# Patient Record
Sex: FEMALE | Race: WHITE | NOT HISPANIC OR LATINO | Employment: OTHER | URBAN - METROPOLITAN AREA
[De-identification: names, ages, dates, MRNs, and addresses within clinical notes are randomized per-mention and may not be internally consistent; named-entity substitution may affect disease eponyms.]

---

## 2020-02-09 ENCOUNTER — HOSPITAL ENCOUNTER (OUTPATIENT)
Facility: HOSPITAL | Age: 80
Discharge: HOME | End: 2020-02-10
Attending: EMERGENCY MEDICINE | Admitting: INTERNAL MEDICINE
Payer: MEDICARE

## 2020-02-09 DIAGNOSIS — H81.393 PERIPHERAL VERTIGO OF BOTH EARS: ICD-10-CM

## 2020-02-09 DIAGNOSIS — R55 SYNCOPE, UNSPECIFIED SYNCOPE TYPE: ICD-10-CM

## 2020-02-09 DIAGNOSIS — R42 VERTIGO: Primary | ICD-10-CM

## 2020-02-09 DIAGNOSIS — H81.4 VERTIGO OF CENTRAL ORIGIN: ICD-10-CM

## 2020-02-09 LAB
ALBUMIN SERPL BCP-MCNC: 4 G/DL (ref 3.5–5.2)
ALP SERPL-CCNC: 85 U/L (ref 55–135)
ALT SERPL W/O P-5'-P-CCNC: 18 U/L (ref 10–44)
AMPHET+METHAMPHET UR QL: NEGATIVE
ANION GAP SERPL CALC-SCNC: 12 MMOL/L (ref 8–16)
APTT PPP: 27.3 SEC (ref 23.6–33.3)
AST SERPL-CCNC: 24 U/L (ref 10–40)
BACTERIA #/AREA URNS HPF: NEGATIVE /HPF
BARBITURATES UR QL SCN>200 NG/ML: NEGATIVE
BASOPHILS # BLD AUTO: 0.01 K/UL (ref 0–0.2)
BASOPHILS NFR BLD: 0.3 % (ref 0–1.9)
BENZODIAZ UR QL SCN>200 NG/ML: NEGATIVE
BILIRUB SERPL-MCNC: 1 MG/DL (ref 0.1–1)
BILIRUB UR QL STRIP: NEGATIVE
BNP SERPL-MCNC: 111 PG/ML (ref 0–99)
BUN SERPL-MCNC: 21 MG/DL (ref 8–23)
BZE UR QL SCN: NEGATIVE
CALCIUM SERPL-MCNC: 9 MG/DL (ref 8.7–10.5)
CANNABINOIDS UR QL SCN: NEGATIVE
CHLORIDE SERPL-SCNC: 102 MMOL/L (ref 95–110)
CK MB SERPL-MCNC: 1.3 NG/ML (ref 0.1–6.5)
CK SERPL-CCNC: 103 U/L (ref 20–180)
CLARITY UR: CLEAR
CO2 SERPL-SCNC: 25 MMOL/L (ref 23–29)
COLOR UR: YELLOW
CREAT SERPL-MCNC: 0.6 MG/DL (ref 0.5–1.4)
CREAT UR-MCNC: 146 MG/DL (ref 15–325)
DIFFERENTIAL METHOD: ABNORMAL
EOSINOPHIL # BLD AUTO: 0 K/UL (ref 0–0.5)
EOSINOPHIL NFR BLD: 0.8 % (ref 0–8)
ERYTHROCYTE [DISTWIDTH] IN BLOOD BY AUTOMATED COUNT: 12.3 % (ref 11.5–14.5)
EST. GFR  (AFRICAN AMERICAN): >60 ML/MIN/1.73 M^2
EST. GFR  (NON AFRICAN AMERICAN): >60 ML/MIN/1.73 M^2
GLUCOSE SERPL-MCNC: 89 MG/DL (ref 70–110)
GLUCOSE UR QL STRIP: NEGATIVE
HCT VFR BLD AUTO: 39.2 % (ref 37–48.5)
HGB BLD-MCNC: 12.9 G/DL (ref 12–16)
HGB UR QL STRIP: ABNORMAL
HYALINE CASTS #/AREA URNS LPF: 25 /LPF
IMM GRANULOCYTES # BLD AUTO: 0.01 K/UL (ref 0–0.04)
IMM GRANULOCYTES NFR BLD AUTO: 0.3 % (ref 0–0.5)
INFLUENZA A, MOLECULAR: NEGATIVE
INFLUENZA B, MOLECULAR: NEGATIVE
INR PPP: 1.1
KETONES UR QL STRIP: ABNORMAL
LEUKOCYTE ESTERASE UR QL STRIP: ABNORMAL
LIPASE SERPL-CCNC: 35 U/L (ref 4–60)
LYMPHOCYTES # BLD AUTO: 0.6 K/UL (ref 1–4.8)
LYMPHOCYTES NFR BLD: 16.1 % (ref 18–48)
MAGNESIUM SERPL-MCNC: 1.9 MG/DL (ref 1.6–2.6)
MCH RBC QN AUTO: 30.1 PG (ref 27–31)
MCHC RBC AUTO-ENTMCNC: 32.9 G/DL (ref 32–36)
MCV RBC AUTO: 91 FL (ref 82–98)
MICROSCOPIC COMMENT: ABNORMAL
MONOCYTES # BLD AUTO: 0.3 K/UL (ref 0.3–1)
MONOCYTES NFR BLD: 7.9 % (ref 4–15)
NEUTROPHILS # BLD AUTO: 2.7 K/UL (ref 1.8–7.7)
NEUTROPHILS NFR BLD: 74.6 % (ref 38–73)
NITRITE UR QL STRIP: NEGATIVE
NRBC BLD-RTO: 0 /100 WBC
OPIATES UR QL SCN: NEGATIVE
PCP UR QL SCN>25 NG/ML: NEGATIVE
PH UR STRIP: 6 [PH] (ref 5–8)
PLATELET # BLD AUTO: 90 K/UL (ref 150–350)
PMV BLD AUTO: 10.4 FL (ref 9.2–12.9)
POTASSIUM SERPL-SCNC: 3.5 MMOL/L (ref 3.5–5.1)
PROT SERPL-MCNC: 6.5 G/DL (ref 6–8.4)
PROT UR QL STRIP: ABNORMAL
PROTHROMBIN TIME: 13.2 SEC (ref 10.6–14.8)
RBC # BLD AUTO: 4.29 M/UL (ref 4–5.4)
RBC #/AREA URNS HPF: 69 /HPF (ref 0–4)
SODIUM SERPL-SCNC: 139 MMOL/L (ref 136–145)
SP GR UR STRIP: 1.02 (ref 1–1.03)
SPECIMEN SOURCE: NORMAL
SQUAMOUS #/AREA URNS HPF: 5 /HPF
TOXICOLOGY INFORMATION: NORMAL
TROPONIN I SERPL DL<=0.01 NG/ML-MCNC: <0.03 NG/ML
TSH SERPL DL<=0.005 MIU/L-ACNC: 1.19 UIU/ML (ref 0.34–5.6)
URN SPEC COLLECT METH UR: ABNORMAL
UROBILINOGEN UR STRIP-ACNC: ABNORMAL EU/DL
WBC # BLD AUTO: 3.55 K/UL (ref 3.9–12.7)
WBC #/AREA URNS HPF: 24 /HPF (ref 0–5)

## 2020-02-09 PROCEDURE — 63600175 PHARM REV CODE 636 W HCPCS: Performed by: EMERGENCY MEDICINE

## 2020-02-09 PROCEDURE — G0378 HOSPITAL OBSERVATION PER HR: HCPCS

## 2020-02-09 PROCEDURE — 84443 ASSAY THYROID STIM HORMONE: CPT

## 2020-02-09 PROCEDURE — 93005 ELECTROCARDIOGRAM TRACING: CPT

## 2020-02-09 PROCEDURE — 81001 URINALYSIS AUTO W/SCOPE: CPT

## 2020-02-09 PROCEDURE — 96361 HYDRATE IV INFUSION ADD-ON: CPT

## 2020-02-09 PROCEDURE — 87502 INFLUENZA DNA AMP PROBE: CPT

## 2020-02-09 PROCEDURE — 83880 ASSAY OF NATRIURETIC PEPTIDE: CPT

## 2020-02-09 PROCEDURE — 85610 PROTHROMBIN TIME: CPT

## 2020-02-09 PROCEDURE — 87086 URINE CULTURE/COLONY COUNT: CPT

## 2020-02-09 PROCEDURE — 84484 ASSAY OF TROPONIN QUANT: CPT

## 2020-02-09 PROCEDURE — 82550 ASSAY OF CK (CPK): CPT

## 2020-02-09 PROCEDURE — A9585 GADOBUTROL INJECTION: HCPCS | Performed by: EMERGENCY MEDICINE

## 2020-02-09 PROCEDURE — 36415 COLL VENOUS BLD VENIPUNCTURE: CPT

## 2020-02-09 PROCEDURE — 99285 EMERGENCY DEPT VISIT HI MDM: CPT | Mod: 25

## 2020-02-09 PROCEDURE — 82553 CREATINE MB FRACTION: CPT

## 2020-02-09 PROCEDURE — 85730 THROMBOPLASTIN TIME PARTIAL: CPT

## 2020-02-09 PROCEDURE — 96374 THER/PROPH/DIAG INJ IV PUSH: CPT | Mod: 59

## 2020-02-09 PROCEDURE — 83735 ASSAY OF MAGNESIUM: CPT

## 2020-02-09 PROCEDURE — 25000003 PHARM REV CODE 250: Performed by: EMERGENCY MEDICINE

## 2020-02-09 PROCEDURE — 25500020 PHARM REV CODE 255: Performed by: EMERGENCY MEDICINE

## 2020-02-09 PROCEDURE — 83690 ASSAY OF LIPASE: CPT

## 2020-02-09 PROCEDURE — 25000003 PHARM REV CODE 250: Performed by: INTERNAL MEDICINE

## 2020-02-09 PROCEDURE — 85025 COMPLETE CBC W/AUTO DIFF WBC: CPT

## 2020-02-09 PROCEDURE — 80053 COMPREHEN METABOLIC PANEL: CPT

## 2020-02-09 PROCEDURE — 80307 DRUG TEST PRSMV CHEM ANLYZR: CPT

## 2020-02-09 RX ORDER — NAPROXEN SODIUM 220 MG/1
81 TABLET, FILM COATED ORAL DAILY
Status: ON HOLD | COMMUNITY
End: 2021-01-20 | Stop reason: HOSPADM

## 2020-02-09 RX ORDER — LORAZEPAM 2 MG/ML
0.5 INJECTION INTRAMUSCULAR
Status: COMPLETED | OUTPATIENT
Start: 2020-02-09 | End: 2020-02-09

## 2020-02-09 RX ORDER — MECLIZINE HCL 12.5 MG 12.5 MG/1
25 TABLET ORAL
Status: COMPLETED | OUTPATIENT
Start: 2020-02-09 | End: 2020-02-09

## 2020-02-09 RX ORDER — HYDROCHLOROTHIAZIDE 12.5 MG/1
50 CAPSULE ORAL DAILY
Status: ON HOLD | COMMUNITY
End: 2021-01-20 | Stop reason: HOSPADM

## 2020-02-09 RX ORDER — GADOBUTROL 604.72 MG/ML
8 INJECTION INTRAVENOUS
Status: COMPLETED | OUTPATIENT
Start: 2020-02-09 | End: 2020-02-09

## 2020-02-09 RX ORDER — NAPROXEN SODIUM 220 MG/1
324 TABLET, FILM COATED ORAL
Status: COMPLETED | OUTPATIENT
Start: 2020-02-09 | End: 2020-02-09

## 2020-02-09 RX ORDER — GUAIFENESIN 100 MG/5ML
200 SOLUTION ORAL EVERY 6 HOURS PRN
Status: DISCONTINUED | OUTPATIENT
Start: 2020-02-09 | End: 2020-02-10 | Stop reason: HOSPADM

## 2020-02-09 RX ADMIN — GUAIFENESIN 200 MG: 200 SOLUTION ORAL at 10:02

## 2020-02-09 RX ADMIN — GADOBUTROL 8 ML: 604.72 INJECTION INTRAVENOUS at 06:02

## 2020-02-09 RX ADMIN — ASPIRIN 81 MG 324 MG: 81 TABLET ORAL at 02:02

## 2020-02-09 RX ADMIN — LORAZEPAM 0.5 MG: 2 INJECTION INTRAMUSCULAR; INTRAVENOUS at 12:02

## 2020-02-09 RX ADMIN — SODIUM CHLORIDE 500 ML: 0.9 INJECTION, SOLUTION INTRAVENOUS at 11:02

## 2020-02-09 RX ADMIN — MECLIZINE HYDROCHLORIDE 25 MG: 12.5 TABLET ORAL at 12:02

## 2020-02-09 NOTE — CONSULTS
[facility]  Neurology  Consult Note    Patient Name: Irene العراقي  MRN: [id number]  Admission Date: 2/9/2020  Hospital Length of Stay: 0 days  Code Status: No Order   Attending Provider: Vel Johnson MD   Consulting Provider: Lianne Greco DNP  Primary Care Physician: Primary Doctor No  Principal Problem:Peripheral vertigo of both ears    Consults  Subjective:     Chief Complaint:   Dizziness x1day, cough, ear problem     HPI: Patient is visiting family in [facility].  Over last 4 days she reports a lot of nasal congestion and nonproductive cough.  She woke up today feeling like her ears were stuffed up.  There is no tinnitus.  Patient reports when she moves her head and sits up she feels off balance like a spinning sensation.  She has no syncope.  No headache. No similar symptoms in the past.  There is no fever or chills. No pleurisy or hemoptysis.  No weakness. No paresthesias.  No trouble with vision or speech.    INTERVAL HISTORY: Patient seen and examined with in ED with Dr. Desmond Hair. She states she has been experiencing dizziness for 24 hours, therefore she is not a tPA candidate. On exam, she does not exhibit focal weakness, facial asymmetry, or ataxia. She denies paresthesia and visual changes.     CRT:   0.6  LDL: pending   Hgb A1c: pending     Brain imaging:  MRI brain: pending   MRA brain: pending       Neck imaging:  MRA neck: pending     Heart imaging:  TTE with bubble: pending     EEG: pending       Past Medical History:   Diagnosis Date    Hypertension        History reviewed. No pertinent surgical history.    Review of patient's allergies indicates:   Allergen Reactions    Meperidine hcl Nausea And Vomiting    Persantine [dipyridamole]     Vicodin [hydrocodone-acetaminophen] Nausea And Vomiting       Current Medications:     No current facility-administered medications on file prior to encounter.      Current Outpatient Medications on File Prior to Encounter   Medication Sig     aspirin 81 MG Chew Take 81 mg by mouth once daily.    hydroCHLOROthiazide (MICROZIDE) 12.5 mg capsule Take 12.5 mg by mouth once daily.      Family History     None        Tobacco Use    Smoking status: Never Smoker   Substance and Sexual Activity    Alcohol use: Not on file    Drug use: Not on file    Sexual activity: Not on file     Review of Systems   Constitutional: Negative for chills and fever.   HENT: Positive for congestion.    Eyes: Negative for visual disturbance.   Respiratory: Negative for shortness of breath.    Cardiovascular: Negative for chest pain and palpitations.   Gastrointestinal: Negative for abdominal pain and vomiting.   Genitourinary: Negative for dysuria.   Musculoskeletal: Negative for joint swelling.   Neurological: Negative for headaches.   Psychiatric/Behavioral: Negative for confusion.     Objective:     Vital Signs (Most Recent):  Temp: 98.4 °F (36.9 °C) (02/09/20 1004)  Pulse: 93 (02/09/20 1530)  Resp: (!) 34 (02/09/20 1100)  BP: (!) 148/66 (02/09/20 1530)  SpO2: (!) 88 % (02/09/20 1530) Vital Signs (24h Range):  Temp:  [98.4 °F (36.9 °C)] 98.4 °F (36.9 °C)  Pulse:  [76-94] 93  Resp:  [20-34] 34  SpO2:  [88 %-96 %] 88 %  BP: (135-148)/(63-70) 148/66     Weight: 79.4 kg (175 lb)  Body mass index is 28.25 kg/m².    Physical Exam   Constitutional: She is oriented to person, place, and time.   Neurological: She is oriented to person, place, and time. She has normal strength. She has a normal Finger-Nose-Finger Test.   Psychiatric: Her speech is normal.     Nursing note and vitals reviewed.  Constitutional: She is not diaphoretic. No distress.   HENT:   Head: Normocephalic and atraumatic.   Eyes: Conjunctivae and EOM are normal. Pupils are equal, round, and reactive to light.   No nystagmus   Neck: Normal range of motion.   Cardiovascular: Normal rate.   Pulmonary/Chest: Breath sounds normal.   Abdominal: Soft. There is no tenderness.   Musculoskeletal: Normal range of motion.    Neurological: She is alert and oriented to person, place, and time. She has normal strength. No cranial nerve deficit or sensory deficit.   Finger-to-nose is normal   Skin: No rash noted.   Psychiatric: She has a normal mood and affect.   NEUROLOGICAL EXAMINATION:     MENTAL STATUS   Oriented to person, place, and time.   Follows 2 step commands.   Attention: normal. Concentration: normal.   Speech: speech is normal   Level of consciousness: alert  Able to repeat. Normal comprehension.     CRANIAL NERVES   Cranial nerves II through XII intact.     MOTOR EXAM     Strength   Strength 5/5 throughout.     SENSORY EXAM   Light touch normal.     GAIT AND COORDINATION      Coordination   Finger to nose coordination: normal      Significant Labs:   CBC:   Recent Labs   Lab 02/09/20  1045   WBC 3.55*   HGB 12.9   HCT 39.2   PLT 90*     CMP:   Recent Labs   Lab 02/09/20  1045   GLU 89      K 3.5      CO2 25   BUN 21   CREATININE 0.6   CALCIUM 9.0   MG 1.9   PROT 6.5   ALBUMIN 4.0   BILITOT 1.0   ALKPHOS 85   AST 24   ALT 18   ANIONGAP 12   EGFRNONAA >60.0     All pertinent lab results from the past 24 hours have been reviewed.    Significant Imaging: I have reviewed all pertinent imaging results/findings within the past 24 hours.    Assessment and Plan:  1. Dizziness  -MRI/A brain w/o contrast   -MRA neck w/o contrast   -TTE   -CUS  -EEG     2. R/O TIA/Stroke  -as noted above   -Aspirin 81 mg po once daily     Patient to follow up with [facility] at [phone] within 2 weeks from discharge.       Active Diagnoses:    Diagnosis Date Noted POA    PRINCIPAL PROBLEM:  Peripheral vertigo of both ears [H81.393] 02/09/2020 Yes    Vertigo [R42] 02/09/2020 Yes      Problems Resolved During this Admission:       VTE Risk Mitigation (From admission, onward)    None          Thank you for your consult. I will follow-up with patient. Please contact us if you have any additional questions.    Lianne Greco,  DNP  Neurology  [facility]    I, Dr. Desmond Hair, have personally seen and examined the patient with my advanced provider and agree with above. I personally did a focused exam, and reviewed all necessary clinical information. I discussed my management plan with my NP and agree with above. Will need stroke work up. Stroke education given.

## 2020-02-09 NOTE — ED PROVIDER NOTES
Encounter Date: 2/9/2020       History     Chief Complaint   Patient presents with    Dizziness     X 1 DAY    Cough    Ear Problem     BILAT EARS CLOGGED     Patient is visiting family in town.  Over last 4 days she reports a lot of nasal congestion and nonproductive cough.  She woke up today feeling like her ears were stuffed up.  There is no tinnitus.  Patient reports when she moves her head and sits up she feels off balance like a spinning sensation.  She has no syncope.  No headache. No similar symptoms in the past.  There is no fever or chills. No pleurisy or hemoptysis.  No weakness. No paresthesias.  No trouble with vision or speech.        Review of patient's allergies indicates:   Allergen Reactions    Persantine [dipyridamole]      Past Medical History:   Diagnosis Date    Hypertension      No past surgical history on file.  No family history on file.  Social History     Tobacco Use    Smoking status: Not on file   Substance Use Topics    Alcohol use: Not on file    Drug use: Not on file     Review of Systems   Constitutional: Negative for chills and fever.   HENT: Positive for congestion.    Eyes: Negative for visual disturbance.   Respiratory: Negative for shortness of breath.    Cardiovascular: Negative for chest pain and palpitations.   Gastrointestinal: Negative for abdominal pain and vomiting.   Genitourinary: Negative for dysuria.   Musculoskeletal: Negative for joint swelling.   Neurological: Negative for headaches.   Psychiatric/Behavioral: Negative for confusion.       Physical Exam     Initial Vitals [02/09/20 1004]   BP Pulse Resp Temp SpO2   (!) 141/70 79 20 98.4 °F (36.9 °C) (!) 94 %      MAP       --         Physical Exam    Nursing note and vitals reviewed.  Constitutional: She is not diaphoretic. No distress.   HENT:   Head: Normocephalic and atraumatic.   Eyes: Conjunctivae and EOM are normal. Pupils are equal, round, and reactive to light.   No nystagmus   Neck: Normal range of  motion.   Cardiovascular: Normal rate.   Pulmonary/Chest: Breath sounds normal.   Abdominal: Soft. There is no tenderness.   Musculoskeletal: Normal range of motion.   Neurological: She is alert and oriented to person, place, and time. She has normal strength. No cranial nerve deficit or sensory deficit.   Finger-to-nose is normal   Skin: No rash noted.   Psychiatric: She has a normal mood and affect.         ED Course   Procedures  Labs Reviewed   CBC W/ AUTO DIFFERENTIAL - Abnormal; Notable for the following components:       Result Value    WBC 3.55 (*)     Platelets 90 (*)     All other components within normal limits   APTT   COMPREHENSIVE METABOLIC PANEL   LIPASE   MAGNESIUM   PROTIME-INR   TROPONIN I   CK   INFLUENZA A AND B ANTIGEN    Narrative:     Specimen Source->Nasopharyngeal Swab   DRUG SCREEN PANEL, URINE EMERGENCY   TSH   URINALYSIS, REFLEX TO URINE CULTURE   CK-MB   POCT GLUCOSE MONITORING CONTINUOUS          Imaging Results          CT Head Without Contrast (Final result)  Result time 02/09/20 11:23:03    Final result by Pasquale Capone MD (02/09/20 11:23:03)                 Impression:      No CT evidence of acute intracranial pathology.    Mild white matter microangiopathic changes.    Findings suggesting left maxillary sinusitis.      Electronically signed by: Pasquale Capone MD  Date:    02/09/2020  Time:    11:23             Narrative:    EXAMINATION:  CT HEAD WITHOUT CONTRAST    CLINICAL HISTORY:  Headache, post trauma;Weakness;    TECHNIQUE:  CMS Mandated Quality Data-CT Radiation Dose-436    All CT scans at this facility dose modulation, iterative reconstruction, and or weight-based dosing when appropriate to reduce radiation dose to as low as reasonably achievable.    COMPARISON:  None    FINDINGS:  Negative for acute intracranial hemorrhage, midline shift, or mass effect.  Ventricles and sulci are normal in size.  Scattered white-matter hypodensities are nonspecific, but most commonly  associated with microangiopathic change in patients of this age group.  Cerebellar hemispheres and brainstem are unremarkable.    There are no calvarial lesion or fracture.  Mastoid air cells are clear.  Small air-fluid level within the left maxillary sinus.  Polypoid mucosal thickening of the left maxillary sinus inferiorly.                               X-Ray Chest AP Portable (In process)                  Medical Decision Making:   History:   Old Medical Records: I decided to obtain old medical records.  Clinical Tests:   Lab Tests: Reviewed  Radiological Study: Reviewed  Medical Tests: Reviewed  ED Management:  Patient daughter is now at bedside.  Reportedly patient had a complete syncopal episode approximately 2-3 days ago witnessed by patient's son.  There is no seizure-like activity.  Patient is amnestic of the event.  Today very difficult to determine as patient having presyncope versus vertigo.  Patient continues with significant symptoms with sitting up or walking.  Patient essentially unable to walk.  Discussed with Dr. Desmond Hair with Neurology.  He desires MRI for further evaluation of possible posterior stroke.  Hospitalist consulted for admission.  Aspirin given for possible stroke.                                 Clinical Impression:       ICD-10-CM ICD-9-CM   1. Vertigo R42 780.4   2. Syncope, unspecified syncope type R55 780.2                             Vel Johnson MD  02/09/20 [id number]

## 2020-02-10 ENCOUNTER — CLINICAL SUPPORT (OUTPATIENT)
Dept: CARDIOLOGY | Facility: HOSPITAL | Age: 80
End: 2020-02-10
Attending: INTERNAL MEDICINE
Payer: MEDICARE

## 2020-02-10 VITALS
WEIGHT: 173.94 LBS | HEART RATE: 79 BPM | HEIGHT: 66 IN | DIASTOLIC BLOOD PRESSURE: 56 MMHG | SYSTOLIC BLOOD PRESSURE: 100 MMHG | RESPIRATION RATE: 18 BRPM | OXYGEN SATURATION: 90 % | TEMPERATURE: 98 F | BODY MASS INDEX: 27.95 KG/M2

## 2020-02-10 PROBLEM — R42 VERTIGO: Status: RESOLVED | Noted: 2020-02-09 | Resolved: 2020-02-10

## 2020-02-10 LAB
ANION GAP SERPL CALC-SCNC: 8 MMOL/L (ref 8–16)
BUN SERPL-MCNC: 21 MG/DL (ref 8–23)
CALCIUM SERPL-MCNC: 8.6 MG/DL (ref 8.7–10.5)
CHLORIDE SERPL-SCNC: 104 MMOL/L (ref 95–110)
CHOLEST SERPL-MCNC: 173 MG/DL (ref 120–199)
CHOLEST/HDLC SERPL: 2.2 {RATIO} (ref 2–5)
CO2 SERPL-SCNC: 25 MMOL/L (ref 23–29)
CREAT SERPL-MCNC: 0.5 MG/DL (ref 0.5–1.4)
EST. GFR  (AFRICAN AMERICAN): >60 ML/MIN/1.73 M^2
EST. GFR  (NON AFRICAN AMERICAN): >60 ML/MIN/1.73 M^2
ESTIMATED AVG GLUCOSE: 105 MG/DL (ref 68–131)
GLUCOSE SERPL-MCNC: 90 MG/DL (ref 70–110)
HBA1C MFR BLD HPLC: 5.3 % (ref 4.5–6.2)
HDLC SERPL-MCNC: 78 MG/DL (ref 40–75)
HDLC SERPL: 45.1 % (ref 20–50)
LDLC SERPL CALC-MCNC: 87 MG/DL (ref 63–159)
NONHDLC SERPL-MCNC: 95 MG/DL
POTASSIUM SERPL-SCNC: 3.5 MMOL/L (ref 3.5–5.1)
SODIUM SERPL-SCNC: 137 MMOL/L (ref 136–145)
TRIGL SERPL-MCNC: 40 MG/DL (ref 30–150)

## 2020-02-10 PROCEDURE — G0378 HOSPITAL OBSERVATION PER HR: HCPCS

## 2020-02-10 PROCEDURE — 80061 LIPID PANEL: CPT

## 2020-02-10 PROCEDURE — 80048 BASIC METABOLIC PNL TOTAL CA: CPT

## 2020-02-10 PROCEDURE — 95819 EEG AWAKE AND ASLEEP: CPT

## 2020-02-10 PROCEDURE — 83036 HEMOGLOBIN GLYCOSYLATED A1C: CPT

## 2020-02-10 PROCEDURE — 36415 COLL VENOUS BLD VENIPUNCTURE: CPT

## 2020-02-10 PROCEDURE — 93306 TTE W/DOPPLER COMPLETE: CPT

## 2020-02-10 PROCEDURE — 25000003 PHARM REV CODE 250: Performed by: INTERNAL MEDICINE

## 2020-02-10 RX ORDER — POTASSIUM CHLORIDE 7.45 MG/ML
40 INJECTION INTRAVENOUS
Status: DISCONTINUED | OUTPATIENT
Start: 2020-02-10 | End: 2020-02-10 | Stop reason: HOSPADM

## 2020-02-10 RX ORDER — ENOXAPARIN SODIUM 100 MG/ML
40 INJECTION SUBCUTANEOUS EVERY 24 HOURS
Status: DISCONTINUED | OUTPATIENT
Start: 2020-02-10 | End: 2020-02-10 | Stop reason: HOSPADM

## 2020-02-10 RX ORDER — MAGNESIUM SULFATE HEPTAHYDRATE 40 MG/ML
4 INJECTION, SOLUTION INTRAVENOUS
Status: DISCONTINUED | OUTPATIENT
Start: 2020-02-10 | End: 2020-02-10 | Stop reason: HOSPADM

## 2020-02-10 RX ORDER — POTASSIUM CHLORIDE 20 MEQ/1
20 TABLET, EXTENDED RELEASE ORAL
Status: DISCONTINUED | OUTPATIENT
Start: 2020-02-10 | End: 2020-02-10 | Stop reason: HOSPADM

## 2020-02-10 RX ORDER — POTASSIUM CHLORIDE 20 MEQ/1
40 TABLET, EXTENDED RELEASE ORAL
Status: DISCONTINUED | OUTPATIENT
Start: 2020-02-10 | End: 2020-02-10 | Stop reason: HOSPADM

## 2020-02-10 RX ORDER — LANOLIN ALCOHOL/MO/W.PET/CERES
800 CREAM (GRAM) TOPICAL
Status: DISCONTINUED | OUTPATIENT
Start: 2020-02-10 | End: 2020-02-10 | Stop reason: HOSPADM

## 2020-02-10 RX ORDER — POTASSIUM CHLORIDE 7.45 MG/ML
20 INJECTION INTRAVENOUS
Status: DISCONTINUED | OUTPATIENT
Start: 2020-02-10 | End: 2020-02-10 | Stop reason: HOSPADM

## 2020-02-10 RX ORDER — HYDROCHLOROTHIAZIDE 12.5 MG/1
12.5 TABLET ORAL DAILY
Status: DISCONTINUED | OUTPATIENT
Start: 2020-02-10 | End: 2020-02-10 | Stop reason: HOSPADM

## 2020-02-10 RX ORDER — ACETAMINOPHEN 325 MG/1
650 TABLET ORAL EVERY 8 HOURS PRN
Status: DISCONTINUED | OUTPATIENT
Start: 2020-02-10 | End: 2020-02-10 | Stop reason: HOSPADM

## 2020-02-10 RX ORDER — MAGNESIUM SULFATE HEPTAHYDRATE 40 MG/ML
2 INJECTION, SOLUTION INTRAVENOUS
Status: DISCONTINUED | OUTPATIENT
Start: 2020-02-10 | End: 2020-02-10 | Stop reason: HOSPADM

## 2020-02-10 RX ORDER — NAPROXEN SODIUM 220 MG/1
81 TABLET, FILM COATED ORAL DAILY
Status: DISCONTINUED | OUTPATIENT
Start: 2020-02-10 | End: 2020-02-10 | Stop reason: HOSPADM

## 2020-02-10 RX ORDER — MAGNESIUM SULFATE 1 G/100ML
1 INJECTION INTRAVENOUS
Status: DISCONTINUED | OUTPATIENT
Start: 2020-02-10 | End: 2020-02-10 | Stop reason: HOSPADM

## 2020-02-10 RX ADMIN — ACETAMINOPHEN 650 MG: 325 TABLET ORAL at 04:02

## 2020-02-10 RX ADMIN — POTASSIUM CHLORIDE 40 MEQ: 20 TABLET, EXTENDED RELEASE ORAL at 02:02

## 2020-02-10 RX ADMIN — GUAIFENESIN 200 MG: 200 SOLUTION ORAL at 10:02

## 2020-02-10 RX ADMIN — ASPIRIN 81 MG 81 MG: 81 TABLET ORAL at 10:02

## 2020-02-10 RX ADMIN — HYDROCHLOROTHIAZIDE 12.5 MG: 12.5 TABLET ORAL at 10:02

## 2020-02-10 NOTE — DISCHARGE SUMMARY
"[facility] Medicine  Discharge Summary      Patient Name: Irene العراقي  MRN: [id number]  Admission Date: 2/9/2020  Hospital Length of Stay: 0 days  Discharge Date and Time:  02/10/2020 3:28 PM  Attending Physician: Osmani Bello MD   Discharging Provider: Osmani Bello MD  Primary Care Provider: Primary Doctor No      HPI:   This is note for 02/09/2020    79 yerar old female presented to ED complaining ov worsening vertigo over the past 2-3 days. She cannot stand upright. She came to the ED after she experienced pre-syncope like symptoms. The patient did not lose consciousness, but required her son to hold on to her has she fell backwards onto the bed. No post ictal symptoms. No HA/vision changes. She has ringing in her ears but this is not new. She was not able to walk in the ED because of lack of balance. No LOP.  In ED: CT Head was normal. Labs were normal. She is being admitted for MRI, EEG, and MRA    * No surgery found *      Hospital Course:   79 yerar old female presented to ED complaining ov worsening vertigo over the past 2-3 days. She cannot stand upright. She came to the ED after she experienced pre-syncope like symptoms. The patient did not lose consciousness, but required her son to hold on to her has she fell backwards onto the bed. No post ictal symptoms. No HA/vision changes. She has ringing in her ears but this is not new. She was not able to walk in the ED because of lack of balance. No LOP.  In ED: CT Head was normal. Labs were normal. She is being admitted for MRI, EEG, and MRA    02/10 Patient has had a negative work-up in hospital. She feels "Really good" and would like to be discharged home with outpatient follow-up. No further dizzines. No HA/vision changes. No CP/SOB.  VSS  Lungs: vesicualr without adventitious sounds  Heart S1S2 reg  Abdo: soft  Neuro: moves all extremities spontaneously against gravity     Consults:   Consults (From admission, onward)        " Status Ordering Provider     Inpatient consult to Hospitalist  Once     Provider:  Daniela Parsons MD    Acknowledged GILES SANCHEZ     Inpatient consult to Neurology  Once     Provider:  Karl Hair MD    Acknowledged DANIELA PARSONS          No new Assessment & Plan notes have been filed under this hospital service since the last note was generated.  Service: Hospital Medicine    Final Active Diagnoses:    Diagnosis Date Noted POA    PRINCIPAL PROBLEM:  Peripheral vertigo of both ears [H81.393] 02/09/2020 Yes      Problems Resolved During this Admission:    Diagnosis Date Noted Date Resolved POA    Vertigo [R42] 02/09/2020 02/10/2020 Yes       Discharged Condition: good    Disposition: Home or Self Care    Follow Up:    Patient Instructions:      Diet Cardiac     Activity as tolerated       Significant Diagnostic Studies: Labs: All labs within the past 24 hours have been reviewed    Pending Diagnostic Studies:     Procedure Component Value Units Date/Time    Echo Color Flow Doppler? Yes; Bubble Contrast? Yes [[id number]] Resulted:  02/10/20 1414    Order Status:  Sent Lab Status:  In process Updated:  02/10/20 1417     BSA 1.92 m2      TDI SEPTAL 0.04 m/s      LV LATERAL E/E' RATIO 16.17 m/s      LV SEPTAL E/E' RATIO 24.25 m/s      AORTIC VALVE CUSP SEPERATION 1.17 cm      TDI LATERAL 0.06 m/s      PV PEAK VELOCITY 85.56 cm/s      LVIDD 4.25 cm      IVS 0.93 cm      PW 0.93 cm      Ao root annulus 3.09 cm      LVIDS 2.56 cm      FS 40 %      LV mass 126.51 g      LA size 2.81 cm      Left Ventricle Relative Wall Thickness 0.44 cm      AV mean gradient 17 mmHg      AV valve area 1.54 cm2      AV Velocity Ratio 41.90     AV index (prosthetic) 0.48     E/A ratio 0.74     Mean e' 0.05 m/s      E wave decelartion time 414.39 msec      LVOT diameter 2.03 cm      LVOT area 3.2 cm2      LVOT peak cade 108.53 m/s      LVOT peak VTI 28.03 cm      Ao peak cade 2.59 m/s      Ao VTI 58.80 cm      LVOT stroke volume  90.67 cm3      AV peak gradient 27 mmHg      E/E' ratio 19.40 m/s      MV Peak E Javad 0.97 m/s      TR Max Javad 2.55 m/s      MV Peak A Javad 1.31 m/s      LV Mass Index 67 g/m2      Triscuspid Valve Regurgitation Peak Gradient 26 mmHg     Narrative:       · Concentric left ventricular remodeling.       Impression:                Medications:  Reconciled Home Medications:      Medication List      CONTINUE taking these medications    aspirin 81 MG Chew  Take 81 mg by mouth once daily.     hydroCHLOROthiazide 12.5 mg capsule  Commonly known as:  MICROZIDE  Take 12.5 mg by mouth once daily.            Indwelling Lines/Drains at time of discharge:   Lines/Drains/Airways     None                 Time spent on the discharge of patient: 32 minutes  Patient was seen and examined on the date of discharge and determined to be suitable for discharge.         Osmani Bello MD  Department of Hospital Medicine  [facility]

## 2020-02-10 NOTE — HPI
This is note for 02/09/2020    79 yerar old female presented to ED complaining ov worsening vertigo over the past 2-3 days. She cannot stand upright. She came to the ED after she experienced pre-syncope like symptoms. The patient did not lose consciousness, but required her son to hold on to her has she fell backwards onto the bed. No post ictal symptoms. No HA/vision changes. She has ringing in her ears but this is not new. She was not able to walk in the ED because of lack of balance. No LOP.  In ED: CT Head was normal. Labs were normal. She is being admitted for MRI, EEG, and MRA

## 2020-02-10 NOTE — HOSPITAL COURSE
"79 yerar old female presented to ED complaining ov worsening vertigo over the past 2-3 days. She cannot stand upright. She came to the ED after she experienced pre-syncope like symptoms. The patient did not lose consciousness, but required her son to hold on to her has she fell backwards onto the bed. No post ictal symptoms. No HA/vision changes. She has ringing in her ears but this is not new. She was not able to walk in the ED because of lack of balance. No LOP.  In ED: CT Head was normal. Labs were normal. She is being admitted for MRI, EEG, and MRA    02/10 Patient has had a negative work-up in hospital. She feels "Really good" and would like to be discharged home with outpatient follow-up. No further dizzines. No HA/vision changes. No CP/SOB.  VSS  Lungs: vesicualr without adventitious sounds  Heart S1S2 reg  Abdo: soft  Neuro: moves all extremities spontaneously against gravity  "

## 2020-02-10 NOTE — NURSING
Went over discharge information with the patient and family answered questions they had at that time. Removed PIV without difficulty tip intact. Patient refused wheel chair and walked out to car where family drove patient home.

## 2020-02-10 NOTE — PLAN OF CARE
02/10/20 0916   ZIMMERMAN Message   Medicare Outpatient and Observation Notification regarding financial responsibility Given to patient/caregiver;Explained to patient/caregiver;Signed/date by patient/caregiver   Date ZIMMERMAN was signed 02/10/20   Time ZIMMERMAN was signed 0912     SW met with Pt at bedside to discuss observation status.  ZIMMERMAN Form explained and signed by Pt.  Copy and information sheet left at bedside, and original will be scanned to chart.  Pt verbalized no further questions at this time.

## 2020-02-10 NOTE — SUBJECTIVE & OBJECTIVE
Past Medical History:   Diagnosis Date    Hypertension        History reviewed. No pertinent surgical history.    Review of patient's allergies indicates:   Allergen Reactions    Meperidine hcl Nausea And Vomiting    Persantine [dipyridamole]     Vicodin [hydrocodone-acetaminophen] Nausea And Vomiting       No current facility-administered medications on file prior to encounter.      Current Outpatient Medications on File Prior to Encounter   Medication Sig    aspirin 81 MG Chew Take 81 mg by mouth once daily.    hydroCHLOROthiazide (MICROZIDE) 12.5 mg capsule Take 12.5 mg by mouth once daily.     Family History     None        Tobacco Use    Smoking status: Never Smoker   Substance and Sexual Activity    Alcohol use: Not on file    Drug use: Not on file    Sexual activity: Not on file     Review of Systems   Constitutional: Negative.    HENT: Negative.    Eyes: Negative.    Respiratory: Negative.    Cardiovascular: Negative.    Gastrointestinal: Negative.    Endocrine: Negative.    Genitourinary: Negative.    Musculoskeletal: Negative.    Skin: Negative.    Allergic/Immunologic: Negative.    Neurological: Positive for dizziness.   Hematological: Negative.    All other systems reviewed and are negative.    Objective:     Vital Signs (Most Recent):  Temp: 98 °F (36.7 °C) (02/10/20 0500)  Pulse: 86 (02/10/20 0500)  Resp: 20 (02/10/20 0500)  BP: 136/64 (02/10/20 0500)  SpO2: (!) 90 % (02/10/20 0500) Vital Signs (24h Range):  Temp:  [98 °F (36.7 °C)-99.9 °F (37.7 °C)] 98 °F (36.7 °C)  Pulse:  [] 86  Resp:  [18-34] 20  SpO2:  [88 %-96 %] 90 %  BP: (131-148)/(60-70) 136/64     Weight: 78.9 kg (173 lb 15.1 oz)  Body mass index is 28.08 kg/m².    Physical Exam   Constitutional: She is oriented to person, place, and time. She appears well-developed and well-nourished.   HENT:   Head: Normocephalic and atraumatic.   Eyes: Pupils are equal, round, and reactive to light. Conjunctivae and EOM are normal.   Neck:  Normal range of motion. Neck supple.   Cardiovascular: Normal rate, regular rhythm, normal heart sounds and intact distal pulses.   Pulmonary/Chest: Effort normal and breath sounds normal.   Abdominal: Soft. Bowel sounds are normal.   Musculoskeletal: Normal range of motion.   Neurological: She is alert and oriented to person, place, and time.   Skin: Skin is warm and dry. Capillary refill takes less than 2 seconds.   Psychiatric: She has a normal mood and affect. Her behavior is normal. Judgment and thought content normal.   Nursing note and vitals reviewed.        CRANIAL NERVES     CN III, IV, VI   Pupils are equal, round, and reactive to light.  Extraocular motions are normal.        Significant Labs:   BMP:   Recent Labs   Lab 02/09/20  1045 02/10/20  0427   GLU 89 90    137   K 3.5 3.5    104   CO2 25 25   BUN 21 21   CREATININE 0.6 0.5   CALCIUM 9.0 8.6*   MG 1.9  --      CBC:   Recent Labs   Lab 02/09/20  1045   WBC 3.55*   HGB 12.9   HCT 39.2   PLT 90*       Significant Imaging: I have reviewed and interpreted all pertinent imaging results/findings within the past 24 hours.

## 2020-02-10 NOTE — PLAN OF CARE
02/10/20 1607   Final Note   Assessment Type Final Discharge Note   Anticipated Discharge Disposition Home     Pt discharged home this date and discharge orders reviewed.  No SS / CM needs noted at this time

## 2020-02-10 NOTE — H&P
[facility] Medicine  History & Physical    Patient Name: Irene العراقي  MRN: [id number]  Admission Date: 2/9/2020  Attending Physician: Osmani Bello MD  Primary Care Provider: Primary Doctor No         Patient information was obtained from patient and ER records.     Subjective:     Principal Problem:Peripheral vertigo of both ears    Chief Complaint:   Chief Complaint   Patient presents with    Dizziness     X 1 DAY    Cough    Ear Problem     BILAT EARS CLOGGED        HPI: This is note for 02/09/2020    79 yerar old female presented to ED complaining ov worsening vertigo over the past 2-3 days. She cannot stand upright. She came to the ED after she experienced pre-syncope like symptoms. The patient did not lose consciousness, but required her son to hold on to her has she fell backwards onto the bed. No post ictal symptoms. No HA/vision changes. She has ringing in her ears but this is not new. She was not able to walk in the ED because of lack of balance. No LOP.  In ED: CT Head was normal. Labs were normal. She is being admitted for MRI, EEG, and MRA    Past Medical History:   Diagnosis Date    Hypertension        History reviewed. No pertinent surgical history.    Review of patient's allergies indicates:   Allergen Reactions    Meperidine hcl Nausea And Vomiting    Persantine [dipyridamole]     Vicodin [hydrocodone-acetaminophen] Nausea And Vomiting       No current facility-administered medications on file prior to encounter.      Current Outpatient Medications on File Prior to Encounter   Medication Sig    aspirin 81 MG Chew Take 81 mg by mouth once daily.    hydroCHLOROthiazide (MICROZIDE) 12.5 mg capsule Take 12.5 mg by mouth once daily.     Family History     None        Tobacco Use    Smoking status: Never Smoker   Substance and Sexual Activity    Alcohol use: Not on file    Drug use: Not on file    Sexual activity: Not on file     Review of Systems   Constitutional:  Negative.    HENT: Negative.    Eyes: Negative.    Respiratory: Negative.    Cardiovascular: Negative.    Gastrointestinal: Negative.    Endocrine: Negative.    Genitourinary: Negative.    Musculoskeletal: Negative.    Skin: Negative.    Allergic/Immunologic: Negative.    Neurological: Positive for dizziness.   Hematological: Negative.    All other systems reviewed and are negative.    Objective:     Vital Signs (Most Recent):  Temp: 98 °F (36.7 °C) (02/10/20 0500)  Pulse: 86 (02/10/20 0500)  Resp: 20 (02/10/20 0500)  BP: 136/64 (02/10/20 0500)  SpO2: (!) 90 % (02/10/20 0500) Vital Signs (24h Range):  Temp:  [98 °F (36.7 °C)-99.9 °F (37.7 °C)] 98 °F (36.7 °C)  Pulse:  [] 86  Resp:  [18-34] 20  SpO2:  [88 %-96 %] 90 %  BP: (131-148)/(60-70) 136/64     Weight: 78.9 kg (173 lb 15.1 oz)  Body mass index is 28.08 kg/m².    Physical Exam   Constitutional: She is oriented to person, place, and time. She appears well-developed and well-nourished.   HENT:   Head: Normocephalic and atraumatic.   Eyes: Pupils are equal, round, and reactive to light. Conjunctivae and EOM are normal.   Neck: Normal range of motion. Neck supple.   Cardiovascular: Normal rate, regular rhythm, normal heart sounds and intact distal pulses.   Pulmonary/Chest: Effort normal and breath sounds normal.   Abdominal: Soft. Bowel sounds are normal.   Musculoskeletal: Normal range of motion.   Neurological: She is alert and oriented to person, place, and time.   Skin: Skin is warm and dry. Capillary refill takes less than 2 seconds.   Psychiatric: She has a normal mood and affect. Her behavior is normal. Judgment and thought content normal.   Nursing note and vitals reviewed.        CRANIAL NERVES     CN III, IV, VI   Pupils are equal, round, and reactive to light.  Extraocular motions are normal.        Significant Labs:   BMP:   Recent Labs   Lab 02/09/20  1045 02/10/20  0427   GLU 89 90    137   K 3.5 3.5    104   CO2 25 25   BUN 21 21    CREATININE 0.6 0.5   CALCIUM 9.0 8.6*   MG 1.9  --      CBC:   Recent Labs   Lab 02/09/20  1045   WBC 3.55*   HGB 12.9   HCT 39.2   PLT 90*       Significant Imaging: I have reviewed and interpreted all pertinent imaging results/findings within the past 24 hours.    Assessment/Plan:     * Peripheral vertigo of both ears  MRA, MRI, EEG      Vertigo          VTE Risk Mitigation (From admission, onward)         Ordered     enoxaparin injection 40 mg  Daily      02/10/20 0034     IP VTE HIGH RISK PATIENT  Once      02/10/20 0034     Place KRISTINA hose  Until discontinued      02/10/20 0034                   Osmani Bello MD  Department of Hospital Medicine   [facility]

## 2020-02-11 LAB
AORTIC ROOT ANNULUS: 3.09 CM
AORTIC VALVE CUSP SEPERATION: 1.17 CM
AV INDEX (PROSTH): 0.48
AV MEAN GRADIENT: 17 MMHG
AV PEAK GRADIENT: 27 MMHG
AV VALVE AREA: 1.54 CM2
AV VELOCITY RATIO: 41.9
BACTERIA UR CULT: NORMAL
BACTERIA UR CULT: NORMAL
BSA FOR ECHO PROCEDURE: 1.92 M2
CV ECHO LV RWT: 0.44 CM
DOP CALC AO PEAK VEL: 2.59 M/S
DOP CALC AO VTI: 58.8 CM
DOP CALC LVOT AREA: 3.2 CM2
DOP CALC LVOT DIAMETER: 2.03 CM
DOP CALC LVOT PEAK VEL: 108.53 M/S
DOP CALC LVOT STROKE VOLUME: 90.67 CM3
DOP CALCLVOT PEAK VEL VTI: 28.03 CM
E WAVE DECELERATION TIME: 414.39 MSEC
E/A RATIO: 0.74
E/E' RATIO: 19.4 M/S
ECHO LV POSTERIOR WALL: 0.93 CM (ref 0.6–1.1)
FRACTIONAL SHORTENING: 40 % (ref 28–44)
INTERVENTRICULAR SEPTUM: 0.93 CM (ref 0.6–1.1)
LEFT ATRIUM SIZE: 2.81 CM
LEFT INTERNAL DIMENSION IN SYSTOLE: 2.56 CM (ref 2.1–4)
LEFT VENTRICLE MASS INDEX: 67 G/M2
LEFT VENTRICULAR INTERNAL DIMENSION IN DIASTOLE: 4.25 CM (ref 3.5–6)
LEFT VENTRICULAR MASS: 126.51 G
LV LATERAL E/E' RATIO: 16.17 M/S
LV SEPTAL E/E' RATIO: 24.25 M/S
MV PEAK A VEL: 1.31 M/S
MV PEAK E VEL: 0.97 M/S
PISA TR MAX VEL: 2.55 M/S
PV PEAK VELOCITY: 85.56 CM/S
RA PRESSURE: 3 MMHG
TDI LATERAL: 0.06 M/S
TDI SEPTAL: 0.04 M/S
TDI: 0.05 M/S
TR MAX PG: 26 MMHG
TV REST PULMONARY ARTERY PRESSURE: 29 MMHG

## 2020-02-11 NOTE — PROCEDURES
EEG Report    Patient name: Irene WEEKS 40    Date of Service: 2/10/20    Duration: 30 minutes    Requested By: Osmani Bello M.D.                                                                                     Reading Physician: Puneet Presley M.D.        Reason for Study: Seizure.         Clinical History: 78yo woman who presented with acute dizziness. EEG done to rule out seizure activity.        AED/sedation: none      Exam Notes:  The recording was performed with the standard 10-20 electrode placement with additional ECG electrodes.     Summary:    There is a posterior dominant rhythm of 7-9 Hz, which attenuates with drowsiness. Stage II sleep structures are seen.    Photic stimulation is performed with no abnormal reactivity noted. Hyperventilation is not performed.    No epileptiform discharges or seizures are captured.      Impression:      This is an abnormal awake and asleep routine EEG due to diffuse slowing of the background activity consistent with a mild encephalopathy.

## 2021-01-08 ENCOUNTER — HOSPITAL ENCOUNTER (INPATIENT)
Facility: HOSPITAL | Age: 81
LOS: 11 days | Discharge: NURSING, REHAB OR LONG-TERM CARE | DRG: 177 | End: 2021-01-20
Attending: EMERGENCY MEDICINE | Admitting: INTERNAL MEDICINE
Payer: MEDICARE

## 2021-01-08 DIAGNOSIS — J12.82 PNEUMONIA DUE TO COVID-19 VIRUS: Primary | ICD-10-CM

## 2021-01-08 DIAGNOSIS — R07.9 CHEST PAIN: ICD-10-CM

## 2021-01-08 DIAGNOSIS — R09.02 HYPOXIA: ICD-10-CM

## 2021-01-08 DIAGNOSIS — U07.1 PNEUMONIA DUE TO COVID-19 VIRUS: Primary | ICD-10-CM

## 2021-01-08 LAB
ALBUMIN SERPL BCP-MCNC: 3.6 G/DL (ref 3.5–5.2)
ALP SERPL-CCNC: 62 U/L (ref 55–135)
ALT SERPL W/O P-5'-P-CCNC: 20 U/L (ref 10–44)
ANION GAP SERPL CALC-SCNC: 11 MMOL/L (ref 8–16)
AST SERPL-CCNC: 37 U/L (ref 10–40)
BASOPHILS # BLD AUTO: 0 K/UL (ref 0–0.2)
BASOPHILS NFR BLD: 0 % (ref 0–1.9)
BILIRUB SERPL-MCNC: 1.1 MG/DL (ref 0.1–1)
BNP SERPL-MCNC: 147 PG/ML (ref 0–99)
BUN SERPL-MCNC: 21 MG/DL (ref 8–23)
CALCIUM SERPL-MCNC: 8.5 MG/DL (ref 8.7–10.5)
CHLORIDE SERPL-SCNC: 94 MMOL/L (ref 95–110)
CK SERPL-CCNC: 117 U/L (ref 20–180)
CO2 SERPL-SCNC: 26 MMOL/L (ref 23–29)
CREAT SERPL-MCNC: 0.9 MG/DL (ref 0.5–1.4)
CRP SERPL-MCNC: 2.05 MG/DL
DIFFERENTIAL METHOD: ABNORMAL
EOSINOPHIL # BLD AUTO: 0 K/UL (ref 0–0.5)
EOSINOPHIL NFR BLD: 0 % (ref 0–8)
ERYTHROCYTE [DISTWIDTH] IN BLOOD BY AUTOMATED COUNT: 12.3 % (ref 11.5–14.5)
EST. GFR  (AFRICAN AMERICAN): >60 ML/MIN/1.73 M^2
EST. GFR  (NON AFRICAN AMERICAN): >60 ML/MIN/1.73 M^2
GLUCOSE SERPL-MCNC: 108 MG/DL (ref 70–110)
HCT VFR BLD AUTO: 37.2 % (ref 37–48.5)
HGB BLD-MCNC: 12.6 G/DL (ref 12–16)
IMM GRANULOCYTES # BLD AUTO: 0.02 K/UL (ref 0–0.04)
IMM GRANULOCYTES NFR BLD AUTO: 0.7 % (ref 0–0.5)
LDH SERPL L TO P-CCNC: 238 U/L (ref 110–260)
LIPASE SERPL-CCNC: 43 U/L (ref 4–60)
LYMPHOCYTES # BLD AUTO: 0.6 K/UL (ref 1–4.8)
LYMPHOCYTES NFR BLD: 19.8 % (ref 18–48)
MCH RBC QN AUTO: 30.1 PG (ref 27–31)
MCHC RBC AUTO-ENTMCNC: 33.9 G/DL (ref 32–36)
MCV RBC AUTO: 89 FL (ref 82–98)
MONOCYTES # BLD AUTO: 0.2 K/UL (ref 0.3–1)
MONOCYTES NFR BLD: 6.7 % (ref 4–15)
NEUTROPHILS # BLD AUTO: 2.1 K/UL (ref 1.8–7.7)
NEUTROPHILS NFR BLD: 72.8 % (ref 38–73)
NRBC BLD-RTO: 0 /100 WBC
PLATELET # BLD AUTO: 70 K/UL (ref 150–350)
PMV BLD AUTO: 11 FL (ref 9.2–12.9)
POTASSIUM SERPL-SCNC: 2.7 MMOL/L (ref 3.5–5.1)
PROT SERPL-MCNC: 6.1 G/DL (ref 6–8.4)
RBC # BLD AUTO: 4.19 M/UL (ref 4–5.4)
SODIUM SERPL-SCNC: 131 MMOL/L (ref 136–145)
TROPONIN I SERPL DL<=0.01 NG/ML-MCNC: 0.04 NG/ML
WBC # BLD AUTO: 2.83 K/UL (ref 3.9–12.7)

## 2021-01-08 PROCEDURE — 84484 ASSAY OF TROPONIN QUANT: CPT

## 2021-01-08 PROCEDURE — 85025 COMPLETE CBC W/AUTO DIFF WBC: CPT

## 2021-01-08 PROCEDURE — 82728 ASSAY OF FERRITIN: CPT

## 2021-01-08 PROCEDURE — 83880 ASSAY OF NATRIURETIC PEPTIDE: CPT

## 2021-01-08 PROCEDURE — 87040 BLOOD CULTURE FOR BACTERIA: CPT

## 2021-01-08 PROCEDURE — 82550 ASSAY OF CK (CPK): CPT

## 2021-01-08 PROCEDURE — 87147 CULTURE TYPE IMMUNOLOGIC: CPT

## 2021-01-08 PROCEDURE — 93010 ELECTROCARDIOGRAM REPORT: CPT | Mod: ,,, | Performed by: INTERNAL MEDICINE

## 2021-01-08 PROCEDURE — 93005 ELECTROCARDIOGRAM TRACING: CPT | Performed by: INTERNAL MEDICINE

## 2021-01-08 PROCEDURE — U0002 COVID-19 LAB TEST NON-CDC: HCPCS

## 2021-01-08 PROCEDURE — 36415 COLL VENOUS BLD VENIPUNCTURE: CPT

## 2021-01-08 PROCEDURE — 83615 LACTATE (LD) (LDH) ENZYME: CPT

## 2021-01-08 PROCEDURE — 80053 COMPREHEN METABOLIC PANEL: CPT

## 2021-01-08 PROCEDURE — 99291 CRITICAL CARE FIRST HOUR: CPT

## 2021-01-08 PROCEDURE — 93010 EKG 12-LEAD: ICD-10-PCS | Mod: ,,, | Performed by: INTERNAL MEDICINE

## 2021-01-08 PROCEDURE — 86140 C-REACTIVE PROTEIN: CPT

## 2021-01-08 PROCEDURE — 83690 ASSAY OF LIPASE: CPT

## 2021-01-08 RX ORDER — METHYLPREDNISOLONE SOD SUCC 125 MG
125 VIAL (EA) INJECTION
Status: COMPLETED | OUTPATIENT
Start: 2021-01-08 | End: 2021-01-09

## 2021-01-08 RX ORDER — ASPIRIN 325 MG
325 TABLET ORAL
Status: COMPLETED | OUTPATIENT
Start: 2021-01-08 | End: 2021-01-09

## 2021-01-08 RX ORDER — DOCUSATE SODIUM 100 MG/1
100 CAPSULE, LIQUID FILLED ORAL
COMMUNITY
End: 2021-01-08

## 2021-01-08 RX ORDER — ONDANSETRON 2 MG/ML
4 INJECTION INTRAMUSCULAR; INTRAVENOUS
Status: COMPLETED | OUTPATIENT
Start: 2021-01-09 | End: 2021-01-09

## 2021-01-08 RX ORDER — IBUPROFEN 200 MG
200 TABLET ORAL
COMMUNITY
End: 2021-01-08

## 2021-01-09 PROBLEM — U07.1 PNEUMONIA DUE TO COVID-19 VIRUS: Status: ACTIVE | Noted: 2021-01-09

## 2021-01-09 PROBLEM — A08.39 GASTROENTERITIS DUE TO COVID-19 VIRUS: Status: ACTIVE | Noted: 2021-01-09

## 2021-01-09 PROBLEM — D69.6 THROMBOCYTOPENIA: Status: ACTIVE | Noted: 2021-01-09

## 2021-01-09 PROBLEM — J96.01 ACUTE RESPIRATORY FAILURE WITH HYPOXIA AND HYPERCARBIA: Status: ACTIVE | Noted: 2021-01-09

## 2021-01-09 PROBLEM — U07.1 COVID-19: Status: ACTIVE | Noted: 2021-01-09

## 2021-01-09 PROBLEM — E87.6 HYPOKALEMIA: Status: ACTIVE | Noted: 2021-01-09

## 2021-01-09 PROBLEM — J96.02 ACUTE RESPIRATORY FAILURE WITH HYPOXIA AND HYPERCARBIA: Status: ACTIVE | Noted: 2021-01-09

## 2021-01-09 PROBLEM — J12.82 PNEUMONIA DUE TO COVID-19 VIRUS: Status: ACTIVE | Noted: 2021-01-09

## 2021-01-09 LAB
ABO + RH BLD: NORMAL
BILIRUB UR QL STRIP: NEGATIVE
BLD PROD TYP BPU: NORMAL
BLD PROD TYP BPU: NORMAL
BLOOD UNIT EXPIRATION DATE: NORMAL
BLOOD UNIT EXPIRATION DATE: NORMAL
BLOOD UNIT TYPE CODE: 5100
BLOOD UNIT TYPE CODE: 5100
BLOOD UNIT TYPE: NORMAL
BLOOD UNIT TYPE: NORMAL
CLARITY UR: CLEAR
CODING SYSTEM: NORMAL
CODING SYSTEM: NORMAL
COLOR UR: YELLOW
D DIMER PPP IA.FEU-MCNC: 0.76 UG/ML FEU
DISPENSE STATUS: NORMAL
DISPENSE STATUS: NORMAL
ERYTHROCYTE [SEDIMENTATION RATE] IN BLOOD BY WESTERGREN METHOD: 40 MM/HR (ref 0–20)
FERRITIN SERPL-MCNC: 479 NG/ML (ref 20–300)
GLUCOSE UR QL STRIP: NEGATIVE
HGB UR QL STRIP: ABNORMAL
KETONES UR QL STRIP: NEGATIVE
LACTATE SERPL-SCNC: 1 MMOL/L (ref 0.5–1.9)
LEUKOCYTE ESTERASE UR QL STRIP: ABNORMAL
MICROSCOPIC COMMENT: NORMAL
NITRITE UR QL STRIP: NEGATIVE
PH UR STRIP: 6 [PH] (ref 5–8)
POTASSIUM SERPL-SCNC: 2.7 MMOL/L (ref 3.5–5.1)
POTASSIUM SERPL-SCNC: 3.4 MMOL/L (ref 3.5–5.1)
PROCALCITONIN SERPL IA-MCNC: <0.05 NG/ML (ref 0–0.5)
PROT UR QL STRIP: ABNORMAL
RBC #/AREA URNS HPF: 2 /HPF (ref 0–4)
SARS-COV-2 RDRP RESP QL NAA+PROBE: POSITIVE
SP GR UR STRIP: 1.01 (ref 1–1.03)
TROPONIN I SERPL DL<=0.01 NG/ML-MCNC: 0.04 NG/ML
UNIT NUMBER: NORMAL
UNIT NUMBER: NORMAL
URN SPEC COLLECT METH UR: ABNORMAL
UROBILINOGEN UR STRIP-ACNC: NEGATIVE EU/DL
WBC #/AREA URNS HPF: 2 /HPF (ref 0–5)

## 2021-01-09 PROCEDURE — 94761 N-INVAS EAR/PLS OXIMETRY MLT: CPT

## 2021-01-09 PROCEDURE — 25000003 PHARM REV CODE 250: Performed by: NURSE PRACTITIONER

## 2021-01-09 PROCEDURE — 94640 AIRWAY INHALATION TREATMENT: CPT

## 2021-01-09 PROCEDURE — 81001 URINALYSIS AUTO W/SCOPE: CPT

## 2021-01-09 PROCEDURE — 99900035 HC TECH TIME PER 15 MIN (STAT)

## 2021-01-09 PROCEDURE — 86900 BLOOD TYPING SEROLOGIC ABO: CPT

## 2021-01-09 PROCEDURE — 21400001 HC TELEMETRY ROOM

## 2021-01-09 PROCEDURE — 25000242 PHARM REV CODE 250 ALT 637 W/ HCPCS: Performed by: NURSE PRACTITIONER

## 2021-01-09 PROCEDURE — 63600175 PHARM REV CODE 636 W HCPCS: Performed by: NURSE PRACTITIONER

## 2021-01-09 PROCEDURE — 85651 RBC SED RATE NONAUTOMATED: CPT

## 2021-01-09 PROCEDURE — 85379 FIBRIN DEGRADATION QUANT: CPT

## 2021-01-09 PROCEDURE — 87040 BLOOD CULTURE FOR BACTERIA: CPT

## 2021-01-09 PROCEDURE — 36415 COLL VENOUS BLD VENIPUNCTURE: CPT

## 2021-01-09 PROCEDURE — 27000221 HC OXYGEN, UP TO 24 HOURS

## 2021-01-09 PROCEDURE — 96361 HYDRATE IV INFUSION ADD-ON: CPT

## 2021-01-09 PROCEDURE — 83605 ASSAY OF LACTIC ACID: CPT

## 2021-01-09 PROCEDURE — 96374 THER/PROPH/DIAG INJ IV PUSH: CPT

## 2021-01-09 PROCEDURE — 84484 ASSAY OF TROPONIN QUANT: CPT

## 2021-01-09 PROCEDURE — 84132 ASSAY OF SERUM POTASSIUM: CPT | Mod: 91

## 2021-01-09 PROCEDURE — 25000003 PHARM REV CODE 250: Performed by: EMERGENCY MEDICINE

## 2021-01-09 PROCEDURE — 63600175 PHARM REV CODE 636 W HCPCS: Performed by: EMERGENCY MEDICINE

## 2021-01-09 PROCEDURE — 12000002 HC ACUTE/MED SURGE SEMI-PRIVATE ROOM

## 2021-01-09 PROCEDURE — 84145 PROCALCITONIN (PCT): CPT

## 2021-01-09 PROCEDURE — 96375 TX/PRO/DX INJ NEW DRUG ADDON: CPT

## 2021-01-09 PROCEDURE — 99900031 HC PATIENT EDUCATION (STAT)

## 2021-01-09 PROCEDURE — 25000242 PHARM REV CODE 250 ALT 637 W/ HCPCS: Performed by: INTERNAL MEDICINE

## 2021-01-09 RX ORDER — ASCORBIC ACID 500 MG
500 TABLET ORAL 2 TIMES DAILY
Status: DISCONTINUED | OUTPATIENT
Start: 2021-01-09 | End: 2021-01-20 | Stop reason: HOSPADM

## 2021-01-09 RX ORDER — LANOLIN ALCOHOL/MO/W.PET/CERES
800 CREAM (GRAM) TOPICAL
Status: DISCONTINUED | OUTPATIENT
Start: 2021-01-09 | End: 2021-01-20 | Stop reason: HOSPADM

## 2021-01-09 RX ORDER — GLUCAGON 1 MG
1 KIT INJECTION
Status: DISCONTINUED | OUTPATIENT
Start: 2021-01-09 | End: 2021-01-20 | Stop reason: HOSPADM

## 2021-01-09 RX ORDER — ONDANSETRON 2 MG/ML
4 INJECTION INTRAMUSCULAR; INTRAVENOUS EVERY 8 HOURS PRN
Status: DISCONTINUED | OUTPATIENT
Start: 2021-01-09 | End: 2021-01-20 | Stop reason: HOSPADM

## 2021-01-09 RX ORDER — CHOLECALCIFEROL (VITAMIN D3) 25 MCG
1000 TABLET ORAL DAILY
Status: DISCONTINUED | OUTPATIENT
Start: 2021-01-09 | End: 2021-01-20 | Stop reason: HOSPADM

## 2021-01-09 RX ORDER — ALBUTEROL SULFATE 90 UG/1
2 AEROSOL, METERED RESPIRATORY (INHALATION)
Status: DISCONTINUED | OUTPATIENT
Start: 2021-01-09 | End: 2021-01-12

## 2021-01-09 RX ORDER — IBUPROFEN 200 MG
16 TABLET ORAL
Status: DISCONTINUED | OUTPATIENT
Start: 2021-01-09 | End: 2021-01-20 | Stop reason: HOSPADM

## 2021-01-09 RX ORDER — SODIUM,POTASSIUM PHOSPHATES 280-250MG
2 POWDER IN PACKET (EA) ORAL
Status: DISCONTINUED | OUTPATIENT
Start: 2021-01-09 | End: 2021-01-20 | Stop reason: HOSPADM

## 2021-01-09 RX ORDER — HYDROCODONE BITARTRATE AND ACETAMINOPHEN 500; 5 MG/1; MG/1
TABLET ORAL
Status: DISCONTINUED | OUTPATIENT
Start: 2021-01-09 | End: 2021-01-20 | Stop reason: HOSPADM

## 2021-01-09 RX ORDER — GUAIFENESIN/DEXTROMETHORPHAN 100-10MG/5
10 SYRUP ORAL EVERY 4 HOURS PRN
Status: DISCONTINUED | OUTPATIENT
Start: 2021-01-09 | End: 2021-01-20 | Stop reason: HOSPADM

## 2021-01-09 RX ORDER — ACETAMINOPHEN 325 MG/1
650 TABLET ORAL EVERY 8 HOURS PRN
Status: DISCONTINUED | OUTPATIENT
Start: 2021-01-09 | End: 2021-01-20 | Stop reason: HOSPADM

## 2021-01-09 RX ORDER — ENOXAPARIN SODIUM 100 MG/ML
40 INJECTION SUBCUTANEOUS EVERY 24 HOURS
Status: DISCONTINUED | OUTPATIENT
Start: 2021-01-09 | End: 2021-01-20 | Stop reason: HOSPADM

## 2021-01-09 RX ORDER — ALBUTEROL SULFATE 90 UG/1
2 AEROSOL, METERED RESPIRATORY (INHALATION) EVERY 6 HOURS
Status: DISCONTINUED | OUTPATIENT
Start: 2021-01-09 | End: 2021-01-09

## 2021-01-09 RX ORDER — TALC
6 POWDER (GRAM) TOPICAL NIGHTLY PRN
Status: DISCONTINUED | OUTPATIENT
Start: 2021-01-09 | End: 2021-01-20 | Stop reason: HOSPADM

## 2021-01-09 RX ORDER — IBUPROFEN 200 MG
24 TABLET ORAL
Status: DISCONTINUED | OUTPATIENT
Start: 2021-01-09 | End: 2021-01-20 | Stop reason: HOSPADM

## 2021-01-09 RX ORDER — SODIUM CHLORIDE 0.9 % (FLUSH) 0.9 %
10 SYRINGE (ML) INJECTION
Status: DISCONTINUED | OUTPATIENT
Start: 2021-01-09 | End: 2021-01-20 | Stop reason: HOSPADM

## 2021-01-09 RX ORDER — NAPROXEN SODIUM 220 MG/1
81 TABLET, FILM COATED ORAL DAILY
Status: DISCONTINUED | OUTPATIENT
Start: 2021-01-09 | End: 2021-01-19

## 2021-01-09 RX ADMIN — ASPIRIN 325 MG ORAL TABLET 325 MG: 325 PILL ORAL at 12:01

## 2021-01-09 RX ADMIN — POTASSIUM BICARBONATE 60 MEQ: 977.5 TABLET, EFFERVESCENT ORAL at 05:01

## 2021-01-09 RX ADMIN — ALBUTEROL SULFATE 2 PUFF: 90 AEROSOL, METERED RESPIRATORY (INHALATION) at 07:01

## 2021-01-09 RX ADMIN — METHYLPREDNISOLONE SODIUM SUCCINATE 125 MG: 125 INJECTION, POWDER, FOR SOLUTION INTRAMUSCULAR; INTRAVENOUS at 12:01

## 2021-01-09 RX ADMIN — SODIUM CHLORIDE, SODIUM LACTATE, POTASSIUM CHLORIDE, AND CALCIUM CHLORIDE 500 ML: .6; .31; .03; .02 INJECTION, SOLUTION INTRAVENOUS at 12:01

## 2021-01-09 RX ADMIN — OXYCODONE HYDROCHLORIDE AND ACETAMINOPHEN 500 MG: 500 TABLET ORAL at 09:01

## 2021-01-09 RX ADMIN — MELATONIN 6 MG: at 08:01

## 2021-01-09 RX ADMIN — POTASSIUM BICARBONATE 50 MEQ: 977.5 TABLET, EFFERVESCENT ORAL at 12:01

## 2021-01-09 RX ADMIN — Medication 1000 UNITS: at 09:01

## 2021-01-09 RX ADMIN — REMDESIVIR 200 MG: 100 INJECTION, POWDER, LYOPHILIZED, FOR SOLUTION INTRAVENOUS at 10:01

## 2021-01-09 RX ADMIN — ONDANSETRON 4 MG: 2 INJECTION INTRAMUSCULAR; INTRAVENOUS at 12:01

## 2021-01-09 RX ADMIN — ENOXAPARIN SODIUM 40 MG: 40 INJECTION SUBCUTANEOUS at 04:01

## 2021-01-09 RX ADMIN — DEXAMETHASONE 6 MG: 2 TABLET ORAL at 08:01

## 2021-01-09 RX ADMIN — ALBUTEROL SULFATE 2 PUFF: 90 AEROSOL, METERED RESPIRATORY (INHALATION) at 01:01

## 2021-01-09 RX ADMIN — OXYCODONE HYDROCHLORIDE AND ACETAMINOPHEN 500 MG: 500 TABLET ORAL at 08:01

## 2021-01-09 RX ADMIN — ASPIRIN 81 MG 81 MG: 81 TABLET ORAL at 09:01

## 2021-01-10 LAB
ALBUMIN SERPL BCP-MCNC: 3.3 G/DL (ref 3.5–5.2)
ALP SERPL-CCNC: 64 U/L (ref 55–135)
ALT SERPL W/O P-5'-P-CCNC: 20 U/L (ref 10–44)
ANION GAP SERPL CALC-SCNC: 12 MMOL/L (ref 8–16)
AST SERPL-CCNC: 36 U/L (ref 10–40)
BASOPHILS # BLD AUTO: 0 K/UL (ref 0–0.2)
BASOPHILS NFR BLD: 0 % (ref 0–1.9)
BILIRUB SERPL-MCNC: 0.8 MG/DL (ref 0.1–1)
BUN SERPL-MCNC: 36 MG/DL (ref 8–23)
CALCIUM SERPL-MCNC: 8.9 MG/DL (ref 8.7–10.5)
CHLORIDE SERPL-SCNC: 99 MMOL/L (ref 95–110)
CO2 SERPL-SCNC: 28 MMOL/L (ref 23–29)
CREAT SERPL-MCNC: 0.8 MG/DL (ref 0.5–1.4)
DIFFERENTIAL METHOD: ABNORMAL
EOSINOPHIL # BLD AUTO: 0 K/UL (ref 0–0.5)
EOSINOPHIL NFR BLD: 0 % (ref 0–8)
ERYTHROCYTE [DISTWIDTH] IN BLOOD BY AUTOMATED COUNT: 12.3 % (ref 11.5–14.5)
EST. GFR  (AFRICAN AMERICAN): >60 ML/MIN/1.73 M^2
EST. GFR  (NON AFRICAN AMERICAN): >60 ML/MIN/1.73 M^2
GLUCOSE SERPL-MCNC: 103 MG/DL (ref 70–110)
HCT VFR BLD AUTO: 37.9 % (ref 37–48.5)
HGB BLD-MCNC: 12.9 G/DL (ref 12–16)
IMM GRANULOCYTES # BLD AUTO: 0.03 K/UL (ref 0–0.04)
IMM GRANULOCYTES NFR BLD AUTO: 0.6 % (ref 0–0.5)
LYMPHOCYTES # BLD AUTO: 0.5 K/UL (ref 1–4.8)
LYMPHOCYTES NFR BLD: 9.7 % (ref 18–48)
MAGNESIUM SERPL-MCNC: 1.9 MG/DL (ref 1.6–2.6)
MCH RBC QN AUTO: 30.6 PG (ref 27–31)
MCHC RBC AUTO-ENTMCNC: 34 G/DL (ref 32–36)
MCV RBC AUTO: 90 FL (ref 82–98)
MONOCYTES # BLD AUTO: 0.3 K/UL (ref 0.3–1)
MONOCYTES NFR BLD: 5.4 % (ref 4–15)
NEUTROPHILS # BLD AUTO: 4.5 K/UL (ref 1.8–7.7)
NEUTROPHILS NFR BLD: 84.3 % (ref 38–73)
NRBC BLD-RTO: 0 /100 WBC
PHOSPHATE SERPL-MCNC: 2.3 MG/DL (ref 2.7–4.5)
PLATELET # BLD AUTO: 67 K/UL (ref 150–350)
PMV BLD AUTO: 11.3 FL (ref 9.2–12.9)
POTASSIUM SERPL-SCNC: 3.3 MMOL/L (ref 3.5–5.1)
PROT SERPL-MCNC: 6.2 G/DL (ref 6–8.4)
RBC # BLD AUTO: 4.22 M/UL (ref 4–5.4)
SODIUM SERPL-SCNC: 139 MMOL/L (ref 136–145)
WBC # BLD AUTO: 5.34 K/UL (ref 3.9–12.7)

## 2021-01-10 PROCEDURE — 84100 ASSAY OF PHOSPHORUS: CPT

## 2021-01-10 PROCEDURE — 80053 COMPREHEN METABOLIC PANEL: CPT

## 2021-01-10 PROCEDURE — 36415 COLL VENOUS BLD VENIPUNCTURE: CPT

## 2021-01-10 PROCEDURE — 27000221 HC OXYGEN, UP TO 24 HOURS

## 2021-01-10 PROCEDURE — 83735 ASSAY OF MAGNESIUM: CPT

## 2021-01-10 PROCEDURE — 25000003 PHARM REV CODE 250: Performed by: INTERNAL MEDICINE

## 2021-01-10 PROCEDURE — 85025 COMPLETE CBC W/AUTO DIFF WBC: CPT

## 2021-01-10 PROCEDURE — 12000002 HC ACUTE/MED SURGE SEMI-PRIVATE ROOM

## 2021-01-10 PROCEDURE — 99900035 HC TECH TIME PER 15 MIN (STAT)

## 2021-01-10 PROCEDURE — 94640 AIRWAY INHALATION TREATMENT: CPT

## 2021-01-10 PROCEDURE — 99900031 HC PATIENT EDUCATION (STAT)

## 2021-01-10 PROCEDURE — 94761 N-INVAS EAR/PLS OXIMETRY MLT: CPT

## 2021-01-10 PROCEDURE — 63600175 PHARM REV CODE 636 W HCPCS: Performed by: NURSE PRACTITIONER

## 2021-01-10 PROCEDURE — 25000003 PHARM REV CODE 250: Performed by: NURSE PRACTITIONER

## 2021-01-10 RX ORDER — POTASSIUM CHLORIDE 20 MEQ/1
20 TABLET, EXTENDED RELEASE ORAL
Status: DISCONTINUED | OUTPATIENT
Start: 2021-01-10 | End: 2021-01-20 | Stop reason: HOSPADM

## 2021-01-10 RX ORDER — MAGNESIUM SULFATE 1 G/100ML
1 INJECTION INTRAVENOUS
Status: DISCONTINUED | OUTPATIENT
Start: 2021-01-10 | End: 2021-01-20 | Stop reason: HOSPADM

## 2021-01-10 RX ORDER — MAGNESIUM SULFATE HEPTAHYDRATE 40 MG/ML
2 INJECTION, SOLUTION INTRAVENOUS
Status: DISCONTINUED | OUTPATIENT
Start: 2021-01-10 | End: 2021-01-20 | Stop reason: HOSPADM

## 2021-01-10 RX ORDER — POTASSIUM CHLORIDE 7.45 MG/ML
20 INJECTION INTRAVENOUS
Status: DISCONTINUED | OUTPATIENT
Start: 2021-01-10 | End: 2021-01-20 | Stop reason: HOSPADM

## 2021-01-10 RX ORDER — LANOLIN ALCOHOL/MO/W.PET/CERES
800 CREAM (GRAM) TOPICAL
Status: DISCONTINUED | OUTPATIENT
Start: 2021-01-10 | End: 2021-01-20 | Stop reason: HOSPADM

## 2021-01-10 RX ORDER — MAGNESIUM SULFATE HEPTAHYDRATE 40 MG/ML
4 INJECTION, SOLUTION INTRAVENOUS
Status: DISCONTINUED | OUTPATIENT
Start: 2021-01-10 | End: 2021-01-20 | Stop reason: HOSPADM

## 2021-01-10 RX ORDER — POTASSIUM CHLORIDE 20 MEQ/1
40 TABLET, EXTENDED RELEASE ORAL
Status: DISCONTINUED | OUTPATIENT
Start: 2021-01-10 | End: 2021-01-20 | Stop reason: HOSPADM

## 2021-01-10 RX ORDER — POTASSIUM CHLORIDE 7.45 MG/ML
40 INJECTION INTRAVENOUS
Status: DISCONTINUED | OUTPATIENT
Start: 2021-01-10 | End: 2021-01-20 | Stop reason: HOSPADM

## 2021-01-10 RX ADMIN — ACETAMINOPHEN 650 MG: 325 TABLET, FILM COATED ORAL at 07:01

## 2021-01-10 RX ADMIN — MULTIPLE VITAMINS W/ MINERALS TAB 1 TABLET: TAB at 09:01

## 2021-01-10 RX ADMIN — ALBUTEROL SULFATE 2 PUFF: 90 AEROSOL, METERED RESPIRATORY (INHALATION) at 07:01

## 2021-01-10 RX ADMIN — ASPIRIN 81 MG 81 MG: 81 TABLET ORAL at 09:01

## 2021-01-10 RX ADMIN — ALBUTEROL SULFATE 2 PUFF: 90 AEROSOL, METERED RESPIRATORY (INHALATION) at 01:01

## 2021-01-10 RX ADMIN — OXYCODONE HYDROCHLORIDE AND ACETAMINOPHEN 500 MG: 500 TABLET ORAL at 08:01

## 2021-01-10 RX ADMIN — ENOXAPARIN SODIUM 40 MG: 40 INJECTION SUBCUTANEOUS at 04:01

## 2021-01-10 RX ADMIN — ONDANSETRON 4 MG: 2 INJECTION INTRAMUSCULAR; INTRAVENOUS at 06:01

## 2021-01-10 RX ADMIN — MELATONIN 6 MG: at 08:01

## 2021-01-10 RX ADMIN — OXYCODONE HYDROCHLORIDE AND ACETAMINOPHEN 500 MG: 500 TABLET ORAL at 09:01

## 2021-01-10 RX ADMIN — ACETAMINOPHEN 650 MG: 325 TABLET, FILM COATED ORAL at 11:01

## 2021-01-10 RX ADMIN — DEXAMETHASONE 6 MG: 2 TABLET ORAL at 09:01

## 2021-01-10 RX ADMIN — REMDESIVIR 100 MG: 100 INJECTION, POWDER, LYOPHILIZED, FOR SOLUTION INTRAVENOUS at 09:01

## 2021-01-10 RX ADMIN — Medication 1000 UNITS: at 09:01

## 2021-01-10 RX ADMIN — POTASSIUM CHLORIDE 40 MEQ: 20 TABLET, EXTENDED RELEASE ORAL at 09:01

## 2021-01-11 LAB
ALBUMIN SERPL BCP-MCNC: 2.9 G/DL (ref 3.5–5.2)
ALP SERPL-CCNC: 60 U/L (ref 55–135)
ALT SERPL W/O P-5'-P-CCNC: 22 U/L (ref 10–44)
ANION GAP SERPL CALC-SCNC: 7 MMOL/L (ref 8–16)
AST SERPL-CCNC: 36 U/L (ref 10–40)
BASOPHILS # BLD AUTO: 0 K/UL (ref 0–0.2)
BASOPHILS NFR BLD: 0 % (ref 0–1.9)
BILIRUB SERPL-MCNC: 0.7 MG/DL (ref 0.1–1)
BUN SERPL-MCNC: 45 MG/DL (ref 8–23)
CALCIUM SERPL-MCNC: 8.5 MG/DL (ref 8.7–10.5)
CHLORIDE SERPL-SCNC: 100 MMOL/L (ref 95–110)
CO2 SERPL-SCNC: 28 MMOL/L (ref 23–29)
CREAT SERPL-MCNC: 0.8 MG/DL (ref 0.5–1.4)
CRP SERPL-MCNC: 2.68 MG/DL
DIFFERENTIAL METHOD: ABNORMAL
EOSINOPHIL # BLD AUTO: 0 K/UL (ref 0–0.5)
EOSINOPHIL NFR BLD: 0 % (ref 0–8)
ERYTHROCYTE [DISTWIDTH] IN BLOOD BY AUTOMATED COUNT: 12.4 % (ref 11.5–14.5)
EST. GFR  (AFRICAN AMERICAN): >60 ML/MIN/1.73 M^2
EST. GFR  (NON AFRICAN AMERICAN): >60 ML/MIN/1.73 M^2
GLUCOSE SERPL-MCNC: 99 MG/DL (ref 70–110)
HCT VFR BLD AUTO: 33 % (ref 37–48.5)
HGB BLD-MCNC: 11.1 G/DL (ref 12–16)
IMM GRANULOCYTES # BLD AUTO: 0.03 K/UL (ref 0–0.04)
IMM GRANULOCYTES NFR BLD AUTO: 1 % (ref 0–0.5)
LYMPHOCYTES # BLD AUTO: 0.4 K/UL (ref 1–4.8)
LYMPHOCYTES NFR BLD: 14.3 % (ref 18–48)
MAGNESIUM SERPL-MCNC: 2 MG/DL (ref 1.6–2.6)
MCH RBC QN AUTO: 30.3 PG (ref 27–31)
MCHC RBC AUTO-ENTMCNC: 33.6 G/DL (ref 32–36)
MCV RBC AUTO: 90 FL (ref 82–98)
MONOCYTES # BLD AUTO: 0.2 K/UL (ref 0.3–1)
MONOCYTES NFR BLD: 7.2 % (ref 4–15)
NEUTROPHILS # BLD AUTO: 2.3 K/UL (ref 1.8–7.7)
NEUTROPHILS NFR BLD: 77.5 % (ref 38–73)
NRBC BLD-RTO: 0 /100 WBC
PHOSPHATE SERPL-MCNC: 3 MG/DL (ref 2.7–4.5)
PLATELET # BLD AUTO: 70 K/UL (ref 150–350)
PMV BLD AUTO: 11.7 FL (ref 9.2–12.9)
POTASSIUM SERPL-SCNC: 3.7 MMOL/L (ref 3.5–5.1)
PROT SERPL-MCNC: 5.4 G/DL (ref 6–8.4)
RBC # BLD AUTO: 3.66 M/UL (ref 4–5.4)
SODIUM SERPL-SCNC: 135 MMOL/L (ref 136–145)
WBC # BLD AUTO: 2.93 K/UL (ref 3.9–12.7)

## 2021-01-11 PROCEDURE — 85025 COMPLETE CBC W/AUTO DIFF WBC: CPT

## 2021-01-11 PROCEDURE — 94761 N-INVAS EAR/PLS OXIMETRY MLT: CPT

## 2021-01-11 PROCEDURE — 36415 COLL VENOUS BLD VENIPUNCTURE: CPT

## 2021-01-11 PROCEDURE — 25000003 PHARM REV CODE 250: Performed by: NURSE PRACTITIONER

## 2021-01-11 PROCEDURE — 25000242 PHARM REV CODE 250 ALT 637 W/ HCPCS: Performed by: NURSE PRACTITIONER

## 2021-01-11 PROCEDURE — 12000002 HC ACUTE/MED SURGE SEMI-PRIVATE ROOM

## 2021-01-11 PROCEDURE — 84100 ASSAY OF PHOSPHORUS: CPT

## 2021-01-11 PROCEDURE — 86140 C-REACTIVE PROTEIN: CPT

## 2021-01-11 PROCEDURE — 63600175 PHARM REV CODE 636 W HCPCS: Performed by: NURSE PRACTITIONER

## 2021-01-11 PROCEDURE — 94640 AIRWAY INHALATION TREATMENT: CPT

## 2021-01-11 PROCEDURE — 99900035 HC TECH TIME PER 15 MIN (STAT)

## 2021-01-11 PROCEDURE — 83735 ASSAY OF MAGNESIUM: CPT

## 2021-01-11 PROCEDURE — 25000003 PHARM REV CODE 250: Performed by: INTERNAL MEDICINE

## 2021-01-11 PROCEDURE — 80053 COMPREHEN METABOLIC PANEL: CPT

## 2021-01-11 RX ADMIN — ALBUTEROL SULFATE 2 PUFF: 90 AEROSOL, METERED RESPIRATORY (INHALATION) at 08:01

## 2021-01-11 RX ADMIN — ASPIRIN 81 MG 81 MG: 81 TABLET ORAL at 08:01

## 2021-01-11 RX ADMIN — ALBUTEROL SULFATE 2 PUFF: 90 AEROSOL, METERED RESPIRATORY (INHALATION) at 07:01

## 2021-01-11 RX ADMIN — ALBUTEROL SULFATE 2 PUFF: 90 AEROSOL, METERED RESPIRATORY (INHALATION) at 01:01

## 2021-01-11 RX ADMIN — ENOXAPARIN SODIUM 40 MG: 40 INJECTION SUBCUTANEOUS at 04:01

## 2021-01-11 RX ADMIN — OXYCODONE HYDROCHLORIDE AND ACETAMINOPHEN 500 MG: 500 TABLET ORAL at 08:01

## 2021-01-11 RX ADMIN — Medication 1000 UNITS: at 08:01

## 2021-01-11 RX ADMIN — MELATONIN 6 MG: at 09:01

## 2021-01-11 RX ADMIN — MULTIPLE VITAMINS W/ MINERALS TAB 1 TABLET: TAB at 08:01

## 2021-01-11 RX ADMIN — REMDESIVIR 100 MG: 100 INJECTION, POWDER, LYOPHILIZED, FOR SOLUTION INTRAVENOUS at 08:01

## 2021-01-11 RX ADMIN — POTASSIUM CHLORIDE 20 MEQ: 20 TABLET, EXTENDED RELEASE ORAL at 06:01

## 2021-01-11 RX ADMIN — OXYCODONE HYDROCHLORIDE AND ACETAMINOPHEN 500 MG: 500 TABLET ORAL at 09:01

## 2021-01-11 RX ADMIN — DEXAMETHASONE 6 MG: 2 TABLET ORAL at 08:01

## 2021-01-12 LAB
ALBUMIN SERPL BCP-MCNC: 2.9 G/DL (ref 3.5–5.2)
ALP SERPL-CCNC: 60 U/L (ref 55–135)
ALT SERPL W/O P-5'-P-CCNC: 22 U/L (ref 10–44)
ANION GAP SERPL CALC-SCNC: 7 MMOL/L (ref 8–16)
AST SERPL-CCNC: 33 U/L (ref 10–40)
BACTERIA BLD CULT: ABNORMAL
BASOPHILS # BLD AUTO: 0 K/UL (ref 0–0.2)
BASOPHILS NFR BLD: 0 % (ref 0–1.9)
BILIRUB SERPL-MCNC: 0.9 MG/DL (ref 0.1–1)
BUN SERPL-MCNC: 38 MG/DL (ref 8–23)
CALCIUM SERPL-MCNC: 8.5 MG/DL (ref 8.7–10.5)
CHLORIDE SERPL-SCNC: 102 MMOL/L (ref 95–110)
CO2 SERPL-SCNC: 27 MMOL/L (ref 23–29)
CREAT SERPL-MCNC: 0.6 MG/DL (ref 0.5–1.4)
D DIMER PPP IA.FEU-MCNC: 0.62 UG/ML FEU
DIFFERENTIAL METHOD: ABNORMAL
EOSINOPHIL # BLD AUTO: 0 K/UL (ref 0–0.5)
EOSINOPHIL NFR BLD: 0 % (ref 0–8)
ERYTHROCYTE [DISTWIDTH] IN BLOOD BY AUTOMATED COUNT: 12.2 % (ref 11.5–14.5)
EST. GFR  (AFRICAN AMERICAN): >60 ML/MIN/1.73 M^2
EST. GFR  (NON AFRICAN AMERICAN): >60 ML/MIN/1.73 M^2
FERRITIN SERPL-MCNC: 353 NG/ML (ref 20–300)
GLUCOSE SERPL-MCNC: 108 MG/DL (ref 70–110)
HCT VFR BLD AUTO: 33.9 % (ref 37–48.5)
HGB BLD-MCNC: 11.5 G/DL (ref 12–16)
IMM GRANULOCYTES # BLD AUTO: 0.06 K/UL (ref 0–0.04)
IMM GRANULOCYTES NFR BLD AUTO: 2 % (ref 0–0.5)
LYMPHOCYTES # BLD AUTO: 0.4 K/UL (ref 1–4.8)
LYMPHOCYTES NFR BLD: 14.7 % (ref 18–48)
MAGNESIUM SERPL-MCNC: 1.9 MG/DL (ref 1.6–2.6)
MCH RBC QN AUTO: 30.7 PG (ref 27–31)
MCHC RBC AUTO-ENTMCNC: 33.9 G/DL (ref 32–36)
MCV RBC AUTO: 90 FL (ref 82–98)
MONOCYTES # BLD AUTO: 0.3 K/UL (ref 0.3–1)
MONOCYTES NFR BLD: 8.5 % (ref 4–15)
NEUTROPHILS # BLD AUTO: 2.2 K/UL (ref 1.8–7.7)
NEUTROPHILS NFR BLD: 74.8 % (ref 38–73)
NRBC BLD-RTO: 0 /100 WBC
PHOSPHATE SERPL-MCNC: 2.8 MG/DL (ref 2.7–4.5)
PLATELET # BLD AUTO: 71 K/UL (ref 150–350)
PMV BLD AUTO: 11.4 FL (ref 9.2–12.9)
POTASSIUM SERPL-SCNC: 4 MMOL/L (ref 3.5–5.1)
PROT SERPL-MCNC: 5.3 G/DL (ref 6–8.4)
RBC # BLD AUTO: 3.75 M/UL (ref 4–5.4)
SODIUM SERPL-SCNC: 136 MMOL/L (ref 136–145)
WBC # BLD AUTO: 2.93 K/UL (ref 3.9–12.7)

## 2021-01-12 PROCEDURE — 99900035 HC TECH TIME PER 15 MIN (STAT)

## 2021-01-12 PROCEDURE — 12000002 HC ACUTE/MED SURGE SEMI-PRIVATE ROOM

## 2021-01-12 PROCEDURE — 80053 COMPREHEN METABOLIC PANEL: CPT

## 2021-01-12 PROCEDURE — 83735 ASSAY OF MAGNESIUM: CPT

## 2021-01-12 PROCEDURE — 99900031 HC PATIENT EDUCATION (STAT)

## 2021-01-12 PROCEDURE — 63600175 PHARM REV CODE 636 W HCPCS: Performed by: NURSE PRACTITIONER

## 2021-01-12 PROCEDURE — 27000221 HC OXYGEN, UP TO 24 HOURS

## 2021-01-12 PROCEDURE — 85025 COMPLETE CBC W/AUTO DIFF WBC: CPT

## 2021-01-12 PROCEDURE — 25000003 PHARM REV CODE 250: Performed by: NURSE PRACTITIONER

## 2021-01-12 PROCEDURE — 36415 COLL VENOUS BLD VENIPUNCTURE: CPT

## 2021-01-12 PROCEDURE — 84100 ASSAY OF PHOSPHORUS: CPT

## 2021-01-12 PROCEDURE — 94761 N-INVAS EAR/PLS OXIMETRY MLT: CPT

## 2021-01-12 PROCEDURE — 82728 ASSAY OF FERRITIN: CPT

## 2021-01-12 PROCEDURE — 85379 FIBRIN DEGRADATION QUANT: CPT

## 2021-01-12 PROCEDURE — 94640 AIRWAY INHALATION TREATMENT: CPT

## 2021-01-12 RX ADMIN — ALBUTEROL SULFATE 2 PUFF: 90 AEROSOL, METERED RESPIRATORY (INHALATION) at 07:01

## 2021-01-12 RX ADMIN — Medication 1000 UNITS: at 09:01

## 2021-01-12 RX ADMIN — ENOXAPARIN SODIUM 40 MG: 40 INJECTION SUBCUTANEOUS at 05:01

## 2021-01-12 RX ADMIN — REMDESIVIR 100 MG: 100 INJECTION, POWDER, LYOPHILIZED, FOR SOLUTION INTRAVENOUS at 09:01

## 2021-01-12 RX ADMIN — ONDANSETRON 4 MG: 2 INJECTION INTRAMUSCULAR; INTRAVENOUS at 09:01

## 2021-01-12 RX ADMIN — ASPIRIN 81 MG 81 MG: 81 TABLET ORAL at 09:01

## 2021-01-12 RX ADMIN — MULTIPLE VITAMINS W/ MINERALS TAB 1 TABLET: TAB at 09:01

## 2021-01-12 RX ADMIN — OXYCODONE HYDROCHLORIDE AND ACETAMINOPHEN 500 MG: 500 TABLET ORAL at 08:01

## 2021-01-12 RX ADMIN — OXYCODONE HYDROCHLORIDE AND ACETAMINOPHEN 500 MG: 500 TABLET ORAL at 01:01

## 2021-01-12 RX ADMIN — DEXAMETHASONE 6 MG: 2 TABLET ORAL at 09:01

## 2021-01-13 LAB
ALBUMIN SERPL BCP-MCNC: 2.8 G/DL (ref 3.5–5.2)
ALP SERPL-CCNC: 62 U/L (ref 55–135)
ALT SERPL W/O P-5'-P-CCNC: 21 U/L (ref 10–44)
ANION GAP SERPL CALC-SCNC: 7 MMOL/L (ref 8–16)
AST SERPL-CCNC: 30 U/L (ref 10–40)
BASOPHILS # BLD AUTO: 0.02 K/UL (ref 0–0.2)
BASOPHILS NFR BLD: 0.5 % (ref 0–1.9)
BILIRUB SERPL-MCNC: 0.7 MG/DL (ref 0.1–1)
BUN SERPL-MCNC: 30 MG/DL (ref 8–23)
CALCIUM SERPL-MCNC: 8.9 MG/DL (ref 8.7–10.5)
CHLORIDE SERPL-SCNC: 104 MMOL/L (ref 95–110)
CO2 SERPL-SCNC: 24 MMOL/L (ref 23–29)
CREAT SERPL-MCNC: 0.6 MG/DL (ref 0.5–1.4)
CRP SERPL-MCNC: 1.39 MG/DL
DIFFERENTIAL METHOD: ABNORMAL
EOSINOPHIL # BLD AUTO: 0 K/UL (ref 0–0.5)
EOSINOPHIL NFR BLD: 0 % (ref 0–8)
ERYTHROCYTE [DISTWIDTH] IN BLOOD BY AUTOMATED COUNT: 12.2 % (ref 11.5–14.5)
EST. GFR  (AFRICAN AMERICAN): >60 ML/MIN/1.73 M^2
EST. GFR  (NON AFRICAN AMERICAN): >60 ML/MIN/1.73 M^2
GLUCOSE SERPL-MCNC: 91 MG/DL (ref 70–110)
HCT VFR BLD AUTO: 34.9 % (ref 37–48.5)
HGB BLD-MCNC: 11.5 G/DL (ref 12–16)
IMM GRANULOCYTES # BLD AUTO: 0.12 K/UL (ref 0–0.04)
IMM GRANULOCYTES NFR BLD AUTO: 3.1 % (ref 0–0.5)
LYMPHOCYTES # BLD AUTO: 0.6 K/UL (ref 1–4.8)
LYMPHOCYTES NFR BLD: 14.9 % (ref 18–48)
MAGNESIUM SERPL-MCNC: 1.8 MG/DL (ref 1.6–2.6)
MCH RBC QN AUTO: 30.6 PG (ref 27–31)
MCHC RBC AUTO-ENTMCNC: 33 G/DL (ref 32–36)
MCV RBC AUTO: 93 FL (ref 82–98)
MONOCYTES # BLD AUTO: 0.4 K/UL (ref 0.3–1)
MONOCYTES NFR BLD: 9.9 % (ref 4–15)
NEUTROPHILS # BLD AUTO: 2.7 K/UL (ref 1.8–7.7)
NEUTROPHILS NFR BLD: 71.6 % (ref 38–73)
NRBC BLD-RTO: 0 /100 WBC
PHOSPHATE SERPL-MCNC: 2.9 MG/DL (ref 2.7–4.5)
PLATELET # BLD AUTO: 92 K/UL (ref 150–350)
PMV BLD AUTO: 12 FL (ref 9.2–12.9)
POTASSIUM SERPL-SCNC: 4.3 MMOL/L (ref 3.5–5.1)
PROT SERPL-MCNC: 5.3 G/DL (ref 6–8.4)
RBC # BLD AUTO: 3.76 M/UL (ref 4–5.4)
SODIUM SERPL-SCNC: 135 MMOL/L (ref 136–145)
WBC # BLD AUTO: 3.83 K/UL (ref 3.9–12.7)

## 2021-01-13 PROCEDURE — 94761 N-INVAS EAR/PLS OXIMETRY MLT: CPT

## 2021-01-13 PROCEDURE — 80053 COMPREHEN METABOLIC PANEL: CPT

## 2021-01-13 PROCEDURE — 99900035 HC TECH TIME PER 15 MIN (STAT)

## 2021-01-13 PROCEDURE — 86140 C-REACTIVE PROTEIN: CPT

## 2021-01-13 PROCEDURE — 36415 COLL VENOUS BLD VENIPUNCTURE: CPT

## 2021-01-13 PROCEDURE — 12000002 HC ACUTE/MED SURGE SEMI-PRIVATE ROOM

## 2021-01-13 PROCEDURE — 63600175 PHARM REV CODE 636 W HCPCS: Performed by: NURSE PRACTITIONER

## 2021-01-13 PROCEDURE — 85025 COMPLETE CBC W/AUTO DIFF WBC: CPT

## 2021-01-13 PROCEDURE — 99900031 HC PATIENT EDUCATION (STAT)

## 2021-01-13 PROCEDURE — 25000242 PHARM REV CODE 250 ALT 637 W/ HCPCS: Performed by: NURSE PRACTITIONER

## 2021-01-13 PROCEDURE — 27000221 HC OXYGEN, UP TO 24 HOURS

## 2021-01-13 PROCEDURE — 25000003 PHARM REV CODE 250: Performed by: HOSPITALIST

## 2021-01-13 PROCEDURE — 94799 UNLISTED PULMONARY SVC/PX: CPT

## 2021-01-13 PROCEDURE — 25000003 PHARM REV CODE 250: Performed by: NURSE PRACTITIONER

## 2021-01-13 PROCEDURE — 84100 ASSAY OF PHOSPHORUS: CPT

## 2021-01-13 PROCEDURE — 83735 ASSAY OF MAGNESIUM: CPT

## 2021-01-13 RX ORDER — DOCUSATE SODIUM 100 MG/1
100 CAPSULE, LIQUID FILLED ORAL DAILY
Status: DISCONTINUED | OUTPATIENT
Start: 2021-01-13 | End: 2021-01-20 | Stop reason: HOSPADM

## 2021-01-13 RX ADMIN — MULTIPLE VITAMINS W/ MINERALS TAB 1 TABLET: TAB at 09:01

## 2021-01-13 RX ADMIN — Medication 1000 UNITS: at 09:01

## 2021-01-13 RX ADMIN — DEXAMETHASONE 6 MG: 2 TABLET ORAL at 09:01

## 2021-01-13 RX ADMIN — DOCUSATE SODIUM 100 MG: 100 CAPSULE, LIQUID FILLED ORAL at 02:01

## 2021-01-13 RX ADMIN — OXYCODONE HYDROCHLORIDE AND ACETAMINOPHEN 500 MG: 500 TABLET ORAL at 08:01

## 2021-01-13 RX ADMIN — OXYCODONE HYDROCHLORIDE AND ACETAMINOPHEN 500 MG: 500 TABLET ORAL at 09:01

## 2021-01-13 RX ADMIN — ASPIRIN 81 MG 81 MG: 81 TABLET ORAL at 09:01

## 2021-01-13 RX ADMIN — REMDESIVIR 100 MG: 100 INJECTION, POWDER, LYOPHILIZED, FOR SOLUTION INTRAVENOUS at 09:01

## 2021-01-13 RX ADMIN — ACETAMINOPHEN 650 MG: 325 TABLET, FILM COATED ORAL at 08:01

## 2021-01-13 RX ADMIN — ENOXAPARIN SODIUM 40 MG: 40 INJECTION SUBCUTANEOUS at 04:01

## 2021-01-14 LAB
ALBUMIN SERPL BCP-MCNC: 2.9 G/DL (ref 3.5–5.2)
ALP SERPL-CCNC: 61 U/L (ref 55–135)
ALT SERPL W/O P-5'-P-CCNC: 20 U/L (ref 10–44)
ANION GAP SERPL CALC-SCNC: 7 MMOL/L (ref 8–16)
AST SERPL-CCNC: 26 U/L (ref 10–40)
BACTERIA BLD CULT: NORMAL
BASOPHILS # BLD AUTO: 0.01 K/UL (ref 0–0.2)
BASOPHILS NFR BLD: 0.2 % (ref 0–1.9)
BILIRUB SERPL-MCNC: 0.9 MG/DL (ref 0.1–1)
BUN SERPL-MCNC: 31 MG/DL (ref 8–23)
CALCIUM SERPL-MCNC: 8.6 MG/DL (ref 8.7–10.5)
CHLORIDE SERPL-SCNC: 103 MMOL/L (ref 95–110)
CO2 SERPL-SCNC: 25 MMOL/L (ref 23–29)
CREAT SERPL-MCNC: 0.7 MG/DL (ref 0.5–1.4)
DIFFERENTIAL METHOD: ABNORMAL
EOSINOPHIL # BLD AUTO: 0 K/UL (ref 0–0.5)
EOSINOPHIL NFR BLD: 0.2 % (ref 0–8)
ERYTHROCYTE [DISTWIDTH] IN BLOOD BY AUTOMATED COUNT: 12.2 % (ref 11.5–14.5)
EST. GFR  (AFRICAN AMERICAN): >60 ML/MIN/1.73 M^2
EST. GFR  (NON AFRICAN AMERICAN): >60 ML/MIN/1.73 M^2
GLUCOSE SERPL-MCNC: 91 MG/DL (ref 70–110)
HCT VFR BLD AUTO: 35.7 % (ref 37–48.5)
HGB BLD-MCNC: 12 G/DL (ref 12–16)
IMM GRANULOCYTES # BLD AUTO: 0.15 K/UL (ref 0–0.04)
IMM GRANULOCYTES NFR BLD AUTO: 3.1 % (ref 0–0.5)
LYMPHOCYTES # BLD AUTO: 0.7 K/UL (ref 1–4.8)
LYMPHOCYTES NFR BLD: 13.3 % (ref 18–48)
MAGNESIUM SERPL-MCNC: 1.8 MG/DL (ref 1.6–2.6)
MCH RBC QN AUTO: 30.1 PG (ref 27–31)
MCHC RBC AUTO-ENTMCNC: 33.6 G/DL (ref 32–36)
MCV RBC AUTO: 90 FL (ref 82–98)
MONOCYTES # BLD AUTO: 0.5 K/UL (ref 0.3–1)
MONOCYTES NFR BLD: 9.4 % (ref 4–15)
NEUTROPHILS # BLD AUTO: 3.6 K/UL (ref 1.8–7.7)
NEUTROPHILS NFR BLD: 73.8 % (ref 38–73)
NRBC BLD-RTO: 0 /100 WBC
PHOSPHATE SERPL-MCNC: 2.9 MG/DL (ref 2.7–4.5)
PLATELET # BLD AUTO: 108 K/UL (ref 150–350)
PMV BLD AUTO: 12 FL (ref 9.2–12.9)
POTASSIUM SERPL-SCNC: 4.1 MMOL/L (ref 3.5–5.1)
PROT SERPL-MCNC: 5.5 G/DL (ref 6–8.4)
RBC # BLD AUTO: 3.99 M/UL (ref 4–5.4)
SODIUM SERPL-SCNC: 135 MMOL/L (ref 136–145)
WBC # BLD AUTO: 4.88 K/UL (ref 3.9–12.7)

## 2021-01-14 PROCEDURE — 80053 COMPREHEN METABOLIC PANEL: CPT

## 2021-01-14 PROCEDURE — 25000242 PHARM REV CODE 250 ALT 637 W/ HCPCS: Performed by: NURSE PRACTITIONER

## 2021-01-14 PROCEDURE — 84100 ASSAY OF PHOSPHORUS: CPT

## 2021-01-14 PROCEDURE — 99900035 HC TECH TIME PER 15 MIN (STAT)

## 2021-01-14 PROCEDURE — 85025 COMPLETE CBC W/AUTO DIFF WBC: CPT

## 2021-01-14 PROCEDURE — 94799 UNLISTED PULMONARY SVC/PX: CPT

## 2021-01-14 PROCEDURE — 25000003 PHARM REV CODE 250: Performed by: NURSE PRACTITIONER

## 2021-01-14 PROCEDURE — 25000003 PHARM REV CODE 250: Performed by: HOSPITALIST

## 2021-01-14 PROCEDURE — 27000221 HC OXYGEN, UP TO 24 HOURS

## 2021-01-14 PROCEDURE — 94761 N-INVAS EAR/PLS OXIMETRY MLT: CPT

## 2021-01-14 PROCEDURE — 63600175 PHARM REV CODE 636 W HCPCS: Performed by: NURSE PRACTITIONER

## 2021-01-14 PROCEDURE — 83735 ASSAY OF MAGNESIUM: CPT

## 2021-01-14 PROCEDURE — 36415 COLL VENOUS BLD VENIPUNCTURE: CPT

## 2021-01-14 PROCEDURE — 12000002 HC ACUTE/MED SURGE SEMI-PRIVATE ROOM

## 2021-01-14 RX ORDER — BISACODYL 5 MG
5 TABLET, DELAYED RELEASE (ENTERIC COATED) ORAL DAILY PRN
Status: DISCONTINUED | OUTPATIENT
Start: 2021-01-14 | End: 2021-01-15

## 2021-01-14 RX ADMIN — OXYCODONE HYDROCHLORIDE AND ACETAMINOPHEN 500 MG: 500 TABLET ORAL at 08:01

## 2021-01-14 RX ADMIN — ASPIRIN 81 MG 81 MG: 81 TABLET ORAL at 08:01

## 2021-01-14 RX ADMIN — DOCUSATE SODIUM 100 MG: 100 CAPSULE, LIQUID FILLED ORAL at 08:01

## 2021-01-14 RX ADMIN — MULTIPLE VITAMINS W/ MINERALS TAB 1 TABLET: TAB at 08:01

## 2021-01-14 RX ADMIN — DEXAMETHASONE 6 MG: 2 TABLET ORAL at 08:01

## 2021-01-14 RX ADMIN — Medication 1000 UNITS: at 08:01

## 2021-01-14 RX ADMIN — BISACODYL 5 MG: 5 TABLET, COATED ORAL at 04:01

## 2021-01-14 RX ADMIN — ACETAMINOPHEN 650 MG: 325 TABLET, FILM COATED ORAL at 08:01

## 2021-01-14 RX ADMIN — ENOXAPARIN SODIUM 40 MG: 40 INJECTION SUBCUTANEOUS at 04:01

## 2021-01-15 PROCEDURE — 25000003 PHARM REV CODE 250: Performed by: HOSPITALIST

## 2021-01-15 PROCEDURE — 12000002 HC ACUTE/MED SURGE SEMI-PRIVATE ROOM

## 2021-01-15 PROCEDURE — 94618 PULMONARY STRESS TESTING: CPT

## 2021-01-15 PROCEDURE — 25000242 PHARM REV CODE 250 ALT 637 W/ HCPCS: Performed by: NURSE PRACTITIONER

## 2021-01-15 PROCEDURE — 94761 N-INVAS EAR/PLS OXIMETRY MLT: CPT

## 2021-01-15 PROCEDURE — 27000221 HC OXYGEN, UP TO 24 HOURS

## 2021-01-15 PROCEDURE — 97165 OT EVAL LOW COMPLEX 30 MIN: CPT

## 2021-01-15 PROCEDURE — 97535 SELF CARE MNGMENT TRAINING: CPT

## 2021-01-15 PROCEDURE — 25000003 PHARM REV CODE 250: Performed by: NURSE PRACTITIONER

## 2021-01-15 PROCEDURE — 63600175 PHARM REV CODE 636 W HCPCS: Performed by: NURSE PRACTITIONER

## 2021-01-15 PROCEDURE — 97162 PT EVAL MOD COMPLEX 30 MIN: CPT

## 2021-01-15 PROCEDURE — 97530 THERAPEUTIC ACTIVITIES: CPT

## 2021-01-15 PROCEDURE — 94799 UNLISTED PULMONARY SVC/PX: CPT

## 2021-01-15 PROCEDURE — 99900035 HC TECH TIME PER 15 MIN (STAT)

## 2021-01-15 RX ORDER — SYRING-NEEDL,DISP,INSUL,0.3 ML 29 G X1/2"
148 SYRINGE, EMPTY DISPOSABLE MISCELLANEOUS ONCE
Status: COMPLETED | OUTPATIENT
Start: 2021-01-15 | End: 2021-01-15

## 2021-01-15 RX ADMIN — Medication 1000 UNITS: at 08:01

## 2021-01-15 RX ADMIN — DEXAMETHASONE 6 MG: 2 TABLET ORAL at 08:01

## 2021-01-15 RX ADMIN — OXYCODONE HYDROCHLORIDE AND ACETAMINOPHEN 500 MG: 500 TABLET ORAL at 08:01

## 2021-01-15 RX ADMIN — BISACODYL 5 MG: 5 TABLET, COATED ORAL at 08:01

## 2021-01-15 RX ADMIN — ASPIRIN 81 MG 81 MG: 81 TABLET ORAL at 08:01

## 2021-01-15 RX ADMIN — DOCUSATE SODIUM 100 MG: 100 CAPSULE, LIQUID FILLED ORAL at 08:01

## 2021-01-15 RX ADMIN — ENOXAPARIN SODIUM 40 MG: 40 INJECTION SUBCUTANEOUS at 04:01

## 2021-01-15 RX ADMIN — MULTIPLE VITAMINS W/ MINERALS TAB 1 TABLET: TAB at 08:01

## 2021-01-15 RX ADMIN — Medication 148 ML: at 02:01

## 2021-01-16 LAB
ANION GAP SERPL CALC-SCNC: 12 MMOL/L (ref 8–16)
BUN SERPL-MCNC: 32 MG/DL (ref 8–23)
CALCIUM SERPL-MCNC: 9.1 MG/DL (ref 8.7–10.5)
CHLORIDE SERPL-SCNC: 101 MMOL/L (ref 95–110)
CO2 SERPL-SCNC: 23 MMOL/L (ref 23–29)
CREAT SERPL-MCNC: 0.7 MG/DL (ref 0.5–1.4)
EST. GFR  (AFRICAN AMERICAN): >60 ML/MIN/1.73 M^2
EST. GFR  (NON AFRICAN AMERICAN): >60 ML/MIN/1.73 M^2
GLUCOSE SERPL-MCNC: 104 MG/DL (ref 70–110)
POTASSIUM SERPL-SCNC: 3.8 MMOL/L (ref 3.5–5.1)
SODIUM SERPL-SCNC: 136 MMOL/L (ref 136–145)

## 2021-01-16 PROCEDURE — 25000003 PHARM REV CODE 250: Performed by: HOSPITALIST

## 2021-01-16 PROCEDURE — 36415 COLL VENOUS BLD VENIPUNCTURE: CPT

## 2021-01-16 PROCEDURE — 25000242 PHARM REV CODE 250 ALT 637 W/ HCPCS: Performed by: NURSE PRACTITIONER

## 2021-01-16 PROCEDURE — 80048 BASIC METABOLIC PNL TOTAL CA: CPT

## 2021-01-16 PROCEDURE — 94761 N-INVAS EAR/PLS OXIMETRY MLT: CPT

## 2021-01-16 PROCEDURE — 99900035 HC TECH TIME PER 15 MIN (STAT)

## 2021-01-16 PROCEDURE — 94799 UNLISTED PULMONARY SVC/PX: CPT

## 2021-01-16 PROCEDURE — 12000002 HC ACUTE/MED SURGE SEMI-PRIVATE ROOM

## 2021-01-16 PROCEDURE — 63600175 PHARM REV CODE 636 W HCPCS: Performed by: NURSE PRACTITIONER

## 2021-01-16 PROCEDURE — 27000221 HC OXYGEN, UP TO 24 HOURS

## 2021-01-16 PROCEDURE — 25000003 PHARM REV CODE 250: Performed by: NURSE PRACTITIONER

## 2021-01-16 RX ADMIN — OXYCODONE HYDROCHLORIDE AND ACETAMINOPHEN 500 MG: 500 TABLET ORAL at 08:01

## 2021-01-16 RX ADMIN — ENOXAPARIN SODIUM 40 MG: 40 INJECTION SUBCUTANEOUS at 04:01

## 2021-01-16 RX ADMIN — Medication 1000 UNITS: at 09:01

## 2021-01-16 RX ADMIN — ASPIRIN 81 MG 81 MG: 81 TABLET ORAL at 09:01

## 2021-01-16 RX ADMIN — DOCUSATE SODIUM 100 MG: 100 CAPSULE, LIQUID FILLED ORAL at 09:01

## 2021-01-16 RX ADMIN — MULTIPLE VITAMINS W/ MINERALS TAB 1 TABLET: TAB at 09:01

## 2021-01-16 RX ADMIN — DEXAMETHASONE 6 MG: 2 TABLET ORAL at 09:01

## 2021-01-16 RX ADMIN — OXYCODONE HYDROCHLORIDE AND ACETAMINOPHEN 500 MG: 500 TABLET ORAL at 09:01

## 2021-01-17 PROCEDURE — 25000003 PHARM REV CODE 250: Performed by: HOSPITALIST

## 2021-01-17 PROCEDURE — 94761 N-INVAS EAR/PLS OXIMETRY MLT: CPT

## 2021-01-17 PROCEDURE — 63600175 PHARM REV CODE 636 W HCPCS: Performed by: NURSE PRACTITIONER

## 2021-01-17 PROCEDURE — 25000003 PHARM REV CODE 250: Performed by: NURSE PRACTITIONER

## 2021-01-17 PROCEDURE — 25000242 PHARM REV CODE 250 ALT 637 W/ HCPCS: Performed by: NURSE PRACTITIONER

## 2021-01-17 PROCEDURE — 12000002 HC ACUTE/MED SURGE SEMI-PRIVATE ROOM

## 2021-01-17 PROCEDURE — 27000221 HC OXYGEN, UP TO 24 HOURS

## 2021-01-17 PROCEDURE — 99900035 HC TECH TIME PER 15 MIN (STAT)

## 2021-01-17 PROCEDURE — 99900031 HC PATIENT EDUCATION (STAT)

## 2021-01-17 RX ADMIN — OXYCODONE HYDROCHLORIDE AND ACETAMINOPHEN 500 MG: 500 TABLET ORAL at 08:01

## 2021-01-17 RX ADMIN — DEXAMETHASONE 6 MG: 2 TABLET ORAL at 08:01

## 2021-01-17 RX ADMIN — Medication 1000 UNITS: at 08:01

## 2021-01-17 RX ADMIN — DOCUSATE SODIUM 100 MG: 100 CAPSULE, LIQUID FILLED ORAL at 08:01

## 2021-01-17 RX ADMIN — ASPIRIN 81 MG 81 MG: 81 TABLET ORAL at 08:01

## 2021-01-17 RX ADMIN — MULTIPLE VITAMINS W/ MINERALS TAB 1 TABLET: TAB at 08:01

## 2021-01-17 RX ADMIN — ENOXAPARIN SODIUM 40 MG: 40 INJECTION SUBCUTANEOUS at 04:01

## 2021-01-18 LAB
ANION GAP SERPL CALC-SCNC: 5 MMOL/L (ref 8–16)
BUN SERPL-MCNC: 33 MG/DL (ref 8–23)
CALCIUM SERPL-MCNC: 8.7 MG/DL (ref 8.7–10.5)
CHLORIDE SERPL-SCNC: 106 MMOL/L (ref 95–110)
CO2 SERPL-SCNC: 24 MMOL/L (ref 23–29)
CREAT SERPL-MCNC: 0.7 MG/DL (ref 0.5–1.4)
ERYTHROCYTE [DISTWIDTH] IN BLOOD BY AUTOMATED COUNT: 12.7 % (ref 11.5–14.5)
EST. GFR  (AFRICAN AMERICAN): >60 ML/MIN/1.73 M^2
EST. GFR  (NON AFRICAN AMERICAN): >60 ML/MIN/1.73 M^2
GLUCOSE SERPL-MCNC: 101 MG/DL (ref 70–110)
HCT VFR BLD AUTO: 37.6 % (ref 37–48.5)
HGB BLD-MCNC: 12.3 G/DL (ref 12–16)
MCH RBC QN AUTO: 30.4 PG (ref 27–31)
MCHC RBC AUTO-ENTMCNC: 32.7 G/DL (ref 32–36)
MCV RBC AUTO: 93 FL (ref 82–98)
PLATELET # BLD AUTO: 148 K/UL (ref 150–350)
PLATELET BLD QL SMEAR: ABNORMAL
PMV BLD AUTO: 11.3 FL (ref 9.2–12.9)
POTASSIUM SERPL-SCNC: 3.7 MMOL/L (ref 3.5–5.1)
RBC # BLD AUTO: 4.04 M/UL (ref 4–5.4)
SODIUM SERPL-SCNC: 135 MMOL/L (ref 136–145)
WBC # BLD AUTO: 7.17 K/UL (ref 3.9–12.7)

## 2021-01-18 PROCEDURE — 25000242 PHARM REV CODE 250 ALT 637 W/ HCPCS: Performed by: NURSE PRACTITIONER

## 2021-01-18 PROCEDURE — 97116 GAIT TRAINING THERAPY: CPT | Mod: CQ

## 2021-01-18 PROCEDURE — 36415 COLL VENOUS BLD VENIPUNCTURE: CPT

## 2021-01-18 PROCEDURE — 27000221 HC OXYGEN, UP TO 24 HOURS

## 2021-01-18 PROCEDURE — 25000003 PHARM REV CODE 250: Performed by: NURSE PRACTITIONER

## 2021-01-18 PROCEDURE — 97535 SELF CARE MNGMENT TRAINING: CPT

## 2021-01-18 PROCEDURE — 99900035 HC TECH TIME PER 15 MIN (STAT)

## 2021-01-18 PROCEDURE — 94799 UNLISTED PULMONARY SVC/PX: CPT

## 2021-01-18 PROCEDURE — 94761 N-INVAS EAR/PLS OXIMETRY MLT: CPT

## 2021-01-18 PROCEDURE — 25000003 PHARM REV CODE 250: Performed by: HOSPITALIST

## 2021-01-18 PROCEDURE — 12000002 HC ACUTE/MED SURGE SEMI-PRIVATE ROOM

## 2021-01-18 PROCEDURE — 63600175 PHARM REV CODE 636 W HCPCS: Performed by: NURSE PRACTITIONER

## 2021-01-18 PROCEDURE — 85027 COMPLETE CBC AUTOMATED: CPT

## 2021-01-18 PROCEDURE — 80048 BASIC METABOLIC PNL TOTAL CA: CPT

## 2021-01-18 RX ADMIN — DEXAMETHASONE 6 MG: 2 TABLET ORAL at 09:01

## 2021-01-18 RX ADMIN — ENOXAPARIN SODIUM 40 MG: 40 INJECTION SUBCUTANEOUS at 04:01

## 2021-01-18 RX ADMIN — Medication 1000 UNITS: at 09:01

## 2021-01-18 RX ADMIN — DOCUSATE SODIUM 100 MG: 100 CAPSULE, LIQUID FILLED ORAL at 09:01

## 2021-01-18 RX ADMIN — OXYCODONE HYDROCHLORIDE AND ACETAMINOPHEN 500 MG: 500 TABLET ORAL at 09:01

## 2021-01-18 RX ADMIN — ASPIRIN 81 MG 81 MG: 81 TABLET ORAL at 09:01

## 2021-01-18 RX ADMIN — MULTIPLE VITAMINS W/ MINERALS TAB 1 TABLET: TAB at 09:01

## 2021-01-18 RX ADMIN — OXYCODONE HYDROCHLORIDE AND ACETAMINOPHEN 500 MG: 500 TABLET ORAL at 08:01

## 2021-01-19 LAB
ANION GAP SERPL CALC-SCNC: 7 MMOL/L (ref 8–16)
BUN SERPL-MCNC: 30 MG/DL (ref 8–23)
CALCIUM SERPL-MCNC: 9.1 MG/DL (ref 8.7–10.5)
CHLORIDE SERPL-SCNC: 106 MMOL/L (ref 95–110)
CO2 SERPL-SCNC: 24 MMOL/L (ref 23–29)
CREAT SERPL-MCNC: 0.7 MG/DL (ref 0.5–1.4)
EST. GFR  (AFRICAN AMERICAN): >60 ML/MIN/1.73 M^2
EST. GFR  (NON AFRICAN AMERICAN): >60 ML/MIN/1.73 M^2
GLUCOSE SERPL-MCNC: 77 MG/DL (ref 70–110)
POTASSIUM SERPL-SCNC: 3.9 MMOL/L (ref 3.5–5.1)
SODIUM SERPL-SCNC: 137 MMOL/L (ref 136–145)

## 2021-01-19 PROCEDURE — 94761 N-INVAS EAR/PLS OXIMETRY MLT: CPT

## 2021-01-19 PROCEDURE — 25000003 PHARM REV CODE 250: Performed by: NURSE PRACTITIONER

## 2021-01-19 PROCEDURE — 97535 SELF CARE MNGMENT TRAINING: CPT

## 2021-01-19 PROCEDURE — 25000003 PHARM REV CODE 250: Performed by: HOSPITALIST

## 2021-01-19 PROCEDURE — 27000221 HC OXYGEN, UP TO 24 HOURS

## 2021-01-19 PROCEDURE — 63600175 PHARM REV CODE 636 W HCPCS: Performed by: NURSE PRACTITIONER

## 2021-01-19 PROCEDURE — 80048 BASIC METABOLIC PNL TOTAL CA: CPT

## 2021-01-19 PROCEDURE — 36415 COLL VENOUS BLD VENIPUNCTURE: CPT

## 2021-01-19 PROCEDURE — 94799 UNLISTED PULMONARY SVC/PX: CPT

## 2021-01-19 PROCEDURE — 12000002 HC ACUTE/MED SURGE SEMI-PRIVATE ROOM

## 2021-01-19 PROCEDURE — 97116 GAIT TRAINING THERAPY: CPT | Mod: CQ

## 2021-01-19 RX ORDER — ASPIRIN 325 MG
325 TABLET, DELAYED RELEASE (ENTERIC COATED) ORAL DAILY
Status: DISCONTINUED | OUTPATIENT
Start: 2021-01-20 | End: 2021-01-20 | Stop reason: HOSPADM

## 2021-01-19 RX ADMIN — Medication 1000 UNITS: at 08:01

## 2021-01-19 RX ADMIN — MULTIPLE VITAMINS W/ MINERALS TAB 1 TABLET: TAB at 08:01

## 2021-01-19 RX ADMIN — OXYCODONE HYDROCHLORIDE AND ACETAMINOPHEN 500 MG: 500 TABLET ORAL at 09:01

## 2021-01-19 RX ADMIN — OXYCODONE HYDROCHLORIDE AND ACETAMINOPHEN 500 MG: 500 TABLET ORAL at 08:01

## 2021-01-19 RX ADMIN — DOCUSATE SODIUM 100 MG: 100 CAPSULE, LIQUID FILLED ORAL at 08:01

## 2021-01-19 RX ADMIN — ASPIRIN 81 MG 81 MG: 81 TABLET ORAL at 08:01

## 2021-01-19 RX ADMIN — ENOXAPARIN SODIUM 40 MG: 40 INJECTION SUBCUTANEOUS at 04:01

## 2021-01-20 VITALS
SYSTOLIC BLOOD PRESSURE: 132 MMHG | DIASTOLIC BLOOD PRESSURE: 60 MMHG | HEART RATE: 77 BPM | TEMPERATURE: 98 F | BODY MASS INDEX: 27.81 KG/M2 | OXYGEN SATURATION: 95 % | HEIGHT: 66 IN | WEIGHT: 173.06 LBS | RESPIRATION RATE: 17 BRPM

## 2021-01-20 LAB
ANION GAP SERPL CALC-SCNC: 5 MMOL/L (ref 8–16)
BUN SERPL-MCNC: 35 MG/DL (ref 8–23)
CALCIUM SERPL-MCNC: 8.6 MG/DL (ref 8.7–10.5)
CHLORIDE SERPL-SCNC: 107 MMOL/L (ref 95–110)
CO2 SERPL-SCNC: 25 MMOL/L (ref 23–29)
CREAT SERPL-MCNC: 0.8 MG/DL (ref 0.5–1.4)
EST. GFR  (AFRICAN AMERICAN): >60 ML/MIN/1.73 M^2
EST. GFR  (NON AFRICAN AMERICAN): >60 ML/MIN/1.73 M^2
GLUCOSE SERPL-MCNC: 77 MG/DL (ref 70–110)
POTASSIUM SERPL-SCNC: 4 MMOL/L (ref 3.5–5.1)
SODIUM SERPL-SCNC: 137 MMOL/L (ref 136–145)

## 2021-01-20 PROCEDURE — 97116 GAIT TRAINING THERAPY: CPT

## 2021-01-20 PROCEDURE — 25000003 PHARM REV CODE 250: Performed by: HOSPITALIST

## 2021-01-20 PROCEDURE — 94761 N-INVAS EAR/PLS OXIMETRY MLT: CPT

## 2021-01-20 PROCEDURE — 80048 BASIC METABOLIC PNL TOTAL CA: CPT

## 2021-01-20 PROCEDURE — 99900031 HC PATIENT EDUCATION (STAT)

## 2021-01-20 PROCEDURE — 25000003 PHARM REV CODE 250: Performed by: NURSE PRACTITIONER

## 2021-01-20 PROCEDURE — 36415 COLL VENOUS BLD VENIPUNCTURE: CPT

## 2021-01-20 PROCEDURE — 27000221 HC OXYGEN, UP TO 24 HOURS

## 2021-01-20 PROCEDURE — 99900035 HC TECH TIME PER 15 MIN (STAT)

## 2021-01-20 RX ORDER — ASPIRIN 325 MG
325 TABLET, DELAYED RELEASE (ENTERIC COATED) ORAL DAILY
Qty: 30 TABLET | Refills: 0 | Status: ON HOLD | OUTPATIENT
Start: 2021-01-21 | End: 2021-02-02 | Stop reason: HOSPADM

## 2021-01-20 RX ADMIN — DOCUSATE SODIUM 100 MG: 100 CAPSULE, LIQUID FILLED ORAL at 09:01

## 2021-01-20 RX ADMIN — OXYCODONE HYDROCHLORIDE AND ACETAMINOPHEN 500 MG: 500 TABLET ORAL at 09:01

## 2021-01-20 RX ADMIN — Medication 1000 UNITS: at 09:01

## 2021-01-20 RX ADMIN — MULTIPLE VITAMINS W/ MINERALS TAB 1 TABLET: TAB at 09:01

## 2021-01-20 RX ADMIN — ASPIRIN 325 MG: 325 TABLET, COATED ORAL at 09:01

## 2021-01-27 ENCOUNTER — HOSPITAL ENCOUNTER (INPATIENT)
Facility: HOSPITAL | Age: 81
LOS: 6 days | Discharge: HOME HEALTH | DRG: 309 | End: 2021-02-02
Attending: INTERNAL MEDICINE | Admitting: INTERNAL MEDICINE
Payer: MEDICARE

## 2021-01-27 DIAGNOSIS — J12.82 PNEUMONIA DUE TO COVID-19 VIRUS: ICD-10-CM

## 2021-01-27 DIAGNOSIS — A08.39 GASTROENTERITIS DUE TO COVID-19 VIRUS: Primary | ICD-10-CM

## 2021-01-27 DIAGNOSIS — U07.1 GASTROENTERITIS DUE TO COVID-19 VIRUS: Primary | ICD-10-CM

## 2021-01-27 DIAGNOSIS — R07.9 CHEST PAIN: ICD-10-CM

## 2021-01-27 DIAGNOSIS — I49.9 ARRHYTHMIA: ICD-10-CM

## 2021-01-27 DIAGNOSIS — I48.91 A-FIB: ICD-10-CM

## 2021-01-27 DIAGNOSIS — U07.1 PNEUMONIA DUE TO COVID-19 VIRUS: ICD-10-CM

## 2021-01-27 DIAGNOSIS — I48.91 ATRIAL FIBRILLATION WITH RVR: ICD-10-CM

## 2021-01-27 DIAGNOSIS — R94.31 QT PROLONGATION: ICD-10-CM

## 2021-01-27 DIAGNOSIS — U07.1 COVID-19: ICD-10-CM

## 2021-01-27 PROBLEM — I51.89 DIASTOLIC DYSFUNCTION: Status: ACTIVE | Noted: 2021-01-27

## 2021-01-27 PROBLEM — R93.89 ABNORMAL CXR: Status: ACTIVE | Noted: 2021-01-27

## 2021-01-27 LAB
ALBUMIN SERPL BCP-MCNC: 3.3 G/DL (ref 3.5–5.2)
ALP SERPL-CCNC: 71 U/L (ref 55–135)
ALT SERPL W/O P-5'-P-CCNC: 19 U/L (ref 10–44)
ANION GAP SERPL CALC-SCNC: 9 MMOL/L (ref 8–16)
AST SERPL-CCNC: 20 U/L (ref 10–40)
BASOPHILS # BLD AUTO: 0.02 K/UL (ref 0–0.2)
BASOPHILS NFR BLD: 0.5 % (ref 0–1.9)
BILIRUB SERPL-MCNC: 0.7 MG/DL (ref 0.1–1)
BNP SERPL-MCNC: 304 PG/ML (ref 0–99)
BUN SERPL-MCNC: 18 MG/DL (ref 8–23)
CALCIUM SERPL-MCNC: 9.2 MG/DL (ref 8.7–10.5)
CHLORIDE SERPL-SCNC: 111 MMOL/L (ref 95–110)
CO2 SERPL-SCNC: 20 MMOL/L (ref 23–29)
CREAT SERPL-MCNC: 0.7 MG/DL (ref 0.5–1.4)
DIFFERENTIAL METHOD: ABNORMAL
EOSINOPHIL # BLD AUTO: 0.1 K/UL (ref 0–0.5)
EOSINOPHIL NFR BLD: 1.5 % (ref 0–8)
ERYTHROCYTE [DISTWIDTH] IN BLOOD BY AUTOMATED COUNT: 13.1 % (ref 11.5–14.5)
EST. GFR  (AFRICAN AMERICAN): >60 ML/MIN/1.73 M^2
EST. GFR  (NON AFRICAN AMERICAN): >60 ML/MIN/1.73 M^2
GLUCOSE SERPL-MCNC: 80 MG/DL (ref 70–110)
HCT VFR BLD AUTO: 32.7 % (ref 37–48.5)
HGB BLD-MCNC: 10.9 G/DL (ref 12–16)
IMM GRANULOCYTES # BLD AUTO: 0.01 K/UL (ref 0–0.04)
IMM GRANULOCYTES NFR BLD AUTO: 0.3 % (ref 0–0.5)
LYMPHOCYTES # BLD AUTO: 1.2 K/UL (ref 1–4.8)
LYMPHOCYTES NFR BLD: 29.5 % (ref 18–48)
MAGNESIUM SERPL-MCNC: 2 MG/DL (ref 1.6–2.6)
MCH RBC QN AUTO: 30.7 PG (ref 27–31)
MCHC RBC AUTO-ENTMCNC: 33.3 G/DL (ref 32–36)
MCV RBC AUTO: 92 FL (ref 82–98)
MONOCYTES # BLD AUTO: 0.5 K/UL (ref 0.3–1)
MONOCYTES NFR BLD: 12.9 % (ref 4–15)
NEUTROPHILS # BLD AUTO: 2.2 K/UL (ref 1.8–7.7)
NEUTROPHILS NFR BLD: 55.3 % (ref 38–73)
NRBC BLD-RTO: 0 /100 WBC
PLATELET # BLD AUTO: 96 K/UL (ref 150–350)
PMV BLD AUTO: 10.8 FL (ref 9.2–12.9)
POTASSIUM SERPL-SCNC: 3.7 MMOL/L (ref 3.5–5.1)
PROT SERPL-MCNC: 5.8 G/DL (ref 6–8.4)
RBC # BLD AUTO: 3.55 M/UL (ref 4–5.4)
SODIUM SERPL-SCNC: 140 MMOL/L (ref 136–145)
T4 FREE SERPL-MCNC: 1.36 NG/DL (ref 0.71–1.51)
TROPONIN I SERPL DL<=0.01 NG/ML-MCNC: 1.08 NG/ML
TROPONIN I SERPL DL<=0.01 NG/ML-MCNC: 1.58 NG/ML
TROPONIN I SERPL DL<=0.01 NG/ML-MCNC: <0.03 NG/ML
TSH SERPL DL<=0.005 MIU/L-ACNC: 4.49 UIU/ML (ref 0.34–5.6)
WBC # BLD AUTO: 3.96 K/UL (ref 3.9–12.7)

## 2021-01-27 PROCEDURE — 84484 ASSAY OF TROPONIN QUANT: CPT

## 2021-01-27 PROCEDURE — 83880 ASSAY OF NATRIURETIC PEPTIDE: CPT

## 2021-01-27 PROCEDURE — 96365 THER/PROPH/DIAG IV INF INIT: CPT

## 2021-01-27 PROCEDURE — 96376 TX/PRO/DX INJ SAME DRUG ADON: CPT

## 2021-01-27 PROCEDURE — 99223 PR INITIAL HOSPITAL CARE,LEVL III: ICD-10-PCS | Mod: ,,, | Performed by: INTERNAL MEDICINE

## 2021-01-27 PROCEDURE — 25000003 PHARM REV CODE 250: Performed by: NURSE PRACTITIONER

## 2021-01-27 PROCEDURE — 63600175 PHARM REV CODE 636 W HCPCS: Mod: JG | Performed by: NURSE PRACTITIONER

## 2021-01-27 PROCEDURE — 99223 1ST HOSP IP/OBS HIGH 75: CPT | Mod: ,,, | Performed by: INTERNAL MEDICINE

## 2021-01-27 PROCEDURE — 25000003 PHARM REV CODE 250: Performed by: EMERGENCY MEDICINE

## 2021-01-27 PROCEDURE — 63600175 PHARM REV CODE 636 W HCPCS: Performed by: EMERGENCY MEDICINE

## 2021-01-27 PROCEDURE — 96375 TX/PRO/DX INJ NEW DRUG ADDON: CPT

## 2021-01-27 PROCEDURE — 84439 ASSAY OF FREE THYROXINE: CPT

## 2021-01-27 PROCEDURE — 84443 ASSAY THYROID STIM HORMONE: CPT

## 2021-01-27 PROCEDURE — 99285 EMERGENCY DEPT VISIT HI MDM: CPT | Mod: 25

## 2021-01-27 PROCEDURE — 93010 ELECTROCARDIOGRAM REPORT: CPT | Mod: ,,, | Performed by: INTERNAL MEDICINE

## 2021-01-27 PROCEDURE — 63600175 PHARM REV CODE 636 W HCPCS: Performed by: INTERNAL MEDICINE

## 2021-01-27 PROCEDURE — 84484 ASSAY OF TROPONIN QUANT: CPT | Mod: 91

## 2021-01-27 PROCEDURE — P9045 ALBUMIN (HUMAN), 5%, 250 ML: HCPCS | Mod: JG | Performed by: NURSE PRACTITIONER

## 2021-01-27 PROCEDURE — 25000003 PHARM REV CODE 250: Performed by: INTERNAL MEDICINE

## 2021-01-27 PROCEDURE — 85025 COMPLETE CBC W/AUTO DIFF WBC: CPT

## 2021-01-27 PROCEDURE — 63600175 PHARM REV CODE 636 W HCPCS: Performed by: NURSE PRACTITIONER

## 2021-01-27 PROCEDURE — 93005 ELECTROCARDIOGRAM TRACING: CPT | Performed by: INTERNAL MEDICINE

## 2021-01-27 PROCEDURE — 83735 ASSAY OF MAGNESIUM: CPT

## 2021-01-27 PROCEDURE — 20000000 HC ICU ROOM

## 2021-01-27 PROCEDURE — 80053 COMPREHEN METABOLIC PANEL: CPT

## 2021-01-27 PROCEDURE — 36415 COLL VENOUS BLD VENIPUNCTURE: CPT

## 2021-01-27 PROCEDURE — 25500020 PHARM REV CODE 255: Performed by: INTERNAL MEDICINE

## 2021-01-27 PROCEDURE — 93010 EKG 12-LEAD: ICD-10-PCS | Mod: ,,, | Performed by: INTERNAL MEDICINE

## 2021-01-27 RX ORDER — NITROGLYCERIN 0.4 MG/1
0.4 TABLET SUBLINGUAL EVERY 5 MIN PRN
Status: DISCONTINUED | OUTPATIENT
Start: 2021-01-27 | End: 2021-02-02 | Stop reason: HOSPADM

## 2021-01-27 RX ORDER — CALCIUM CHLORIDE IN 0.9 % NACL 1 G/100 ML
1 INTRAVENOUS SOLUTION, PIGGYBACK (ML) INTRAVENOUS
Status: DISCONTINUED | OUTPATIENT
Start: 2021-01-27 | End: 2021-02-02 | Stop reason: HOSPADM

## 2021-01-27 RX ORDER — MAGNESIUM SULFATE 1 G/100ML
1 INJECTION INTRAVENOUS ONCE
Status: COMPLETED | OUTPATIENT
Start: 2021-01-27 | End: 2021-01-27

## 2021-01-27 RX ORDER — POTASSIUM CHLORIDE 20 MEQ/1
20 TABLET, EXTENDED RELEASE ORAL
Status: DISCONTINUED | OUTPATIENT
Start: 2021-01-27 | End: 2021-02-02 | Stop reason: HOSPADM

## 2021-01-27 RX ORDER — NAPROXEN SODIUM 220 MG/1
81 TABLET, FILM COATED ORAL DAILY
Status: DISCONTINUED | OUTPATIENT
Start: 2021-01-27 | End: 2021-01-29

## 2021-01-27 RX ORDER — HYDROCHLOROTHIAZIDE 50 MG/1
50 TABLET ORAL
Status: ON HOLD | COMMUNITY
Start: 2020-10-28 | End: 2021-02-02 | Stop reason: HOSPADM

## 2021-01-27 RX ORDER — METOPROLOL TARTRATE 25 MG/1
12.5 TABLET ORAL 2 TIMES DAILY
Status: DISCONTINUED | OUTPATIENT
Start: 2021-01-27 | End: 2021-01-27

## 2021-01-27 RX ORDER — ONDANSETRON 2 MG/ML
4 INJECTION INTRAMUSCULAR; INTRAVENOUS EVERY 8 HOURS PRN
Status: DISCONTINUED | OUTPATIENT
Start: 2021-01-27 | End: 2021-02-02 | Stop reason: HOSPADM

## 2021-01-27 RX ORDER — POTASSIUM CHLORIDE 20 MEQ/1
40 TABLET, EXTENDED RELEASE ORAL ONCE
Status: COMPLETED | OUTPATIENT
Start: 2021-01-27 | End: 2021-01-27

## 2021-01-27 RX ORDER — ACETAMINOPHEN 325 MG/1
650 TABLET ORAL EVERY 4 HOURS PRN
Status: DISCONTINUED | OUTPATIENT
Start: 2021-01-27 | End: 2021-02-02 | Stop reason: HOSPADM

## 2021-01-27 RX ORDER — SODIUM CHLORIDE 0.9 % (FLUSH) 0.9 %
10 SYRINGE (ML) INJECTION
Status: DISCONTINUED | OUTPATIENT
Start: 2021-01-27 | End: 2021-01-29

## 2021-01-27 RX ORDER — ENOXAPARIN SODIUM 100 MG/ML
1 INJECTION SUBCUTANEOUS
Status: DISCONTINUED | OUTPATIENT
Start: 2021-01-27 | End: 2021-01-29

## 2021-01-27 RX ORDER — METOPROLOL TARTRATE 1 MG/ML
2.5 INJECTION, SOLUTION INTRAVENOUS
Status: DISCONTINUED | OUTPATIENT
Start: 2021-01-27 | End: 2021-01-29

## 2021-01-27 RX ORDER — DILTIAZEM HYDROCHLORIDE 5 MG/ML
20 INJECTION INTRAVENOUS
Status: COMPLETED | OUTPATIENT
Start: 2021-01-27 | End: 2021-01-27

## 2021-01-27 RX ORDER — SODIUM CHLORIDE 9 MG/ML
INJECTION, SOLUTION INTRAVENOUS
Status: COMPLETED | OUTPATIENT
Start: 2021-01-27 | End: 2021-01-27

## 2021-01-27 RX ORDER — POTASSIUM CHLORIDE 7.45 MG/ML
40 INJECTION INTRAVENOUS
Status: DISCONTINUED | OUTPATIENT
Start: 2021-01-27 | End: 2021-02-02 | Stop reason: HOSPADM

## 2021-01-27 RX ORDER — MAGNESIUM SULFATE 1 G/100ML
1 INJECTION INTRAVENOUS
Status: DISCONTINUED | OUTPATIENT
Start: 2021-01-27 | End: 2021-02-02 | Stop reason: HOSPADM

## 2021-01-27 RX ORDER — BISACODYL 10 MG
10 SUPPOSITORY, RECTAL RECTAL DAILY PRN
Status: DISCONTINUED | OUTPATIENT
Start: 2021-01-27 | End: 2021-02-02 | Stop reason: HOSPADM

## 2021-01-27 RX ORDER — MAGNESIUM SULFATE HEPTAHYDRATE 40 MG/ML
2 INJECTION, SOLUTION INTRAVENOUS
Status: DISCONTINUED | OUTPATIENT
Start: 2021-01-27 | End: 2021-02-02 | Stop reason: HOSPADM

## 2021-01-27 RX ORDER — PHENYLPROPANOLAMINE/CLEMASTINE 75-1.34MG
200 TABLET, EXTENDED RELEASE ORAL EVERY 4 HOURS PRN
Status: ON HOLD | COMMUNITY
End: 2021-02-02 | Stop reason: HOSPADM

## 2021-01-27 RX ORDER — POTASSIUM CHLORIDE 7.45 MG/ML
20 INJECTION INTRAVENOUS
Status: DISCONTINUED | OUTPATIENT
Start: 2021-01-27 | End: 2021-02-02 | Stop reason: HOSPADM

## 2021-01-27 RX ORDER — METOPROLOL TARTRATE 25 MG/1
12.5 TABLET ORAL 3 TIMES DAILY
Status: DISCONTINUED | OUTPATIENT
Start: 2021-01-27 | End: 2021-01-29

## 2021-01-27 RX ORDER — MAGNESIUM SULFATE HEPTAHYDRATE 40 MG/ML
4 INJECTION, SOLUTION INTRAVENOUS
Status: DISCONTINUED | OUTPATIENT
Start: 2021-01-27 | End: 2021-02-02 | Stop reason: HOSPADM

## 2021-01-27 RX ORDER — LANOLIN ALCOHOL/MO/W.PET/CERES
800 CREAM (GRAM) TOPICAL
Status: DISCONTINUED | OUTPATIENT
Start: 2021-01-27 | End: 2021-02-02 | Stop reason: HOSPADM

## 2021-01-27 RX ORDER — POTASSIUM CHLORIDE 20 MEQ/1
40 TABLET, EXTENDED RELEASE ORAL
Status: DISCONTINUED | OUTPATIENT
Start: 2021-01-27 | End: 2021-02-02 | Stop reason: HOSPADM

## 2021-01-27 RX ORDER — ALBUMIN HUMAN 50 G/1000ML
25 SOLUTION INTRAVENOUS ONCE
Status: COMPLETED | OUTPATIENT
Start: 2021-01-27 | End: 2021-01-27

## 2021-01-27 RX ORDER — LORAZEPAM 2 MG/ML
0.5 INJECTION INTRAMUSCULAR
Status: COMPLETED | OUTPATIENT
Start: 2021-01-27 | End: 2021-01-27

## 2021-01-27 RX ORDER — ASPIRIN 325 MG
325 TABLET ORAL
Status: COMPLETED | OUTPATIENT
Start: 2021-01-27 | End: 2021-01-27

## 2021-01-27 RX ADMIN — DILTIAZEM HYDROCHLORIDE 10 MG: 5 INJECTION INTRAVENOUS at 03:01

## 2021-01-27 RX ADMIN — MAGNESIUM SULFATE 1 G: 1 INJECTION INTRAVENOUS at 09:01

## 2021-01-27 RX ADMIN — ALBUMIN (HUMAN) 25 G: 12.5 SOLUTION INTRAVENOUS at 11:01

## 2021-01-27 RX ADMIN — METOPROLOL TARTRATE 12.5 MG: 25 TABLET, FILM COATED ORAL at 07:01

## 2021-01-27 RX ADMIN — METOPROLOL TARTRATE 2.5 MG: 5 INJECTION INTRAVENOUS at 11:01

## 2021-01-27 RX ADMIN — POTASSIUM CHLORIDE 40 MEQ: 20 TABLET, EXTENDED RELEASE ORAL at 09:01

## 2021-01-27 RX ADMIN — IOHEXOL 100 ML: 350 INJECTION, SOLUTION INTRAVENOUS at 10:01

## 2021-01-27 RX ADMIN — ASPIRIN 325 MG ORAL TABLET 325 MG: 325 PILL ORAL at 05:01

## 2021-01-27 RX ADMIN — LORAZEPAM 0.5 MG: 2 INJECTION INTRAMUSCULAR; INTRAVENOUS at 03:01

## 2021-01-27 RX ADMIN — ENOXAPARIN SODIUM 80 MG: 80 INJECTION, SOLUTION INTRAVENOUS; SUBCUTANEOUS at 07:01

## 2021-01-27 RX ADMIN — DILTIAZEM HYDROCHLORIDE 6 MG/HR: 100 INJECTION, POWDER, LYOPHILIZED, FOR SOLUTION INTRAVENOUS at 04:01

## 2021-01-27 RX ADMIN — SODIUM CHLORIDE: 0.9 INJECTION, SOLUTION INTRAVENOUS at 03:01

## 2021-01-27 RX ADMIN — METOPROLOL TARTRATE 2.5 MG: 5 INJECTION INTRAVENOUS at 09:01

## 2021-01-28 PROBLEM — R07.9 CHEST PAIN: Status: RESOLVED | Noted: 2021-01-27 | Resolved: 2021-01-28

## 2021-01-28 PROBLEM — I48.91 A-FIB: Status: ACTIVE | Noted: 2021-01-28

## 2021-01-28 PROBLEM — D64.9 ANEMIA: Status: ACTIVE | Noted: 2021-01-28

## 2021-01-28 PROBLEM — R79.89 ELEVATED TROPONIN: Status: ACTIVE | Noted: 2021-01-28

## 2021-01-28 LAB
ANION GAP SERPL CALC-SCNC: 5 MMOL/L (ref 8–16)
BACTERIA #/AREA URNS HPF: NEGATIVE /HPF
BASOPHILS # BLD AUTO: 0.01 K/UL (ref 0–0.2)
BASOPHILS NFR BLD: 0.2 % (ref 0–1.9)
BILIRUB UR QL STRIP: NEGATIVE
BUN SERPL-MCNC: 14 MG/DL (ref 8–23)
CALCIUM SERPL-MCNC: 8.5 MG/DL (ref 8.7–10.5)
CHLORIDE SERPL-SCNC: 108 MMOL/L (ref 95–110)
CLARITY UR: CLEAR
CO2 SERPL-SCNC: 24 MMOL/L (ref 23–29)
COLOR UR: YELLOW
CREAT SERPL-MCNC: 0.7 MG/DL (ref 0.5–1.4)
DIFFERENTIAL METHOD: ABNORMAL
EOSINOPHIL # BLD AUTO: 0.1 K/UL (ref 0–0.5)
EOSINOPHIL NFR BLD: 1 % (ref 0–8)
ERYTHROCYTE [DISTWIDTH] IN BLOOD BY AUTOMATED COUNT: 13.3 % (ref 11.5–14.5)
EST. GFR  (AFRICAN AMERICAN): >60 ML/MIN/1.73 M^2
EST. GFR  (NON AFRICAN AMERICAN): >60 ML/MIN/1.73 M^2
GLUCOSE SERPL-MCNC: 84 MG/DL (ref 70–110)
GLUCOSE UR QL STRIP: NEGATIVE
HCT VFR BLD AUTO: 28.6 % (ref 37–48.5)
HCT VFR BLD AUTO: 30.8 % (ref 37–48.5)
HGB BLD-MCNC: 9.4 G/DL (ref 12–16)
HGB BLD-MCNC: 9.9 G/DL (ref 12–16)
HGB UR QL STRIP: ABNORMAL
HYALINE CASTS #/AREA URNS LPF: 8 /LPF
IMM GRANULOCYTES # BLD AUTO: 0.02 K/UL (ref 0–0.04)
IMM GRANULOCYTES NFR BLD AUTO: 0.4 % (ref 0–0.5)
KETONES UR QL STRIP: NEGATIVE
LEUKOCYTE ESTERASE UR QL STRIP: ABNORMAL
LYMPHOCYTES # BLD AUTO: 0.9 K/UL (ref 1–4.8)
LYMPHOCYTES NFR BLD: 18.5 % (ref 18–48)
MAGNESIUM SERPL-MCNC: 2.1 MG/DL (ref 1.6–2.6)
MCH RBC QN AUTO: 30.3 PG (ref 27–31)
MCHC RBC AUTO-ENTMCNC: 32.9 G/DL (ref 32–36)
MCV RBC AUTO: 92 FL (ref 82–98)
MICROSCOPIC COMMENT: ABNORMAL
MONOCYTES # BLD AUTO: 0.5 K/UL (ref 0.3–1)
MONOCYTES NFR BLD: 10.5 % (ref 4–15)
NEUTROPHILS # BLD AUTO: 3.4 K/UL (ref 1.8–7.7)
NEUTROPHILS NFR BLD: 69.4 % (ref 38–73)
NITRITE UR QL STRIP: NEGATIVE
NRBC BLD-RTO: 0 /100 WBC
PH UR STRIP: 6 [PH] (ref 5–8)
PLATELET # BLD AUTO: 82 K/UL (ref 150–350)
PMV BLD AUTO: 10.9 FL (ref 9.2–12.9)
POTASSIUM SERPL-SCNC: 4.2 MMOL/L (ref 3.5–5.1)
PROT UR QL STRIP: NEGATIVE
RBC # BLD AUTO: 3.1 M/UL (ref 4–5.4)
RBC #/AREA URNS HPF: 73 /HPF (ref 0–4)
SODIUM SERPL-SCNC: 137 MMOL/L (ref 136–145)
SP GR UR STRIP: 1.02 (ref 1–1.03)
SQUAMOUS #/AREA URNS HPF: 2 /HPF
URN SPEC COLLECT METH UR: ABNORMAL
UROBILINOGEN UR STRIP-ACNC: NEGATIVE EU/DL
WBC # BLD AUTO: 4.93 K/UL (ref 3.9–12.7)
WBC #/AREA URNS HPF: 8 /HPF (ref 0–5)

## 2021-01-28 PROCEDURE — 97161 PT EVAL LOW COMPLEX 20 MIN: CPT

## 2021-01-28 PROCEDURE — 85018 HEMOGLOBIN: CPT

## 2021-01-28 PROCEDURE — 99232 SBSQ HOSP IP/OBS MODERATE 35: CPT | Mod: ,,, | Performed by: INTERNAL MEDICINE

## 2021-01-28 PROCEDURE — 21000000 HC CCU ICU ROOM CHARGE

## 2021-01-28 PROCEDURE — 25000003 PHARM REV CODE 250

## 2021-01-28 PROCEDURE — 36415 COLL VENOUS BLD VENIPUNCTURE: CPT

## 2021-01-28 PROCEDURE — 27000221 HC OXYGEN, UP TO 24 HOURS

## 2021-01-28 PROCEDURE — 94761 N-INVAS EAR/PLS OXIMETRY MLT: CPT

## 2021-01-28 PROCEDURE — 25000242 PHARM REV CODE 250 ALT 637 W/ HCPCS: Performed by: INTERNAL MEDICINE

## 2021-01-28 PROCEDURE — 99900031 HC PATIENT EDUCATION (STAT)

## 2021-01-28 PROCEDURE — 99900035 HC TECH TIME PER 15 MIN (STAT)

## 2021-01-28 PROCEDURE — 94640 AIRWAY INHALATION TREATMENT: CPT

## 2021-01-28 PROCEDURE — 63600175 PHARM REV CODE 636 W HCPCS: Performed by: INTERNAL MEDICINE

## 2021-01-28 PROCEDURE — 63600175 PHARM REV CODE 636 W HCPCS: Performed by: NURSE PRACTITIONER

## 2021-01-28 PROCEDURE — 83735 ASSAY OF MAGNESIUM: CPT

## 2021-01-28 PROCEDURE — 25000003 PHARM REV CODE 250: Performed by: NURSE PRACTITIONER

## 2021-01-28 PROCEDURE — 81001 URINALYSIS AUTO W/SCOPE: CPT

## 2021-01-28 PROCEDURE — 80048 BASIC METABOLIC PNL TOTAL CA: CPT

## 2021-01-28 PROCEDURE — 85014 HEMATOCRIT: CPT

## 2021-01-28 PROCEDURE — 97116 GAIT TRAINING THERAPY: CPT

## 2021-01-28 PROCEDURE — 25000003 PHARM REV CODE 250: Performed by: INTERNAL MEDICINE

## 2021-01-28 PROCEDURE — 85025 COMPLETE CBC W/AUTO DIFF WBC: CPT

## 2021-01-28 PROCEDURE — 99232 PR SUBSEQUENT HOSPITAL CARE,LEVL II: ICD-10-PCS | Mod: ,,, | Performed by: INTERNAL MEDICINE

## 2021-01-28 RX ORDER — CHLORHEXIDINE GLUCONATE ORAL RINSE 1.2 MG/ML
15 SOLUTION DENTAL 2 TIMES DAILY
Status: DISCONTINUED | OUTPATIENT
Start: 2021-01-28 | End: 2021-01-31

## 2021-01-28 RX ORDER — MUPIROCIN 20 MG/G
OINTMENT TOPICAL 2 TIMES DAILY
Status: DISCONTINUED | OUTPATIENT
Start: 2021-01-28 | End: 2021-01-31

## 2021-01-28 RX ORDER — FUROSEMIDE 10 MG/ML
20 INJECTION INTRAMUSCULAR; INTRAVENOUS ONCE
Status: COMPLETED | OUTPATIENT
Start: 2021-01-28 | End: 2021-01-28

## 2021-01-28 RX ORDER — IPRATROPIUM BROMIDE AND ALBUTEROL SULFATE 2.5; .5 MG/3ML; MG/3ML
3 SOLUTION RESPIRATORY (INHALATION) EVERY 6 HOURS
Status: DISCONTINUED | OUTPATIENT
Start: 2021-01-28 | End: 2021-01-28

## 2021-01-28 RX ORDER — LEVALBUTEROL 1.25 MG/.5ML
1.25 SOLUTION, CONCENTRATE RESPIRATORY (INHALATION)
Status: DISCONTINUED | OUTPATIENT
Start: 2021-01-28 | End: 2021-01-30

## 2021-01-28 RX ORDER — AMOXICILLIN 250 MG
2 CAPSULE ORAL 2 TIMES DAILY PRN
Status: DISCONTINUED | OUTPATIENT
Start: 2021-01-28 | End: 2021-01-30

## 2021-01-28 RX ADMIN — METOPROLOL TARTRATE 12.5 MG: 25 TABLET, FILM COATED ORAL at 08:01

## 2021-01-28 RX ADMIN — FUROSEMIDE 20 MG: 10 INJECTION, SOLUTION INTRAMUSCULAR; INTRAVENOUS at 06:01

## 2021-01-28 RX ADMIN — IPRATROPIUM BROMIDE AND ALBUTEROL SULFATE 3 ML: .5; 3 SOLUTION RESPIRATORY (INHALATION) at 07:01

## 2021-01-28 RX ADMIN — LEVALBUTEROL HYDROCHLORIDE 1.25 MG: 1.25 SOLUTION, CONCENTRATE RESPIRATORY (INHALATION) at 01:01

## 2021-01-28 RX ADMIN — CHLORHEXIDINE GLUCONATE 15 ML: 1.2 RINSE ORAL at 08:01

## 2021-01-28 RX ADMIN — METOPROLOL TARTRATE 12.5 MG: 25 TABLET, FILM COATED ORAL at 02:01

## 2021-01-28 RX ADMIN — LEVALBUTEROL HYDROCHLORIDE 1.25 MG: 1.25 SOLUTION, CONCENTRATE RESPIRATORY (INHALATION) at 07:01

## 2021-01-28 RX ADMIN — ASPIRIN 81 MG: 81 TABLET, CHEWABLE ORAL at 08:01

## 2021-01-28 RX ADMIN — MUPIROCIN: 20 OINTMENT TOPICAL at 08:01

## 2021-01-28 RX ADMIN — SENNOSIDES AND DOCUSATE SODIUM 2 TABLET: 8.6; 5 TABLET ORAL at 02:01

## 2021-01-28 RX ADMIN — ENOXAPARIN SODIUM 80 MG: 80 INJECTION, SOLUTION INTRAVENOUS; SUBCUTANEOUS at 05:01

## 2021-01-29 PROBLEM — R79.89 ELEVATED TROPONIN: Status: RESOLVED | Noted: 2021-01-28 | Resolved: 2021-01-29

## 2021-01-29 PROBLEM — J96.01 ACUTE HYPOXEMIC RESPIRATORY FAILURE: Status: ACTIVE | Noted: 2021-01-29

## 2021-01-29 LAB
ANION GAP SERPL CALC-SCNC: 8 MMOL/L (ref 8–16)
BASOPHILS # BLD AUTO: 0.01 K/UL (ref 0–0.2)
BASOPHILS NFR BLD: 0.2 % (ref 0–1.9)
BUN SERPL-MCNC: 18 MG/DL (ref 8–23)
CALCIUM SERPL-MCNC: 8.9 MG/DL (ref 8.7–10.5)
CHLORIDE SERPL-SCNC: 106 MMOL/L (ref 95–110)
CO2 SERPL-SCNC: 25 MMOL/L (ref 23–29)
CREAT SERPL-MCNC: 0.8 MG/DL (ref 0.5–1.4)
DIFFERENTIAL METHOD: ABNORMAL
EOSINOPHIL # BLD AUTO: 0.1 K/UL (ref 0–0.5)
EOSINOPHIL NFR BLD: 1.4 % (ref 0–8)
ERYTHROCYTE [DISTWIDTH] IN BLOOD BY AUTOMATED COUNT: 13.4 % (ref 11.5–14.5)
EST. GFR  (AFRICAN AMERICAN): >60 ML/MIN/1.73 M^2
EST. GFR  (NON AFRICAN AMERICAN): >60 ML/MIN/1.73 M^2
GLUCOSE SERPL-MCNC: 86 MG/DL (ref 70–110)
HCT VFR BLD AUTO: 31.2 % (ref 37–48.5)
HGB BLD-MCNC: 10.2 G/DL (ref 12–16)
IMM GRANULOCYTES # BLD AUTO: 0.02 K/UL (ref 0–0.04)
IMM GRANULOCYTES NFR BLD AUTO: 0.4 % (ref 0–0.5)
LYMPHOCYTES # BLD AUTO: 1.1 K/UL (ref 1–4.8)
LYMPHOCYTES NFR BLD: 22.1 % (ref 18–48)
MAGNESIUM SERPL-MCNC: 1.9 MG/DL (ref 1.6–2.6)
MCH RBC QN AUTO: 30.5 PG (ref 27–31)
MCHC RBC AUTO-ENTMCNC: 32.7 G/DL (ref 32–36)
MCV RBC AUTO: 93 FL (ref 82–98)
MONOCYTES # BLD AUTO: 0.6 K/UL (ref 0.3–1)
MONOCYTES NFR BLD: 10.7 % (ref 4–15)
NEUTROPHILS # BLD AUTO: 3.4 K/UL (ref 1.8–7.7)
NEUTROPHILS NFR BLD: 65.2 % (ref 38–73)
NRBC BLD-RTO: 0 /100 WBC
PLATELET # BLD AUTO: 91 K/UL (ref 150–350)
PMV BLD AUTO: 10.9 FL (ref 9.2–12.9)
POTASSIUM SERPL-SCNC: 4 MMOL/L (ref 3.5–5.1)
RBC # BLD AUTO: 3.34 M/UL (ref 4–5.4)
SODIUM SERPL-SCNC: 139 MMOL/L (ref 136–145)
TROPONIN I SERPL DL<=0.01 NG/ML-MCNC: 0.43 NG/ML
WBC # BLD AUTO: 5.16 K/UL (ref 3.9–12.7)

## 2021-01-29 PROCEDURE — 25000003 PHARM REV CODE 250

## 2021-01-29 PROCEDURE — 25000003 PHARM REV CODE 250: Performed by: SPECIALIST

## 2021-01-29 PROCEDURE — 99233 SBSQ HOSP IP/OBS HIGH 50: CPT | Mod: ,,, | Performed by: INTERNAL MEDICINE

## 2021-01-29 PROCEDURE — 97116 GAIT TRAINING THERAPY: CPT | Mod: CQ

## 2021-01-29 PROCEDURE — 21000000 HC CCU ICU ROOM CHARGE

## 2021-01-29 PROCEDURE — 99900035 HC TECH TIME PER 15 MIN (STAT)

## 2021-01-29 PROCEDURE — 27000221 HC OXYGEN, UP TO 24 HOURS

## 2021-01-29 PROCEDURE — 99233 PR SUBSEQUENT HOSPITAL CARE,LEVL III: ICD-10-PCS | Mod: ,,, | Performed by: INTERNAL MEDICINE

## 2021-01-29 PROCEDURE — 63600175 PHARM REV CODE 636 W HCPCS: Performed by: SPECIALIST

## 2021-01-29 PROCEDURE — 80048 BASIC METABOLIC PNL TOTAL CA: CPT

## 2021-01-29 PROCEDURE — 25000003 PHARM REV CODE 250: Performed by: NURSE PRACTITIONER

## 2021-01-29 PROCEDURE — 83735 ASSAY OF MAGNESIUM: CPT

## 2021-01-29 PROCEDURE — 99900031 HC PATIENT EDUCATION (STAT)

## 2021-01-29 PROCEDURE — 25000003 PHARM REV CODE 250: Performed by: INTERNAL MEDICINE

## 2021-01-29 PROCEDURE — 63600175 PHARM REV CODE 636 W HCPCS: Performed by: NURSE PRACTITIONER

## 2021-01-29 PROCEDURE — 97530 THERAPEUTIC ACTIVITIES: CPT | Mod: CQ

## 2021-01-29 PROCEDURE — 94761 N-INVAS EAR/PLS OXIMETRY MLT: CPT

## 2021-01-29 PROCEDURE — 94640 AIRWAY INHALATION TREATMENT: CPT

## 2021-01-29 PROCEDURE — 84484 ASSAY OF TROPONIN QUANT: CPT

## 2021-01-29 PROCEDURE — 85025 COMPLETE CBC W/AUTO DIFF WBC: CPT

## 2021-01-29 PROCEDURE — 25000242 PHARM REV CODE 250 ALT 637 W/ HCPCS: Performed by: INTERNAL MEDICINE

## 2021-01-29 PROCEDURE — 36415 COLL VENOUS BLD VENIPUNCTURE: CPT

## 2021-01-29 RX ORDER — METOPROLOL SUCCINATE 25 MG/1
25 TABLET, EXTENDED RELEASE ORAL NIGHTLY
Status: DISCONTINUED | OUTPATIENT
Start: 2021-01-30 | End: 2021-01-31

## 2021-01-29 RX ORDER — METOPROLOL TARTRATE 1 MG/ML
5 INJECTION, SOLUTION INTRAVENOUS
Status: COMPLETED | OUTPATIENT
Start: 2021-01-29 | End: 2021-01-31

## 2021-01-29 RX ORDER — DIGOXIN 0.25 MG/ML
375 INJECTION INTRAMUSCULAR; INTRAVENOUS ONCE
Status: COMPLETED | OUTPATIENT
Start: 2021-01-29 | End: 2021-01-29

## 2021-01-29 RX ORDER — LANOLIN ALCOHOL/MO/W.PET/CERES
400 CREAM (GRAM) TOPICAL DAILY
Status: DISCONTINUED | OUTPATIENT
Start: 2021-01-29 | End: 2021-02-02 | Stop reason: HOSPADM

## 2021-01-29 RX ORDER — METOPROLOL SUCCINATE 25 MG/1
25 TABLET, EXTENDED RELEASE ORAL DAILY
Status: DISCONTINUED | OUTPATIENT
Start: 2021-01-29 | End: 2021-01-29

## 2021-01-29 RX ORDER — SPIRONOLACTONE 25 MG/1
25 TABLET ORAL DAILY
Status: DISCONTINUED | OUTPATIENT
Start: 2021-01-29 | End: 2021-02-02 | Stop reason: HOSPADM

## 2021-01-29 RX ADMIN — METOPROLOL SUCCINATE 25 MG: 25 TABLET, FILM COATED, EXTENDED RELEASE ORAL at 11:01

## 2021-01-29 RX ADMIN — ENOXAPARIN SODIUM 80 MG: 80 INJECTION, SOLUTION INTRAVENOUS; SUBCUTANEOUS at 05:01

## 2021-01-29 RX ADMIN — METOPROLOL TARTRATE 2.5 MG: 5 INJECTION INTRAVENOUS at 09:01

## 2021-01-29 RX ADMIN — APIXABAN 5 MG: 5 TABLET, FILM COATED ORAL at 08:01

## 2021-01-29 RX ADMIN — METOPROLOL TARTRATE 5 MG: 5 INJECTION INTRAVENOUS at 10:01

## 2021-01-29 RX ADMIN — CHLORHEXIDINE GLUCONATE 15 ML: 1.2 RINSE ORAL at 10:01

## 2021-01-29 RX ADMIN — LEVALBUTEROL HYDROCHLORIDE 1.25 MG: 1.25 SOLUTION, CONCENTRATE RESPIRATORY (INHALATION) at 01:01

## 2021-01-29 RX ADMIN — DIGOXIN 375 MCG: 0.25 INJECTION INTRAMUSCULAR; INTRAVENOUS at 10:01

## 2021-01-29 RX ADMIN — LEVALBUTEROL HYDROCHLORIDE 1.25 MG: 1.25 SOLUTION, CONCENTRATE RESPIRATORY (INHALATION) at 08:01

## 2021-01-29 RX ADMIN — METOPROLOL TARTRATE 12.5 MG: 25 TABLET, FILM COATED ORAL at 10:01

## 2021-01-29 RX ADMIN — ASPIRIN 81 MG: 81 TABLET, CHEWABLE ORAL at 10:01

## 2021-01-29 RX ADMIN — CHLORHEXIDINE GLUCONATE 15 ML: 1.2 RINSE ORAL at 08:01

## 2021-01-29 RX ADMIN — MUPIROCIN: 20 OINTMENT TOPICAL at 10:01

## 2021-01-29 RX ADMIN — MUPIROCIN: 20 OINTMENT TOPICAL at 08:01

## 2021-01-29 RX ADMIN — MAGNESIUM OXIDE 400 MG: 400 TABLET ORAL at 11:01

## 2021-01-29 RX ADMIN — SODIUM CHLORIDE 200 ML: 0.9 INJECTION, SOLUTION INTRAVENOUS at 01:01

## 2021-01-30 LAB
ANION GAP SERPL CALC-SCNC: 3 MMOL/L (ref 8–16)
BASOPHILS # BLD AUTO: 0.01 K/UL (ref 0–0.2)
BASOPHILS NFR BLD: 0.3 % (ref 0–1.9)
BUN SERPL-MCNC: 17 MG/DL (ref 8–23)
CALCIUM SERPL-MCNC: 8.5 MG/DL (ref 8.7–10.5)
CHLORIDE SERPL-SCNC: 108 MMOL/L (ref 95–110)
CO2 SERPL-SCNC: 25 MMOL/L (ref 23–29)
CREAT SERPL-MCNC: 0.7 MG/DL (ref 0.5–1.4)
DIFFERENTIAL METHOD: ABNORMAL
EOSINOPHIL # BLD AUTO: 0.1 K/UL (ref 0–0.5)
EOSINOPHIL NFR BLD: 2.4 % (ref 0–8)
ERYTHROCYTE [DISTWIDTH] IN BLOOD BY AUTOMATED COUNT: 13.2 % (ref 11.5–14.5)
EST. GFR  (AFRICAN AMERICAN): >60 ML/MIN/1.73 M^2
EST. GFR  (NON AFRICAN AMERICAN): >60 ML/MIN/1.73 M^2
GLUCOSE SERPL-MCNC: 75 MG/DL (ref 70–110)
HCT VFR BLD AUTO: 29.3 % (ref 37–48.5)
HGB BLD-MCNC: 9.7 G/DL (ref 12–16)
IMM GRANULOCYTES # BLD AUTO: 0.01 K/UL (ref 0–0.04)
IMM GRANULOCYTES NFR BLD AUTO: 0.3 % (ref 0–0.5)
LYMPHOCYTES # BLD AUTO: 1 K/UL (ref 1–4.8)
LYMPHOCYTES NFR BLD: 25.7 % (ref 18–48)
MAGNESIUM SERPL-MCNC: 2 MG/DL (ref 1.6–2.6)
MCH RBC QN AUTO: 30.9 PG (ref 27–31)
MCHC RBC AUTO-ENTMCNC: 33.1 G/DL (ref 32–36)
MCV RBC AUTO: 93 FL (ref 82–98)
MONOCYTES # BLD AUTO: 0.4 K/UL (ref 0.3–1)
MONOCYTES NFR BLD: 11 % (ref 4–15)
NEUTROPHILS # BLD AUTO: 2.3 K/UL (ref 1.8–7.7)
NEUTROPHILS NFR BLD: 60.3 % (ref 38–73)
NRBC BLD-RTO: 0 /100 WBC
PLATELET # BLD AUTO: 90 K/UL (ref 150–350)
PMV BLD AUTO: 11 FL (ref 9.2–12.9)
POTASSIUM SERPL-SCNC: 3.9 MMOL/L (ref 3.5–5.1)
RBC # BLD AUTO: 3.14 M/UL (ref 4–5.4)
SODIUM SERPL-SCNC: 136 MMOL/L (ref 136–145)
WBC # BLD AUTO: 3.82 K/UL (ref 3.9–12.7)

## 2021-01-30 PROCEDURE — 25000003 PHARM REV CODE 250: Performed by: INTERNAL MEDICINE

## 2021-01-30 PROCEDURE — 36415 COLL VENOUS BLD VENIPUNCTURE: CPT

## 2021-01-30 PROCEDURE — 99232 PR SUBSEQUENT HOSPITAL CARE,LEVL II: ICD-10-PCS | Mod: ,,, | Performed by: SPECIALIST

## 2021-01-30 PROCEDURE — 83735 ASSAY OF MAGNESIUM: CPT

## 2021-01-30 PROCEDURE — 94761 N-INVAS EAR/PLS OXIMETRY MLT: CPT

## 2021-01-30 PROCEDURE — 99900035 HC TECH TIME PER 15 MIN (STAT)

## 2021-01-30 PROCEDURE — 97116 GAIT TRAINING THERAPY: CPT | Mod: CQ

## 2021-01-30 PROCEDURE — 99232 SBSQ HOSP IP/OBS MODERATE 35: CPT | Mod: ,,, | Performed by: SPECIALIST

## 2021-01-30 PROCEDURE — 25000242 PHARM REV CODE 250 ALT 637 W/ HCPCS: Performed by: INTERNAL MEDICINE

## 2021-01-30 PROCEDURE — 25000003 PHARM REV CODE 250

## 2021-01-30 PROCEDURE — 85025 COMPLETE CBC W/AUTO DIFF WBC: CPT

## 2021-01-30 PROCEDURE — 99900031 HC PATIENT EDUCATION (STAT)

## 2021-01-30 PROCEDURE — 80048 BASIC METABOLIC PNL TOTAL CA: CPT

## 2021-01-30 PROCEDURE — 21000000 HC CCU ICU ROOM CHARGE

## 2021-01-30 PROCEDURE — 27000221 HC OXYGEN, UP TO 24 HOURS

## 2021-01-30 PROCEDURE — 94640 AIRWAY INHALATION TREATMENT: CPT

## 2021-01-30 RX ORDER — DIGOXIN 125 MCG
0.12 TABLET ORAL DAILY
Status: DISCONTINUED | OUTPATIENT
Start: 2021-01-30 | End: 2021-01-30

## 2021-01-30 RX ORDER — LEVALBUTEROL 1.25 MG/.5ML
1.25 SOLUTION, CONCENTRATE RESPIRATORY (INHALATION) EVERY 6 HOURS PRN
Status: DISCONTINUED | OUTPATIENT
Start: 2021-01-30 | End: 2021-02-02 | Stop reason: HOSPADM

## 2021-01-30 RX ORDER — AMOXICILLIN 250 MG
1 CAPSULE ORAL DAILY
Status: DISCONTINUED | OUTPATIENT
Start: 2021-01-31 | End: 2021-02-02 | Stop reason: HOSPADM

## 2021-01-30 RX ADMIN — MAGNESIUM OXIDE 400 MG: 400 TABLET ORAL at 08:01

## 2021-01-30 RX ADMIN — SPIRONOLACTONE 25 MG: 25 TABLET ORAL at 08:01

## 2021-01-30 RX ADMIN — METOPROLOL SUCCINATE 25 MG: 25 TABLET, FILM COATED, EXTENDED RELEASE ORAL at 08:01

## 2021-01-30 RX ADMIN — MUPIROCIN: 20 OINTMENT TOPICAL at 08:01

## 2021-01-30 RX ADMIN — CHLORHEXIDINE GLUCONATE 15 ML: 1.2 RINSE ORAL at 08:01

## 2021-01-30 RX ADMIN — SENNOSIDES AND DOCUSATE SODIUM 2 TABLET: 8.6; 5 TABLET ORAL at 09:01

## 2021-01-30 RX ADMIN — LEVALBUTEROL HYDROCHLORIDE 1.25 MG: 1.25 SOLUTION, CONCENTRATE RESPIRATORY (INHALATION) at 08:01

## 2021-01-30 RX ADMIN — APIXABAN 5 MG: 5 TABLET, FILM COATED ORAL at 08:01

## 2021-01-31 LAB
ANION GAP SERPL CALC-SCNC: 8 MMOL/L (ref 8–16)
BASOPHILS # BLD AUTO: 0.02 K/UL (ref 0–0.2)
BASOPHILS NFR BLD: 0.5 % (ref 0–1.9)
BUN SERPL-MCNC: 21 MG/DL (ref 8–23)
CALCIUM SERPL-MCNC: 8.8 MG/DL (ref 8.7–10.5)
CHLORIDE SERPL-SCNC: 107 MMOL/L (ref 95–110)
CO2 SERPL-SCNC: 23 MMOL/L (ref 23–29)
CREAT SERPL-MCNC: 0.7 MG/DL (ref 0.5–1.4)
DIFFERENTIAL METHOD: ABNORMAL
EOSINOPHIL # BLD AUTO: 0.1 K/UL (ref 0–0.5)
EOSINOPHIL NFR BLD: 3 % (ref 0–8)
ERYTHROCYTE [DISTWIDTH] IN BLOOD BY AUTOMATED COUNT: 13.2 % (ref 11.5–14.5)
EST. GFR  (AFRICAN AMERICAN): >60 ML/MIN/1.73 M^2
EST. GFR  (NON AFRICAN AMERICAN): >60 ML/MIN/1.73 M^2
GLUCOSE SERPL-MCNC: 85 MG/DL (ref 70–110)
HCT VFR BLD AUTO: 30.1 % (ref 37–48.5)
HGB BLD-MCNC: 9.9 G/DL (ref 12–16)
IMM GRANULOCYTES # BLD AUTO: 0.02 K/UL (ref 0–0.04)
IMM GRANULOCYTES NFR BLD AUTO: 0.5 % (ref 0–0.5)
LYMPHOCYTES # BLD AUTO: 1 K/UL (ref 1–4.8)
LYMPHOCYTES NFR BLD: 25 % (ref 18–48)
MAGNESIUM SERPL-MCNC: 2.1 MG/DL (ref 1.6–2.6)
MCH RBC QN AUTO: 30.6 PG (ref 27–31)
MCHC RBC AUTO-ENTMCNC: 32.9 G/DL (ref 32–36)
MCV RBC AUTO: 93 FL (ref 82–98)
MONOCYTES # BLD AUTO: 0.5 K/UL (ref 0.3–1)
MONOCYTES NFR BLD: 11.1 % (ref 4–15)
NEUTROPHILS # BLD AUTO: 2.4 K/UL (ref 1.8–7.7)
NEUTROPHILS NFR BLD: 59.9 % (ref 38–73)
NRBC BLD-RTO: 0 /100 WBC
PLATELET # BLD AUTO: 107 K/UL (ref 150–350)
PMV BLD AUTO: 11 FL (ref 9.2–12.9)
POTASSIUM SERPL-SCNC: 3.9 MMOL/L (ref 3.5–5.1)
RBC # BLD AUTO: 3.24 M/UL (ref 4–5.4)
SODIUM SERPL-SCNC: 138 MMOL/L (ref 136–145)
WBC # BLD AUTO: 4.04 K/UL (ref 3.9–12.7)

## 2021-01-31 PROCEDURE — 27000221 HC OXYGEN, UP TO 24 HOURS

## 2021-01-31 PROCEDURE — 63600175 PHARM REV CODE 636 W HCPCS: Performed by: SPECIALIST

## 2021-01-31 PROCEDURE — 94761 N-INVAS EAR/PLS OXIMETRY MLT: CPT

## 2021-01-31 PROCEDURE — 80048 BASIC METABOLIC PNL TOTAL CA: CPT

## 2021-01-31 PROCEDURE — 21000000 HC CCU ICU ROOM CHARGE

## 2021-01-31 PROCEDURE — 25000003 PHARM REV CODE 250

## 2021-01-31 PROCEDURE — 85025 COMPLETE CBC W/AUTO DIFF WBC: CPT

## 2021-01-31 PROCEDURE — 99233 PR SUBSEQUENT HOSPITAL CARE,LEVL III: ICD-10-PCS | Mod: ,,, | Performed by: SPECIALIST

## 2021-01-31 PROCEDURE — 99900035 HC TECH TIME PER 15 MIN (STAT)

## 2021-01-31 PROCEDURE — 83735 ASSAY OF MAGNESIUM: CPT

## 2021-01-31 PROCEDURE — 99233 SBSQ HOSP IP/OBS HIGH 50: CPT | Mod: ,,, | Performed by: SPECIALIST

## 2021-01-31 PROCEDURE — 25000003 PHARM REV CODE 250: Performed by: INTERNAL MEDICINE

## 2021-01-31 PROCEDURE — 36415 COLL VENOUS BLD VENIPUNCTURE: CPT

## 2021-01-31 PROCEDURE — 25000003 PHARM REV CODE 250: Performed by: SPECIALIST

## 2021-01-31 PROCEDURE — 25000003 PHARM REV CODE 250: Performed by: NURSE PRACTITIONER

## 2021-01-31 RX ORDER — METOPROLOL TARTRATE 1 MG/ML
5 INJECTION, SOLUTION INTRAVENOUS ONCE
Status: DISCONTINUED | OUTPATIENT
Start: 2021-01-31 | End: 2021-01-31

## 2021-01-31 RX ORDER — METOPROLOL TARTRATE 1 MG/ML
5 INJECTION, SOLUTION INTRAVENOUS EVERY 5 MIN PRN
Status: DISCONTINUED | OUTPATIENT
Start: 2021-01-31 | End: 2021-01-31

## 2021-01-31 RX ORDER — SOTALOL HYDROCHLORIDE 80 MG/1
80 TABLET ORAL 2 TIMES DAILY
Status: DISCONTINUED | OUTPATIENT
Start: 2021-01-31 | End: 2021-02-01

## 2021-01-31 RX ADMIN — SOTALOL HYDROCHLORIDE 80 MG: 80 TABLET ORAL at 09:01

## 2021-01-31 RX ADMIN — METOPROLOL SUCCINATE 12.5 MG: 25 TABLET, EXTENDED RELEASE ORAL at 09:01

## 2021-01-31 RX ADMIN — AMIODARONE HYDROCHLORIDE 1 MG/MIN: 1.8 INJECTION, SOLUTION INTRAVENOUS at 04:01

## 2021-01-31 RX ADMIN — APIXABAN 5 MG: 5 TABLET, FILM COATED ORAL at 09:01

## 2021-01-31 RX ADMIN — SPIRONOLACTONE 25 MG: 25 TABLET ORAL at 09:01

## 2021-01-31 RX ADMIN — MAGNESIUM OXIDE 400 MG: 400 TABLET ORAL at 09:01

## 2021-01-31 RX ADMIN — METOPROLOL TARTRATE 5 MG: 5 INJECTION INTRAVENOUS at 02:01

## 2021-01-31 RX ADMIN — SENNOSIDES AND DOCUSATE SODIUM 1 TABLET: 8.6; 5 TABLET ORAL at 09:01

## 2021-01-31 RX ADMIN — MUPIROCIN: 20 OINTMENT TOPICAL at 09:01

## 2021-01-31 RX ADMIN — AMIODARONE HYDROCHLORIDE 0.5 MG/MIN: 1.8 INJECTION, SOLUTION INTRAVENOUS at 10:01

## 2021-01-31 RX ADMIN — METOPROLOL TARTRATE 5 MG: 5 INJECTION INTRAVENOUS at 03:01

## 2021-01-31 RX ADMIN — AMIODARONE HYDROCHLORIDE 150 MG: 1.5 INJECTION, SOLUTION INTRAVENOUS at 04:01

## 2021-01-31 RX ADMIN — CHLORHEXIDINE GLUCONATE 15 ML: 1.2 RINSE ORAL at 09:01

## 2021-02-01 PROBLEM — R42 LIGHTHEADEDNESS: Status: ACTIVE | Noted: 2021-02-01

## 2021-02-01 LAB
ANION GAP SERPL CALC-SCNC: 6 MMOL/L (ref 8–16)
BASOPHILS # BLD AUTO: 0.02 K/UL (ref 0–0.2)
BASOPHILS NFR BLD: 0.5 % (ref 0–1.9)
BUN SERPL-MCNC: 20 MG/DL (ref 8–23)
CALCIUM SERPL-MCNC: 8.8 MG/DL (ref 8.7–10.5)
CHLORIDE SERPL-SCNC: 108 MMOL/L (ref 95–110)
CO2 SERPL-SCNC: 24 MMOL/L (ref 23–29)
CREAT SERPL-MCNC: 0.8 MG/DL (ref 0.5–1.4)
DIFFERENTIAL METHOD: ABNORMAL
EOSINOPHIL # BLD AUTO: 0.2 K/UL (ref 0–0.5)
EOSINOPHIL NFR BLD: 4.4 % (ref 0–8)
ERYTHROCYTE [DISTWIDTH] IN BLOOD BY AUTOMATED COUNT: 13.2 % (ref 11.5–14.5)
EST. GFR  (AFRICAN AMERICAN): >60 ML/MIN/1.73 M^2
EST. GFR  (NON AFRICAN AMERICAN): >60 ML/MIN/1.73 M^2
GLUCOSE SERPL-MCNC: 81 MG/DL (ref 70–110)
GLUCOSE SERPL-MCNC: 92 MG/DL (ref 70–110)
HCT VFR BLD AUTO: 30.1 % (ref 37–48.5)
HGB BLD-MCNC: 10 G/DL (ref 12–16)
IMM GRANULOCYTES # BLD AUTO: 0.01 K/UL (ref 0–0.04)
IMM GRANULOCYTES NFR BLD AUTO: 0.3 % (ref 0–0.5)
LYMPHOCYTES # BLD AUTO: 0.9 K/UL (ref 1–4.8)
LYMPHOCYTES NFR BLD: 23.8 % (ref 18–48)
MAGNESIUM SERPL-MCNC: 2.1 MG/DL (ref 1.6–2.6)
MCH RBC QN AUTO: 31.1 PG (ref 27–31)
MCHC RBC AUTO-ENTMCNC: 33.2 G/DL (ref 32–36)
MCV RBC AUTO: 94 FL (ref 82–98)
MONOCYTES # BLD AUTO: 0.4 K/UL (ref 0.3–1)
MONOCYTES NFR BLD: 10.9 % (ref 4–15)
NEUTROPHILS # BLD AUTO: 2.3 K/UL (ref 1.8–7.7)
NEUTROPHILS NFR BLD: 60.1 % (ref 38–73)
NRBC BLD-RTO: 0 /100 WBC
PLATELET # BLD AUTO: 103 K/UL (ref 150–350)
PMV BLD AUTO: 10.7 FL (ref 9.2–12.9)
POTASSIUM SERPL-SCNC: 4 MMOL/L (ref 3.5–5.1)
RBC # BLD AUTO: 3.22 M/UL (ref 4–5.4)
SODIUM SERPL-SCNC: 138 MMOL/L (ref 136–145)
WBC # BLD AUTO: 3.87 K/UL (ref 3.9–12.7)

## 2021-02-01 PROCEDURE — 25000003 PHARM REV CODE 250: Performed by: NURSE PRACTITIONER

## 2021-02-01 PROCEDURE — 80048 BASIC METABOLIC PNL TOTAL CA: CPT

## 2021-02-01 PROCEDURE — 94761 N-INVAS EAR/PLS OXIMETRY MLT: CPT

## 2021-02-01 PROCEDURE — 93010 ELECTROCARDIOGRAM REPORT: CPT | Mod: ,,, | Performed by: INTERNAL MEDICINE

## 2021-02-01 PROCEDURE — 97116 GAIT TRAINING THERAPY: CPT | Mod: CQ

## 2021-02-01 PROCEDURE — 21000000 HC CCU ICU ROOM CHARGE

## 2021-02-01 PROCEDURE — 25000003 PHARM REV CODE 250: Performed by: INTERNAL MEDICINE

## 2021-02-01 PROCEDURE — 85025 COMPLETE CBC W/AUTO DIFF WBC: CPT

## 2021-02-01 PROCEDURE — 97530 THERAPEUTIC ACTIVITIES: CPT | Mod: CQ

## 2021-02-01 PROCEDURE — 99900035 HC TECH TIME PER 15 MIN (STAT)

## 2021-02-01 PROCEDURE — 83735 ASSAY OF MAGNESIUM: CPT

## 2021-02-01 PROCEDURE — 99233 PR SUBSEQUENT HOSPITAL CARE,LEVL III: ICD-10-PCS | Mod: ,,, | Performed by: INTERNAL MEDICINE

## 2021-02-01 PROCEDURE — 25000003 PHARM REV CODE 250: Performed by: SPECIALIST

## 2021-02-01 PROCEDURE — 93010 EKG 12-LEAD: ICD-10-PCS | Mod: ,,, | Performed by: INTERNAL MEDICINE

## 2021-02-01 PROCEDURE — 94618 PULMONARY STRESS TESTING: CPT

## 2021-02-01 PROCEDURE — 99233 SBSQ HOSP IP/OBS HIGH 50: CPT | Mod: ,,, | Performed by: INTERNAL MEDICINE

## 2021-02-01 PROCEDURE — 93005 ELECTROCARDIOGRAM TRACING: CPT | Performed by: INTERNAL MEDICINE

## 2021-02-01 PROCEDURE — 36415 COLL VENOUS BLD VENIPUNCTURE: CPT

## 2021-02-01 RX ORDER — SOTALOL HYDROCHLORIDE 80 MG/1
40 TABLET ORAL 2 TIMES DAILY
Status: DISCONTINUED | OUTPATIENT
Start: 2021-02-01 | End: 2021-02-02 | Stop reason: HOSPADM

## 2021-02-01 RX ADMIN — APIXABAN 5 MG: 5 TABLET, FILM COATED ORAL at 08:02

## 2021-02-01 RX ADMIN — ACETAMINOPHEN 650 MG: 325 TABLET, FILM COATED ORAL at 08:02

## 2021-02-01 RX ADMIN — SPIRONOLACTONE 25 MG: 25 TABLET ORAL at 08:02

## 2021-02-01 RX ADMIN — SOTALOL HYDROCHLORIDE 80 MG: 80 TABLET ORAL at 08:02

## 2021-02-01 RX ADMIN — SOTALOL HYDROCHLORIDE 40 MG: 80 TABLET ORAL at 08:02

## 2021-02-01 RX ADMIN — MAGNESIUM OXIDE 400 MG: 400 TABLET ORAL at 08:02

## 2021-02-02 VITALS
HEIGHT: 66 IN | WEIGHT: 170.44 LBS | RESPIRATION RATE: 26 BRPM | BODY MASS INDEX: 27.39 KG/M2 | HEART RATE: 73 BPM | OXYGEN SATURATION: 99 % | TEMPERATURE: 98 F | SYSTOLIC BLOOD PRESSURE: 120 MMHG | DIASTOLIC BLOOD PRESSURE: 63 MMHG

## 2021-02-02 PROBLEM — F41.9 ANXIETY: Status: ACTIVE | Noted: 2021-02-02

## 2021-02-02 PROBLEM — J96.01 ACUTE HYPOXEMIC RESPIRATORY FAILURE: Status: RESOLVED | Noted: 2021-01-29 | Resolved: 2021-02-02

## 2021-02-02 PROBLEM — R42 LIGHTHEADEDNESS: Status: RESOLVED | Noted: 2021-02-01 | Resolved: 2021-02-02

## 2021-02-02 LAB
ANION GAP SERPL CALC-SCNC: 8 MMOL/L (ref 8–16)
BASOPHILS # BLD AUTO: 0.01 K/UL (ref 0–0.2)
BASOPHILS NFR BLD: 0.3 % (ref 0–1.9)
BUN SERPL-MCNC: 20 MG/DL (ref 8–23)
CALCIUM SERPL-MCNC: 9.2 MG/DL (ref 8.7–10.5)
CHLORIDE SERPL-SCNC: 107 MMOL/L (ref 95–110)
CO2 SERPL-SCNC: 25 MMOL/L (ref 23–29)
CREAT SERPL-MCNC: 0.7 MG/DL (ref 0.5–1.4)
DIFFERENTIAL METHOD: ABNORMAL
EOSINOPHIL # BLD AUTO: 0.2 K/UL (ref 0–0.5)
EOSINOPHIL NFR BLD: 5.1 % (ref 0–8)
ERYTHROCYTE [DISTWIDTH] IN BLOOD BY AUTOMATED COUNT: 13.3 % (ref 11.5–14.5)
EST. GFR  (AFRICAN AMERICAN): >60 ML/MIN/1.73 M^2
EST. GFR  (NON AFRICAN AMERICAN): >60 ML/MIN/1.73 M^2
GLUCOSE SERPL-MCNC: 82 MG/DL (ref 70–110)
HCT VFR BLD AUTO: 32.1 % (ref 37–48.5)
HGB BLD-MCNC: 10.4 G/DL (ref 12–16)
IMM GRANULOCYTES # BLD AUTO: 0.01 K/UL (ref 0–0.04)
IMM GRANULOCYTES NFR BLD AUTO: 0.3 % (ref 0–0.5)
LYMPHOCYTES # BLD AUTO: 0.9 K/UL (ref 1–4.8)
LYMPHOCYTES NFR BLD: 24.8 % (ref 18–48)
MAGNESIUM SERPL-MCNC: 2 MG/DL (ref 1.6–2.6)
MCH RBC QN AUTO: 30.1 PG (ref 27–31)
MCHC RBC AUTO-ENTMCNC: 32.4 G/DL (ref 32–36)
MCV RBC AUTO: 93 FL (ref 82–98)
MONOCYTES # BLD AUTO: 0.5 K/UL (ref 0.3–1)
MONOCYTES NFR BLD: 13 % (ref 4–15)
NEUTROPHILS # BLD AUTO: 2 K/UL (ref 1.8–7.7)
NEUTROPHILS NFR BLD: 56.5 % (ref 38–73)
NRBC BLD-RTO: 0 /100 WBC
PLATELET # BLD AUTO: 108 K/UL (ref 150–350)
PMV BLD AUTO: 10.9 FL (ref 9.2–12.9)
POTASSIUM SERPL-SCNC: 4 MMOL/L (ref 3.5–5.1)
RBC # BLD AUTO: 3.46 M/UL (ref 4–5.4)
SODIUM SERPL-SCNC: 140 MMOL/L (ref 136–145)
WBC # BLD AUTO: 3.55 K/UL (ref 3.9–12.7)

## 2021-02-02 PROCEDURE — 25000003 PHARM REV CODE 250: Performed by: NURSE PRACTITIONER

## 2021-02-02 PROCEDURE — 25000003 PHARM REV CODE 250: Performed by: INTERNAL MEDICINE

## 2021-02-02 PROCEDURE — 93010 ELECTROCARDIOGRAM REPORT: CPT | Mod: ,,, | Performed by: INTERNAL MEDICINE

## 2021-02-02 PROCEDURE — 99232 SBSQ HOSP IP/OBS MODERATE 35: CPT | Mod: ,,, | Performed by: INTERNAL MEDICINE

## 2021-02-02 PROCEDURE — 97116 GAIT TRAINING THERAPY: CPT

## 2021-02-02 PROCEDURE — 99900035 HC TECH TIME PER 15 MIN (STAT)

## 2021-02-02 PROCEDURE — 94761 N-INVAS EAR/PLS OXIMETRY MLT: CPT

## 2021-02-02 PROCEDURE — 93005 ELECTROCARDIOGRAM TRACING: CPT | Performed by: INTERNAL MEDICINE

## 2021-02-02 PROCEDURE — 83735 ASSAY OF MAGNESIUM: CPT

## 2021-02-02 PROCEDURE — 36415 COLL VENOUS BLD VENIPUNCTURE: CPT

## 2021-02-02 PROCEDURE — 99232 PR SUBSEQUENT HOSPITAL CARE,LEVL II: ICD-10-PCS | Mod: ,,, | Performed by: INTERNAL MEDICINE

## 2021-02-02 PROCEDURE — 85025 COMPLETE CBC W/AUTO DIFF WBC: CPT

## 2021-02-02 PROCEDURE — 80048 BASIC METABOLIC PNL TOTAL CA: CPT

## 2021-02-02 PROCEDURE — 93010 EKG 12-LEAD: ICD-10-PCS | Mod: ,,, | Performed by: INTERNAL MEDICINE

## 2021-02-02 RX ORDER — SPIRONOLACTONE 25 MG/1
25 TABLET ORAL DAILY
Qty: 30 TABLET | Refills: 0 | Status: SHIPPED | OUTPATIENT
Start: 2021-02-03 | End: 2021-02-08 | Stop reason: SDUPTHER

## 2021-02-02 RX ORDER — LANOLIN ALCOHOL/MO/W.PET/CERES
400 CREAM (GRAM) TOPICAL DAILY
Qty: 30 TABLET | Refills: 0 | Status: SHIPPED | OUTPATIENT
Start: 2021-02-03 | End: 2021-02-08 | Stop reason: SDUPTHER

## 2021-02-02 RX ORDER — SOTALOL HYDROCHLORIDE 80 MG/1
40 TABLET ORAL 2 TIMES DAILY
Qty: 30 TABLET | Refills: 0 | Status: SHIPPED | OUTPATIENT
Start: 2021-02-02 | End: 2021-02-08 | Stop reason: SDUPTHER

## 2021-02-02 RX ADMIN — MAGNESIUM OXIDE 400 MG: 400 TABLET ORAL at 08:02

## 2021-02-02 RX ADMIN — SPIRONOLACTONE 25 MG: 25 TABLET ORAL at 08:02

## 2021-02-02 RX ADMIN — APIXABAN 5 MG: 5 TABLET, FILM COATED ORAL at 08:02

## 2021-02-02 RX ADMIN — SOTALOL HYDROCHLORIDE 40 MG: 80 TABLET ORAL at 08:02

## 2021-02-02 RX ADMIN — SENNOSIDES AND DOCUSATE SODIUM 1 TABLET: 8.6; 5 TABLET ORAL at 08:02

## 2021-02-08 ENCOUNTER — OFFICE VISIT (OUTPATIENT)
Dept: FAMILY MEDICINE | Facility: CLINIC | Age: 81
End: 2021-02-08
Payer: MEDICARE

## 2021-02-08 VITALS
HEIGHT: 66 IN | HEART RATE: 79 BPM | BODY MASS INDEX: 27.61 KG/M2 | WEIGHT: 171.81 LBS | OXYGEN SATURATION: 96 % | TEMPERATURE: 97 F

## 2021-02-08 DIAGNOSIS — I48.91 ATRIAL FIBRILLATION WITH RVR: Primary | ICD-10-CM

## 2021-02-08 PROCEDURE — 1170F FXNL STATUS ASSESSED: CPT | Mod: S$GLB,,, | Performed by: NURSE PRACTITIONER

## 2021-02-08 PROCEDURE — 1111F DSCHRG MED/CURRENT MED MERGE: CPT | Mod: S$GLB,,, | Performed by: NURSE PRACTITIONER

## 2021-02-08 PROCEDURE — 1170F PR FUNCTIONAL STATUS ASSESSED: ICD-10-PCS | Mod: S$GLB,,, | Performed by: NURSE PRACTITIONER

## 2021-02-08 PROCEDURE — 1101F PT FALLS ASSESS-DOCD LE1/YR: CPT | Mod: S$GLB,,, | Performed by: NURSE PRACTITIONER

## 2021-02-08 PROCEDURE — 1111F PR DISCHARGE MEDS RECONCILED W/ CURRENT OUTPATIENT MED LIST: ICD-10-PCS | Mod: S$GLB,,, | Performed by: NURSE PRACTITIONER

## 2021-02-08 PROCEDURE — 1159F PR MEDICATION LIST DOCUMENTED IN MEDICAL RECORD: ICD-10-PCS | Mod: S$GLB,,, | Performed by: NURSE PRACTITIONER

## 2021-02-08 PROCEDURE — 3288F FALL RISK ASSESSMENT DOCD: CPT | Mod: S$GLB,,, | Performed by: NURSE PRACTITIONER

## 2021-02-08 PROCEDURE — 1159F MED LIST DOCD IN RCRD: CPT | Mod: S$GLB,,, | Performed by: NURSE PRACTITIONER

## 2021-02-08 PROCEDURE — 1101F PR PT FALLS ASSESS DOC 0-1 FALLS W/OUT INJ PAST YR: ICD-10-PCS | Mod: S$GLB,,, | Performed by: NURSE PRACTITIONER

## 2021-02-08 PROCEDURE — 3288F PR FALLS RISK ASSESSMENT DOCUMENTED: ICD-10-PCS | Mod: S$GLB,,, | Performed by: NURSE PRACTITIONER

## 2021-02-08 PROCEDURE — 99203 OFFICE O/P NEW LOW 30 MIN: CPT | Mod: S$GLB,,, | Performed by: NURSE PRACTITIONER

## 2021-02-08 PROCEDURE — 1126F AMNT PAIN NOTED NONE PRSNT: CPT | Mod: S$GLB,,, | Performed by: NURSE PRACTITIONER

## 2021-02-08 PROCEDURE — 99203 PR OFFICE/OUTPT VISIT, NEW, LEVL III, 30-44 MIN: ICD-10-PCS | Mod: S$GLB,,, | Performed by: NURSE PRACTITIONER

## 2021-02-08 PROCEDURE — 1126F PR PAIN SEVERITY QUANTIFIED, NO PAIN PRESENT: ICD-10-PCS | Mod: S$GLB,,, | Performed by: NURSE PRACTITIONER

## 2021-02-08 RX ORDER — LANOLIN ALCOHOL/MO/W.PET/CERES
400 CREAM (GRAM) TOPICAL DAILY
Qty: 30 TABLET | Refills: 0 | Status: SHIPPED | OUTPATIENT
Start: 2021-02-08 | End: 2021-04-29 | Stop reason: SDUPTHER

## 2021-02-08 RX ORDER — HYDROCHLOROTHIAZIDE 25 MG/1
50 TABLET ORAL DAILY
COMMUNITY
Start: 2020-10-01 | End: 2021-02-08

## 2021-02-08 RX ORDER — SPIRONOLACTONE 25 MG/1
25 TABLET ORAL DAILY
Qty: 30 TABLET | Refills: 0 | Status: SHIPPED | OUTPATIENT
Start: 2021-02-08 | End: 2021-04-29 | Stop reason: SDUPTHER

## 2021-02-08 RX ORDER — SOTALOL HYDROCHLORIDE 80 MG/1
40 TABLET ORAL 2 TIMES DAILY
Qty: 90 TABLET | Refills: 0 | Status: SHIPPED | OUTPATIENT
Start: 2021-02-08 | End: 2021-02-08 | Stop reason: SDUPTHER

## 2021-02-08 RX ORDER — LANOLIN ALCOHOL/MO/W.PET/CERES
400 CREAM (GRAM) TOPICAL DAILY
Qty: 90 TABLET | Refills: 0 | Status: SHIPPED | OUTPATIENT
Start: 2021-02-08 | End: 2021-02-08 | Stop reason: SDUPTHER

## 2021-02-08 RX ORDER — SPIRONOLACTONE 25 MG/1
25 TABLET ORAL DAILY
Qty: 90 TABLET | Refills: 0 | Status: SHIPPED | OUTPATIENT
Start: 2021-02-08 | End: 2021-02-08 | Stop reason: SDUPTHER

## 2021-02-08 RX ORDER — SOTALOL HYDROCHLORIDE 80 MG/1
40 TABLET ORAL 2 TIMES DAILY
Qty: 30 TABLET | Refills: 0 | Status: SHIPPED | OUTPATIENT
Start: 2021-02-08 | End: 2021-04-29 | Stop reason: SDUPTHER

## 2021-02-11 ENCOUNTER — TELEPHONE (OUTPATIENT)
Dept: CARDIOLOGY | Facility: CLINIC | Age: 81
End: 2021-02-11

## 2021-02-22 ENCOUNTER — OFFICE VISIT (OUTPATIENT)
Dept: CARDIOLOGY | Facility: CLINIC | Age: 81
End: 2021-02-22
Payer: MEDICARE

## 2021-02-22 ENCOUNTER — LAB VISIT (OUTPATIENT)
Dept: LAB | Facility: HOSPITAL | Age: 81
End: 2021-02-22
Attending: NURSE PRACTITIONER
Payer: MEDICARE

## 2021-02-22 VITALS
RESPIRATION RATE: 16 BRPM | HEART RATE: 68 BPM | BODY MASS INDEX: 27.32 KG/M2 | OXYGEN SATURATION: 98 % | WEIGHT: 170 LBS | SYSTOLIC BLOOD PRESSURE: 128 MMHG | DIASTOLIC BLOOD PRESSURE: 78 MMHG | HEIGHT: 66 IN

## 2021-02-22 DIAGNOSIS — Z79.01 CURRENT USE OF LONG TERM ANTICOAGULATION: ICD-10-CM

## 2021-02-22 DIAGNOSIS — F41.9 ANXIETY: ICD-10-CM

## 2021-02-22 DIAGNOSIS — I48.0 PAROXYSMAL ATRIAL FIBRILLATION: ICD-10-CM

## 2021-02-22 DIAGNOSIS — D64.9 ANEMIA, UNSPECIFIED TYPE: ICD-10-CM

## 2021-02-22 DIAGNOSIS — I48.0 PAROXYSMAL ATRIAL FIBRILLATION: Primary | ICD-10-CM

## 2021-02-22 DIAGNOSIS — I51.89 DIASTOLIC DYSFUNCTION: ICD-10-CM

## 2021-02-22 DIAGNOSIS — R93.89 ABNORMAL CXR: ICD-10-CM

## 2021-02-22 LAB
ANION GAP SERPL CALC-SCNC: 6 MMOL/L (ref 8–16)
BUN SERPL-MCNC: 25 MG/DL (ref 8–23)
CALCIUM SERPL-MCNC: 9.6 MG/DL (ref 8.7–10.5)
CHLORIDE SERPL-SCNC: 106 MMOL/L (ref 95–110)
CO2 SERPL-SCNC: 24 MMOL/L (ref 23–29)
CREAT SERPL-MCNC: 0.9 MG/DL (ref 0.5–1.4)
ERYTHROCYTE [DISTWIDTH] IN BLOOD BY AUTOMATED COUNT: 13.6 % (ref 11.5–14.5)
EST. GFR  (AFRICAN AMERICAN): >60 ML/MIN/1.73 M^2
EST. GFR  (NON AFRICAN AMERICAN): >60 ML/MIN/1.73 M^2
GLUCOSE SERPL-MCNC: 91 MG/DL (ref 70–110)
HCT VFR BLD AUTO: 35.6 % (ref 37–48.5)
HGB BLD-MCNC: 11.6 G/DL (ref 12–16)
MCH RBC QN AUTO: 30.9 PG (ref 27–31)
MCHC RBC AUTO-ENTMCNC: 32.6 G/DL (ref 32–36)
MCV RBC AUTO: 95 FL (ref 82–98)
PLATELET # BLD AUTO: 161 K/UL (ref 150–350)
PMV BLD AUTO: 10.8 FL (ref 9.2–12.9)
POTASSIUM SERPL-SCNC: 3.9 MMOL/L (ref 3.5–5.1)
RBC # BLD AUTO: 3.75 M/UL (ref 4–5.4)
SODIUM SERPL-SCNC: 136 MMOL/L (ref 136–145)
WBC # BLD AUTO: 5.17 K/UL (ref 3.9–12.7)

## 2021-02-22 PROCEDURE — 36415 COLL VENOUS BLD VENIPUNCTURE: CPT

## 2021-02-22 PROCEDURE — 1159F PR MEDICATION LIST DOCUMENTED IN MEDICAL RECORD: ICD-10-PCS | Mod: S$GLB,,, | Performed by: NURSE PRACTITIONER

## 2021-02-22 PROCEDURE — 99213 PR OFFICE/OUTPT VISIT, EST, LEVL III, 20-29 MIN: ICD-10-PCS | Mod: S$GLB,,, | Performed by: NURSE PRACTITIONER

## 2021-02-22 PROCEDURE — 99213 OFFICE O/P EST LOW 20 MIN: CPT | Mod: S$GLB,,, | Performed by: NURSE PRACTITIONER

## 2021-02-22 PROCEDURE — 1101F PT FALLS ASSESS-DOCD LE1/YR: CPT | Mod: S$GLB,,, | Performed by: NURSE PRACTITIONER

## 2021-02-22 PROCEDURE — 85027 COMPLETE CBC AUTOMATED: CPT

## 2021-02-22 PROCEDURE — 93000 EKG 12-LEAD: ICD-10-PCS | Mod: S$GLB,,, | Performed by: SPECIALIST

## 2021-02-22 PROCEDURE — 1101F PR PT FALLS ASSESS DOC 0-1 FALLS W/OUT INJ PAST YR: ICD-10-PCS | Mod: S$GLB,,, | Performed by: NURSE PRACTITIONER

## 2021-02-22 PROCEDURE — 3288F PR FALLS RISK ASSESSMENT DOCUMENTED: ICD-10-PCS | Mod: S$GLB,,, | Performed by: NURSE PRACTITIONER

## 2021-02-22 PROCEDURE — 1126F PR PAIN SEVERITY QUANTIFIED, NO PAIN PRESENT: ICD-10-PCS | Mod: S$GLB,,, | Performed by: NURSE PRACTITIONER

## 2021-02-22 PROCEDURE — 93000 ELECTROCARDIOGRAM COMPLETE: CPT | Mod: S$GLB,,, | Performed by: SPECIALIST

## 2021-02-22 PROCEDURE — 1159F MED LIST DOCD IN RCRD: CPT | Mod: S$GLB,,, | Performed by: NURSE PRACTITIONER

## 2021-02-22 PROCEDURE — 80048 BASIC METABOLIC PNL TOTAL CA: CPT

## 2021-02-22 PROCEDURE — 1126F AMNT PAIN NOTED NONE PRSNT: CPT | Mod: S$GLB,,, | Performed by: NURSE PRACTITIONER

## 2021-02-22 PROCEDURE — 3288F FALL RISK ASSESSMENT DOCD: CPT | Mod: S$GLB,,, | Performed by: NURSE PRACTITIONER

## 2021-02-23 ENCOUNTER — TELEPHONE (OUTPATIENT)
Dept: CARDIOLOGY | Facility: CLINIC | Age: 81
End: 2021-02-23

## 2021-04-29 ENCOUNTER — LAB VISIT (OUTPATIENT)
Dept: LAB | Facility: HOSPITAL | Age: 81
End: 2021-04-29
Attending: NURSE PRACTITIONER
Payer: MEDICARE

## 2021-04-29 ENCOUNTER — OFFICE VISIT (OUTPATIENT)
Dept: CARDIOLOGY | Facility: CLINIC | Age: 81
End: 2021-04-29
Payer: MEDICARE

## 2021-04-29 VITALS
OXYGEN SATURATION: 98 % | WEIGHT: 171 LBS | SYSTOLIC BLOOD PRESSURE: 118 MMHG | DIASTOLIC BLOOD PRESSURE: 62 MMHG | BODY MASS INDEX: 27.48 KG/M2 | RESPIRATION RATE: 16 BRPM | HEIGHT: 66 IN | HEART RATE: 57 BPM

## 2021-04-29 DIAGNOSIS — Z86.16 HISTORY OF COVID-19: ICD-10-CM

## 2021-04-29 DIAGNOSIS — I48.0 PAROXYSMAL ATRIAL FIBRILLATION: Primary | ICD-10-CM

## 2021-04-29 DIAGNOSIS — E87.6 HYPOKALEMIA: ICD-10-CM

## 2021-04-29 DIAGNOSIS — D64.9 ANEMIA, UNSPECIFIED TYPE: ICD-10-CM

## 2021-04-29 DIAGNOSIS — I51.89 DIASTOLIC DYSFUNCTION: ICD-10-CM

## 2021-04-29 DIAGNOSIS — F41.9 ANXIETY: ICD-10-CM

## 2021-04-29 DIAGNOSIS — I48.0 PAROXYSMAL ATRIAL FIBRILLATION: ICD-10-CM

## 2021-04-29 LAB
ANION GAP SERPL CALC-SCNC: 8 MMOL/L (ref 8–16)
BUN SERPL-MCNC: 25 MG/DL (ref 8–23)
CALCIUM SERPL-MCNC: 9.5 MG/DL (ref 8.7–10.5)
CHLORIDE SERPL-SCNC: 105 MMOL/L (ref 95–110)
CO2 SERPL-SCNC: 24 MMOL/L (ref 23–29)
CREAT SERPL-MCNC: 0.9 MG/DL (ref 0.5–1.4)
EST. GFR  (AFRICAN AMERICAN): >60 ML/MIN/1.73 M^2
EST. GFR  (NON AFRICAN AMERICAN): >60 ML/MIN/1.73 M^2
GLUCOSE SERPL-MCNC: 92 MG/DL (ref 70–110)
MAGNESIUM SERPL-MCNC: 2 MG/DL (ref 1.6–2.6)
POTASSIUM SERPL-SCNC: 4.2 MMOL/L (ref 3.5–5.1)
SODIUM SERPL-SCNC: 137 MMOL/L (ref 136–145)

## 2021-04-29 PROCEDURE — 1101F PT FALLS ASSESS-DOCD LE1/YR: CPT | Mod: S$GLB,,, | Performed by: INTERNAL MEDICINE

## 2021-04-29 PROCEDURE — 3288F FALL RISK ASSESSMENT DOCD: CPT | Mod: S$GLB,,, | Performed by: INTERNAL MEDICINE

## 2021-04-29 PROCEDURE — 1126F AMNT PAIN NOTED NONE PRSNT: CPT | Mod: S$GLB,,, | Performed by: INTERNAL MEDICINE

## 2021-04-29 PROCEDURE — 99214 OFFICE O/P EST MOD 30 MIN: CPT | Mod: S$GLB,,, | Performed by: INTERNAL MEDICINE

## 2021-04-29 PROCEDURE — 1159F MED LIST DOCD IN RCRD: CPT | Mod: S$GLB,,, | Performed by: INTERNAL MEDICINE

## 2021-04-29 PROCEDURE — 36415 COLL VENOUS BLD VENIPUNCTURE: CPT | Performed by: NURSE PRACTITIONER

## 2021-04-29 PROCEDURE — 1159F PR MEDICATION LIST DOCUMENTED IN MEDICAL RECORD: ICD-10-PCS | Mod: S$GLB,,, | Performed by: INTERNAL MEDICINE

## 2021-04-29 PROCEDURE — 99214 PR OFFICE/OUTPT VISIT, EST, LEVL IV, 30-39 MIN: ICD-10-PCS | Mod: S$GLB,,, | Performed by: INTERNAL MEDICINE

## 2021-04-29 PROCEDURE — 80048 BASIC METABOLIC PNL TOTAL CA: CPT | Performed by: NURSE PRACTITIONER

## 2021-04-29 PROCEDURE — 1126F PR PAIN SEVERITY QUANTIFIED, NO PAIN PRESENT: ICD-10-PCS | Mod: S$GLB,,, | Performed by: INTERNAL MEDICINE

## 2021-04-29 PROCEDURE — 3288F PR FALLS RISK ASSESSMENT DOCUMENTED: ICD-10-PCS | Mod: S$GLB,,, | Performed by: INTERNAL MEDICINE

## 2021-04-29 PROCEDURE — 1101F PR PT FALLS ASSESS DOC 0-1 FALLS W/OUT INJ PAST YR: ICD-10-PCS | Mod: S$GLB,,, | Performed by: INTERNAL MEDICINE

## 2021-04-29 PROCEDURE — 93005 ELECTROCARDIOGRAM TRACING: CPT | Mod: S$GLB,,, | Performed by: NURSE PRACTITIONER

## 2021-04-29 PROCEDURE — 83735 ASSAY OF MAGNESIUM: CPT | Performed by: NURSE PRACTITIONER

## 2021-04-29 PROCEDURE — 93005 EKG 12-LEAD: ICD-10-PCS | Mod: S$GLB,,, | Performed by: NURSE PRACTITIONER

## 2021-04-29 RX ORDER — LANOLIN ALCOHOL/MO/W.PET/CERES
400 CREAM (GRAM) TOPICAL DAILY
Qty: 90 TABLET | Refills: 3 | Status: SHIPPED | OUTPATIENT
Start: 2021-04-29 | End: 2022-02-21 | Stop reason: SDUPTHER

## 2021-04-29 RX ORDER — SOTALOL HYDROCHLORIDE 80 MG/1
40 TABLET ORAL 2 TIMES DAILY
Qty: 90 TABLET | Refills: 3 | Status: SHIPPED | OUTPATIENT
Start: 2021-04-29 | End: 2022-02-21 | Stop reason: SDUPTHER

## 2021-04-29 RX ORDER — SPIRONOLACTONE 25 MG/1
25 TABLET ORAL DAILY
Qty: 90 TABLET | Refills: 3 | Status: SHIPPED | OUTPATIENT
Start: 2021-04-29 | End: 2022-02-21 | Stop reason: SDUPTHER

## 2021-06-15 ENCOUNTER — TELEPHONE (OUTPATIENT)
Dept: CARDIOLOGY | Facility: CLINIC | Age: 81
End: 2021-06-15

## 2021-06-30 ENCOUNTER — TELEPHONE (OUTPATIENT)
Dept: CARDIOLOGY | Facility: CLINIC | Age: 81
End: 2021-06-30
Payer: MEDICARE

## 2021-06-30 ENCOUNTER — TELEPHONE (OUTPATIENT)
Dept: CARDIOLOGY | Facility: CLINIC | Age: 81
End: 2021-06-30

## 2021-06-30 NOTE — TELEPHONE ENCOUNTER
Today before meds 109/57 72  Saturday highest 120 but stayed around 100-110/50s she was lightheaded and weak.   Sotalol 40mg bid

## 2021-06-30 NOTE — TELEPHONE ENCOUNTER
----- Message from Car Enrique sent at 6/30/2021  8:12 AM CDT -----  Contact: pt at [phone]  Type: Needs Medical Advice  Who Called: pt  Best Call Back Number: [phone]  Additional Information: pt is calling the office to let them know her blood pressure is on the low side. Please call back and advise

## 2021-08-25 ENCOUNTER — OFFICE VISIT (OUTPATIENT)
Dept: CARDIOLOGY | Facility: CLINIC | Age: 81
End: 2021-08-25
Payer: MEDICARE

## 2021-08-25 VITALS
WEIGHT: 175 LBS | OXYGEN SATURATION: 100 % | DIASTOLIC BLOOD PRESSURE: 70 MMHG | HEART RATE: 62 BPM | SYSTOLIC BLOOD PRESSURE: 122 MMHG | BODY MASS INDEX: 28.12 KG/M2 | HEIGHT: 66 IN

## 2021-08-25 DIAGNOSIS — I51.89 DIASTOLIC DYSFUNCTION: ICD-10-CM

## 2021-08-25 DIAGNOSIS — I48.0 PAF (PAROXYSMAL ATRIAL FIBRILLATION): Primary | ICD-10-CM

## 2021-08-25 DIAGNOSIS — F41.9 ANXIETY: ICD-10-CM

## 2021-08-25 DIAGNOSIS — Z86.16 HISTORY OF COVID-19: ICD-10-CM

## 2021-08-25 PROCEDURE — 99214 PR OFFICE/OUTPT VISIT, EST, LEVL IV, 30-39 MIN: ICD-10-PCS | Mod: S$GLB,,, | Performed by: INTERNAL MEDICINE

## 2021-08-25 PROCEDURE — 1101F PR PT FALLS ASSESS DOC 0-1 FALLS W/OUT INJ PAST YR: ICD-10-PCS | Mod: S$GLB,,, | Performed by: INTERNAL MEDICINE

## 2021-08-25 PROCEDURE — 1160F PR REVIEW ALL MEDS BY PRESCRIBER/CLIN PHARMACIST DOCUMENTED: ICD-10-PCS | Mod: S$GLB,,, | Performed by: INTERNAL MEDICINE

## 2021-08-25 PROCEDURE — 93005 EKG 12-LEAD: ICD-10-PCS | Mod: S$GLB,,, | Performed by: NURSE PRACTITIONER

## 2021-08-25 PROCEDURE — 1159F PR MEDICATION LIST DOCUMENTED IN MEDICAL RECORD: ICD-10-PCS | Mod: S$GLB,,, | Performed by: INTERNAL MEDICINE

## 2021-08-25 PROCEDURE — 99214 OFFICE O/P EST MOD 30 MIN: CPT | Mod: S$GLB,,, | Performed by: INTERNAL MEDICINE

## 2021-08-25 PROCEDURE — 93010 ELECTROCARDIOGRAM REPORT: CPT | Mod: S$GLB,,, | Performed by: GENERAL PRACTICE

## 2021-08-25 PROCEDURE — 1159F MED LIST DOCD IN RCRD: CPT | Mod: S$GLB,,, | Performed by: INTERNAL MEDICINE

## 2021-08-25 PROCEDURE — 3288F PR FALLS RISK ASSESSMENT DOCUMENTED: ICD-10-PCS | Mod: S$GLB,,, | Performed by: INTERNAL MEDICINE

## 2021-08-25 PROCEDURE — 3074F PR MOST RECENT SYSTOLIC BLOOD PRESSURE < 130 MM HG: ICD-10-PCS | Mod: S$GLB,,, | Performed by: INTERNAL MEDICINE

## 2021-08-25 PROCEDURE — 93005 ELECTROCARDIOGRAM TRACING: CPT | Mod: S$GLB,,, | Performed by: NURSE PRACTITIONER

## 2021-08-25 PROCEDURE — 1160F RVW MEDS BY RX/DR IN RCRD: CPT | Mod: S$GLB,,, | Performed by: INTERNAL MEDICINE

## 2021-08-25 PROCEDURE — 1126F PR PAIN SEVERITY QUANTIFIED, NO PAIN PRESENT: ICD-10-PCS | Mod: S$GLB,,, | Performed by: INTERNAL MEDICINE

## 2021-08-25 PROCEDURE — 3074F SYST BP LT 130 MM HG: CPT | Mod: S$GLB,,, | Performed by: INTERNAL MEDICINE

## 2021-08-25 PROCEDURE — 3078F PR MOST RECENT DIASTOLIC BLOOD PRESSURE < 80 MM HG: ICD-10-PCS | Mod: S$GLB,,, | Performed by: INTERNAL MEDICINE

## 2021-08-25 PROCEDURE — 3288F FALL RISK ASSESSMENT DOCD: CPT | Mod: S$GLB,,, | Performed by: INTERNAL MEDICINE

## 2021-08-25 PROCEDURE — 93010 EKG 12-LEAD: ICD-10-PCS | Mod: S$GLB,,, | Performed by: GENERAL PRACTICE

## 2021-08-25 PROCEDURE — 3078F DIAST BP <80 MM HG: CPT | Mod: S$GLB,,, | Performed by: INTERNAL MEDICINE

## 2021-08-25 PROCEDURE — 1126F AMNT PAIN NOTED NONE PRSNT: CPT | Mod: S$GLB,,, | Performed by: INTERNAL MEDICINE

## 2021-08-25 PROCEDURE — 1101F PT FALLS ASSESS-DOCD LE1/YR: CPT | Mod: S$GLB,,, | Performed by: INTERNAL MEDICINE

## 2021-11-17 ENCOUNTER — CLINICAL SUPPORT (OUTPATIENT)
Dept: FAMILY MEDICINE | Facility: CLINIC | Age: 81
End: 2021-11-17
Payer: MEDICARE

## 2021-11-17 DIAGNOSIS — Z23 NEED FOR INFLUENZA VACCINATION: Primary | ICD-10-CM

## 2021-11-17 PROCEDURE — 90662 IIV NO PRSV INCREASED AG IM: CPT | Mod: S$GLB,,, | Performed by: NURSE PRACTITIONER

## 2021-11-17 PROCEDURE — G0008 ADMIN INFLUENZA VIRUS VAC: HCPCS | Mod: S$GLB,,, | Performed by: NURSE PRACTITIONER

## 2021-11-17 PROCEDURE — 90662 FLU VACCINE - QUADRIVALENT - HIGH DOSE (65+) PRESERVATIVE FREE IM: ICD-10-PCS | Mod: S$GLB,,, | Performed by: NURSE PRACTITIONER

## 2021-11-17 PROCEDURE — G0008 FLU VACCINE - QUADRIVALENT - HIGH DOSE (65+) PRESERVATIVE FREE IM: ICD-10-PCS | Mod: S$GLB,,, | Performed by: NURSE PRACTITIONER

## 2022-02-21 ENCOUNTER — OFFICE VISIT (OUTPATIENT)
Dept: CARDIOLOGY | Facility: CLINIC | Age: 82
End: 2022-02-21
Payer: MEDICARE

## 2022-02-21 ENCOUNTER — LAB VISIT (OUTPATIENT)
Dept: LAB | Facility: HOSPITAL | Age: 82
End: 2022-02-21
Attending: INTERNAL MEDICINE
Payer: MEDICARE

## 2022-02-21 VITALS
BODY MASS INDEX: 28.77 KG/M2 | HEART RATE: 65 BPM | OXYGEN SATURATION: 99 % | HEIGHT: 66 IN | DIASTOLIC BLOOD PRESSURE: 70 MMHG | WEIGHT: 179 LBS | SYSTOLIC BLOOD PRESSURE: 124 MMHG

## 2022-02-21 DIAGNOSIS — U07.1 PNEUMONIA DUE TO COVID-19 VIRUS: ICD-10-CM

## 2022-02-21 DIAGNOSIS — I10 HYPERTENSION, UNSPECIFIED TYPE: ICD-10-CM

## 2022-02-21 DIAGNOSIS — I51.89 DIASTOLIC DYSFUNCTION: ICD-10-CM

## 2022-02-21 DIAGNOSIS — G93.31 POSTVIRAL FATIGUE SYNDROME: ICD-10-CM

## 2022-02-21 DIAGNOSIS — I48.0 PAROXYSMAL ATRIAL FIBRILLATION: ICD-10-CM

## 2022-02-21 DIAGNOSIS — J12.82 PNEUMONIA DUE TO COVID-19 VIRUS: ICD-10-CM

## 2022-02-21 DIAGNOSIS — U07.1 COVID-19: ICD-10-CM

## 2022-02-21 DIAGNOSIS — E87.6 HYPOKALEMIA: ICD-10-CM

## 2022-02-21 DIAGNOSIS — Z79.01 CURRENT USE OF LONG TERM ANTICOAGULATION: ICD-10-CM

## 2022-02-21 DIAGNOSIS — I35.0 AORTIC STENOSIS, MILD: ICD-10-CM

## 2022-02-21 DIAGNOSIS — I48.0 PAROXYSMAL ATRIAL FIBRILLATION: Primary | ICD-10-CM

## 2022-02-21 LAB
ALBUMIN SERPL BCP-MCNC: 4.1 G/DL (ref 3.5–5.2)
ALP SERPL-CCNC: 84 U/L (ref 55–135)
ALT SERPL W/O P-5'-P-CCNC: 15 U/L (ref 10–44)
ANION GAP SERPL CALC-SCNC: 9 MMOL/L (ref 8–16)
AST SERPL-CCNC: 20 U/L (ref 10–40)
BILIRUB SERPL-MCNC: 0.6 MG/DL (ref 0.1–1)
BUN SERPL-MCNC: 24 MG/DL (ref 8–23)
CALCIUM SERPL-MCNC: 9.6 MG/DL (ref 8.7–10.5)
CHLORIDE SERPL-SCNC: 101 MMOL/L (ref 95–110)
CO2 SERPL-SCNC: 25 MMOL/L (ref 23–29)
CREAT SERPL-MCNC: 0.8 MG/DL (ref 0.5–1.4)
ERYTHROCYTE [DISTWIDTH] IN BLOOD BY AUTOMATED COUNT: 12.6 % (ref 11.5–14.5)
EST. GFR  (AFRICAN AMERICAN): >60 ML/MIN/1.73 M^2
EST. GFR  (NON AFRICAN AMERICAN): >60 ML/MIN/1.73 M^2
GLUCOSE SERPL-MCNC: 102 MG/DL (ref 70–110)
HCT VFR BLD AUTO: 38.4 % (ref 37–48.5)
HGB BLD-MCNC: 12.3 G/DL (ref 12–16)
MCH RBC QN AUTO: 29.6 PG (ref 27–31)
MCHC RBC AUTO-ENTMCNC: 32 G/DL (ref 32–36)
MCV RBC AUTO: 93 FL (ref 82–98)
PLATELET # BLD AUTO: 147 K/UL (ref 150–450)
PMV BLD AUTO: 10.9 FL (ref 9.2–12.9)
POTASSIUM SERPL-SCNC: 3.9 MMOL/L (ref 3.5–5.1)
PROT SERPL-MCNC: 6.7 G/DL (ref 6–8.4)
RBC # BLD AUTO: 4.15 M/UL (ref 4–5.4)
SODIUM SERPL-SCNC: 135 MMOL/L (ref 136–145)
WBC # BLD AUTO: 4.46 K/UL (ref 3.9–12.7)

## 2022-02-21 PROCEDURE — 36415 COLL VENOUS BLD VENIPUNCTURE: CPT | Performed by: INTERNAL MEDICINE

## 2022-02-21 PROCEDURE — 3288F FALL RISK ASSESSMENT DOCD: CPT | Mod: CPTII,S$GLB,, | Performed by: INTERNAL MEDICINE

## 2022-02-21 PROCEDURE — 1159F PR MEDICATION LIST DOCUMENTED IN MEDICAL RECORD: ICD-10-PCS | Mod: CPTII,S$GLB,, | Performed by: INTERNAL MEDICINE

## 2022-02-21 PROCEDURE — 3074F PR MOST RECENT SYSTOLIC BLOOD PRESSURE < 130 MM HG: ICD-10-PCS | Mod: CPTII,S$GLB,, | Performed by: INTERNAL MEDICINE

## 2022-02-21 PROCEDURE — 1126F PR PAIN SEVERITY QUANTIFIED, NO PAIN PRESENT: ICD-10-PCS | Mod: CPTII,S$GLB,, | Performed by: INTERNAL MEDICINE

## 2022-02-21 PROCEDURE — 93000 EKG 12-LEAD: ICD-10-PCS | Mod: S$GLB,,, | Performed by: INTERNAL MEDICINE

## 2022-02-21 PROCEDURE — 1160F PR REVIEW ALL MEDS BY PRESCRIBER/CLIN PHARMACIST DOCUMENTED: ICD-10-PCS | Mod: CPTII,S$GLB,, | Performed by: INTERNAL MEDICINE

## 2022-02-21 PROCEDURE — 3078F PR MOST RECENT DIASTOLIC BLOOD PRESSURE < 80 MM HG: ICD-10-PCS | Mod: CPTII,S$GLB,, | Performed by: INTERNAL MEDICINE

## 2022-02-21 PROCEDURE — 1159F MED LIST DOCD IN RCRD: CPT | Mod: CPTII,S$GLB,, | Performed by: INTERNAL MEDICINE

## 2022-02-21 PROCEDURE — 1160F RVW MEDS BY RX/DR IN RCRD: CPT | Mod: CPTII,S$GLB,, | Performed by: INTERNAL MEDICINE

## 2022-02-21 PROCEDURE — 3074F SYST BP LT 130 MM HG: CPT | Mod: CPTII,S$GLB,, | Performed by: INTERNAL MEDICINE

## 2022-02-21 PROCEDURE — 3288F PR FALLS RISK ASSESSMENT DOCUMENTED: ICD-10-PCS | Mod: CPTII,S$GLB,, | Performed by: INTERNAL MEDICINE

## 2022-02-21 PROCEDURE — 80053 COMPREHEN METABOLIC PANEL: CPT | Performed by: INTERNAL MEDICINE

## 2022-02-21 PROCEDURE — 3078F DIAST BP <80 MM HG: CPT | Mod: CPTII,S$GLB,, | Performed by: INTERNAL MEDICINE

## 2022-02-21 PROCEDURE — 99214 OFFICE O/P EST MOD 30 MIN: CPT | Mod: S$GLB,,, | Performed by: INTERNAL MEDICINE

## 2022-02-21 PROCEDURE — 93000 ELECTROCARDIOGRAM COMPLETE: CPT | Mod: S$GLB,,, | Performed by: INTERNAL MEDICINE

## 2022-02-21 PROCEDURE — 1126F AMNT PAIN NOTED NONE PRSNT: CPT | Mod: CPTII,S$GLB,, | Performed by: INTERNAL MEDICINE

## 2022-02-21 PROCEDURE — 1101F PR PT FALLS ASSESS DOC 0-1 FALLS W/OUT INJ PAST YR: ICD-10-PCS | Mod: CPTII,S$GLB,, | Performed by: INTERNAL MEDICINE

## 2022-02-21 PROCEDURE — 1101F PT FALLS ASSESS-DOCD LE1/YR: CPT | Mod: CPTII,S$GLB,, | Performed by: INTERNAL MEDICINE

## 2022-02-21 PROCEDURE — 99214 PR OFFICE/OUTPT VISIT, EST, LEVL IV, 30-39 MIN: ICD-10-PCS | Mod: S$GLB,,, | Performed by: INTERNAL MEDICINE

## 2022-02-21 PROCEDURE — 85027 COMPLETE CBC AUTOMATED: CPT | Performed by: INTERNAL MEDICINE

## 2022-02-21 RX ORDER — LANOLIN ALCOHOL/MO/W.PET/CERES
400 CREAM (GRAM) TOPICAL DAILY
Qty: 90 TABLET | Refills: 3 | Status: SHIPPED | OUTPATIENT
Start: 2022-02-21 | End: 2022-05-10

## 2022-02-21 RX ORDER — SOTALOL HYDROCHLORIDE 80 MG/1
40 TABLET ORAL 2 TIMES DAILY
Qty: 90 TABLET | Refills: 3 | Status: ON HOLD | OUTPATIENT
Start: 2022-02-21 | End: 2023-04-20 | Stop reason: SDUPTHER

## 2022-02-21 RX ORDER — SPIRONOLACTONE 25 MG/1
25 TABLET ORAL DAILY
Qty: 90 TABLET | Refills: 3 | Status: ON HOLD | OUTPATIENT
Start: 2022-02-21 | End: 2023-04-20 | Stop reason: HOSPADM

## 2022-02-21 NOTE — PROGRESS NOTES
Subjective:    Patient ID:  Irene العراقي is a 81 y.o. female     Chief Complaint   Patient presents with    Follow-up     6 month        HPI:  Ms Irene العراقي is a 81 y.o. female here for follow-up.  Patient has been doing well no specific complaints.  She has recovered well from her COVID 19 infection.  She still has episodes where she feels short of breath on exertion at times and she feels fatigued.  Otherwise denies any chest pain or tightness or heaviness denies any dizziness or lightheadedness denies any loss of consciousness falls or head injury.      Review of patient's allergies indicates:   Allergen Reactions    Meperidine hcl Nausea And Vomiting    Persantine [dipyridamole]     Vicodin [hydrocodone-acetaminophen] Nausea And Vomiting       Past Medical History:   Diagnosis Date    Atrial fibrillation 01/25/2021    Hypertension      Past Surgical History:   Procedure Laterality Date    BREAST SURGERY       Social History     Tobacco Use    Smoking status: Never Smoker    Smokeless tobacco: Never Used   Substance Use Topics    Alcohol use: Not Currently    Drug use: Not Currently     Family History   Problem Relation Age of Onset    Cancer Mother     Cancer Father         Review of Systems:   Constitution: Negative for diaphoresis and fever.  Fatigue and tiredness intermittently.  HEENT: Negative for nosebleeds.    Cardiovascular: Negative for chest pain       dyspnea on exertion       No leg swelling        No palpitations  Respiratory: Negative for shortness of breath and wheezing.    Hematologic/Lymphatic: Negative for bleeding problem. Does not bruise/bleed easily.   Skin: Negative for color change and rash.   Musculoskeletal: Negative for falls and myalgias.   Gastrointestinal: Negative for hematemesis and hematochezia.   Genitourinary: Negative for hematuria.   Neurological: Negative for dizziness and light-headedness.   Psychiatric/Behavioral: Negative for altered mental status and  memory loss.          Objective:        Vitals:    02/21/22 1237   BP: 124/70   Pulse: 65       Lab Results   Component Value Date    WBC 5.17 02/22/2021    HGB 11.6 (L) 02/22/2021    HCT 35.6 (L) 02/22/2021     02/22/2021    CHOL 173 02/10/2020    TRIG 40 02/10/2020    HDL 78 (H) 02/10/2020    ALT 19 01/27/2021    AST 20 01/27/2021     04/29/2021    K 4.2 04/29/2021     04/29/2021    CREATININE 0.9 04/29/2021    BUN 25 (H) 04/29/2021    CO2 24 04/29/2021    TSH 4.490 01/27/2021    INR 1.1 02/09/2020    HGBA1C 5.3 02/10/2020        ECHOCARDIOGRAM RESULTS  Results for orders placed during the hospital encounter of 02/09/20    Echo Color Flow Doppler? Yes; Bubble Contrast? Yes    Interpretation Summary  · Concentric left ventricular remodeling.  · Intravenous Saline (bubble) contrast was used during the study.Study is negative for shunt  · Left ventricular systolic function. The estimated ejection fraction is 65%.  · Grade I (mild) left ventricular diastolic dysfunction consistent with impaired relaxation.  · Normal right ventricular systolic function.  · Mild aortic regurgitation.  · Mild aortic valve stenosis.  · Aortic valve area is 1.54 cm2; peak velocity is 2.59 m/s; mean gradient is 17 mmHg.  · Mild tricuspid regurgitation.  · Normal central venous pressure (3 mmHg).  · The estimated PA systolic pressure is 29 mmHg.        CURRENT/PREVIOUS VISIT EKG  Results for orders placed or performed in visit on 08/25/21   IN OFFICE EKG 12-LEAD (to [facility])    Collection Time: 08/25/21  1:45 PM    Narrative    Test Reason : I48.0,    Vent. Rate : 063 BPM     Atrial Rate : 063 BPM     P-R Int : 112 ms          QRS Dur : 084 ms      QT Int : 434 ms       P-R-T Axes : 046 -03 005 degrees     QTc Int : 444 ms    Normal sinus rhythm  Possible Left atrial enlargement  Borderline Abnormal ECG  When compared with ECG of 29-APR-2021 14:52,  Questionable change in The axis  T wave inversion now evident in Inferior  leads  Confirmed by En ARRIAGA, Jose D DANIEL ([id number]) on 8/26/2021 3:43:45 PM    Referred By:  RG           Confirmed By:Jose D Gatica MD     No valid procedures specified.   No results found for this or any previous visit.      Physical Exam:  CONSTITUTIONAL: No fever, no chills  HEENT: Normocephalic, atraumatic,pupils reactive to light                 NECK:  No JVD no carotid bruit  CVS: S1S2+, RRR, soft systolic murmurs,   LUNGS: Clear  ABDOMEN: Soft, NT, BS+  EXTREMITIES: No cyanosis, edema  : No araya catheter  NEURO: AAO X 3  PSY: Normal affect      Medication List with Changes/Refills   Current Medications    APIXABAN (ELIQUIS) 5 MG TAB    Take 1 tablet (5 mg total) by mouth 2 (two) times daily.    MAGNESIUM OXIDE (MAG-OX) 400 MG (241.3 MG MAGNESIUM) TABLET    Take 1 tablet (400 mg total) by mouth once daily.    SOTALOL (BETAPACE) 80 MG TABLET    Take 0.5 tablets (40 mg total) by mouth 2 (two) times daily.    SPIRONOLACTONE (ALDACTONE) 25 MG TABLET    Take 1 tablet (25 mg total) by mouth once daily.             Assessment:       1. Paroxysmal atrial fibrillation    2. Postviral fatigue syndrome    3. Diastolic dysfunction    4. Current use of long term anticoagulation    5. Aortic stenosis, mild    6. Recent covid 19 infection    7. Pneumonia due to COVID-19 virus    8. Hypokalemia    9. Hypertension, unspecified type         Plan:   1. Patient's blood pressure is good at 124/70 and she is currently on sotalol 40 mg p.o. b.i.d..  2. Patient has post viral fatigue syndrome where she is having fatigue from COVID-19.  Otherwise she feels stable and good.  3. Patient has paroxysmal atrial fibrillation currently she is in sinus rhythm and she is on sotalol 40 mg p.o. b.i.d. continue the same and she is also on magnesium oxide 400 mg p.o. q.day.  4. Patient is hypokalemic and she is on spironolactone 25 mg p.o. q.day continue the same will check a potassium and magnesium.  5. Patient complains of fatigue and  tiredness will also need to rule out other causes of fatigue including anemia  Will need blood work including a CBC and a cm P    6. Reviewed her EKG independently patient is in normal sinus rhythm with heart rate of 65 beats per minute right atrial enlargement incomplete right bundle branch block.  And QT is 420 and QTC is 436 milliseconds essentially within normal limits.  7. Will also do a 2D echocardiogram for LV function ejection fraction as well as for aortic valve measurements.  Patient has mild aortic stenosis.  8. I will see her back in the office in 6 months.  9. Will also reorder all her prescription medications.        Problem List Items Addressed This Visit        Cardiac/Vascular    Diastolic dysfunction    Paroxysmal atrial fibrillation - Primary       Renal/    Hypokalemia       Other    Pneumonia due to COVID-19 virus    Recent covid 19 infection      Other Visit Diagnoses     Postviral fatigue syndrome        Current use of long term anticoagulation        Aortic stenosis, mild        Hypertension, unspecified type        Relevant Orders    IN OFFICE EKG 12-LEAD (to [place])          No follow-ups on file.

## 2022-05-18 ENCOUNTER — OFFICE VISIT (OUTPATIENT)
Dept: FAMILY MEDICINE | Facility: CLINIC | Age: 82
End: 2022-05-18
Payer: MEDICARE

## 2022-05-18 VITALS
OXYGEN SATURATION: 97 % | HEIGHT: 66 IN | DIASTOLIC BLOOD PRESSURE: 82 MMHG | HEART RATE: 76 BPM | BODY MASS INDEX: 29.07 KG/M2 | WEIGHT: 180.88 LBS | SYSTOLIC BLOOD PRESSURE: 126 MMHG

## 2022-05-18 DIAGNOSIS — B37.31 VAGINAL CANDIDIASIS: Primary | ICD-10-CM

## 2022-05-18 DIAGNOSIS — N89.8 VAGINAL DISCHARGE: ICD-10-CM

## 2022-05-18 DIAGNOSIS — Z00.00 ROUTINE ADULT HEALTH MAINTENANCE: ICD-10-CM

## 2022-05-18 DIAGNOSIS — Z11.59 NEED FOR HEPATITIS C SCREENING TEST: ICD-10-CM

## 2022-05-18 LAB
BILIRUB UR QL STRIP: NEGATIVE
CLARITY UR: CLEAR
COLOR UR: ABNORMAL
GLUCOSE UR QL STRIP: NEGATIVE
KETONES UR QL STRIP: NEGATIVE
LEUKOCYTE ESTERASE UR QL STRIP: NEGATIVE
PH, POC UA: 6 (ref 5–8.5)
POC BLOOD, URINE: POSITIVE
POC NITRATES, URINE: NEGATIVE
PROT UR QL STRIP: NEGATIVE
SP GR UR STRIP: 1.01 (ref 1–1.03)
UROBILINOGEN UR STRIP-ACNC: NORMAL (ref 0.2–8)

## 2022-05-18 PROCEDURE — 99214 OFFICE O/P EST MOD 30 MIN: CPT | Mod: S$GLB,,, | Performed by: NURSE PRACTITIONER

## 2022-05-18 PROCEDURE — 1159F MED LIST DOCD IN RCRD: CPT | Mod: CPTII,S$GLB,, | Performed by: NURSE PRACTITIONER

## 2022-05-18 PROCEDURE — 1125F AMNT PAIN NOTED PAIN PRSNT: CPT | Mod: CPTII,S$GLB,, | Performed by: NURSE PRACTITIONER

## 2022-05-18 PROCEDURE — 1101F PR PT FALLS ASSESS DOC 0-1 FALLS W/OUT INJ PAST YR: ICD-10-PCS | Mod: CPTII,S$GLB,, | Performed by: NURSE PRACTITIONER

## 2022-05-18 PROCEDURE — 81003 URINALYSIS AUTO W/O SCOPE: CPT | Mod: QW,S$GLB,, | Performed by: NURSE PRACTITIONER

## 2022-05-18 PROCEDURE — 1101F PT FALLS ASSESS-DOCD LE1/YR: CPT | Mod: CPTII,S$GLB,, | Performed by: NURSE PRACTITIONER

## 2022-05-18 PROCEDURE — 1125F PR PAIN SEVERITY QUANTIFIED, PAIN PRESENT: ICD-10-PCS | Mod: CPTII,S$GLB,, | Performed by: NURSE PRACTITIONER

## 2022-05-18 PROCEDURE — 3288F FALL RISK ASSESSMENT DOCD: CPT | Mod: CPTII,S$GLB,, | Performed by: NURSE PRACTITIONER

## 2022-05-18 PROCEDURE — 1159F PR MEDICATION LIST DOCUMENTED IN MEDICAL RECORD: ICD-10-PCS | Mod: CPTII,S$GLB,, | Performed by: NURSE PRACTITIONER

## 2022-05-18 PROCEDURE — 3288F PR FALLS RISK ASSESSMENT DOCUMENTED: ICD-10-PCS | Mod: CPTII,S$GLB,, | Performed by: NURSE PRACTITIONER

## 2022-05-18 PROCEDURE — 81003 POCT URINALYSIS, DIPSTICK, AUTOMATED, W/O SCOPE: ICD-10-PCS | Mod: QW,S$GLB,, | Performed by: NURSE PRACTITIONER

## 2022-05-18 PROCEDURE — 99214 PR OFFICE/OUTPT VISIT, EST, LEVL IV, 30-39 MIN: ICD-10-PCS | Mod: S$GLB,,, | Performed by: NURSE PRACTITIONER

## 2022-05-18 RX ORDER — FLUCONAZOLE 150 MG/1
150 TABLET ORAL DAILY
Qty: 1 TABLET | Refills: 0 | Status: SHIPPED | OUTPATIENT
Start: 2022-05-18 | End: 2022-05-19

## 2022-05-18 NOTE — PROGRESS NOTES
Patient ID: Irene العراقي is a 81 y.o. female.    Chief Complaint: Abdominal Pain (Low Abdominal Pain - yellow vaginal discharge - been going on for about 5 months now )    Ms. Tariq presents today for sick visit. She is a patient of [facility]. She is being seen today to primarily have vaginal discharge evaluated. She also states that she has very mild lower abdominal pain that she rates 3/10 and Is aching in nature. She denies nausea, vomiting, fever, chills, night sweats or any other infectious morbidity.     She states her bowel movements are normal in frequency and consistency. She denies any changes in diet.     Abdominal Pain  This is a new problem. The current episode started in the past 7 days. The onset quality is gradual. The problem occurs intermittently. The problem has been waxing and waning. The pain is located in the generalized abdominal region. The pain is at a severity of 3/10. The pain is mild. The quality of the pain is aching. The abdominal pain does not radiate. Pertinent negatives include no arthralgias, constipation, diarrhea, dysuria, fever, frequency, headaches, myalgias, nausea or vomiting. The pain is aggravated by certain positions and movement. She has tried acetaminophen for the symptoms. The treatment provided mild relief.   Vaginal Discharge  The patient's primary symptoms include vaginal discharge (yellow in panties x5 months ). This is a chronic problem. The current episode started more than 1 month ago (approximately 5 months ago). The problem occurs daily. The problem has been waxing and waning. Pertinent negatives include no abdominal pain, constipation, diarrhea, dysuria, fever, flank pain, frequency, headaches, nausea, rash, sore throat, urgency or vomiting. The vaginal discharge was yellow. There has been no bleeding. She has not been passing clots. She has not been passing tissue. Nothing aggravates the symptoms. She has tried nothing for the symptoms.  She is not sexually active. No, her partner does not have an STD. She uses abstinence for contraception. She is postmenopausal. Her past medical history is significant for a gynecological surgery.           Past Medical History:   Diagnosis Date    Atrial fibrillation 01/25/2021    Hypertension      Past Surgical History:   Procedure Laterality Date    BREAST SURGERY           Tobacco History:  reports that she has never smoked. She has never used smokeless tobacco.      Review of patient's allergies indicates:   Allergen Reactions    Hydrocodone     Meperidine     Meperidine hcl Nausea And Vomiting    Persantine [dipyridamole]     Vicodin [hydrocodone-acetaminophen] Nausea And Vomiting       Current Outpatient Medications:     apixaban (ELIQUIS) 5 mg Tab, Take 1 tablet (5 mg total) by mouth 2 (two) times daily., Disp: 180 tablet, Rfl: 3    magnesium oxide (MAG-OX) 400 mg (241.3 mg magnesium) tablet, TAKE 1 TABLET BY MOUTH ONCE DAILY, Disp: 90 tablet, Rfl: 3    sotaloL (BETAPACE) 80 MG tablet, Take 0.5 tablets (40 mg total) by mouth 2 (two) times daily., Disp: 90 tablet, Rfl: 3    spironolactone (ALDACTONE) 25 MG tablet, Take 1 tablet (25 mg total) by mouth once daily., Disp: 90 tablet, Rfl: 3    fluconazole (DIFLUCAN) 150 MG Tab, Take 1 tablet (150 mg total) by mouth once daily. for 1 day, Disp: 1 tablet, Rfl: 0    Review of Systems   Constitutional: Negative for activity change, appetite change, fatigue, fever and unexpected weight change.   HENT: Negative for congestion, mouth sores, nosebleeds, postnasal drip, rhinorrhea, sinus pressure, sinus pain, sneezing, sore throat and trouble swallowing.    Eyes: Negative for pain, redness and itching.   Respiratory: Negative for chest tightness and shortness of breath.    Cardiovascular: Negative for chest pain and palpitations.   Gastrointestinal: Negative for abdominal pain, blood in stool, constipation, diarrhea, nausea and vomiting.   Endocrine: Negative  "for cold intolerance, heat intolerance, polydipsia, polyphagia and polyuria.   Genitourinary: Positive for vaginal discharge (yellow in panties x5 months ). Negative for difficulty urinating, dysuria, flank pain, frequency, genital sores, menstrual problem, urgency and vaginal bleeding.   Musculoskeletal: Negative for arthralgias and myalgias.   Skin: Negative for rash and wound.   Allergic/Immunologic: Negative for environmental allergies and food allergies.   Neurological: Negative for dizziness, light-headedness and headaches.   Hematological: Negative for adenopathy. Does not bruise/bleed easily.   Psychiatric/Behavioral: Negative for agitation, confusion, hallucinations, self-injury and sleep disturbance. The patient is not nervous/anxious.           Objective:      Vitals:    05/18/22 1040   BP: 126/82   Pulse: 76   SpO2: 97%   Weight: 82.1 kg (180 lb 14.4 oz)   Height: 5' 6" (1.676 m)     Physical Exam  Vitals reviewed. Exam conducted with a chaperone present.   Constitutional:       General: She is not in acute distress.     Appearance: Normal appearance. She is normal weight. She is not ill-appearing, toxic-appearing or diaphoretic.   HENT:      Head: Normocephalic and atraumatic.      Right Ear: Tympanic membrane and external ear normal. There is no impacted cerumen.      Left Ear: Tympanic membrane and external ear normal. There is no impacted cerumen.      Nose: Nose normal. No congestion or rhinorrhea.      Mouth/Throat:      Mouth: Mucous membranes are moist.      Pharynx: Oropharynx is clear. No oropharyngeal exudate or posterior oropharyngeal erythema.   Eyes:      General: No scleral icterus.        Right eye: No discharge.         Left eye: No discharge.      Extraocular Movements: Extraocular movements intact.      Conjunctiva/sclera: Conjunctivae normal.      Pupils: Pupils are equal, round, and reactive to light.   Neck:      Vascular: No carotid bruit.   Cardiovascular:      Rate and Rhythm: " Normal rate and regular rhythm.      Pulses: Normal pulses.      Heart sounds: Normal heart sounds. No murmur heard.    No friction rub. No gallop.   Pulmonary:      Effort: Pulmonary effort is normal. No respiratory distress.      Breath sounds: Normal breath sounds. No stridor. No rales.   Chest:      Chest wall: No tenderness.   Abdominal:      General: Abdomen is flat. Bowel sounds are normal.      Palpations: Abdomen is soft. There is no mass.      Tenderness: There is no abdominal tenderness. There is no guarding.   Genitourinary:     General: Normal vulva.      Exam position: Lithotomy position.      Pubic Area: No rash.       Labia:         Right: No rash, tenderness, lesion or injury.         Left: No rash, tenderness, lesion or injury.       Urethra: No prolapse or urethral pain.      Vagina: Tenderness present. No erythema or lesions.      Cervix: Friability and cervical bleeding present.      Uterus: Absent.       Adnexa: Right adnexa normal and left adnexa normal.        Right: No tenderness.          Left: No tenderness.        Comments: Patient has scant amount of yellow discharge seen on vaginal exam. She did bleed when sample was obtained. She denies blood in discharge or any abnormal bleeding.   Musculoskeletal:         General: No swelling or signs of injury. Normal range of motion.      Cervical back: Normal range of motion and neck supple. No rigidity or tenderness.      Right lower leg: No edema.      Left lower leg: No edema.   Skin:     General: Skin is warm and dry.      Capillary Refill: Capillary refill takes less than 2 seconds.      Findings: No bruising, lesion or rash.   Neurological:      General: No focal deficit present.      Mental Status: She is alert and oriented to person, place, and time. Mental status is at baseline.      Motor: No weakness.      Coordination: Coordination normal.   Psychiatric:         Mood and Affect: Mood normal.         Behavior: Behavior normal.          Thought Content: Thought content normal.         Judgment: Judgment normal.           Assessment:       1. Vaginal candidiasis    2. Routine adult health maintenance    3. Need for hepatitis C screening test    4. Vaginal discharge           Plan:       Vaginal candidiasis  -     fluconazole (DIFLUCAN) 150 MG Tab; Take 1 tablet (150 mg total) by mouth once daily. for 1 day  Dispense: 1 tablet; Refill: 0    Routine adult health maintenance  -     Hepatitis C Antibody; Future; Expected date: 05/18/2022  -     Urinalysis; Future; Expected date: 05/18/2022  -     POCT Urinalysis, Dipstick, Automated, W/O Scope    Need for hepatitis C screening test  -     Hepatitis C Antibody; Future; Expected date: 05/18/2022    Vaginal discharge  -     Urinalysis; Future; Expected date: 05/18/2022  -     NuSwab Vaginitis Plus (VG+)  -     POCT Urinalysis, Dipstick, Automated, W/O Scope      Follow up in about 3 months (around 8/18/2022), or if symptoms worsen or fail to improve, for Lab review/cholesterol.      I will submit specimen for culture. I will tailor treatment based on results. She understands that it will take approximately 5 days for results to come back. I will treat for Candidiasis which appears to be the issue. I explained that if she were to have further issues with vaginal discharge that it may be appropriate to refer her to Gynecology. She verbalized understanding of this. She will follow up with Arelyl or myself as required. She is due for labs and orders have been placed accordingly.     Spent dillan 30 minutes with patient which involved review of pts medical conditions, labs, medications and with 50% of time face-to-face discussion about medical problems, management and any applicable changes.      5/18/2022 Thank you for allowing me to participate in your care.        Wanda MARQUES,DEBORAHP-C

## 2022-05-20 ENCOUNTER — TELEPHONE (OUTPATIENT)
Dept: CARDIOLOGY | Facility: CLINIC | Age: 82
End: 2022-05-20

## 2022-05-20 NOTE — TELEPHONE ENCOUNTER
----- Message from Vimal Christianson sent at 5/20/2022  9:35 AM CDT -----  Regarding: medical questions  Contact: Patient  Patient want to speak with a nurse regarding taking fluconazole 150mg, please call back at [phone] (home)     Case number [id number]

## 2022-05-21 LAB
A VAGINAE DNA VAG QL NAA+PROBE: NORMAL SCORE
BVAB2 DNA VAG QL NAA+PROBE: NORMAL SCORE
C ALBICANS DNA VAG QL NAA+PROBE: NEGATIVE
C GLABRATA DNA VAG QL NAA+PROBE: NEGATIVE
C TRACH DNA VAG QL NAA+PROBE: NEGATIVE
MEGA1 DNA VAG QL NAA+PROBE: NORMAL SCORE
N GONORRHOEA DNA VAG QL NAA+PROBE: NEGATIVE
T VAGINALIS DNA VAG QL NAA+PROBE: NEGATIVE

## 2022-07-23 ENCOUNTER — HOSPITAL ENCOUNTER (OUTPATIENT)
Facility: HOSPITAL | Age: 82
Discharge: HOME | End: 2022-07-24
Attending: EMERGENCY MEDICINE | Admitting: INTERNAL MEDICINE
Payer: MEDICARE

## 2022-07-23 DIAGNOSIS — R07.9 CHEST PAIN: ICD-10-CM

## 2022-07-23 DIAGNOSIS — R07.9 CHEST PAIN: Primary | ICD-10-CM

## 2022-07-23 LAB
ALBUMIN SERPL BCP-MCNC: 4.3 G/DL (ref 3.5–5.2)
ALP SERPL-CCNC: 88 U/L (ref 55–135)
ALT SERPL W/O P-5'-P-CCNC: 14 U/L (ref 10–44)
ANION GAP SERPL CALC-SCNC: 9 MMOL/L (ref 8–16)
AST SERPL-CCNC: 21 U/L (ref 10–40)
BACTERIA #/AREA URNS HPF: ABNORMAL /HPF
BASOPHILS # BLD AUTO: 0.03 K/UL (ref 0–0.2)
BASOPHILS NFR BLD: 0.7 % (ref 0–1.9)
BILIRUB SERPL-MCNC: 0.6 MG/DL (ref 0.1–1)
BILIRUB UR QL STRIP: NEGATIVE
BNP SERPL-MCNC: 311 PG/ML (ref 0–99)
BUN SERPL-MCNC: 23 MG/DL (ref 8–23)
CALCIUM SERPL-MCNC: 9.6 MG/DL (ref 8.7–10.5)
CHLORIDE SERPL-SCNC: 106 MMOL/L (ref 95–110)
CK SERPL-CCNC: 127 U/L (ref 20–180)
CLARITY UR: CLEAR
CO2 SERPL-SCNC: 22 MMOL/L (ref 23–29)
COLOR UR: YELLOW
CREAT SERPL-MCNC: 0.8 MG/DL (ref 0.5–1.4)
DIFFERENTIAL METHOD: ABNORMAL
EOSINOPHIL # BLD AUTO: 0.2 K/UL (ref 0–0.5)
EOSINOPHIL NFR BLD: 4.5 % (ref 0–8)
ERYTHROCYTE [DISTWIDTH] IN BLOOD BY AUTOMATED COUNT: 12.8 % (ref 11.5–14.5)
EST. GFR  (AFRICAN AMERICAN): >60 ML/MIN/1.73 M^2
EST. GFR  (NON AFRICAN AMERICAN): >60 ML/MIN/1.73 M^2
GLUCOSE SERPL-MCNC: 80 MG/DL (ref 70–110)
GLUCOSE UR QL STRIP: NEGATIVE
HCT VFR BLD AUTO: 37.3 % (ref 37–48.5)
HGB BLD-MCNC: 12.4 G/DL (ref 12–16)
HGB UR QL STRIP: ABNORMAL
HYALINE CASTS #/AREA URNS LPF: 1.8 /LPF
IMM GRANULOCYTES # BLD AUTO: 0.02 K/UL (ref 0–0.04)
IMM GRANULOCYTES NFR BLD AUTO: 0.5 % (ref 0–0.5)
INR PPP: 1.3
KETONES UR QL STRIP: NEGATIVE
LEUKOCYTE ESTERASE UR QL STRIP: ABNORMAL
LYMPHOCYTES # BLD AUTO: 1.2 K/UL (ref 1–4.8)
LYMPHOCYTES NFR BLD: 28.2 % (ref 18–48)
MAGNESIUM SERPL-MCNC: 2.3 MG/DL (ref 1.6–2.6)
MCH RBC QN AUTO: 30.5 PG (ref 27–31)
MCHC RBC AUTO-ENTMCNC: 33.2 G/DL (ref 32–36)
MCV RBC AUTO: 92 FL (ref 82–98)
MICROSCOPIC COMMENT: ABNORMAL
MONOCYTES # BLD AUTO: 0.4 K/UL (ref 0.3–1)
MONOCYTES NFR BLD: 9.3 % (ref 4–15)
NEUTROPHILS # BLD AUTO: 2.5 K/UL (ref 1.8–7.7)
NEUTROPHILS NFR BLD: 56.8 % (ref 38–73)
NITRITE UR QL STRIP: NEGATIVE
NRBC BLD-RTO: 0 /100 WBC
PH UR STRIP: 6 [PH] (ref 5–8)
PLATELET # BLD AUTO: 146 K/UL (ref 150–450)
PMV BLD AUTO: 10.6 FL (ref 9.2–12.9)
POTASSIUM SERPL-SCNC: 3.9 MMOL/L (ref 3.5–5.1)
PROT SERPL-MCNC: 7.1 G/DL (ref 6–8.4)
PROT UR QL STRIP: NEGATIVE
PROTHROMBIN TIME: 15 SEC (ref 11.4–13.7)
RBC # BLD AUTO: 4.07 M/UL (ref 4–5.4)
RBC #/AREA URNS HPF: 13 /HPF (ref 0–4)
SARS-COV-2 RDRP RESP QL NAA+PROBE: NEGATIVE
SODIUM SERPL-SCNC: 137 MMOL/L (ref 136–145)
SP GR UR STRIP: <=1.005 (ref 1–1.03)
SQUAMOUS #/AREA URNS HPF: 1 /HPF
TROPONIN I SERPL DL<=0.01 NG/ML-MCNC: <0.03 NG/ML
TROPONIN I SERPL DL<=0.01 NG/ML-MCNC: <0.03 NG/ML
TSH SERPL DL<=0.005 MIU/L-ACNC: 3.09 UIU/ML (ref 0.34–5.6)
URN SPEC COLLECT METH UR: ABNORMAL
UROBILINOGEN UR STRIP-ACNC: NEGATIVE EU/DL
WBC # BLD AUTO: 4.4 K/UL (ref 3.9–12.7)
WBC #/AREA URNS HPF: 1 /HPF (ref 0–5)

## 2022-07-23 PROCEDURE — 85025 COMPLETE CBC W/AUTO DIFF WBC: CPT | Performed by: NURSE PRACTITIONER

## 2022-07-23 PROCEDURE — G0378 HOSPITAL OBSERVATION PER HR: HCPCS

## 2022-07-23 PROCEDURE — 99285 EMERGENCY DEPT VISIT HI MDM: CPT | Mod: 25

## 2022-07-23 PROCEDURE — 36415 COLL VENOUS BLD VENIPUNCTURE: CPT | Performed by: NURSE PRACTITIONER

## 2022-07-23 PROCEDURE — 96374 THER/PROPH/DIAG INJ IV PUSH: CPT

## 2022-07-23 PROCEDURE — 93010 ELECTROCARDIOGRAM REPORT: CPT | Mod: ,,, | Performed by: INTERNAL MEDICINE

## 2022-07-23 PROCEDURE — 85610 PROTHROMBIN TIME: CPT | Performed by: NURSE PRACTITIONER

## 2022-07-23 PROCEDURE — 82550 ASSAY OF CK (CPK): CPT | Performed by: NURSE PRACTITIONER

## 2022-07-23 PROCEDURE — 84443 ASSAY THYROID STIM HORMONE: CPT | Performed by: NURSE PRACTITIONER

## 2022-07-23 PROCEDURE — 83735 ASSAY OF MAGNESIUM: CPT | Performed by: NURSE PRACTITIONER

## 2022-07-23 PROCEDURE — 84484 ASSAY OF TROPONIN QUANT: CPT | Mod: 91 | Performed by: NURSE PRACTITIONER

## 2022-07-23 PROCEDURE — 93010 EKG 12-LEAD: ICD-10-PCS | Mod: ,,, | Performed by: INTERNAL MEDICINE

## 2022-07-23 PROCEDURE — 25000003 PHARM REV CODE 250: Performed by: INTERNAL MEDICINE

## 2022-07-23 PROCEDURE — U0002 COVID-19 LAB TEST NON-CDC: HCPCS | Performed by: EMERGENCY MEDICINE

## 2022-07-23 PROCEDURE — 81001 URINALYSIS AUTO W/SCOPE: CPT | Performed by: EMERGENCY MEDICINE

## 2022-07-23 PROCEDURE — 83880 ASSAY OF NATRIURETIC PEPTIDE: CPT | Performed by: NURSE PRACTITIONER

## 2022-07-23 PROCEDURE — 84484 ASSAY OF TROPONIN QUANT: CPT | Performed by: INTERNAL MEDICINE

## 2022-07-23 PROCEDURE — 93005 ELECTROCARDIOGRAM TRACING: CPT | Performed by: INTERNAL MEDICINE

## 2022-07-23 PROCEDURE — 80053 COMPREHEN METABOLIC PANEL: CPT | Performed by: NURSE PRACTITIONER

## 2022-07-23 RX ORDER — ACETAMINOPHEN 325 MG/1
650 TABLET ORAL EVERY 8 HOURS PRN
Status: DISCONTINUED | OUTPATIENT
Start: 2022-07-23 | End: 2022-07-24 | Stop reason: HOSPADM

## 2022-07-23 RX ORDER — LABETALOL HYDROCHLORIDE 5 MG/ML
10 INJECTION, SOLUTION INTRAVENOUS EVERY 6 HOURS PRN
Status: DISCONTINUED | OUTPATIENT
Start: 2022-07-23 | End: 2022-07-24 | Stop reason: HOSPADM

## 2022-07-23 RX ORDER — ACETAMINOPHEN 325 MG/1
650 TABLET ORAL EVERY 8 HOURS PRN
Status: DISCONTINUED | OUTPATIENT
Start: 2022-07-23 | End: 2022-07-23

## 2022-07-23 RX ADMIN — LABETALOL HYDROCHLORIDE 10 MG: 5 INJECTION, SOLUTION INTRAVENOUS at 08:07

## 2022-07-23 RX ADMIN — ACETAMINOPHEN 650 MG: 325 TABLET ORAL at 10:07

## 2022-07-24 ENCOUNTER — CLINICAL SUPPORT (OUTPATIENT)
Dept: CARDIOLOGY | Facility: HOSPITAL | Age: 82
End: 2022-07-24
Attending: INTERNAL MEDICINE
Payer: MEDICARE

## 2022-07-24 ENCOUNTER — HOSPITAL ENCOUNTER (OUTPATIENT)
Dept: RADIOLOGY | Facility: HOSPITAL | Age: 82
Discharge: HOME | End: 2022-07-24
Attending: INTERNAL MEDICINE
Payer: MEDICARE

## 2022-07-24 VITALS
HEIGHT: 66 IN | TEMPERATURE: 99 F | HEART RATE: 62 BPM | DIASTOLIC BLOOD PRESSURE: 63 MMHG | BODY MASS INDEX: 28.53 KG/M2 | WEIGHT: 177.5 LBS | RESPIRATION RATE: 18 BRPM | SYSTOLIC BLOOD PRESSURE: 134 MMHG | OXYGEN SATURATION: 96 %

## 2022-07-24 VITALS — HEIGHT: 66 IN | WEIGHT: 177 LBS | BODY MASS INDEX: 28.45 KG/M2

## 2022-07-24 LAB
ANION GAP SERPL CALC-SCNC: 6 MMOL/L (ref 8–16)
AV INDEX (PROSTH): 0.35
AV MEAN GRADIENT: 26 MMHG
AV VALVE AREA: 1.49 CM2
BSA FOR ECHO PROCEDURE: 1.93 M2
BUN SERPL-MCNC: 21 MG/DL (ref 8–23)
CALCIUM SERPL-MCNC: 9.3 MG/DL (ref 8.7–10.5)
CHLORIDE SERPL-SCNC: 108 MMOL/L (ref 95–110)
CO2 SERPL-SCNC: 23 MMOL/L (ref 23–29)
CREAT SERPL-MCNC: 0.9 MG/DL (ref 0.5–1.4)
CV ECHO LV RWT: 0.5 CM
CV PHARM DOSE: 0.4 MG
CV STRESS BASE HR: 63 BPM
DIASTOLIC BLOOD PRESSURE: 81 MMHG
DOP CALC AO VTI: 77.68 CM
DOP CALC LVOT AREA: 4.3 CM2
DOP CALC LVOT DIAMETER: 2.33 CM
DOP CALC LVOT PEAK VEL: 110.49 M/S
DOP CALC LVOT STROKE VOLUME: 115.96 CM3
DOP CALCLVOT PEAK VEL VTI: 27.21 CM
E WAVE DECELERATION TIME: 318.44 MSEC
E/A RATIO: 0.86
E/E' RATIO: 22.55 M/S
ECHO LV POSTERIOR WALL: 1.01 CM (ref 0.6–1.1)
EJECTION FRACTION: 60 %
EST. GFR  (AFRICAN AMERICAN): >60 ML/MIN/1.73 M^2
EST. GFR  (NON AFRICAN AMERICAN): >60 ML/MIN/1.73 M^2
FRACTIONAL SHORTENING: 21 % (ref 28–44)
GLUCOSE SERPL-MCNC: 81 MG/DL (ref 70–110)
INTERVENTRICULAR SEPTUM: 1 CM (ref 0.6–1.1)
IVRT: 124.32 MSEC
LEFT INTERNAL DIMENSION IN SYSTOLE: 3.2 CM (ref 2.1–4)
LEFT VENTRICLE DIASTOLIC VOLUME INDEX: 34.53 ML/M2
LEFT VENTRICLE DIASTOLIC VOLUME: 65.6 ML
LEFT VENTRICLE MASS INDEX: 68 G/M2
LEFT VENTRICLE SYSTOLIC VOLUME INDEX: 17.2 ML/M2
LEFT VENTRICLE SYSTOLIC VOLUME: 32.77 ML
LEFT VENTRICULAR INTERNAL DIMENSION IN DIASTOLE: 4.03 CM (ref 3.5–6)
LEFT VENTRICULAR MASS: 129.48 G
LV LATERAL E/E' RATIO: 20.67 M/S
LV SEPTAL E/E' RATIO: 24.8 M/S
MAGNESIUM SERPL-MCNC: 2.1 MG/DL (ref 1.6–2.6)
MV PEAK A VEL: 1.45 M/S
MV PEAK E VEL: 1.24 M/S
OHS CV CPX 1 MINUTE RECOVERY HEART RATE: 90 BPM
OHS CV CPX 85 PERCENT MAX PREDICTED HEART RATE MALE: 115
OHS CV CPX MAX PREDICTED HEART RATE: 135
OHS CV CPX PATIENT IS FEMALE: 1
OHS CV CPX PATIENT IS MALE: 0
OHS CV CPX PEAK DIASTOLIC BLOOD PRESSURE: 83 MMHG
OHS CV CPX PEAK HEAR RATE: 92 BPM
OHS CV CPX PEAK RATE PRESSURE PRODUCT: NORMAL
OHS CV CPX PEAK SYSTOLIC BLOOD PRESSURE: 140 MMHG
OHS CV CPX PERCENT MAX PREDICTED HEART RATE ACHIEVED: 68
OHS CV CPX RATE PRESSURE PRODUCT PRESENTING: 9576
PISA MRMAX VEL: 563.85 M/S
PISA TR MAX VEL: 2.57 M/S
POTASSIUM SERPL-SCNC: 3.8 MMOL/L (ref 3.5–5.1)
PULM VEIN S/D RATIO: 1.22
PV PEAK D VEL: 32.07 M/S
PV PEAK S VEL: 39.11 M/S
RA PRESSURE: 8 MMHG
RIGHT VENTRICULAR END-DIASTOLIC DIMENSION: 3.8 CM
SODIUM SERPL-SCNC: 137 MMOL/L (ref 136–145)
SYSTOLIC BLOOD PRESSURE: 152 MMHG
TDI LATERAL: 0.06 M/S
TDI SEPTAL: 0.05 M/S
TDI: 0.06 M/S
TR MAX PG: 26 MMHG
TRICUSPID ANNULAR PLANE SYSTOLIC EXCURSION: 2.84 CM
TROPONIN I SERPL DL<=0.01 NG/ML-MCNC: <0.03 NG/ML
TV REST PULMONARY ARTERY PRESSURE: 34 MMHG

## 2022-07-24 PROCEDURE — 25000003 PHARM REV CODE 250: Performed by: INTERNAL MEDICINE

## 2022-07-24 PROCEDURE — G0378 HOSPITAL OBSERVATION PER HR: HCPCS

## 2022-07-24 PROCEDURE — 80048 BASIC METABOLIC PNL TOTAL CA: CPT | Performed by: INTERNAL MEDICINE

## 2022-07-24 PROCEDURE — 84484 ASSAY OF TROPONIN QUANT: CPT | Performed by: INTERNAL MEDICINE

## 2022-07-24 PROCEDURE — A9502 TC99M TETROFOSMIN: HCPCS

## 2022-07-24 PROCEDURE — 93306 TTE W/DOPPLER COMPLETE: CPT

## 2022-07-24 PROCEDURE — 83735 ASSAY OF MAGNESIUM: CPT | Performed by: INTERNAL MEDICINE

## 2022-07-24 PROCEDURE — 63600175 PHARM REV CODE 636 W HCPCS: Performed by: INTERNAL MEDICINE

## 2022-07-24 PROCEDURE — 93017 CV STRESS TEST TRACING ONLY: CPT

## 2022-07-24 RX ORDER — SOTALOL HYDROCHLORIDE 80 MG/1
40 TABLET ORAL 2 TIMES DAILY
Status: DISCONTINUED | OUTPATIENT
Start: 2022-07-24 | End: 2022-07-24 | Stop reason: HOSPADM

## 2022-07-24 RX ORDER — SODIUM,POTASSIUM PHOSPHATES 280-250MG
2 POWDER IN PACKET (EA) ORAL
Status: DISCONTINUED | OUTPATIENT
Start: 2022-07-24 | End: 2022-07-24 | Stop reason: HOSPADM

## 2022-07-24 RX ORDER — ONDANSETRON 2 MG/ML
4 INJECTION INTRAMUSCULAR; INTRAVENOUS EVERY 8 HOURS PRN
Status: DISCONTINUED | OUTPATIENT
Start: 2022-07-24 | End: 2022-07-24 | Stop reason: HOSPADM

## 2022-07-24 RX ORDER — LANOLIN ALCOHOL/MO/W.PET/CERES
400 CREAM (GRAM) TOPICAL DAILY
Status: DISCONTINUED | OUTPATIENT
Start: 2022-07-24 | End: 2022-07-24 | Stop reason: HOSPADM

## 2022-07-24 RX ORDER — LANOLIN ALCOHOL/MO/W.PET/CERES
800 CREAM (GRAM) TOPICAL
Status: DISCONTINUED | OUTPATIENT
Start: 2022-07-24 | End: 2022-07-24 | Stop reason: HOSPADM

## 2022-07-24 RX ORDER — REGADENOSON 0.08 MG/ML
0.4 INJECTION, SOLUTION INTRAVENOUS
Status: COMPLETED | OUTPATIENT
Start: 2022-07-24 | End: 2022-07-24

## 2022-07-24 RX ORDER — SPIRONOLACTONE 25 MG/1
25 TABLET ORAL DAILY
Status: DISCONTINUED | OUTPATIENT
Start: 2022-07-24 | End: 2022-07-24 | Stop reason: HOSPADM

## 2022-07-24 RX ORDER — POLYETHYLENE GLYCOL 3350 17 G/17G
17 POWDER, FOR SOLUTION ORAL DAILY PRN
Status: DISCONTINUED | OUTPATIENT
Start: 2022-07-24 | End: 2022-07-24 | Stop reason: HOSPADM

## 2022-07-24 RX ORDER — TALC
6 POWDER (GRAM) TOPICAL NIGHTLY PRN
Status: DISCONTINUED | OUTPATIENT
Start: 2022-07-24 | End: 2022-07-24 | Stop reason: HOSPADM

## 2022-07-24 RX ORDER — NALOXONE HCL 0.4 MG/ML
0.02 VIAL (ML) INJECTION
Status: DISCONTINUED | OUTPATIENT
Start: 2022-07-24 | End: 2022-07-24 | Stop reason: HOSPADM

## 2022-07-24 RX ADMIN — SPIRONOLACTONE 25 MG: 25 TABLET ORAL at 12:07

## 2022-07-24 RX ADMIN — REGADENOSON 0.4 MG: 0.08 INJECTION, SOLUTION INTRAVENOUS at 10:07

## 2022-07-24 RX ADMIN — SOTALOL HYDROCHLORIDE 40 MG: 80 TABLET ORAL at 01:07

## 2022-07-24 RX ADMIN — APIXABAN 5 MG: 5 TABLET, FILM COATED ORAL at 12:07

## 2022-07-24 RX ADMIN — SOTALOL HYDROCHLORIDE 40 MG: 80 TABLET ORAL at 12:07

## 2022-07-24 RX ADMIN — SODIUM CHLORIDE 250 ML: 9 INJECTION, SOLUTION INTRAVENOUS at 10:07

## 2022-07-24 RX ADMIN — APIXABAN 5 MG: 5 TABLET, FILM COATED ORAL at 01:07

## 2022-07-24 NOTE — PLAN OF CARE
[facility]  Initial Discharge Assessment       Primary Care Provider: BRYANT Chinchilla    Admission Diagnosis: Chest pain [R07.9]    Admission Date: 7/23/2022  Expected Discharge Date:     Discharge Barriers Identified: (P) None    Payor: BCBS [place] MEDICARE / Plan: BCBS OUT OF STATE MEDICARE ADVANTAGE / Product Type: Medicare Advantage /     Extended Emergency Contact Information  Primary Emergency Contact: Patricio Licona  Home Phone: [phone]  Mobile Phone: [phone]  Relation: Relative  Preferred language: English   needed? No  Secondary Emergency Contact: Roger Diane  Home Phone: [phone]  Mobile Phone: [phone]  Relation: Relative  Preferred language: English   needed? No    Discharge Plan A: (P) Home, Home with family  Discharge Plan B: (P) Home, Home with family      [id number] [place] - [address]  [address] [address]  Phone: [phone] Fax: [phone]      Initial Assessment (most recent)       Adult Discharge Assessment - 07/24/22 1312          Discharge Assessment    Assessment Type Discharge Planning Assessment (P)      Confirmed/corrected address, phone number and insurance Yes (P)      Confirmed Demographics Correct on Facesheet (P)      Source of Information patient (P)      When was your last doctors appointment? -- (P)    3 months ago with Mike Kuhn NP    Does patient/caregiver understand observation status Yes (P)      Communicated YANET with patient/caregiver No (P)      Reason For Admission chest pain (P)      Lives With sibling(s) (P)      Facility Arrived From: home (P)      Do you expect to return to your current living situation? Yes (P)      Do you have help at home or someone to help you manage your care at home? Yes (P)      Who are your caregiver(s) and their phone number(s)? sister,Patricio Licona (Relative)   [phone] (Mobile) (P)      Prior to hospitilization cognitive status:  Alert/Oriented (P)      Current cognitive status: Alert/Oriented (P)      Walking or Climbing Stairs Difficulty none (P)      Dressing/Bathing Difficulty none (P)      Equipment Currently Used at Home none (P)      Readmission within 30 days? No (P)      Patient currently being followed by outpatient case management? No (P)      Do you currently have service(s) that help you manage your care at home? No (P)      Do you take prescription medications? Yes (P)      Do you have prescription coverage? Yes (P)      Coverage BCBS managed medicare (P)      Do you have any problems affording any of your prescribed medications? No (P)      Is the patient taking medications as prescribed? yes (P)      Who is going to help you get home at discharge? Quang Liconaw (Relative)   [phone] (Mobile) (P)      How do you get to doctors appointments? family or friend will provide (P)      Are you on dialysis? No (P)      Do you take coumadin? No (P)      Discharge Plan A Home;Home with family (P)      Discharge Plan B Home;Home with family (P)      DME Needed Upon Discharge  none (P)      Discharge Plan discussed with: Patient (P)      Discharge Barriers Identified None (P)         Relationship/Environment    Name(s) of Who Lives With Patient LivanPatricio (Relative)   [phone] (Mobile), Sister (P)

## 2022-07-24 NOTE — SUBJECTIVE & OBJECTIVE
Past Medical History:   Diagnosis Date    Atrial fibrillation 01/25/2021    Hypertension        Past Surgical History:   Procedure Laterality Date    BREAST SURGERY         Review of patient's allergies indicates:   Allergen Reactions    Hydrocodone     Meperidine     Meperidine hcl Nausea And Vomiting    Persantine [dipyridamole]     Vicodin [hydrocodone-acetaminophen] Nausea And Vomiting       No current facility-administered medications on file prior to encounter.     Current Outpatient Medications on File Prior to Encounter   Medication Sig    apixaban (ELIQUIS) 5 mg Tab Take 1 tablet (5 mg total) by mouth 2 (two) times daily.    magnesium oxide (MAG-OX) 400 mg (241.3 mg magnesium) tablet TAKE 1 TABLET BY MOUTH ONCE DAILY (Patient taking differently: Take 400 mg by mouth once daily.)    sotaloL (BETAPACE) 80 MG tablet Take 0.5 tablets (40 mg total) by mouth 2 (two) times daily.    spironolactone (ALDACTONE) 25 MG tablet Take 1 tablet (25 mg total) by mouth once daily.     Family History       Problem Relation (Age of Onset)    Cancer Mother, Father          Tobacco Use    Smoking status: Never Smoker    Smokeless tobacco: Never Used   Substance and Sexual Activity    Alcohol use: Not Currently    Drug use: Not Currently    Sexual activity: Not on file       Objective:     Vital Signs (Most Recent):  Temp: 97.7 °F (36.5 °C) (07/23/22 1510)  Pulse: 71 (07/23/22 1600)  Resp: 19 (07/23/22 1600)  BP: (!) 150/81 (07/23/22 1600)  SpO2: 97 % (07/23/22 1600)   Vital Signs (24h Range):  Temp:  [97.7 °F (36.5 °C)] 97.7 °F (36.5 °C)  Pulse:  [71-83] 71  Resp:  [16-19] 19  SpO2:  [96 %-98 %] 97 %  BP: (150-168)/(77-90) 150/81     Weight: 80.7 kg (178 lb)  Body mass index is 28.73 kg/m².    Physical Exam      General: Patient resting comfortably in no acute distress. Appears stated age  Head: Normocephalic and atraumatic   Eyes:  No conjunctival pallor. No scleral icterus.  Mouth: Oral mucosa moist   Lungs: CTA. Good air  entry.  Cor: Regular rate and rhythm. No murmurs. Trace pedal edema.  Abd: Soft. Nontender  Musculoskeletal: No arthropathy, deformity.  Skin: No rashes, swelling, or erythema.  Neuro: A&O x 3. Moving all extremities equally. Follows commands  Ext: No clubbing. No cyanosis. Peripheral pulses +  Psych: Anxious. Memory intact     Significant Labs: All pertinent labs within the past 24 hours have been reviewed.  CBC:   Recent Labs   Lab 07/23/22  1524   WBC 4.40   HGB 12.4   HCT 37.3   *     CMP:   Recent Labs   Lab 07/23/22  1524      K 3.9      CO2 22*   GLU 80   BUN 23   CREATININE 0.8   CALCIUM 9.6   PROT 7.1   ALBUMIN 4.3   BILITOT 0.6   ALKPHOS 88   AST 21   ALT 14   ANIONGAP 9   EGFRNONAA >60.0     Cardiac Markers:   Recent Labs   Lab 07/23/22  1524   *     Troponin:   Recent Labs   Lab 07/23/22  1524   TROPONINI <0.030       Significant Imaging: I have reviewed all pertinent imaging results/findings within the past 24 hours.    Imaging Results              X-Ray Chest AP Portable (Final result)  Result time 07/23/22 16:27:00      Final result by Pasquale Capone MD (07/23/22 16:27:00)                   Narrative:    XR CHEST 1 VIEW    CLINICAL HISTORY:  81 years Female IRREG HEARTBEAT    COMPARISON: January 27, 2021    FINDINGS: Cardiac silhouette size is within normal limits. Atherosclerotic calcification of the aorta. Previously seen interstitial and groundglass opacities within both lungs have improved. No new or worsening pulmonary parenchymal abnormality. No pleural effusion or pneumothorax. Osteopenia.    IMPRESSION:    No acute pulmonary pathology.    Electronically signed by:  Pasquale Capone MD  7/23/2022 4:27 PM CDT Workstation: [id number]                                    Results for orders placed or performed during the hospital encounter of 07/23/22   EKG 12-lead    Collection Time: 07/23/22  3:07 PM    Narrative    Test Reason : R07.9,    Vent. Rate : 084 BPM     Atrial  Rate : 084 BPM     P-R Int : 138 ms          QRS Dur : 076 ms      QT Int : 368 ms       P-R-T Axes : 074 048 051 degrees     QTc Int : 434 ms    Normal sinus rhythm  Possible Left atrial enlargement  Borderline Abnormal ECG  When compared with ECG of 21-FEB-2022 12:41,  No significant change was found    Referred By: AAAREFERR   SELF           Confirmed By:

## 2022-07-24 NOTE — H&P
[facility] - Emergency Dept  Hospital Medicine  History & Physical    Patient Name: Irene العراقي  MRN: [id number]  Patient Class: OP- Observation  Admission Date: 7/23/2022  Attending Physician: Adelfo Moran MD  Primary Care Provider: BRYANT Chinchilla         Patient information was obtained from patient, past medical records and ER records.     Subjective:     Principal Problem:Chest pain    Chief Complaint:   Chief Complaint   Patient presents with    Irregular Heart Beat     KNOWN AFIB, 140S AT HOME        HPI: Patient is a 81-year-old  female who moved from [place] to [place] about 2 years ago to care for ill twin sister presents to the ED with chief complaint of acute onset chest tightness and palpitations.  Her medical history is notable for paroxysmal atrial fibrillation and essential hypertension.    She reports being in her usual state of health until this morning.  She checked her pulse rate on pulse oximeter and found to be at around 110 pulsations per minute.  Later went to run few errands and took a nap.  Shortly after waking up she developed acute episode of substernal chest tightness with no radiation.  Pulse rate was now in the 150s.  No associated shortness of breath.  Symptoms have resolved spontaneously after presentation here and now she is in normal sinus rhythm. Denies recent illness or pedal edema.     She first developed atrial fibrillation with RVR following COVID-19 infection in Jan 2021.  She is currently maintained on sotalol and is on apixaban for long-term anticoagulation.  She follows Dr. RONNI Ramirez.  Reports compliance with home medications.    Rest of the 10 point review of systems is negative except as mentioned above.      Past Medical History:   Diagnosis Date    Atrial fibrillation 01/25/2021    Hypertension        Past Surgical History:   Procedure Laterality Date    BREAST SURGERY         Review of patient's allergies indicates:    Allergen Reactions    Hydrocodone     Meperidine     Meperidine hcl Nausea And Vomiting    Persantine [dipyridamole]     Vicodin [hydrocodone-acetaminophen] Nausea And Vomiting       No current facility-administered medications on file prior to encounter.     Current Outpatient Medications on File Prior to Encounter   Medication Sig    apixaban (ELIQUIS) 5 mg Tab Take 1 tablet (5 mg total) by mouth 2 (two) times daily.    magnesium oxide (MAG-OX) 400 mg (241.3 mg magnesium) tablet TAKE 1 TABLET BY MOUTH ONCE DAILY (Patient taking differently: Take 400 mg by mouth once daily.)    sotaloL (BETAPACE) 80 MG tablet Take 0.5 tablets (40 mg total) by mouth 2 (two) times daily.    spironolactone (ALDACTONE) 25 MG tablet Take 1 tablet (25 mg total) by mouth once daily.     Family History       Problem Relation (Age of Onset)    Cancer Mother, Father          Tobacco Use    Smoking status: Never Smoker    Smokeless tobacco: Never Used   Substance and Sexual Activity    Alcohol use: Not Currently    Drug use: Not Currently    Sexual activity: Not on file       Objective:     Vital Signs (Most Recent):  Temp: 97.7 °F (36.5 °C) (07/23/22 1510)  Pulse: 71 (07/23/22 1600)  Resp: 19 (07/23/22 1600)  BP: (!) 150/81 (07/23/22 1600)  SpO2: 97 % (07/23/22 1600)   Vital Signs (24h Range):  Temp:  [97.7 °F (36.5 °C)] 97.7 °F (36.5 °C)  Pulse:  [71-83] 71  Resp:  [16-19] 19  SpO2:  [96 %-98 %] 97 %  BP: (150-168)/(77-90) 150/81     Weight: 80.7 kg (178 lb)  Body mass index is 28.73 kg/m².    Physical Exam      General: Patient resting comfortably in no acute distress. Appears stated age  Head: Normocephalic and atraumatic   Eyes:  No conjunctival pallor. No scleral icterus.  Mouth: Oral mucosa moist   Lungs: CTA. Good air entry.  Cor: Regular rate and rhythm. No murmurs. Trace pedal edema.  Abd: Soft. Nontender  Musculoskeletal: No arthropathy, deformity.  Skin: No rashes, swelling, or erythema.  Neuro: A&O x 3. Moving all  extremities equally. Follows commands  Ext: No clubbing. No cyanosis. Peripheral pulses +  Psych: Anxious. Memory intact     Significant Labs: All pertinent labs within the past 24 hours have been reviewed.  CBC:   Recent Labs   Lab 07/23/22  1524   WBC 4.40   HGB 12.4   HCT 37.3   *     CMP:   Recent Labs   Lab 07/23/22  1524      K 3.9      CO2 22*   GLU 80   BUN 23   CREATININE 0.8   CALCIUM 9.6   PROT 7.1   ALBUMIN 4.3   BILITOT 0.6   ALKPHOS 88   AST 21   ALT 14   ANIONGAP 9   EGFRNONAA >60.0     Cardiac Markers:   Recent Labs   Lab 07/23/22  1524   *     Troponin:   Recent Labs   Lab 07/23/22  1524   TROPONINI <0.030       Significant Imaging: I have reviewed all pertinent imaging results/findings within the past 24 hours.    Imaging Results              X-Ray Chest AP Portable (Final result)  Result time 07/23/22 16:27:00      Final result by Pasquale Capone MD (07/23/22 16:27:00)                   Narrative:    XR CHEST 1 VIEW    CLINICAL HISTORY:  81 years Female IRREG HEARTBEAT    COMPARISON: January 27, 2021    FINDINGS: Cardiac silhouette size is within normal limits. Atherosclerotic calcification of the aorta. Previously seen interstitial and groundglass opacities within both lungs have improved. No new or worsening pulmonary parenchymal abnormality. No pleural effusion or pneumothorax. Osteopenia.    IMPRESSION:    No acute pulmonary pathology.    Electronically signed by:  Pasquale Capone MD  7/23/2022 4:27 PM CDT Workstation: [id number]                                    Results for orders placed or performed during the hospital encounter of 07/23/22   EKG 12-lead    Collection Time: 07/23/22  3:07 PM    Narrative    Test Reason : R07.9,    Vent. Rate : 084 BPM     Atrial Rate : 084 BPM     P-R Int : 138 ms          QRS Dur : 076 ms      QT Int : 368 ms       P-R-T Axes : 074 048 051 degrees     QTc Int : 434 ms    Normal sinus rhythm  Possible Left atrial  enlargement  Borderline Abnormal ECG  When compared with ECG of 21-FEB-2022 12:41,  No significant change was found    Referred By: AAAREFERR   SELF           Confirmed By:          Assessment/Plan:     Active Hospital Problems    Diagnosis  POA    *Chest pain [R07.9]  Yes    Hypertension [I10]  Yes    Paroxysmal atrial fibrillation [I48.0]  Yes      Resolved Hospital Problems   No resolved problems to display.       Plan:  Transient episode of chest tightness associated with the atrial fibrillation with RVR - spontaneously resolved   Chest pain likely secondary to arrhythmia  However high risk for ACS  Trend serial troponin levels   Obtain stress test with myocardial perfusion imaging  Obtain echocardiogram (this was scheduled outpatient by her cardiologist)  Continue home sotalol, apixaban, spironolactone and daily magnesium  Telemetry monitoring    Full code. Confirmed with patient   SCDs and apixaban for VTE risk mitigation     Adelfo Moran MD  Department of Hospital Medicine   [facility]

## 2022-07-24 NOTE — DISCHARGE SUMMARY
[facility] Medicine  Discharge Summary      Patient Name: Irene العراقي  MRN: [id number]  Patient Class: OP- Observation  Admission Date: 7/23/2022  Hospital Length of Stay: 0 days  Discharge Date and Time:  07/24/2022 3:35 PM  Attending Physician: Saqib Charles DO   Discharging Provider: Saqib Charles DO  Primary Care Provider: BRYANT Chinchilla      HPI:   Patient is a 81-year-old  female who moved from [place] to [place] about 2 years ago to care for ill twin sister presents to the ED with chief complaint of acute onset chest tightness and palpitations.  Her medical history is notable for paroxysmal atrial fibrillation and essential hypertension.    She reports being in her usual state of health until this morning.  She checked her pulse rate on pulse oximeter and found to be at around 110 pulsations per minute.  Later went to run few errands and took a nap.  Shortly after waking up she developed acute episode of substernal chest tightness with no radiation.  Pulse rate was now in the 150s.  No associated shortness of breath.  Symptoms have resolved spontaneously after presentation here and now she is in normal sinus rhythm. Denies recent illness or pedal edema.     She first developed atrial fibrillation with RVR following COVID-19 infection in Jan 2021.  She is currently maintained on sotalol and is on apixaban for long-term anticoagulation.  She follows Dr. RONNI Ramirez.  Reports compliance with home medications.    Rest of the 10 point review of systems is negative except as mentioned above.      * No surgery found *      Hospital Course:   81-year-old female who was admitted with atrial fibrillation and chest pain.  Patient is now in sinus rhythm.  She has converted spontaneously.  Myoview shows no evidence of ischemia.  Patient was discharged home stable condition.  Echocardiogram is pending at time of discharge.  Patient has been instructed to follow-up with  cardiologist this week       Goals of Care Treatment Preferences:  Code Status: Full Code      Consults:   Consults (From admission, onward)        Status Ordering Provider     Inpatient consult to Hospitalist  Once        Provider:  Richard Arora MD    Acknowledged RICHARD ARORA          No new Assessment & Plan notes have been filed under this hospital service since the last note was generated.  Service: Hospital Medicine    Final Active Diagnoses:    Diagnosis Date Noted POA    PRINCIPAL PROBLEM:  Chest pain [R07.9] 01/27/2021 Yes    Hypertension [I10] 02/21/2022 Yes    Paroxysmal atrial fibrillation [I48.0] 01/28/2021 Yes      Problems Resolved During this Admission:       Discharged Condition:  Patient appears no distress eating lunch  Lungs clear  Heart regular rate rhythm  Neuro patient is alert oriented x3    Disposition: Home or Self Care    Follow Up:   Follow-up Information     BRYANT Chinchilla Follow up in 3 day(s).    Specialty: Family Medicine  Contact information:  [address]  [address]  [place] [address]  [phone]             Pj Ramirez MD Follow up in 1 week(s).    Specialties: Interventional Cardiology, Cardiology, Cardiovascular Disease  Contact information:  [address]  [address]  [place] [address]  [phone]                       Patient Instructions:      Diet Cardiac     Activity as tolerated       Significant Diagnostic Studies: Labs:   BMP:   Recent Labs   Lab 07/23/22  1524 07/24/22  0322   GLU 80 81    137   K 3.9 3.8    108   CO2 22* 23   BUN 23 21   CREATININE 0.8 0.9   CALCIUM 9.6 9.3   MG 2.3 2.1   , CMP   Recent Labs   Lab 07/23/22  1524 07/24/22  0322    137   K 3.9 3.8    108   CO2 22* 23   GLU 80 81   BUN 23 21   CREATININE 0.8 0.9   CALCIUM 9.6 9.3   PROT 7.1  --    ALBUMIN 4.3  --    BILITOT 0.6  --    ALKPHOS 88  --    AST 21  --    ALT 14  --    ANIONGAP 9 6*   ESTGFRAFRICA >60.0 >60.0   EGFRNONAA >60.0 >60.0     and CBC   Recent Labs   Lab 07/23/22  1524   WBC 4.40   HGB 12.4   HCT 37.3   *       Pending Diagnostic Studies:     Procedure Component Value Units Date/Time    Echo [[id number]]     Order Status: Sent Lab Status: No result          Medications:  Reconciled Home Medications:      Medication List      CONTINUE taking these medications    apixaban 5 mg Tab  Commonly known as: ELIQUIS  Take 1 tablet (5 mg total) by mouth 2 (two) times daily.     magnesium oxide 400 mg (241.3 mg magnesium) tablet  Commonly known as: MAG-OX  TAKE 1 TABLET BY MOUTH ONCE DAILY     sotaloL 80 MG tablet  Commonly known as: BETAPACE  Take 0.5 tablets (40 mg total) by mouth 2 (two) times daily.     spironolactone 25 MG tablet  Commonly known as: ALDACTONE  Take 1 tablet (25 mg total) by mouth once daily.            Indwelling Lines/Drains at time of discharge:   Lines/Drains/Airways     None                 Time spent on the discharge of patient: 33 minutes         Saqib Charles DO  Department of Hospital Medicine  [facility]

## 2022-07-24 NOTE — HOSPITAL COURSE
81-year-old female who was admitted with atrial fibrillation and chest pain.  Patient is now in sinus rhythm.  She has converted spontaneously.  Myoview shows no evidence of ischemia.  Patient was discharged home stable condition.  Echocardiogram is pending at time of discharge.  Patient has been instructed to follow-up with cardiologist this week

## 2022-07-24 NOTE — PLAN OF CARE
07/24/22 1318   ZIMMERMAN Message   Medicare Outpatient and Observation Notification regarding financial responsibility Given to patient/caregiver;Explained to patient/caregiver;Signed/date by patient/caregiver   Date ZIMMERMAN was signed 07/24/22   Time ZIMMERMAN was signed 1243

## 2022-07-24 NOTE — ED NOTES
Pt.'s family member, Roger, called per pt request to inform him of her being admitted and for him to bring her cellphone.

## 2022-07-24 NOTE — ED PROVIDER NOTES
Encounter Date: 7/23/2022       History     Chief Complaint   Patient presents with    Irregular Heart Beat     KNOWN AFIB, 140S AT HOME     81-year-old female with a history of paroxysmal atrial fibrillation presents for evaluation of 2 episodes of chest discomfort today.  The patient states she felt somewhat lightheaded.  She had 2 mild episodes of chest pressure.  The chest pressure was nonradiating with no associated nausea, diaphoresis or shortness of breath.  She did feel that her heart may have been racing at that time.  A family member checked her pulse and it seemed to be high prompting presentation.  The symptoms have resolved and she has no current chest pain or palpitations.  Patient's family members present who is a paramedic does states that he checked her pulse and it was in the 180 range prior to ED arrival.  Patient denies any other problems or complaints.  She has been compliant with Eliquis.        Review of patient's allergies indicates:   Allergen Reactions    Hydrocodone     Meperidine     Meperidine hcl Nausea And Vomiting    Persantine [dipyridamole]     Vicodin [hydrocodone-acetaminophen] Nausea And Vomiting     Past Medical History:   Diagnosis Date    Atrial fibrillation 01/25/2021    Hypertension      Past Surgical History:   Procedure Laterality Date    BREAST SURGERY       Family History   Problem Relation Age of Onset    Cancer Mother     Cancer Father      Social History     Tobacco Use    Smoking status: Never Smoker    Smokeless tobacco: Never Used   Substance Use Topics    Alcohol use: Not Currently    Drug use: Not Currently     Review of Systems   Constitutional: Negative.  Negative for fever.   HENT: Negative.    Respiratory: Positive for chest tightness.    Cardiovascular: Positive for chest pain and palpitations.   Gastrointestinal: Negative.    Genitourinary: Negative.    Musculoskeletal: Negative.    Skin: Negative.    Neurological: Negative.    All other  systems reviewed and are negative.      Physical Exam     Initial Vitals [07/23/22 1510]   BP Pulse Resp Temp SpO2   (!) 155/90 83 16 97.7 °F (36.5 °C) 96 %      MAP       --         Physical Exam    Nursing note and vitals reviewed.  Constitutional: She appears well-developed and well-nourished. She is not diaphoretic. She does not appear ill. No distress.   HENT:   Head: Normocephalic and atraumatic.   Nose: Nose normal.   Mouth/Throat: Uvula is midline and oropharynx is clear and moist.   Eyes: Conjunctivae, EOM and lids are normal. Pupils are equal, round, and reactive to light.   Neck: Trachea normal and phonation normal. Neck supple. No stridor present.   Normal range of motion.  Cardiovascular: Normal rate, regular rhythm, normal heart sounds, intact distal pulses and normal pulses. Exam reveals no gallop, no distant heart sounds and no friction rub.    No murmur heard.  Pulmonary/Chest: Breath sounds normal. No respiratory distress. She has no wheezes. She has no rhonchi. She has no rales. She exhibits no tenderness.   Abdominal: Abdomen is soft. Bowel sounds are normal. She exhibits no distension and no mass. There is no abdominal tenderness. There is no rebound and no guarding.   Musculoskeletal:         General: No tenderness or edema. Normal range of motion.      Cervical back: Normal range of motion and neck supple.      Comments: Pulses 2+ throughout, no extremity abnormalities     Neurological: She is alert and oriented to person, place, and time. She has normal strength. No cranial nerve deficit or sensory deficit. Gait normal. GCS score is 15. GCS eye subscore is 4. GCS verbal subscore is 5. GCS motor subscore is 6.   Skin: Capillary refill takes less than 2 seconds. No rash noted.   Psychiatric: She has a normal mood and affect. Her speech is normal and behavior is normal.         ED Course   Procedures  Labs Reviewed   CBC W/ AUTO DIFFERENTIAL - Abnormal; Notable for the following components:        Result Value    Platelets 146 (*)     All other components within normal limits   COMPREHENSIVE METABOLIC PANEL - Abnormal; Notable for the following components:    CO2 22 (*)     All other components within normal limits   B-TYPE NATRIURETIC PEPTIDE - Abnormal; Notable for the following components:     (*)     All other components within normal limits   PROTIME-INR - Abnormal; Notable for the following components:    PT 15.0 (*)     All other components within normal limits   URINALYSIS, REFLEX TO URINE CULTURE - Abnormal; Notable for the following components:    Occult Blood UA 3+ (*)     Leukocytes, UA 1+ (*)     All other components within normal limits    Narrative:     Specimen Source->Urine   URINALYSIS MICROSCOPIC - Abnormal; Notable for the following components:    RBC, UA 13 (*)     Hyaline Casts, UA 1.8 (*)     All other components within normal limits    Narrative:     Specimen Source->Urine   TROPONIN I   MAGNESIUM   CK   TSH   SARS-COV-2 RNA AMPLIFICATION, QUAL   MAGNESIUM   TSH   CK        ECG Results          EKG 12-lead (In process)  Result time 07/23/22 15:14:10    In process by Interface, Lab In [facility] (07/23/22 15:14:10)                 Narrative:    Test Reason : R07.9,    Vent. Rate : 084 BPM     Atrial Rate : 084 BPM     P-R Int : 138 ms          QRS Dur : 076 ms      QT Int : 368 ms       P-R-T Axes : 074 048 051 degrees     QTc Int : 434 ms    Normal sinus rhythm  Possible Left atrial enlargement  Borderline Abnormal ECG  When compared with ECG of 21-FEB-2022 12:41,  No significant change was found    Referred By: [email or web address]   SELF           Confirmed By:                             Imaging Results          X-Ray Chest AP Portable (Final result)  Result time 07/23/22 16:27:00    Final result by Pasquale Capone MD (07/23/22 16:27:00)                 Narrative:    XR CHEST 1 VIEW    CLINICAL HISTORY:  81 years Female IRREG HEARTBEAT    COMPARISON: January 27, 2021    FINDINGS:  Cardiac silhouette size is within normal limits. Atherosclerotic calcification of the aorta. Previously seen interstitial and groundglass opacities within both lungs have improved. No new or worsening pulmonary parenchymal abnormality. No pleural effusion or pneumothorax. Osteopenia.    IMPRESSION:    No acute pulmonary pathology.    Electronically signed by:  Pasquale Capone MD  7/23/2022 4:27 PM CDT Workstation: [id number]                               Medications - No data to display  Medical Decision Making:   Clinical Tests:   Lab Tests: Reviewed  Radiological Study: Reviewed  Medical Tests: Reviewed  ED Management:  Patient is currently asymptomatic.  Secondary to constellation of symptoms and known cardiac risk factors will discuss with hospitalist for admission.                      Clinical Impression:   Final diagnoses:  [R07.9] Chest pain          ED Disposition Condition    Admit               Tiffany Larson MD  07/23/22 1921       Tiffany Larson MD  07/23/22 1942

## 2022-07-24 NOTE — NURSING
Patient discharged per MD orders. Patient discharge instructions reviewed with pt and copy given upon discharge. PIV and telemetry box removed. Patient transferred off unit via w/c to personal car.

## 2022-08-08 ENCOUNTER — TELEPHONE (OUTPATIENT)
Dept: FAMILY MEDICINE | Facility: CLINIC | Age: 82
End: 2022-08-08

## 2022-08-08 NOTE — TELEPHONE ENCOUNTER
Patient called in stating she needs to cancel her 8/16/22 appointment @ 11 AM. She stated she was having problems with her sister and had transportation issues.  She stated she was in the hospital on 7/23/22 for A-fib, they ran a stress test, echo and labs. Her question was can she do a Phone(not virtual) appointment because she does not know when she will be able to reschedule.  [phone] -  I told the patient I would call her back with your response.

## 2022-08-08 NOTE — TELEPHONE ENCOUNTER
I have rescheduled her for tomorrow morning @ 10:20 AM. Her transportation issue is she has to get her sister to appointments that was her reason for not being able to keep the previous date and time.

## 2022-08-09 ENCOUNTER — OFFICE VISIT (OUTPATIENT)
Dept: FAMILY MEDICINE | Facility: CLINIC | Age: 82
End: 2022-08-09
Payer: MEDICARE

## 2022-08-09 DIAGNOSIS — I10 HYPERTENSION, UNSPECIFIED TYPE: Primary | ICD-10-CM

## 2022-08-09 DIAGNOSIS — I48.91 ATRIAL FIBRILLATION WITH RVR: ICD-10-CM

## 2022-08-09 PROCEDURE — 99213 OFFICE O/P EST LOW 20 MIN: CPT | Mod: 95,,, | Performed by: NURSE PRACTITIONER

## 2022-08-09 PROCEDURE — 99213 PR OFFICE/OUTPT VISIT, EST, LEVL III, 20-29 MIN: ICD-10-PCS | Mod: 95,,, | Performed by: NURSE PRACTITIONER

## 2022-08-09 NOTE — PROGRESS NOTES
Established Patient - Audio Only Telehealth Visit     The patient location is: [place]   The chief complaint leading to consultation is: Follow up and lab review   Visit type: Virtual visit with audio only (telephone)  Total time spent with patient: 10 minutes       The reason for the audio only service rather than synchronous audio and video virtual visit was related to technical difficulties or patient preference/necessity.     Each patient to whom I provide medical services by telemedicine is:  (1) informed of the relationship between the physician and patient and the respective role of any other health care provider with respect to management of the patient; and (2) notified that they may decline to receive medical services by telemedicine and may withdraw from such care at any time. Patient verbally consented to receive this service via voice-only telephone call.       Patient ID: Irene العراقي is a 81 y.o. female.    Chief Complaint: No chief complaint on file.    Ms. العراقي presents today in a virtual visit for 3 month follow up and lab review for her chronic co morbidities. She states she is doing well overall and denies any issues or complaints at this time. She has some overdue health maintenance but she would like to defer at this time due to transportation issues.       Past Medical History:   Diagnosis Date    Atrial fibrillation 01/25/2021    Hypertension      Past Surgical History:   Procedure Laterality Date    BREAST SURGERY           Tobacco History:  reports that she has never smoked. She has never used smokeless tobacco.      Review of patient's allergies indicates:   Allergen Reactions    Hydrocodone     Meperidine     Meperidine hcl Nausea And Vomiting    Persantine [dipyridamole]     Vicodin [hydrocodone-acetaminophen] Nausea And Vomiting       Current Outpatient Medications:     apixaban (ELIQUIS) 5 mg Tab, Take 1 tablet (5 mg total) by mouth 2 (two) times daily., Disp: 180 tablet,  Rfl: 3    magnesium oxide (MAG-OX) 400 mg (241.3 mg magnesium) tablet, TAKE 1 TABLET BY MOUTH ONCE DAILY (Patient taking differently: Take 400 mg by mouth once daily.), Disp: 90 tablet, Rfl: 3    sotaloL (BETAPACE) 80 MG tablet, Take 0.5 tablets (40 mg total) by mouth 2 (two) times daily., Disp: 90 tablet, Rfl: 3    spironolactone (ALDACTONE) 25 MG tablet, Take 1 tablet (25 mg total) by mouth once daily., Disp: 90 tablet, Rfl: 3    Review of Systems   Constitutional: Negative for activity change, appetite change, fatigue, fever and unexpected weight change.   HENT: Negative for congestion, mouth sores, nosebleeds, postnasal drip, rhinorrhea, sinus pressure, sinus pain, sneezing, sore throat and trouble swallowing.    Eyes: Negative for pain, redness and itching.   Respiratory: Negative for chest tightness and shortness of breath.    Cardiovascular: Negative for chest pain and palpitations.   Gastrointestinal: Negative for abdominal pain, blood in stool, constipation, diarrhea, nausea and vomiting.   Endocrine: Negative for cold intolerance, heat intolerance, polydipsia, polyphagia and polyuria.   Genitourinary: Negative for difficulty urinating, dysuria, flank pain, frequency, genital sores, menstrual problem, urgency, vaginal bleeding and vaginal discharge.   Musculoskeletal: Negative for arthralgias and myalgias.   Skin: Negative for rash and wound.   Allergic/Immunologic: Negative for environmental allergies and food allergies.   Neurological: Negative for dizziness, light-headedness and headaches.   Hematological: Negative for adenopathy. Does not bruise/bleed easily.   Psychiatric/Behavioral: Negative for agitation, confusion, hallucinations, self-injury and sleep disturbance. The patient is not nervous/anxious.           Objective:      Deferred due to phone visit.       Assessment:       1. Hypertension, unspecified type    2. Atrial fibrillation with RVR           Plan:       Hypertension, unspecified  type    Atrial fibrillation with RVR    No changes were made at this time.   Keep scheduled follow up with Cardiology Monday    Follow up in 3 months and as needed.         8/9/2022 Wanda Kuhn NP                             This service was not originating from a related E/M service provided within the previous 7 days nor will  to an E/M service or procedure within the next 24 hours or my soonest available appointment.  Prevailing standard of care was able to be met in this audio-only visit.

## 2022-10-11 ENCOUNTER — LAB VISIT (OUTPATIENT)
Dept: LAB | Facility: HOSPITAL | Age: 82
End: 2022-10-11
Attending: INTERNAL MEDICINE
Payer: MEDICARE

## 2022-10-11 ENCOUNTER — OFFICE VISIT (OUTPATIENT)
Dept: CARDIOLOGY | Facility: CLINIC | Age: 82
End: 2022-10-11
Payer: MEDICARE

## 2022-10-11 VITALS
BODY MASS INDEX: 28.93 KG/M2 | DIASTOLIC BLOOD PRESSURE: 72 MMHG | OXYGEN SATURATION: 98 % | HEART RATE: 59 BPM | RESPIRATION RATE: 16 BRPM | HEIGHT: 66 IN | WEIGHT: 180 LBS | SYSTOLIC BLOOD PRESSURE: 120 MMHG

## 2022-10-11 DIAGNOSIS — I35.0 AORTIC STENOSIS, MILD: ICD-10-CM

## 2022-10-11 DIAGNOSIS — G93.31 POSTVIRAL FATIGUE SYNDROME: Primary | ICD-10-CM

## 2022-10-11 DIAGNOSIS — I51.89 DIASTOLIC DYSFUNCTION: ICD-10-CM

## 2022-10-11 DIAGNOSIS — E78.2 MIXED HYPERLIPIDEMIA: ICD-10-CM

## 2022-10-11 DIAGNOSIS — E87.6 HYPOKALEMIA: ICD-10-CM

## 2022-10-11 DIAGNOSIS — G93.31 POSTVIRAL FATIGUE SYNDROME: ICD-10-CM

## 2022-10-11 DIAGNOSIS — I48.0 PAROXYSMAL ATRIAL FIBRILLATION: Primary | ICD-10-CM

## 2022-10-11 DIAGNOSIS — I48.91 ATRIAL FIBRILLATION WITH RVR: ICD-10-CM

## 2022-10-11 DIAGNOSIS — I10 ESSENTIAL HYPERTENSION: ICD-10-CM

## 2022-10-11 LAB
ANION GAP SERPL CALC-SCNC: 5 MMOL/L (ref 8–16)
BUN SERPL-MCNC: 25 MG/DL (ref 8–23)
CALCIUM SERPL-MCNC: 9.9 MG/DL (ref 8.7–10.5)
CHLORIDE SERPL-SCNC: 105 MMOL/L (ref 95–110)
CO2 SERPL-SCNC: 24 MMOL/L (ref 23–29)
CREAT SERPL-MCNC: 0.9 MG/DL (ref 0.5–1.4)
EST. GFR  (NO RACE VARIABLE): >60 ML/MIN/1.73 M^2
GLUCOSE SERPL-MCNC: 85 MG/DL (ref 70–110)
MAGNESIUM SERPL-MCNC: 2.2 MG/DL (ref 1.6–2.6)
POTASSIUM SERPL-SCNC: 4.3 MMOL/L (ref 3.5–5.1)
SODIUM SERPL-SCNC: 134 MMOL/L (ref 136–145)

## 2022-10-11 PROCEDURE — 36415 COLL VENOUS BLD VENIPUNCTURE: CPT | Performed by: INTERNAL MEDICINE

## 2022-10-11 PROCEDURE — 3078F DIAST BP <80 MM HG: CPT | Mod: CPTII,S$GLB,, | Performed by: INTERNAL MEDICINE

## 2022-10-11 PROCEDURE — 3288F PR FALLS RISK ASSESSMENT DOCUMENTED: ICD-10-PCS | Mod: CPTII,S$GLB,, | Performed by: INTERNAL MEDICINE

## 2022-10-11 PROCEDURE — 1160F PR REVIEW ALL MEDS BY PRESCRIBER/CLIN PHARMACIST DOCUMENTED: ICD-10-PCS | Mod: CPTII,S$GLB,, | Performed by: INTERNAL MEDICINE

## 2022-10-11 PROCEDURE — 1159F MED LIST DOCD IN RCRD: CPT | Mod: CPTII,S$GLB,, | Performed by: INTERNAL MEDICINE

## 2022-10-11 PROCEDURE — 93000 EKG 12-LEAD: ICD-10-PCS | Mod: S$GLB,,, | Performed by: SPECIALIST

## 2022-10-11 PROCEDURE — 1160F RVW MEDS BY RX/DR IN RCRD: CPT | Mod: CPTII,S$GLB,, | Performed by: INTERNAL MEDICINE

## 2022-10-11 PROCEDURE — 3078F PR MOST RECENT DIASTOLIC BLOOD PRESSURE < 80 MM HG: ICD-10-PCS | Mod: CPTII,S$GLB,, | Performed by: INTERNAL MEDICINE

## 2022-10-11 PROCEDURE — 1126F PR PAIN SEVERITY QUANTIFIED, NO PAIN PRESENT: ICD-10-PCS | Mod: CPTII,S$GLB,, | Performed by: INTERNAL MEDICINE

## 2022-10-11 PROCEDURE — 93000 ELECTROCARDIOGRAM COMPLETE: CPT | Mod: S$GLB,,, | Performed by: SPECIALIST

## 2022-10-11 PROCEDURE — 80048 BASIC METABOLIC PNL TOTAL CA: CPT | Performed by: INTERNAL MEDICINE

## 2022-10-11 PROCEDURE — 3288F FALL RISK ASSESSMENT DOCD: CPT | Mod: CPTII,S$GLB,, | Performed by: INTERNAL MEDICINE

## 2022-10-11 PROCEDURE — 83735 ASSAY OF MAGNESIUM: CPT | Performed by: INTERNAL MEDICINE

## 2022-10-11 PROCEDURE — 1101F PR PT FALLS ASSESS DOC 0-1 FALLS W/OUT INJ PAST YR: ICD-10-PCS | Mod: CPTII,S$GLB,, | Performed by: INTERNAL MEDICINE

## 2022-10-11 PROCEDURE — 1126F AMNT PAIN NOTED NONE PRSNT: CPT | Mod: CPTII,S$GLB,, | Performed by: INTERNAL MEDICINE

## 2022-10-11 PROCEDURE — 1159F PR MEDICATION LIST DOCUMENTED IN MEDICAL RECORD: ICD-10-PCS | Mod: CPTII,S$GLB,, | Performed by: INTERNAL MEDICINE

## 2022-10-11 PROCEDURE — 1101F PT FALLS ASSESS-DOCD LE1/YR: CPT | Mod: CPTII,S$GLB,, | Performed by: INTERNAL MEDICINE

## 2022-10-11 PROCEDURE — 3074F SYST BP LT 130 MM HG: CPT | Mod: CPTII,S$GLB,, | Performed by: INTERNAL MEDICINE

## 2022-10-11 PROCEDURE — 3074F PR MOST RECENT SYSTOLIC BLOOD PRESSURE < 130 MM HG: ICD-10-PCS | Mod: CPTII,S$GLB,, | Performed by: INTERNAL MEDICINE

## 2022-10-11 PROCEDURE — 99214 PR OFFICE/OUTPT VISIT, EST, LEVL IV, 30-39 MIN: ICD-10-PCS | Mod: S$GLB,,, | Performed by: INTERNAL MEDICINE

## 2022-10-11 PROCEDURE — 99214 OFFICE O/P EST MOD 30 MIN: CPT | Mod: S$GLB,,, | Performed by: INTERNAL MEDICINE

## 2022-10-11 NOTE — PROGRESS NOTES
Subjective:    Patient ID:  Irene العراقي is a 82 y.o. female     Chief Complaint   Patient presents with    Atrial Fibrillation    Hypertension       HPI:  Ms Irene العراقي is a 82 y.o. female is here for follow-up.    patient has been doing well no specific complaints at the present time.  Denies any shortness of breath or difficulty in breathing denies any chest pain or tightness or heaviness denies any loss of consciousness falls or head injury.    she has been taking all her medications regularly.        Review of patient's allergies indicates:   Allergen Reactions    Hydrocodone     Meperidine     Meperidine hcl Nausea And Vomiting    Persantine [dipyridamole]     Vicodin [hydrocodone-acetaminophen] Nausea And Vomiting       Past Medical History:   Diagnosis Date    Atrial fibrillation 01/25/2021    Hypertension      Past Surgical History:   Procedure Laterality Date    BREAST SURGERY       Social History     Tobacco Use    Smoking status: Never    Smokeless tobacco: Never   Substance Use Topics    Alcohol use: Not Currently    Drug use: Not Currently     Family History   Problem Relation Age of Onset    Cancer Mother     Cancer Father         Review of Systems:   Constitution: Negative for diaphoresis and fever.   HEENT: Negative for nosebleeds.    Cardiovascular: Negative for chest pain       No dyspnea on exertion       No leg swelling        No palpitations  Respiratory: Negative for shortness of breath and wheezing.    Hematologic/Lymphatic: Negative for bleeding problem. Does not bruise/bleed easily.   Skin: Negative for color change and rash.   Musculoskeletal: Negative for falls and myalgias.   Gastrointestinal: Negative for hematemesis and hematochezia.   Genitourinary: Negative for hematuria.   Neurological: Negative for dizziness and light-headedness.   Psychiatric/Behavioral: Negative for altered mental status and memory loss.          Objective:        Vitals:    10/11/22 0927   BP: 120/72   Pulse:  (!) 59   Resp: 16       Lab Results   Component Value Date    WBC 4.40 07/23/2022    HGB 12.4 07/23/2022    HCT 37.3 07/23/2022     (L) 07/23/2022    CHOL 173 02/10/2020    TRIG 40 02/10/2020    HDL 78 (H) 02/10/2020    ALT 14 07/23/2022    AST 21 07/23/2022     07/24/2022    K 3.8 07/24/2022     07/24/2022    CREATININE 0.9 07/24/2022    BUN 21 07/24/2022    CO2 23 07/24/2022    TSH 3.090 07/23/2022    INR 1.3 07/23/2022    HGBA1C 5.3 02/10/2020        ECHOCARDIOGRAM RESULTS  Results for orders placed during the hospital encounter of 07/23/22    Echo    Interpretation Summary  · There is moderate aortic valve stenosis.  · Aortic valve area is 1.49 cm2; peak velocity is m/s; mean gradient is 26 mmHg.  · Mild aortic regurgitation.  · Mild mitral regurgitation.  · The left ventricle is normal in size with concentric remodeling and normal systolic function.  · The estimated ejection fraction is 60%.  · Grade I left ventricular diastolic dysfunction.  · Normal right ventricular size with normal right ventricular systolic function.  · Mild left atrial enlargement.  · Mild tricuspid regurgitation.  · Intermediate central venous pressure (8 mmHg).  · The estimated PA systolic pressure is 34 mmHg.        CURRENT/PREVIOUS VISIT EKG  Results for orders placed or performed during the hospital encounter of 07/23/22   EKG 12-lead    Collection Time: 07/23/22  3:07 PM    Narrative    Test Reason : R07.9,    Vent. Rate : 084 BPM     Atrial Rate : 084 BPM     P-R Int : 138 ms          QRS Dur : 076 ms      QT Int : 368 ms       P-R-T Axes : 074 048 051 degrees     QTc Int : 434 ms    Normal sinus rhythm  Possible Left atrial enlargement  Borderline Abnormal ECG  When compared with ECG of 21-FEB-2022 12:41,  No significant change was found  Confirmed by Pj Ramirez MD ([id number]) on 7/27/2022 9:16:12 PM    Referred By: [email or web address]   SELF           Confirmed By:Pj Ramirez MD     No valid procedures specified.    Results for orders placed during the hospital encounter of 07/23/22    Nuclear Stress Test    Interpretation Summary    The EKG portion of this study is negative for ischemia.    The patient reported no chest pain during the stress test.    The nuclear portion of this study will be reported separately.      Physical Exam:  CONSTITUTIONAL: No fever, no chills  HEENT: Normocephalic, atraumatic,pupils reactive to light                 NECK:  No JVD no carotid bruit  CVS: S1S2+, RRR, systolic murmurs,   LUNGS: Clear  ABDOMEN: Soft, NT, BS+  EXTREMITIES: No cyanosis, edema  : No araya catheter  NEURO: AAO X 3  PSY: Normal affect      Medication List with Changes/Refills   Current Medications    APIXABAN (ELIQUIS) 5 MG TAB    Take 1 tablet (5 mg total) by mouth 2 (two) times daily.    MAGNESIUM OXIDE (MAG-OX) 400 MG (241.3 MG MAGNESIUM) TABLET    TAKE 1 TABLET BY MOUTH ONCE DAILY    SOTALOL (BETAPACE) 80 MG TABLET    Take 0.5 tablets (40 mg total) by mouth 2 (two) times daily.    SPIRONOLACTONE (ALDACTONE) 25 MG TABLET    Take 1 tablet (25 mg total) by mouth once daily.             Assessment:       1. Paroxysmal atrial fibrillation    2. Diastolic dysfunction    3. Aortic stenosis, mild    4. Essential hypertension    5. Mixed hyperlipidemia         Plan:   1. Paroxysmal atrial fibrillation   Patient is doing well she is currently in sinus rhythm and she is on sotalol 80 mg half a tablet p.o. b.i.d. continue the same.  Her QT interval is 434 and corrected QT interval is 429 milliseconds.  2. Chronic anticoagulation   She is on Eliquis 5 mg p.o. b.i.d. continue the same no bleeding issues of blood in the stool or urine.    3. Essential hypertension  her blood pressure is stable at 1 20/72 and she is currently on spironolactone 25 mg p.o. q.day continue the same.  4. Hypokalemia   continue spironolactone.  And will check her potassium and magnesium levels.    5.  EKG  review of the EKG independently patient is in sinus  bradycardia and sinus rhythm with a heart rate of 59 beats per minute normal intervals and no acute ST T wave changes essentially within normal limits.  6. Patient to continue current management and I will see her back in the office in 6 months to        Problem List Items Addressed This Visit          Cardiac/Vascular    Atrial fibrillation with RVR - Primary    Diastolic dysfunction    Aortic stenosis, mild     Other Visit Diagnoses       Essential hypertension        Mixed hyperlipidemia                No follow-ups on file.

## 2022-11-10 ENCOUNTER — TELEPHONE (OUTPATIENT)
Dept: FAMILY MEDICINE | Facility: CLINIC | Age: 82
End: 2022-11-10

## 2022-11-10 NOTE — TELEPHONE ENCOUNTER
Patient called and stated her back went out 3 am this morning. Patient was advised she would need to be seen as her last office visit was 8/9/22. Patent also advised there would be a chance she was be seen here then be diverted to second location for further test if needed, as she stated she could not get in the car. Patient expressed verbal understanding when told she would be best advised to go to the ER.

## 2022-12-12 ENCOUNTER — CLINICAL SUPPORT (OUTPATIENT)
Dept: FAMILY MEDICINE | Facility: CLINIC | Age: 82
End: 2022-12-12
Payer: MEDICARE

## 2022-12-12 DIAGNOSIS — Z23 NEED FOR INFLUENZA VACCINATION: Primary | ICD-10-CM

## 2022-12-12 PROCEDURE — 90662 FLU VACCINE - QUADRIVALENT - HIGH DOSE (65+) PRESERVATIVE FREE IM: ICD-10-PCS | Mod: S$GLB,,, | Performed by: INTERNAL MEDICINE

## 2022-12-12 PROCEDURE — 90662 IIV NO PRSV INCREASED AG IM: CPT | Mod: S$GLB,,, | Performed by: INTERNAL MEDICINE

## 2022-12-12 PROCEDURE — G0008 ADMIN INFLUENZA VIRUS VAC: HCPCS | Mod: S$GLB,,, | Performed by: INTERNAL MEDICINE

## 2022-12-12 PROCEDURE — G0008 FLU VACCINE - QUADRIVALENT - HIGH DOSE (65+) PRESERVATIVE FREE IM: ICD-10-PCS | Mod: S$GLB,,, | Performed by: INTERNAL MEDICINE

## 2022-12-12 NOTE — PROGRESS NOTES
Patient was in today to get administered a flu shot. The high dose Influenza shot was administered to the patient's left deltoid. Patient confirmed that she has never had an allergic reaction to this vaccine before and that she hasn't been sick in the past 24 hours. Patient was also instructed to wait in additional 15 minutes once the shot was administered. Patient tolerated shot very well.      Shot administered by: Mirlande Cedeño CMA  Authorizing Provider : Hilda Gunter MD  Administered at: 9:42AM, Left Deltoid

## 2023-04-10 ENCOUNTER — HOSPITAL ENCOUNTER (INPATIENT)
Facility: HOSPITAL | Age: 83
LOS: 10 days | Discharge: HOME | DRG: 377 | End: 2023-04-20
Attending: EMERGENCY MEDICINE | Admitting: INTERNAL MEDICINE
Payer: MEDICARE

## 2023-04-10 DIAGNOSIS — K59.00 CONSTIPATION, UNSPECIFIED CONSTIPATION TYPE: ICD-10-CM

## 2023-04-10 DIAGNOSIS — Z79.899 ENCOUNTER FOR MONITORING SOTALOL THERAPY: ICD-10-CM

## 2023-04-10 DIAGNOSIS — I21.4 NSTEMI (NON-ST ELEVATED MYOCARDIAL INFARCTION): ICD-10-CM

## 2023-04-10 DIAGNOSIS — R07.9 CHEST PAIN: ICD-10-CM

## 2023-04-10 DIAGNOSIS — R00.0 TACHYCARDIA: ICD-10-CM

## 2023-04-10 DIAGNOSIS — I48.0 PAROXYSMAL ATRIAL FIBRILLATION: ICD-10-CM

## 2023-04-10 DIAGNOSIS — I48.91 A-FIB: ICD-10-CM

## 2023-04-10 DIAGNOSIS — K62.89 PROCTITIS: Primary | ICD-10-CM

## 2023-04-10 DIAGNOSIS — I48.91 ATRIAL FIBRILLATION: ICD-10-CM

## 2023-04-10 DIAGNOSIS — Z51.81 ENCOUNTER FOR MONITORING SOTALOL THERAPY: ICD-10-CM

## 2023-04-10 DIAGNOSIS — K62.5 RECTAL BLEEDING: ICD-10-CM

## 2023-04-10 PROBLEM — R33.9 URINARY RETENTION: Status: ACTIVE | Noted: 2023-04-10

## 2023-04-10 LAB
ABO + RH BLD: NORMAL
ALBUMIN SERPL BCP-MCNC: 4 G/DL (ref 3.5–5.2)
ALP SERPL-CCNC: 88 U/L (ref 55–135)
ALT SERPL W/O P-5'-P-CCNC: 16 U/L (ref 10–44)
ANION GAP SERPL CALC-SCNC: 7 MMOL/L (ref 8–16)
APTT PPP: 25.7 SEC (ref 21–32)
AST SERPL-CCNC: 21 U/L (ref 10–40)
BACTERIA #/AREA URNS HPF: NEGATIVE /HPF
BASOPHILS # BLD AUTO: 0.02 K/UL (ref 0–0.2)
BASOPHILS NFR BLD: 0.3 % (ref 0–1.9)
BILIRUB SERPL-MCNC: 0.6 MG/DL (ref 0.1–1)
BILIRUB UR QL STRIP: NEGATIVE
BLD GP AB SCN CELLS X3 SERPL QL: NORMAL
BUN SERPL-MCNC: 25 MG/DL (ref 8–23)
CALCIUM SERPL-MCNC: 9.8 MG/DL (ref 8.7–10.5)
CHLORIDE SERPL-SCNC: 109 MMOL/L (ref 95–110)
CLARITY UR: CLEAR
CO2 SERPL-SCNC: 23 MMOL/L (ref 23–29)
COLOR UR: YELLOW
CREAT SERPL-MCNC: 1 MG/DL (ref 0.5–1.4)
DIFFERENTIAL METHOD: ABNORMAL
EOSINOPHIL # BLD AUTO: 0.1 K/UL (ref 0–0.5)
EOSINOPHIL NFR BLD: 1.8 % (ref 0–8)
ERYTHROCYTE [DISTWIDTH] IN BLOOD BY AUTOMATED COUNT: 12.4 % (ref 11.5–14.5)
EST. GFR  (NO RACE VARIABLE): 56.2 ML/MIN/1.73 M^2
FERRITIN SERPL-MCNC: 77 NG/ML (ref 20–300)
GLUCOSE SERPL-MCNC: 91 MG/DL (ref 70–110)
GLUCOSE UR QL STRIP: NEGATIVE
HCT VFR BLD AUTO: 36 % (ref 37–48.5)
HGB BLD-MCNC: 11.9 G/DL (ref 12–16)
HGB UR QL STRIP: ABNORMAL
HYALINE CASTS #/AREA URNS LPF: 2 /LPF
IMM GRANULOCYTES # BLD AUTO: 0.02 K/UL (ref 0–0.04)
IMM GRANULOCYTES NFR BLD AUTO: 0.3 % (ref 0–0.5)
INR PPP: 1 (ref 0.8–1.2)
IRON SERPL-MCNC: 78 UG/DL (ref 30–160)
KETONES UR QL STRIP: NEGATIVE
LEUKOCYTE ESTERASE UR QL STRIP: NEGATIVE
LIPASE SERPL-CCNC: 43 U/L (ref 4–60)
LYMPHOCYTES # BLD AUTO: 0.8 K/UL (ref 1–4.8)
LYMPHOCYTES NFR BLD: 11.8 % (ref 18–48)
MAGNESIUM SERPL-MCNC: 2.1 MG/DL (ref 1.6–2.6)
MCH RBC QN AUTO: 30.7 PG (ref 27–31)
MCHC RBC AUTO-ENTMCNC: 33.1 G/DL (ref 32–36)
MCV RBC AUTO: 93 FL (ref 82–98)
MICROSCOPIC COMMENT: ABNORMAL
MONOCYTES # BLD AUTO: 0.3 K/UL (ref 0.3–1)
MONOCYTES NFR BLD: 4.4 % (ref 4–15)
NEUTROPHILS # BLD AUTO: 5.6 K/UL (ref 1.8–7.7)
NEUTROPHILS NFR BLD: 81.4 % (ref 38–73)
NITRITE UR QL STRIP: NEGATIVE
NRBC BLD-RTO: 0 /100 WBC
PH UR STRIP: 6 [PH] (ref 5–8)
PLATELET # BLD AUTO: 140 K/UL (ref 150–450)
PMV BLD AUTO: 10.1 FL (ref 9.2–12.9)
POTASSIUM SERPL-SCNC: 4.3 MMOL/L (ref 3.5–5.1)
PROT SERPL-MCNC: 6.7 G/DL (ref 6–8.4)
PROT UR QL STRIP: NEGATIVE
PROTHROMBIN TIME: 10.9 SEC (ref 9–12.5)
RBC # BLD AUTO: 3.87 M/UL (ref 4–5.4)
RBC #/AREA URNS HPF: 67 /HPF (ref 0–4)
SATURATED IRON: 23 % (ref 20–50)
SODIUM SERPL-SCNC: 139 MMOL/L (ref 136–145)
SP GR UR STRIP: 1.02 (ref 1–1.03)
SPECIMEN OUTDATE: NORMAL
SQUAMOUS #/AREA URNS HPF: 0 /HPF
TOTAL IRON BINDING CAPACITY: 342 UG/DL (ref 250–450)
TRANSFERRIN SERPL-MCNC: 244 MG/DL (ref 200–375)
URN SPEC COLLECT METH UR: ABNORMAL
UROBILINOGEN UR STRIP-ACNC: NEGATIVE EU/DL
WBC # BLD AUTO: 6.84 K/UL (ref 3.9–12.7)
WBC #/AREA URNS HPF: 1 /HPF (ref 0–5)

## 2023-04-10 PROCEDURE — C9113 INJ PANTOPRAZOLE SODIUM, VIA: HCPCS | Performed by: STUDENT IN AN ORGANIZED HEALTH CARE EDUCATION/TRAINING PROGRAM

## 2023-04-10 PROCEDURE — 85610 PROTHROMBIN TIME: CPT | Performed by: STUDENT IN AN ORGANIZED HEALTH CARE EDUCATION/TRAINING PROGRAM

## 2023-04-10 PROCEDURE — 93010 ELECTROCARDIOGRAM REPORT: CPT | Mod: ,,, | Performed by: INTERNAL MEDICINE

## 2023-04-10 PROCEDURE — 36415 COLL VENOUS BLD VENIPUNCTURE: CPT | Performed by: STUDENT IN AN ORGANIZED HEALTH CARE EDUCATION/TRAINING PROGRAM

## 2023-04-10 PROCEDURE — 85730 THROMBOPLASTIN TIME PARTIAL: CPT | Performed by: STUDENT IN AN ORGANIZED HEALTH CARE EDUCATION/TRAINING PROGRAM

## 2023-04-10 PROCEDURE — 25500020 PHARM REV CODE 255: Performed by: EMERGENCY MEDICINE

## 2023-04-10 PROCEDURE — 85018 HEMOGLOBIN: CPT | Performed by: NURSE PRACTITIONER

## 2023-04-10 PROCEDURE — 25000003 PHARM REV CODE 250: Performed by: NURSE PRACTITIONER

## 2023-04-10 PROCEDURE — 86900 BLOOD TYPING SEROLOGIC ABO: CPT | Performed by: STUDENT IN AN ORGANIZED HEALTH CARE EDUCATION/TRAINING PROGRAM

## 2023-04-10 PROCEDURE — 12000002 HC ACUTE/MED SURGE SEMI-PRIVATE ROOM

## 2023-04-10 PROCEDURE — 96375 TX/PRO/DX INJ NEW DRUG ADDON: CPT

## 2023-04-10 PROCEDURE — 80053 COMPREHEN METABOLIC PANEL: CPT | Performed by: STUDENT IN AN ORGANIZED HEALTH CARE EDUCATION/TRAINING PROGRAM

## 2023-04-10 PROCEDURE — 85025 COMPLETE CBC W/AUTO DIFF WBC: CPT | Performed by: STUDENT IN AN ORGANIZED HEALTH CARE EDUCATION/TRAINING PROGRAM

## 2023-04-10 PROCEDURE — 93005 ELECTROCARDIOGRAM TRACING: CPT | Performed by: INTERNAL MEDICINE

## 2023-04-10 PROCEDURE — 99285 EMERGENCY DEPT VISIT HI MDM: CPT | Mod: 25

## 2023-04-10 PROCEDURE — 85014 HEMATOCRIT: CPT | Performed by: NURSE PRACTITIONER

## 2023-04-10 PROCEDURE — 36415 COLL VENOUS BLD VENIPUNCTURE: CPT | Performed by: NURSE PRACTITIONER

## 2023-04-10 PROCEDURE — 25000003 PHARM REV CODE 250: Performed by: STUDENT IN AN ORGANIZED HEALTH CARE EDUCATION/TRAINING PROGRAM

## 2023-04-10 PROCEDURE — 83690 ASSAY OF LIPASE: CPT | Performed by: STUDENT IN AN ORGANIZED HEALTH CARE EDUCATION/TRAINING PROGRAM

## 2023-04-10 PROCEDURE — 81001 URINALYSIS AUTO W/SCOPE: CPT | Performed by: STUDENT IN AN ORGANIZED HEALTH CARE EDUCATION/TRAINING PROGRAM

## 2023-04-10 PROCEDURE — 96365 THER/PROPH/DIAG IV INF INIT: CPT

## 2023-04-10 PROCEDURE — 82728 ASSAY OF FERRITIN: CPT | Performed by: STUDENT IN AN ORGANIZED HEALTH CARE EDUCATION/TRAINING PROGRAM

## 2023-04-10 PROCEDURE — 93010 EKG 12-LEAD: ICD-10-PCS | Mod: ,,, | Performed by: INTERNAL MEDICINE

## 2023-04-10 PROCEDURE — 84466 ASSAY OF TRANSFERRIN: CPT | Performed by: STUDENT IN AN ORGANIZED HEALTH CARE EDUCATION/TRAINING PROGRAM

## 2023-04-10 PROCEDURE — 83735 ASSAY OF MAGNESIUM: CPT | Performed by: STUDENT IN AN ORGANIZED HEALTH CARE EDUCATION/TRAINING PROGRAM

## 2023-04-10 PROCEDURE — 63600175 PHARM REV CODE 636 W HCPCS: Performed by: STUDENT IN AN ORGANIZED HEALTH CARE EDUCATION/TRAINING PROGRAM

## 2023-04-10 PROCEDURE — 25000003 PHARM REV CODE 250: Performed by: EMERGENCY MEDICINE

## 2023-04-10 RX ORDER — NALOXONE HCL 0.4 MG/ML
0.02 VIAL (ML) INJECTION
Status: DISCONTINUED | OUTPATIENT
Start: 2023-04-10 | End: 2023-04-20 | Stop reason: HOSPADM

## 2023-04-10 RX ORDER — DEXTROMETHORPHAN POLISTIREX 30 MG/5 ML
1 SUSPENSION, EXTENDED RELEASE 12 HR ORAL ONCE
Status: COMPLETED | OUTPATIENT
Start: 2023-04-10 | End: 2023-04-10

## 2023-04-10 RX ORDER — PANTOPRAZOLE SODIUM 40 MG/10ML
80 INJECTION, POWDER, LYOPHILIZED, FOR SOLUTION INTRAVENOUS ONCE
Status: COMPLETED | OUTPATIENT
Start: 2023-04-10 | End: 2023-04-10

## 2023-04-10 RX ORDER — ACETAMINOPHEN 500 MG
1000 TABLET ORAL EVERY 8 HOURS PRN
Status: DISCONTINUED | OUTPATIENT
Start: 2023-04-10 | End: 2023-04-20 | Stop reason: HOSPADM

## 2023-04-10 RX ORDER — ACETAMINOPHEN 500 MG
500 TABLET ORAL NIGHTLY PRN
COMMUNITY
End: 2023-08-18

## 2023-04-10 RX ORDER — SPIRONOLACTONE 25 MG/1
25 TABLET ORAL DAILY
Status: DISCONTINUED | OUTPATIENT
Start: 2023-04-10 | End: 2023-04-11

## 2023-04-10 RX ORDER — PANTOPRAZOLE SODIUM 40 MG/1
40 TABLET, DELAYED RELEASE ORAL DAILY
Status: DISCONTINUED | OUTPATIENT
Start: 2023-04-11 | End: 2023-04-20 | Stop reason: HOSPADM

## 2023-04-10 RX ORDER — ONDANSETRON 2 MG/ML
4 INJECTION INTRAMUSCULAR; INTRAVENOUS EVERY 6 HOURS PRN
Status: DISCONTINUED | OUTPATIENT
Start: 2023-04-10 | End: 2023-04-20 | Stop reason: HOSPADM

## 2023-04-10 RX ORDER — SODIUM CHLORIDE 0.9 % (FLUSH) 0.9 %
10 SYRINGE (ML) INJECTION
Status: DISCONTINUED | OUTPATIENT
Start: 2023-04-10 | End: 2023-04-20 | Stop reason: HOSPADM

## 2023-04-10 RX ORDER — ACETAMINOPHEN 325 MG/1
650 TABLET ORAL EVERY 4 HOURS PRN
Status: DISCONTINUED | OUTPATIENT
Start: 2023-04-10 | End: 2023-04-20 | Stop reason: HOSPADM

## 2023-04-10 RX ORDER — SIMETHICONE 80 MG
1 TABLET,CHEWABLE ORAL 4 TIMES DAILY PRN
Status: DISCONTINUED | OUTPATIENT
Start: 2023-04-10 | End: 2023-04-20 | Stop reason: HOSPADM

## 2023-04-10 RX ORDER — LACTULOSE 10 G/15ML
30 SOLUTION ORAL 3 TIMES DAILY
Status: DISPENSED | OUTPATIENT
Start: 2023-04-10 | End: 2023-04-12

## 2023-04-10 RX ORDER — SOTALOL HYDROCHLORIDE 80 MG/1
40 TABLET ORAL 2 TIMES DAILY
Status: DISCONTINUED | OUTPATIENT
Start: 2023-04-10 | End: 2023-04-13

## 2023-04-10 RX ORDER — POLYETHYLENE GLYCOL 3350 17 G/17G
17 POWDER, FOR SOLUTION ORAL 2 TIMES DAILY
Status: DISCONTINUED | OUTPATIENT
Start: 2023-04-12 | End: 2023-04-20 | Stop reason: HOSPADM

## 2023-04-10 RX ORDER — ACETAMINOPHEN 500 MG
1000 TABLET ORAL
Status: COMPLETED | OUTPATIENT
Start: 2023-04-10 | End: 2023-04-10

## 2023-04-10 RX ORDER — MORPHINE SULFATE 4 MG/ML
4 INJECTION, SOLUTION INTRAMUSCULAR; INTRAVENOUS
Status: DISCONTINUED | OUTPATIENT
Start: 2023-04-10 | End: 2023-04-10

## 2023-04-10 RX ORDER — LANOLIN ALCOHOL/MO/W.PET/CERES
400 CREAM (GRAM) TOPICAL DAILY
Status: DISCONTINUED | OUTPATIENT
Start: 2023-04-11 | End: 2023-04-20 | Stop reason: HOSPADM

## 2023-04-10 RX ORDER — TALC
6 POWDER (GRAM) TOPICAL NIGHTLY PRN
Status: DISCONTINUED | OUTPATIENT
Start: 2023-04-10 | End: 2023-04-20 | Stop reason: HOSPADM

## 2023-04-10 RX ADMIN — LACTULOSE 30 G: 20 SOLUTION ORAL at 11:04

## 2023-04-10 RX ADMIN — PANTOPRAZOLE SODIUM 80 MG: 40 INJECTION, POWDER, FOR SOLUTION INTRAVENOUS at 07:04

## 2023-04-10 RX ADMIN — PIPERACILLIN AND TAZOBACTAM 4.5 G: 4; .5 INJECTION, POWDER, LYOPHILIZED, FOR SOLUTION INTRAVENOUS; PARENTERAL at 07:04

## 2023-04-10 RX ADMIN — ACETAMINOPHEN 1000 MG: 500 TABLET ORAL at 05:04

## 2023-04-10 RX ADMIN — MINERAL OIL 1 ENEMA: 100 ENEMA RECTAL at 11:04

## 2023-04-10 RX ADMIN — IOHEXOL 100 ML: 350 INJECTION, SOLUTION INTRAVENOUS at 06:04

## 2023-04-10 NOTE — Clinical Note
75 ml of contrast were injected throughout the case. 25 mL of contrast was the total wasted during the case. 100 mL was the total amount used during the case. COUGH

## 2023-04-10 NOTE — ED NOTES
"Asked pt if she would like to try Tylenol for pain since she didn't want the morphine. Pt replied "yes please".  "

## 2023-04-10 NOTE — Clinical Note
The catheter was repositioned into the ostium   right coronary artery. An angiography was performed of the right coronary arteries. Multiple views were taken. The angiography was performed via power injection. The injected amount was 6 mL contrast at 3 mL/s. The PSI from the power injection was 300.

## 2023-04-10 NOTE — ED PROVIDER NOTES
Continue Claritin until complete symptoms resolution Encounter Date: 4/10/2023       History     Chief Complaint   Patient presents with    Rectal Bleeding     Rectal bleeding x2 hours, constipation, on blood thiners     82-year-old female that is currently on Eliquis for atrial fibrillation, hypertension, who presents to the emergency department with concerns for 1 episode of rectal bleeding which occurred after she took MiraLax earlier today due to constipation.  Patient states that she takes MiraLax every 2 days because she suffers from constipation.  She also complains of left lower quadrant abdominal pain associated with this episode, denies upper abdominal pain, no urgency, frequency, dysuria, no trauma to the abdomen.  No previous history of GI bleeds.  She specifically complains of pain around her rectum that is burning associated with the ongoing diarrhea.  Denies any fevers at home.      Review of patient's allergies indicates:   Allergen Reactions    Hydrocodone     Meperidine     Meperidine hcl Nausea And Vomiting    Persantine [dipyridamole]     Vicodin [hydrocodone-acetaminophen] Nausea And Vomiting     Past Medical History:   Diagnosis Date    Atrial fibrillation 01/25/2021    Hypertension      Past Surgical History:   Procedure Laterality Date    BREAST SURGERY       Family History   Problem Relation Age of Onset    Cancer Mother     Cancer Father      Social History     Tobacco Use    Smoking status: Never    Smokeless tobacco: Never   Substance Use Topics    Alcohol use: Not Currently    Drug use: Not Currently     Review of Systems   Constitutional:  Negative for fever.   HENT:  Negative for sore throat.    Respiratory:  Negative for shortness of breath.    Cardiovascular:  Negative for chest pain.   Gastrointestinal:  Positive for abdominal pain and rectal pain. Negative for nausea.   Genitourinary:  Negative for dysuria.   Musculoskeletal:  Negative for back pain.   Skin:  Negative for rash.   Neurological:  Negative for weakness.   Hematological:   Does not bruise/bleed easily.     Physical Exam     Initial Vitals [04/10/23 1545]   BP Pulse Resp Temp SpO2   (!) 183/86 65 18 97.6 °F (36.4 °C) 96 %      MAP       --         Physical Exam    Nursing note and vitals reviewed.  Constitutional: She appears well-developed and well-nourished.   HENT:   Head: Normocephalic and atraumatic.   Eyes: EOM are normal. Pupils are equal, round, and reactive to light.   Neck:   Normal range of motion.  Cardiovascular:  Normal rate and regular rhythm.     Exam reveals no friction rub.       No murmur heard.  Pulmonary/Chest: Breath sounds normal. No respiratory distress. She has no wheezes. She has no rales.   Abdominal: Abdomen is soft. Bowel sounds are normal. She exhibits no distension. There is abdominal tenderness.   Left lower quadrant abdominal tenderness   Genitourinary:    Genitourinary Comments: Examination performed with nurse in the room.  Copious amounts of light brown liquid stool noted around the rectum, that was cleaned, a digital rectal examination was performed which did not reveal any laceration, hemorrhoids, or other concerning features, no melena noted     Musculoskeletal:         General: Normal range of motion.      Cervical back: Normal range of motion.     Neurological: She is alert and oriented to person, place, and time.   Skin: Skin is warm and dry.       ED Course   Procedures  Labs Reviewed   CBC W/ AUTO DIFFERENTIAL - Abnormal; Notable for the following components:       Result Value    RBC 3.87 (*)     Hemoglobin 11.9 (*)     Hematocrit 36.0 (*)     Platelets 140 (*)     Lymph # 0.8 (*)     Gran % 81.4 (*)     Lymph % 11.8 (*)     All other components within normal limits   COMPREHENSIVE METABOLIC PANEL - Abnormal; Notable for the following components:    BUN 25 (*)     Anion Gap 7 (*)     eGFR 56.2 (*)     All other components within normal limits   URINALYSIS, REFLEX TO URINE CULTURE - Abnormal; Notable for the following components:    Occult  Blood UA 3+ (*)     All other components within normal limits    Narrative:     Specimen Source->Urine   URINALYSIS MICROSCOPIC - Abnormal; Notable for the following components:    RBC, UA 67 (*)     Hyaline Casts, UA 2 (*)     All other components within normal limits    Narrative:     Specimen Source->Urine   PROTIME-INR   APTT   LIPASE   MAGNESIUM   MAGNESIUM   IRON AND TIBC   FERRITIN   TYPE & SCREEN        ECG Results              EKG 12-lead (Final result)  Result time 04/12/23 19:45:34      Final result by Interface, Lab In [facility] (04/12/23 19:45:34)                   Narrative:    Test Reason : K62.5,    Vent. Rate : 076 BPM     Atrial Rate : 076 BPM     P-R Int : 130 ms          QRS Dur : 076 ms      QT Int : 390 ms       P-R-T Axes : -01 057 049 degrees     QTc Int : 438 ms    Normal sinus rhythm  Inferior Infarct - Age indeterminate, or probably old  When compared with ECG of 11-OCT-2022 09:34,  ST elevation now present in Inferior leads  ST now depressed in Lateral leads  Nonspecific T wave abnormality no longer evident in Anterior leads  Confirmed by Pj Ramirez MD ([id number]) on 4/12/2023 7:45:26 PM    Referred By: [email or web address]   SELF           Confirmed By:Pj Ramirez MD                                  Imaging Results              CT Abdomen Pelvis With Contrast (Final result)  Result time 04/10/23 19:06:37      Final result by Jose Uribe MD (04/10/23 19:06:37)                   Narrative:      EXAM: CT ABDOMEN PELVIS WITH CONTRAST    HISTORY: LLQ abdominal pain    COMPARISON: None    TECHNIQUE: Multiple axial 2 mm thick images were obtained through the abdomen and pelvis with IV contrast only.    This exam was performed according to our departmental dose-optimization program, which includes automated exposure control, adjustment of the mA and/or kV according to patient size and/or use of iterative reconstruction technique.    Unless otherwise stated, incidental findings do not require  dedicated follow-up imaging.    FINDINGS: The liver, spleen, pancreas, kidneys, and adrenal glands appear within normal limits. Several tiny gallstones are noted in the gallbladder lumen. The gallbladder is otherwise unremarkable. There is no significant bile duct dilatation. There is a small hiatal hernia. The stomach is otherwise unremarkable. The small bowel is normal in caliber. There is no evidence of obstruction. There is a moderately large amount of stool distending the rectum consistent with fecal impaction. In addition, there is fairly extensive edema within the perirectal fascia and also mucosal hyperenhancement within the distended rectum consistent with proctitis. The remainder of the colon does not appear inflamed. It contains formed stool. There are multiple diverticuli in the sigmoid colon. There is no CT evidence of diverticulitis. Images through the lung bases show no abnormalities..    IMPRESSION:   CT findings consistent with fecal impaction and proctitis as described. Cholelithiasis. Sigmoid diverticulosis without evidence of diverticulitis.    Electronically signed by:  Roger Uribe MD  4/10/2023 7:06 PM CDT Workstation: [id number]                                     Medications   piperacillin-tazobactam 4.5 g in dextrose 5 % 100 mL IVPB (ready to mix system) (4.5 g Intravenous New Bag 4/12/23 2039)   magnesium oxide tablet 400 mg (400 mg Oral Given 4/12/23 0917)   sotalol split tablet 40 mg (40 mg Oral Given 4/12/23 2043)   sodium chloride 0.9% flush 10 mL (has no administration in time range)   melatonin tablet 6 mg (0 mg Oral Return to Cabinet 4/11/23 2023)   ondansetron injection 4 mg (4 mg Intravenous Given 4/11/23 1448)   simethicone chewable tablet 80 mg (80 mg Oral Not Given 4/11/23 2023)   acetaminophen tablet 650 mg (has no administration in time range)   naloxone 0.4 mg/mL injection 0.02 mg (has no administration in time range)   acetaminophen tablet 1,000 mg (1,000 mg Oral Given  4/11/23 2054)   lactulose 20 gram/30 mL solution Soln 30 g (30 g Oral Not Given 4/11/23 2100)   polyethylene glycol packet 17 g (17 g Oral Given 4/12/23 2039)   pantoprazole EC tablet 40 mg (40 mg Oral Given 4/12/23 0518)   morphine injection 2 mg (has no administration in time range)   lactated ringers infusion (0 mL/hr Intravenous Stopped 4/12/23 1132)   acetaminophen tablet 1,000 mg (1,000 mg Oral Given 4/10/23 1759)   iohexoL (OMNIPAQUE 350) injection 100 mL (100 mLs Intravenous Given 4/10/23 1839)   pantoprazole injection 80 mg (80 mg Intravenous Given 4/10/23 1942)   piperacillin-tazobactam 4.5 g in dextrose 5 % 100 mL IVPB (ready to mix system) (0 g Intravenous Stopped 4/10/23 2345)   mineral oil enema 1 enema (1 enema Rectal Given 4/10/23 2314)   polyethylene glycol packet 170 g (170 g Oral Given 4/11/23 1103)   potassium bicarbonate disintegrating tablet 50 mEq (50 mEq Oral Given 4/12/23 2039)     Medical Decision Making:   Differential Diagnosis:   Upper GI bleed, lower GI bleed, rectal bleed, diverticulitis, diverticulosis  ED Management:  82-year-old female who presented with constant diarrhea as well as concerns for rectal bleeding.  Workup thus far showed that she has a stable hemoglobin, a CT abdomen and pelvis was notable for proctitis.  All other labs appear within normal limits.  Have started the patient on Protonix and Zosyn, admitted to the medicine service for further management.  Other:   I have discussed this case with another health care provider.          Attending Attestation:   Physician Attestation Statement for Resident:  As the supervising MD   Physician Attestation Statement: I have personally seen and examined this patient.   I agree with the above history.  -:   As the supervising MD I agree with the above PE.     As the supervising MD I agree with the above treatment, course, plan, and disposition.    I have reviewed and agree with the residents interpretation of the following: lab  data, x-rays, CT scans and EKG.                            Clinical Impression:   Final diagnoses:  [K62.5] Rectal bleeding  [K62.89] Proctitis (Primary)        ED Disposition Condition    Admit                 Malu Daigle MD  Resident  04/10/23 2020       Tiffany Larson MD  04/12/23 8551

## 2023-04-11 LAB
ANION GAP SERPL CALC-SCNC: 7 MMOL/L (ref 8–16)
BASOPHILS # BLD AUTO: 0.02 K/UL (ref 0–0.2)
BASOPHILS NFR BLD: 0.3 % (ref 0–1.9)
BUN SERPL-MCNC: 26 MG/DL (ref 8–23)
CALCIUM SERPL-MCNC: 9.3 MG/DL (ref 8.7–10.5)
CHLORIDE SERPL-SCNC: 107 MMOL/L (ref 95–110)
CO2 SERPL-SCNC: 23 MMOL/L (ref 23–29)
CREAT SERPL-MCNC: 1.2 MG/DL (ref 0.5–1.4)
DIFFERENTIAL METHOD: ABNORMAL
EOSINOPHIL # BLD AUTO: 0 K/UL (ref 0–0.5)
EOSINOPHIL NFR BLD: 0.4 % (ref 0–8)
ERYTHROCYTE [DISTWIDTH] IN BLOOD BY AUTOMATED COUNT: 12.5 % (ref 11.5–14.5)
EST. GFR  (NO RACE VARIABLE): 45.2 ML/MIN/1.73 M^2
ESTIMATED AVG GLUCOSE: 100 MG/DL (ref 68–131)
GLUCOSE SERPL-MCNC: 104 MG/DL (ref 70–110)
HBA1C MFR BLD: 5.1 % (ref 4.5–6.2)
HCT VFR BLD AUTO: 33.5 % (ref 37–48.5)
HCT VFR BLD AUTO: 34.3 % (ref 37–48.5)
HCT VFR BLD AUTO: 34.6 % (ref 37–48.5)
HCT VFR BLD AUTO: 34.7 % (ref 37–48.5)
HCT VFR BLD AUTO: 35.4 % (ref 37–48.5)
HGB BLD-MCNC: 11.1 G/DL (ref 12–16)
HGB BLD-MCNC: 11.3 G/DL (ref 12–16)
HGB BLD-MCNC: 11.4 G/DL (ref 12–16)
HGB BLD-MCNC: 11.5 G/DL (ref 12–16)
HGB BLD-MCNC: 11.8 G/DL (ref 12–16)
IMM GRANULOCYTES # BLD AUTO: 0.04 K/UL (ref 0–0.04)
IMM GRANULOCYTES NFR BLD AUTO: 0.6 % (ref 0–0.5)
LYMPHOCYTES # BLD AUTO: 0.6 K/UL (ref 1–4.8)
LYMPHOCYTES NFR BLD: 7.8 % (ref 18–48)
MAGNESIUM SERPL-MCNC: 2 MG/DL (ref 1.6–2.6)
MCH RBC QN AUTO: 30.3 PG (ref 27–31)
MCHC RBC AUTO-ENTMCNC: 32.5 G/DL (ref 32–36)
MCV RBC AUTO: 93 FL (ref 82–98)
MONOCYTES # BLD AUTO: 0.4 K/UL (ref 0.3–1)
MONOCYTES NFR BLD: 5.1 % (ref 4–15)
NEUTROPHILS # BLD AUTO: 6.2 K/UL (ref 1.8–7.7)
NEUTROPHILS NFR BLD: 85.8 % (ref 38–73)
NRBC BLD-RTO: 0 /100 WBC
OB PNL STL: NEGATIVE
PLATELET # BLD AUTO: 118 K/UL (ref 150–450)
PMV BLD AUTO: 10.5 FL (ref 9.2–12.9)
POTASSIUM SERPL-SCNC: 4 MMOL/L (ref 3.5–5.1)
RBC # BLD AUTO: 3.79 M/UL (ref 4–5.4)
SODIUM SERPL-SCNC: 137 MMOL/L (ref 136–145)
WBC # BLD AUTO: 7.22 K/UL (ref 3.9–12.7)

## 2023-04-11 PROCEDURE — 36415 COLL VENOUS BLD VENIPUNCTURE: CPT | Performed by: NURSE PRACTITIONER

## 2023-04-11 PROCEDURE — 63600175 PHARM REV CODE 636 W HCPCS: Performed by: STUDENT IN AN ORGANIZED HEALTH CARE EDUCATION/TRAINING PROGRAM

## 2023-04-11 PROCEDURE — 25000003 PHARM REV CODE 250: Performed by: STUDENT IN AN ORGANIZED HEALTH CARE EDUCATION/TRAINING PROGRAM

## 2023-04-11 PROCEDURE — 51798 US URINE CAPACITY MEASURE: CPT

## 2023-04-11 PROCEDURE — 83735 ASSAY OF MAGNESIUM: CPT | Performed by: NURSE PRACTITIONER

## 2023-04-11 PROCEDURE — 63600175 PHARM REV CODE 636 W HCPCS: Performed by: NURSE PRACTITIONER

## 2023-04-11 PROCEDURE — 85018 HEMOGLOBIN: CPT | Performed by: NURSE PRACTITIONER

## 2023-04-11 PROCEDURE — 97166 OT EVAL MOD COMPLEX 45 MIN: CPT

## 2023-04-11 PROCEDURE — 94799 UNLISTED PULMONARY SVC/PX: CPT

## 2023-04-11 PROCEDURE — 80048 BASIC METABOLIC PNL TOTAL CA: CPT | Performed by: NURSE PRACTITIONER

## 2023-04-11 PROCEDURE — 99900035 HC TECH TIME PER 15 MIN (STAT)

## 2023-04-11 PROCEDURE — 85025 COMPLETE CBC W/AUTO DIFF WBC: CPT | Performed by: NURSE PRACTITIONER

## 2023-04-11 PROCEDURE — 25000003 PHARM REV CODE 250: Performed by: NURSE PRACTITIONER

## 2023-04-11 PROCEDURE — 82272 OCCULT BLD FECES 1-3 TESTS: CPT | Performed by: NURSE PRACTITIONER

## 2023-04-11 PROCEDURE — 83036 HEMOGLOBIN GLYCOSYLATED A1C: CPT | Performed by: NURSE PRACTITIONER

## 2023-04-11 PROCEDURE — 12000002 HC ACUTE/MED SURGE SEMI-PRIVATE ROOM

## 2023-04-11 PROCEDURE — 94761 N-INVAS EAR/PLS OXIMETRY MLT: CPT

## 2023-04-11 PROCEDURE — 99900031 HC PATIENT EDUCATION (STAT)

## 2023-04-11 PROCEDURE — 85014 HEMATOCRIT: CPT | Performed by: NURSE PRACTITIONER

## 2023-04-11 RX ORDER — SODIUM CHLORIDE, SODIUM LACTATE, POTASSIUM CHLORIDE, CALCIUM CHLORIDE 600; 310; 30; 20 MG/100ML; MG/100ML; MG/100ML; MG/100ML
INJECTION, SOLUTION INTRAVENOUS CONTINUOUS
Status: ACTIVE | OUTPATIENT
Start: 2023-04-11 | End: 2023-04-12

## 2023-04-11 RX ORDER — POLYETHYLENE GLYCOL 3350 17 G/17G
170 POWDER, FOR SOLUTION ORAL ONCE
Status: COMPLETED | OUTPATIENT
Start: 2023-04-11 | End: 2023-04-11

## 2023-04-11 RX ORDER — POLYETHYLENE GLYCOL 3350 17 G/17G
170 POWDER, FOR SOLUTION ORAL ONCE
Status: DISCONTINUED | OUTPATIENT
Start: 2023-04-11 | End: 2023-04-11

## 2023-04-11 RX ORDER — MORPHINE SULFATE 2 MG/ML
2 INJECTION, SOLUTION INTRAMUSCULAR; INTRAVENOUS EVERY 4 HOURS PRN
Status: DISCONTINUED | OUTPATIENT
Start: 2023-04-11 | End: 2023-04-20 | Stop reason: HOSPADM

## 2023-04-11 RX ADMIN — MAGNESIUM OXIDE 400 MG (241.3 MG MAGNESIUM) TABLET 400 MG: at 08:04

## 2023-04-11 RX ADMIN — SODIUM CHLORIDE, SODIUM LACTATE, POTASSIUM CHLORIDE, AND CALCIUM CHLORIDE: .6; .31; .03; .02 INJECTION, SOLUTION INTRAVENOUS at 11:04

## 2023-04-11 RX ADMIN — ONDANSETRON HYDROCHLORIDE 4 MG: 2 INJECTION, SOLUTION INTRAMUSCULAR; INTRAVENOUS at 01:04

## 2023-04-11 RX ADMIN — SOTALOL HYDROCHLORIDE 40 MG: 80 TABLET ORAL at 10:04

## 2023-04-11 RX ADMIN — POLYETHYLENE GLYCOL 3350 170 G: 17 POWDER, FOR SOLUTION ORAL at 11:04

## 2023-04-11 RX ADMIN — PANTOPRAZOLE SODIUM 40 MG: 40 TABLET, DELAYED RELEASE ORAL at 05:04

## 2023-04-11 RX ADMIN — PIPERACILLIN AND TAZOBACTAM 4.5 G: .5; 4 INJECTION, POWDER, LYOPHILIZED, FOR SOLUTION INTRAVENOUS at 08:04

## 2023-04-11 RX ADMIN — LACTULOSE 30 G: 20 SOLUTION ORAL at 08:04

## 2023-04-11 RX ADMIN — SOTALOL HYDROCHLORIDE 40 MG: 80 TABLET ORAL at 09:04

## 2023-04-11 RX ADMIN — SOTALOL HYDROCHLORIDE 40 MG: 80 TABLET ORAL at 12:04

## 2023-04-11 RX ADMIN — ACETAMINOPHEN 1000 MG: 500 TABLET, FILM COATED ORAL at 01:04

## 2023-04-11 RX ADMIN — PIPERACILLIN AND TAZOBACTAM 4.5 G: .5; 4 INJECTION, POWDER, LYOPHILIZED, FOR SOLUTION INTRAVENOUS at 12:04

## 2023-04-11 RX ADMIN — PIPERACILLIN AND TAZOBACTAM 4.5 G: .5; 4 INJECTION, POWDER, LYOPHILIZED, FOR SOLUTION INTRAVENOUS at 04:04

## 2023-04-11 RX ADMIN — ACETAMINOPHEN 1000 MG: 500 TABLET, FILM COATED ORAL at 08:04

## 2023-04-11 RX ADMIN — SIMETHICONE 80 MG: 80 TABLET, CHEWABLE ORAL at 01:04

## 2023-04-11 RX ADMIN — ONDANSETRON HYDROCHLORIDE 4 MG: 2 INJECTION, SOLUTION INTRAMUSCULAR; INTRAVENOUS at 02:04

## 2023-04-11 NOTE — CONSULTS
[facility]  Adult Nutrition   Consult Note (Nutrition Education)     SUMMARY     Recommendations  .) Advance diet as tolerated to high fiber, cardiac diet restrictions.   2.) Will add Ensure Clear with meals to meet protein requirements.  Nutrition Goal Status: new    Dietitian Rounds Brief  Pt presents to the emergency room complaining of rectal bleeding.  Patient states that she has chronic constipation and takes MiraLax every other day. Pt reports following a high fiber diet at home regarding constipation issues. Provided constipation meal planning education with handouts. RD contact information provided.    Diet order:   Current Diet Order: clear liquid diet     Reason for Assessment  Reason For Assessment: consult (education)  Diagnosis: gastrointestinal disease  Relevant Medical History: paroxysmal atrial fibrillation on apixaban, chronic HFpEF, constipation and hypertension     Nutrition/Diet History  Spiritual, Cultural Beliefs, Faith Practices, Values that Affect Care: no  Food Allergies: NKFA  Factors Affecting Nutritional Intake: altered gastrointestinal function, pain, clear liquid diet    Nutrition Risk Screen  Nutrition Risk Screen: no indicators present     MST Score: 0  Have you recently lost weight without trying?: No  Weight loss score: 0  Have you been eating poorly because of a decreased appetite?: No  Appetite score: 0     Weight History:  Wt Readings from Last 5 Encounters:   04/10/23 81.1 kg (178 lb 12.7 oz)   10/11/22 81.6 kg (180 lb)   07/24/22 80.5 kg (177 lb 7.5 oz)   07/24/22 80.3 kg (177 lb)   05/18/22 82.1 kg (180 lb 14.4 oz)     Lab/Procedures/Meds: Pertinent Labs/Meds Reviewed    Nutrition Risk  Level of Risk/Frequency of Follow-up: moderate     Nutrition Follow-Up  RD Follow-up?: Yes      Sandra Mahmood RD 04/11/2023 4:01 PM

## 2023-04-11 NOTE — ASSESSMENT & PLAN NOTE
Platelets 140, looking back on lab work patient has had chronic thrombocytopenia as low as 67 in January of 2021, repeat CBC in a.m.

## 2023-04-11 NOTE — PROGRESS NOTES
[facility]  Progress Note    Patient Name: Irene العراقي  MRN: [id number]  Patient Class: IP- Inpatient   Admission Date: 4/10/2023  Length of Stay: 1 days  Attending Physician: Reji Meeks MD  Primary Care Provider: BRYANT Chinchilla        Subjective:     Principal Problem:Rectal bleeding        HPI:  Irene العراقي is an 82-year-old female with chronic medical problems including paroxysmal atrial fibrillation on apixaban, chronic HFpEF, constipation and hypertension who presents to the emergency room complaining of rectal bleeding.  Patient states that she has chronic constipation and takes MiraLax every other day.  She took some at about 10:00 a.m. this morning.  Later that morning she started having some crampy abdominal pain across her lower abdomen especially left lower quadrant and went to the bathroom and had a small amount of bright red blood in the toilet.  Later she started having more abdominal pain, gas and liquid stools. Associated symptoms are lightheadedness, rectal burning and difficulty voiding.  She said she is never had bleeding before or difficulty voiding.  She came down from [place] 2 years ago to take care of her twin sister that has never been able to go back since COVID.  She follows with cardiology and a PCP here in his on spironolactone for hypertension, apixaban and sotalol for paroxysmal atrial fibrillation and spironolactone for hypertension.  She denies dysuria, fevers, chills, shortness of breath or chest pain, nausea or vomiting.     In the ED, H&H 11.9/36.0, platelets decreased to 140, WBC 6.84, BUN/CR 25/1.0, glucose 91, sodium 139, potassium 4.3, lipase 43, INR 1.0, 12 lead EKG with normal sinus rhythm, ventricular rate 76 and , CT scan of pelvis with contrast shows fecal impaction and proctitis, cholelithiasis and sigmoid diverticulosis with no evidence of diverticulitis.  See report for full details.  Per ED physician rectal  exam was negative for blood.  Temperature 97.6°, heart rate 83, blood pressure 125/63, respiratory rate 15 to 20 and O2 sat 95% on room air.  She was given 1 g acetaminophen, an 80 mg Protonix and 4.5 g Zosyn.  She will be admitted to the hospitalist service for further evaluation treatment with a consult Gastroenterology.      Overview/Hospital Course:  No notes on file    Interval History: No acute events overnight. Hgb stable today. She had rectal bleeding yesterday with significant straining. She has proctitis and severe constipation. She is having pain with any BM attempt this morning. High dose miralax today per Dr Thompson, enemas, hold off on endoscopy for now    Review of Systems   All other systems reviewed and are negative.  Objective:     Vital Signs (Most Recent):  Temp: 98.8 °F (37.1 °C) (04/11/23 0710)  Pulse: 74 (04/11/23 0900)  Resp: 18 (04/11/23 0710)  BP: (!) 99/51 (Dr Meeks notified) (04/11/23 0900)  SpO2: (!) 93 % (notified nurse chasity) (04/11/23 0710)   Vital Signs (24h Range):  Temp:  [97.6 °F (36.4 °C)-98.9 °F (37.2 °C)] 98.8 °F (37.1 °C)  Pulse:  [65-93] 74  Resp:  [15-20] 18  SpO2:  [93 %-98 %] 93 %  BP: ()/(51-86) 99/51     Weight: 81.1 kg (178 lb 12.7 oz)  Body mass index is 28.86 kg/m².    Intake/Output Summary (Last 24 hours) at 4/11/2023 1001  Last data filed at 4/11/2023 0441  Gross per 24 hour   Intake --   Output 1000 ml   Net -1000 ml      Physical Exam  Constitutional:       Appearance: Normal appearance. She is normal weight.   HENT:      Head: Normocephalic and atraumatic.      Nose: Nose normal.      Mouth/Throat:      Mouth: Mucous membranes are moist.   Eyes:      Conjunctiva/sclera: Conjunctivae normal.      Pupils: Pupils are equal, round, and reactive to light.   Cardiovascular:      Rate and Rhythm: Normal rate and regular rhythm.      Pulses: Normal pulses.      Heart sounds: Normal heart sounds. No murmur heard.    No friction rub. No gallop.   Pulmonary:       Effort: Pulmonary effort is normal.      Breath sounds: Normal breath sounds. No wheezing or rales.   Abdominal:      General: Abdomen is flat. Bowel sounds are normal. There is no distension.      Palpations: Abdomen is soft.      Tenderness: There is no abdominal tenderness. There is no guarding.   Musculoskeletal:         General: No swelling. Normal range of motion.      Cervical back: Normal range of motion and neck supple.   Skin:     General: Skin is warm and dry.   Neurological:      General: No focal deficit present.      Mental Status: She is alert.   Psychiatric:         Mood and Affect: Mood normal.         Thought Content: Thought content normal.         Judgment: Judgment normal.       Significant Labs: All pertinent labs within the past 24 hours have been reviewed.    Significant Imaging: I have reviewed all pertinent imaging results/findings within the past 24 hours.      Assessment/Plan:      * Rectal bleeding  - Hgb stable this am  - appreciate Dr Thompson's evaluation  - continue aggressive miralax  - enemas  - hold off on colonoscopy for now  - protonix po, trend CBC  - pain control, and proctitis management as below    Urinary retention  CTA showed large amount of urine and bladder, patient was in and out catheterization after CT scan with 600 cc output per nursing, bladder scan every 6 hours as needed if patient is unable to void, may have to place urinary catheter but most likely related to the constipation      Constipation  Enemas  Miralax  Will need scheduled bowel regimen going forward      Proctitis  - continue zosyn for proctitis  - address constipation    Hypertension  Blood pressure under good control, most recent blood pressure 125/63  Hold aldactone this am      Current use of long term anticoagulation  Patient is on apixaban for paroxysmal atrial fibrillation, hold apixaban       Paroxysmal atrial fibrillation  History of paroxysmal atrial fibrillation on apixaban and sotalol, 12  lead EKG with normal sinus rhythm, ventricular rate 76 and , monitor on telemetry, continue sotalol, hold apixaban        Diastolic dysfunction  Echocardiogram from July 2024 with mild aortic and mitral regurgitation, 60% ejection fraction and grade 1 left ventricular diastolic dysfunction, monitor daily weight and I/O      Thrombocytopenia  Platelets 140, looking back on lab work patient has had chronic thrombocytopenia as low as 67 in January of 2021, repeat CBC in a.m.        VTE Risk Mitigation (From admission, onward)         Ordered     Reason for No Pharmacological VTE Prophylaxis  Once        Question Answer Comment   Reasons: Risk of Bleeding    Reasons: Active Bleeding        04/10/23 2211     IP VTE HIGH RISK PATIENT  Once         04/10/23 2211     Place sequential compression device  Until discontinued         04/10/23 2211                Discharge Planning   YANET:      Code Status: Full Code   Is the patient medically ready for discharge?:     Reason for patient still in hospital (select all that apply): Treatment                     Reji Meeks MD  Department of Hospital Medicine   [facility]

## 2023-04-11 NOTE — PLAN OF CARE
[facility]  Initial Discharge Assessment       Primary Care Provider: BRYANT Chinchilla    Admission Diagnosis: Rectal bleeding [K62.5]    Admission Date: 4/10/2023  Expected Discharge Date: 4/12/2023    Initial assessment completed at bedside with patient. Patient is temporarily staying with sister Evelia in [place]. Patient intends to return back to [place] shortly after discharge. Patient will self schedule hospital follow up appointments. Patient denies any discharge needs. Patient anticipates discharge home when medically clear.         Payor: BCBS [place] MEDICARE / Plan: BCBS OUT OF STATE MEDICARE ADVANTAGE / Product Type: Medicare Advantage /     Extended Emergency Contact Information  Primary Emergency Contact: Roger Diane  Home Phone: [phone]  Mobile Phone: [phone]  Relation: Relative  Preferred language: English   needed? No  Secondary Emergency Contact: LivanPatricio  Home Phone: [phone]  Mobile Phone: [phone]  Relation: Relative  Preferred language: English   needed? No    Discharge Plan A: Home  Discharge Plan B: Home      [id number] [place] - [address]  [address]  [place] [address]  Phone: [phone] Fax: [phone]      Initial Assessment (most recent)       Adult Discharge Assessment - 04/11/23 1543          Discharge Assessment    Assessment Type Discharge Planning Assessment     Confirmed/corrected address, phone number and insurance Yes     Confirmed Demographics Correct on Facesheet     Source of Information patient     When was your last doctors appointment? 03/08/23     Reason For Admission rectal bleeding     People in Home sibling(s)     Facility Arrived From: home     Do you expect to return to your current living situation? Yes   Patient plans to return home to [place] within the month.    Do you have help at home or someone to help you manage your care at home? No     Prior to hospitilization  cognitive status: Alert/Oriented     Current cognitive status: Alert/Oriented     Walking or Climbing Stairs --   none    Dressing/Bathing --   none    Home Layout Able to live on 1st floor     Equipment Currently Used at Home none     Readmission within 30 days? No     Patient currently being followed by outpatient case management? No     Do you currently have service(s) that help you manage your care at home? No     Do you take prescription medications? Yes     Do you have prescription coverage? Yes     Coverage BCBS     Do you have any problems affording any of your prescribed medications? No     Is the patient taking medications as prescribed? yes     Who is going to help you get home at discharge? Roger Diane  (neighbor)[phone]     How do you get to doctors appointments? family or friend will provide;car, drives self     Are you on dialysis? No     Do you take coumadin? No     Discharge Plan A Home     Discharge Plan B Home     DME Needed Upon Discharge  none        Physical Activity    On average, how many days per week do you engage in moderate to strenuous exercise (like a brisk walk)? 0 days     On average, how many minutes do you engage in exercise at this level? 0 min        Financial Resource Strain    How hard is it for you to pay for the very basics like food, housing, medical care, and heating? Not very hard        Housing Stability    In the last 12 months, was there a time when you were not able to pay the mortgage or rent on time? No     In the last 12 months, how many places have you lived? 2     In the last 12 months, was there a time when you did not have a steady place to sleep or slept in a shelter (including now)? No        Transportation Needs    In the past 12 months, has lack of transportation kept you from medical appointments or from getting medications? No     In the past 12 months, has lack of transportation kept you from meetings, work, or from getting things needed for daily  living? No        Food Insecurity    Within the past 12 months, you worried that your food would run out before you got the money to buy more. Never true     Within the past 12 months, the food you bought just didn't last and you didn't have money to get more. Never true        Stress    Do you feel stress - tense, restless, nervous, or anxious, or unable to sleep at night because your mind is troubled all the time - these days? To some extent        Social Connections    In a typical week, how many times do you talk on the phone with family, friends, or neighbors? More than three times a week     How often do you get together with friends or relatives? More than three times a week     How often do you attend Mu-ism or Jehovah's witness services? Never     Do you belong to any clubs or organizations such as Mu-ism groups, unions, fraternal or athletic groups, or school groups? No     How often do you attend meetings of the clubs or organizations you belong to? Never     Are you , , , , never , or living with a partner? Never         Alcohol Use    Q1: How often do you have a drink containing alcohol? Never     Q2: How many drinks containing alcohol do you have on a typical day when you are drinking? Patient does not drink

## 2023-04-11 NOTE — ASSESSMENT & PLAN NOTE
Episode of rectal bleeding today, given 80 mg Protonix IV in ED, most likely related to severe constipation and proctitis, obtain stool for occult blood, monitor continued rectal bleeding, Typed and crossed for blood, H&H q.6 hours x6, iron studies, hold apixaban, consult Gastroenterology for evaluation, monitor on med tele, clear liquid diet until seen by Gastroenterology, monitor vital signs q.4 hours, Zofran as needed for nausea, acetaminophen for pain, patient states she does not want anything else for pain

## 2023-04-11 NOTE — H&P
[facility] - Emergency Dept  Hospital Medicine  History & Physical    Patient Name: Irene العراقي  MRN: [id number]  Patient Class: IP- Inpatient  Admission Date: 4/10/2023  Attending Physician: Osmani Bello MD   Primary Care Provider: BRYANT Chinchilla         Patient information was obtained from patient, caregiver / friend and ER records .     Subjective:     Principal Problem:Rectal bleeding    Chief Complaint:   Chief Complaint   Patient presents with    Rectal Bleeding     Rectal bleeding x2 hours, constipation, on blood thiners        HPI: Irene العراقي is an 82-year-old female with chronic medical problems including paroxysmal atrial fibrillation on apixaban, chronic HFpEF, constipation and hypertension who presents to the emergency room complaining of rectal bleeding.  Patient states that she has chronic constipation and takes MiraLax every other day.  She took some at about 10:00 a.m. this morning.  Later that morning she started having some crampy abdominal pain across her lower abdomen especially left lower quadrant and went to the bathroom and had a small amount of bright red blood in the toilet.  Later she started having more abdominal pain, gas and liquid stools. Associated symptoms are lightheadedness, rectal burning and difficulty voiding.  She said she is never had bleeding before or difficulty voiding.  She came down from [place] 2 years ago to take care of her twin sister that has never been able to go back since COVID.  She follows with cardiology and a PCP here in [place] on spironolactone for hypertension, apixaban and sotalol for paroxysmal atrial fibrillation and spironolactone for hypertension.  She denies dysuria, fevers, chills, shortness of breath or chest pain, nausea or vomiting.     In the ED, H&H 11.9/36.0, platelets decreased to 140, WBC 6.84, BUN/CR 25/1.0, glucose 91, sodium 139, potassium 4.3, lipase 43, INR 1.0, 12 lead EKG with normal sinus rhythm,  ventricular rate 76 and , CT scan of pelvis with contrast shows fecal impaction and proctitis, cholelithiasis and sigmoid diverticulosis with no evidence of diverticulitis.  See report for full details.  Per ED physician rectal exam was negative for blood.  Temperature 97.6°, heart rate 83, blood pressure 125/63, respiratory rate 15 to 20 and O2 sat 95% on room air.  She was given 1 g acetaminophen, an 80 mg Protonix and 4.5 g Zosyn.  She will be admitted to the hospitalist service for further evaluation treatment with a consult Gastroenterology.      Past Medical History:   Diagnosis Date    Atrial fibrillation 01/25/2021    Hypertension        Past Surgical History:   Procedure Laterality Date    BREAST SURGERY         Review of patient's allergies indicates:   Allergen Reactions    Hydrocodone     Meperidine     Meperidine hcl Nausea And Vomiting    Persantine [dipyridamole]     Vicodin [hydrocodone-acetaminophen] Nausea And Vomiting     Scheduled Meds:   lactulose  30 g Oral TID    [START ON 4/11/2023] magnesium oxide  400 mg Oral Daily    mineral oil  1 enema Rectal Once    [START ON 4/11/2023] pantoprazole  40 mg Oral Daily    [START ON 4/11/2023] piperacillin-tazobactam (ZOSYN) IVPB  4.5 g Intravenous Q8H    piperacillin-tazobactam (ZOSYN) IVPB  4.5 g Intravenous ED 1 Time    [START ON 4/12/2023] polyethylene glycol  17 g Oral BID    sotaloL  40 mg Oral BID    spironolactone  25 mg Oral Daily     Continuous Infusions:  PRN Meds:.acetaminophen, acetaminophen, melatonin, naloxone, ondansetron, simethicone, sodium chloride 0.9%    Current Outpatient Medications on File Prior to Encounter   Medication Sig    apixaban (ELIQUIS) 5 mg Tab Take 1 tablet (5 mg total) by mouth 2 (two) times daily.    magnesium oxide (MAG-OX) 400 mg (241.3 mg magnesium) tablet TAKE 1 TABLET BY MOUTH ONCE DAILY (Patient taking differently: Take 400 mg by mouth once daily.)    sotaloL (BETAPACE) 80 MG tablet  Take 0.5 tablets (40 mg total) by mouth 2 (two) times daily. (Patient taking differently: Take 40 mg by mouth once daily.)    spironolactone (ALDACTONE) 25 MG tablet Take 1 tablet (25 mg total) by mouth once daily.     Family History       Problem Relation (Age of Onset)    Cancer Mother, Father          Tobacco Use    Smoking status: Never    Smokeless tobacco: Never   Substance and Sexual Activity    Alcohol use: Not Currently    Drug use: Not Currently    Sexual activity: Not on file     Review of Systems   All other systems reviewed and are negative.  Twelve point review of systems obtained and negative except as stated above in HPI      Objective:     Vital Signs (Most Recent):  Temp: 97.6 °F (36.4 °C) (04/10/23 1545)  Pulse: 82 (04/10/23 2200)  Resp: 20 (04/10/23 2200)  BP: 133/63 (04/10/23 2200)  SpO2: 95 % (04/10/23 2200) Vital Signs (24h Range):  Temp:  [97.6 °F (36.4 °C)] 97.6 °F (36.4 °C)  Pulse:  [65-93] 82  Resp:  [15-20] 20  SpO2:  [95 %-98 %] 95 %  BP: (119-183)/(63-86) 133/63     Weight: 81.6 kg (180 lb)  Body mass index is 29.05 kg/m².    Physical Exam  Vitals and nursing note reviewed.   Constitutional:       General: She is awake.      Appearance: Normal appearance. She is well-developed and overweight. She is not ill-appearing or toxic-appearing.      Comments: Lying in bed awake alert she is a good historian she has a friend at bedside who helps her provide history.   HENT:      Head: Normocephalic.      Right Ear: Hearing and external ear normal.      Left Ear: Hearing and external ear normal.      Nose: Nose normal.      Mouth/Throat:      Lips: Pink.      Mouth: Mucous membranes are moist.   Eyes:      General: Lids are normal. Vision grossly intact. Gaze aligned appropriately.      Pupils: Pupils are equal, round, and reactive to light.   Cardiovascular:      Rate and Rhythm: Normal rate and regular rhythm.      Pulses: Normal pulses.      Heart sounds: Normal heart sounds. No murmur  heard.  Pulmonary:      Effort: Pulmonary effort is normal. No tachypnea or accessory muscle usage.      Breath sounds: Normal breath sounds and air entry.      Comments: On room air, respirations soft and even, converses easily.  Abdominal:      General: Abdomen is protuberant. Bowel sounds are decreased. There is no distension.      Palpations: Abdomen is soft.      Tenderness: There is abdominal tenderness.      Comments: Tenderness to palpation at mid lower abdomen and left lower quadrant, abdomen is rounded and soft with decreased bowel sounds.   Musculoskeletal:         General: Normal range of motion.      Cervical back: Normal range of motion and neck supple.      Right lower leg: No edema.      Left lower leg: No edema.   Skin:     General: Skin is warm and dry.      Capillary Refill: Capillary refill takes less than 2 seconds.   Neurological:      Mental Status: She is alert and oriented to person, place, and time.      GCS: GCS eye subscore is 4. GCS verbal subscore is 5. GCS motor subscore is 6.      Sensory: Sensation is intact.      Motor: Motor function is intact.   Psychiatric:         Attention and Perception: Attention and perception normal.         Mood and Affect: Mood is anxious. Affect is tearful.         Speech: Speech normal.         Behavior: Behavior normal. Behavior is cooperative.         Thought Content: Thought content normal.         Cognition and Memory: Cognition and memory normal.         Judgment: Judgment normal.       Significant Labs: All pertinent labs within the past 24 hours have been reviewed.    Bilirubin:   Recent Labs   Lab 04/10/23  1711   BILITOT 0.6     BMP:   Recent Labs   Lab 04/10/23  1711   GLU 91      K 4.3      CO2 23   BUN 25*   CREATININE 1.0   CALCIUM 9.8     CBC:   Recent Labs   Lab 04/10/23  1711   WBC 6.84   HGB 11.9*   HCT 36.0*   *     CMP:   Recent Labs   Lab 04/10/23  1711      K 4.3      CO2 23   GLU 91   BUN 25*    CREATININE 1.0   CALCIUM 9.8   PROT 6.7   ALBUMIN 4.0   BILITOT 0.6   ALKPHOS 88   AST 21   ALT 16   ANIONGAP 7*     Coagulation:   Recent Labs   Lab 04/10/23  1711   INR 1.0   APTT 25.7     Lipase:   Recent Labs   Lab 04/10/23  1711   LIPASE 43     Urine Studies:   Recent Labs   Lab 04/10/23  1914   COLORU Yellow   APPEARANCEUA Clear   PHUR 6.0   SPECGRAV 1.020   PROTEINUA Negative   GLUCUA Negative   KETONESU Negative   BILIRUBINUA Negative   OCCULTUA 3+*   NITRITE Negative   UROBILINOGEN Negative   LEUKOCYTESUR Negative   RBCUA 67*   WBCUA 1   BACTERIA Negative   SQUAMEPITHEL 0   HYALINECASTS 2*       Significant Imaging: I have reviewed all pertinent imaging results/findings within the past 24 hours.    CT Abdomen Pelvis With Contrast [[id number]]Collected: 04/10/23 1805     Unless otherwise stated, incidental findings do not require dedicated follow-up imaging.     FINDINGS: The liver, spleen, pancreas, kidneys, and adrenal glands appear within normal limits. Several tiny gallstones are noted in the gallbladder lumen. The gallbladder is otherwise unremarkable. There is no significant bile duct dilatation. There is a small hiatal hernia. The stomach is otherwise unremarkable. The small bowel is normal in caliber. There is no evidence of obstruction. There is a moderately large amount of stool distending the rectum consistent with fecal impaction. In addition, there is fairly extensive edema within the perirectal fascia and also mucosal hyperenhancement within the distended rectum consistent with proctitis. The remainder of the colon does not appear inflamed. It contains formed stool. There are multiple diverticuli in the sigmoid colon. There is no CT evidence of diverticulitis. Images through the lung bases show no abnormalities..     IMPRESSION:   CT findings consistent with fecal impaction and proctitis as described. Cholelithiasis. Sigmoid diverticulosis without evidence of diverticulitis.        Assessment/Plan:     * Rectal bleeding  Episode of rectal bleeding today, given 80 mg Protonix IV in ED, most likely related to severe constipation and proctitis, obtain stool for occult blood, monitor continued rectal bleeding, Typed and crossed for blood, H&H q.6 hours x6, iron studies, hold apixaban, consult Gastroenterology for evaluation, monitor on med tele, clear liquid diet until seen by Gastroenterology, monitor vital signs q.4 hours, Zofran as needed for nausea, acetaminophen for pain, patient states she does not want anything else for pain    Proctitis  CT scan with proctitis, given 4.5 g Zosyn in ED, we will continue every 8 hours, consult Gastroenterology for evaluation, pain management with acetaminophen per patient request      Constipation  Mineral oil enema tonight, start lactulose 30 g t.i.d. for 4 doses, reassess in a.m.      Current use of long term anticoagulation  Patient is on apixaban for paroxysmal atrial fibrillation, hold apixaban       Urinary retention  CTA showed large amount of urine and bladder, patient was in and out catheterization after CT scan with 600 cc output per nursing, bladder scan every 6 hours as needed if patient is unable to void, may have to place urinary catheter but most likely related to the constipation      Hypertension  Blood pressure under good control, most recent blood pressure 125/63, resume spironolactone      Paroxysmal atrial fibrillation  History of paroxysmal atrial fibrillation on apixaban and sotalol, 12 lead EKG with normal sinus rhythm, ventricular rate 76 and , monitor on telemetry, continue sotalol, hold apixaban        Diastolic dysfunction  Echocardiogram from July 2024 with mild aortic and mitral regurgitation, 60% ejection fraction and grade 1 left ventricular diastolic dysfunction, monitor daily weight and I/O      Thrombocytopenia  Platelets 140, looking back on lab work patient has had chronic thrombocytopenia as low as 67 in  January of 2021, repeat CBC in a.m.        VTE Risk Mitigation (From admission, onward)         Ordered     Reason for No Pharmacological VTE Prophylaxis  Once        Question Answer Comment   Reasons: Risk of Bleeding    Reasons: Active Bleeding        04/10/23 2211     IP VTE HIGH RISK PATIENT  Once         04/10/23 2211     Place sequential compression device  Until discontinued         04/10/23 2211                 Patient was examined at 2134    Code discussion with patient and/or caregiver regarding intubation, CPR, medications and emergency life support.  Patient elected to be: FULL CODE     DO NOT RESUSCITATE      LIMITED RESUSCITATION *(  )*          Jennifer Casey NP  Department of Hospital Medicine  [facility] [place] [facility] - Emergency Dept

## 2023-04-11 NOTE — ASSESSMENT & PLAN NOTE
History of paroxysmal atrial fibrillation on apixaban and sotalol, 12 lead EKG with normal sinus rhythm, ventricular rate 76 and , monitor on telemetry, continue sotalol, hold apixaban

## 2023-04-11 NOTE — PT/OT/SLP PROGRESS
Physical Therapy      Patient Name:  Irene العراقي   MRN:  [id number]    Patient not seen today secondary to Other (Comment) (declined due to abdominal discomfort, [facility] Will follow-up 4/12/23.

## 2023-04-11 NOTE — HPI
Irene العراقي is an 82-year-old female with chronic medical problems including paroxysmal atrial fibrillation on apixaban, chronic HFpEF, constipation and hypertension who presents to the emergency room complaining of rectal bleeding.  Patient states that she has chronic constipation and takes MiraLax every other day.  She took some at about 10:00 a.m. this morning.  Later that morning she started having some crampy abdominal pain across her lower abdomen especially left lower quadrant and went to the bathroom and had a small amount of bright red blood in the toilet.  Later she started having more abdominal pain, gas and liquid stools. Associated symptoms are lightheadedness, rectal burning and difficulty voiding.  She said she is never had bleeding before or difficulty voiding.  She came down from [place] 2 years ago to take care of her twin sister that has never been able to go back since COVID.  She follows with cardiology and a PCP here in [place] on spironolactone for hypertension, apixaban and sotalol for paroxysmal atrial fibrillation and spironolactone for hypertension.  She denies dysuria, fevers, chills, shortness of breath or chest pain, nausea or vomiting.     In the ED, H&H 11.9/36.0, platelets decreased to 140, WBC 6.84, BUN/CR 25/1.0, glucose 91, sodium 139, potassium 4.3, lipase 43, INR 1.0, 12 lead EKG with normal sinus rhythm, ventricular rate 76 and , CT scan of pelvis with contrast shows fecal impaction and proctitis, cholelithiasis and sigmoid diverticulosis with no evidence of diverticulitis.  See report for full details.  Per ED physician rectal exam was negative for blood.  Temperature 97.6°, heart rate 83, blood pressure 125/63, respiratory rate 15 to 20 and O2 sat 95% on room air.  She was given 1 g acetaminophen, an 80 mg Protonix and 4.5 g Zosyn.  She will be admitted to the hospitalist service for further evaluation treatment with a consult Gastroenterology.

## 2023-04-11 NOTE — PT/OT/SLP EVAL
Occupational Therapy   Evaluation    Name: Irene العراقي  MRN: [id number]  Admitting Diagnosis: Rectal bleeding  Recent Surgery: * No surgery found *      Recommendations:     Discharge Recommendations: home health OT   Discharge Equipment Recommendations:  walker, rolling  Barriers to discharge:  Decreased caregiver support; primary caregiver for twin sister    Assessment:     Irene العراقي is a 82 y.o. female with a medical diagnosis of Rectal bleeding.  Performance deficits affecting function: weakness, impaired sensation, impaired endurance, impaired self care skills, pain, gait instability, impaired functional mobility.  Patient reports pain with a lot of movement. Reluctant to move much.     Rehab Prognosis: Good; patient would benefit from acute skilled OT services to address these deficits and reach maximum level of function.       Plan:     Patient to be seen 5 x/week to address the above listed problems via self-care/home management, therapeutic activities, therapeutic exercises  Plan of Care Expires: 05/11/23  Plan of Care Reviewed with: patient    Subjective     Chief Complaint: abdominal pain  Patient/Family Comments/goals: return home    Occupational Profile:  Living Environment: lives with sister in sister's home; came to [place] over two years ago to assist twin sister  Previous level of function: performed self care without difficulty  Equipment Used at Home: none (has rolling walker)  Assistance upon Discharge: nephew lives nearby    Pain/Comfort:  Pain Rating 1:  (did not rate)  Location 1: abdomen    Patients cultural, spiritual, Hinduism conflicts given the current situation: no    Objective:     Communicated with: nurse prior to session.  Patient found supine with araya catheter, peripheral IV upon OT entry to room.    General Precautions: Standard, fall  Orthopedic Precautions: N/A  Braces: N/A  Respiratory Status: Room air    Occupational Performance:    Bed Mobility:    Patient completed  Rolling/Turning to Left with  minimum assistance  Patient completed Scooting/Bridging with minimum assistance  Patient completed Supine to Sit with minimum assistance      Activities of Daily Living:  Grooming: stand by assistance/setup for washing hands and face while seated at edge of bed    Cognitive/Visual Perceptual:  Cognitive/Psychosocial Skills:     -       Oriented to: Person, Place, Time, and Situation   -       Follows Commands/attention:Follows multistep  commands  -       Communication: clear/fluent  -       Memory: No Deficits noted  -       Safety awareness/insight to disability: intact   -       Mood/Affect/Coping skills/emotional control: Appropriate to situation, Cooperative, and Pleasant    Physical Exam:  Upper Extremity Range of Motion:     -       Right Upper Extremity: WNL  -       Left Upper Extremity: WNL  Upper Extremity Strength:    -       Right Upper Extremity: WNL  -       Left Upper Extremity: WNL   Strength:    -       Right Upper Extremity: WNL  -       Left Upper Extremity: WNL  Fine Motor Coordination:    -       Intact    AMPAC 6 Click ADL:  AMPAC Total Score: 21    Treatment & Education:  Patient was educated on role of OT/POC, importance of activity and getting out of bed during hospitalization, safety during transfers, functional mobility, and ADLs and reviewed use of call bell for assistance.     Patient left HOB elevated with all lines intact, call button in reach, and bed alarm on    GOALS:   Multidisciplinary Problems       Occupational Therapy Goals          Problem: Occupational Therapy    Goal Priority Disciplines Outcome Interventions   Occupational Therapy Goal     OT, PT/OT     Description: Goals to be met by: 5/11/2023     Patient will increase functional independence with ADLs by performing:    UE Dressing with Modified [place].  LE Dressing with Modified [place].  Grooming while standing with Modified [place].  Toileting from toilet with Modified  [place] for hygiene and clothing management.                          History:     Past Medical History:   Diagnosis Date    Atrial fibrillation 01/25/2021    Hypertension          Past Surgical History:   Procedure Laterality Date    BREAST SURGERY         Time Tracking:     OT Date of Treatment: 04/11/23  OT Start Time: 1010  OT Stop Time: 1025  OT Total Time (min): 15 min    Billable Minutes:Evaluation 15    4/11/2023

## 2023-04-11 NOTE — SUBJECTIVE & OBJECTIVE
Interval History: No acute events overnight. Hgb stable today. She had rectal bleeding yesterday with significant straining. She has proctitis and severe constipation. She is having pain with any BM attempt this morning. High dose miralax today per Dr Thompson, enemas, hold off on endoscopy for now    Review of Systems   All other systems reviewed and are negative.  Objective:     Vital Signs (Most Recent):  Temp: 98.8 °F (37.1 °C) (04/11/23 0710)  Pulse: 74 (04/11/23 0900)  Resp: 18 (04/11/23 0710)  BP: (!) 99/51 (Dr Meeks notified) (04/11/23 0900)  SpO2: (!) 93 % (notified nurse chasity) (04/11/23 0710)   Vital Signs (24h Range):  Temp:  [97.6 °F (36.4 °C)-98.9 °F (37.2 °C)] 98.8 °F (37.1 °C)  Pulse:  [65-93] 74  Resp:  [15-20] 18  SpO2:  [93 %-98 %] 93 %  BP: ()/(51-86) 99/51     Weight: 81.1 kg (178 lb 12.7 oz)  Body mass index is 28.86 kg/m².    Intake/Output Summary (Last 24 hours) at 4/11/2023 1001  Last data filed at 4/11/2023 0441  Gross per 24 hour   Intake --   Output 1000 ml   Net -1000 ml      Physical Exam  Constitutional:       Appearance: Normal appearance. She is normal weight.   HENT:      Head: Normocephalic and atraumatic.      Nose: Nose normal.      Mouth/Throat:      Mouth: Mucous membranes are moist.   Eyes:      Conjunctiva/sclera: Conjunctivae normal.      Pupils: Pupils are equal, round, and reactive to light.   Cardiovascular:      Rate and Rhythm: Normal rate and regular rhythm.      Pulses: Normal pulses.      Heart sounds: Normal heart sounds. No murmur heard.    No friction rub. No gallop.   Pulmonary:      Effort: Pulmonary effort is normal.      Breath sounds: Normal breath sounds. No wheezing or rales.   Abdominal:      General: Abdomen is flat. Bowel sounds are normal. There is no distension.      Palpations: Abdomen is soft.      Tenderness: There is no abdominal tenderness. There is no guarding.   Musculoskeletal:         General: No swelling. Normal range of motion.       Cervical back: Normal range of motion and neck supple.   Skin:     General: Skin is warm and dry.   Neurological:      General: No focal deficit present.      Mental Status: She is alert.   Psychiatric:         Mood and Affect: Mood normal.         Thought Content: Thought content normal.         Judgment: Judgment normal.       Significant Labs: All pertinent labs within the past 24 hours have been reviewed.    Significant Imaging: I have reviewed all pertinent imaging results/findings within the past 24 hours.

## 2023-04-11 NOTE — SUBJECTIVE & OBJECTIVE
Past Medical History:   Diagnosis Date    Atrial fibrillation 01/25/2021    Hypertension        Past Surgical History:   Procedure Laterality Date    BREAST SURGERY         Review of patient's allergies indicates:   Allergen Reactions    Hydrocodone     Meperidine     Meperidine hcl Nausea And Vomiting    Persantine [dipyridamole]     Vicodin [hydrocodone-acetaminophen] Nausea And Vomiting       No current facility-administered medications on file prior to encounter.     Current Outpatient Medications on File Prior to Encounter   Medication Sig    apixaban (ELIQUIS) 5 mg Tab Take 1 tablet (5 mg total) by mouth 2 (two) times daily.    magnesium oxide (MAG-OX) 400 mg (241.3 mg magnesium) tablet TAKE 1 TABLET BY MOUTH ONCE DAILY (Patient taking differently: Take 400 mg by mouth once daily.)    sotaloL (BETAPACE) 80 MG tablet Take 0.5 tablets (40 mg total) by mouth 2 (two) times daily. (Patient taking differently: Take 40 mg by mouth once daily.)    spironolactone (ALDACTONE) 25 MG tablet Take 1 tablet (25 mg total) by mouth once daily.     Family History       Problem Relation (Age of Onset)    Cancer Mother, Father          Tobacco Use    Smoking status: Never    Smokeless tobacco: Never   Substance and Sexual Activity    Alcohol use: Not Currently    Drug use: Not Currently    Sexual activity: Not on file     Review of Systems   All other systems reviewed and are negative.  Twelve point review of systems obtained and negative except as stated above in HPI      Objective:     Vital Signs (Most Recent):  Temp: 97.6 °F (36.4 °C) (04/10/23 1545)  Pulse: 82 (04/10/23 2200)  Resp: 20 (04/10/23 2200)  BP: 133/63 (04/10/23 2200)  SpO2: 95 % (04/10/23 2200) Vital Signs (24h Range):  Temp:  [97.6 °F (36.4 °C)] 97.6 °F (36.4 °C)  Pulse:  [65-93] 82  Resp:  [15-20] 20  SpO2:  [95 %-98 %] 95 %  BP: (119-183)/(63-86) 133/63     Weight: 81.6 kg (180 lb)  Body mass index is 29.05 kg/m².    Physical Exam  Vitals and nursing note  reviewed.   Constitutional:       General: She is awake.      Appearance: Normal appearance. She is well-developed and overweight. She is not ill-appearing or toxic-appearing.      Comments: Lying in bed awake alert she is a good historian she has a friend at bedside who helps her provide history.   HENT:      Head: Normocephalic.      Right Ear: Hearing and external ear normal.      Left Ear: Hearing and external ear normal.      Nose: Nose normal.      Mouth/Throat:      Lips: Pink.      Mouth: Mucous membranes are moist.   Eyes:      General: Lids are normal. Vision grossly intact. Gaze aligned appropriately.      Pupils: Pupils are equal, round, and reactive to light.   Cardiovascular:      Rate and Rhythm: Normal rate and regular rhythm.      Pulses: Normal pulses.      Heart sounds: Normal heart sounds. No murmur heard.  Pulmonary:      Effort: Pulmonary effort is normal. No tachypnea or accessory muscle usage.      Breath sounds: Normal breath sounds and air entry.      Comments: On room air, respirations soft and even, converses easily.  Abdominal:      General: Abdomen is protuberant. Bowel sounds are decreased. There is no distension.      Palpations: Abdomen is soft.      Tenderness: There is abdominal tenderness.      Comments: Tenderness to palpation at mid lower abdomen and left lower quadrant, abdomen is rounded and soft with decreased bowel sounds.   Musculoskeletal:         General: Normal range of motion.      Cervical back: Normal range of motion and neck supple.      Right lower leg: No edema.      Left lower leg: No edema.   Skin:     General: Skin is warm and dry.      Capillary Refill: Capillary refill takes less than 2 seconds.   Neurological:      Mental Status: She is alert and oriented to person, place, and time.      GCS: GCS eye subscore is 4. GCS verbal subscore is 5. GCS motor subscore is 6.      Sensory: Sensation is intact.      Motor: Motor function is intact.   Psychiatric:          Attention and Perception: Attention and perception normal.         Mood and Affect: Mood is anxious. Affect is tearful.         Speech: Speech normal.         Behavior: Behavior normal. Behavior is cooperative.         Thought Content: Thought content normal.         Cognition and Memory: Cognition and memory normal.         Judgment: Judgment normal.         CRANIAL NERVES     CN III, IV, VI   Pupils are equal, round, and reactive to light.     Significant Labs: All pertinent labs within the past 24 hours have been reviewed.    Bilirubin:   Recent Labs   Lab 04/10/23  1711   BILITOT 0.6     BMP:   Recent Labs   Lab 04/10/23  1711   GLU 91      K 4.3      CO2 23   BUN 25*   CREATININE 1.0   CALCIUM 9.8     CBC:   Recent Labs   Lab 04/10/23  1711   WBC 6.84   HGB 11.9*   HCT 36.0*   *     CMP:   Recent Labs   Lab 04/10/23  1711      K 4.3      CO2 23   GLU 91   BUN 25*   CREATININE 1.0   CALCIUM 9.8   PROT 6.7   ALBUMIN 4.0   BILITOT 0.6   ALKPHOS 88   AST 21   ALT 16   ANIONGAP 7*     Coagulation:   Recent Labs   Lab 04/10/23  1711   INR 1.0   APTT 25.7     Lipase:   Recent Labs   Lab 04/10/23  1711   LIPASE 43     Urine Studies:   Recent Labs   Lab 04/10/23  1914   COLORU Yellow   APPEARANCEUA Clear   PHUR 6.0   SPECGRAV 1.020   PROTEINUA Negative   GLUCUA Negative   KETONESU Negative   BILIRUBINUA Negative   OCCULTUA 3+*   NITRITE Negative   UROBILINOGEN Negative   LEUKOCYTESUR Negative   RBCUA 67*   WBCUA 1   BACTERIA Negative   SQUAMEPITHEL 0   HYALINECASTS 2*       Significant Imaging: I have reviewed all pertinent imaging results/findings within the past 24 hours.    CT Abdomen Pelvis With Contrast [[id number]]Collected: 04/10/23 8107     Unless otherwise stated, incidental findings do not require dedicated follow-up imaging.     FINDINGS: The liver, spleen, pancreas, kidneys, and adrenal glands appear within normal limits. Several tiny gallstones are noted in the gallbladder  lumen. The gallbladder is otherwise unremarkable. There is no significant bile duct dilatation. There is a small hiatal hernia. The stomach is otherwise unremarkable. The small bowel is normal in caliber. There is no evidence of obstruction. There is a moderately large amount of stool distending the rectum consistent with fecal impaction. In addition, there is fairly extensive edema within the perirectal fascia and also mucosal hyperenhancement within the distended rectum consistent with proctitis. The remainder of the colon does not appear inflamed. It contains formed stool. There are multiple diverticuli in the sigmoid colon. There is no CT evidence of diverticulitis. Images through the lung bases show no abnormalities..     IMPRESSION:   CT findings consistent with fecal impaction and proctitis as described. Cholelithiasis. Sigmoid diverticulosis without evidence of diverticulitis.

## 2023-04-11 NOTE — ASSESSMENT & PLAN NOTE
CTA showed large amount of urine and bladder, patient was in and out catheterization after CT scan with 600 cc output per nursing, bladder scan every 6 hours as needed if patient is unable to void, may have to place urinary catheter but most likely related to the constipation

## 2023-04-11 NOTE — CONSULTS
GASTROENTEROLOGY INPATIENT CONSULT NOTE  Patient Name: Irene العراقي  Patient MRN: [id number]  Patient : 1940    Admit Date: 4/10/2023  Service date: 2023    Reason for Consult: impaction / rectal bleeding    PCP: BRYANT Chinchilla    Chief Complaint   Patient presents with    Rectal Bleeding     Rectal bleeding x2 hours, constipation, on blood thiners       HPI: Patient is a 82 y.o. female with PMHx AFib (Eliquis), HTN, gallstones (GB still present), diverticulosis, chronic constipation presents for evaluation of constipation. Acute / chronic, intermittent, progressive over past few days. Has chroni issues w/ constipation and takes miralax qod and reports past fecal impaction. Had trace rectal bleeding w/ straining and came to ER.     CHART REVIEW:   Hg 11.5 (baseline 11-12ish)  CT Abd  - Nml liver, panc, biliary; small gallstones; small HH; constipation; ?proctitis?; tics    Past Medical History:  Past Medical History:   Diagnosis Date    Atrial fibrillation 2021    Hypertension         Past Surgical History:  Past Surgical History:   Procedure Laterality Date    BREAST SURGERY          Home Medications:  Medications Prior to Admission   Medication Sig Dispense Refill Last Dose    acetaminophen (TYLENOL) 500 MG tablet Take 500 mg by mouth nightly as needed for Pain.   2023    apixaban (ELIQUIS) 5 mg Tab Take 1 tablet (5 mg total) by mouth 2 (two) times daily. 180 tablet 3 4/10/2023    magnesium oxide (MAG-OX) 400 mg (241.3 mg magnesium) tablet TAKE 1 TABLET BY MOUTH ONCE DAILY (Patient taking differently: Take 400 mg by mouth once daily.) 90 tablet 3 4/10/2023    sotaloL (BETAPACE) 80 MG tablet Take 0.5 tablets (40 mg total) by mouth 2 (two) times daily. (Patient taking differently: Take 40 mg by mouth once daily.) 90 tablet 3 4/10/2023    spironolactone (ALDACTONE) 25 MG tablet Take 1 tablet (25 mg total) by mouth once daily. 90 tablet 3 4/10/2023       Inpatient Medications:    "lactulose  30 g Oral TID    magnesium oxide  400 mg Oral Daily    pantoprazole  40 mg Oral Daily    piperacillin-tazobactam (ZOSYN) IVPB  4.5 g Intravenous Q8H    [START ON 4/12/2023] polyethylene glycol  17 g Oral BID    sotaloL  40 mg Oral BID    spironolactone  25 mg Oral Daily     acetaminophen, acetaminophen, melatonin, naloxone, ondansetron, simethicone, sodium chloride 0.9%    Review of patient's allergies indicates:   Allergen Reactions    Hydrocodone     Meperidine     Meperidine hcl Nausea And Vomiting    Persantine [dipyridamole]     Vicodin [hydrocodone-acetaminophen] Nausea And Vomiting       Social History:   Social History     Occupational History    Not on file   Tobacco Use    Smoking status: Never    Smokeless tobacco: Never   Substance and Sexual Activity    Alcohol use: Not Currently    Drug use: Not Currently    Sexual activity: Not on file       Family History:   Family History   Problem Relation Age of Onset    Cancer Mother     Cancer Father        Review of Systems:  A 10 point review of systems was performed and was normal, except as mentioned in the HPI, including constitutional, HEENT, heme, lymph, cardiovascular, respiratory, gastrointestinal, genitourinary, neurologic, endocrine, psychiatric and musculoskeletal.      OBJECTIVE:    Physical Exam:  24 Hour Vital Sign Ranges: Temp:  [97.6 °F (36.4 °C)-98.9 °F (37.2 °C)] 98.8 °F (37.1 °C)  Pulse:  [65-93] 76  Resp:  [15-20] 18  SpO2:  [93 %-98 %] 93 %  BP: ()/(51-86) 117/56  Most recent vitals: BP (!) 117/56 (BP Location: Right arm, Patient Position: Lying) Comment: notified nurse Daniela  Pulse 76   Temp 98.8 °F (37.1 °C) (Oral)   Resp 18   Ht 5' 6" (1.676 m)   Wt 81.1 kg (178 lb 12.7 oz)   SpO2 (!) 93% Comment: notified nurse daniela  Breastfeeding No   BMI 28.86 kg/m²    GEN: well-developed, well-nourished, awake and alert, non-toxic appearing adult  HEENT: PERRL, sclera anicteric, oral mucosa pink and moist without " lesion  NECK: trachea midline; Good ROM  CV: regular rate and rhythm, no murmurs or gallops  RESP: clear to auscultation bilaterally, no wheezes, rhonci or rales  ABD: soft, non-tender, non-distended, normal bowel sounds  EXT: no swelling or edema, 2+ pulses distally  SKIN: no rashes or jaundice  PSYCH: normal affect  RECTAL (Chaperoned by Rn): ext hemorrhoids; nml tone; no blood; mild stool above reach    Labs:   Recent Labs     04/10/23  1711 04/11/23  0729   WBC 6.84 7.22   MCV 93 93   * 118*     Recent Labs     04/10/23  1711 04/11/23  0729    137   K 4.3 4.0    107   CO2 23 23   BUN 25* 26*   GLU 91 104     No results for input(s): ALB in the last 72 hours.    Invalid input(s): ALKP, SGOT, SGPT, TBIL, DBIL, TPRO  Recent Labs     04/10/23  1711   INR 1.0         Radiology Review:  CT Abdomen Pelvis With Contrast   Final Result            IMPRESSION / RECOMMENDATIONS:  82 y.o. female with PMHx AFib (Eliquis), HTN, gallstones (GB still present), diverticulosis, chronic constipation presents for evaluation of constipation and rectal bleeding w/ impaction / proctitis on imaging. No bleeding since admission or on exam. Discussed flex sig to evaluate further but patient does not want procedures. Will try bowel regimen to see if can get relief and revisit flex sig if any concerns or bleeding.     -High dose Miralax today  -Enemas today  -Increase Miralax to daily as outpatient  -If any signs of bleeding or concerns, revisit endoscopic evaluation     Thank you for this consult.    Vel Thompson III  4/11/2023  9:21 AM

## 2023-04-11 NOTE — UM SECONDARY REVIEW
Other (see comment) Initial front end review    Level of Care Issue    Approved Observation  Pt was approved as observation for GI bleed.  Pt was admitted as Inpatient. Message was sent via secure chat to Jennifer Casey NP, to inform of level of care.  Level of care not changed.

## 2023-04-11 NOTE — ASSESSMENT & PLAN NOTE
CT scan with proctitis, given 4.5 g Zosyn in ED, we will continue every 8 hours, consult Gastroenterology for evaluation, pain management with acetaminophen per patient request

## 2023-04-11 NOTE — ASSESSMENT & PLAN NOTE
- Hgb stable this am  - appreciate Dr Thompson's evaluation  - continue aggressive miralax  - enemas  - hold off on colonoscopy for now  - protonix po, trend CBC  - pain control, and proctitis management as below

## 2023-04-11 NOTE — PLAN OF CARE
04/11/23 0900   Patient Assessment/Suction   Level of Consciousness (AVPU) alert   Respiratory Effort Normal   Expansion/Accessory Muscles/Retractions no use of accessory muscles   All Lung Fields Breath Sounds clear;diminished   Rhythm/Pattern, Respiratory unlabored;pattern regular;depth regular   PRE-TX-O2   Device (Oxygen Therapy) room air   SpO2 95 %   Pulse Oximetry Type Intermittent   $ Pulse Oximetry - Multiple Charge Pulse Oximetry - Multiple   Oximetry Probe Site Intact;Changed;Assessed   Pulse 74   Resp 18   BP (!) 99/51  (Dr Meeks notified)   Education   $ Education DME Oxygen;15 min   Respiratory Evaluation   $ Care Plan Tech Time 15 min   $ Eval/Re-eval Charges Evaluation   Evaluation For New Orders

## 2023-04-11 NOTE — ASSESSMENT & PLAN NOTE
Echocardiogram from July 2024 with mild aortic and mitral regurgitation, 60% ejection fraction and grade 1 left ventricular diastolic dysfunction, monitor daily weight and I/O

## 2023-04-12 LAB
ANION GAP SERPL CALC-SCNC: 5 MMOL/L (ref 8–16)
BASOPHILS # BLD AUTO: 0.02 K/UL (ref 0–0.2)
BASOPHILS NFR BLD: 0.4 % (ref 0–1.9)
BUN SERPL-MCNC: 20 MG/DL (ref 8–23)
CALCIUM SERPL-MCNC: 8.9 MG/DL (ref 8.7–10.5)
CHLORIDE SERPL-SCNC: 110 MMOL/L (ref 95–110)
CO2 SERPL-SCNC: 23 MMOL/L (ref 23–29)
CREAT SERPL-MCNC: 1.4 MG/DL (ref 0.5–1.4)
DIFFERENTIAL METHOD: ABNORMAL
EOSINOPHIL # BLD AUTO: 0.1 K/UL (ref 0–0.5)
EOSINOPHIL NFR BLD: 2.5 % (ref 0–8)
ERYTHROCYTE [DISTWIDTH] IN BLOOD BY AUTOMATED COUNT: 12.6 % (ref 11.5–14.5)
EST. GFR  (NO RACE VARIABLE): 37.6 ML/MIN/1.73 M^2
GLUCOSE SERPL-MCNC: 91 MG/DL (ref 70–110)
HCT VFR BLD AUTO: 30.3 % (ref 37–48.5)
HCT VFR BLD AUTO: 31.2 % (ref 37–48.5)
HCT VFR BLD AUTO: 31.2 % (ref 37–48.5)
HGB BLD-MCNC: 10 G/DL (ref 12–16)
HGB BLD-MCNC: 10.3 G/DL (ref 12–16)
HGB BLD-MCNC: 10.4 G/DL (ref 12–16)
IMM GRANULOCYTES # BLD AUTO: 0.03 K/UL (ref 0–0.04)
IMM GRANULOCYTES NFR BLD AUTO: 0.6 % (ref 0–0.5)
LYMPHOCYTES # BLD AUTO: 0.9 K/UL (ref 1–4.8)
LYMPHOCYTES NFR BLD: 16.8 % (ref 18–48)
MAGNESIUM SERPL-MCNC: 2 MG/DL (ref 1.6–2.6)
MCH RBC QN AUTO: 31 PG (ref 27–31)
MCHC RBC AUTO-ENTMCNC: 33.3 G/DL (ref 32–36)
MCV RBC AUTO: 93 FL (ref 82–98)
MONOCYTES # BLD AUTO: 0.7 K/UL (ref 0.3–1)
MONOCYTES NFR BLD: 12.9 % (ref 4–15)
NEUTROPHILS # BLD AUTO: 3.5 K/UL (ref 1.8–7.7)
NEUTROPHILS NFR BLD: 66.8 % (ref 38–73)
NRBC BLD-RTO: 0 /100 WBC
PLATELET # BLD AUTO: 92 K/UL (ref 150–450)
PMV BLD AUTO: 10.9 FL (ref 9.2–12.9)
POTASSIUM SERPL-SCNC: 3.2 MMOL/L (ref 3.5–5.1)
RBC # BLD AUTO: 3.36 M/UL (ref 4–5.4)
SODIUM SERPL-SCNC: 138 MMOL/L (ref 136–145)
WBC # BLD AUTO: 5.29 K/UL (ref 3.9–12.7)

## 2023-04-12 PROCEDURE — 85014 HEMATOCRIT: CPT | Mod: 91 | Performed by: NURSE PRACTITIONER

## 2023-04-12 PROCEDURE — 63600175 PHARM REV CODE 636 W HCPCS: Performed by: NURSE PRACTITIONER

## 2023-04-12 PROCEDURE — 25000003 PHARM REV CODE 250: Performed by: STUDENT IN AN ORGANIZED HEALTH CARE EDUCATION/TRAINING PROGRAM

## 2023-04-12 PROCEDURE — 85025 COMPLETE CBC W/AUTO DIFF WBC: CPT | Performed by: NURSE PRACTITIONER

## 2023-04-12 PROCEDURE — 97161 PT EVAL LOW COMPLEX 20 MIN: CPT

## 2023-04-12 PROCEDURE — 25000003 PHARM REV CODE 250: Performed by: NURSE PRACTITIONER

## 2023-04-12 PROCEDURE — 97535 SELF CARE MNGMENT TRAINING: CPT

## 2023-04-12 PROCEDURE — 97530 THERAPEUTIC ACTIVITIES: CPT

## 2023-04-12 PROCEDURE — 85014 HEMATOCRIT: CPT | Performed by: NURSE PRACTITIONER

## 2023-04-12 PROCEDURE — 80048 BASIC METABOLIC PNL TOTAL CA: CPT | Performed by: NURSE PRACTITIONER

## 2023-04-12 PROCEDURE — 36415 COLL VENOUS BLD VENIPUNCTURE: CPT | Performed by: NURSE PRACTITIONER

## 2023-04-12 PROCEDURE — 85018 HEMOGLOBIN: CPT | Mod: 91 | Performed by: NURSE PRACTITIONER

## 2023-04-12 PROCEDURE — 63600175 PHARM REV CODE 636 W HCPCS: Performed by: STUDENT IN AN ORGANIZED HEALTH CARE EDUCATION/TRAINING PROGRAM

## 2023-04-12 PROCEDURE — 83735 ASSAY OF MAGNESIUM: CPT | Performed by: NURSE PRACTITIONER

## 2023-04-12 PROCEDURE — 85018 HEMOGLOBIN: CPT | Performed by: NURSE PRACTITIONER

## 2023-04-12 PROCEDURE — 97116 GAIT TRAINING THERAPY: CPT

## 2023-04-12 PROCEDURE — 12000002 HC ACUTE/MED SURGE SEMI-PRIVATE ROOM

## 2023-04-12 RX ORDER — POLYETHYLENE GLYCOL 3350 17 G/17G
102 POWDER, FOR SOLUTION ORAL ONCE
Status: COMPLETED | OUTPATIENT
Start: 2023-04-13 | End: 2023-04-13

## 2023-04-12 RX ADMIN — SODIUM CHLORIDE, SODIUM LACTATE, POTASSIUM CHLORIDE, AND CALCIUM CHLORIDE: .6; .31; .03; .02 INJECTION, SOLUTION INTRAVENOUS at 12:04

## 2023-04-12 RX ADMIN — PANTOPRAZOLE SODIUM 40 MG: 40 TABLET, DELAYED RELEASE ORAL at 05:04

## 2023-04-12 RX ADMIN — POLYETHYLENE GLYCOL 3350 17 G: 17 POWDER, FOR SOLUTION ORAL at 09:04

## 2023-04-12 RX ADMIN — SOTALOL HYDROCHLORIDE 40 MG: 80 TABLET ORAL at 09:04

## 2023-04-12 RX ADMIN — POTASSIUM BICARBONATE 50 MEQ: 977.5 TABLET, EFFERVESCENT ORAL at 08:04

## 2023-04-12 RX ADMIN — PIPERACILLIN AND TAZOBACTAM 4.5 G: .5; 4 INJECTION, POWDER, LYOPHILIZED, FOR SOLUTION INTRAVENOUS at 04:04

## 2023-04-12 RX ADMIN — MAGNESIUM OXIDE 400 MG (241.3 MG MAGNESIUM) TABLET 400 MG: at 09:04

## 2023-04-12 RX ADMIN — ACETAMINOPHEN 1000 MG: 500 TABLET, FILM COATED ORAL at 11:04

## 2023-04-12 RX ADMIN — PIPERACILLIN AND TAZOBACTAM 4.5 G: .5; 4 INJECTION, POWDER, LYOPHILIZED, FOR SOLUTION INTRAVENOUS at 08:04

## 2023-04-12 RX ADMIN — POLYETHYLENE GLYCOL 3350 17 G: 17 POWDER, FOR SOLUTION ORAL at 08:04

## 2023-04-12 RX ADMIN — SOTALOL HYDROCHLORIDE 40 MG: 80 TABLET ORAL at 08:04

## 2023-04-12 RX ADMIN — PIPERACILLIN AND TAZOBACTAM 4.5 G: .5; 4 INJECTION, POWDER, LYOPHILIZED, FOR SOLUTION INTRAVENOUS at 12:04

## 2023-04-12 NOTE — PT/OT/SLP EVAL
Physical Therapy Evaluation    Patient Name:  Irene العراقي   MRN:  [id number]    Recommendations:     Discharge Recommendations: home health PT   Discharge Equipment Recommendations: none   Barriers to discharge:  pain, decrease activity tolerance, high fall risk,     Assessment:     Irene العراقي is a 82 y.o. female admitted with a medical diagnosis of Rectal bleeding.  She presents with the following impairments/functional limitations: weakness, impaired endurance, impaired self care skills, impaired functional mobility, gait instability, impaired balance, decreased lower extremity function, decreased safety awareness, pain, impaired cardiopulmonary response to activity.    Pt found in bed in left side lying. Pt agreeable to visit. Upon standing patient reports small amount of diarrhea. Transitioned to bedside commode. Small amount more of diarrhea. Pt then ambulated 100 ft with RW and CGA. No loss of balance and mild shortness of breath. Agreeable to remain up in chair.     Rehab Prognosis: Fair; patient would benefit from acute skilled PT services to address these deficits and reach maximum level of function.    Recent Surgery: * No surgery found *      Plan:     During this hospitalization, patient to be seen 6 x/week to address the identified rehab impairments via gait training, therapeutic activities, therapeutic exercises, neuromuscular re-education and progress toward the following goals:    Plan of Care Expires:  05/12/23    Subjective     Chief Complaint: abdominal discomfort and painful diarrhea   Patient/Family Comments/goals: return home with her sister  Pain/Comfort:  Pain Rating 1: 0/10    Patients cultural, spiritual, Restoration conflicts given the current situation: no    Living Environment:  Pt lives with her sister in a one story home. 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 (+)   Prior to admission, patients level of function was  Independent.  Equipment used at home: none.  DME owned (not currently used): none.  Upon discharge, patient will have assistance from family.    Objective:     Communicated with RN prior to session.  Patient found left sidelying with peripheral IV, telemetry, araya catheter  upon PT entry to room.    General Precautions: Standard, fall  Orthopedic Precautions:N/A   Braces: N/A  Respiratory Status: Room air    Exams:  Cognitive Exam:  Patient is oriented to Person, Place, Time, and Situation  RLE ROM: WFL  RLE Strength: WFL  LLE ROM: WFL  LLE Strength: WFL    Functional Mobility:  Bed Mobility:     Supine to Sit: contact guard assistance  Transfers:     Sit to Stand:  contact guard assistance with rolling walker  Gait: 100 ft with RW and CGA      AM-PAC 6 CLICK MOBILITY  Total Score:18       Treatment & Education:  Pt educated on POC, discharge recommendation, benefit of RW at this time, importance of time OOB, safety with mobility, need for assist with mobility, use of call bell to seek assistance as needed and fall prevention      Patient left up in chair with all lines intact, call button in reach, and extender notified.    GOALS:   Multidisciplinary Problems       Physical Therapy Goals          Problem: Physical Therapy    Goal Priority Disciplines Outcome Goal Variances Interventions   Physical Therapy Goal     PT, PT/OT Ongoing, Progressing     Description: Goals to be met by: 23     Patient will increase functional independence with mobility by performin. Supine to sit with Independent  2. Sit to stand transfer with Supervision  3. Bed to chair transfer with Supervision using least restrictive assistive device  4. Gait  x 150 feet with Supervision using least restrictive assistive device                             History:     Past Medical History:   Diagnosis Date    Atrial fibrillation 2021    Hypertension        Past Surgical History:   Procedure Laterality Date    BREAST SURGERY          Time Tracking:     PT Received On: 04/12/23  PT Start Time: 0903     PT Stop Time: 0923  PT Total Time (min): 20 min     Billable Minutes: Evaluation 10 and Gait Training 10      04/12/2023

## 2023-04-12 NOTE — PLAN OF CARE
CM s/w patient at bedside to discuss therapy recommendations for home health at discharge. Patient adamantly refused home health and refused to provide patient choice. CM will discuss with PT/OT in am.

## 2023-04-12 NOTE — PT/OT/SLP PROGRESS
Occupational Therapy   Treatment    Name: Irene العراقي  MRN: [id number]  Admitting Diagnosis:  Rectal bleeding     The primary encounter diagnosis was Proctitis. Diagnoses of Rectal bleeding and Chest pain were also pertinent to this visit.    Recommendations:     Discharge Recommendations: home health OT  Discharge Equipment Recommendations:  walker, rolling  Barriers to discharge:   constipation    Assessment:     Irene العراقي is a 82 y.o. female with a medical diagnosis of Rectal bleeding.  She presents with Performance deficits affecting function are weakness, impaired endurance, impaired self care skills, impaired functional mobility, gait instability, impaired balance, decreased coordination, decreased lower extremity function, decreased safety awareness, pain.       Rehab Prognosis:  Good; patient would benefit from acute skilled OT services to address these deficits and reach maximum level of function.       Plan:     Patient to be seen 5 x/week to address the above listed problems via self-care/home management, therapeutic activities, therapeutic exercises  Plan of Care Expires: 05/11/23  Plan of Care Reviewed with: patient    Subjective     Chief Complaint: constipation  Patient/Family Comments/goals: to have a BM  Pain/Comfort:  Pain Rating 1:  (not rated)  Location - Orientation 1: lower  Location 1: perirectal  Pain Rating Post-Intervention 1:  (not rated)    Objective:     Communicated with: nurse prior to session.  Patient found  in bed, just given enema by nurse  with bed alarm, peripheral IV upon OT entry to room.    General Precautions: Standard, fall (clear liquid)    Orthopedic Precautions:N/A  Braces: N/A  Respiratory Status: Room air     Occupational Performance:     Bed Mobility:    Patient completed Scooting/Bridging with stand by assistance and with side rail  Patient completed Supine to Sit with stand by assistance and with side rail  Patient completed Sit to Supine with stand by assistance  and with side rail     Functional Mobility/Transfers:  Patient completed Sit <> Stand Transfer with contact guard assistance  with  hand-held assist and rolling walker   Patient completed Toilet Transfer Step Transfer technique with contact guard assistance and minimum assistance with  hand-held assist and rolling walker  Functional Mobility: ambulated cga with RW to sink and back to bed.    Activities of Daily Living:  Grooming: contact guard assistance washed hands, brushed dentures standing inside RW  Toileting: moderate assistance all aspects for brief management, clothes 2/2 constipation, unsteadiness;  small pieces of BM made, pt still constipated after enema         Treatment & Education:  See above  Purpose of  OT and POC    Patient left HOB elevated with all lines intact, call button in reach, bed alarm on, and nurse notified    GOALS:   Multidisciplinary Problems       Occupational Therapy Goals          Problem: Occupational Therapy    Goal Priority Disciplines Outcome Interventions   Occupational Therapy Goal     OT, PT/OT     Description: Goals to be met by: 5/11/2023     Patient will increase functional independence with ADLs by performing:    UE Dressing with Modified [place].  LE Dressing with Modified [place].  Grooming while standing with Modified [place].  Toileting from toilet with Modified [place] for hygiene and clothing management.                          Time Tracking:     OT Date of Treatment: 04/12/23  OT Start Time: 1506  OT Stop Time: 1539  OT Total Time (min): 33 min    Billable Minutes:Self Care/Home Management 20  Therapeutic Activity 13  Total Time 33    OT/HANNAH: OT          4/12/2023

## 2023-04-13 LAB
ANION GAP SERPL CALC-SCNC: 8 MMOL/L (ref 8–16)
BASOPHILS # BLD AUTO: 0.02 K/UL (ref 0–0.2)
BASOPHILS NFR BLD: 0.4 % (ref 0–1.9)
BUN SERPL-MCNC: 15 MG/DL (ref 8–23)
CALCIUM SERPL-MCNC: 8 MG/DL (ref 8.7–10.5)
CHLORIDE SERPL-SCNC: 106 MMOL/L (ref 95–110)
CO2 SERPL-SCNC: 21 MMOL/L (ref 23–29)
CREAT SERPL-MCNC: 1.6 MG/DL (ref 0.5–1.4)
DIFFERENTIAL METHOD: ABNORMAL
EOSINOPHIL # BLD AUTO: 0.2 K/UL (ref 0–0.5)
EOSINOPHIL NFR BLD: 4.4 % (ref 0–8)
ERYTHROCYTE [DISTWIDTH] IN BLOOD BY AUTOMATED COUNT: 12.5 % (ref 11.5–14.5)
EST. GFR  (NO RACE VARIABLE): 32 ML/MIN/1.73 M^2
GLUCOSE SERPL-MCNC: 85 MG/DL (ref 70–110)
HCT VFR BLD AUTO: 32.3 % (ref 37–48.5)
HGB BLD-MCNC: 10.8 G/DL (ref 12–16)
IMM GRANULOCYTES # BLD AUTO: 0.02 K/UL (ref 0–0.04)
IMM GRANULOCYTES NFR BLD AUTO: 0.4 % (ref 0–0.5)
LYMPHOCYTES # BLD AUTO: 1.5 K/UL (ref 1–4.8)
LYMPHOCYTES NFR BLD: 27.9 % (ref 18–48)
MAGNESIUM SERPL-MCNC: 2 MG/DL (ref 1.6–2.6)
MCH RBC QN AUTO: 31 PG (ref 27–31)
MCHC RBC AUTO-ENTMCNC: 33.4 G/DL (ref 32–36)
MCV RBC AUTO: 93 FL (ref 82–98)
MONOCYTES # BLD AUTO: 0.6 K/UL (ref 0.3–1)
MONOCYTES NFR BLD: 12.2 % (ref 4–15)
NEUTROPHILS # BLD AUTO: 2.9 K/UL (ref 1.8–7.7)
NEUTROPHILS NFR BLD: 54.7 % (ref 38–73)
NRBC BLD-RTO: 0 /100 WBC
PLATELET # BLD AUTO: 106 K/UL (ref 150–450)
PMV BLD AUTO: 10.8 FL (ref 9.2–12.9)
POTASSIUM SERPL-SCNC: 3.5 MMOL/L (ref 3.5–5.1)
RBC # BLD AUTO: 3.48 M/UL (ref 4–5.4)
SODIUM SERPL-SCNC: 135 MMOL/L (ref 136–145)
WBC # BLD AUTO: 5.23 K/UL (ref 3.9–12.7)

## 2023-04-13 PROCEDURE — 25000003 PHARM REV CODE 250: Performed by: STUDENT IN AN ORGANIZED HEALTH CARE EDUCATION/TRAINING PROGRAM

## 2023-04-13 PROCEDURE — 25000003 PHARM REV CODE 250: Performed by: NURSE PRACTITIONER

## 2023-04-13 PROCEDURE — 25000003 PHARM REV CODE 250: Performed by: INTERNAL MEDICINE

## 2023-04-13 PROCEDURE — 63600175 PHARM REV CODE 636 W HCPCS: Performed by: NURSE PRACTITIONER

## 2023-04-13 PROCEDURE — 63600175 PHARM REV CODE 636 W HCPCS: Performed by: STUDENT IN AN ORGANIZED HEALTH CARE EDUCATION/TRAINING PROGRAM

## 2023-04-13 PROCEDURE — 85025 COMPLETE CBC W/AUTO DIFF WBC: CPT | Performed by: NURSE PRACTITIONER

## 2023-04-13 PROCEDURE — 83735 ASSAY OF MAGNESIUM: CPT | Performed by: NURSE PRACTITIONER

## 2023-04-13 PROCEDURE — 12000002 HC ACUTE/MED SURGE SEMI-PRIVATE ROOM

## 2023-04-13 PROCEDURE — 80048 BASIC METABOLIC PNL TOTAL CA: CPT | Performed by: NURSE PRACTITIONER

## 2023-04-13 PROCEDURE — 36415 COLL VENOUS BLD VENIPUNCTURE: CPT | Performed by: NURSE PRACTITIONER

## 2023-04-13 RX ORDER — SODIUM CHLORIDE, SODIUM LACTATE, POTASSIUM CHLORIDE, CALCIUM CHLORIDE 600; 310; 30; 20 MG/100ML; MG/100ML; MG/100ML; MG/100ML
INJECTION, SOLUTION INTRAVENOUS CONTINUOUS
Status: ACTIVE | OUTPATIENT
Start: 2023-04-13 | End: 2023-04-14

## 2023-04-13 RX ORDER — SOTALOL HYDROCHLORIDE 80 MG/1
40 TABLET ORAL DAILY
Status: DISCONTINUED | OUTPATIENT
Start: 2023-04-14 | End: 2023-04-13

## 2023-04-13 RX ORDER — SOTALOL HYDROCHLORIDE 80 MG/1
40 TABLET ORAL 2 TIMES DAILY
Status: DISCONTINUED | OUTPATIENT
Start: 2023-04-13 | End: 2023-04-15

## 2023-04-13 RX ADMIN — POLYETHYLENE GLYCOL 3350 102 G: 17 POWDER, FOR SOLUTION ORAL at 08:04

## 2023-04-13 RX ADMIN — PIPERACILLIN AND TAZOBACTAM 4.5 G: .5; 4 INJECTION, POWDER, LYOPHILIZED, FOR SOLUTION INTRAVENOUS at 09:04

## 2023-04-13 RX ADMIN — SOTALOL HYDROCHLORIDE 40 MG: 80 TABLET ORAL at 11:04

## 2023-04-13 RX ADMIN — MAGNESIUM OXIDE 400 MG (241.3 MG MAGNESIUM) TABLET 400 MG: at 08:04

## 2023-04-13 RX ADMIN — PANTOPRAZOLE SODIUM 40 MG: 40 TABLET, DELAYED RELEASE ORAL at 05:04

## 2023-04-13 RX ADMIN — PIPERACILLIN AND TAZOBACTAM 4.5 G: .5; 4 INJECTION, POWDER, LYOPHILIZED, FOR SOLUTION INTRAVENOUS at 03:04

## 2023-04-13 RX ADMIN — POLYETHYLENE GLYCOL 3350 17 G: 17 POWDER, FOR SOLUTION ORAL at 09:04

## 2023-04-13 RX ADMIN — APIXABAN 2.5 MG: 2.5 TABLET, FILM COATED ORAL at 09:04

## 2023-04-13 RX ADMIN — PIPERACILLIN AND TAZOBACTAM 4.5 G: .5; 4 INJECTION, POWDER, LYOPHILIZED, FOR SOLUTION INTRAVENOUS at 12:04

## 2023-04-13 RX ADMIN — POLYETHYLENE GLYCOL 3350 17 G: 17 POWDER, FOR SOLUTION ORAL at 08:04

## 2023-04-13 RX ADMIN — SODIUM CHLORIDE, SODIUM LACTATE, POTASSIUM CHLORIDE, AND CALCIUM CHLORIDE: .6; .31; .03; .02 INJECTION, SOLUTION INTRAVENOUS at 08:04

## 2023-04-13 RX ADMIN — SOTALOL HYDROCHLORIDE 40 MG: 80 TABLET ORAL at 08:04

## 2023-04-13 RX ADMIN — POTASSIUM BICARBONATE 50 MEQ: 977.5 TABLET, EFFERVESCENT ORAL at 09:04

## 2023-04-13 NOTE — PT/OT/SLP PROGRESS
Occupational Therapy      Patient Name:  Irene العراقي   MRN:  [id number]    Patient not seen today secondary to Patient ill (pt with significant abdominal discomfort today); unable to participate.      4/13/2023

## 2023-04-13 NOTE — PROGRESS NOTES
Pharmacist Renal Dose Adjustment Note    Irene العراقي is a 82 y.o. female being treated with the medication SOTALOL 40 MG 2 TIMES DAILY    Patient Data:    Vital Signs (Most Recent):  Temp: 97.7 °F (36.5 °C) (04/13/23 1129)  Pulse: 69 (04/13/23 1129)  Resp: 16 (04/13/23 1129)  BP: (!) 148/68 (notified nurse massimo) (04/13/23 1129)  SpO2: 96 % (04/13/23 1129)   Vital Signs (72h Range):  Temp:  [97.3 °F (36.3 °C)-99.1 °F (37.3 °C)]   Pulse:  [61-93]   Resp:  [15-20]   BP: ()/(48-86)   SpO2:  [93 %-98 %]      Recent Labs   Lab 04/11/23  0729 04/12/23  0604 04/13/23  0418   CREATININE 1.2 1.4 1.6*     Serum creatinine: 1.6 mg/dL (H) 04/13/23 0418  Estimated creatinine clearance: 29.2 mL/min (A)    Medication:SOTALOL 40 MG 2 TIMES DAILY Will be changed to medication:SOTALOL  dose:40 MG frequency:DAILY.    The QTc result is 438 ms     Pharmacist's Name: Lexa Ford  Pharmacist's Extension: [phone]

## 2023-04-13 NOTE — PT/OT/SLP PROGRESS
Physical Therapy      Patient Name:  Irene العراقي   MRN:  [id number]    Patient not seen today secondary to Pain (abdominal pain/discomfort). Will follow-up 4/14/23.

## 2023-04-13 NOTE — PROGRESS NOTES
[facility] Medicine  Progress Note    Patient name: Irene العراقي  MRN: [id number]  Admit Date: 4/10/2023   LOS: 3 days     SUBJECTIVE:     Principal problem: Rectal bleeding, constipation     Interval History:      4/13- she still hasn't had a bm despite multiple enemas, miralax and attempt to disimpact.  Continuing high dose miralax.      4/12- patient is up to bedside chair, still no bowel movement despite enema yesterday.  Will order soap suds and disimpaction today.  Vitals stable, feels well.      Scheduled Meds:   magnesium oxide  400 mg Oral Daily    pantoprazole  40 mg Oral Daily    piperacillin-tazobactam (ZOSYN) IVPB  4.5 g Intravenous Q8H    polyethylene glycol  17 g Oral BID    [START ON 4/14/2023] sotaloL  40 mg Oral Daily     Continuous Infusions:   lactated ringers 100 mL/hr at 04/13/23 0844     PRN Meds:acetaminophen, acetaminophen, melatonin, morphine, naloxone, ondansetron, simethicone, sodium chloride 0.9%    Review of patient's allergies indicates:   Allergen Reactions    Hydrocodone     Meperidine     Meperidine hcl Nausea And Vomiting    Persantine [dipyridamole]     Vicodin [hydrocodone-acetaminophen] Nausea And Vomiting       Review of Systems: As per interval history    OBJECTIVE:     Vital Signs (Most Recent)  Temp: 97.7 °F (36.5 °C) (04/13/23 1129)  Pulse: 69 (04/13/23 1129)  Resp: 16 (04/13/23 1129)  BP: (!) 148/68 (notified nurse massimo) (04/13/23 1129)  SpO2: 96 % (04/13/23 1129)    Vital Signs Range (Last 24H):  Temp:  [97.5 °F (36.4 °C)-98.7 °F (37.1 °C)]   Pulse:  [64-76]   Resp:  [16-18]   BP: (114-148)/(60-71)   SpO2:  [95 %-98 %]     I & O (Last 24H):  Intake/Output Summary (Last 24 hours) at 4/13/2023 1331  Last data filed at 4/13/2023 1322  Gross per 24 hour   Intake 480 ml   Output 1750 ml   Net -1270 ml         Physical Exam:  General: Patient resting comfortably in no acute distress.   Eyes: No conjunctival injection. No scleral icterus.  ENT: Hearing  grossly intact. No discharge from ears. No nasal discharge.   Neck: Supple, trachea midline. No JVD  CVS: RRR. No LE edema BL  Lungs:  No tachypnea or accessory muscle use.  Clear to auscultation bilaterally  Abdomen:  Soft, nontender and nondistended.  No organomegaly  Neuro: AOx3. Moves all extremities. Follows commands. Responds appropriately   Skin:  No rash or erythema noted    Laboratory:  I have reviewed all pertinent lab results within the past 24 hours.    Diagnostic Results:  Labs: Reviewed  ECG: Reviewed  X-Ray: Reviewed    ASSESSMENT/PLAN:     81 yo female with hx of chronic constipation, diverticulosis, afib on elaquis who came in for rectal bleeding associated with constipation, likely impacted.      Active Hospital Problems    Diagnosis  POA    *Rectal bleeding [K62.5]  Unknown    Proctitis [K62.89]  Unknown    Constipation [K59.00]  Unknown    Urinary retention [R33.9]  Unknown    Hypertension [I10]  Yes    Current use of long term anticoagulation [Z79.01]  Not Applicable    Paroxysmal atrial fibrillation [I48.0]  Yes    Diastolic dysfunction [I51.89]  Yes    Thrombocytopenia [D69.6]  Yes      Resolved Hospital Problems   No resolved problems to display.         Plan:     Rectal bleeding  - Hgb stable this am  - appreciate Dr Thompson's evaluation  - continue miralax  - soap jessica enema today and attempt disimpaction  - hold off on flex sig for now, patient doesn't want invasive procedure if  its possible to avoid  - protonix po, trend CBC  - pain control, and proctitis management as below  - after discussing flex sig and explaining that this wouldn't be for screening alone, but that its possible intervention for severe constipation, she seems more open to the idea if necessary    Urinary retention  CTA showed large amount of urine and bladder, patient was in and out catheterization after CT scan with 600 cc output per nursing, bladder scan every 6 hours as needed if patient is unable to void, may  have to place urinary catheter but most likely related to the constipation      Constipation  Soap suds enema with disimpaction ineffective   Continue high dose Miralax and f/u GI recs  Will need scheduled bowel regimen going forward    Proctitis  - continue zosyn for proctitis  - address constipation    Hypertension  Blood pressure under good control, most recent blood pressure 125/63  Hold aldactone this am      Current use of long term anticoagulation  Patient is on apixaban for paroxysmal atrial fibrillation  Restart apixaban  today if ok with GI      Paroxysmal atrial fibrillation  History of paroxysmal atrial fibrillation on apixaban and sotalol, 12 lead EKG with normal sinus rhythm, ventricular rate 76 and , monitor on telemetry, continue sotalol, apixaban        Diastolic dysfunction  Echocardiogram from July 2024 with mild aortic and mitral regurgitation, 60% ejection fraction and grade 1 left ventricular diastolic dysfunction, monitor daily weight and I/O      Thrombocytopenia  Platelets 140, looking back on lab work patient has had chronic thrombocytopenia as low as 67 in January of 2021, repeat CBC in a.m.          VTE Risk Mitigation (From admission, onward)           Ordered     Reason for No Pharmacological VTE Prophylaxis  Once        Question Answer Comment   Reasons: Risk of Bleeding    Reasons: Active Bleeding        04/10/23 2211     IP VTE HIGH RISK PATIENT  Once         04/10/23 2211     Place sequential compression device  Until discontinued         04/10/23 2211                        Department Hospital Medicine  [facility]  Jhon Tam MD  Date of service: 04/13/2023

## 2023-04-14 LAB
ANION GAP SERPL CALC-SCNC: 6 MMOL/L (ref 8–16)
BASOPHILS # BLD AUTO: 0.03 K/UL (ref 0–0.2)
BASOPHILS NFR BLD: 0.6 % (ref 0–1.9)
BUN SERPL-MCNC: 12 MG/DL (ref 8–23)
CALCIUM SERPL-MCNC: 9.1 MG/DL (ref 8.7–10.5)
CHLORIDE SERPL-SCNC: 111 MMOL/L (ref 95–110)
CO2 SERPL-SCNC: 24 MMOL/L (ref 23–29)
CREAT SERPL-MCNC: 1.3 MG/DL (ref 0.5–1.4)
DIFFERENTIAL METHOD: ABNORMAL
EOSINOPHIL # BLD AUTO: 0.3 K/UL (ref 0–0.5)
EOSINOPHIL NFR BLD: 5.4 % (ref 0–8)
ERYTHROCYTE [DISTWIDTH] IN BLOOD BY AUTOMATED COUNT: 12.4 % (ref 11.5–14.5)
EST. GFR  (NO RACE VARIABLE): 41.1 ML/MIN/1.73 M^2
GLUCOSE SERPL-MCNC: 78 MG/DL (ref 70–110)
HCT VFR BLD AUTO: 31.3 % (ref 37–48.5)
HGB BLD-MCNC: 10.6 G/DL (ref 12–16)
IMM GRANULOCYTES # BLD AUTO: 0.01 K/UL (ref 0–0.04)
IMM GRANULOCYTES NFR BLD AUTO: 0.2 % (ref 0–0.5)
LYMPHOCYTES # BLD AUTO: 1.3 K/UL (ref 1–4.8)
LYMPHOCYTES NFR BLD: 27.4 % (ref 18–48)
MAGNESIUM SERPL-MCNC: 1.9 MG/DL (ref 1.6–2.6)
MCH RBC QN AUTO: 30.8 PG (ref 27–31)
MCHC RBC AUTO-ENTMCNC: 33.9 G/DL (ref 32–36)
MCV RBC AUTO: 91 FL (ref 82–98)
MONOCYTES # BLD AUTO: 0.6 K/UL (ref 0.3–1)
MONOCYTES NFR BLD: 12.7 % (ref 4–15)
NEUTROPHILS # BLD AUTO: 2.5 K/UL (ref 1.8–7.7)
NEUTROPHILS NFR BLD: 53.7 % (ref 38–73)
NRBC BLD-RTO: 0 /100 WBC
PLATELET # BLD AUTO: 105 K/UL (ref 150–450)
PMV BLD AUTO: 10.7 FL (ref 9.2–12.9)
POTASSIUM SERPL-SCNC: 4.1 MMOL/L (ref 3.5–5.1)
RBC # BLD AUTO: 3.44 M/UL (ref 4–5.4)
SODIUM SERPL-SCNC: 141 MMOL/L (ref 136–145)
WBC # BLD AUTO: 4.63 K/UL (ref 3.9–12.7)

## 2023-04-14 PROCEDURE — 36415 COLL VENOUS BLD VENIPUNCTURE: CPT | Performed by: NURSE PRACTITIONER

## 2023-04-14 PROCEDURE — 97116 GAIT TRAINING THERAPY: CPT

## 2023-04-14 PROCEDURE — 25000003 PHARM REV CODE 250: Performed by: NURSE PRACTITIONER

## 2023-04-14 PROCEDURE — 80048 BASIC METABOLIC PNL TOTAL CA: CPT | Performed by: NURSE PRACTITIONER

## 2023-04-14 PROCEDURE — 25000003 PHARM REV CODE 250: Performed by: INTERNAL MEDICINE

## 2023-04-14 PROCEDURE — 83735 ASSAY OF MAGNESIUM: CPT | Performed by: NURSE PRACTITIONER

## 2023-04-14 PROCEDURE — 12000002 HC ACUTE/MED SURGE SEMI-PRIVATE ROOM

## 2023-04-14 PROCEDURE — 63600175 PHARM REV CODE 636 W HCPCS: Performed by: NURSE PRACTITIONER

## 2023-04-14 PROCEDURE — 85025 COMPLETE CBC W/AUTO DIFF WBC: CPT | Performed by: NURSE PRACTITIONER

## 2023-04-14 PROCEDURE — 25000003 PHARM REV CODE 250: Performed by: STUDENT IN AN ORGANIZED HEALTH CARE EDUCATION/TRAINING PROGRAM

## 2023-04-14 PROCEDURE — 97530 THERAPEUTIC ACTIVITIES: CPT

## 2023-04-14 RX ADMIN — SOTALOL HYDROCHLORIDE 40 MG: 80 TABLET ORAL at 11:04

## 2023-04-14 RX ADMIN — PIPERACILLIN AND TAZOBACTAM 4.5 G: .5; 4 INJECTION, POWDER, LYOPHILIZED, FOR SOLUTION INTRAVENOUS at 01:04

## 2023-04-14 RX ADMIN — APIXABAN 2.5 MG: 2.5 TABLET, FILM COATED ORAL at 08:04

## 2023-04-14 RX ADMIN — POLYETHYLENE GLYCOL 3350 17 G: 17 POWDER, FOR SOLUTION ORAL at 08:04

## 2023-04-14 RX ADMIN — PIPERACILLIN AND TAZOBACTAM 4.5 G: .5; 4 INJECTION, POWDER, LYOPHILIZED, FOR SOLUTION INTRAVENOUS at 08:04

## 2023-04-14 RX ADMIN — POLYETHYLENE GLYCOL 3350 17 G: 17 POWDER, FOR SOLUTION ORAL at 11:04

## 2023-04-14 RX ADMIN — PANTOPRAZOLE SODIUM 40 MG: 40 TABLET, DELAYED RELEASE ORAL at 05:04

## 2023-04-14 RX ADMIN — ACETAMINOPHEN 1000 MG: 500 TABLET, FILM COATED ORAL at 01:04

## 2023-04-14 RX ADMIN — MAGNESIUM OXIDE 400 MG (241.3 MG MAGNESIUM) TABLET 400 MG: at 11:04

## 2023-04-14 RX ADMIN — PIPERACILLIN AND TAZOBACTAM 4.5 G: .5; 4 INJECTION, POWDER, LYOPHILIZED, FOR SOLUTION INTRAVENOUS at 04:04

## 2023-04-14 RX ADMIN — SOTALOL HYDROCHLORIDE 40 MG: 80 TABLET ORAL at 08:04

## 2023-04-14 RX ADMIN — APIXABAN 2.5 MG: 2.5 TABLET, FILM COATED ORAL at 11:04

## 2023-04-14 NOTE — PT/OT/SLP PROGRESS
Occupational Therapy      Patient Name:  Irene العراقي   MRN:  [id number]    Patient not seen today; patient ill/unwilling to participate secondary to abdominal discomfort.    4/14/2023

## 2023-04-14 NOTE — PT/OT/SLP PROGRESS
Physical Therapy Treatment    Patient Name:  Irene العراقي   MRN:  [id number]    Recommendations:     Discharge Recommendations: home  Discharge Equipment Recommendations: none  Barriers to discharge:  medical status    Assessment:     Irene العراقي is a 82 y.o. female admitted with a medical diagnosis of Rectal bleeding.  She presents with the following impairments/functional limitations: weakness, impaired endurance, impaired self care skills, impaired functional mobility, gait instability, impaired balance, decreased lower extremity function, decreased safety awareness, pain, impaired cardiopulmonary response to activity.    Pt found in bed in left side lying. Pt agreeable to visit. Pt noted to be soiled assisted to hygiene. Good standing balance at RW for optimal hygiene. Pt then ambulated 80 ft with RW and CGA. No loss of balance and no  shortness of breath. After ambulation pt noted to be soiled again and assisted with hygiene and brief change at end of session. Agreeable to remain up in chair     Rehab Prognosis: Fair; patient would benefit from acute skilled PT services to address these deficits and reach maximum level of function.    Recent Surgery: * No surgery found *      Plan:     During this hospitalization, patient to be seen 6 x/week to address the identified rehab impairments via gait training, therapeutic activities, therapeutic exercises, neuromuscular re-education and progress toward the following goals:    Plan of Care Expires:  05/14/23    Subjective     Chief Complaint: still haven't had a BM  Patient/Family Comments/goals: have a BM  Pain/Comfort:  Pain Rating 1: other (see comments) (did not quantify)  Location 1: abdomen  Pain Addressed 1: Reposition, Distraction, Cessation of Activity      Objective:     Communicated with RN prior to session.  Patient found left sidelying with telemetry upon PT entry to room.     General Precautions: Standard, fall  Orthopedic Precautions: N/A  Braces:  N/A  Respiratory Status: Room air     Functional Mobility:  Bed Mobility:     Supine to Sit: contact guard assistance  Transfers:     Sit to Stand:  contact guard assistance with rolling walker  Gait: 80 ft with RW and CGA      AM-PAC 6 CLICK MOBILITY          Treatment & Education:  Pt educated on POC, discharge recommendation, benefit of RW at this time, importance of time OOB, safety with mobility, need for assist with mobility, use of call bell to seek assistance as needed and fall prevention    Patient left up in chair with all lines intact and call button in reach..    GOALS:   Multidisciplinary Problems       Physical Therapy Goals          Problem: Physical Therapy    Goal Priority Disciplines Outcome Goal Variances Interventions   Physical Therapy Goal     PT, PT/OT Ongoing, Progressing     Description: Goals to be met by: 23     Patient will increase functional independence with mobility by performin. Supine to sit with Independent  2. Sit to stand transfer with Supervision  3. Bed to chair transfer with Supervision using least restrictive assistive device  4. Gait  x 150 feet with Supervision using least restrictive assistive device                             Time Tracking:     PT Received On: 23  PT Start Time: 0940     PT Stop Time: 1003  PT Total Time (min): 23 min     Billable Minutes: Gait Training 11 and Therapeutic Activity 12    Treatment Type: Treatment  PT/PTA: PT     Number of PTA visits since last PT visit: 0     2023

## 2023-04-14 NOTE — PROGRESS NOTES
"Gastroenterology    CC: constipation    S: pt w/o bm since last Friday.  Abdomen soft.  No abd pain.  Has used a lot of miralax/enemas etc.  Intermittent blood per rectum.    Past Medical History:   Diagnosis Date    Atrial fibrillation 01/25/2021    Hypertension        Review of Systems  General ROS: negative for chills, fever or weight loss  Cardiovascular ROS: no chest pain or dyspnea on exertion  Gastrointestinal ROS: no abdominal pain, + change in bowel habits,+bloody stools    Physical Examination  /60 mmHg  Pulse 67  Temp(Src) 97.9 °F (36.6 °C) (Oral)  Resp 15  Ht 5' 8" (1.727 m)  Wt 74.9 kg (165 lb 2 oz)  BMI 25.11 kg/m2  SpO2 100%  LMP  (LMP Unknown)  Breastfeeding? No  General appearance: alert, cooperative, no distress  HENT: Normocephalic, atraumatic, neck symmetrical, no nasal discharge   Lungs: clear to auscultation bilaterally, no dullness to percussion bilaterally  Heart: regular rate and rhythm without rub; no displacement of the PMI   Abdomen: soft, non-tender; bowel sounds normoactive; no organomegaly  Extremities: extremities symmetric; no clubbing, cyanosis, or edema  Neurologic: Alert and oriented X 3, normal strength  Rectal: poor tone, tags, blood on glove. No impaction palpable.    Labs:  Lab Results   Component Value Date    WBC 4.63 04/14/2023    HGB 10.6 (L) 04/14/2023    HCT 31.3 (L) 04/14/2023    MCV 91 04/14/2023     (L) 04/14/2023       BMP  Lab Results   Component Value Date     04/14/2023    K 4.1 04/14/2023     (H) 04/14/2023    CO2 24 04/14/2023    BUN 12 04/14/2023    CREATININE 1.3 04/14/2023    CALCIUM 9.1 04/14/2023    ANIONGAP 6 (L) 04/14/2023    EGFRNORACEVR 41.1 (A) 04/14/2023     Lab Results   Component Value Date    INR 1.0 04/10/2023    INR 1.3 07/23/2022    INR 1.1 02/09/2020         Imaging:  CT 4/10/23  IMPRESSION:   CT findings consistent with fecal impaction and proctitis as described. Cholelithiasis. Sigmoid diverticulosis without " evidence of diverticulitis.      Assessment:   82 y.o. female with suspected fecal impaction    Plan:  Tap water enemas in AM and flex    Domingo Rivera  [facility]  Cell: [phone]

## 2023-04-14 NOTE — PROGRESS NOTES
[facility] Medicine  Progress Note    Patient name: Irene العراقي  MRN: [id number]  Admit Date: 4/10/2023   LOS: 4 days     SUBJECTIVE:     Principal problem: Rectal bleeding, constipation     Interval History:      4/14- only liquid bm, she appears more uncomfortable today.      4/13- she still hasn't had a bm despite multiple enemas, miralax and attempt to disimpact.  Continuing high dose miralax.      4/12- patient is up to bedside chair, still no bowel movement despite enema yesterday.  Will order soap suds and disimpaction today.  Vitals stable, feels well.      Scheduled Meds:   apixaban  2.5 mg Oral BID    magnesium oxide  400 mg Oral Daily    pantoprazole  40 mg Oral Daily    piperacillin-tazobactam (ZOSYN) IVPB  4.5 g Intravenous Q8H    polyethylene glycol  17 g Oral BID    sotaloL  40 mg Oral BID     Continuous Infusions:      PRN Meds:acetaminophen, acetaminophen, melatonin, morphine, naloxone, ondansetron, simethicone, sodium chloride 0.9%    Review of patient's allergies indicates:   Allergen Reactions    Hydrocodone     Meperidine     Meperidine hcl Nausea And Vomiting    Persantine [dipyridamole]     Vicodin [hydrocodone-acetaminophen] Nausea And Vomiting       Review of Systems: As per interval history    OBJECTIVE:     Vital Signs (Most Recent)  Temp: 98.5 °F (36.9 °C) (04/14/23 0800)  Pulse: 74 (04/14/23 0800)  Resp: 18 (04/14/23 0800)  BP: 134/78 (04/14/23 1105)  SpO2: 96 % (04/14/23 0800)    Vital Signs Range (Last 24H):  Temp:  [97.7 °F (36.5 °C)-98.5 °F (36.9 °C)]   Pulse:  [59-74]   Resp:  [16-18]   BP: (131-161)/(60-78)   SpO2:  [94 %-97 %]     I & O (Last 24H):  Intake/Output Summary (Last 24 hours) at 4/14/2023 1141  Last data filed at 4/14/2023 0758  Gross per 24 hour   Intake 240 ml   Output 3000 ml   Net -2760 ml         Physical Exam:  General: Patient appears uncomfortable, slightly distressed.   Eyes: No conjunctival injection. No scleral icterus.  ENT: Hearing  grossly intact. No discharge from ears. No nasal discharge.   Neck: Supple, trachea midline. No JVD  CVS: RRR. No LE edema BL  Lungs:  No tachypnea or accessory muscle use.  Clear to auscultation bilaterally  Abdomen:  Soft and nondistended.    Neuro: AOx3. Moves all extremities. Follows commands. Responds appropriately   Skin:  No rash or erythema noted    Laboratory:  I have reviewed all pertinent lab results within the past 24 hours.    Diagnostic Results:  Labs: Reviewed  ECG: Reviewed  X-Ray: Reviewed    ASSESSMENT/PLAN:     83 yo female with hx of chronic constipation, diverticulosis, afib on elaquis who came in for rectal bleeding associated with constipation, likely impacted.      Active Hospital Problems    Diagnosis  POA    *Rectal bleeding [K62.5]  Unknown    Proctitis [K62.89]  Unknown    Constipation [K59.00]  Unknown    Urinary retention [R33.9]  Unknown    Hypertension [I10]  Yes    Current use of long term anticoagulation [Z79.01]  Not Applicable    Paroxysmal atrial fibrillation [I48.0]  Yes    Diastolic dysfunction [I51.89]  Yes    Thrombocytopenia [D69.6]  Yes      Resolved Hospital Problems   No resolved problems to display.         Plan:     Rectal bleeding  - Hgb stable this am  - appreciate Dr Thompson's evaluation  - continue miralax  - soap jessica enema 4/12 and attempt disimpaction  - held off on flex sig initially as patient didn't want invasive procedure if  its possible to avoid  - protonix po, trend CBC  - pain control, and proctitis management as below  - after discussing flex sig and explaining that this wouldn't be for screening alone, but that its possible intervention for severe constipation, she would like to proceed with flex sig    Urinary retention  CTA showed large amount of urine and bladder, patient was in and out catheterization after CT scan with 600 cc output per nursing, bladder scan every 6 hours as needed if patient is unable to void, may have to place urinary catheter  but most likely related to the constipation      Constipation       Mineral oil and tap water enemas not effective  Soap suds enema with disimpaction ineffective   Continue high dose Miralax and f/u GI recs  Will need scheduled bowel regimen going forward    Proctitis  - continue zosyn for proctitis  - address constipation    Hypertension  Blood pressure under good control, most recent blood pressure 125/63  Hold aldactone this am      Current use of long term anticoagulation  Patient is on apixaban for paroxysmal atrial fibrillation  Restart apixaban  today if ok with GI      Paroxysmal atrial fibrillation  History of paroxysmal atrial fibrillation on apixaban and sotalol, 12 lead EKG with normal sinus rhythm, ventricular rate 76 and , monitor on telemetry, continue sotalol, apixaban        Diastolic dysfunction  Echocardiogram from July 2024 with mild aortic and mitral regurgitation, 60% ejection fraction and grade 1 left ventricular diastolic dysfunction, monitor daily weight and I/O      Thrombocytopenia  Platelets 140, looking back on lab work patient has had chronic thrombocytopenia as low as 67 in January of 2021, repeat CBC in a.m.          VTE Risk Mitigation (From admission, onward)           Ordered     apixaban tablet 2.5 mg  2 times daily         04/13/23 1409     Reason for No Pharmacological VTE Prophylaxis  Once        Question Answer Comment   Reasons: Risk of Bleeding    Reasons: Active Bleeding        04/10/23 2211     IP VTE HIGH RISK PATIENT  Once         04/10/23 2211     Place sequential compression device  Until discontinued         04/10/23 2211                        Department Hospital Medicine  [facility]  Jhon Tam MD  Date of service: 04/14/2023

## 2023-04-15 ENCOUNTER — ANESTHESIA (OUTPATIENT)
Dept: SURGERY | Facility: HOSPITAL | Age: 83
DRG: 377 | End: 2023-04-15
Payer: MEDICARE

## 2023-04-15 ENCOUNTER — ANESTHESIA EVENT (OUTPATIENT)
Dept: SURGERY | Facility: HOSPITAL | Age: 83
DRG: 377 | End: 2023-04-15
Payer: MEDICARE

## 2023-04-15 LAB
ANION GAP SERPL CALC-SCNC: 1 MMOL/L (ref 8–16)
BASOPHILS # BLD AUTO: 0.03 K/UL (ref 0–0.2)
BASOPHILS NFR BLD: 0.6 % (ref 0–1.9)
BUN SERPL-MCNC: 11 MG/DL (ref 8–23)
CALCIUM SERPL-MCNC: 8.8 MG/DL (ref 8.7–10.5)
CHLORIDE SERPL-SCNC: 113 MMOL/L (ref 95–110)
CO2 SERPL-SCNC: 23 MMOL/L (ref 23–29)
CREAT SERPL-MCNC: 1.3 MG/DL (ref 0.5–1.4)
DIFFERENTIAL METHOD: ABNORMAL
EOSINOPHIL # BLD AUTO: 0.2 K/UL (ref 0–0.5)
EOSINOPHIL NFR BLD: 4.9 % (ref 0–8)
ERYTHROCYTE [DISTWIDTH] IN BLOOD BY AUTOMATED COUNT: 12.3 % (ref 11.5–14.5)
EST. GFR  (NO RACE VARIABLE): 41.1 ML/MIN/1.73 M^2
GLUCOSE SERPL-MCNC: 76 MG/DL (ref 70–110)
GLUCOSE SERPL-MCNC: 89 MG/DL (ref 70–110)
HCT VFR BLD AUTO: 32 % (ref 37–48.5)
HGB BLD-MCNC: 10.7 G/DL (ref 12–16)
IMM GRANULOCYTES # BLD AUTO: 0.01 K/UL (ref 0–0.04)
IMM GRANULOCYTES NFR BLD AUTO: 0.2 % (ref 0–0.5)
LYMPHOCYTES # BLD AUTO: 1.1 K/UL (ref 1–4.8)
LYMPHOCYTES NFR BLD: 22.8 % (ref 18–48)
MAGNESIUM SERPL-MCNC: 1.9 MG/DL (ref 1.6–2.6)
MCH RBC QN AUTO: 30.4 PG (ref 27–31)
MCHC RBC AUTO-ENTMCNC: 33.4 G/DL (ref 32–36)
MCV RBC AUTO: 91 FL (ref 82–98)
MONOCYTES # BLD AUTO: 0.5 K/UL (ref 0.3–1)
MONOCYTES NFR BLD: 10.9 % (ref 4–15)
NEUTROPHILS # BLD AUTO: 2.9 K/UL (ref 1.8–7.7)
NEUTROPHILS NFR BLD: 60.6 % (ref 38–73)
NRBC BLD-RTO: 0 /100 WBC
PLATELET # BLD AUTO: 114 K/UL (ref 150–450)
PMV BLD AUTO: 10.6 FL (ref 9.2–12.9)
POTASSIUM SERPL-SCNC: 3.5 MMOL/L (ref 3.5–5.1)
RBC # BLD AUTO: 3.52 M/UL (ref 4–5.4)
SODIUM SERPL-SCNC: 137 MMOL/L (ref 136–145)
TROPONIN I SERPL HS-MCNC: 1375 PG/ML (ref 0–14.9)
TROPONIN I SERPL HS-MCNC: 3212.4 PG/ML (ref 0–14.9)
TROPONIN I SERPL HS-MCNC: 3330.2 PG/ML (ref 0–14.9)
TROPONIN I SERPL HS-MCNC: 9.6 PG/ML (ref 0–14.9)
WBC # BLD AUTO: 4.86 K/UL (ref 3.9–12.7)

## 2023-04-15 PROCEDURE — 25000003 PHARM REV CODE 250

## 2023-04-15 PROCEDURE — 63600175 PHARM REV CODE 636 W HCPCS: Performed by: NURSE PRACTITIONER

## 2023-04-15 PROCEDURE — 99223 PR INITIAL HOSPITAL CARE,LEVL III: ICD-10-PCS | Mod: ,,, | Performed by: INTERNAL MEDICINE

## 2023-04-15 PROCEDURE — 84484 ASSAY OF TROPONIN QUANT: CPT | Mod: 91 | Performed by: STUDENT IN AN ORGANIZED HEALTH CARE EDUCATION/TRAINING PROGRAM

## 2023-04-15 PROCEDURE — 63600175 PHARM REV CODE 636 W HCPCS: Performed by: STUDENT IN AN ORGANIZED HEALTH CARE EDUCATION/TRAINING PROGRAM

## 2023-04-15 PROCEDURE — 25000003 PHARM REV CODE 250: Performed by: NURSE PRACTITIONER

## 2023-04-15 PROCEDURE — 80048 BASIC METABOLIC PNL TOTAL CA: CPT | Performed by: NURSE PRACTITIONER

## 2023-04-15 PROCEDURE — 25000003 PHARM REV CODE 250: Performed by: STUDENT IN AN ORGANIZED HEALTH CARE EDUCATION/TRAINING PROGRAM

## 2023-04-15 PROCEDURE — 85025 COMPLETE CBC W/AUTO DIFF WBC: CPT | Performed by: NURSE PRACTITIONER

## 2023-04-15 PROCEDURE — 36415 COLL VENOUS BLD VENIPUNCTURE: CPT | Performed by: STUDENT IN AN ORGANIZED HEALTH CARE EDUCATION/TRAINING PROGRAM

## 2023-04-15 PROCEDURE — 99900031 HC PATIENT EDUCATION (STAT)

## 2023-04-15 PROCEDURE — 93005 ELECTROCARDIOGRAM TRACING: CPT | Performed by: SPECIALIST

## 2023-04-15 PROCEDURE — 99900035 HC TECH TIME PER 15 MIN (STAT)

## 2023-04-15 PROCEDURE — 21000000 HC CCU ICU ROOM CHARGE

## 2023-04-15 PROCEDURE — 25000003 PHARM REV CODE 250: Performed by: INTERNAL MEDICINE

## 2023-04-15 PROCEDURE — 84484 ASSAY OF TROPONIN QUANT: CPT | Mod: 91

## 2023-04-15 PROCEDURE — 93010 EKG 12-LEAD: ICD-10-PCS | Mod: ,,, | Performed by: SPECIALIST

## 2023-04-15 PROCEDURE — 94761 N-INVAS EAR/PLS OXIMETRY MLT: CPT

## 2023-04-15 PROCEDURE — 99223 1ST HOSP IP/OBS HIGH 75: CPT | Mod: ,,, | Performed by: INTERNAL MEDICINE

## 2023-04-15 PROCEDURE — 36415 COLL VENOUS BLD VENIPUNCTURE: CPT

## 2023-04-15 PROCEDURE — 93010 ELECTROCARDIOGRAM REPORT: CPT | Mod: ,,, | Performed by: SPECIALIST

## 2023-04-15 PROCEDURE — 36415 COLL VENOUS BLD VENIPUNCTURE: CPT | Performed by: NURSE PRACTITIONER

## 2023-04-15 PROCEDURE — 83735 ASSAY OF MAGNESIUM: CPT | Performed by: NURSE PRACTITIONER

## 2023-04-15 RX ORDER — METOPROLOL TARTRATE 1 MG/ML
5 INJECTION, SOLUTION INTRAVENOUS ONCE
Status: COMPLETED | OUTPATIENT
Start: 2023-04-15 | End: 2023-04-15

## 2023-04-15 RX ORDER — SOTALOL HYDROCHLORIDE 80 MG/1
40 TABLET ORAL 3 TIMES DAILY
Status: DISCONTINUED | OUTPATIENT
Start: 2023-04-15 | End: 2023-04-20 | Stop reason: HOSPADM

## 2023-04-15 RX ORDER — METOPROLOL TARTRATE 1 MG/ML
5 INJECTION, SOLUTION INTRAVENOUS
Status: DISCONTINUED | OUTPATIENT
Start: 2023-04-15 | End: 2023-04-20 | Stop reason: HOSPADM

## 2023-04-15 RX ORDER — DILTIAZEM HCL 1 MG/ML
0-15 INJECTION, SOLUTION INTRAVENOUS CONTINUOUS
Status: DISCONTINUED | OUTPATIENT
Start: 2023-04-15 | End: 2023-04-15

## 2023-04-15 RX ORDER — LUBIPROSTONE 24 UG/1
24 CAPSULE ORAL 2 TIMES DAILY WITH MEALS
Status: DISCONTINUED | OUTPATIENT
Start: 2023-04-15 | End: 2023-04-20 | Stop reason: HOSPADM

## 2023-04-15 RX ADMIN — POLYETHYLENE GLYCOL 3350 17 G: 17 POWDER, FOR SOLUTION ORAL at 08:04

## 2023-04-15 RX ADMIN — SOTALOL HYDROCHLORIDE 40 MG: 80 TABLET ORAL at 09:04

## 2023-04-15 RX ADMIN — ONDANSETRON HYDROCHLORIDE 4 MG: 2 INJECTION, SOLUTION INTRAMUSCULAR; INTRAVENOUS at 07:04

## 2023-04-15 RX ADMIN — SOTALOL HYDROCHLORIDE 40 MG: 80 TABLET ORAL at 08:04

## 2023-04-15 RX ADMIN — POTASSIUM BICARBONATE 50 MEQ: 977.5 TABLET, EFFERVESCENT ORAL at 02:04

## 2023-04-15 RX ADMIN — DILTIAZEM HYDROCHLORIDE 5 MG/HR: 100 INJECTION, POWDER, LYOPHILIZED, FOR SOLUTION INTRAVENOUS at 11:04

## 2023-04-15 RX ADMIN — METOPROLOL TARTRATE 5 MG: 1 INJECTION, SOLUTION INTRAVENOUS at 10:04

## 2023-04-15 RX ADMIN — PIPERACILLIN AND TAZOBACTAM 4.5 G: .5; 4 INJECTION, POWDER, LYOPHILIZED, FOR SOLUTION INTRAVENOUS at 11:04

## 2023-04-15 RX ADMIN — PIPERACILLIN AND TAZOBACTAM 4.5 G: .5; 4 INJECTION, POWDER, LYOPHILIZED, FOR SOLUTION INTRAVENOUS at 08:04

## 2023-04-15 RX ADMIN — MAGNESIUM OXIDE 400 MG (241.3 MG MAGNESIUM) TABLET 400 MG: at 09:04

## 2023-04-15 RX ADMIN — PIPERACILLIN AND TAZOBACTAM 4.5 G: .5; 4 INJECTION, POWDER, LYOPHILIZED, FOR SOLUTION INTRAVENOUS at 04:04

## 2023-04-15 RX ADMIN — SODIUM CHLORIDE, SODIUM LACTATE, POTASSIUM CHLORIDE, AND CALCIUM CHLORIDE 500 ML: .6; .31; .03; .02 INJECTION, SOLUTION INTRAVENOUS at 10:04

## 2023-04-15 RX ADMIN — METOPROLOL TARTRATE 5 MG: 1 INJECTION, SOLUTION INTRAVENOUS at 06:04

## 2023-04-15 NOTE — NURSING
Monitor room called pt' being sinus tachy. Pt. Had big enema care done at the same time and was emotional/stressful about her oncoming procedure. Dr.Dr mcbride was called due to fast heart rate (170bpm)     Dr. Mcbride ordered STAT Metoprolol 5mg, monitor room was called before giving medication. No other orders were ordered.

## 2023-04-15 NOTE — NURSING
Pt. remains in Afib with RVR. Denies chest pain. Gave Metoprolol 5 mg slow IVP as ordered .  on monitor. BP 90's over 60's after 2nd bolus of  cc.  Notified by Amelie Hubbard RN, that pt.will be transferring to Cardio  #[id number].  Pt's niece at bedside, pt. stable, AA&O x 4. Report called to WILDER Meade. At 1115 am, pt transferred to . #[id number] without incident. Pt. care turned over to WILDER Meade.

## 2023-04-15 NOTE — ANESTHESIA PREPROCEDURE EVALUATION
04/16/2023  Irene العراقي is a 82 y.o., female.      Results for orders placed or performed during the hospital encounter of 04/10/23   EKG 12-lead    Collection Time: 04/15/23  1:41 PM    Narrative    Test Reason : I48.91,    Vent. Rate : 062 BPM     Atrial Rate : 062 BPM     P-R Int : 128 ms          QRS Dur : 088 ms      QT Int : 422 ms       P-R-T Axes : 041 006 -11 degrees     QTc Int : 428 ms    Normal sinus rhythm with sinus arrhythmia  Nonspecific T wave abnormality  Abnormal ECG  When compared with ECG of 15-APR-2023 07:50,  Sinus rhythm has replaced Atrial fibrillation  Vent. rate has decreased BY  72 BPM  ST no longer depressed in Inferior leads  ST no longer depressed in Anterior-lateral leads  T wave inversion no longer evident in Lateral leads    Referred By: [email or web address]   SELF           Confirmed By:         Imaging Results          CT Abdomen Pelvis With Contrast (Final result)  Result time 04/10/23 19:06:37    Final result by Jose Uribe MD (04/10/23 19:06:37)                 Narrative:      EXAM: CT ABDOMEN PELVIS WITH CONTRAST    HISTORY: LLQ abdominal pain    COMPARISON: None    TECHNIQUE: Multiple axial 2 mm thick images were obtained through the abdomen and pelvis with IV contrast only.    This exam was performed according to our departmental dose-optimization program, which includes automated exposure control, adjustment of the mA and/or kV according to patient size and/or use of iterative reconstruction technique.    Unless otherwise stated, incidental findings do not require dedicated follow-up imaging.    FINDINGS: The liver, spleen, pancreas, kidneys, and adrenal glands appear within normal limits. Several tiny gallstones are noted in the gallbladder lumen. The gallbladder is otherwise unremarkable. There is no significant bile duct dilatation. There is a small hiatal hernia.  The stomach is otherwise unremarkable. The small bowel is normal in caliber. There is no evidence of obstruction. There is a moderately large amount of stool distending the rectum consistent with fecal impaction. In addition, there is fairly extensive edema within the perirectal fascia and also mucosal hyperenhancement within the distended rectum consistent with proctitis. The remainder of the colon does not appear inflamed. It contains formed stool. There are multiple diverticuli in the sigmoid colon. There is no CT evidence of diverticulitis. Images through the lung bases show no abnormalities..    IMPRESSION:   CT findings consistent with fecal impaction and proctitis as described. Cholelithiasis. Sigmoid diverticulosis without evidence of diverticulitis.    Electronically signed by:  Roger Uribe MD  4/10/2023 7:06 PM CDT Workstation: [id number]                               Lab Results   Component Value Date    WBC 6.43 04/16/2023    HGB 10.4 (L) 04/16/2023    HCT 31.6 (L) 04/16/2023    MCV 93 04/16/2023     (L) 04/16/2023     BMP  Lab Results   Component Value Date     04/16/2023    K 3.7 04/16/2023     04/16/2023    CO2 23 04/16/2023    BUN 14 04/16/2023    CREATININE 1.3 04/16/2023    CALCIUM 8.8 04/16/2023    ANIONGAP 6 (L) 04/16/2023    GLU 90 04/16/2023    GLU 76 04/15/2023    GLU 78 04/14/2023       Results for orders placed during the hospital encounter of 07/23/22    Echo    Interpretation Summary  · There is moderate aortic valve stenosis.  · Aortic valve area is 1.49 cm2; peak velocity is m/s; mean gradient is 26 mmHg.  · Mild aortic regurgitation.  · Mild mitral regurgitation.  · The left ventricle is normal in size with concentric remodeling and normal systolic function.  · The estimated ejection fraction is 60%.  · Grade I left ventricular diastolic dysfunction.  · Normal right ventricular size with normal right ventricular systolic function.  · Mild left atrial  enlargement.  · Mild tricuspid regurgitation.  · Intermediate central venous pressure (8 mmHg).  · The estimated PA systolic pressure is 34 mmHg.            Pre-op Assessment    I have reviewed the Patient Summary Reports.     I have reviewed the Nursing Notes. I have reviewed the NPO Status.   I have reviewed the Medications.     Review of Systems  Anesthesia Hx:  No problems with previous Anesthesia  Denies Family Hx of Anesthesia complications.   Denies Personal Hx of Anesthesia complications.   Social:  Non-Smoker, No Alcohol Use    Hematology/Oncology:     Oncology Normal    -- Anemia: Hematology Comments: Thrombocytopenia    Eliquis Therapy last dose 4/12/2023    EENT/Dental:EENT/Dental Normal   Cardiovascular:   Hypertension, well controlled Valvular problems/Murmurs, AS Dysrhythmias (Paroxysmal AFib. Patient was in sinus, however this morning she went into AFib.) atrial fibrillation ECG has been reviewed. Moderate aortic valve stenosis.   Pulmonary:   Pneumonia (h/o Covid pneumonia) HX of Acute respiratory failure with hypoxia and hypercarbia    No home oxygen therapy of inhaler use    Renal/:  Renal/ Normal     Hepatic/GI:  Hepatic/GI Normal Rectal bleeding, constipation.  History of hemorrhoids.  The patient reports hemorrhoidectomy in the past.   Musculoskeletal:  Musculoskeletal Normal    Neurological:  Neurology Normal  Neuro Symptoms of vertigo   Endocrine:  Endocrine Normal    Dermatological:  Skin Normal    Psych:   anxiety          Physical Exam  General: Well nourished, Cooperative, Alert and Oriented    Airway:  Mallampati: I   Mouth Opening: Normal  TM Distance: > 6 cm  Tongue: Normal  Neck ROM: Normal ROM    Dental:  Dentures    Chest/Lungs:  Clear to auscultation, Normal Respiratory Rate    Heart:  Rate: Normal  Rhythm: Regular Rhythm        Anesthesia Plan  Type of Anesthesia, risks & benefits discussed:    Anesthesia Type: Gen Natural Airway  Intra-op Monitoring Plan: Standard ASA  Monitors  Post Op Pain Control Plan:   (medical reason for not using multimodal pain management)  Induction:  IV  Informed Consent: Informed consent signed with the Patient and all parties understand the risks and agree with anesthesia plan.  All questions answered.   ASA Score: 3  Anesthesia Plan Notes:     Propofol     POM MASK      Ready For Surgery From Anesthesia Perspective.     .

## 2023-04-15 NOTE — NURSING
Pt. BP 96/64 with rhythm on monitor of Afib with RVR of 140-160's.  Pt. AA&O x 4, reports CP described as pressure that goes through to her back. Pt. Has history of Afib-w/RVR but has been SR with HR of 120's. Dr. Bello saw pt this am prior to shift change and ordered Metoprolol 5 mg for HTN and Abnormal rhythm.  Pt. now C/O nausea/vomiting.  Pt. Scheduled for Flex-Sig today around 11am due to rectal impaction that has not resolved with laxatives/enemas.  Notified nurse Nara in Endo of the above.

## 2023-04-15 NOTE — PROGRESS NOTES
[facility] Medicine  Progress Note    Patient name: Irene العراقي  MRN: [id number]  Admit Date: 4/10/2023   LOS: 5 days     SUBJECTIVE:     Principal problem: Rectal bleeding, constipation     Interval History:      4/15- patient went into afib/rvr early this morning, denied chest pain and shortness of breath at the time.  I was called around 0730 due to patient being diaphoretic, in and out of afib.  On my exam she was nauseated,  had vomited, was diaphoretic, HR was normal, BP was 94/50s, crackles to right lung which is new.  She also has chest tightness, no shortness of breath.  Her glucose is 89, will order trop/ekg to be safe, and cxr.      4/14- only liquid bm, she appears more uncomfortable today.      4/13- she still hasn't had a bm despite multiple enemas, miralax and attempt to disimpact.  Continuing high dose miralax.      4/12- patient is up to bedside chair, still no bowel movement despite enema yesterday.  Will order soap suds and disimpaction today.  Vitals stable, feels well.      Scheduled Meds:   magnesium oxide  400 mg Oral Daily    pantoprazole  40 mg Oral Daily    piperacillin-tazobactam (ZOSYN) IVPB  4.5 g Intravenous Q8H    polyethylene glycol  17 g Oral BID    sotaloL  40 mg Oral BID     Continuous Infusions:      PRN Meds:acetaminophen, acetaminophen, melatonin, metoprolol, morphine, naloxone, ondansetron, simethicone, sodium chloride 0.9%    Review of patient's allergies indicates:   Allergen Reactions    Hydrocodone     Meperidine     Meperidine hcl Nausea And Vomiting    Persantine [dipyridamole]     Vicodin [hydrocodone-acetaminophen] Nausea And Vomiting       Review of Systems: As per interval history    OBJECTIVE:     Vital Signs (Most Recent)  Temp: 98.7 °F (37.1 °C) (04/15/23 0522)  Pulse: 72 (04/15/23 0522)  Resp: 18 (04/15/23 0522)  BP: (!) 182/84 (med given) (04/15/23 0522)  SpO2: 98 % (04/15/23 0522)    Vital Signs Range (Last 24H):  Temp:  [97.6 °F (36.4  °C)-98.7 °F (37.1 °C)]   Pulse:  [59-74]   Resp:  [18]   BP: (129-182)/(70-92)   SpO2:  [96 %-98 %]     I & O (Last 24H):  Intake/Output Summary (Last 24 hours) at 4/15/2023 0751  Last data filed at 4/15/2023 0527  Gross per 24 hour   Intake 340 ml   Output 1801 ml   Net -1461 ml         Physical Exam:  General: Patient resting, anxious when she is awake.   Eyes: No conjunctival injection. No scleral icterus.  ENT: Hearing grossly intact. No discharge from ears. No nasal discharge.   Neck: Supple, trachea midline. No JVD  CVS: RRR. No LE edema BL  Lungs:  No tachypnea or accessory muscle use. RML and RLL Crackles noted   Abdomen:  Soft and nondistended.    Neuro: AOx3. Moves all extremities. Follows commands. Responds appropriately   Skin:  clammy. No rash or erythema noted    Laboratory:  I have reviewed all pertinent lab results within the past 24 hours.    Diagnostic Results:  Labs: Reviewed  ECG: Reviewed  X-Ray: Reviewed  CT abd/pelvis    ASSESSMENT/PLAN:     81 yo female with hx of chronic constipation, diverticulosis, afib on elaquis who came in for rectal bleeding associated with constipation, likely impacted.      Active Hospital Problems    Diagnosis  POA    *Rectal bleeding [K62.5]  Unknown    Proctitis [K62.89]  Unknown    Constipation [K59.00]  Unknown    Urinary retention [R33.9]  Unknown    Hypertension [I10]  Yes    Current use of long term anticoagulation [Z79.01]  Not Applicable    Paroxysmal atrial fibrillation [I48.0]  Yes    Diastolic dysfunction [I51.89]  Yes    Thrombocytopenia [D69.6]  Yes      Resolved Hospital Problems   No resolved problems to display.         Plan:     4/15-  AFIb/RVR  Nausea, vomiting, diaphoresis  Chest tightness  She was given IV metoprolol around 6-6:30  She was tachy and hypertensive at the time   HR improved, now slightly hypotensive  -Will get stat trop and ekg,  ml bolus  She vomited, now has crackles on exam.  Possibly aspirated  -cxr ordered, she is on  zosyn        Rectal bleeding  - Hgb stable this am  - appreciate Dr Thompson's evaluation  - continue miralax  - soap jessica enema 4/12 and attempt disimpaction  - held off on flex sig initially as patient didn't want invasive procedure if  its possible to avoid  - protonix po, trend CBC  - pain control, and proctitis management as below  - after discussing flex sig and explaining that this wouldn't be for screening alone, but that its possible intervention for severe constipation, she would like to proceed with flex sig    Urinary retention  CTA showed large amount of urine and bladder, patient was in and out catheterization after CT scan with 600 cc output per nursing, bladder scan every 6 hours as needed if patient is unable to void, may have to place urinary catheter but most likely related to the constipation      Constipation       Mineral oil and tap water enemas not effective  Soap suds enema with disimpaction ineffective   Continue high dose Miralax and f/u GI recs  Will need scheduled bowel regimen going forward    Proctitis  - continue zosyn for proctitis  - address constipation    Hypertension  Blood pressure under good control, most recent blood pressure 125/63  Hold aldactone this am      Current use of long term anticoagulation  Patient is on apixaban for paroxysmal atrial fibrillation  Restart apixaban  today if ok with GI      Paroxysmal atrial fibrillation  History of paroxysmal atrial fibrillation on apixaban and sotalol, 12 lead EKG with normal sinus rhythm, ventricular rate 76 and , monitor on telemetry, continue sotalol, apixaban        Diastolic dysfunction  Echocardiogram from July 2024 with mild aortic and mitral regurgitation, 60% ejection fraction and grade 1 left ventricular diastolic dysfunction, monitor daily weight and I/O      Thrombocytopenia  Platelets 140, looking back on lab work patient has had chronic thrombocytopenia as low as 67 in January of 2021, repeat CBC in  ruben          VTE Risk Mitigation (From admission, onward)           Ordered     Reason for No Pharmacological VTE Prophylaxis  Once        Question Answer Comment   Reasons: Risk of Bleeding    Reasons: Active Bleeding        04/10/23 2211     IP VTE HIGH RISK PATIENT  Once         04/10/23 2211     Place sequential compression device  Until discontinued         04/10/23 2211                        Department Hospital Medicine  [facility]  Jhon Tam MD  Date of service: 04/15/2023

## 2023-04-15 NOTE — CARE UPDATE
"04/15 0600 hours Called by nurse that patient's HR is 123-130 sinus tach and that she is mildly hypertensive. The patient denied CP/SOB. No orthopnea. Feels "Scared about my procedure this morning".   Lungs: no adventitious   Heart tachy, regular  Abdo soft  Impression Anxiety; Sinus tach; Hypertension.  Plan: Metoprolol 5 mg ivp. Discussed patient's feeling with her. Felt "Much better" afterwards -- discussing her fears.  "

## 2023-04-15 NOTE — CONSULTS
[facility]  Department of Cardiology  Consult Note      PATIENT NAME: Irene العراقي  MRN: [id number]  TODAY'S DATE: 04/15/2023  ADMIT DATE: 4/10/2023                          CONSULT REQUESTED BY: Jhon Tam, *    SUBJECTIVE     PRINCIPAL PROBLEM: Rectal bleeding      REASON FOR CONSULT:    AFIB RVR      HPI:    Patient is an 82-year-old female with past medical history of paroxysmal atrial fibrillation on Eliquis, chronic HFpEF, constipation, and hypertension who presented to the ED with complaints of rectal bleeding secondary to chronic constipation.  She had been taking MiraLax every other day when she developed abdominal cramping, went to use the restroom, and found bright red blood in the toilet.  She also complained of associated dizziness and lightheadedness.  Patient was admitted for constipation and rectal bleeding.  She was received multiple enemas, MiraLax and disimpaction this admission.  This a.m., she went into AFib RVR, denied chest pain and shortness of breath.  Patient was found to be diaphoretic and hypotensive during examination with nausea and vomiting and chest tightness.  Patient was supposed to have flex sigmoid colonoscopy this a.m. with GI.  Procedure postponed till tomorrow in view of AFib RVR.  Patient is receiving her sotalol 40 mg b.i.d..  Anticoagulation is on hold.    During examination patient is on Cardizem 5 milligrams/hour.  She was in sinus rhythm.  No acute distress.  Alert and oriented.  Blood pressure 108/61 heart rate 80, 90% on room air.  Rodriguez catheter with positive urinary output.  Patient's troponin HS at 7:49 a.m. was 9.6, repeat at 1:10 p.m. was 1375.  Patient is chest pain-free during examination.  She appears anxious in view of recent events.             FROM H&P:    HPI: Irene العراقي is an 82-year-old female with chronic medical problems including paroxysmal atrial fibrillation on apixaban, chronic HFpEF, constipation and hypertension who  presents to the emergency room complaining of rectal bleeding.  Patient states that she has chronic constipation and takes MiraLax every other day.  She took some at about 10:00 a.m. this morning.  Later that morning she started having some crampy abdominal pain across her lower abdomen especially left lower quadrant and went to the bathroom and had a small amount of bright red blood in the toilet.  Later she started having more abdominal pain, gas and liquid stools. Associated symptoms are lightheadedness, rectal burning and difficulty voiding.  She said she is never had bleeding before or difficulty voiding.  She came down from [place] 2 years ago to take care of her twin sister that has never been able to go back since COVID.  She follows with cardiology and a PCP here in his on spironolactone for hypertension, apixaban and sotalol for paroxysmal atrial fibrillation and spironolactone for hypertension.  She denies dysuria, fevers, chills, shortness of breath or chest pain, nausea or vomiting.      In the ED, H&H 11.9/36.0, platelets decreased to 140, WBC 6.84, BUN/CR 25/1.0, glucose 91, sodium 139, potassium 4.3, lipase 43, INR 1.0, 12 lead EKG with normal sinus rhythm, ventricular rate 76 and , CT scan of pelvis with contrast shows fecal impaction and proctitis, cholelithiasis and sigmoid diverticulosis with no evidence of diverticulitis.  See report for full details.  Per ED physician rectal exam was negative for blood.  Temperature 97.6°, heart rate 83, blood pressure 125/63, respiratory rate 15 to 20 and O2 sat 95% on room air.  She was given 1 g acetaminophen, an 80 mg Protonix and 4.5 g Zosyn.  She will be admitted to the hospitalist service for further evaluation treatment with a consult Gastroenterology.      4/15- patient went into afib/rvr early this morning, denied chest pain and shortness of breath at the time.  I was called around 0730 due to patient being diaphoretic, in and out of afib.  On  my exam she was nauseated,  had vomited, was diaphoretic, HR was normal, BP was 94/50s, crackles to right lung which is new.  She also has chest tightness, no shortness of breath.  Her glucose is 89, will order trop/ekg to be safe, and cxr.       4/14- only liquid bm, she appears more uncomfortable today.       4/13- she still hasn't had a bm despite multiple enemas, miralax and attempt to disimpact.  Continuing high dose miralax.       4/12- patient is up to bedside chair, still no bowel movement despite enema yesterday.  Will order soap suds and disimpaction today.  Vitals stable, feels well.      Review of patient's allergies indicates:   Allergen Reactions    Hydrocodone     Meperidine     Meperidine hcl Nausea And Vomiting    Persantine [dipyridamole]     Vicodin [hydrocodone-acetaminophen] Nausea And Vomiting       Past Medical History:   Diagnosis Date    Atrial fibrillation 01/25/2021    Hypertension      Past Surgical History:   Procedure Laterality Date    BREAST SURGERY       Social History     Tobacco Use    Smoking status: Never    Smokeless tobacco: Never   Substance Use Topics    Alcohol use: Not Currently    Drug use: Not Currently        REVIEW OF SYSTEMS  As per mentioned above in HPI.    Positive rectal bleeding    Chest discomfort, diaphoresis, nausea, vomiting have resolved since converting to sinus rhythm    OBJECTIVE     VITAL SIGNS (Most Recent)  Temp: 98.1 °F (36.7 °C) (04/15/23 1500)  Pulse: 63 (04/15/23 1500)  Resp: (!) 23 (04/15/23 1500)  BP: (!) 124/58 (04/15/23 1500)  SpO2: 97 % (04/15/23 1500)    VENTILATION STATUS  Resp: (!) 23 (04/15/23 1500)  SpO2: 97 % (04/15/23 1500)           I & O (Last 24H):  Intake/Output Summary (Last 24 hours) at 4/15/2023 1615  Last data filed at 4/15/2023 1033  Gross per 24 hour   Intake 100 ml   Output 2201 ml   Net -2101 ml       WEIGHTS  Wt Readings from Last 3 Encounters:   04/12/23 0400 81.7 kg (180 lb 1.9 oz)   04/10/23 2300 81.1 kg (178 lb 12.7 oz)    04/10/23 1545 81.6 kg (180 lb)   10/11/22 0927 81.6 kg (180 lb)   07/24/22 0404 80.5 kg (177 lb 7.5 oz)   07/24/22 0042 81.9 kg (180 lb 8 oz)   07/23/22 1510 80.7 kg (178 lb)       PHYSICAL EXAM  GENERAL: well built, well nourished, well-developed in no apparent distress alert and oriented.   HEENT: Normocephalic. Pupils normal and conjunctivae normal.    NECK: No JVD. No bruit..   THYROID: Thyroid not enlarged. No nodules present..   CARDIAC: Regular rate and rhythm. S1 is normal.S2 is normal.No gallops, clicks or murmurs noted at this time.  CHEST ANATOMY: normal.   LUNGS: Clear to auscultation. No wheezing or rhonchi..   ABDOMEN: Soft. Normal bowel sounds. Nontender.   URINARY: No araya catheter   EXTREMITIES: No cyanosis, clubbing or edema noted at this time., no calf tenderness bilaterally.   PERIPHERAL VASCULAR SYSTEM: Good palpable distal pulses.   CENTRAL NERVOUS SYSTEM: No focal motor or sensory deficits noted.   SKIN: Skin without lesions, moist, well perfused.   MUSCLE STRENGTH & TONE: No noteable weakness, atrophy or abnormal movement.     HOME MEDICATIONS:  No current facility-administered medications on file prior to encounter.     Current Outpatient Medications on File Prior to Encounter   Medication Sig Dispense Refill    acetaminophen (TYLENOL) 500 MG tablet Take 500 mg by mouth nightly as needed for Pain.      apixaban (ELIQUIS) 5 mg Tab Take 1 tablet (5 mg total) by mouth 2 (two) times daily. 180 tablet 3    magnesium oxide (MAG-OX) 400 mg (241.3 mg magnesium) tablet TAKE 1 TABLET BY MOUTH ONCE DAILY (Patient taking differently: Take 400 mg by mouth once daily.) 90 tablet 3    sotaloL (BETAPACE) 80 MG tablet Take 0.5 tablets (40 mg total) by mouth 2 (two) times daily. (Patient taking differently: Take 40 mg by mouth once daily.) 90 tablet 3    spironolactone (ALDACTONE) 25 MG tablet Take 1 tablet (25 mg total) by mouth once daily. 90 tablet 3       SCHEDULED MEDS:   lubiprostone  24 mcg Oral BID  WM    magnesium oxide  400 mg Oral Daily    pantoprazole  40 mg Oral Daily    piperacillin-tazobactam (ZOSYN) IVPB  4.5 g Intravenous Q8H    polyethylene glycol  17 g Oral BID    sotaloL  40 mg Oral BID       CONTINUOUS INFUSIONS:   dilTIAZem 2.5 mg/hr (04/15/23 1316)       PRN MEDS:acetaminophen, acetaminophen, melatonin, metoprolol, morphine, naloxone, ondansetron, promethazine (PHENERGAN) IVPB, simethicone, sodium chloride 0.9%    LABS AND DIAGNOSTICS     CBC LAST 3 DAYS  Recent Labs   Lab 04/13/23  0418 04/14/23  0421 04/15/23  0425   WBC 5.23 4.63 4.86   RBC 3.48* 3.44* 3.52*   HGB 10.8* 10.6* 10.7*   HCT 32.3* 31.3* 32.0*   MCV 93 91 91   MCH 31.0 30.8 30.4   MCHC 33.4 33.9 33.4   RDW 12.5 12.4 12.3   * 105* 114*   MPV 10.8 10.7 10.6   GRAN 54.7  2.9 53.7  2.5 60.6  2.9   LYMPH 27.9  1.5 27.4  1.3 22.8  1.1   MONO 12.2  0.6 12.7  0.6 10.9  0.5   BASO 0.02 0.03 0.03   NRBC 0 0 0       COAGULATION LAST 3 DAYS  Recent Labs   Lab 04/10/23  1711   INR 1.0   APTT 25.7       CHEMISTRY LAST 3 DAYS  Recent Labs   Lab 04/10/23  1711 04/11/23  0729 04/13/23  0418 04/14/23  0421 04/15/23  0425      < > 135* 141 137   K 4.3   < > 3.5 4.1 3.5      < > 106 111* 113*   CO2 23   < > 21* 24 23   ANIONGAP 7*   < > 8 6* 1*   BUN 25*   < > 15 12 11   CREATININE 1.0   < > 1.6* 1.3 1.3   GLU 91   < > 85 78 76   CALCIUM 9.8   < > 8.0* 9.1 8.8   MG 2.1   < > 2.0 1.9 1.9   ALBUMIN 4.0  --   --   --   --    PROT 6.7  --   --   --   --    ALKPHOS 88  --   --   --   --    ALT 16  --   --   --   --    AST 21  --   --   --   --    BILITOT 0.6  --   --   --   --     < > = values in this interval not displayed.       CARDIAC PROFILE LAST 3 DAYS  Recent Labs   Lab 04/15/23  0749 04/15/23  1310   TROPONINIHS 9.6 1375.0*       ENDOCRINE LAST 3 DAYS  No results for input(s): TSH, PROCAL in the last 168 hours.    LAST ARTERIAL BLOOD GAS  ABG  No results for input(s): PH, PO2, PCO2, HCO3, BE in the last 168  hours.    LAST 7 DAYS MICROBIOLOGY   Microbiology Results (last 7 days)       ** No results found for the last 168 hours. **            MOST RECENT IMAGING  X-Ray Chest AP Portable  HISTORY: diaphoretic, crackles on exam that are new    FINDINGS: Portable chest radiograph at 2016 hours compared to 07/23/2022 shows the cardiomediastinal silhouette and pulmonary vasculature are stable and within normal limits, with aortic vascular calcifications.    The lungs are normally expanded, with no consolidation, large pleural effusion, or evidence of brigid interstitial pulmonary edema. There are stable mild scattered reticulonodular densities in both lungs, with lucencies reflecting pulmonary emphysema. No pneumothorax or acute fractures.    IMPRESSION: No definite evidence of acute cardiopulmonary disease.    Electronically signed by:  Corey Kaiser MD  4/15/2023 8:36 AM CDT Workstation: [id number]      ECHOCARDIOGRAM RESULTS (last 5)  Results for orders placed during the hospital encounter of 07/23/22    Echo    Interpretation Summary  · There is moderate aortic valve stenosis.  · Aortic valve area is 1.49 cm2; peak velocity is m/s; mean gradient is 26 mmHg.  · Mild aortic regurgitation.  · Mild mitral regurgitation.  · The left ventricle is normal in size with concentric remodeling and normal systolic function.  · The estimated ejection fraction is 60%.  · Grade I left ventricular diastolic dysfunction.  · Normal right ventricular size with normal right ventricular systolic function.  · Mild left atrial enlargement.  · Mild tricuspid regurgitation.  · Intermediate central venous pressure (8 mmHg).  · The estimated PA systolic pressure is 34 mmHg.      Results for orders placed during the hospital encounter of 02/09/20    Echo Color Flow Doppler? Yes; Bubble Contrast? Yes    Interpretation Summary  · Concentric left ventricular remodeling.  · Intravenous Saline (bubble) contrast was used during the study.Study is negative  for shunt  · Left ventricular systolic function. The estimated ejection fraction is 65%.  · Grade I (mild) left ventricular diastolic dysfunction consistent with impaired relaxation.  · Normal right ventricular systolic function.  · Mild aortic regurgitation.  · Mild aortic valve stenosis.  · Aortic valve area is 1.54 cm2; peak velocity is 2.59 m/s; mean gradient is 17 mmHg.  · Mild tricuspid regurgitation.  · Normal central venous pressure (3 mmHg).  · The estimated PA systolic pressure is 29 mmHg.      CURRENT/PREVIOUS VISIT EKG  Results for orders placed or performed during the hospital encounter of 04/10/23   EKG 12-lead    Collection Time: 04/15/23  1:41 PM    Narrative    Test Reason : I48.91,    Vent. Rate : 062 BPM     Atrial Rate : 062 BPM     P-R Int : 128 ms          QRS Dur : 088 ms      QT Int : 422 ms       P-R-T Axes : 041 006 -11 degrees     QTc Int : 428 ms    Normal sinus rhythm with sinus arrhythmia  Nonspecific T wave abnormality  Abnormal ECG  When compared with ECG of 15-APR-2023 07:50,  Sinus rhythm has replaced Atrial fibrillation  Vent. rate has decreased BY  72 BPM  ST no longer depressed in Inferior leads  ST no longer depressed in Anterior-lateral leads  T wave inversion no longer evident in Lateral leads    Referred By: [email or web address]   SELF           Confirmed By:            ASSESSMENT/PLAN:     Active Hospital Problems    Diagnosis    *Rectal bleeding    Proctitis    Constipation    Urinary retention    Hypertension    Current use of long term anticoagulation    Paroxysmal atrial fibrillation    Diastolic dysfunction    Thrombocytopenia       ASSESSMENT & PLAN:     Rectal Bleeding on Eliquis  Paroxysmal Atrial Fibrillation on Sotalol  AFRVR  Diastolic Dysfunction  Hypertension  Thrombocytopenia  Moderate aortic valve stenosis  NSTEMI    RECOMMENDATIONS:    Patient presented to the ED with acute rectal bleeding.  Anticoagulation was held upon admission and patient was admitted for management  of bleeding and constipation.  This a.m., patient was supposed to have a flex sigmoid with GI.  Procedure was postponed due to patient went into AFib RVR.  She became hypotensive, diaphoretic, with nausea vomiting and chest tightness.  Patient was chest pain-free during examination.  Patient was placed on Cardizem drip and has converted into sinus rhythm.  Recommend weaning from Cardizem drip as tolerated.  Initial troponin this a.m. was 96, repeat 1375.  Continue to trend troponin HS.  No acute STT wave changes on EKG or telemetry.  We will hold off on heparin drip at this time in view of rectal bleeding.  Increase sotalol 40 mg t.i.d..  Closely monitor heart rate and blood pressure.   Continue to hold anticoagulation in view of rectal bleeding and upcoming procedure.  Continue to check and replace potassium and magnesium. Goal for potassium is 4.0, and goal for magnesium is 2.0.        Zulema Hernández NP  Department of Cardiology  Date of Service: 04/15/2023        I have personally interviewed and examined the patient, I have reviewed the Nurse Practitioner's history and physical, assessment, and plan. I agree with the findings and plan.    1. Patient went into atrial fibrillation with rapid ventricular rate and developed chest discomfort with nausea and vomiting and chest tightness.  And she has abdominal troponin.    2. Significant fecal impaction and attempted flexible sigmoidoscopy but patient went into atrial fibrillation with chest pain with hypotension and the procedure  was stopped.  Will trend the troponin.   3. Patient is on sotalol 40 mg p.o. daily will increase it to Q 8 hours and watch her heart rate closely.  Will repeat EKG and troponins.      Pj Ramirez M.D.  Department of Cardiology  Date of Service: 04/15/2023  4:15 PM

## 2023-04-15 NOTE — H&P (VIEW-ONLY)
[facility]  Department of Cardiology  Consult Note      PATIENT NAME: Irene العراقي  MRN: [id number]  TODAY'S DATE: 04/15/2023  ADMIT DATE: 4/10/2023                          CONSULT REQUESTED BY: Jhon Tam, *    SUBJECTIVE     PRINCIPAL PROBLEM: Rectal bleeding      REASON FOR CONSULT:    AFIB RVR      HPI:    Patient is an 82-year-old female with past medical history of paroxysmal atrial fibrillation on Eliquis, chronic HFpEF, constipation, and hypertension who presented to the ED with complaints of rectal bleeding secondary to chronic constipation.  She had been taking MiraLax every other day when she developed abdominal cramping, went to use the restroom, and found bright red blood in the toilet.  She also complained of associated dizziness and lightheadedness.  Patient was admitted for constipation and rectal bleeding.  She was received multiple enemas, MiraLax and disimpaction this admission.  This a.m., she went into AFib RVR, denied chest pain and shortness of breath.  Patient was found to be diaphoretic and hypotensive during examination with nausea and vomiting and chest tightness.  Patient was supposed to have flex sigmoid colonoscopy this a.m. with GI.  Procedure postponed till tomorrow in view of AFib RVR.  Patient is receiving her sotalol 40 mg b.i.d..  Anticoagulation is on hold.    During examination patient is on Cardizem 5 milligrams/hour.  She was in sinus rhythm.  No acute distress.  Alert and oriented.  Blood pressure 108/61 heart rate 80, 90% on room air.  Rodriguez catheter with positive urinary output.  Patient's troponin HS at 7:49 a.m. was 9.6, repeat at 1:10 p.m. was 1375.  Patient is chest pain-free during examination.  She appears anxious in view of recent events.             FROM H&P:    HPI: Irene العراقي is an 82-year-old female with chronic medical problems including paroxysmal atrial fibrillation on apixaban, chronic HFpEF, constipation and hypertension who  presents to the emergency room complaining of rectal bleeding.  Patient states that she has chronic constipation and takes MiraLax every other day.  She took some at about 10:00 a.m. this morning.  Later that morning she started having some crampy abdominal pain across her lower abdomen especially left lower quadrant and went to the bathroom and had a small amount of bright red blood in the toilet.  Later she started having more abdominal pain, gas and liquid stools. Associated symptoms are lightheadedness, rectal burning and difficulty voiding.  She said she is never had bleeding before or difficulty voiding.  She came down from [place] 2 years ago to take care of her twin sister that has never been able to go back since COVID.  She follows with cardiology and a PCP here in his on spironolactone for hypertension, apixaban and sotalol for paroxysmal atrial fibrillation and spironolactone for hypertension.  She denies dysuria, fevers, chills, shortness of breath or chest pain, nausea or vomiting.      In the ED, H&H 11.9/36.0, platelets decreased to 140, WBC 6.84, BUN/CR 25/1.0, glucose 91, sodium 139, potassium 4.3, lipase 43, INR 1.0, 12 lead EKG with normal sinus rhythm, ventricular rate 76 and , CT scan of pelvis with contrast shows fecal impaction and proctitis, cholelithiasis and sigmoid diverticulosis with no evidence of diverticulitis.  See report for full details.  Per ED physician rectal exam was negative for blood.  Temperature 97.6°, heart rate 83, blood pressure 125/63, respiratory rate 15 to 20 and O2 sat 95% on room air.  She was given 1 g acetaminophen, an 80 mg Protonix and 4.5 g Zosyn.  She will be admitted to the hospitalist service for further evaluation treatment with a consult Gastroenterology.      4/15- patient went into afib/rvr early this morning, denied chest pain and shortness of breath at the time.  I was called around 0730 due to patient being diaphoretic, in and out of afib.  On  my exam she was nauseated,  had vomited, was diaphoretic, HR was normal, BP was 94/50s, crackles to right lung which is new.  She also has chest tightness, no shortness of breath.  Her glucose is 89, will order trop/ekg to be safe, and cxr.       4/14- only liquid bm, she appears more uncomfortable today.       4/13- she still hasn't had a bm despite multiple enemas, miralax and attempt to disimpact.  Continuing high dose miralax.       4/12- patient is up to bedside chair, still no bowel movement despite enema yesterday.  Will order soap suds and disimpaction today.  Vitals stable, feels well.      Review of patient's allergies indicates:   Allergen Reactions    Hydrocodone     Meperidine     Meperidine hcl Nausea And Vomiting    Persantine [dipyridamole]     Vicodin [hydrocodone-acetaminophen] Nausea And Vomiting       Past Medical History:   Diagnosis Date    Atrial fibrillation 01/25/2021    Hypertension      Past Surgical History:   Procedure Laterality Date    BREAST SURGERY       Social History     Tobacco Use    Smoking status: Never    Smokeless tobacco: Never   Substance Use Topics    Alcohol use: Not Currently    Drug use: Not Currently        REVIEW OF SYSTEMS  As per mentioned above in HPI.    Positive rectal bleeding    Chest discomfort, diaphoresis, nausea, vomiting have resolved since converting to sinus rhythm    OBJECTIVE     VITAL SIGNS (Most Recent)  Temp: 98.1 °F (36.7 °C) (04/15/23 1500)  Pulse: 63 (04/15/23 1500)  Resp: (!) 23 (04/15/23 1500)  BP: (!) 124/58 (04/15/23 1500)  SpO2: 97 % (04/15/23 1500)    VENTILATION STATUS  Resp: (!) 23 (04/15/23 1500)  SpO2: 97 % (04/15/23 1500)           I & O (Last 24H):  Intake/Output Summary (Last 24 hours) at 4/15/2023 1615  Last data filed at 4/15/2023 1033  Gross per 24 hour   Intake 100 ml   Output 2201 ml   Net -2101 ml       WEIGHTS  Wt Readings from Last 3 Encounters:   04/12/23 0400 81.7 kg (180 lb 1.9 oz)   04/10/23 2300 81.1 kg (178 lb 12.7 oz)    04/10/23 1545 81.6 kg (180 lb)   10/11/22 0927 81.6 kg (180 lb)   07/24/22 0404 80.5 kg (177 lb 7.5 oz)   07/24/22 0042 81.9 kg (180 lb 8 oz)   07/23/22 1510 80.7 kg (178 lb)       PHYSICAL EXAM  GENERAL: well built, well nourished, well-developed in no apparent distress alert and oriented.   HEENT: Normocephalic. Pupils normal and conjunctivae normal.    NECK: No JVD. No bruit..   THYROID: Thyroid not enlarged. No nodules present..   CARDIAC: Regular rate and rhythm. S1 is normal.S2 is normal.No gallops, clicks or murmurs noted at this time.  CHEST ANATOMY: normal.   LUNGS: Clear to auscultation. No wheezing or rhonchi..   ABDOMEN: Soft. Normal bowel sounds. Nontender.   URINARY: No araya catheter   EXTREMITIES: No cyanosis, clubbing or edema noted at this time., no calf tenderness bilaterally.   PERIPHERAL VASCULAR SYSTEM: Good palpable distal pulses.   CENTRAL NERVOUS SYSTEM: No focal motor or sensory deficits noted.   SKIN: Skin without lesions, moist, well perfused.   MUSCLE STRENGTH & TONE: No noteable weakness, atrophy or abnormal movement.     HOME MEDICATIONS:  No current facility-administered medications on file prior to encounter.     Current Outpatient Medications on File Prior to Encounter   Medication Sig Dispense Refill    acetaminophen (TYLENOL) 500 MG tablet Take 500 mg by mouth nightly as needed for Pain.      apixaban (ELIQUIS) 5 mg Tab Take 1 tablet (5 mg total) by mouth 2 (two) times daily. 180 tablet 3    magnesium oxide (MAG-OX) 400 mg (241.3 mg magnesium) tablet TAKE 1 TABLET BY MOUTH ONCE DAILY (Patient taking differently: Take 400 mg by mouth once daily.) 90 tablet 3    sotaloL (BETAPACE) 80 MG tablet Take 0.5 tablets (40 mg total) by mouth 2 (two) times daily. (Patient taking differently: Take 40 mg by mouth once daily.) 90 tablet 3    spironolactone (ALDACTONE) 25 MG tablet Take 1 tablet (25 mg total) by mouth once daily. 90 tablet 3       SCHEDULED MEDS:   lubiprostone  24 mcg Oral BID  WM    magnesium oxide  400 mg Oral Daily    pantoprazole  40 mg Oral Daily    piperacillin-tazobactam (ZOSYN) IVPB  4.5 g Intravenous Q8H    polyethylene glycol  17 g Oral BID    sotaloL  40 mg Oral BID       CONTINUOUS INFUSIONS:   dilTIAZem 2.5 mg/hr (04/15/23 1316)       PRN MEDS:acetaminophen, acetaminophen, melatonin, metoprolol, morphine, naloxone, ondansetron, promethazine (PHENERGAN) IVPB, simethicone, sodium chloride 0.9%    LABS AND DIAGNOSTICS     CBC LAST 3 DAYS  Recent Labs   Lab 04/13/23  0418 04/14/23  0421 04/15/23  0425   WBC 5.23 4.63 4.86   RBC 3.48* 3.44* 3.52*   HGB 10.8* 10.6* 10.7*   HCT 32.3* 31.3* 32.0*   MCV 93 91 91   MCH 31.0 30.8 30.4   MCHC 33.4 33.9 33.4   RDW 12.5 12.4 12.3   * 105* 114*   MPV 10.8 10.7 10.6   GRAN 54.7  2.9 53.7  2.5 60.6  2.9   LYMPH 27.9  1.5 27.4  1.3 22.8  1.1   MONO 12.2  0.6 12.7  0.6 10.9  0.5   BASO 0.02 0.03 0.03   NRBC 0 0 0       COAGULATION LAST 3 DAYS  Recent Labs   Lab 04/10/23  1711   INR 1.0   APTT 25.7       CHEMISTRY LAST 3 DAYS  Recent Labs   Lab 04/10/23  1711 04/11/23  0729 04/13/23  0418 04/14/23  0421 04/15/23  0425      < > 135* 141 137   K 4.3   < > 3.5 4.1 3.5      < > 106 111* 113*   CO2 23   < > 21* 24 23   ANIONGAP 7*   < > 8 6* 1*   BUN 25*   < > 15 12 11   CREATININE 1.0   < > 1.6* 1.3 1.3   GLU 91   < > 85 78 76   CALCIUM 9.8   < > 8.0* 9.1 8.8   MG 2.1   < > 2.0 1.9 1.9   ALBUMIN 4.0  --   --   --   --    PROT 6.7  --   --   --   --    ALKPHOS 88  --   --   --   --    ALT 16  --   --   --   --    AST 21  --   --   --   --    BILITOT 0.6  --   --   --   --     < > = values in this interval not displayed.       CARDIAC PROFILE LAST 3 DAYS  Recent Labs   Lab 04/15/23  0749 04/15/23  1310   TROPONINIHS 9.6 1375.0*       ENDOCRINE LAST 3 DAYS  No results for input(s): TSH, PROCAL in the last 168 hours.    LAST ARTERIAL BLOOD GAS  ABG  No results for input(s): PH, PO2, PCO2, HCO3, BE in the last 168  hours.    LAST 7 DAYS MICROBIOLOGY   Microbiology Results (last 7 days)       ** No results found for the last 168 hours. **            MOST RECENT IMAGING  X-Ray Chest AP Portable  HISTORY: diaphoretic, crackles on exam that are new    FINDINGS: Portable chest radiograph at 2016 hours compared to 07/23/2022 shows the cardiomediastinal silhouette and pulmonary vasculature are stable and within normal limits, with aortic vascular calcifications.    The lungs are normally expanded, with no consolidation, large pleural effusion, or evidence of brigid interstitial pulmonary edema. There are stable mild scattered reticulonodular densities in both lungs, with lucencies reflecting pulmonary emphysema. No pneumothorax or acute fractures.    IMPRESSION: No definite evidence of acute cardiopulmonary disease.    Electronically signed by:  Corey Kaiser MD  4/15/2023 8:36 AM CDT Workstation: [id number]      ECHOCARDIOGRAM RESULTS (last 5)  Results for orders placed during the hospital encounter of 07/23/22    Echo    Interpretation Summary  · There is moderate aortic valve stenosis.  · Aortic valve area is 1.49 cm2; peak velocity is m/s; mean gradient is 26 mmHg.  · Mild aortic regurgitation.  · Mild mitral regurgitation.  · The left ventricle is normal in size with concentric remodeling and normal systolic function.  · The estimated ejection fraction is 60%.  · Grade I left ventricular diastolic dysfunction.  · Normal right ventricular size with normal right ventricular systolic function.  · Mild left atrial enlargement.  · Mild tricuspid regurgitation.  · Intermediate central venous pressure (8 mmHg).  · The estimated PA systolic pressure is 34 mmHg.      Results for orders placed during the hospital encounter of 02/09/20    Echo Color Flow Doppler? Yes; Bubble Contrast? Yes    Interpretation Summary  · Concentric left ventricular remodeling.  · Intravenous Saline (bubble) contrast was used during the study.Study is negative  for shunt  · Left ventricular systolic function. The estimated ejection fraction is 65%.  · Grade I (mild) left ventricular diastolic dysfunction consistent with impaired relaxation.  · Normal right ventricular systolic function.  · Mild aortic regurgitation.  · Mild aortic valve stenosis.  · Aortic valve area is 1.54 cm2; peak velocity is 2.59 m/s; mean gradient is 17 mmHg.  · Mild tricuspid regurgitation.  · Normal central venous pressure (3 mmHg).  · The estimated PA systolic pressure is 29 mmHg.      CURRENT/PREVIOUS VISIT EKG  Results for orders placed or performed during the hospital encounter of 04/10/23   EKG 12-lead    Collection Time: 04/15/23  1:41 PM    Narrative    Test Reason : I48.91,    Vent. Rate : 062 BPM     Atrial Rate : 062 BPM     P-R Int : 128 ms          QRS Dur : 088 ms      QT Int : 422 ms       P-R-T Axes : 041 006 -11 degrees     QTc Int : 428 ms    Normal sinus rhythm with sinus arrhythmia  Nonspecific T wave abnormality  Abnormal ECG  When compared with ECG of 15-APR-2023 07:50,  Sinus rhythm has replaced Atrial fibrillation  Vent. rate has decreased BY  72 BPM  ST no longer depressed in Inferior leads  ST no longer depressed in Anterior-lateral leads  T wave inversion no longer evident in Lateral leads    Referred By: [email or web address]   SELF           Confirmed By:            ASSESSMENT/PLAN:     Active Hospital Problems    Diagnosis    *Rectal bleeding    Proctitis    Constipation    Urinary retention    Hypertension    Current use of long term anticoagulation    Paroxysmal atrial fibrillation    Diastolic dysfunction    Thrombocytopenia       ASSESSMENT & PLAN:     Rectal Bleeding on Eliquis  Paroxysmal Atrial Fibrillation on Sotalol  AFRVR  Diastolic Dysfunction  Hypertension  Thrombocytopenia  Moderate aortic valve stenosis  NSTEMI    RECOMMENDATIONS:    Patient presented to the ED with acute rectal bleeding.  Anticoagulation was held upon admission and patient was admitted for management  of bleeding and constipation.  This a.m., patient was supposed to have a flex sigmoid with GI.  Procedure was postponed due to patient went into AFib RVR.  She became hypotensive, diaphoretic, with nausea vomiting and chest tightness.  Patient was chest pain-free during examination.  Patient was placed on Cardizem drip and has converted into sinus rhythm.  Recommend weaning from Cardizem drip as tolerated.  Initial troponin this a.m. was 96, repeat 1375.  Continue to trend troponin HS.  No acute STT wave changes on EKG or telemetry.  We will hold off on heparin drip at this time in view of rectal bleeding.  Increase sotalol 40 mg t.i.d..  Closely monitor heart rate and blood pressure.   Continue to hold anticoagulation in view of rectal bleeding and upcoming procedure.  Continue to check and replace potassium and magnesium. Goal for potassium is 4.0, and goal for magnesium is 2.0.        Zulema Hernández NP  Department of Cardiology  Date of Service: 04/15/2023        I have personally interviewed and examined the patient, I have reviewed the Nurse Practitioner's history and physical, assessment, and plan. I agree with the findings and plan.    1. Patient went into atrial fibrillation with rapid ventricular rate and developed chest discomfort with nausea and vomiting and chest tightness.  And she has abdominal troponin.    2. Significant fecal impaction and attempted flexible sigmoidoscopy but patient went into atrial fibrillation with chest pain with hypotension and the procedure  was stopped.  Will trend the troponin.   3. Patient is on sotalol 40 mg p.o. daily will increase it to Q 8 hours and watch her heart rate closely.  Will repeat EKG and troponins.      Pj Ramirez M.D.  Department of Cardiology  Date of Service: 04/15/2023  4:15 PM

## 2023-04-15 NOTE — PROGRESS NOTES
"Gastroenterology    CC: constipation    S: Afib with RVR this morning. Anesthesia requesting Cardiology evaluation. Stable this afternoon with NSR on monitor and hemodynamically stable. Denies bleeding.    Past Medical History:   Diagnosis Date    Atrial fibrillation 01/25/2021    Hypertension        Review of Systems  General ROS: negative for chills, fever or weight loss  Cardiovascular ROS: no chest pain or dyspnea on exertion  Gastrointestinal ROS: no abdominal pain, + change in bowel habits,+bloody stools    Physical Examination  /60 mmHg  Pulse 67  Temp(Src) 97.9 °F (36.6 °C) (Oral)  Resp 15  Ht 5' 8" (1.727 m)  Wt 74.9 kg (165 lb 2 oz)  BMI 25.11 kg/m2  SpO2 100%  LMP  (LMP Unknown)  Breastfeeding? No  General appearance: alert, cooperative, no distress  HENT: Normocephalic, atraumatic, neck symmetrical, no nasal discharge   Lungs: clear to auscultation bilaterally, no dullness to percussion bilaterally  Heart: regular rate and rhythm without rub; no displacement of the PMI   Abdomen: soft, non-tender; bowel sounds normoactive; no organomegaly  Extremities: extremities symmetric; no clubbing, cyanosis, or edema  Neurologic: Alert and oriented X 3, normal strength      Labs:  Lab Results   Component Value Date    WBC 4.86 04/15/2023    HGB 10.7 (L) 04/15/2023    HCT 32.0 (L) 04/15/2023    MCV 91 04/15/2023     (L) 04/15/2023       BMP  Lab Results   Component Value Date     04/15/2023    K 3.5 04/15/2023     (H) 04/15/2023    CO2 23 04/15/2023    BUN 11 04/15/2023    CREATININE 1.3 04/15/2023    CALCIUM 8.8 04/15/2023    ANIONGAP 1 (L) 04/15/2023    EGFRNORACEVR 41.1 (A) 04/15/2023     Lab Results   Component Value Date    INR 1.0 04/10/2023    INR 1.3 07/23/2022    INR 1.1 02/09/2020         Imaging:  CT 4/10/23  IMPRESSION:   CT findings consistent with fecal impaction and proctitis as described. Cholelithiasis. Sigmoid diverticulosis without evidence of " diverticulitis.      Assessment:   82 y.o. female with suspected fecal impaction    Plan:  Follow up cardiology recs. If stable overnight. NPO after midnight and plan for flex sig tomorrow morning.    I have discussed the risks, benefits, and alternatives of the procedure in detail with the patient. The risks include pain, discomfort, bleeding, infection, perforation, missed lesions or missed cancer, sedation/anesthesia risks, and even death. The benefit of the procedure is that it allows an evaluation of the reported problem or issue and possible intervention. The alternatives include not having the procedure or an alternative evaluation by another method.  The patient was given the opportunity to ask questions and they were answered. Informed consent was obtained.   Increased risk factors include recent Afib with RVR    Kvng Mensah MD  Gastroenterology  [facility] [place]

## 2023-04-15 NOTE — PT/OT/SLP PROGRESS
Physical Therapy      Patient Name:  Irene العراقي   MRN:  [id number]    Patient not seen today secondary to MD hold (Comment) (a-fib wtih RVR at rest). Will follow-up 4/16/23.

## 2023-04-16 ENCOUNTER — CLINICAL SUPPORT (OUTPATIENT)
Dept: CARDIOLOGY | Facility: HOSPITAL | Age: 83
DRG: 377 | End: 2023-04-16
Payer: MEDICARE

## 2023-04-16 VITALS — WEIGHT: 180 LBS | HEIGHT: 66 IN | BODY MASS INDEX: 28.93 KG/M2

## 2023-04-16 LAB
ANION GAP SERPL CALC-SCNC: 6 MMOL/L (ref 8–16)
AORTIC VALVE CUSP SEPERATION: 0.9 CM
AV INDEX (PROSTH): 0.33
AV MEAN GRADIENT: 32 MMHG
AV PEAK GRADIENT: 49 MMHG
AV REGURGITATION PRESSURE HALF TIME: 438 MS
AV VALVE AREA: 1.24 CM2
AV VELOCITY RATIO: 0.33
BASOPHILS # BLD AUTO: 0.02 K/UL (ref 0–0.2)
BASOPHILS NFR BLD: 0.3 % (ref 0–1.9)
BSA FOR ECHO PROCEDURE: 1.95 M2
BUN SERPL-MCNC: 14 MG/DL (ref 8–23)
CALCIUM SERPL-MCNC: 8.8 MG/DL (ref 8.7–10.5)
CHLORIDE SERPL-SCNC: 107 MMOL/L (ref 95–110)
CO2 SERPL-SCNC: 23 MMOL/L (ref 23–29)
CREAT SERPL-MCNC: 1.3 MG/DL (ref 0.5–1.4)
CV ECHO LV RWT: 0.57 CM
DIFFERENTIAL METHOD: ABNORMAL
DOP CALC AO PEAK VEL: 3.51 M/S
DOP CALC AO VTI: 87.9 CM
DOP CALC LVOT AREA: 3.8 CM2
DOP CALC LVOT DIAMETER: 2.2 CM
DOP CALC LVOT PEAK VEL: 1.15 M/S
DOP CALC LVOT STROKE VOLUME: 109.42 CM3
DOP CALC MV VTI: 48.6 CM
DOP CALCLVOT PEAK VEL VTI: 28.8 CM
ECHO LV POSTERIOR WALL: 1.08 CM (ref 0.6–1.1)
EJECTION FRACTION: 65 %
EOSINOPHIL # BLD AUTO: 0.1 K/UL (ref 0–0.5)
EOSINOPHIL NFR BLD: 1.9 % (ref 0–8)
ERYTHROCYTE [DISTWIDTH] IN BLOOD BY AUTOMATED COUNT: 12.4 % (ref 11.5–14.5)
EST. GFR  (NO RACE VARIABLE): 41.1 ML/MIN/1.73 M^2
FRACTIONAL SHORTENING: 34 % (ref 28–44)
GLUCOSE SERPL-MCNC: 90 MG/DL (ref 70–110)
HCT VFR BLD AUTO: 31.6 % (ref 37–48.5)
HGB BLD-MCNC: 10.4 G/DL (ref 12–16)
IMM GRANULOCYTES # BLD AUTO: 0.03 K/UL (ref 0–0.04)
IMM GRANULOCYTES NFR BLD AUTO: 0.5 % (ref 0–0.5)
INTERVENTRICULAR SEPTUM: 1.3 CM (ref 0.6–1.1)
LA MAJOR: 5.4 CM
LA MINOR: 3.9 CM
LA WIDTH: 5 CM
LEFT ATRIUM SIZE: 3.6 CM
LEFT ATRIUM VOLUME INDEX MOD: 57.6 ML/M2
LEFT ATRIUM VOLUME INDEX: 36.3 ML/M2
LEFT ATRIUM VOLUME MOD: 110 CM3
LEFT ATRIUM VOLUME: 69.29 CM3
LEFT INTERNAL DIMENSION IN SYSTOLE: 2.48 CM (ref 2.1–4)
LEFT VENTRICLE DIASTOLIC VOLUME INDEX: 31.62 ML/M2
LEFT VENTRICLE DIASTOLIC VOLUME: 60.4 ML
LEFT VENTRICLE MASS INDEX: 78 G/M2
LEFT VENTRICLE SYSTOLIC VOLUME INDEX: 11.5 ML/M2
LEFT VENTRICLE SYSTOLIC VOLUME: 21.9 ML
LEFT VENTRICULAR INTERNAL DIMENSION IN DIASTOLE: 3.76 CM (ref 3.5–6)
LEFT VENTRICULAR MASS: 148.96 G
LVOT MG: 3 MMHG
LVOT MV: 0.77 CM/S
LYMPHOCYTES # BLD AUTO: 1.1 K/UL (ref 1–4.8)
LYMPHOCYTES NFR BLD: 17.3 % (ref 18–48)
MAGNESIUM SERPL-MCNC: 1.8 MG/DL (ref 1.6–2.6)
MCH RBC QN AUTO: 30.5 PG (ref 27–31)
MCHC RBC AUTO-ENTMCNC: 32.9 G/DL (ref 32–36)
MCV RBC AUTO: 93 FL (ref 82–98)
MONOCYTES # BLD AUTO: 0.8 K/UL (ref 0.3–1)
MONOCYTES NFR BLD: 12.1 % (ref 4–15)
MV MEAN GRADIENT: 4 MMHG
MV PEAK GRADIENT: 10 MMHG
MV VALVE AREA BY CONTINUITY EQUATION: 2.25 CM2
NEUTROPHILS # BLD AUTO: 4.4 K/UL (ref 1.8–7.7)
NEUTROPHILS NFR BLD: 67.9 % (ref 38–73)
NRBC BLD-RTO: 0 /100 WBC
PISA AR MAX VEL: 4.21 M/S
PISA MRMAX VEL: 4.89 M/S
PISA TR MAX VEL: 3.43 M/S
PLATELET # BLD AUTO: 100 K/UL (ref 150–450)
PMV BLD AUTO: 10.4 FL (ref 9.2–12.9)
POTASSIUM SERPL-SCNC: 3.7 MMOL/L (ref 3.5–5.1)
PV MV: 1.12 M/S
PV PEAK VELOCITY: 1.53 CM/S
RA MAJOR: 4.63 CM
RA WIDTH: 3.18 CM
RBC # BLD AUTO: 3.41 M/UL (ref 4–5.4)
RIGHT VENTRICULAR LENGTH IN DIASTOLE (APICAL 4-CHAMBER VIEW): 5.66 CM
RV MID DIAMA: 2.18 CM
SODIUM SERPL-SCNC: 136 MMOL/L (ref 136–145)
TR MAX PG: 47 MMHG
TROPONIN I SERPL HS-MCNC: 3067.2 PG/ML (ref 0–14.9)
WBC # BLD AUTO: 6.43 K/UL (ref 3.9–12.7)

## 2023-04-16 PROCEDURE — 83735 ASSAY OF MAGNESIUM: CPT | Performed by: NURSE PRACTITIONER

## 2023-04-16 PROCEDURE — 27201114 HC TRAP (ANY): Performed by: STUDENT IN AN ORGANIZED HEALTH CARE EDUCATION/TRAINING PROGRAM

## 2023-04-16 PROCEDURE — 93306 TTE W/DOPPLER COMPLETE: CPT | Mod: 26,,, | Performed by: INTERNAL MEDICINE

## 2023-04-16 PROCEDURE — 93306 TTE W/DOPPLER COMPLETE: CPT

## 2023-04-16 PROCEDURE — 85025 COMPLETE CBC W/AUTO DIFF WBC: CPT | Performed by: NURSE PRACTITIONER

## 2023-04-16 PROCEDURE — 25000003 PHARM REV CODE 250

## 2023-04-16 PROCEDURE — 94761 N-INVAS EAR/PLS OXIMETRY MLT: CPT

## 2023-04-16 PROCEDURE — 84484 ASSAY OF TROPONIN QUANT: CPT

## 2023-04-16 PROCEDURE — 37000008 HC ANESTHESIA 1ST 15 MINUTES: Performed by: STUDENT IN AN ORGANIZED HEALTH CARE EDUCATION/TRAINING PROGRAM

## 2023-04-16 PROCEDURE — 25000003 PHARM REV CODE 250: Performed by: NURSE PRACTITIONER

## 2023-04-16 PROCEDURE — D9220A PRA ANESTHESIA: ICD-10-PCS | Mod: ANES,,, | Performed by: ANESTHESIOLOGY

## 2023-04-16 PROCEDURE — 99900035 HC TECH TIME PER 15 MIN (STAT)

## 2023-04-16 PROCEDURE — 63600175 PHARM REV CODE 636 W HCPCS: Performed by: NURSE ANESTHETIST, CERTIFIED REGISTERED

## 2023-04-16 PROCEDURE — D9220A PRA ANESTHESIA: ICD-10-PCS | Mod: CRNA,,, | Performed by: NURSE ANESTHETIST, CERTIFIED REGISTERED

## 2023-04-16 PROCEDURE — 99900031 HC PATIENT EDUCATION (STAT)

## 2023-04-16 PROCEDURE — 93306 ECHO (CUPID ONLY): ICD-10-PCS | Mod: 26,,, | Performed by: INTERNAL MEDICINE

## 2023-04-16 PROCEDURE — 99233 PR SUBSEQUENT HOSPITAL CARE,LEVL III: ICD-10-PCS | Mod: 25,,, | Performed by: INTERNAL MEDICINE

## 2023-04-16 PROCEDURE — 36415 COLL VENOUS BLD VENIPUNCTURE: CPT | Performed by: NURSE PRACTITIONER

## 2023-04-16 PROCEDURE — 80048 BASIC METABOLIC PNL TOTAL CA: CPT | Performed by: NURSE PRACTITIONER

## 2023-04-16 PROCEDURE — 99233 SBSQ HOSP IP/OBS HIGH 50: CPT | Mod: 25,,, | Performed by: INTERNAL MEDICINE

## 2023-04-16 PROCEDURE — 45385 COLONOSCOPY W/LESION REMOVAL: CPT | Performed by: STUDENT IN AN ORGANIZED HEALTH CARE EDUCATION/TRAINING PROGRAM

## 2023-04-16 PROCEDURE — D9220A PRA ANESTHESIA: Mod: CRNA,,, | Performed by: NURSE ANESTHETIST, CERTIFIED REGISTERED

## 2023-04-16 PROCEDURE — 88305 TISSUE EXAM BY PATHOLOGIST: CPT | Mod: TC

## 2023-04-16 PROCEDURE — 63600175 PHARM REV CODE 636 W HCPCS: Performed by: NURSE PRACTITIONER

## 2023-04-16 PROCEDURE — 21000000 HC CCU ICU ROOM CHARGE

## 2023-04-16 PROCEDURE — D9220A PRA ANESTHESIA: Mod: ANES,,, | Performed by: ANESTHESIOLOGY

## 2023-04-16 PROCEDURE — 27201089 HC SNARE, DISP (ANY): Performed by: STUDENT IN AN ORGANIZED HEALTH CARE EDUCATION/TRAINING PROGRAM

## 2023-04-16 PROCEDURE — 37000009 HC ANESTHESIA EA ADD 15 MINS: Performed by: STUDENT IN AN ORGANIZED HEALTH CARE EDUCATION/TRAINING PROGRAM

## 2023-04-16 RX ORDER — PHENYLEPHRINE HYDROCHLORIDE 10 MG/ML
INJECTION INTRAVENOUS
Status: DISCONTINUED | OUTPATIENT
Start: 2023-04-16 | End: 2023-04-16

## 2023-04-16 RX ORDER — MUPIROCIN 20 MG/G
OINTMENT TOPICAL 2 TIMES DAILY
Status: DISCONTINUED | OUTPATIENT
Start: 2023-04-16 | End: 2023-04-20 | Stop reason: HOSPADM

## 2023-04-16 RX ORDER — PROPOFOL 10 MG/ML
VIAL (ML) INTRAVENOUS
Status: DISCONTINUED | OUTPATIENT
Start: 2023-04-16 | End: 2023-04-16

## 2023-04-16 RX ORDER — CHLORHEXIDINE GLUCONATE ORAL RINSE 1.2 MG/ML
15 SOLUTION DENTAL 2 TIMES DAILY
Status: DISCONTINUED | OUTPATIENT
Start: 2023-04-16 | End: 2023-04-20 | Stop reason: HOSPADM

## 2023-04-16 RX ADMIN — MUPIROCIN 1 G: 20 OINTMENT TOPICAL at 08:04

## 2023-04-16 RX ADMIN — PIPERACILLIN AND TAZOBACTAM 4.5 G: .5; 4 INJECTION, POWDER, LYOPHILIZED, FOR SOLUTION INTRAVENOUS at 09:04

## 2023-04-16 RX ADMIN — CHLORHEXIDINE GLUCONATE 15 ML: 1.2 RINSE ORAL at 08:04

## 2023-04-16 RX ADMIN — SOTALOL HYDROCHLORIDE 40 MG: 80 TABLET ORAL at 08:04

## 2023-04-16 RX ADMIN — MAGNESIUM OXIDE 400 MG (241.3 MG MAGNESIUM) TABLET 400 MG: at 09:04

## 2023-04-16 RX ADMIN — PROPOFOL 25 MG: 10 INJECTION, EMULSION INTRAVENOUS at 02:04

## 2023-04-16 RX ADMIN — PIPERACILLIN AND TAZOBACTAM 4.5 G: .5; 4 INJECTION, POWDER, LYOPHILIZED, FOR SOLUTION INTRAVENOUS at 03:04

## 2023-04-16 RX ADMIN — PROPOFOL 20 MG: 10 INJECTION, EMULSION INTRAVENOUS at 02:04

## 2023-04-16 RX ADMIN — SODIUM CHLORIDE, SODIUM LACTATE, POTASSIUM CHLORIDE, AND CALCIUM CHLORIDE: .6; .31; .03; .02 INJECTION, SOLUTION INTRAVENOUS at 02:04

## 2023-04-16 RX ADMIN — PIPERACILLIN AND TAZOBACTAM 4.5 G: .5; 4 INJECTION, POWDER, LYOPHILIZED, FOR SOLUTION INTRAVENOUS at 04:04

## 2023-04-16 RX ADMIN — PHENYLEPHRINE HYDROCHLORIDE 100 MCG: 10 INJECTION INTRAVENOUS at 02:04

## 2023-04-16 RX ADMIN — PANTOPRAZOLE SODIUM 40 MG: 40 TABLET, DELAYED RELEASE ORAL at 05:04

## 2023-04-16 RX ADMIN — PROPOFOL 50 MG: 10 INJECTION, EMULSION INTRAVENOUS at 02:04

## 2023-04-16 RX ADMIN — SOTALOL HYDROCHLORIDE 40 MG: 80 TABLET ORAL at 04:04

## 2023-04-16 RX ADMIN — PROPOFOL 20 MG: 10 INJECTION, EMULSION INTRAVENOUS at 03:04

## 2023-04-16 NOTE — TRANSFER OF CARE
"Anesthesia Transfer of Care Note    Patient: Irene العراقي    Procedure(s) Performed: Procedure(s) (LRB):  COLONOSCOPY (N/A)    Patient location: PACU    Anesthesia Type: MAC    Transport from OR: Transported from OR on room air with adequate spontaneous ventilation    Post pain: adequate analgesia    Post assessment: no apparent anesthetic complications    Post vital signs: stable    Level of consciousness: awake and alert    Nausea/Vomiting: no nausea/vomiting    Complications: none    Transfer of care protocol was followedComments: SV, exchanging well. To PACU, VSS. Report to RN      Last vitals:   Visit Vitals  BP (!) 178/78 (BP Location: Right arm, Patient Position: Lying)   Pulse 69   Temp 37.3 °C (99.1 °F)   Resp 18   Ht 5' 6" (1.676 m)   Wt 81.7 kg (180 lb 1.9 oz)   SpO2 96%   Breastfeeding No   BMI 29.07 kg/m²     "

## 2023-04-16 NOTE — CARE UPDATE
04/15/23 2031   Patient Assessment/Suction   Level of Consciousness (AVPU) alert   Respiratory Effort Normal   PRE-TX-O2   Device (Oxygen Therapy) room air   SpO2 (!) 94 %   Pulse Oximetry Type Continuous   $ Pulse Oximetry - Multiple Charge Pulse Oximetry - Multiple   Pulse 67   Resp 19   Education   $ Education 15 min   Respiratory Evaluation   $ Care Plan Tech Time 15 min

## 2023-04-16 NOTE — PROGRESS NOTES
[facility]  Department of Cardiology  Consult Note      PATIENT NAME: Irene العراقي  MRN: [id number]  TODAY'S DATE: 04/16/2023  ADMIT DATE: 4/10/2023                          CONSULT REQUESTED BY: Jhon Tam, *    SUBJECTIVE     PRINCIPAL PROBLEM: Rectal bleeding      REASON FOR CONSULT:    AFIB RVR    Interval history  04/16/2023    Patient remains in sinus rhythm on monitor.  Heart rate 59.  Blood pressure 123/61.  94% O2 saturation on room air.  Plans for possible flex sig this afternoon.  Patient states she is having bowel movements and they are coming home clear liquid.  H&H 10.4/31.6, platelets 100, K 3.7, creatinine 1.3, magnesium 1.8 patient denies chest pain or any acute symptoms at this time.    Troponins trended upward yesterday to 3330 in are beginning to trend downward.      Patient's troponin HS 4/15/23 -7:49 a.m. was 9.6, repeat at 1:10 p.m. was 1375, repeat at 6:05 p.m. was 3212, repeat at 7:39 p.m. was 3333.  04/16/2023 troponin this a.m. was 3067.2.        HPI:    Patient is an 82-year-old female with past medical history of paroxysmal atrial fibrillation on Eliquis, chronic HFpEF, constipation, and hypertension who presented to the ED with complaints of rectal bleeding secondary to chronic constipation.  She had been taking MiraLax every other day when she developed abdominal cramping, went to use the restroom, and found bright red blood in the toilet.  She also complained of associated dizziness and lightheadedness.  Patient was admitted for constipation and rectal bleeding.  She was received multiple enemas, MiraLax and disimpaction this admission.  This a.m., she went into AFib RVR, denied chest pain and shortness of breath.  Patient was found to be diaphoretic and hypotensive during examination with nausea and vomiting and chest tightness.  Patient was supposed to have flex sigmoid colonoscopy this a.m. with GI.  Procedure postponed till tomorrow in view of AFib RVR.   Patient is receiving her sotalol 40 mg b.i.d..  Anticoagulation is on hold.    During examination patient is on Cardizem 5 milligrams/hour.  She was in sinus rhythm.  No acute distress.  Alert and oriented.  Blood pressure 108/61 heart rate 80, 90% on room air.  Rodriguez catheter with positive urinary output.  Patient's troponin HS at 7:49 a.m. was 9.6, repeat at 1:10 p.m. was 1375.  Patient is chest pain-free during examination.  She appears anxious in view of recent events.             FROM H&P:    HPI: Irene العراقي is an 82-year-old female with chronic medical problems including paroxysmal atrial fibrillation on apixaban, chronic HFpEF, constipation and hypertension who presents to the emergency room complaining of rectal bleeding.  Patient states that she has chronic constipation and takes MiraLax every other day.  She took some at about 10:00 a.m. this morning.  Later that morning she started having some crampy abdominal pain across her lower abdomen especially left lower quadrant and went to the bathroom and had a small amount of bright red blood in the toilet.  Later she started having more abdominal pain, gas and liquid stools. Associated symptoms are lightheadedness, rectal burning and difficulty voiding.  She said she is never had bleeding before or difficulty voiding.  She came down from [place] 2 years ago to take care of her twin sister that has never been able to go back since COVID.  She follows with cardiology and a PCP here in his on spironolactone for hypertension, apixaban and sotalol for paroxysmal atrial fibrillation and spironolactone for hypertension.  She denies dysuria, fevers, chills, shortness of breath or chest pain, nausea or vomiting.      In the ED, H&H 11.9/36.0, platelets decreased to 140, WBC 6.84, BUN/CR 25/1.0, glucose 91, sodium 139, potassium 4.3, lipase 43, INR 1.0, 12 lead EKG with normal sinus rhythm, ventricular rate 76 and , CT scan of pelvis with contrast shows fecal  impaction and proctitis, cholelithiasis and sigmoid diverticulosis with no evidence of diverticulitis.  See report for full details.  Per ED physician rectal exam was negative for blood.  Temperature 97.6°, heart rate 83, blood pressure 125/63, respiratory rate 15 to 20 and O2 sat 95% on room air.  She was given 1 g acetaminophen, an 80 mg Protonix and 4.5 g Zosyn.  She will be admitted to the hospitalist service for further evaluation treatment with a consult Gastroenterology.      4/15- patient went into afib/rvr early this morning, denied chest pain and shortness of breath at the time.  I was called around 0730 due to patient being diaphoretic, in and out of afib.  On my exam she was nauseated,  had vomited, was diaphoretic, HR was normal, BP was 94/50s, crackles to right lung which is new.  She also has chest tightness, no shortness of breath.  Her glucose is 89, will order trop/ekg to be safe, and cxr.       4/14- only liquid bm, she appears more uncomfortable today.       4/13- she still hasn't had a bm despite multiple enemas, miralax and attempt to disimpact.  Continuing high dose miralax.       4/12- patient is up to bedside chair, still no bowel movement despite enema yesterday.  Will order soap suds and disimpaction today.  Vitals stable, feels well.      Review of patient's allergies indicates:   Allergen Reactions    Hydrocodone     Meperidine     Meperidine hcl Nausea And Vomiting    Persantine [dipyridamole]     Vicodin [hydrocodone-acetaminophen] Nausea And Vomiting       Past Medical History:   Diagnosis Date    Atrial fibrillation 01/25/2021    Hypertension      Past Surgical History:   Procedure Laterality Date    BREAST SURGERY       Social History     Tobacco Use    Smoking status: Never    Smokeless tobacco: Never   Substance Use Topics    Alcohol use: Not Currently    Drug use: Not Currently        REVIEW OF SYSTEMS  As per mentioned above in HPI.      OBJECTIVE     VITAL SIGNS (Most  Recent)  Temp: 98 °F (36.7 °C) (04/16/23 1100)  Pulse: 71 (04/16/23 1100)  Resp: 18 (04/16/23 1100)  BP: (!) 151/72 (04/16/23 1100)  SpO2: (!) 92 % (04/16/23 1100)    VENTILATION STATUS  Resp: 18 (04/16/23 1100)  SpO2: (!) 92 % (04/16/23 1100)           I & O (Last 24H):  Intake/Output Summary (Last 24 hours) at 4/16/2023 1310  Last data filed at 4/16/2023 0740  Gross per 24 hour   Intake 399.42 ml   Output 700 ml   Net -300.58 ml       WEIGHTS  Wt Readings from Last 3 Encounters:   04/12/23 0400 81.7 kg (180 lb 1.9 oz)   04/10/23 2300 81.1 kg (178 lb 12.7 oz)   04/10/23 1545 81.6 kg (180 lb)   04/16/23 1205 81.6 kg (180 lb)   10/11/22 0927 81.6 kg (180 lb)       PHYSICAL EXAM  GENERAL: well built, well nourished, well-developed in no apparent distress alert and oriented.   HEENT: Normocephalic. Pupils normal and conjunctivae normal.    NECK: No JVD. No bruit..   THYROID: Thyroid not enlarged. No nodules present..   CARDIAC: Regular rate and rhythm. S1 is normal.S2 is normal.2/6 systolic murmur  CHEST ANATOMY: normal.   LUNGS: Clear to auscultation. No wheezing or rhonchi..   ABDOMEN: Soft. Normal bowel sounds. Nontender.   URINARY: No araya catheter   EXTREMITIES: No cyanosis, clubbing or edema noted at this time.  PERIPHERAL VASCULAR SYSTEM: Good palpable distal pulses.   CENTRAL NERVOUS SYSTEM: No focal motor or sensory deficits noted.   SKIN: Skin without lesions, moist, well perfused.   MUSCLE STRENGTH & TONE: No noteable weakness, atrophy or abnormal movement.     HOME MEDICATIONS:  No current facility-administered medications on file prior to encounter.     Current Outpatient Medications on File Prior to Encounter   Medication Sig Dispense Refill    acetaminophen (TYLENOL) 500 MG tablet Take 500 mg by mouth nightly as needed for Pain.      apixaban (ELIQUIS) 5 mg Tab Take 1 tablet (5 mg total) by mouth 2 (two) times daily. 180 tablet 3    magnesium oxide (MAG-OX) 400 mg (241.3 mg magnesium) tablet TAKE 1  TABLET BY MOUTH ONCE DAILY (Patient taking differently: Take 400 mg by mouth once daily.) 90 tablet 3    sotaloL (BETAPACE) 80 MG tablet Take 0.5 tablets (40 mg total) by mouth 2 (two) times daily. (Patient taking differently: Take 40 mg by mouth once daily.) 90 tablet 3    spironolactone (ALDACTONE) 25 MG tablet Take 1 tablet (25 mg total) by mouth once daily. 90 tablet 3       SCHEDULED MEDS:   chlorhexidine  15 mL Mouth/Throat BID    lubiprostone  24 mcg Oral BID WM    magnesium oxide  400 mg Oral Daily    mupirocin   Nasal BID    pantoprazole  40 mg Oral Daily    piperacillin-tazobactam (ZOSYN) IVPB  4.5 g Intravenous Q8H    polyethylene glycol  17 g Oral BID    sotaloL  40 mg Oral TID       CONTINUOUS INFUSIONS:   dilTIAZem Stopped (04/15/23 2200)       PRN MEDS:acetaminophen, acetaminophen, melatonin, metoprolol, morphine, naloxone, ondansetron, promethazine (PHENERGAN) IVPB, simethicone, sodium chloride 0.9%    LABS AND DIAGNOSTICS     CBC LAST 3 DAYS  Recent Labs   Lab 04/14/23  0421 04/15/23  0425 04/16/23  0456   WBC 4.63 4.86 6.43   RBC 3.44* 3.52* 3.41*   HGB 10.6* 10.7* 10.4*   HCT 31.3* 32.0* 31.6*   MCV 91 91 93   MCH 30.8 30.4 30.5   MCHC 33.9 33.4 32.9   RDW 12.4 12.3 12.4   * 114* 100*   MPV 10.7 10.6 10.4   GRAN 53.7  2.5 60.6  2.9 67.9  4.4   LYMPH 27.4  1.3 22.8  1.1 17.3*  1.1   MONO 12.7  0.6 10.9  0.5 12.1  0.8   BASO 0.03 0.03 0.02   NRBC 0 0 0       COAGULATION LAST 3 DAYS  Recent Labs   Lab 04/10/23  1711   INR 1.0   APTT 25.7       CHEMISTRY LAST 3 DAYS  Recent Labs   Lab 04/10/23  1711 04/11/23  0729 04/14/23  0421 04/15/23  0425 04/16/23  0456      < > 141 137 136   K 4.3   < > 4.1 3.5 3.7      < > 111* 113* 107   CO2 23   < > 24 23 23   ANIONGAP 7*   < > 6* 1* 6*   BUN 25*   < > 12 11 14   CREATININE 1.0   < > 1.3 1.3 1.3   GLU 91   < > 78 76 90   CALCIUM 9.8   < > 9.1 8.8 8.8   MG 2.1   < > 1.9 1.9 1.8   ALBUMIN 4.0  --   --   --   --    PROT 6.7  --   --    --   --    ALKPHOS 88  --   --   --   --    ALT 16  --   --   --   --    AST 21  --   --   --   --    BILITOT 0.6  --   --   --   --     < > = values in this interval not displayed.       CARDIAC PROFILE LAST 3 DAYS  Recent Labs   Lab 04/15/23  1805 04/15/23  1939 04/16/23  0456   TROPONINIHS 3212.4* 3330.2* 3067.2*       ENDOCRINE LAST 3 DAYS  No results for input(s): TSH, PROCAL in the last 168 hours.    LAST ARTERIAL BLOOD GAS  ABG  No results for input(s): PH, PO2, PCO2, HCO3, BE in the last 168 hours.    LAST 7 DAYS MICROBIOLOGY   Microbiology Results (last 7 days)       ** No results found for the last 168 hours. **            MOST RECENT IMAGING  X-Ray Chest AP Portable  HISTORY: diaphoretic, crackles on exam that are new    FINDINGS: Portable chest radiograph at 2016 hours compared to 07/23/2022 shows the cardiomediastinal silhouette and pulmonary vasculature are stable and within normal limits, with aortic vascular calcifications.    The lungs are normally expanded, with no consolidation, large pleural effusion, or evidence of brigid interstitial pulmonary edema. There are stable mild scattered reticulonodular densities in both lungs, with lucencies reflecting pulmonary emphysema. No pneumothorax or acute fractures.    IMPRESSION: No definite evidence of acute cardiopulmonary disease.    Electronically signed by:  Corey Kaiser MD  4/15/2023 8:36 AM CDT Workstation: [id number]      ECHOCARDIOGRAM RESULTS (last 5)  Results for orders placed during the hospital encounter of 07/23/22    Echo    Interpretation Summary  · There is moderate aortic valve stenosis.  · Aortic valve area is 1.49 cm2; peak velocity is m/s; mean gradient is 26 mmHg.  · Mild aortic regurgitation.  · Mild mitral regurgitation.  · The left ventricle is normal in size with concentric remodeling and normal systolic function.  · The estimated ejection fraction is 60%.  · Grade I left ventricular diastolic dysfunction.  · Normal right  ventricular size with normal right ventricular systolic function.  · Mild left atrial enlargement.  · Mild tricuspid regurgitation.  · Intermediate central venous pressure (8 mmHg).  · The estimated PA systolic pressure is 34 mmHg.      Results for orders placed during the hospital encounter of 02/09/20    Echo Color Flow Doppler? Yes; Bubble Contrast? Yes    Interpretation Summary  · Concentric left ventricular remodeling.  · Intravenous Saline (bubble) contrast was used during the study.Study is negative for shunt  · Left ventricular systolic function. The estimated ejection fraction is 65%.  · Grade I (mild) left ventricular diastolic dysfunction consistent with impaired relaxation.  · Normal right ventricular systolic function.  · Mild aortic regurgitation.  · Mild aortic valve stenosis.  · Aortic valve area is 1.54 cm2; peak velocity is 2.59 m/s; mean gradient is 17 mmHg.  · Mild tricuspid regurgitation.  · Normal central venous pressure (3 mmHg).  · The estimated PA systolic pressure is 29 mmHg.      CURRENT/PREVIOUS VISIT EKG  Results for orders placed or performed during the hospital encounter of 04/10/23   EKG 12-lead    Collection Time: 04/15/23  1:41 PM    Narrative    Test Reason : I48.91,    Vent. Rate : 062 BPM     Atrial Rate : 062 BPM     P-R Int : 128 ms          QRS Dur : 088 ms      QT Int : 422 ms       P-R-T Axes : 041 006 -11 degrees     QTc Int : 428 ms    Normal sinus rhythm with sinus arrhythmia  Nonspecific T wave abnormality  Abnormal ECG  When compared with ECG of 15-APR-2023 07:50,  Sinus rhythm has replaced Atrial fibrillation  Vent. rate has decreased BY  72 BPM  ST no longer depressed in Inferior leads  ST no longer depressed in Anterior-lateral leads  T wave inversion no longer evident in Lateral leads    Referred By: [email or web address]   SELF           Confirmed By:            ASSESSMENT/PLAN:     Active Hospital Problems    Diagnosis    *Rectal bleeding    Proctitis    Constipation     Urinary retention    Hypertension    Current use of long term anticoagulation    Paroxysmal atrial fibrillation    Diastolic dysfunction    Thrombocytopenia       ASSESSMENT & PLAN:     Rectal Bleeding on Eliquis  Paroxysmal Atrial Fibrillation on Sotalol  AFRVR  Diastolic Dysfunction  Hypertension  Thrombocytopenia  Moderate aortic valve stenosis  NSTEMI    RECOMMENDATIONS:    Patient has remained in sinus rhythm overnight.  Troponin HS peaked to 3330 and is trending downward this a.m..  Patient remains chest pain-free.  Plans for flexible sigmoidoscopy this afternoon.  Recommend management and resolution of acute rectal bleed and constipation prior to pursuing with any cardiac catheterization.  Discussed with patient that she may need an angiogram this admission to rule out coronary blockages when rectal bleed is stable.  We will reassess after flexible sigmoidoscopy is performed.  Continue sotalol 40 mg t.i.d..   Continue to hold anticoagulation in view of rectal bleeding and upcoming procedure.  Continue to check and replace potassium and magnesium. Goal for potassium is 4.0, and goal for magnesium is 2.0.    We will continue to follow.    Zulema Hernández NP  Department of Cardiology  Date of Service: 04/16/2023        I have personally interviewed and examined the patient, I have reviewed the Nurse Practitioner's history and physical, assessment, and plan. I agree with the findings and plan.    Had an extensive discussion with patient and her son.    She has heme-positive stools and also fecal impaction  Patient has not had prior coronary angiography would certainly benefit from it she has positive troponin.  The limiting factor for her coronary angiography is that she has heme-positive stools and etiology is unclear at the present time if she has a mass in the colorectal area.  Anticoagulation is also on hold for the same reason.  As soon as she is relieved of the fecal impaction consideration for coronary  angiography for care with GI.  Discussed with patient the benefits and options at length in detail.  Coronary Angiogram +/- PCI  AntiPlatelet therapy-  Asprin Plavix Brilinta  Access - Bilateral CFA/ Radial  Allergies : No Iodine Allergy  Pre Hydration IVF  cc /hr  Pre Procedure Medication : Benadryl 25 - 50 mg po prior to procedure X 1  No recent head trauma.  No bleeding issues or Blood in the stools or Urine.   Risks benefits and alternate options were explained to the Patient.    Risks include but not limited to bleeding, allergic reaction to the dye, vascular damage, renal failure with potential need for Dialysis, Infection, Rhythm abnormalities, stroke, myocardial infarction, emergency bypass surgery and death.    The risks is of moderate sedation include hypotension respiratory depression arrhythmias bronchospasm and death.  Patient does understand these risks and wants to proceed with cardiac catheterization.  Should stenting be indicated, patient has agreed to dual antiplatelet therapy for 12 to 18 months with drug-eluting stent and a minimum of 1 month of dual antiplatelet therapy with the use of bare metal stent.  Additionally patient is aware of the noncompliance with medications is likely to result in the stent clotting with heart attack and heart failure and or death.  Further recommendations to follow.      Pj Ramirez M.D.  Department of Cardiology  Date of Service: 04/16/2023  4:15 PM

## 2023-04-16 NOTE — ANESTHESIA POSTPROCEDURE EVALUATION
Anesthesia Post Evaluation    Patient: Irene العراقي    Procedure(s) Performed: Procedure(s) (LRB):  COLONOSCOPY (N/A)    Final Anesthesia Type: MAC      Patient location during evaluation: GI PACU  Patient participation: Yes- Able to Participate  Level of consciousness: awake and alert, oriented and awake  Post-procedure vital signs: reviewed and stable  Pain management: adequate  Airway patency: patent    PONV status at discharge: No PONV  Anesthetic complications: no      Cardiovascular status: blood pressure returned to baseline, hemodynamically stable and stable  Respiratory status: unassisted, spontaneous ventilation and room air  Hydration status: euvolemic  Follow-up not needed.          Vitals Value Taken Time   /57 04/16/23 1422   Temp 37.3 °C (99.1 °F) 04/16/23 1422   Pulse 57 04/16/23 1422   Resp 18 04/16/23 1422   SpO2 97 % 04/16/23 1422         No case tracking events are documented in the log.      Pain/Xochilt Score: No data recorded

## 2023-04-16 NOTE — PROGRESS NOTES
[facility] Medicine  Progress Note    Patient name: Irene العراقي  MRN: [id number]  Admit Date: 4/10/2023   LOS: 6 days     SUBJECTIVE:     Principal problem: Rectal bleeding, constipation     Interval History:      4/16- patient looks well this morning, HR controlled and sinus rhythm, bp wnl.  Had nstemi yesterday.  Plan for flex sig today    4/15- patient went into afib/rvr early this morning, denied chest pain and shortness of breath at the time.  I was called around 0730 due to patient being diaphoretic, in and out of afib.  On my exam she was nauseated,  had vomited, was diaphoretic, HR was normal, BP was 94/50s, crackles to right lung which is new.  She also has chest tightness, no shortness of breath.  Her glucose is 89, will order trop/ekg to be safe, and cxr.      4/14- only liquid bm, she appears more uncomfortable today.      4/13- she still hasn't had a bm despite multiple enemas, miralax and attempt to disimpact.  Continuing high dose miralax.      4/12- patient is up to bedside chair, still no bowel movement despite enema yesterday.  Will order soap suds and disimpaction today.  Vitals stable, feels well.      Scheduled Meds:   chlorhexidine  15 mL Mouth/Throat BID    lubiprostone  24 mcg Oral BID WM    magnesium oxide  400 mg Oral Daily    mupirocin   Nasal BID    pantoprazole  40 mg Oral Daily    piperacillin-tazobactam (ZOSYN) IVPB  4.5 g Intravenous Q8H    polyethylene glycol  17 g Oral BID    sotaloL  40 mg Oral TID     Continuous Infusions:   dilTIAZem Stopped (04/15/23 2200)       PRN Meds:acetaminophen, acetaminophen, melatonin, metoprolol, morphine, naloxone, ondansetron, promethazine (PHENERGAN) IVPB, simethicone, sodium chloride 0.9%    Review of patient's allergies indicates:   Allergen Reactions    Hydrocodone     Meperidine     Meperidine hcl Nausea And Vomiting    Persantine [dipyridamole]     Vicodin [hydrocodone-acetaminophen] Nausea And Vomiting       Review of  Systems: As per interval history    OBJECTIVE:     Vital Signs (Most Recent)  Temp: 98 °F (36.7 °C) (04/16/23 1513)  Pulse: (!) 51 (04/16/23 1513)  Resp: 20 (04/16/23 1513)  BP: 128/71 (04/16/23 1513)  SpO2: 100 % (04/16/23 1513)    Vital Signs Range (Last 24H):  Temp:  [97.5 °F (36.4 °C)-99.1 °F (37.3 °C)]   Pulse:  [51-75]   Resp:  [14-33]   BP: ()/(46-83)   SpO2:  [90 %-100 %]     I & O (Last 24H):  Intake/Output Summary (Last 24 hours) at 4/16/2023 1707  Last data filed at 4/16/2023 1504  Gross per 24 hour   Intake 649.42 ml   Output 700 ml   Net -50.58 ml         Physical Exam:  General: Patient resting, anxious when she is awake.   Eyes: No conjunctival injection. No scleral icterus.  ENT: Hearing grossly intact. No discharge from ears. No nasal discharge.   Neck: Supple, trachea midline. No JVD  CVS: RRR. No LE edema BL  Lungs:  No tachypnea or accessory muscle use. RML and RLL Crackles noted   Abdomen:  Soft and nondistended.    Neuro: AOx3. Moves all extremities. Follows commands. Responds appropriately   Skin:  clammy. No rash or erythema noted    Laboratory:  I have reviewed all pertinent lab results within the past 24 hours.    Diagnostic Results:  Labs: Reviewed  ECG: Reviewed  X-Ray: Reviewed  CT abd/pelvis    ASSESSMENT/PLAN:     83 yo female with hx of chronic constipation, diverticulosis, afib on elaquis who came in for rectal bleeding associated with constipation, likely impacted.      Active Hospital Problems    Diagnosis  POA    *Rectal bleeding [K62.5]  Unknown    Proctitis [K62.89]  Unknown    Constipation [K59.00]  Unknown    Urinary retention [R33.9]  Unknown    Hypertension [I10]  Yes    Current use of long term anticoagulation [Z79.01]  Not Applicable    Paroxysmal atrial fibrillation [I48.0]  Yes    Diastolic dysfunction [I51.89]  Yes    Thrombocytopenia [D69.6]  Yes      Resolved Hospital Problems   No resolved problems to display.         Plan:     4/15-  AFIb/RVR  Nausea,  vomiting, diaphoresis  Chest tightness  She was given IV metoprolol around 6-6:30  She was tachy and hypertensive at the time   HR improved, now slightly hypotensive  -Will get stat trop and ekg,  ml bolus  She vomited, now has crackles on exam.  Possibly aspirated  -cxr ordered, she is on zosyn    4/16- likely nstemi yesterday, trops downtrending, no symptoms, vitals stable, holding off AC until after procedure.  F/u cards recs        Rectal bleeding  - Hgb stable this am  - appreciate Dr Thompson's evaluation  - continue miralax  - soap jessica enema 4/12 and attempt disimpaction  - held off on flex sig initially as patient didn't want invasive procedure if  its possible to avoid  - protonix po, trend CBC  - pain control, and proctitis management as below  - after discussing flex sig and explaining that this wouldn't be for screening alone, but that its possible intervention for severe constipation, she would like to proceed with flex sig  -flex sig today    Urinary retention  CTA showed large amount of urine and bladder, patient was in and out catheterization after CT scan with 600 cc output per nursing, bladder scan every 6 hours as needed if patient is unable to void, may have to place urinary catheter but most likely related to the constipation      Constipation       Mineral oil and tap water enemas not effective  Soap suds enema with disimpaction ineffective   Continue high dose Miralax and f/u GI recs  Will need scheduled bowel regimen going forward    Proctitis  - continue zosyn for proctitis  - address constipation    Hypertension  Blood pressure under good control, most recent blood pressure 125/63  Hold aldactone this am      Current use of long term anticoagulation  Patient is on apixaban for paroxysmal atrial fibrillation  Restart apixaban  today if ok with GI      Paroxysmal atrial fibrillation  History of paroxysmal atrial fibrillation on apixaban and sotalol, 12 lead EKG with normal sinus  rhythm, ventricular rate 76 and , monitor on telemetry, continue sotalol, apixaban        Diastolic dysfunction  Echocardiogram from July 2024 with mild aortic and mitral regurgitation, 60% ejection fraction and grade 1 left ventricular diastolic dysfunction, monitor daily weight and I/O      Thrombocytopenia  Platelets 140, looking back on lab work patient has had chronic thrombocytopenia as low as 67 in January of 2021, repeat CBC in a.m.          VTE Risk Mitigation (From admission, onward)           Ordered     Reason for No Pharmacological VTE Prophylaxis  Once        Question Answer Comment   Reasons: Risk of Bleeding    Reasons: Active Bleeding        04/10/23 2211     IP VTE HIGH RISK PATIENT  Once         04/10/23 2211     Place sequential compression device  Until discontinued         04/10/23 2211                        Department Hospital Medicine  [facility]  Jhon Tam MD  Date of service: 04/16/2023

## 2023-04-16 NOTE — [employer] PATIENT INSTRUCTIONS
Discharge Summary/Instructions after an Endoscopic Procedure  Patient Name: Irene العراقي  Patient MRN: [id number]  Patient YOB: 1940 Sunday, April 16, 2023  Kvng Mensah MD  RESTRICTIONS:  During your procedure today, you received medications for sedation.  These   medications may affect your judgment, balance and coordination.  Therefore,   for 24 hours, you have the following restrictions:   - DO NOT drive a car, operate machinery, make legal/financial decisions,   sign important papers or drink alcohol.    ACTIVITY:  Today: no heavy lifting, straining or running due to procedural   sedation/anesthesia.  The following day: return to full activity including work.  DIET:  Eat and drink normally unless instructed otherwise.     TREATMENT FOR COMMON SIDE EFFECTS:  - Mild abdominal pain, nausea, belching, bloating or excessive gas:  rest,   eat lightly and use a heating pad.  - Sore Throat: treat with throat lozenges and/or gargle with warm salt   water.  - Because air was used during the procedure, expelling large amounts of air   from your rectum or belching is normal.  - If a bowel prep was taken, you may not have a bowel movement for 1-3 days.    This is normal.  SYMPTOMS TO WATCH FOR AND REPORT TO YOUR PHYSICIAN:  1. Abdominal pain or bloating, other than gas cramps.  2. Chest pain.  3. Back pain.  4. Signs of infection such as: chills or fever occurring within 24 hours   after the procedure.  5. Rectal bleeding, which would show as bright red, maroon, or black stools.   (A tablespoon of blood from the rectum is not serious, especially if   hemorrhoids are present.)  6. Vomiting.  7. Weakness or dizziness.  GO DIRECTLY TO THE NEAREST EMERGENCY ROOM IF YOU HAVE ANY OF THE FOLLOWING:      Difficulty breathing              Chills and/or fever over 101 F   Persistent vomiting and/or vomiting blood   Severe abdominal pain   Severe chest pain   Black, tarry stools   Bleeding- more than one  tablespoon   Any other symptom or condition that you feel may need urgent attention  Your doctor recommends these additional instructions:  If any biopsies were taken, your doctors clinic will contact you in 1 to 2   weeks with any results.  - Discharge patient to home.   - Resume previous diet.   - Continue present medications.   - Resume Eliquis (apixaban) at prior dose tomorrow.  Refer to managing   physician for further adjustment of therapy.   - Await pathology results.   - Repeat colonoscopy is not recommended due to current age (66 years or   older) for surveillance.  For questions, problems or results please call your physician - Kvng Mensah MD at Work:  [phone].  [facility], EMERGENCY ROOM PHONE NUMBER: [phone]  IF A COMPLICATION OR EMERGENCY SITUATION ARISES AND YOU ARE UNABLE TO REACH   YOUR PHYSICIAN - GO DIRECTLY TO THE EMERGENCY ROOM.  Kvng Mensah MD  4/16/2023 3:14:13 PM  This report has been verified and signed electronically.  Dear patient,  As a result of recent federal legislation (The Federal Cures Act), you may   receive lab or pathology results from your procedure in your [facility]   account before your physician is able to contact you. Your physician or   their representative will relay the results to you with their   recommendations at their soonest availability.  Thank you,  [employer]

## 2023-04-16 NOTE — PT/OT/SLP PROGRESS
Physical Therapy      Patient Name:  Irene الرعاقي   MRN:  [id number]    Patient not seen today secondary to Patient unwilling to participate, Other (Comment) About to do colonoscopy.. Will follow-up 4/17/23.

## 2023-04-17 LAB
ANION GAP SERPL CALC-SCNC: 7 MMOL/L (ref 8–16)
APTT PPP: 27.5 SEC (ref 21–32)
BASOPHILS # BLD AUTO: 0.04 K/UL (ref 0–0.2)
BASOPHILS NFR BLD: 0.6 % (ref 0–1.9)
BUN SERPL-MCNC: 16 MG/DL (ref 8–23)
CALCIUM SERPL-MCNC: 8.5 MG/DL (ref 8.7–10.5)
CHLORIDE SERPL-SCNC: 108 MMOL/L (ref 95–110)
CO2 SERPL-SCNC: 22 MMOL/L (ref 23–29)
CREAT SERPL-MCNC: 1.3 MG/DL (ref 0.5–1.4)
DIFFERENTIAL METHOD: ABNORMAL
EOSINOPHIL # BLD AUTO: 0.3 K/UL (ref 0–0.5)
EOSINOPHIL NFR BLD: 4.9 % (ref 0–8)
ERYTHROCYTE [DISTWIDTH] IN BLOOD BY AUTOMATED COUNT: 12.3 % (ref 11.5–14.5)
EST. GFR  (NO RACE VARIABLE): 41.1 ML/MIN/1.73 M^2
GLUCOSE SERPL-MCNC: 77 MG/DL (ref 70–110)
HCT VFR BLD AUTO: 31 % (ref 37–48.5)
HGB BLD-MCNC: 10.5 G/DL (ref 12–16)
IMM GRANULOCYTES # BLD AUTO: 0.05 K/UL (ref 0–0.04)
IMM GRANULOCYTES NFR BLD AUTO: 0.8 % (ref 0–0.5)
INR PPP: 1 (ref 0.8–1.2)
LYMPHOCYTES # BLD AUTO: 1.3 K/UL (ref 1–4.8)
LYMPHOCYTES NFR BLD: 20.2 % (ref 18–48)
MAGNESIUM SERPL-MCNC: 2 MG/DL (ref 1.6–2.6)
MCH RBC QN AUTO: 31.1 PG (ref 27–31)
MCHC RBC AUTO-ENTMCNC: 33.9 G/DL (ref 32–36)
MCV RBC AUTO: 92 FL (ref 82–98)
MONOCYTES # BLD AUTO: 0.8 K/UL (ref 0.3–1)
MONOCYTES NFR BLD: 11.9 % (ref 4–15)
NEUTROPHILS # BLD AUTO: 4 K/UL (ref 1.8–7.7)
NEUTROPHILS NFR BLD: 61.6 % (ref 38–73)
NRBC BLD-RTO: 0 /100 WBC
PLATELET # BLD AUTO: 99 K/UL (ref 150–450)
PMV BLD AUTO: 10.9 FL (ref 9.2–12.9)
POTASSIUM SERPL-SCNC: 3.4 MMOL/L (ref 3.5–5.1)
PROTHROMBIN TIME: 10.9 SEC (ref 9–12.5)
RBC # BLD AUTO: 3.38 M/UL (ref 4–5.4)
SODIUM SERPL-SCNC: 137 MMOL/L (ref 136–145)
WBC # BLD AUTO: 6.49 K/UL (ref 3.9–12.7)

## 2023-04-17 PROCEDURE — 21000000 HC CCU ICU ROOM CHARGE

## 2023-04-17 PROCEDURE — 25000003 PHARM REV CODE 250: Performed by: INTERNAL MEDICINE

## 2023-04-17 PROCEDURE — 36415 COLL VENOUS BLD VENIPUNCTURE: CPT | Performed by: NURSE PRACTITIONER

## 2023-04-17 PROCEDURE — 85610 PROTHROMBIN TIME: CPT

## 2023-04-17 PROCEDURE — 25000003 PHARM REV CODE 250: Performed by: NURSE PRACTITIONER

## 2023-04-17 PROCEDURE — 94761 N-INVAS EAR/PLS OXIMETRY MLT: CPT

## 2023-04-17 PROCEDURE — 63600175 PHARM REV CODE 636 W HCPCS: Performed by: STUDENT IN AN ORGANIZED HEALTH CARE EDUCATION/TRAINING PROGRAM

## 2023-04-17 PROCEDURE — 99233 PR SUBSEQUENT HOSPITAL CARE,LEVL III: ICD-10-PCS | Mod: ,,, | Performed by: INTERNAL MEDICINE

## 2023-04-17 PROCEDURE — 25000003 PHARM REV CODE 250

## 2023-04-17 PROCEDURE — 36415 COLL VENOUS BLD VENIPUNCTURE: CPT

## 2023-04-17 PROCEDURE — 83735 ASSAY OF MAGNESIUM: CPT | Performed by: NURSE PRACTITIONER

## 2023-04-17 PROCEDURE — 99900031 HC PATIENT EDUCATION (STAT)

## 2023-04-17 PROCEDURE — 80048 BASIC METABOLIC PNL TOTAL CA: CPT | Performed by: NURSE PRACTITIONER

## 2023-04-17 PROCEDURE — 85025 COMPLETE CBC W/AUTO DIFF WBC: CPT | Performed by: NURSE PRACTITIONER

## 2023-04-17 PROCEDURE — 85730 THROMBOPLASTIN TIME PARTIAL: CPT

## 2023-04-17 PROCEDURE — 63600175 PHARM REV CODE 636 W HCPCS: Performed by: NURSE PRACTITIONER

## 2023-04-17 PROCEDURE — 97116 GAIT TRAINING THERAPY: CPT

## 2023-04-17 PROCEDURE — 99900035 HC TECH TIME PER 15 MIN (STAT)

## 2023-04-17 PROCEDURE — 97535 SELF CARE MNGMENT TRAINING: CPT

## 2023-04-17 PROCEDURE — 99233 SBSQ HOSP IP/OBS HIGH 50: CPT | Mod: ,,, | Performed by: INTERNAL MEDICINE

## 2023-04-17 RX ORDER — ENOXAPARIN SODIUM 100 MG/ML
80 INJECTION SUBCUTANEOUS ONCE
Status: COMPLETED | OUTPATIENT
Start: 2023-04-17 | End: 2023-04-17

## 2023-04-17 RX ORDER — SODIUM CHLORIDE 9 MG/ML
INJECTION, SOLUTION INTRAVENOUS ONCE
Status: COMPLETED | OUTPATIENT
Start: 2023-04-18 | End: 2023-04-18

## 2023-04-17 RX ORDER — LANOLIN ALCOHOL/MO/W.PET/CERES
800 CREAM (GRAM) TOPICAL
Status: DISCONTINUED | OUTPATIENT
Start: 2023-04-17 | End: 2023-04-20 | Stop reason: HOSPADM

## 2023-04-17 RX ORDER — SODIUM,POTASSIUM PHOSPHATES 280-250MG
2 POWDER IN PACKET (EA) ORAL
Status: DISCONTINUED | OUTPATIENT
Start: 2023-04-17 | End: 2023-04-20 | Stop reason: HOSPADM

## 2023-04-17 RX ORDER — SODIUM CHLORIDE 0.9 % (FLUSH) 0.9 %
2 SYRINGE (ML) INJECTION
Status: DISCONTINUED | OUTPATIENT
Start: 2023-04-17 | End: 2023-04-20 | Stop reason: HOSPADM

## 2023-04-17 RX ADMIN — PIPERACILLIN AND TAZOBACTAM 4.5 G: .5; 4 INJECTION, POWDER, LYOPHILIZED, FOR SOLUTION INTRAVENOUS at 05:04

## 2023-04-17 RX ADMIN — ENOXAPARIN SODIUM 80 MG: 100 INJECTION SUBCUTANEOUS at 03:04

## 2023-04-17 RX ADMIN — MUPIROCIN 1 G: 20 OINTMENT TOPICAL at 08:04

## 2023-04-17 RX ADMIN — MUPIROCIN 1 G: 20 OINTMENT TOPICAL at 09:04

## 2023-04-17 RX ADMIN — SOTALOL HYDROCHLORIDE 40 MG: 80 TABLET ORAL at 03:04

## 2023-04-17 RX ADMIN — CHLORHEXIDINE GLUCONATE 15 ML: 1.2 RINSE ORAL at 09:04

## 2023-04-17 RX ADMIN — SOTALOL HYDROCHLORIDE 40 MG: 80 TABLET ORAL at 09:04

## 2023-04-17 RX ADMIN — MAGNESIUM OXIDE 400 MG (241.3 MG MAGNESIUM) TABLET 400 MG: at 09:04

## 2023-04-17 RX ADMIN — SOTALOL HYDROCHLORIDE 40 MG: 80 TABLET ORAL at 08:04

## 2023-04-17 RX ADMIN — POTASSIUM BICARBONATE 35 MEQ: 391 TABLET, EFFERVESCENT ORAL at 04:04

## 2023-04-17 RX ADMIN — CHLORHEXIDINE GLUCONATE 15 ML: 1.2 RINSE ORAL at 08:04

## 2023-04-17 RX ADMIN — PANTOPRAZOLE SODIUM 40 MG: 40 TABLET, DELAYED RELEASE ORAL at 05:04

## 2023-04-17 RX ADMIN — POTASSIUM BICARBONATE 35 MEQ: 391 TABLET, EFFERVESCENT ORAL at 07:04

## 2023-04-17 NOTE — PROGRESS NOTES
[facility]  Progress Note    Patient name: Irene العراقي  MRN: [id number]  Admit Date: 4/10/2023   LOS: 7 days     SUBJECTIVE:     Principal problem: Rectal bleeding, constipation     Interval History:      4/17- she is up to bedside chair, family visiting.  Colonoscopy did not show constipation or heavy stool burden.  She remains in SR, no chest pain or s ob.  Cards following, considering angio in am.      4/16- patient looks well this morning, HR controlled and sinus rhythm, bp wnl.  Had nstemi yesterday.  Plan for flex sig today    4/15- patient went into afib/rvr early this morning, denied chest pain and shortness of breath at the time.  I was called around 0730 due to patient being diaphoretic, in and out of afib.  On my exam she was nauseated,  had vomited, was diaphoretic, HR was normal, BP was 94/50s, crackles to right lung which is new.  She also has chest tightness, no shortness of breath.  Her glucose is 89, will order trop/ekg to be safe, and cxr.      4/14- only liquid bm, she appears more uncomfortable today.      4/13- she still hasn't had a bm despite multiple enemas, miralax and attempt to disimpact.  Continuing high dose miralax.      4/12- patient is up to bedside chair, still no bowel movement despite enema yesterday.  Will order soap suds and disimpaction today.  Vitals stable, feels well.      Scheduled Meds:   chlorhexidine  15 mL Mouth/Throat BID    lubiprostone  24 mcg Oral BID WM    magnesium oxide  400 mg Oral Daily    mupirocin   Nasal BID    pantoprazole  40 mg Oral Daily    polyethylene glycol  17 g Oral BID    sotaloL  40 mg Oral TID     Continuous Infusions:   dilTIAZem Stopped (04/16/23 8555)       PRN Meds:acetaminophen, acetaminophen, magnesium oxide, magnesium oxide, melatonin, metoprolol, morphine, naloxone, ondansetron, potassium bicarbonate, potassium bicarbonate, potassium bicarbonate, potassium, sodium phosphates, potassium, sodium phosphates,  potassium, sodium phosphates, promethazine (PHENERGAN) IVPB, simethicone, sodium chloride 0.9%    Review of patient's allergies indicates:   Allergen Reactions    Hydrocodone     Meperidine     Meperidine hcl Nausea And Vomiting    Persantine [dipyridamole]     Vicodin [hydrocodone-acetaminophen] Nausea And Vomiting       Review of Systems: As per interval history    OBJECTIVE:     Vital Signs (Most Recent)  Temp: 97.2 °F (36.2 °C) (04/17/23 1140)  Pulse: 71 (04/17/23 1140)  Resp: 20 (04/17/23 1140)  BP: 119/68 (04/17/23 1140)  SpO2: (!) 94 % (04/17/23 1140)    Vital Signs Range (Last 24H):  Temp:  [97.2 °F (36.2 °C)-99.1 °F (37.3 °C)]   Pulse:  []   Resp:  [15-29]   BP: ()/(52-78)   SpO2:  [89 %-100 %]     I & O (Last 24H):  Intake/Output Summary (Last 24 hours) at 4/17/2023 1302  Last data filed at 4/17/2023 0900  Gross per 24 hour   Intake 640 ml   Output 625 ml   Net 15 ml         Physical Exam:  General: Patient resting, anxious when she is awake.   Eyes: No conjunctival injection. No scleral icterus.  ENT: Hearing grossly intact. No discharge from ears. No nasal discharge.   Neck: Supple, trachea midline. No JVD  CVS: RRR. No LE edema BL  Lungs:  No tachypnea or accessory muscle use. RML and RLL Crackles noted   Abdomen:  Soft and nondistended.    Neuro: AOx3. Moves all extremities. Follows commands. Responds appropriately   Skin:  clammy. No rash or erythema noted    Laboratory:  I have reviewed all pertinent lab results within the past 24 hours.    Diagnostic Results:  Labs: Reviewed  ECG: Reviewed  X-Ray: Reviewed  CT abd/pelvis    ASSESSMENT/PLAN:     83 yo female with hx of chronic constipation, diverticulosis, afib on elaquis who came in for rectal bleeding associated with constipation, likely impacted.      Active Hospital Problems    Diagnosis  POA    *Rectal bleeding [K62.5]  Unknown    Proctitis [K62.89]  Unknown    Constipation [K59.00]  Unknown    Urinary retention [R33.9]  Unknown     Hypertension [I10]  Yes    Current use of long term anticoagulation [Z79.01]  Not Applicable    Paroxysmal atrial fibrillation [I48.0]  Yes    Diastolic dysfunction [I51.89]  Yes    Thrombocytopenia [D69.6]  Yes      Resolved Hospital Problems   No resolved problems to display.         Plan:     4/15-  AFIb/RVR  Nausea, vomiting, diaphoresis  Chest tightness  She was given IV metoprolol around 6-6:30  She was tachy and hypertensive at the time   HR improved, now slightly hypotensive  -Will get stat trop and ekg,  ml bolus  She vomited, now has crackles on exam.  Possibly aspirated  -cxr ordered, she is on zosyn    4/16- likely nstemi yesterday, trops downtrending, no symptoms, vitals stable, holding off AC until after procedure.  F/u cards recs        Rectal bleeding  - Hgb stable this am  - appreciate Dr Thompson's evaluation  - continue miralax  - soap jessica enema 4/12 and attempt disimpaction  - held off on flex sig initially as patient didn't want invasive procedure if  its possible to avoid  - protonix po, trend CBC  - pain control, and proctitis management as below  - after discussing flex sig and explaining that this wouldn't be for screening alone, but that its possible intervention for severe constipation, she would like to proceed with flex sig  -colonoscopy completed, colon was clear.    -ok to restart AC   -lovenox x1 dose today   -possibly going for angio in am  -will resume apixaban after cath    Urinary retention  CTA showed large amount of urine and bladder, patient was in and out catheterization after CT scan with 600 cc output per nursing, bladder scan every 6 hours as needed if patient is unable to void, may have to place urinary catheter but most likely related to the constipation      Constipation       Mineral oil and tap water enemas not effective  Soap suds enema with disimpaction ineffective   Continue high dose Miralax and f/u GI recs  Will need scheduled bowel regimen going  forward  Colonoscopy showed clear colon    Proctitis  - discontinue zosyn     Hypertension  Blood pressure under good control, most recent blood pressure 125/63  Hold aldactone this am      Current use of long term anticoagulation  Patient is on apixaban for paroxysmal atrial fibrillation  Ok to resume AC      Paroxysmal atrial fibrillation  History of paroxysmal atrial fibrillation on apixaban and sotalol, 12 lead EKG with normal sinus rhythm, ventricular rate 76 and , monitor on telemetry, continue sotalol, apixaban        Diastolic dysfunction  Echocardiogram from July 2024 with mild aortic and mitral regurgitation, 60% ejection fraction and grade 1 left ventricular diastolic dysfunction, monitor daily weight and I/O      Thrombocytopenia  Platelets 140, looking back on lab work patient has had chronic thrombocytopenia as low as 67 in January of 2021, CBC in a.m.          VTE Risk Mitigation (From admission, onward)           Ordered     Reason for No Pharmacological VTE Prophylaxis  Once        Question Answer Comment   Reasons: Risk of Bleeding    Reasons: Active Bleeding        04/10/23 2211     IP VTE HIGH RISK PATIENT  Once         04/10/23 2211     Place sequential compression device  Until discontinued         04/10/23 2211                        Department Hospital Medicine  [facility]  Jhon Tam MD  Date of service: 04/17/2023

## 2023-04-17 NOTE — PLAN OF CARE
Problem: Adult Inpatient Plan of Care  Goal: Plan of Care Review  4/16/2023 2248 by Estela Sandoval RN  Outcome: Ongoing, Progressing  4/16/2023 2207 by Estela Sandoval RN  Outcome: Ongoing, Progressing  Goal: Patient-Specific Goal (Individualized)  4/16/2023 2248 by Estela Sandoval RN  Outcome: Ongoing, Progressing  4/16/2023 2207 by Estela Sandoval RN  Outcome: Ongoing, Progressing  Goal: Absence of Hospital-Acquired Illness or Injury  4/16/2023 2248 by Estela Sandoval RN  Outcome: Ongoing, Progressing  4/16/2023 2207 by Estela Sandoval RN  Outcome: Ongoing, Progressing  Goal: Optimal Comfort and Wellbeing  4/16/2023 2248 by Estela Sandoval RN  Outcome: Ongoing, Progressing  4/16/2023 2207 by Estela Sandoval RN  Outcome: Ongoing, Progressing  Goal: Readiness for Transition of Care  4/16/2023 2248 by Estela Sandoval RN  Outcome: Ongoing, Progressing  4/16/2023 2207 by Estela Sandoval RN  Outcome: Ongoing, Progressing     Problem: Infection  Goal: Absence of Infection Signs and Symptoms  4/16/2023 2248 by Estela Sandoval RN  Outcome: Ongoing, Progressing  4/16/2023 2207 by Estela Sandoval RN  Outcome: Ongoing, Progressing     Problem: Skin Injury Risk Increased  Goal: Skin Health and Integrity  4/16/2023 2248 by Estela Sandoval RN  Outcome: Ongoing, Progressing  4/16/2023 2207 by Estela Sandoval RN  Outcome: Ongoing, Progressing     Problem: Fall Injury Risk  Goal: Absence of Fall and Fall-Related Injury  4/16/2023 2248 by Estela Sandoval RN  Outcome: Ongoing, Progressing  4/16/2023 2207 by sEtela Sandoval RN  Outcome: Ongoing, Progressing

## 2023-04-17 NOTE — PLAN OF CARE
Patient continues to require hospitalization. Patient refused home health and therapy recommendations are now home, no needs. CM will continue to follow for any discharge needs.       04/17/23 1529   Discharge Reassessment   Assessment Type Discharge Planning Reassessment   Did the patient's condition or plan change since previous assessment? Yes   Discharge Plan discussed with: Patient   Discharge Plan A Home   Discharge Plan B Home   DME Needed Upon Discharge  other (see comments)  (TBD)   Discharge Barriers Identified None   Why the patient remains in the hospital Requires continued medical care   Post-Acute Status   Post-Acute Authorization   (Refused)   Coverage [employer]

## 2023-04-17 NOTE — PROGRESS NOTES
[facility]  Department of Cardiology  Consult Note      PATIENT NAME: Irene العراقي  MRN: [id number]  TODAY'S DATE: 04/17/2023  ADMIT DATE: 4/10/2023                          CONSULT REQUESTED BY: Jhon Tam, *    SUBJECTIVE     PRINCIPAL PROBLEM: Rectal bleeding      REASON FOR CONSULT:    AFIB RVR    Interval history    4/17/23    Patient is resting comfortably in chair during examination.  Sinus bradycardia on monitor heart rate 58, and BP stable.  Patient had intermittent atrial fibrillation with RVR overnight.  Patient denies chest pain or any acute symptoms at this time.  Patient had colonoscopy yesterday that showed no signs of stool impaction or evidence of bleeding.  One polyp was removed.  GI recommendations to restart Eliquis today.  Patient is agreeable to having an angiogram tomorrow for further evaluation of coronary blockages in view of NSTEMI this admission.    COLONOSCOPY YESTERDAY  Impression:            - One 7 mm polyp in the ascending colon, removed                          with a cold snare. Resected and retrieved.                          - The examined portion of the ileum was normal.                          - Diverticulosis in the entire examined colon.                          There was no evidence of diverticular bleeding.                          - Internal hemorrhoids. No large stool impactions.                          Primarily liquid stool and some small stool balls                          seen on exam.   Recommendation:        - Discharge patient to home.                          - Resume previous diet.                          - Continue present medications.                          - Resume Eliquis (apixaban) at prior dose                          tomorrow. Refer to managing physician for further                          adjustment of therapy.                          - Await pathology results.                          - Repeat colonoscopy is not  recommended due to                          current age (66 years or older) for surveillance.   Kvng Mensah MD   04/16/2023    Patient remains in sinus rhythm on monitor.  Heart rate 59.  Blood pressure 123/61.  94% O2 saturation on room air.  Plans for possible flex sig this afternoon.  Patient states she is having bowel movements and they are coming home clear liquid.  H&H 10.4/31.6, platelets 100, K 3.7, creatinine 1.3, magnesium 1.8 patient denies chest pain or any acute symptoms at this time.    Troponins trended upward yesterday to 3330 in are beginning to trend downward.      Patient's troponin HS 4/15/23 -7:49 a.m. was 9.6, repeat at 1:10 p.m. was 1375, repeat at 6:05 p.m. was 3212, repeat at 7:39 p.m. was 3333.  04/16/2023 troponin this a.m. was 3067.2.        HPI:    Patient is an 82-year-old female with past medical history of paroxysmal atrial fibrillation on Eliquis, chronic HFpEF, constipation, and hypertension who presented to the ED with complaints of rectal bleeding secondary to chronic constipation.  She had been taking MiraLax every other day when she developed abdominal cramping, went to use the restroom, and found bright red blood in the toilet.  She also complained of associated dizziness and lightheadedness.  Patient was admitted for constipation and rectal bleeding.  She was received multiple enemas, MiraLax and disimpaction this admission.  This a.m., she went into AFib RVR, denied chest pain and shortness of breath.  Patient was found to be diaphoretic and hypotensive during examination with nausea and vomiting and chest tightness.  Patient was supposed to have flex sigmoid colonoscopy this a.m. with GI.  Procedure postponed till tomorrow in view of AFib RVR.  Patient is receiving her sotalol 40 mg b.i.d..  Anticoagulation is on hold.    During examination patient is on Cardizem 5 milligrams/hour.  She was in sinus rhythm.  No acute distress.  Alert and oriented.  Blood pressure 108/61  heart rate 80, 90% on room air.  Rodriguez catheter with positive urinary output.  Patient's troponin HS at 7:49 a.m. was 9.6, repeat at 1:10 p.m. was 1375.  Patient is chest pain-free during examination.  She appears anxious in view of recent events.             FROM H&P:    HPI: Irene العراقي is an 82-year-old female with chronic medical problems including paroxysmal atrial fibrillation on apixaban, chronic HFpEF, constipation and hypertension who presents to the emergency room complaining of rectal bleeding.  Patient states that she has chronic constipation and takes MiraLax every other day.  She took some at about 10:00 a.m. this morning.  Later that morning she started having some crampy abdominal pain across her lower abdomen especially left lower quadrant and went to the bathroom and had a small amount of bright red blood in the toilet.  Later she started having more abdominal pain, gas and liquid stools. Associated symptoms are lightheadedness, rectal burning and difficulty voiding.  She said she is never had bleeding before or difficulty voiding.  She came down from [place] 2 years ago to take care of her twin sister that has never been able to go back since COVID.  She follows with cardiology and a PCP here in his on spironolactone for hypertension, apixaban and sotalol for paroxysmal atrial fibrillation and spironolactone for hypertension.  She denies dysuria, fevers, chills, shortness of breath or chest pain, nausea or vomiting.      In the ED, H&H 11.9/36.0, platelets decreased to 140, WBC 6.84, BUN/CR 25/1.0, glucose 91, sodium 139, potassium 4.3, lipase 43, INR 1.0, 12 lead EKG with normal sinus rhythm, ventricular rate 76 and , CT scan of pelvis with contrast shows fecal impaction and proctitis, cholelithiasis and sigmoid diverticulosis with no evidence of diverticulitis.  See report for full details.  Per ED physician rectal exam was negative for blood.  Temperature 97.6°, heart rate 83, blood  pressure 125/63, respiratory rate 15 to 20 and O2 sat 95% on room air.  She was given 1 g acetaminophen, an 80 mg Protonix and 4.5 g Zosyn.  She will be admitted to the hospitalist service for further evaluation treatment with a consult Gastroenterology.      4/15- patient went into afib/rvr early this morning, denied chest pain and shortness of breath at the time.  I was called around 0730 due to patient being diaphoretic, in and out of afib.  On my exam she was nauseated,  had vomited, was diaphoretic, HR was normal, BP was 94/50s, crackles to right lung which is new.  She also has chest tightness, no shortness of breath.  Her glucose is 89, will order trop/ekg to be safe, and cxr.       4/14- only liquid bm, she appears more uncomfortable today.       4/13- she still hasn't had a bm despite multiple enemas, miralax and attempt to disimpact.  Continuing high dose miralax.       4/12- patient is up to bedside chair, still no bowel movement despite enema yesterday.  Will order soap suds and disimpaction today.  Vitals stable, feels well.      Review of patient's allergies indicates:   Allergen Reactions    Hydrocodone     Meperidine     Meperidine hcl Nausea And Vomiting    Persantine [dipyridamole]     Vicodin [hydrocodone-acetaminophen] Nausea And Vomiting       Past Medical History:   Diagnosis Date    Atrial fibrillation 01/25/2021    Hypertension      Past Surgical History:   Procedure Laterality Date    BREAST SURGERY      COLONOSCOPY N/A 4/16/2023    Procedure: COLONOSCOPY;  Surgeon: Kvng Mensah MD;  Location: [facility];  Service: Endoscopy;  Laterality: N/A;    COLONOSCOPY W/ POLYPECTOMY  04/16/2023     Social History     Tobacco Use    Smoking status: Never    Smokeless tobacco: Never   Substance Use Topics    Alcohol use: Not Currently    Drug use: Not Currently        REVIEW OF SYSTEMS  As per mentioned above in HPI.      OBJECTIVE     VITAL SIGNS (Most Recent)  Temp: 97.2 °F (36.2 °C) (04/17/23  1140)  Pulse: 71 (04/17/23 1140)  Resp: 20 (04/17/23 1140)  BP: 119/68 (04/17/23 1140)  SpO2: (!) 94 % (04/17/23 1140)    VENTILATION STATUS  Resp: 20 (04/17/23 1140)  SpO2: (!) 94 % (04/17/23 1140)           I & O (Last 24H):  Intake/Output Summary (Last 24 hours) at 4/17/2023 1303  Last data filed at 4/17/2023 0900  Gross per 24 hour   Intake 640 ml   Output 625 ml   Net 15 ml       WEIGHTS  Wt Readings from Last 3 Encounters:   04/17/23 0400 83 kg (182 lb 15.7 oz)   04/12/23 0400 81.7 kg (180 lb 1.9 oz)   04/10/23 2300 81.1 kg (178 lb 12.7 oz)   04/10/23 1545 81.6 kg (180 lb)   04/16/23 1205 81.6 kg (180 lb)   10/11/22 0927 81.6 kg (180 lb)       PHYSICAL EXAM  GENERAL: well built, well nourished, well-developed in no apparent distress alert and oriented.   HEENT: Normocephalic. Pupils normal and conjunctivae normal.    NECK: No JVD. No bruit..   THYROID: Thyroid not enlarged. No nodules present..   CARDIAC: Regular rate and rhythm. S1 is normal.S2 is normal.2/6 systolic murmur  CHEST ANATOMY: normal.   LUNGS: Clear to auscultation. No wheezing or rhonchi..   ABDOMEN: Soft. Normal bowel sounds. Nontender.   URINARY: No araya catheter   EXTREMITIES: No cyanosis, clubbing or edema noted at this time.  PERIPHERAL VASCULAR SYSTEM: Good palpable distal pulses.   CENTRAL NERVOUS SYSTEM: No focal motor or sensory deficits noted.   SKIN: Skin without lesions, moist, well perfused.   MUSCLE STRENGTH & TONE: No noteable weakness, atrophy or abnormal movement.     HOME MEDICATIONS:  No current facility-administered medications on file prior to encounter.     Current Outpatient Medications on File Prior to Encounter   Medication Sig Dispense Refill    acetaminophen (TYLENOL) 500 MG tablet Take 500 mg by mouth nightly as needed for Pain.      apixaban (ELIQUIS) 5 mg Tab Take 1 tablet (5 mg total) by mouth 2 (two) times daily. 180 tablet 3    magnesium oxide (MAG-OX) 400 mg (241.3 mg magnesium) tablet TAKE 1 TABLET BY MOUTH  ONCE DAILY (Patient taking differently: Take 400 mg by mouth once daily.) 90 tablet 3    sotaloL (BETAPACE) 80 MG tablet Take 0.5 tablets (40 mg total) by mouth 2 (two) times daily. (Patient taking differently: Take 40 mg by mouth once daily.) 90 tablet 3    spironolactone (ALDACTONE) 25 MG tablet Take 1 tablet (25 mg total) by mouth once daily. 90 tablet 3       SCHEDULED MEDS:   chlorhexidine  15 mL Mouth/Throat BID    lubiprostone  24 mcg Oral BID WM    magnesium oxide  400 mg Oral Daily    mupirocin   Nasal BID    pantoprazole  40 mg Oral Daily    polyethylene glycol  17 g Oral BID    sotaloL  40 mg Oral TID       CONTINUOUS INFUSIONS:   dilTIAZem Stopped (04/16/23 5265)       PRN MEDS:acetaminophen, acetaminophen, magnesium oxide, magnesium oxide, melatonin, metoprolol, morphine, naloxone, ondansetron, potassium bicarbonate, potassium bicarbonate, potassium bicarbonate, potassium, sodium phosphates, potassium, sodium phosphates, potassium, sodium phosphates, promethazine (PHENERGAN) IVPB, simethicone, sodium chloride 0.9%    LABS AND DIAGNOSTICS     CBC LAST 3 DAYS  Recent Labs   Lab 04/15/23  0425 04/16/23 0456 04/17/23  0442   WBC 4.86 6.43 6.49   RBC 3.52* 3.41* 3.38*   HGB 10.7* 10.4* 10.5*   HCT 32.0* 31.6* 31.0*   MCV 91 93 92   MCH 30.4 30.5 31.1*   MCHC 33.4 32.9 33.9   RDW 12.3 12.4 12.3   * 100* 99*   MPV 10.6 10.4 10.9   GRAN 60.6  2.9 67.9  4.4 61.6  4.0   LYMPH 22.8  1.1 17.3*  1.1 20.2  1.3   MONO 10.9  0.5 12.1  0.8 11.9  0.8   BASO 0.03 0.02 0.04   NRBC 0 0 0       COAGULATION LAST 3 DAYS  Recent Labs   Lab 04/10/23  1711   INR 1.0   APTT 25.7       CHEMISTRY LAST 3 DAYS  Recent Labs   Lab 04/10/23  1711 04/11/23  0729 04/15/23  0425 04/16/23  0456 04/17/23  0442      < > 137 136 137   K 4.3   < > 3.5 3.7 3.4*      < > 113* 107 108   CO2 23   < > 23 23 22*   ANIONGAP 7*   < > 1* 6* 7*   BUN 25*   < > 11 14 16   CREATININE 1.0   < > 1.3 1.3 1.3   GLU 91   < > 76 90 77    CALCIUM 9.8   < > 8.8 8.8 8.5*   MG 2.1   < > 1.9 1.8 2.0   ALBUMIN 4.0  --   --   --   --    PROT 6.7  --   --   --   --    ALKPHOS 88  --   --   --   --    ALT 16  --   --   --   --    AST 21  --   --   --   --    BILITOT 0.6  --   --   --   --     < > = values in this interval not displayed.       CARDIAC PROFILE LAST 3 DAYS  Recent Labs   Lab 04/15/23  1805 04/15/23  1939 04/16/23  0456   TROPONINIHS 3212.4* 3330.2* 3067.2*       ENDOCRINE LAST 3 DAYS  No results for input(s): TSH, PROCAL in the last 168 hours.    LAST ARTERIAL BLOOD GAS  ABG  No results for input(s): PH, PO2, PCO2, HCO3, BE in the last 168 hours.    LAST 7 DAYS MICROBIOLOGY   Microbiology Results (last 7 days)       ** No results found for the last 168 hours. **            MOST RECENT IMAGING  Echo  · The left ventricle is normal in size with concentric remodeling and   normal systolic function.  · Mild left atrial enlargement.  · The estimated ejection fraction is 65%.  · Indeterminate left ventricular diastolic function.  · Normal right ventricular size with normal right ventricular systolic   function.  · There is moderate aortic valve stenosis.  · Aortic valve area is 1.24 cm2; peak velocity is 3.51 m/s; mean gradient   is 32 mmHg.  · Mild mitral regurgitation.  · Mild tricuspid regurgitation.  · There is mild to moderate pulmonary hypertension with RVSP at 47 mmhg         ECHOCARDIOGRAM RESULTS (last 5)  Results for orders placed during the hospital encounter of 07/23/22    Echo    Interpretation Summary  · There is moderate aortic valve stenosis.  · Aortic valve area is 1.49 cm2; peak velocity is m/s; mean gradient is 26 mmHg.  · Mild aortic regurgitation.  · Mild mitral regurgitation.  · The left ventricle is normal in size with concentric remodeling and normal systolic function.  · The estimated ejection fraction is 60%.  · Grade I left ventricular diastolic dysfunction.  · Normal right ventricular size with normal right ventricular  systolic function.  · Mild left atrial enlargement.  · Mild tricuspid regurgitation.  · Intermediate central venous pressure (8 mmHg).  · The estimated PA systolic pressure is 34 mmHg.      Results for orders placed during the hospital encounter of 02/09/20    Echo Color Flow Doppler? Yes; Bubble Contrast? Yes    Interpretation Summary  · Concentric left ventricular remodeling.  · Intravenous Saline (bubble) contrast was used during the study.Study is negative for shunt  · Left ventricular systolic function. The estimated ejection fraction is 65%.  · Grade I (mild) left ventricular diastolic dysfunction consistent with impaired relaxation.  · Normal right ventricular systolic function.  · Mild aortic regurgitation.  · Mild aortic valve stenosis.  · Aortic valve area is 1.54 cm2; peak velocity is 2.59 m/s; mean gradient is 17 mmHg.  · Mild tricuspid regurgitation.  · Normal central venous pressure (3 mmHg).  · The estimated PA systolic pressure is 29 mmHg.      CURRENT/PREVIOUS VISIT EKG  Results for orders placed or performed during the hospital encounter of 04/10/23   EKG 12-lead    Collection Time: 04/15/23  1:41 PM    Narrative    Test Reason : I48.91,    Vent. Rate : 062 BPM     Atrial Rate : 062 BPM     P-R Int : 128 ms          QRS Dur : 088 ms      QT Int : 422 ms       P-R-T Axes : 041 006 -11 degrees     QTc Int : 428 ms    Normal sinus rhythm with sinus arrhythmia  Nonspecific T wave abnormality  Abnormal ECG  When compared with ECG of 15-APR-2023 07:50,  Sinus rhythm has replaced Atrial fibrillation  Vent. rate has decreased BY  72 BPM  ST no longer depressed in Inferior leads  ST no longer depressed in Anterior-lateral leads  T wave inversion no longer evident in Lateral leads  Confirmed by Freddy ARRIAGA, Bryan RUDOLHP ([id number]) on 4/16/2023 7:55:43 PM    Referred By: CYNTHIA   SELF           Confirmed By:Bryan Hayes MD           ASSESSMENT/PLAN:     Active Hospital Problems    Diagnosis    *Rectal bleeding     Proctitis    Constipation    Urinary retention    Hypertension    Current use of long term anticoagulation    Paroxysmal atrial fibrillation    Diastolic dysfunction    Thrombocytopenia       ASSESSMENT & PLAN:     Rectal Bleeding on Eliquis  Paroxysmal Atrial Fibrillation on Sotalol  AFRVR  Diastolic Dysfunction  Hypertension  Thrombocytopenia  Moderate aortic valve stenosis  NSTEMI    RECOMMENDATIONS:    Patient had intermittent episodes of atrial fibrillation overnight.  She was in sinus rhythm this a.m..  Heart rate upper 50s to low 60s.  We will hold off increasing sotalol at this time in view of intermittent bradycardia.  NSTEMI this admission.  Patient remains chest pain-free.  Patient had colonoscopy yesterday with no large stool impactions or signs of bleeding.  One 7 mm polyp in the ascending colon was removed with a cold snare.  Recommendations from GI to resume anticoagulation today.  Continue sotalol 40 mg t.i.d..  Continue cardiac monitoring.  Hold restarting oral anticoagulation at this time.  We will plan for angiogram next a.m. with Dr. Redmond.  Discussed with patient the risks and benefits of the procedure including, but not limited to, the following 1:1000 risk of Heart attack, stroke and death with 3-5% Risk of bleeding, vessel damage, and the need for emergent CABG Surgery. All questions have been answered to patient's satisfaction. Informed consent obtained.  Will keep her nothing by mouth post midnight and proceed with the coronary angiography possible PCI in the morning.   Continue to check and replace potassium and magnesium. Goal for potassium is 4.0, and goal for magnesium is 2.0.    We will continue to follow.    Zulema Hernández NP  Department of Cardiology  Date of Service: 04/17/2023

## 2023-04-17 NOTE — PT/OT/SLP PROGRESS
Occupational Therapy   Treatment    Name: Irene العراقي  MRN: [id number]  Admitting Diagnosis:  Rectal bleeding  1 Day Post-Op    Recommendations:     Discharge Recommendations: home  Discharge Equipment Recommendations:  walker, rolling  Barriers to discharge:       Assessment:     Irene العراقي is a 82 y.o. female with a medical diagnosis of Rectal bleeding.  Pt agreeable to OT therapy session this AM. Performance deficits affecting function are weakness, impaired endurance, impaired self care skills, impaired functional mobility, impaired balance, gait instability, impaired cardiopulmonary response to activity.     Rehab Prognosis:  Fair; patient would benefit from acute skilled OT services to address these deficits and reach maximum level of function.       Plan:     Patient to be seen 5 x/week to address the above listed problems via self-care/home management, therapeutic activities, therapeutic exercises  Plan of Care Expires: 05/11/23  Plan of Care Reviewed with: patient    Subjective     Chief Complaint: none stated  Patient/Family Comments/goals: none stated  Pain/Comfort:  Pain Rating 1: 0/10    Objective:     Communicated with: nursing prior to session.  Patient found HOB elevated with telemetry, bed alarm, pulse ox (continuous), blood pressure cuff, peripheral IV upon OT entry to room.    General Precautions: Standard, fall    Orthopedic Precautions:N/A  Braces: N/A  Respiratory Status: Room air     Occupational Performance:     Bed Mobility:    Patient completed Supine to Sit with stand by assistance     Functional Mobility/Transfers:  Patient completed Sit <> Stand Transfer with contact guard assistance  with  rolling walker   Patient completed Bed <> Chair Transfer using Step Transfer technique with contact guard assistance with rolling walker  Patient completed Toilet Transfer Step Transfer technique with contact guard assistance with  rolling walker  Functional Mobility: pt amb to bathroom with CGA with  RW with no LOB or SOB    Activities of Daily Living:  Grooming: stand by assistance standing at sink to wash face and hands  Upper Body Dressing: minimum assistance to doff soiled gown and don clean gown due to lines  Lower Body Dressing: moderate assistance to don brief in standing   Toileting: maximal assistance for hygiene after BM due to lines (per pt request)    Treatment & Education:  Pt educated on role of OT/POC, importance or OOB/EOB activity, use of call bell, and safety during ADLs, transfers, and functional mobility.    Patient left up in chair with all lines intact, call button in reach, chair alarm on, nursing notified, and family present    GOALS:   Multidisciplinary Problems       Occupational Therapy Goals          Problem: Occupational Therapy    Goal Priority Disciplines Outcome Interventions   Occupational Therapy Goal     OT, PT/OT     Description: Goals to be met by: 5/11/2023     Patient will increase functional independence with ADLs by performing:    UE Dressing with Modified Bacon.  LE Dressing with Modified Bacon.  Grooming while standing with Modified Bacon.  Toileting from toilet with Modified Bacon for hygiene and clothing management.                          Time Tracking:     OT Date of Treatment: 04/17/23  OT Start Time: 0956  OT Stop Time: 1030  OT Total Time (min): 34 min    Billable Minutes:Self Care/Home Management 34    OT/HANNAH: OT          4/17/2023

## 2023-04-17 NOTE — CARE UPDATE
04/16/23 2050   Patient Assessment/Suction   Level of Consciousness (AVPU) alert   Respiratory Effort Normal   PRE-TX-O2   Device (Oxygen Therapy) room air   SpO2 (!) 91 %   Pulse Oximetry Type Continuous   $ Pulse Oximetry - Multiple Charge Pulse Oximetry - Multiple   Pulse (!) 136   Resp (!) 25   Education   $ Education 15 min   Respiratory Evaluation   $ Care Plan Tech Time 15 min

## 2023-04-17 NOTE — PT/OT/SLP PROGRESS
Physical Therapy Treatment    Patient Name:  Irene العراقي   MRN:  [id number]    Recommendations:     Discharge Recommendations: home  Discharge Equipment Recommendations: none  Barriers to discharge:  decrease activity tolerance, fall risk    Assessment:     Irene العراقي is a 82 y.o. female admitted with a medical diagnosis of Rectal bleeding.  She presents with the following impairments/functional limitations: weakness, impaired endurance, impaired self care skills, impaired functional mobility, gait instability, impaired balance, decreased lower extremity function, decreased safety awareness, impaired cardiopulmonary response to activity.      Pt found up in chair with nephew and sister at bedside. Pt agreeable to visit. Reports significant improvement in abdominal pain. Pt ambulated 120 ft with RW and CGA 2 standing rest break due to shortness of breath. VI for pacing/energy conservation.     Rehab Prognosis: Fair; patient would benefit from acute skilled PT services to address these deficits and reach maximum level of function.    Recent Surgery: Procedure(s) (LRB):  COLONOSCOPY (N/A) 1 Day Post-Op    Plan:     During this hospitalization, patient to be seen 6 x/week to address the identified rehab impairments via gait training, therapeutic activities, therapeutic exercises, neuromuscular re-education and progress toward the following goals:    Plan of Care Expires:  05/17/23    Subjective     Chief Complaint: significant improvement in pain  Patient/Family Comments/goals: walk  Pain/Comfort:  Pain Rating 1: 0/10      Objective:     Communicated with RN prior to session.  Patient found up in chair with telemetry, bed alarm, pulse ox (continuous), blood pressure cuff, peripheral IV, araya catheter upon PT entry to room.     General Precautions: Standard, fall  Orthopedic Precautions: N/A  Braces: N/A  Respiratory Status: Room air     Functional Mobility:  Transfers:     Sit to Stand:  contact guard assistance with  rolling walker  Gait: 120 ft with RW and CGA      AM-PAC 6 CLICK MOBILITY  Turning over in bed (including adjusting bedclothes, sheets and blankets)?: 3  Sitting down on and standing up from a chair with arms (e.g., wheelchair, bedside commode, etc.): 3  Moving from lying on back to sitting on the side of the bed?: 3  Moving to and from a bed to a chair (including a wheelchair)?: 3  Need to walk in hospital room?: 3  Climbing 3-5 steps with a railing?: 3  Basic Mobility Total Score: 18       Treatment & Education:  Pt educated on POC, discharge recommendation, pacing/energy conservation, importance of time OOB, benefit of RW at this time, need for assist with mobility, use of call bell to seek assistance as needed and fall prevention      Patient left up in chair with all lines intact, call button in reach, RN notified, and nephew and sister present..    GOALS:   Multidisciplinary Problems       Physical Therapy Goals          Problem: Physical Therapy    Goal Priority Disciplines Outcome Goal Variances Interventions   Physical Therapy Goal     PT, PT/OT Ongoing, Progressing     Description: Goals to be met by: 23     Patient will increase functional independence with mobility by performin. Supine to sit with Independent  2. Sit to stand transfer with Supervision  3. Bed to chair transfer with Supervision using least restrictive assistive device  4. Gait  x 150 feet with Supervision using least restrictive assistive device                             Time Tracking:     PT Received On: 23  PT Start Time: 1122     PT Stop Time: 1140  PT Total Time (min): 18 min     Billable Minutes: Gait Training 18    Treatment Type: Treatment  PT/PTA: PT     Number of PTA visits since last PT visit: 0     2023

## 2023-04-18 LAB
ANION GAP SERPL CALC-SCNC: 9 MMOL/L (ref 8–16)
BASOPHILS # BLD AUTO: 0.03 K/UL (ref 0–0.2)
BASOPHILS NFR BLD: 0.4 % (ref 0–1.9)
BUN SERPL-MCNC: 18 MG/DL (ref 8–23)
CALCIUM SERPL-MCNC: 9.1 MG/DL (ref 8.7–10.5)
CHLORIDE SERPL-SCNC: 103 MMOL/L (ref 95–110)
CO2 SERPL-SCNC: 25 MMOL/L (ref 23–29)
CREAT SERPL-MCNC: 1.2 MG/DL (ref 0.5–1.4)
DIFFERENTIAL METHOD: ABNORMAL
EOSINOPHIL # BLD AUTO: 0.2 K/UL (ref 0–0.5)
EOSINOPHIL NFR BLD: 3 % (ref 0–8)
ERYTHROCYTE [DISTWIDTH] IN BLOOD BY AUTOMATED COUNT: 12.5 % (ref 11.5–14.5)
EST. GFR  (NO RACE VARIABLE): 45.2 ML/MIN/1.73 M^2
GLUCOSE SERPL-MCNC: 94 MG/DL (ref 70–110)
HCT VFR BLD AUTO: 31.2 % (ref 37–48.5)
HGB BLD-MCNC: 10.5 G/DL (ref 12–16)
IMM GRANULOCYTES # BLD AUTO: 0.05 K/UL (ref 0–0.04)
IMM GRANULOCYTES NFR BLD AUTO: 0.7 % (ref 0–0.5)
LYMPHOCYTES # BLD AUTO: 1.2 K/UL (ref 1–4.8)
LYMPHOCYTES NFR BLD: 16.9 % (ref 18–48)
MAGNESIUM SERPL-MCNC: 1.9 MG/DL (ref 1.6–2.6)
MCH RBC QN AUTO: 30.5 PG (ref 27–31)
MCHC RBC AUTO-ENTMCNC: 33.7 G/DL (ref 32–36)
MCV RBC AUTO: 91 FL (ref 82–98)
MONOCYTES # BLD AUTO: 0.7 K/UL (ref 0.3–1)
MONOCYTES NFR BLD: 9.8 % (ref 4–15)
NEUTROPHILS # BLD AUTO: 5.1 K/UL (ref 1.8–7.7)
NEUTROPHILS NFR BLD: 69.2 % (ref 38–73)
NRBC BLD-RTO: 0 /100 WBC
PLATELET # BLD AUTO: 120 K/UL (ref 150–450)
PMV BLD AUTO: 10.7 FL (ref 9.2–12.9)
POTASSIUM SERPL-SCNC: 4 MMOL/L (ref 3.5–5.1)
RBC # BLD AUTO: 3.44 M/UL (ref 4–5.4)
SODIUM SERPL-SCNC: 137 MMOL/L (ref 136–145)
WBC # BLD AUTO: 7.33 K/UL (ref 3.9–12.7)

## 2023-04-18 PROCEDURE — 93005 ELECTROCARDIOGRAM TRACING: CPT | Performed by: SPECIALIST

## 2023-04-18 PROCEDURE — 21000000 HC CCU ICU ROOM CHARGE

## 2023-04-18 PROCEDURE — 94761 N-INVAS EAR/PLS OXIMETRY MLT: CPT

## 2023-04-18 PROCEDURE — 97116 GAIT TRAINING THERAPY: CPT | Mod: CQ

## 2023-04-18 PROCEDURE — 99233 SBSQ HOSP IP/OBS HIGH 50: CPT | Mod: ,,, | Performed by: INTERNAL MEDICINE

## 2023-04-18 PROCEDURE — 99900031 HC PATIENT EDUCATION (STAT)

## 2023-04-18 PROCEDURE — 99900035 HC TECH TIME PER 15 MIN (STAT)

## 2023-04-18 PROCEDURE — 93010 ELECTROCARDIOGRAM REPORT: CPT | Mod: ,,, | Performed by: SPECIALIST

## 2023-04-18 PROCEDURE — 80048 BASIC METABOLIC PNL TOTAL CA: CPT | Performed by: NURSE PRACTITIONER

## 2023-04-18 PROCEDURE — 93010 EKG 12-LEAD: ICD-10-PCS | Mod: ,,, | Performed by: SPECIALIST

## 2023-04-18 PROCEDURE — 99233 PR SUBSEQUENT HOSPITAL CARE,LEVL III: ICD-10-PCS | Mod: ,,, | Performed by: INTERNAL MEDICINE

## 2023-04-18 PROCEDURE — 97110 THERAPEUTIC EXERCISES: CPT

## 2023-04-18 PROCEDURE — 83735 ASSAY OF MAGNESIUM: CPT | Performed by: NURSE PRACTITIONER

## 2023-04-18 PROCEDURE — 36415 COLL VENOUS BLD VENIPUNCTURE: CPT | Performed by: NURSE PRACTITIONER

## 2023-04-18 PROCEDURE — 25000003 PHARM REV CODE 250: Performed by: NURSE PRACTITIONER

## 2023-04-18 PROCEDURE — 25000003 PHARM REV CODE 250

## 2023-04-18 PROCEDURE — 85025 COMPLETE CBC W/AUTO DIFF WBC: CPT | Performed by: NURSE PRACTITIONER

## 2023-04-18 RX ORDER — SODIUM CHLORIDE 9 MG/ML
INJECTION, SOLUTION INTRAVENOUS ONCE
Status: CANCELLED | OUTPATIENT
Start: 2023-04-19

## 2023-04-18 RX ORDER — SODIUM CHLORIDE 0.9 % (FLUSH) 0.9 %
2 SYRINGE (ML) INJECTION
Status: DISCONTINUED | OUTPATIENT
Start: 2023-04-18 | End: 2023-04-20 | Stop reason: HOSPADM

## 2023-04-18 RX ADMIN — SOTALOL HYDROCHLORIDE 40 MG: 80 TABLET ORAL at 05:04

## 2023-04-18 RX ADMIN — CHLORHEXIDINE GLUCONATE 15 ML: 1.2 RINSE ORAL at 09:04

## 2023-04-18 RX ADMIN — MUPIROCIN 1 G: 20 OINTMENT TOPICAL at 08:04

## 2023-04-18 RX ADMIN — PANTOPRAZOLE SODIUM 40 MG: 40 TABLET, DELAYED RELEASE ORAL at 06:04

## 2023-04-18 RX ADMIN — MAGNESIUM OXIDE 400 MG (241.3 MG MAGNESIUM) TABLET 400 MG: at 09:04

## 2023-04-18 RX ADMIN — SOTALOL HYDROCHLORIDE 40 MG: 80 TABLET ORAL at 09:04

## 2023-04-18 RX ADMIN — MUPIROCIN 1 G: 20 OINTMENT TOPICAL at 09:04

## 2023-04-18 RX ADMIN — ACETAMINOPHEN 500 MG: 500 TABLET, FILM COATED ORAL at 12:04

## 2023-04-18 RX ADMIN — CHLORHEXIDINE GLUCONATE 15 ML: 1.2 RINSE ORAL at 08:04

## 2023-04-18 RX ADMIN — SOTALOL HYDROCHLORIDE 40 MG: 80 TABLET ORAL at 08:04

## 2023-04-18 RX ADMIN — SODIUM CHLORIDE: 0.9 INJECTION, SOLUTION INTRAVENOUS at 12:04

## 2023-04-18 NOTE — PLAN OF CARE
Problem: Occupational Therapy  Goal: Occupational Therapy Goal  Description: Goals to be met by: 5/11/2023     Patient will increase functional independence with ADLs by performing:    UE Dressing with Modified [place].  LE Dressing with Modified [place].  Grooming while standing with Modified [place].  Toileting from toilet with Modified [place] for hygiene and clothing management.     Outcome: Ongoing, Progressing

## 2023-04-18 NOTE — PLAN OF CARE
Problem: Physical Therapy  Goal: Physical Therapy Goal  Description: Goals to be met by: 23     Patient will increase functional independence with mobility by performin. Supine to sit with Independent  2. Sit to stand transfer with Supervision  3. Bed to chair transfer with Supervision using least restrictive assistive device  4. Gait  x 150 feet with Supervision using least restrictive assistive device        Outcome: Ongoing, Progressing

## 2023-04-18 NOTE — PROGRESS NOTES
[facility] Medicine  Progress Note    Patient name: Irene العراقي  MRN: [id number]  Admit Date: 4/10/2023   LOS: 8 days     SUBJECTIVE:     Principal problem: Rectal bleeding, constipation     Interval History:      4/18- plan for angio in the am.  Npo at [place].  Vitals stable.  No cp or sob.      4/17- she is up to bedside chair, family visiting.  Colonoscopy did not show constipation or heavy stool burden.  She remains in SR, no chest pain or s ob.  Cards following, considering angio in am.      4/16- patient looks well this morning, HR controlled and sinus rhythm, bp wnl.  Had nstemi yesterday.  Plan for flex sig today    4/15- patient went into afib/rvr early this morning, denied chest pain and shortness of breath at the time.  I was called around 0730 due to patient being diaphoretic, in and out of afib.  On my exam she was nauseated,  had vomited, was diaphoretic, HR was normal, BP was 94/50s, crackles to right lung which is new.  She also has chest tightness, no shortness of breath.  Her glucose is 89, will order trop/ekg to be safe, and cxr.      4/14- only liquid bm, she appears more uncomfortable today.      4/13- she still hasn't had a bm despite multiple enemas, miralax and attempt to disimpact.  Continuing high dose miralax.      4/12- patient is up to bedside chair, still no bowel movement despite enema yesterday.  Will order soap suds and disimpaction today.  Vitals stable, feels well.      Scheduled Meds:   chlorhexidine  15 mL Mouth/Throat BID    lubiprostone  24 mcg Oral BID WM    magnesium oxide  400 mg Oral Daily    mupirocin   Nasal BID    pantoprazole  40 mg Oral Daily    polyethylene glycol  17 g Oral BID    sotaloL  40 mg Oral TID     Continuous Infusions:        PRN Meds:acetaminophen, acetaminophen, magnesium oxide, magnesium oxide, melatonin, metoprolol, morphine, naloxone, ondansetron, potassium bicarbonate, potassium bicarbonate, potassium bicarbonate, potassium,  sodium phosphates, potassium, sodium phosphates, potassium, sodium phosphates, promethazine (PHENERGAN) IVPB, simethicone, sodium chloride 0.9%, sodium chloride 0.9%, sodium chloride 0.9%    Review of patient's allergies indicates:   Allergen Reactions    Hydrocodone     Meperidine     Meperidine hcl Nausea And Vomiting    Persantine [dipyridamole]     Vicodin [hydrocodone-acetaminophen] Nausea And Vomiting       Review of Systems: As per interval history    OBJECTIVE:     Vital Signs (Most Recent)  Temp: 98.5 °F (36.9 °C) (04/18/23 1500)  Pulse: 74 (04/18/23 1600)  Resp: (!) 33 (04/18/23 1600)  BP: (!) 149/63 (04/18/23 1600)  SpO2: (!) 94 % (04/18/23 1600)    Vital Signs Range (Last 24H):  Temp:  [98 °F (36.7 °C)-98.9 °F (37.2 °C)]   Pulse:  [64-81]   Resp:  [18-38]   BP: (103-159)/(51-80)   SpO2:  [89 %-97 %]     I & O (Last 24H):  Intake/Output Summary (Last 24 hours) at 4/18/2023 1833  Last data filed at 4/18/2023 1811  Gross per 24 hour   Intake 372.5 ml   Output --   Net 372.5 ml         Physical Exam:  General: up to bsc, comfortable  Eyes: No conjunctival injection. No scleral icterus.  ENT: Hearing grossly intact. No discharge from ears. No nasal discharge.   Neck: Supple, trachea midline. No JVD  CVS: RRR. No LE edema BL  Lungs:  No tachypnea or accessory muscle use. RML and RLL Crackles noted   Abdomen:  Soft and nondistended.    Neuro: AOx3. Moves all extremities. Follows commands. Responds appropriately   Skin:  clammy. No rash or erythema noted    Laboratory:  I have reviewed all pertinent lab results within the past 24 hours.    Diagnostic Results:  Labs: Reviewed  ECG: Reviewed  X-Ray: Reviewed  CT abd/pelvis    ASSESSMENT/PLAN:     83 yo female with hx of chronic constipation, diverticulosis, afib on elaquis who came in for rectal bleeding associated with constipation, likely impacted.      Active Hospital Problems    Diagnosis  POA    *Rectal bleeding [K62.5]  Unknown    Proctitis [K62.89]  Unknown     Constipation [K59.00]  Unknown    Urinary retention [R33.9]  Unknown    Hypertension [I10]  Yes    Current use of long term anticoagulation [Z79.01]  Not Applicable    Paroxysmal atrial fibrillation [I48.0]  Yes    Diastolic dysfunction [I51.89]  Yes    Thrombocytopenia [D69.6]  Yes      Resolved Hospital Problems   No resolved problems to display.         Plan:     4/15-  AFIb/RVR  Nausea, vomiting, diaphoresis  Chest tightness  NSTEMI  She was given IV metoprolol around 6-6:30 am  She was tachy and hypertensive at the time   HR improved, now slightly hypotensive  -Will get stat trop and ekg,  ml bolus  She vomited, now has crackles on exam.  Possibly aspirated  -cxr ordered, she is on zosyn    4/16- likely nstemi yesterday, trops downtrending, no symptoms, vitals stable, holding off AC until after procedure.  F/u cards recs        Rectal bleeding  - Hgb stable this am  - appreciate Dr Thompson's evaluation  - continue miralax  - soap jessica enema 4/12 and attempt disimpaction  - held off on flex sig initially as patient didn't want invasive procedure if  its possible to avoid  - protonix po, trend CBC  - pain control, and proctitis management as below  - after discussing flex sig and explaining that this wouldn't be for screening alone, but that its possible intervention for severe constipation, she would like to proceed with flex sig  -colonoscopy completed, colon was clear except some liquid school  -ok to restart AC   -lovenox x1 dose today   -possibly going for angio in am  -will resume apixaban after cath    Urinary retention  CTA showed large amount of urine and bladder, patient was in and out catheterization after CT scan with 600 cc output per nursing, bladder scan every 6 hours as needed if patient is unable to void, may have to place urinary catheter but most likely related to the constipation      Constipation       Mineral oil and tap water enemas not effective  Soap suds enema with disimpaction  ineffective   Continue high dose Miralax and f/u GI recs  Will need scheduled bowel regimen going forward  Colonoscopy showed mostly clear colon    Proctitis  - discontinue zosyn     Hypertension  Blood pressure under good control, most recent blood pressure 125/63  Hold aldactone this am      Current use of long term anticoagulation  Patient is on apixaban for paroxysmal atrial fibrillation  Ok to resume AC      Paroxysmal atrial fibrillation  History of paroxysmal atrial fibrillation on apixaban and sotalol, 12 lead EKG with normal sinus rhythm, ventricular rate 76 and , monitor on telemetry, continue sotalol, apixaban        Diastolic dysfunction  Echocardiogram from July 2024 with mild aortic and mitral regurgitation, 60% ejection fraction and grade 1 left ventricular diastolic dysfunction, monitor daily weight and I/O      Thrombocytopenia  Platelets 140, looking back on lab work patient has had chronic thrombocytopenia as low as 67 in January of 2021, CBC in a.m.          VTE Risk Mitigation (From admission, onward)           Ordered     Reason for No Pharmacological VTE Prophylaxis  Once        Question Answer Comment   Reasons: Risk of Bleeding    Reasons: Active Bleeding        04/10/23 2211     IP VTE HIGH RISK PATIENT  Once         04/10/23 2211     Place sequential compression device  Until discontinued         04/10/23 2211                        Department Hospital Medicine  [facility]  Jhon Tam MD  Date of service: 04/18/2023

## 2023-04-18 NOTE — PROGRESS NOTES
[facility]  Department of Cardiology  Consult Note      PATIENT NAME: Irene العراقي  MRN: [id number]  TODAY'S DATE: 04/18/2023  ADMIT DATE: 4/10/2023                          CONSULT REQUESTED BY: Jhon Tam, *    SUBJECTIVE     PRINCIPAL PROBLEM: Rectal bleeding      REASON FOR CONSULT:    AFIB RVR    Interval history    4/18/23    Patient is resting comfortably in chair during exam.  NAD.  VSS. SR on tele.  No event overnight. No  AF overnight. Plans for angiogram next am.  EKG this am shows . No complaints. No CP or anginal equivalent symptoms.     4/17/23    Patient is resting comfortably in chair during examination.  Sinus bradycardia on monitor heart rate 58, and BP stable.  Patient had intermittent atrial fibrillation with RVR overnight.  Patient denies chest pain or any acute symptoms at this time.  Patient had colonoscopy yesterday that showed no signs of stool impaction or evidence of bleeding.  One polyp was removed.  GI recommendations to restart Eliquis today.  Patient is agreeable to having an angiogram tomorrow for further evaluation of coronary blockages in view of NSTEMI this admission.    COLONOSCOPY YESTERDAY  Impression:            - One 7 mm polyp in the ascending colon, removed                          with a cold snare. Resected and retrieved.                          - The examined portion of the ileum was normal.                          - Diverticulosis in the entire examined colon.                          There was no evidence of diverticular bleeding.                          - Internal hemorrhoids. No large stool impactions.                          Primarily liquid stool and some small stool balls                          seen on exam.   Recommendation:        - Discharge patient to home.                          - Resume previous diet.                          - Continue present medications.                          - Resume Eliquis (apixaban) at prior  dose                          tomorrow. Refer to managing physician for further                          adjustment of therapy.                          - Await pathology results.                          - Repeat colonoscopy is not recommended due to                          current age (66 years or older) for surveillance.   Kvng Mensah MD   04/16/2023    Patient remains in sinus rhythm on monitor.  Heart rate 59.  Blood pressure 123/61.  94% O2 saturation on room air.  Plans for possible flex sig this afternoon.  Patient states she is having bowel movements and they are coming home clear liquid.  H&H 10.4/31.6, platelets 100, K 3.7, creatinine 1.3, magnesium 1.8 patient denies chest pain or any acute symptoms at this time.    Troponins trended upward yesterday to 3330 in are beginning to trend downward.      Patient's troponin HS 4/15/23 -7:49 a.m. was 9.6, repeat at 1:10 p.m. was 1375, repeat at 6:05 p.m. was 3212, repeat at 7:39 p.m. was 3333.  04/16/2023 troponin this a.m. was 3067.2.        HPI:    Patient is an 82-year-old female with past medical history of paroxysmal atrial fibrillation on Eliquis, chronic HFpEF, constipation, and hypertension who presented to the ED with complaints of rectal bleeding secondary to chronic constipation.  She had been taking MiraLax every other day when she developed abdominal cramping, went to use the restroom, and found bright red blood in the toilet.  She also complained of associated dizziness and lightheadedness.  Patient was admitted for constipation and rectal bleeding.  She was received multiple enemas, MiraLax and disimpaction this admission.  This a.m., she went into AFib RVR, denied chest pain and shortness of breath.  Patient was found to be diaphoretic and hypotensive during examination with nausea and vomiting and chest tightness.  Patient was supposed to have flex sigmoid colonoscopy this a.m. with GI.  Procedure postponed till tomorrow in view of AFib  RVR.  Patient is receiving her sotalol 40 mg b.i.d..  Anticoagulation is on hold.    During examination patient is on Cardizem 5 milligrams/hour.  She was in sinus rhythm.  No acute distress.  Alert and oriented.  Blood pressure 108/61 heart rate 80, 90% on room air.  Rodriguez catheter with positive urinary output.  Patient's troponin HS at 7:49 a.m. was 9.6, repeat at 1:10 p.m. was 1375.  Patient is chest pain-free during examination.  She appears anxious in view of recent events.             FROM H&P:    HPI: Irene العراقي is an 82-year-old female with chronic medical problems including paroxysmal atrial fibrillation on apixaban, chronic HFpEF, constipation and hypertension who presents to the emergency room complaining of rectal bleeding.  Patient states that she has chronic constipation and takes MiraLax every other day.  She took some at about 10:00 a.m. this morning.  Later that morning she started having some crampy abdominal pain across her lower abdomen especially left lower quadrant and went to the bathroom and had a small amount of bright red blood in the toilet.  Later she started having more abdominal pain, gas and liquid stools. Associated symptoms are lightheadedness, rectal burning and difficulty voiding.  She said she is never had bleeding before or difficulty voiding.  She came down from [place] 2 years ago to take care of her twin sister that has never been able to go back since COVID.  She follows with cardiology and a PCP here in his on spironolactone for hypertension, apixaban and sotalol for paroxysmal atrial fibrillation and spironolactone for hypertension.  She denies dysuria, fevers, chills, shortness of breath or chest pain, nausea or vomiting.      In the ED, H&H 11.9/36.0, platelets decreased to 140, WBC 6.84, BUN/CR 25/1.0, glucose 91, sodium 139, potassium 4.3, lipase 43, INR 1.0, 12 lead EKG with normal sinus rhythm, ventricular rate 76 and , CT scan of pelvis with contrast shows  fecal impaction and proctitis, cholelithiasis and sigmoid diverticulosis with no evidence of diverticulitis.  See report for full details.  Per ED physician rectal exam was negative for blood.  Temperature 97.6°, heart rate 83, blood pressure 125/63, respiratory rate 15 to 20 and O2 sat 95% on room air.  She was given 1 g acetaminophen, an 80 mg Protonix and 4.5 g Zosyn.  She will be admitted to the hospitalist service for further evaluation treatment with a consult Gastroenterology.      4/15- patient went into afib/rvr early this morning, denied chest pain and shortness of breath at the time.  I was called around 0730 due to patient being diaphoretic, in and out of afib.  On my exam she was nauseated,  had vomited, was diaphoretic, HR was normal, BP was 94/50s, crackles to right lung which is new.  She also has chest tightness, no shortness of breath.  Her glucose is 89, will order trop/ekg to be safe, and cxr.       4/14- only liquid bm, she appears more uncomfortable today.       4/13- she still hasn't had a bm despite multiple enemas, miralax and attempt to disimpact.  Continuing high dose miralax.       4/12- patient is up to bedside chair, still no bowel movement despite enema yesterday.  Will order soap suds and disimpaction today.  Vitals stable, feels well.      Review of patient's allergies indicates:   Allergen Reactions    Hydrocodone     Meperidine     Meperidine hcl Nausea And Vomiting    Persantine [dipyridamole]     Vicodin [hydrocodone-acetaminophen] Nausea And Vomiting       Past Medical History:   Diagnosis Date    Atrial fibrillation 01/25/2021    Hypertension      Past Surgical History:   Procedure Laterality Date    BREAST SURGERY      COLONOSCOPY N/A 4/16/2023    Procedure: COLONOSCOPY;  Surgeon: Kvng Mensah MD;  Location: [facility];  Service: Endoscopy;  Laterality: N/A;    COLONOSCOPY W/ POLYPECTOMY  04/16/2023     Social History     Tobacco Use    Smoking status: Never    Smokeless  tobacco: Never   Substance Use Topics    Alcohol use: Not Currently    Drug use: Not Currently        REVIEW OF SYSTEMS  As per mentioned above in HPI.      OBJECTIVE     VITAL SIGNS (Most Recent)  Temp: 98.3 °F (36.8 °C) (04/18/23 0733)  Pulse: 76 (04/18/23 0733)  Resp: (!) 21 (04/18/23 0733)  BP: (!) 159/80 (04/18/23 0733)  SpO2: 97 % (04/18/23 0733)    VENTILATION STATUS  Resp: (!) 21 (04/18/23 0733)  SpO2: 97 % (04/18/23 0733)           I & O (Last 24H):  Intake/Output Summary (Last 24 hours) at 4/18/2023 1349  Last data filed at 4/18/2023 0838  Gross per 24 hour   Intake 360 ml   Output 350 ml   Net 10 ml       WEIGHTS  Wt Readings from Last 3 Encounters:   04/17/23 1944 83 kg (182 lb 15.7 oz)   04/17/23 0400 83 kg (182 lb 15.7 oz)   04/12/23 0400 81.7 kg (180 lb 1.9 oz)   04/10/23 2300 81.1 kg (178 lb 12.7 oz)   04/10/23 1545 81.6 kg (180 lb)   04/16/23 1205 81.6 kg (180 lb)   10/11/22 0927 81.6 kg (180 lb)       PHYSICAL EXAM  GENERAL: well built, well nourished, well-developed in no apparent distress alert and oriented.   HEENT: Normocephalic. Pupils normal and conjunctivae normal.    NECK: No JVD. No bruit..   THYROID: Thyroid not examined  CARDIAC: Regular rate and rhythm. S1 is normal.S2 is normal.2/6 systolic murmur  CHEST ANATOMY: normal.   LUNGS: Clear to auscultation. No wheezing or rhonchi..   ABDOMEN: Soft. Normal bowel sounds. Nontender.   URINARY: No araya catheter   EXTREMITIES: No cyanosis, clubbing or edema noted at this time.  PERIPHERAL VASCULAR SYSTEM: Good palpable distal pulses.   CENTRAL NERVOUS SYSTEM: No focal motor or sensory deficits noted.   SKIN: Skin without lesions, moist, well perfused.   MUSCLE STRENGTH & TONE: No noteable weakness, atrophy or abnormal movement.     HOME MEDICATIONS:  No current facility-administered medications on file prior to encounter.     Current Outpatient Medications on File Prior to Encounter   Medication Sig Dispense Refill    acetaminophen (TYLENOL)  500 MG tablet Take 500 mg by mouth nightly as needed for Pain.      apixaban (ELIQUIS) 5 mg Tab Take 1 tablet (5 mg total) by mouth 2 (two) times daily. 180 tablet 3    magnesium oxide (MAG-OX) 400 mg (241.3 mg magnesium) tablet TAKE 1 TABLET BY MOUTH ONCE DAILY (Patient taking differently: Take 400 mg by mouth once daily.) 90 tablet 3    sotaloL (BETAPACE) 80 MG tablet Take 0.5 tablets (40 mg total) by mouth 2 (two) times daily. (Patient taking differently: Take 40 mg by mouth once daily.) 90 tablet 3    spironolactone (ALDACTONE) 25 MG tablet Take 1 tablet (25 mg total) by mouth once daily. 90 tablet 3       SCHEDULED MEDS:   chlorhexidine  15 mL Mouth/Throat BID    lubiprostone  24 mcg Oral BID WM    magnesium oxide  400 mg Oral Daily    mupirocin   Nasal BID    pantoprazole  40 mg Oral Daily    polyethylene glycol  17 g Oral BID    sotaloL  40 mg Oral TID       CONTINUOUS INFUSIONS:        PRN MEDS:acetaminophen, acetaminophen, magnesium oxide, magnesium oxide, melatonin, metoprolol, morphine, naloxone, ondansetron, potassium bicarbonate, potassium bicarbonate, potassium bicarbonate, potassium, sodium phosphates, potassium, sodium phosphates, potassium, sodium phosphates, promethazine (PHENERGAN) IVPB, simethicone, sodium chloride 0.9%, sodium chloride 0.9%    LABS AND DIAGNOSTICS     CBC LAST 3 DAYS  Recent Labs   Lab 04/16/23  0456 04/17/23  0442 04/18/23  0524   WBC 6.43 6.49 7.33   RBC 3.41* 3.38* 3.44*   HGB 10.4* 10.5* 10.5*   HCT 31.6* 31.0* 31.2*   MCV 93 92 91   MCH 30.5 31.1* 30.5   MCHC 32.9 33.9 33.7   RDW 12.4 12.3 12.5   * 99* 120*   MPV 10.4 10.9 10.7   GRAN 67.9  4.4 61.6  4.0 69.2  5.1   LYMPH 17.3*  1.1 20.2  1.3 16.9*  1.2   MONO 12.1  0.8 11.9  0.8 9.8  0.7   BASO 0.02 0.04 0.03   NRBC 0 0 0       COAGULATION LAST 3 DAYS  Recent Labs   Lab 04/17/23  2227   INR 1.0   APTT 27.5       CHEMISTRY LAST 3 DAYS  Recent Labs   Lab 04/16/23  0456 04/17/23  0442 04/18/23  0524     137 137   K 3.7 3.4* 4.0    108 103   CO2 23 22* 25   ANIONGAP 6* 7* 9   BUN 14 16 18   CREATININE 1.3 1.3 1.2   GLU 90 77 94   CALCIUM 8.8 8.5* 9.1   MG 1.8 2.0 1.9       CARDIAC PROFILE LAST 3 DAYS  Recent Labs   Lab 04/15/23  1805 04/15/23  1939 04/16/23  0456   TROPONINIHS 3212.4* 3330.2* 3067.2*       ENDOCRINE LAST 3 DAYS  No results for input(s): TSH, PROCAL in the last 168 hours.    LAST ARTERIAL BLOOD GAS  ABG  No results for input(s): PH, PO2, PCO2, HCO3, BE in the last 168 hours.    LAST 7 DAYS MICROBIOLOGY   Microbiology Results (last 7 days)       ** No results found for the last 168 hours. **            MOST RECENT IMAGING  Echo  · The left ventricle is normal in size with concentric remodeling and   normal systolic function.  · Mild left atrial enlargement.  · The estimated ejection fraction is 65%.  · Indeterminate left ventricular diastolic function.  · Normal right ventricular size with normal right ventricular systolic   function.  · There is moderate aortic valve stenosis.  · Aortic valve area is 1.24 cm2; peak velocity is 3.51 m/s; mean gradient   is 32 mmHg.  · Mild mitral regurgitation.  · Mild tricuspid regurgitation.  · There is mild to moderate pulmonary hypertension with RVSP at 47 mmhg         ECHOCARDIOGRAM RESULTS (last 5)  Results for orders placed during the hospital encounter of 07/23/22    Echo    Interpretation Summary  · There is moderate aortic valve stenosis.  · Aortic valve area is 1.49 cm2; peak velocity is m/s; mean gradient is 26 mmHg.  · Mild aortic regurgitation.  · Mild mitral regurgitation.  · The left ventricle is normal in size with concentric remodeling and normal systolic function.  · The estimated ejection fraction is 60%.  · Grade I left ventricular diastolic dysfunction.  · Normal right ventricular size with normal right ventricular systolic function.  · Mild left atrial enlargement.  · Mild tricuspid regurgitation.  · Intermediate central venous pressure (8  mmHg).  · The estimated PA systolic pressure is 34 mmHg.      Results for orders placed during the hospital encounter of 02/09/20    Echo Color Flow Doppler? Yes; Bubble Contrast? Yes    Interpretation Summary  · Concentric left ventricular remodeling.  · Intravenous Saline (bubble) contrast was used during the study.Study is negative for shunt  · Left ventricular systolic function. The estimated ejection fraction is 65%.  · Grade I (mild) left ventricular diastolic dysfunction consistent with impaired relaxation.  · Normal right ventricular systolic function.  · Mild aortic regurgitation.  · Mild aortic valve stenosis.  · Aortic valve area is 1.54 cm2; peak velocity is 2.59 m/s; mean gradient is 17 mmHg.  · Mild tricuspid regurgitation.  · Normal central venous pressure (3 mmHg).  · The estimated PA systolic pressure is 29 mmHg.      CURRENT/PREVIOUS VISIT EKG  Results for orders placed or performed during the hospital encounter of 04/10/23   EKG 12-lead    Collection Time: 04/18/23  7:32 AM    Narrative    Test Reason : Z51.81,Z79.899,    Vent. Rate : 068 BPM     Atrial Rate : 068 BPM     P-R Int : 140 ms          QRS Dur : 078 ms      QT Int : 430 ms       P-R-T Axes : 054 -01 -16 degrees     QTc Int : 457 ms    Normal sinus rhythm  Possible Left atrial enlargement  Nonspecific ST and T wave abnormality  Abnormal ECG  When compared with ECG of 15-APR-2023 13:41,  No significant change was found    Referred By: [email or web address]   SELF           Confirmed By:            ASSESSMENT/PLAN:     Active Hospital Problems    Diagnosis    *Rectal bleeding    Proctitis    Constipation    Urinary retention    Hypertension    Current use of long term anticoagulation    Paroxysmal atrial fibrillation    Diastolic dysfunction    Thrombocytopenia       ASSESSMENT & PLAN:     NSTEMI, diffuse ST depressions with RVRs, normal stress test July 22  P. Afib RVR   Moderate aortic valve stenosis  Mild pulmonary hypertension  Rectal Bleeding on  Michael    RECOMMENDATIONS:    NSTEMI this admission.  Patient remains chest pain-free.   Rectal bleed resolved with clearance from GI to restart anticoagulation.   Patient remains in SR.  thos am.  Continue sotalol 40 mg t.i.d..  Continue cardiac monitoring.  Hold restarting oral anticoagulation at this time.  We will plan for angiogram next a.m. with Dr. Redmond.    Discussed with patient the risks and benefits of the procedure including, but not limited to, the following 1:1000 risk of Heart attack, stroke and death with 3-5% Risk of bleeding, vessel damage, and the need for emergent CABG Surgery. All questions have been answered to patient's satisfaction. Informed consent obtained.  Will keep her nothing by mouth post midnight and proceed with the coronary angiography possible PCI in the morning.   Continue to check and replace potassium and magnesium. Goal for potassium is 4.0, and goal for magnesium is 2.0.    We will continue to follow.    Zulema Hernández NP  Department of Cardiology  Date of Service: 04/18/2023

## 2023-04-18 NOTE — CARE UPDATE
04/17/23 2045   Patient Assessment/Suction   Level of Consciousness (AVPU) alert   Respiratory Effort Normal   PRE-TX-O2   Device (Oxygen Therapy) room air   SpO2 (!) 89 %   Pulse Oximetry Type Continuous   $ Pulse Oximetry - Multiple Charge Pulse Oximetry - Multiple   Pulse 70   Resp (!) 35   Education   $ Education 15 min   Respiratory Evaluation   $ Care Plan Tech Time 15 min

## 2023-04-18 NOTE — PLAN OF CARE
Problem: Skin Injury Risk Increased  Goal: Skin Health and Integrity  Intervention: Promote and Optimize Oral Intake  Flowsheets (Taken 4/18/2023 1518)  Oral Nutrition Promotion: calorie-dense liquids provided     Problem: Oral Intake Inadequate  Goal: Improved Oral Intake  Outcome: Ongoing, Progressing  Intervention: Promote and Optimize Oral Intake  Flowsheets (Taken 4/18/2023 1518)  Oral Nutrition Promotion: calorie-dense liquids provided

## 2023-04-18 NOTE — PT/OT/SLP PROGRESS
Occupational Therapy   Treatment    Name: Irene العراقي  MRN: [id number]  Admitting Diagnosis:  Rectal bleeding  2 Days Post-Op    Recommendations:     Discharge Recommendations: home  Discharge Equipment Recommendations:  walker, rolling  Barriers to discharge:       Assessment:     Irene العراقي is a 82 y.o. female with a medical diagnosis of Rectal bleeding.  Pt agreeable to OT therapy session this AM. Performance deficits affecting function are weakness, impaired endurance, impaired self care skills, impaired functional mobility, gait instability, impaired balance, decreased upper extremity function, decreased lower extremity function, impaired cardiopulmonary response to activity. Pt tearful throughout session due to her procedure being pushed back until tomorrow.     Rehab Prognosis:  Fair; patient would benefit from acute skilled OT services to address these deficits and reach maximum level of function.       Plan:     Patient to be seen 5 x/week to address the above listed problems via self-care/home management, therapeutic activities, therapeutic exercises  Plan of Care Expires: 05/11/23  Plan of Care Reviewed with: patient    Subjective     Chief Complaint: upset about procedure being pushed back  Patient/Family Comments/goals: none stated  Pain/Comfort:  Pain Rating 1: 0/10    Objective:     Communicated with: nursing prior to session.  Patient found up in chair with telemetry, pulse ox (continuous), blood pressure cuff, peripheral IV upon OT entry to room.    General Precautions: Standard, fall    Orthopedic Precautions:N/A  Braces: N/A  Respiratory Status: Room air     Occupational Performance:    Activities of Daily Living:  Feeding:  independence breakfast tray    Therapeutic Exercise:  LUE resistive exercises x 10 reps in all major planes with tband seated in chair; pt tolerated well  RUE ROM exercises only (due to chronic RTC pain) x 10 reps; pt tolerated well    Treatment & Education:  Pt educated on  role of OT/POC, importance of OOB/EOB activity, use of call bell, and safety during ADLs, transfers, and functional mobility.    Patient left up in chair with all lines intact and call button in reach    GOALS:   Multidisciplinary Problems       Occupational Therapy Goals          Problem: Occupational Therapy    Goal Priority Disciplines Outcome Interventions   Occupational Therapy Goal     OT, PT/OT Ongoing, Progressing    Description: Goals to be met by: 5/11/2023     Patient will increase functional independence with ADLs by performing:    UE Dressing with Modified Baker.  LE Dressing with Modified Baker.  Grooming while standing with Modified Baker.  Toileting from toilet with Modified Baker for hygiene and clothing management.                          Time Tracking:     OT Date of Treatment: 04/18/23  OT Start Time: 1023  OT Stop Time: 1041  OT Total Time (min): 18 min    Billable Minutes:Therapeutic Exercise 18    OT/HANNAH: OT          4/18/2023

## 2023-04-18 NOTE — PT/OT/SLP PROGRESS
Physical Therapy Treatment    Patient Name:  Irene العراقي   MRN:  [id number]    Recommendations:     Discharge Recommendations: home  Discharge Equipment Recommendations: none  Barriers to discharge:  decreased activity tolerance, fall risk    Assessment:     Irene العراقي is a 82 y.o. female admitted with a medical diagnosis of Rectal bleeding.  She presents with the following impairments/functional limitations: weakness, impaired endurance, impaired self care skills, impaired functional mobility, gait instability, impaired balance, decreased lower extremity function, decreased safety awareness, impaired cardiopulmonary response to activity.    Pt agreeable to visit. Pt upset that her procedure has been canceled for today. Pt ambulated 120' with RW and contact guard assist progressing to stand by assist. Pt with slow pacing and citlaly. Pt reports that she has a RW at home that she can use.    Rehab Prognosis: Fair; patient would benefit from acute skilled PT services to address these deficits and reach maximum level of function.    Recent Surgery: Procedure(s) (LRB):  COLONOSCOPY (N/A) 2 Days Post-Op    Plan:     During this hospitalization, patient to be seen 6 x/week to address the identified rehab impairments via gait training, therapeutic activities, therapeutic exercises, neuromuscular re-education and progress toward the following goals:    Plan of Care Expires:  05/17/23    Subjective     Chief Complaint: pt upset that her procedure was canceled  Patient/Family Comments/goals: to get stronger  Pain/Comfort:  Pain Rating 1: 0/10      Objective:     Communicated with RN prior to session.  Patient found up in chair with telemetry, bed alarm, pulse ox (continuous), blood pressure cuff, peripheral IV upon PT entry to room.     General Precautions: Standard, fall  Orthopedic Precautions: N/A  Braces: N/A  Respiratory Status: Room air     Functional Mobility:  Transfers:     Sit to Stand:  contact guard assistance  with rolling walker  Gait: x 120' with RW and CGA > SBA, slow pacing and citlaly      AM-PAC 6 CLICK MOBILITY          Treatment & Education:  Pt educated on importance of time OOB, importance of intermittent mobility, safe techniques for transfers/ambulation, discharge recommendations/options, and use of call light for assistance and fall prevention.      Patient left up in chair with all lines intact, call button in reach, and RN notified..    GOALS:   Multidisciplinary Problems       Physical Therapy Goals          Problem: Physical Therapy    Goal Priority Disciplines Outcome Goal Variances Interventions   Physical Therapy Goal     PT, PT/OT Ongoing, Progressing     Description: Goals to be met by: 23     Patient will increase functional independence with mobility by performin. Supine to sit with Independent  2. Sit to stand transfer with Supervision  3. Bed to chair transfer with Supervision using least restrictive assistive device  4. Gait  x 150 feet with Supervision using least restrictive assistive device                             Time Tracking:     PT Received On: 23  PT Start Time: 923     PT Stop Time: 939  PT Total Time (min): 16 min     Billable Minutes: Gait Training 16    Treatment Type: Treatment  PT/PTA: PTA     Number of PTA visits since last PT visit: 1     2023

## 2023-04-18 NOTE — PROGRESS NOTES
Former[facility]  Adult Nutrition   Progress Note (Follow-Up)    SUMMARY      Recommendations:   1. Continue current regular diet as tolerated and change Ensure Clear BID to Ensure High Protein BID.  2. Menu  continue obtaining daily food choices.     Goals:   Patient to meet at least 75% of estimated energy and protein needs.   Bowel function to improve.    Dietitian Rounds Brief  F/U Nutrition Note: Pt with 75% PO intake and Ensure Clear BID changed to Ensure High Protein BID due to pt not being on a clear liquid diet anymore. She came in experiencing constipation and rectal bleeding but this has been resloved and her LBM was today. Pt skin is also intact and labs look good. Will continue to follow prn.      NP PN today:  4/18/23  Patient is resting comfortably in chair during exam.  NAD.  VSS. SR on tele.  No event overnight. No  AF overnight. Plans for angiogram next am.  EKG this am shows . No complaints. No CP or anginal equivalent symptoms.      4/17/23  Patient is resting comfortably in chair during examination.  Sinus bradycardia on monitor heart rate 58, and BP stable. Patient had intermittent atrial fibrillation with RVR overnight.  Patient denies chest pain or any acute symptoms at this time.  Patient had colonoscopy yesterday that showed no signs of stool impaction or evidence of bleeding.  One polyp was removed.  GI recommendations to restart Eliquis today.  Patient is agreeable to having an angiogram tomorrow for further evaluation of coronary blockages in view of NSTEMI this admission.     COLONOSCOPY YESTERDAY  Impression:            - One 7 mm polyp in the ascending colon, removed                          with a cold snare. Resected and retrieved.                          - The examined portion of the ileum was normal.                          - Diverticulosis in the entire examined colon.                          There was no evidence of diverticular bleeding.                           - Internal hemorrhoids. No large stool impactions.                          Primarily liquid stool and some small stool balls                          seen on exam.   Recommendation:        - Discharge patient to home.                          - Resume previous diet.                          - Continue present medications.                          - Resume Eliquis (apixaban) at prior dose                          tomorrow. Refer to managing physician for further                          adjustment of therapy.                          - Await pathology results.                          - Repeat colonoscopy is not recommended due to                          current age (66 years or older) for surveillance.     Diet order: Regular    Oral Supplement: Ensure Clear BID    % Intake of Estimated Energy Needs: 75 - 100 %  % Meal Intake: 75 - 100 %    Estimated/Assessed Needs  Weight Used For Calorie Calculations: 81.7 kg (180 lb 1.9 oz)  Energy Calorie Requirements (kcal): 9763-2693 (20-25 kcal/kg)  Energy Need Method: Kcal/kg  Protein Requirements: 81-98 gm (1.0-1.2 gm/kg)  Weight Used For Protein Calculations: 81.7 kg (180 lb 1.9 oz)  Fluid Requirements (mL): 2042 (25 mL/kg)     RDA Method (mL): 1634       Weight History:  Wt Readings from Last 5 Encounters:   04/17/23 83 kg (182 lb 15.7 oz)   04/16/23 81.6 kg (180 lb)   10/11/22 81.6 kg (180 lb)   07/24/22 80.5 kg (177 lb 7.5 oz)   07/24/22 80.3 kg (177 lb)        Reason for Assessment  Reason For Assessment: consult (education)  Diagnosis: gastrointestinal disease  Relevant Medical History: paroxysmal atrial fibrillation on apixaban, chronic HFpEF, constipation and hypertension  Interdisciplinary Rounds: did not attend  General Information Comments: Pt presents to the emergency room complaining of rectal bleeding.  Patient states that she has chronic constipation and takes MiraLax every other day. Pt reports following a high fiber diet at home regarding  constipation issues. Provided constipation meal planning education with handouts. RD contact information provided.    Medications:Pertinent Medications Reviewed  Scheduled Meds:   chlorhexidine  15 mL Mouth/Throat BID    lubiprostone  24 mcg Oral BID WM    magnesium oxide  400 mg Oral Daily    mupirocin   Nasal BID    pantoprazole  40 mg Oral Daily    polyethylene glycol  17 g Oral BID    sotaloL  40 mg Oral TID     Continuous Infusions:  PRN Meds:.acetaminophen, acetaminophen, magnesium oxide, magnesium oxide, melatonin, metoprolol, morphine, naloxone, ondansetron, potassium bicarbonate, potassium bicarbonate, potassium bicarbonate, potassium, sodium phosphates, potassium, sodium phosphates, potassium, sodium phosphates, promethazine (PHENERGAN) IVPB, simethicone, sodium chloride 0.9%, sodium chloride 0.9%, sodium chloride 0.9%    Labs: Pertinent Labs Reviewed  Clinical Chemistry:  Recent Labs   Lab 04/18/23  0524      K 4.0      CO2 25   GLU 94   BUN 18   CREATININE 1.2   CALCIUM 9.1   ANIONGAP 9   MG 1.9     CBC:   Recent Labs   Lab 04/18/23  0524   WBC 7.33   RBC 3.44*   HGB 10.5*   HCT 31.2*   *   MCV 91   MCH 30.5   MCHC 33.7     Monitor and Evaluation  Food and Nutrient Adminstration: diet order  Knowledge/Beliefs/Attitudes: food and nutrition knowledge/skill  Physical Activity and Function: nutrition-related ADLs and IADLs  Anthropometric Measurements: weight change, weight, body mass index  Biochemical Data, Medical Tests and Procedures: electrolyte and renal panel, gastrointestinal profile, glucose/endocrine profile, inflammatory profile, lipid profile  Nutrition-Focused Physical Findings: overall appearance     Nutrition Risk  Level of Risk/Frequency of Follow-up: moderate     Nutrition Follow-Up  RD Follow-up?: Yes    Elicia Rivera RD 04/18/2023 2:59 PM

## 2023-04-19 LAB
ANION GAP SERPL CALC-SCNC: 3 MMOL/L (ref 8–16)
BASOPHILS # BLD AUTO: 0.03 K/UL (ref 0–0.2)
BASOPHILS NFR BLD: 0.5 % (ref 0–1.9)
BUN SERPL-MCNC: 18 MG/DL (ref 8–23)
CALCIUM SERPL-MCNC: 8.9 MG/DL (ref 8.7–10.5)
CHLORIDE SERPL-SCNC: 110 MMOL/L (ref 95–110)
CO2 SERPL-SCNC: 25 MMOL/L (ref 23–29)
CREAT SERPL-MCNC: 1.1 MG/DL (ref 0.5–1.4)
DIFFERENTIAL METHOD: ABNORMAL
EOSINOPHIL # BLD AUTO: 0.2 K/UL (ref 0–0.5)
EOSINOPHIL NFR BLD: 3.7 % (ref 0–8)
ERYTHROCYTE [DISTWIDTH] IN BLOOD BY AUTOMATED COUNT: 12.3 % (ref 11.5–14.5)
EST. GFR  (NO RACE VARIABLE): 50.2 ML/MIN/1.73 M^2
GLUCOSE SERPL-MCNC: 95 MG/DL (ref 70–110)
HCT VFR BLD AUTO: 32.5 % (ref 37–48.5)
HGB BLD-MCNC: 10.7 G/DL (ref 12–16)
IMM GRANULOCYTES # BLD AUTO: 0.06 K/UL (ref 0–0.04)
IMM GRANULOCYTES NFR BLD AUTO: 0.9 % (ref 0–0.5)
LYMPHOCYTES # BLD AUTO: 1.6 K/UL (ref 1–4.8)
LYMPHOCYTES NFR BLD: 23.9 % (ref 18–48)
MAGNESIUM SERPL-MCNC: 2 MG/DL (ref 1.6–2.6)
MCH RBC QN AUTO: 30.1 PG (ref 27–31)
MCHC RBC AUTO-ENTMCNC: 32.9 G/DL (ref 32–36)
MCV RBC AUTO: 91 FL (ref 82–98)
MONOCYTES # BLD AUTO: 0.7 K/UL (ref 0.3–1)
MONOCYTES NFR BLD: 10 % (ref 4–15)
NEUTROPHILS # BLD AUTO: 4 K/UL (ref 1.8–7.7)
NEUTROPHILS NFR BLD: 61 % (ref 38–73)
NRBC BLD-RTO: 0 /100 WBC
PLATELET # BLD AUTO: 147 K/UL (ref 150–450)
PMV BLD AUTO: 11.2 FL (ref 9.2–12.9)
POTASSIUM SERPL-SCNC: 3.9 MMOL/L (ref 3.5–5.1)
RBC # BLD AUTO: 3.56 M/UL (ref 4–5.4)
SODIUM SERPL-SCNC: 138 MMOL/L (ref 136–145)
WBC # BLD AUTO: 6.49 K/UL (ref 3.9–12.7)

## 2023-04-19 PROCEDURE — 99152 PR MOD CONSCIOUS SEDATION, SAME PHYS, 5+ YRS, FIRST 15 MIN: ICD-10-PCS | Mod: ,,, | Performed by: INTERNAL MEDICINE

## 2023-04-19 PROCEDURE — 99152 MOD SED SAME PHYS/QHP 5/>YRS: CPT | Performed by: INTERNAL MEDICINE

## 2023-04-19 PROCEDURE — 83735 ASSAY OF MAGNESIUM: CPT | Performed by: NURSE PRACTITIONER

## 2023-04-19 PROCEDURE — 97116 GAIT TRAINING THERAPY: CPT | Mod: CQ

## 2023-04-19 PROCEDURE — 63600175 PHARM REV CODE 636 W HCPCS: Performed by: INTERNAL MEDICINE

## 2023-04-19 PROCEDURE — 25000003 PHARM REV CODE 250: Performed by: INTERNAL MEDICINE

## 2023-04-19 PROCEDURE — 94799 UNLISTED PULMONARY SVC/PX: CPT

## 2023-04-19 PROCEDURE — 93458 L HRT ARTERY/VENTRICLE ANGIO: CPT | Mod: 26,,, | Performed by: INTERNAL MEDICINE

## 2023-04-19 PROCEDURE — 27201423 OPTIME MED/SURG SUP & DEVICES STERILE SUPPLY: Performed by: INTERNAL MEDICINE

## 2023-04-19 PROCEDURE — 99153 MOD SED SAME PHYS/QHP EA: CPT | Performed by: INTERNAL MEDICINE

## 2023-04-19 PROCEDURE — 80048 BASIC METABOLIC PNL TOTAL CA: CPT | Performed by: NURSE PRACTITIONER

## 2023-04-19 PROCEDURE — 25000003 PHARM REV CODE 250

## 2023-04-19 PROCEDURE — 99152 MOD SED SAME PHYS/QHP 5/>YRS: CPT | Mod: ,,, | Performed by: INTERNAL MEDICINE

## 2023-04-19 PROCEDURE — 25000003 PHARM REV CODE 250: Performed by: NURSE PRACTITIONER

## 2023-04-19 PROCEDURE — 85025 COMPLETE CBC W/AUTO DIFF WBC: CPT | Performed by: NURSE PRACTITIONER

## 2023-04-19 PROCEDURE — 36415 COLL VENOUS BLD VENIPUNCTURE: CPT | Performed by: NURSE PRACTITIONER

## 2023-04-19 PROCEDURE — 21000000 HC CCU ICU ROOM CHARGE

## 2023-04-19 PROCEDURE — 93458 PR CATH PLACE/CORON ANGIO, IMG SUPER/INTERP,W LEFT HEART VENTRICULOGRAPHY: ICD-10-PCS | Mod: 26,,, | Performed by: INTERNAL MEDICINE

## 2023-04-19 PROCEDURE — 25500020 PHARM REV CODE 255: Performed by: INTERNAL MEDICINE

## 2023-04-19 PROCEDURE — 99900035 HC TECH TIME PER 15 MIN (STAT)

## 2023-04-19 PROCEDURE — C1894 INTRO/SHEATH, NON-LASER: HCPCS | Performed by: INTERNAL MEDICINE

## 2023-04-19 PROCEDURE — 94761 N-INVAS EAR/PLS OXIMETRY MLT: CPT

## 2023-04-19 PROCEDURE — C1887 CATHETER, GUIDING: HCPCS | Performed by: INTERNAL MEDICINE

## 2023-04-19 PROCEDURE — 93458 L HRT ARTERY/VENTRICLE ANGIO: CPT | Performed by: INTERNAL MEDICINE

## 2023-04-19 PROCEDURE — C1769 GUIDE WIRE: HCPCS | Performed by: INTERNAL MEDICINE

## 2023-04-19 RX ORDER — HEPARIN SODIUM 10000 [USP'U]/ML
INJECTION, SOLUTION INTRAVENOUS; SUBCUTANEOUS
Status: DISCONTINUED | OUTPATIENT
Start: 2023-04-19 | End: 2023-04-19 | Stop reason: HOSPADM

## 2023-04-19 RX ORDER — NITROGLYCERIN 5 MG/ML
INJECTION, SOLUTION INTRAVENOUS
Status: DISCONTINUED | OUTPATIENT
Start: 2023-04-19 | End: 2023-04-19 | Stop reason: HOSPADM

## 2023-04-19 RX ORDER — MIDAZOLAM HYDROCHLORIDE 1 MG/ML
INJECTION INTRAMUSCULAR; INTRAVENOUS
Status: DISCONTINUED | OUTPATIENT
Start: 2023-04-19 | End: 2023-04-19 | Stop reason: HOSPADM

## 2023-04-19 RX ORDER — IODIXANOL 320 MG/ML
INJECTION, SOLUTION INTRAVASCULAR
Status: DISCONTINUED | OUTPATIENT
Start: 2023-04-19 | End: 2023-04-19 | Stop reason: HOSPADM

## 2023-04-19 RX ORDER — FENTANYL CITRATE 50 UG/ML
INJECTION, SOLUTION INTRAMUSCULAR; INTRAVENOUS
Status: DISCONTINUED | OUTPATIENT
Start: 2023-04-19 | End: 2023-04-19 | Stop reason: HOSPADM

## 2023-04-19 RX ADMIN — SOTALOL HYDROCHLORIDE 40 MG: 80 TABLET ORAL at 03:04

## 2023-04-19 RX ADMIN — CHLORHEXIDINE GLUCONATE 15 ML: 1.2 RINSE ORAL at 08:04

## 2023-04-19 RX ADMIN — SOTALOL HYDROCHLORIDE 40 MG: 80 TABLET ORAL at 08:04

## 2023-04-19 RX ADMIN — ACETAMINOPHEN 1000 MG: 500 TABLET, FILM COATED ORAL at 10:04

## 2023-04-19 RX ADMIN — MUPIROCIN 1 G: 20 OINTMENT TOPICAL at 08:04

## 2023-04-19 RX ADMIN — MAGNESIUM OXIDE 400 MG (241.3 MG MAGNESIUM) TABLET 400 MG: at 09:04

## 2023-04-19 RX ADMIN — PANTOPRAZOLE SODIUM 40 MG: 40 TABLET, DELAYED RELEASE ORAL at 05:04

## 2023-04-19 NOTE — PT/OT/SLP PROGRESS
Occupational Therapy      Patient Name:  Irene العراقي   MRN:  [id number]    Patient not seen today secondary to Off the floor for procedure/surgery. Will follow-up next service date.    4/19/2023

## 2023-04-19 NOTE — INTERVAL H&P NOTE
The patient has been examined and the H&P has been reviewed:    I concur with the findings and no changes have occurred since H&P was written.    Procedure risks, benefits and alternative options discussed and understood by patient/family.        NSTEMI with elevated troponin and chest discomfort.  EKG changes with RVR.  Discussed risks and benefits of the procedure with the patient.  Patient would like to proceed with invasive angiography for further evaluation plus minus PCI.  Discussed risks of pain, bleeding, infection, contrast induced nephropathy, vascular damage, risk of stroke, heart attack and death.  Discussed possible need for blood transfusion if excessive bleeding.  Patient verbalized understanding and would like to proceed with the procedure.  Discussed with them about dual antiplatelet therapy in that the she is on anticoagulation for AFib with would set her higher risk of bleeding.  Informed her that if she develops bleeding some point in the future and her blood thinners need to be stopped, she will be at high risk of developing a major heart attack which might be life-threatening.  Patient verbalized understanding and would like to proceed with the PCI if needed.    Active Hospital Problems    Diagnosis  POA    *Rectal bleeding [K62.5]  Unknown    Proctitis [K62.89]  Unknown    Constipation [K59.00]  Unknown    Urinary retention [R33.9]  Unknown    Hypertension [I10]  Yes    Current use of long term anticoagulation [Z79.01]  Not Applicable    Paroxysmal atrial fibrillation [I48.0]  Yes    Diastolic dysfunction [I51.89]  Yes    Thrombocytopenia [D69.6]  Yes      Resolved Hospital Problems   No resolved problems to display.

## 2023-04-19 NOTE — NURSING
2015- Patient ambulated down hallway and back to room with stand by assist only; upon return to room, patient washed face, brushed teeth and hair independently, tolerated well; voided and had small loose BM; back to bed without difficulty; VSS    0430- CHG bath complete    EOSS: Patient stable throughout shift; all invasive lines patent and secure; repositioning and ambulation encouraged; comfort and safety maintained; patient updated on POC; questions and needs addressed; in NAD at this time; VSS; report to oncoming RN

## 2023-04-19 NOTE — PROGRESS NOTES
[facility] Medicine  Progress Note    Patient name: Irene العراقي  MRN: [id number]  Admit Date: 4/10/2023   LOS: 9 days     SUBJECTIVE:     Principal problem: Rectal bleeding, constipation     Interval History:      4/19 - angio done today, plan to monitor overnight. She is feeling well today without complaints    4/18- plan for angio in the am.  Npo at mn.  Vitals stable.  No cp or sob.      4/17- she is up to bedside chair, family visiting.  Colonoscopy did not show constipation or heavy stool burden.  She remains in SR, no chest pain or s ob.  Cards following, considering angio in am.      4/16- patient looks well this morning, HR controlled and sinus rhythm, bp wnl.  Had nstemi yesterday.  Plan for flex sig today    4/15- patient went into afib/rvr early this morning, denied chest pain and shortness of breath at the time.  I was called around 0730 due to patient being diaphoretic, in and out of afib.  On my exam she was nauseated,  had vomited, was diaphoretic, HR was normal, BP was 94/50s, crackles to right lung which is new.  She also has chest tightness, no shortness of breath.  Her glucose is 89, will order trop/ekg to be safe, and cxr.      4/14- only liquid bm, she appears more uncomfortable today.      4/13- she still hasn't had a bm despite multiple enemas, miralax and attempt to disimpact.  Continuing high dose miralax.      4/12- patient is up to bedside chair, still no bowel movement despite enema yesterday.  Will order soap suds and disimpaction today.  Vitals stable, feels well.      Scheduled Meds:   chlorhexidine  15 mL Mouth/Throat BID    lubiprostone  24 mcg Oral BID WM    magnesium oxide  400 mg Oral Daily    mupirocin   Nasal BID    pantoprazole  40 mg Oral Daily    polyethylene glycol  17 g Oral BID    sotaloL  40 mg Oral TID     Continuous Infusions:   sodium chloride 0.9%         PRN Meds:acetaminophen, acetaminophen, magnesium oxide, magnesium oxide, melatonin,  metoprolol, morphine, naloxone, ondansetron, potassium bicarbonate, potassium bicarbonate, potassium bicarbonate, potassium, sodium phosphates, potassium, sodium phosphates, potassium, sodium phosphates, promethazine (PHENERGAN) IVPB, simethicone, sodium chloride 0.9%, sodium chloride 0.9%, sodium chloride 0.9%, sodium chloride 0.9%    Review of patient's allergies indicates:   Allergen Reactions    Hydrocodone     Meperidine     Meperidine hcl Nausea And Vomiting    Persantine [dipyridamole]     Vicodin [hydrocodone-acetaminophen] Nausea And Vomiting       Review of Systems: As per interval history    OBJECTIVE:     Vital Signs (Most Recent)  Temp: 97.8 °F (36.6 °C) (04/19/23 1100)  Pulse: 61 (04/19/23 1415)  Resp: 18 (04/19/23 1415)  BP: 129/72 (04/19/23 1415)  SpO2: (!) 92 % (04/19/23 1415)    Vital Signs Range (Last 24H):  Temp:  [97.8 °F (36.6 °C)-98.5 °F (36.9 °C)]   Pulse:  [61-81]   Resp:  [16-33]   BP: (117-149)/(58-74)   SpO2:  [92 %-97 %]     I & O (Last 24H):  Intake/Output Summary (Last 24 hours) at 4/19/2023 1432  Last data filed at 4/18/2023 2059  Gross per 24 hour   Intake 212.5 ml   Output --   Net 212.5 ml       Physical Exam:  General: up to bsc, comfortable  Eyes: No conjunctival injection. No scleral icterus.  ENT: Hearing grossly intact. No discharge from ears. No nasal discharge.   Neck: Supple, trachea midline. No JVD  CVS: RRR. No LE edema BL  Lungs:  No tachypnea or accessory muscle use. RML and RLL Crackles noted   Abdomen:  Soft and nondistended.    Neuro: AOx3. Moves all extremities. Follows commands. Responds appropriately   Skin:  clammy. No rash or erythema noted    Laboratory:  I have reviewed all pertinent lab results within the past 24 hours.    Diagnostic Results:  Labs: Reviewed  ECG: Reviewed  X-Ray: Reviewed  CT abd/pelvis    ASSESSMENT/PLAN:     81 yo female with hx of chronic constipation, diverticulosis, afib on elaquis who came in for rectal bleeding associated with  constipation, likely impacted.      Active Hospital Problems    Diagnosis  POA    *Rectal bleeding [K62.5]  Unknown    Proctitis [K62.89]  Unknown    Constipation [K59.00]  Unknown    Urinary retention [R33.9]  Unknown    Hypertension [I10]  Yes    Current use of long term anticoagulation [Z79.01]  Not Applicable    Paroxysmal atrial fibrillation [I48.0]  Yes    Diastolic dysfunction [I51.89]  Yes    Thrombocytopenia [D69.6]  Yes      Resolved Hospital Problems   No resolved problems to display.         Plan:     4/15-  AFIb/RVR  Nausea, vomiting, diaphoresis  Chest tightness  NSTEMI  - heart cath today  - remains in sinus rhythm  - afib RVR resolved  - continue sotolol  - start AC tomorrow          Rectal bleeding  - Hgb stable this am  - appreciate Dr Thompson's evaluation  - continue miralax  - soap jessica enema 4/12 and attempt disimpaction  - held off on flex sig initially as patient didn't want invasive procedure if  its possible to avoid  - protonix po, trend CBC  - pain control, and proctitis management as below  - after discussing flex sig and explaining that this wouldn't be for screening alone, but that its possible intervention for severe constipation, she would like to proceed with flex sig  -colonoscopy completed, colon was clear except some liquid school  -ok to restart AC   -lovenox x1 dose today   -possibly going for angio in am  -will resume apixaban after cath    Urinary retention  CTA showed large amount of urine and bladder, patient was in and out catheterization after CT scan with 600 cc output per nursing, bladder scan every 6 hours as needed if patient is unable to void, may have to place urinary catheter but most likely related to the constipation      Constipation       Mineral oil and tap water enemas not effective  Soap suds enema with disimpaction ineffective   Continue high dose Miralax and f/u GI recs  Will need scheduled bowel regimen going forward  Colonoscopy showed mostly clear  colon    Proctitis  - discontinue zosyn     Hypertension  Blood pressure under good control, most recent blood pressure 125/63  Hold aldactone this am      Current use of long term anticoagulation  Patient is on apixaban for paroxysmal atrial fibrillation  Ok to resume AC      Paroxysmal atrial fibrillation  History of paroxysmal atrial fibrillation on apixaban and sotalol, 12 lead EKG with normal sinus rhythm, ventricular rate 76 and , monitor on telemetry, continue sotalol, apixaban        Diastolic dysfunction  Echocardiogram from July 2024 with mild aortic and mitral regurgitation, 60% ejection fraction and grade 1 left ventricular diastolic dysfunction, monitor daily weight and I/O      Thrombocytopenia  Platelets 140, looking back on lab work patient has had chronic thrombocytopenia as low as 67 in January of 2021, CBC in a.m.          VTE Risk Mitigation (From admission, onward)           Ordered     Reason for No Pharmacological VTE Prophylaxis  Once        Question Answer Comment   Reasons: Risk of Bleeding    Reasons: Active Bleeding        04/10/23 2211     IP VTE HIGH RISK PATIENT  Once         04/10/23 2211     Place sequential compression device  Until discontinued         04/10/23 2211                        Department Hospital Medicine  [facility] Meeks MD  Date of service: 04/19/2023

## 2023-04-19 NOTE — CARE UPDATE
04/18/23 2101   Patient Assessment/Suction   Level of Consciousness (AVPU) alert   Respiratory Effort Normal   PRE-TX-O2   Device (Oxygen Therapy) room air   SpO2 95 %   Pulse Oximetry Type Continuous   $ Pulse Oximetry - Multiple Charge Pulse Oximetry - Multiple   Pulse 71   Education   $ Education 15 min   Respiratory Evaluation   $ Care Plan Tech Time 15 min

## 2023-04-19 NOTE — PT/OT/SLP PROGRESS
Physical Therapy Treatment    Patient Name:  Irene العراقي   MRN:  [id number]    Recommendations:     Discharge Recommendations: home  Discharge Equipment Recommendations: none  Barriers to discharge:  decreased activity tolerance, fall risk    Assessment:     Irene العراقي is a 82 y.o. female admitted with a medical diagnosis of Rectal bleeding.  She presents with the following impairments/functional limitations: weakness, impaired endurance, impaired self care skills, impaired functional mobility, gait instability, impaired balance, decreased lower extremity function, decreased safety awareness, impaired cardiopulmonary response to activity.    Pt agreeable to visit. Pt up in chair and awaiting procedure. Pt ambulated 120' with RW and stand by assist with slow citlaly and good RW management. Pt requested to go to the bathroom and transferred to toilet with supervision and no AD. Pt left up in chair with all needs within reach.    Rehab Prognosis: Fair; patient would benefit from acute skilled PT services to address these deficits and reach maximum level of function.    Recent Surgery: Procedure(s) (LRB):  Left heart cath (Left) Day of Surgery    Plan:     During this hospitalization, patient to be seen 5 x/week to address the identified rehab impairments via gait training, therapeutic activities, therapeutic exercises, neuromuscular re-education and progress toward the following goals:    Plan of Care Expires:  05/17/23    Subjective     Chief Complaint: pt waiting for procedure  Patient/Family Comments/goals: to get better and go home  Pain/Comfort:  Pain Rating 1: 0/10      Objective:     Communicated with RN prior to session.  Patient found up in chair with telemetry, peripheral IV upon PT entry to room.     General Precautions: Standard, fall  Orthopedic Precautions: N/A  Braces: N/A  Respiratory Status: Room air     Functional Mobility:  Transfers:     Sit to Stand:  contact guard assistance with rolling  walker  Toilet Transfer: supervision with  no AD  using  Step Transfer  Gait: x 120' with RW and SBA, slow citlaly and good RW management      AM-PAC 6 CLICK MOBILITY          Treatment & Education:  Pt educated on importance of time OOB, importance of intermittent mobility, safe techniques for transfers/ambulation, discharge recommendations/options, and use of call light for assistance and fall prevention.      Patient left up in chair with all lines intact, call button in reach, and RN notified..    GOALS:   Multidisciplinary Problems       Physical Therapy Goals          Problem: Physical Therapy    Goal Priority Disciplines Outcome Goal Variances Interventions   Physical Therapy Goal     PT, PT/OT Ongoing, Progressing     Description: Goals to be met by: 23     Patient will increase functional independence with mobility by performin. Supine to sit with Independent  2. Sit to stand transfer with Supervision  3. Bed to chair transfer with Supervision using least restrictive assistive device  4. Gait  x 150 feet with Supervision using least restrictive assistive device                             Time Tracking:     PT Received On: 23  PT Start Time: 905     PT Stop Time: 918  PT Total Time (min): 13 min     Billable Minutes: Gait Training 13    Treatment Type: Treatment  PT/PTA: PTA     Number of PTA visits since last PT visit: 2     2023

## 2023-04-20 VITALS
TEMPERATURE: 98 F | BODY MASS INDEX: 30.62 KG/M2 | WEIGHT: 190.5 LBS | RESPIRATION RATE: 28 BRPM | SYSTOLIC BLOOD PRESSURE: 109 MMHG | HEART RATE: 73 BPM | DIASTOLIC BLOOD PRESSURE: 73 MMHG | HEIGHT: 66 IN | OXYGEN SATURATION: 96 %

## 2023-04-20 LAB
ANION GAP SERPL CALC-SCNC: 8 MMOL/L (ref 8–16)
BASOPHILS # BLD AUTO: 0.02 K/UL (ref 0–0.2)
BASOPHILS NFR BLD: 0.4 % (ref 0–1.9)
BUN SERPL-MCNC: 21 MG/DL (ref 8–23)
CALCIUM SERPL-MCNC: 9.1 MG/DL (ref 8.7–10.5)
CHLORIDE SERPL-SCNC: 108 MMOL/L (ref 95–110)
CO2 SERPL-SCNC: 23 MMOL/L (ref 23–29)
CREAT SERPL-MCNC: 1.1 MG/DL (ref 0.5–1.4)
DIFFERENTIAL METHOD: ABNORMAL
EOSINOPHIL # BLD AUTO: 0.3 K/UL (ref 0–0.5)
EOSINOPHIL NFR BLD: 5 % (ref 0–8)
ERYTHROCYTE [DISTWIDTH] IN BLOOD BY AUTOMATED COUNT: 12.6 % (ref 11.5–14.5)
EST. GFR  (NO RACE VARIABLE): 50.2 ML/MIN/1.73 M^2
GLUCOSE SERPL-MCNC: 89 MG/DL (ref 70–110)
HCT VFR BLD AUTO: 30.9 % (ref 37–48.5)
HGB BLD-MCNC: 10.4 G/DL (ref 12–16)
IMM GRANULOCYTES # BLD AUTO: 0.03 K/UL (ref 0–0.04)
IMM GRANULOCYTES NFR BLD AUTO: 0.6 % (ref 0–0.5)
LYMPHOCYTES # BLD AUTO: 1.1 K/UL (ref 1–4.8)
LYMPHOCYTES NFR BLD: 21.6 % (ref 18–48)
MAGNESIUM SERPL-MCNC: 2.2 MG/DL (ref 1.6–2.6)
MCH RBC QN AUTO: 30.9 PG (ref 27–31)
MCHC RBC AUTO-ENTMCNC: 33.7 G/DL (ref 32–36)
MCV RBC AUTO: 92 FL (ref 82–98)
MONOCYTES # BLD AUTO: 0.5 K/UL (ref 0.3–1)
MONOCYTES NFR BLD: 9.8 % (ref 4–15)
NEUTROPHILS # BLD AUTO: 3.3 K/UL (ref 1.8–7.7)
NEUTROPHILS NFR BLD: 62.6 % (ref 38–73)
NRBC BLD-RTO: 0 /100 WBC
PLATELET # BLD AUTO: 136 K/UL (ref 150–450)
PMV BLD AUTO: 10.9 FL (ref 9.2–12.9)
POTASSIUM SERPL-SCNC: 3.8 MMOL/L (ref 3.5–5.1)
RBC # BLD AUTO: 3.37 M/UL (ref 4–5.4)
SODIUM SERPL-SCNC: 139 MMOL/L (ref 136–145)
WBC # BLD AUTO: 5.22 K/UL (ref 3.9–12.7)

## 2023-04-20 PROCEDURE — 25000003 PHARM REV CODE 250: Performed by: NURSE PRACTITIONER

## 2023-04-20 PROCEDURE — 99900031 HC PATIENT EDUCATION (STAT)

## 2023-04-20 PROCEDURE — 85025 COMPLETE CBC W/AUTO DIFF WBC: CPT | Performed by: NURSE PRACTITIONER

## 2023-04-20 PROCEDURE — 36415 COLL VENOUS BLD VENIPUNCTURE: CPT | Performed by: NURSE PRACTITIONER

## 2023-04-20 PROCEDURE — 99900035 HC TECH TIME PER 15 MIN (STAT)

## 2023-04-20 PROCEDURE — 83735 ASSAY OF MAGNESIUM: CPT | Performed by: NURSE PRACTITIONER

## 2023-04-20 PROCEDURE — 25000003 PHARM REV CODE 250

## 2023-04-20 PROCEDURE — 94799 UNLISTED PULMONARY SVC/PX: CPT

## 2023-04-20 PROCEDURE — 80048 BASIC METABOLIC PNL TOTAL CA: CPT | Performed by: NURSE PRACTITIONER

## 2023-04-20 PROCEDURE — 94761 N-INVAS EAR/PLS OXIMETRY MLT: CPT

## 2023-04-20 RX ORDER — SOTALOL HYDROCHLORIDE 80 MG/1
40 TABLET ORAL 3 TIMES DAILY
Qty: 90 TABLET | Refills: 0 | Status: SHIPPED | OUTPATIENT
Start: 2023-04-20 | End: 2023-07-12 | Stop reason: SDUPTHER

## 2023-04-20 RX ORDER — POLYETHYLENE GLYCOL 3350 17 G/17G
17 POWDER, FOR SOLUTION ORAL 2 TIMES DAILY
Qty: 60 EACH | Refills: 0 | Status: SHIPPED | OUTPATIENT
Start: 2023-04-20 | End: 2023-05-20

## 2023-04-20 RX ORDER — PANTOPRAZOLE SODIUM 40 MG/1
40 TABLET, DELAYED RELEASE ORAL DAILY
Qty: 30 TABLET | Refills: 11 | Status: SHIPPED | OUTPATIENT
Start: 2023-04-21 | End: 2023-08-09

## 2023-04-20 RX ADMIN — PANTOPRAZOLE SODIUM 40 MG: 40 TABLET, DELAYED RELEASE ORAL at 05:04

## 2023-04-20 RX ADMIN — CHLORHEXIDINE GLUCONATE 15 ML: 1.2 RINSE ORAL at 07:04

## 2023-04-20 RX ADMIN — SOTALOL HYDROCHLORIDE 40 MG: 80 TABLET ORAL at 07:04

## 2023-04-20 RX ADMIN — MAGNESIUM OXIDE 400 MG (241.3 MG MAGNESIUM) TABLET 400 MG: at 07:04

## 2023-04-20 RX ADMIN — MUPIROCIN 1 G: 20 OINTMENT TOPICAL at 07:04

## 2023-04-20 NOTE — CARE UPDATE
04/20/23 0816   Patient Assessment/Suction   Level of Consciousness (AVPU) alert   Respiratory Effort Normal;Unlabored   Rhythm/Pattern, Respiratory unlabored;pattern regular;depth regular   PRE-TX-O2   Device (Oxygen Therapy) room air   SpO2 97 %   $ Pulse Oximetry - Multiple Charge Pulse Oximetry - Multiple   Pulse 73   Resp (!) 23   Education   $ Education 15 min   Respiratory Evaluation   $ Care Plan Tech Time 15 min   $ Eval/Re-eval Charges Re-evaluation

## 2023-04-20 NOTE — HOSPITAL COURSE
Patient is an 82-year-old female with a history of atrial fibrillation and hypertension who presents with abdominal pain and rectal pain was found to have proctitis with constipation as well as GI bleeding.  She was started on IV antibiotics and evaluated by Gastroenterology.  She underwent colonoscopy and was able to decompress her constipation.  There were no signs of active GI bleeding on endoscopy.  She was restarted on anticoagulation however she developed atrial fibrillation with RVR and chest tightness.  Found to have NSTEMI.  She was evaluated by Cardiology and sotalol was titrated for appropriate rate control.  She underwent angiogram for further evaluation of her coronary anatomy was not found to have any obstructive coronary disease.  It was felt that her NSTEMI was likely due to demand from atrial fibrillation with RVR.  She was resumed on Eliquis for anticoagulation.  She will continue sotalol 3 times daily for rate control of her atrial fibrillation.  Her constipation has resolved and she will continue MiraLax scheduled to avoid recurrent constipation.  She was evaluated by Physical therapy and recommended for discharge home.  I reviewed the discharge plan of care instructions with the patient the day of discharge.  She was discharged in good condition with plans for close outpatient follow-up with her primary provider and will see Cardiology within 1 week for follow-up as well.

## 2023-04-20 NOTE — PLAN OF CARE
Patient cleared for discharge from case management standpoint. Patient needs Cardiology clearance prior to discharging nursing aware.    Follow up appointments scheduled and added to AVS.    Chart and discharge orders reviewed.  Patient discharged home with no further case management needs.       04/20/23 1141   Final Note   Assessment Type Final Discharge Note   Anticipated Discharge Disposition Home   Hospital Resources/Appts/Education Provided Provided patient/caregiver with written discharge plan information;Appointments scheduled and added to AVS   Post-Acute Status   Post-Acute Placement Status Set-up Complete/Auth obtained   Discharge Delays (!) Waiting for Provider to Speak to Patient

## 2023-04-20 NOTE — PT/OT/SLP PROGRESS
Occupational Therapy      Patient Name:  Irene العراقي   MRN:  [id number]    Patient not seen today secondary to Other (Comment) (pending d/c with d/c orders in). Will follow-up next service date if d/c falls through.    4/20/2023

## 2023-04-20 NOTE — PT/OT/SLP PROGRESS
Physical Therapy      Patient Name:  Irene العراقي   MRN:  [id number]    Patient not seen today secondary to pending discharge, with discharge orders in. Will follow-up 4/21/23 if discharge does not proceed as planned.

## 2023-04-20 NOTE — DISCHARGE SUMMARY
[facility]  Discharge Summary      Patient Name: Irene العراقي  MRN: [id number]  [facility]: [id number]  Patient Class: IP- Inpatient  Admission Date: 4/10/2023  Hospital Length of Stay: 10 days  Discharge Date and Time:  04/20/2023 1:33 PM  Attending Physician: Reji Meeks MD   Discharging Provider: Reji Meeks MD  Primary Care Provider: BRYANT Chinchilla    Primary Care Team: Networked reference to record PCT     HPI:   Irene العراقي is an 82-year-old female with chronic medical problems including paroxysmal atrial fibrillation on apixaban, chronic HFpEF, constipation and hypertension who presents to the emergency room complaining of rectal bleeding.  Patient states that she has chronic constipation and takes MiraLax every other day.  She took some at about 10:00 a.m. this morning.  Later that morning she started having some crampy abdominal pain across her lower abdomen especially left lower quadrant and went to the bathroom and had a small amount of bright red blood in the toilet.  Later she started having more abdominal pain, gas and liquid stools. Associated symptoms are lightheadedness, rectal burning and difficulty voiding.  She said she is never had bleeding before or difficulty voiding.  She came down from [place] 2 years ago to take care of her twin sister that has never been able to go back since COVID.  She follows with cardiology and a PCP here in his on spironolactone for hypertension, apixaban and sotalol for paroxysmal atrial fibrillation and spironolactone for hypertension.  She denies dysuria, fevers, chills, shortness of breath or chest pain, nausea or vomiting.     In the ED, H&H 11.9/36.0, platelets decreased to 140, WBC 6.84, BUN/CR 25/1.0, glucose 91, sodium 139, potassium 4.3, lipase 43, INR 1.0, 12 lead EKG with normal sinus rhythm, ventricular rate 76 and , CT scan of pelvis with contrast shows fecal impaction and proctitis,  cholelithiasis and sigmoid diverticulosis with no evidence of diverticulitis.  See report for full details.  Per ED physician rectal exam was negative for blood.  Temperature 97.6°, heart rate 83, blood pressure 125/63, respiratory rate 15 to 20 and O2 sat 95% on room air.  She was given 1 g acetaminophen, an 80 mg Protonix and 4.5 g Zosyn.  She will be admitted to the hospitalist service for further evaluation treatment with a consult Gastroenterology.      Procedure(s) (LRB):  Angiogram, Coronary, with Left Heart Cath (Left)      Hospital Course:   Patient is an 82-year-old female with a history of atrial fibrillation and hypertension who presents with abdominal pain and rectal pain was found to have proctitis with constipation as well as GI bleeding.  She was started on IV antibiotics and evaluated by Gastroenterology.  She underwent colonoscopy and was able to decompress her constipation.  There were no signs of active GI bleeding on endoscopy.  She was restarted on anticoagulation however she developed atrial fibrillation with RVR and chest tightness.  Found to have NSTEMI.  She was evaluated by Cardiology and sotalol was titrated for appropriate rate control.  She underwent angiogram for further evaluation of her coronary anatomy was not found to have any obstructive coronary disease.  It was felt that her NSTEMI was likely due to demand from atrial fibrillation with RVR.  She was resumed on Eliquis for anticoagulation.  She will continue sotalol 3 times daily for rate control of her atrial fibrillation.  Her constipation has resolved and she will continue MiraLax scheduled to avoid recurrent constipation.  She was evaluated by Physical therapy and recommended for discharge home.  I reviewed the discharge plan of care instructions with the patient the day of discharge.  She was discharged in good condition with plans for close outpatient follow-up with her primary provider and will see Cardiology within 1 week  for follow-up as well.        Goals of Care Treatment Preferences:  Code Status: Full Code      Consults:   Consults (From admission, onward)        Status Ordering Provider     Inpatient consult to Cardiology  Once        Provider:  Pj Ramirez MD    Completed MAURICE NICHOLAS     Inpatient consult to   Once        Provider:  (Not yet assigned)    Completed MAURICE NICHOLAS     Inpatient consult to Registered Dietitian/Nutritionist  Once        Provider:  (Not yet assigned)    Completed DANIELLE RODRIGUEZ     Inpatient consult to Gastroenterology  Once        Provider:  Mukul Lyons MD    Completed DANIELLE RODRIGUEZ          No new Assessment & Plan notes have been filed under this hospital service since the last note was generated.  Service: Hospital Medicine    Final Active Diagnoses:    Diagnosis Date Noted POA    PRINCIPAL PROBLEM:  Rectal bleeding [K62.5] 04/10/2023 Unknown    Proctitis [K62.89] 04/10/2023 Unknown    Constipation [K59.00] 04/10/2023 Unknown    Urinary retention [R33.9] 04/10/2023 Unknown    Hypertension [I10] 02/21/2022 Yes    Current use of long term anticoagulation [Z79.01] 02/21/2022 Not Applicable    Paroxysmal atrial fibrillation [I48.0] 01/28/2021 Yes    Diastolic dysfunction [I51.89] 01/27/2021 Yes    Thrombocytopenia [D69.6] 01/09/2021 Yes      Problems Resolved During this Admission:       Discharged Condition: good    Disposition: Home or Self Care    Follow Up:   Follow-up Information     BRYANT Chinchilla Follow up.    Specialty: Family Medicine  Why: Appointment Scheduled on 04/25 1:00 PM .  Contact information:  [address]  [address]  [facility] [id number] [phone]             BRYANT Chinchilla Follow up.    Specialty: Family Medicine  Why: Appointment Scheduled on 4/25 @ 1:00 pm .  Contact information:  [address] [facility]  [address]  [place] [address] [phone]             Pj Ramirez MD. Schedule an  appointment as soon as possible for a visit in 1 week(s).    Specialties: Interventional Cardiology, Cardiology, Cardiovascular Disease  Contact information:  [address] [facility]  [address]  [place]  [phone]                       Patient Instructions:      Diet Adult Regular     No dressing needed     Activity as tolerated       Significant Diagnostic Studies: Labs:   BMP:   Recent Labs   Lab 04/19/23  0535 04/20/23  0506   GLU 95 89    139   K 3.9 3.8    108   CO2 25 23   BUN 18 21   CREATININE 1.1 1.1   CALCIUM 8.9 9.1   MG 2.0 2.2   , CBC   Recent Labs   Lab 04/19/23  0535 04/20/23  0506   WBC 6.49 5.22   HGB 10.7* 10.4*   HCT 32.5* 30.9*   * 136*    and All labs within the past 24 hours have been reviewed    Pending Diagnostic Studies:     Procedure Component Value Units Date/Time    EKG 12-lead [[id number]] Collected: 04/18/23 0732    Order Status: Sent Lab Status: In process Updated: 04/18/23 0759    Narrative:      Test Reason : Z51.81,Z79.899,    Vent. Rate : 068 BPM     Atrial Rate : 068 BPM     P-R Int : 140 ms          QRS Dur : 078 ms      QT Int : 430 ms       P-R-T Axes : 054 -01 -16 degrees     QTc Int : 457 ms    Normal sinus rhythm  Possible Left atrial enlargement  Nonspecific ST and T wave abnormality  Abnormal ECG  When compared with ECG of 15-APR-2023 13:41,  No significant change was found    Referred By: [email or web address]   SELF           Confirmed By:          Medications:  Reconciled Home Medications:      Medication List      START taking these medications    pantoprazole 40 MG tablet  Commonly known as: PROTONIX  Take 1 tablet (40 mg total) by mouth once daily.  Start taking on: April 21, 2023     polyethylene glycol 17 gram Pwpk  Commonly known as: GLYCOLAX  Take 17 g by mouth 2 (two) times daily.        CHANGE how you take these medications    magnesium oxide 400 mg (241.3 mg magnesium) tablet  Commonly known as: MAG-OX  TAKE 1 TABLET BY MOUTH ONCE DAILY  What  changed: when to take this     sotaloL 80 MG tablet  Commonly known as: BETAPACE  Take 0.5 tablets (40 mg total) by mouth 3 (three) times daily.  What changed: when to take this        CONTINUE taking these medications    acetaminophen 500 MG tablet  Commonly known as: TYLENOL  Take 500 mg by mouth nightly as needed for Pain.     apixaban 5 mg Tab  Commonly known as: ELIQUIS  Take 1 tablet (5 mg total) by mouth 2 (two) times daily.        STOP taking these medications    spironolactone 25 MG tablet  Commonly known as: ALDACTONE            Indwelling Lines/Drains at time of discharge:   Lines/Drains/Airways     None                 Time spent on the discharge of patient: 50 minutes         Reji Meeks MD  Department of Hospital Medicine  [facility]

## 2023-04-20 NOTE — PLAN OF CARE
Problem: Adult Inpatient Plan of Care  Goal: Plan of Care Review  Outcome: Ongoing, Progressing  Goal: Patient-Specific Goal (Individualized)  Outcome: Ongoing, Progressing  Goal: Absence of Hospital-Acquired Illness or Injury  Outcome: Ongoing, Progressing  Goal: Optimal Comfort and Wellbeing  Outcome: Ongoing, Progressing  Goal: Readiness for Transition of Care  Outcome: Ongoing, Progressing     Problem: Infection  Goal: Absence of Infection Signs and Symptoms  Outcome: Ongoing, Progressing     Problem: Skin Injury Risk Increased  Goal: Skin Health and Integrity  Outcome: Ongoing, Progressing     Problem: Fall Injury Risk  Goal: Absence of Fall and Fall-Related Injury  Outcome: Ongoing, Progressing     Problem: Oral Intake Inadequate  Goal: Improved Oral Intake  Outcome: Ongoing, Progressing

## 2023-04-20 NOTE — CARE UPDATE
04/19/23 2124   Patient Assessment/Suction   Respiratory Effort Unlabored   Rhythm/Pattern, Respiratory pattern regular   PRE-TX-O2   Device (Oxygen Therapy) room air   SpO2 (!) 93 %   Pulse Oximetry Type Continuous   $ Pulse Oximetry - Multiple Charge Pulse Oximetry - Multiple   Pulse 66   Resp 20   Respiratory Evaluation   $ Care Plan Tech Time 15 min   $ Eval/Re-eval Charges Re-evaluation   Evaluation For Re-Eval 5+ day

## 2023-04-21 ENCOUNTER — PATIENT OUTREACH (OUTPATIENT)
Dept: FAMILY MEDICINE | Facility: CLINIC | Age: 83
End: 2023-04-21

## 2023-04-25 ENCOUNTER — OFFICE VISIT (OUTPATIENT)
Dept: FAMILY MEDICINE | Facility: CLINIC | Age: 83
End: 2023-04-25
Payer: MEDICARE

## 2023-04-25 ENCOUNTER — TELEPHONE (OUTPATIENT)
Dept: FAMILY MEDICINE | Facility: CLINIC | Age: 83
End: 2023-04-25

## 2023-04-25 VITALS
SYSTOLIC BLOOD PRESSURE: 108 MMHG | RESPIRATION RATE: 20 BRPM | HEART RATE: 64 BPM | HEIGHT: 66 IN | TEMPERATURE: 98 F | DIASTOLIC BLOOD PRESSURE: 62 MMHG | BODY MASS INDEX: 28.96 KG/M2 | WEIGHT: 180.19 LBS

## 2023-04-25 DIAGNOSIS — I48.91 ATRIAL FIBRILLATION, CONTROLLED: ICD-10-CM

## 2023-04-25 DIAGNOSIS — R35.0 URINARY FREQUENCY: ICD-10-CM

## 2023-04-25 DIAGNOSIS — R39.15 URINARY URGENCY: ICD-10-CM

## 2023-04-25 DIAGNOSIS — Z09 HOSPITAL DISCHARGE FOLLOW-UP: Primary | ICD-10-CM

## 2023-04-25 LAB
BILIRUB UR QL STRIP: NEGATIVE
GLUCOSE UR QL STRIP: NEGATIVE
KETONES UR QL STRIP: NEGATIVE
LEUKOCYTE ESTERASE UR QL STRIP: POSITIVE
PH, POC UA: 5.5 (ref 5–8.5)
POC BLOOD, URINE: POSITIVE
POC NITRATES, URINE: NEGATIVE
PROT UR QL STRIP: POSITIVE
SP GR UR STRIP: 1.02 (ref 1–1.03)
UROBILINOGEN UR STRIP-ACNC: NORMAL (ref 0.2–8)

## 2023-04-25 PROCEDURE — 81003 URINALYSIS AUTO W/O SCOPE: CPT | Mod: QW,S$GLB,, | Performed by: NURSE PRACTITIONER

## 2023-04-25 PROCEDURE — 3078F PR MOST RECENT DIASTOLIC BLOOD PRESSURE < 80 MM HG: ICD-10-PCS | Mod: CPTII,S$GLB,, | Performed by: NURSE PRACTITIONER

## 2023-04-25 PROCEDURE — 3288F FALL RISK ASSESSMENT DOCD: CPT | Mod: CPTII,S$GLB,, | Performed by: NURSE PRACTITIONER

## 2023-04-25 PROCEDURE — 3288F PR FALLS RISK ASSESSMENT DOCUMENTED: ICD-10-PCS | Mod: CPTII,S$GLB,, | Performed by: NURSE PRACTITIONER

## 2023-04-25 PROCEDURE — 3074F SYST BP LT 130 MM HG: CPT | Mod: CPTII,S$GLB,, | Performed by: NURSE PRACTITIONER

## 2023-04-25 PROCEDURE — 3078F DIAST BP <80 MM HG: CPT | Mod: CPTII,S$GLB,, | Performed by: NURSE PRACTITIONER

## 2023-04-25 PROCEDURE — 81003 POCT URINALYSIS, DIPSTICK, AUTOMATED, W/O SCOPE: ICD-10-PCS | Mod: QW,S$GLB,, | Performed by: NURSE PRACTITIONER

## 2023-04-25 PROCEDURE — 99495 TCM SERVICES (MODERATE COMPLEXITY): ICD-10-PCS | Mod: 25,S$GLB,, | Performed by: NURSE PRACTITIONER

## 2023-04-25 PROCEDURE — 1159F MED LIST DOCD IN RCRD: CPT | Mod: CPTII,S$GLB,, | Performed by: NURSE PRACTITIONER

## 2023-04-25 PROCEDURE — 1101F PT FALLS ASSESS-DOCD LE1/YR: CPT | Mod: CPTII,S$GLB,, | Performed by: NURSE PRACTITIONER

## 2023-04-25 PROCEDURE — 3074F PR MOST RECENT SYSTOLIC BLOOD PRESSURE < 130 MM HG: ICD-10-PCS | Mod: CPTII,S$GLB,, | Performed by: NURSE PRACTITIONER

## 2023-04-25 PROCEDURE — 1126F PR PAIN SEVERITY QUANTIFIED, NO PAIN PRESENT: ICD-10-PCS | Mod: CPTII,S$GLB,, | Performed by: NURSE PRACTITIONER

## 2023-04-25 PROCEDURE — 93000 POCT EKG 12-LEAD: ICD-10-PCS | Mod: S$GLB,,, | Performed by: NURSE PRACTITIONER

## 2023-04-25 PROCEDURE — 93000 ELECTROCARDIOGRAM COMPLETE: CPT | Mod: S$GLB,,, | Performed by: NURSE PRACTITIONER

## 2023-04-25 PROCEDURE — 99495 TRANSJ CARE MGMT MOD F2F 14D: CPT | Mod: 25,S$GLB,, | Performed by: NURSE PRACTITIONER

## 2023-04-25 PROCEDURE — 1126F AMNT PAIN NOTED NONE PRSNT: CPT | Mod: CPTII,S$GLB,, | Performed by: NURSE PRACTITIONER

## 2023-04-25 PROCEDURE — 1101F PR PT FALLS ASSESS DOC 0-1 FALLS W/OUT INJ PAST YR: ICD-10-PCS | Mod: CPTII,S$GLB,, | Performed by: NURSE PRACTITIONER

## 2023-04-25 PROCEDURE — 1159F PR MEDICATION LIST DOCUMENTED IN MEDICAL RECORD: ICD-10-PCS | Mod: CPTII,S$GLB,, | Performed by: NURSE PRACTITIONER

## 2023-04-25 RX ORDER — SPIRONOLACTONE 25 MG/1
25 TABLET ORAL DAILY
COMMUNITY
End: 2023-05-04

## 2023-04-25 NOTE — TELEPHONE ENCOUNTER
Per Rosaline, the caregiver with Mrs. Bonilla called to adv she spoke with Pt's cardiologist regard symptoms after visit. Per Dr. Rogers, Pt was instructed to take another Betapace 80mg and monitor symptoms. If symptoms worsen go to the ED to be eval/tx.  Caregiver states pt WNL at the moment and will keep clinic UTD. KM

## 2023-04-25 NOTE — PROGRESS NOTES
SUBJECTIVE:    Patient ID: Irene العراقي is a 82 y.o. female.    Chief Complaint: Hospital Follow Up (83 yo female here for hospital follow up due to rectal bleeding. Since d/c, Pt c/o feeling fatigue, SOB, swelling in bilateral feet and frequent urination.  KM)    Ms. Casas presents today for hospital follow up. She was admitted due to rectal bleeding and was found to be in Afib with RVR. She was seen by Gastro and Cardiology service lines while admitted. She was then discharged home and she is doing doing somewhat better. We spent time discussing medication changes. She has also been experiencing urinary frequency and urgency since being discharged. She became faint during visit and had an EKG where she is found to be in Afib with RVR. She is then advised to go to ED and she declined. She has follow up with Cardiology early next week.      Admit Date: 4/10/23   Discharge Date: 4/20/23  Discharge Facility: Hospital     History of hospital stay: Hospital Course:   Patient is an 82-year-old female with a history of atrial fibrillation and hypertension who presents with abdominal pain and rectal pain was found to have proctitis with constipation as well as GI bleeding.  She was started on IV antibiotics and evaluated by Gastroenterology.  She underwent colonoscopy and was able to decompress her constipation.  There were no signs of active GI bleeding on endoscopy.  She was restarted on anticoagulation however she developed atrial fibrillation with RVR and chest tightness.  Found to have NSTEMI.  She was evaluated by Cardiology and sotalol was titrated for appropriate rate control.  She underwent angiogram for further evaluation of her coronary anatomy was not found to have any obstructive coronary disease.  It was felt that her NSTEMI was likely due to demand from atrial fibrillation with RVR.  She was resumed on Eliquis for anticoagulation.  She will continue sotalol 3 times daily for rate control of her atrial  fibrillation.  Her constipation has resolved and she will continue MiraLax scheduled to avoid recurrent constipation.  She was evaluated by Physical therapy and recommended for discharge home.  I reviewed the discharge plan of care instructions with the patient the day of discharge.  She was discharged in good condition with plans for close outpatient follow-up with her primary provider and will see Cardiology within 1 week for follow-up as well.     How is patient feeling since discharge from the hospital?  Better but having challenges         Medication Reconciliation:  Medications changed/added/deleted. Sotalol BID. Hold spirinolactone  New Prescriptions filled after discharge: yes  Discharge summary reviewed:  yes  Pending test results at discharge reviewed:   not applicable  Follow up appointments scheduled:  yes   with Cardiology   Follow up labs/tests ordered:   yes  Home Health ordered on discharge:   no  Home Health company name:  none  DME ordered at discharge:   not applicable      Office Visit on 04/25/2023   Component Date Value Ref Range Status    POC Blood, Urine 04/25/2023 Positive (A)  Negative Final    POC Bilirubin, Urine 04/25/2023 Negative  Negative Final    POC Urobilinogen, Urine 04/25/2023 normal  0.2 - 8 Final    POC Ketones, Urine 04/25/2023 Negative  Negative Final    POC Protein, Urine 04/25/2023 Positive (A)  Negative Final    POC Nitrates, Urine 04/25/2023 Negative  Negative Final    POC Glucose, Urine 04/25/2023 Negative  Negative Final    pH, UA 04/25/2023 5.5  5.0 - 8.5 Final    POC Specific Gravity, Urine 04/25/2023 1.020  1.000 - 1.030 Final    POC Leukocytes, Urine 04/25/2023 Positive (A)  Negative Final   No results displayed because visit has over 200 results.          Past Medical History:   Diagnosis Date    Atrial fibrillation 01/25/2021    Hypertension      Past Surgical History:   Procedure Laterality Date    ANGIOGRAM, CORONARY, WITH LEFT HEART CATHETERIZATION Left  4/19/2023    Procedure: Angiogram, Coronary, with Left Heart Cath;  Surgeon: Kevin Redmond MD;  Location: [facility] CATH/EP LAB;  Service: Cardiology;  Laterality: Left;    BREAST SURGERY      COLONOSCOPY N/A 4/16/2023    Procedure: COLONOSCOPY;  Surgeon: Knvg Mensah MD;  Location: [facility] ENDO;  Service: Endoscopy;  Laterality: N/A;    COLONOSCOPY W/ POLYPECTOMY  04/16/2023     Family History   Problem Relation Age of Onset    Cancer Mother     Cancer Father        Marital Status: Single  Alcohol History:  reports that she does not currently use alcohol.  Tobacco History:  reports that she has quit smoking. Her smoking use included cigarettes. She has never used smokeless tobacco.  Drug History:  reports that she does not currently use drugs.    Review of patient's allergies indicates:   Allergen Reactions    Hydrocodone     Meperidine     Meperidine hcl Nausea And Vomiting    Persantine [dipyridamole]     Vicodin [hydrocodone-acetaminophen] Nausea And Vomiting       Current Outpatient Medications:     acetaminophen (TYLENOL) 500 MG tablet, Take 500 mg by mouth nightly as needed for Pain., Disp: , Rfl:     apixaban (ELIQUIS) 5 mg Tab, Take 1 tablet (5 mg total) by mouth 2 (two) times daily., Disp: 180 tablet, Rfl: 3    magnesium oxide (MAG-OX) 400 mg (241.3 mg magnesium) tablet, TAKE 1 TABLET BY MOUTH ONCE DAILY (Patient taking differently: Take 400 mg by mouth once daily.), Disp: 90 tablet, Rfl: 3    polyethylene glycol (GLYCOLAX) 17 gram PwPk, Take 17 g by mouth 2 (two) times daily., Disp: 60 each, Rfl: 0    sotaloL (BETAPACE) 80 MG tablet, Take 0.5 tablets (40 mg total) by mouth 3 (three) times daily., Disp: 90 tablet, Rfl: 0    spironolactone (ALDACTONE) 25 MG tablet, Take 25 mg by mouth once daily., Disp: , Rfl:     pantoprazole (PROTONIX) 40 MG tablet, Take 1 tablet (40 mg total) by mouth once daily. (Patient not taking: Reported on 4/25/2023), Disp: 30 tablet, Rfl: 11    Review of Systems   Constitutional:   "Positive for activity change, appetite change, diaphoresis and fatigue.   Cardiovascular:  Positive for leg swelling.   Genitourinary:  Positive for dysuria, frequency and urgency. Negative for decreased urine volume.   Musculoskeletal:  Positive for arthralgias and gait problem.   Psychiatric/Behavioral:  Positive for dysphoric mood.       Objective:      Vitals:    23 1259   BP: 108/62   Pulse: 64   Resp: 20   Temp: 97.6 °F (36.4 °C)   TempSrc: Oral   Weight: 81.7 kg (180 lb 3.2 oz)   Height: 5' 6" (1.676 m)     Physical Exam  Constitutional:       Appearance: She is obese. She is ill-appearing.   Cardiovascular:      Rate and Rhythm: Tachycardia present. Rhythm irregular.   Abdominal:      General: Abdomen is protuberant.   Musculoskeletal:      Right lower le+ Edema present.      Left lower le+ Edema present.       Assessment:       1. Hospital discharge follow-up    2. Urinary frequency    3. Urinary urgency    4. Atrial fibrillation, controlled         Plan:       Hospital discharge follow-up  -     POCT Glucose, Hand-Held Device    Urinary frequency  -     POCT Urinalysis, Dipstick, Automated, W/O Scope  -     Cancel: Urine culture  -     Urine culture    Urinary urgency  -     POCT Urinalysis, Dipstick, Automated, W/O Scope  -     Cancel: Urine culture  -     Urine culture    Atrial fibrillation, controlled  -     POCT EKG 12-LEAD (Manually Resulted by Ordering Provider)      Follow up in about 4 weeks (around 2023), or if symptoms worsen or fail to improve, for Afib Follow up.       Report to ED is symptoms worsen. I will call patient with urine culture results.   "

## 2023-04-25 NOTE — PATIENT INSTRUCTIONS
Hold Spironolactone until you see Dr. Ramirez.     Continue Sotalol 3 times per day.    Do Miralax only one time per day.    Take Electrolyte formula until you start to feel stronger.

## 2023-04-28 ENCOUNTER — TELEPHONE (OUTPATIENT)
Dept: FAMILY MEDICINE | Facility: CLINIC | Age: 83
End: 2023-04-28

## 2023-04-28 DIAGNOSIS — R35.0 URINARY FREQUENCY: Primary | ICD-10-CM

## 2023-04-28 DIAGNOSIS — N30.00 ACUTE CYSTITIS WITHOUT HEMATURIA: ICD-10-CM

## 2023-04-28 RX ORDER — NITROFURANTOIN 25; 75 MG/1; MG/1
100 CAPSULE ORAL 2 TIMES DAILY
Qty: 14 CAPSULE | Refills: 0 | Status: SHIPPED | OUTPATIENT
Start: 2023-04-28 | End: 2023-05-02

## 2023-04-28 NOTE — TELEPHONE ENCOUNTER
Pt adv rx Macrobid was sent to pharmacy and culture still pending. Pt satisfied with information given and instructed to contact clinic with any further questions pr concerns. KM

## 2023-04-28 NOTE — TELEPHONE ENCOUNTER
Pt called requesting urine culture results.Pt states she still having trouble urinating and asking if she need to start antibiotics? Per rep at [facility] culture still pending with preliminary and should be resulted on Sunday.

## 2023-05-01 ENCOUNTER — TELEPHONE (OUTPATIENT)
Dept: FAMILY MEDICINE | Facility: CLINIC | Age: 83
End: 2023-05-01

## 2023-05-01 NOTE — TELEPHONE ENCOUNTER
Pt called stating severe reaction to Macrobid. Pt states she intaje med times 2 days bid and developed lower abdomen pain, rapid heart rate, and fatigue. Pt states she is urinating WNL and thinks 2 days of med helped her symptoms. Pt instructed to d/c med, drink plenty of fluids and contact clinic with any abnormalities or symptoms. Pt satisfied with information and states she is scheduled to see  cardiologist tomorrow and will keep clinic UTD. GUERITA

## 2023-05-02 ENCOUNTER — OFFICE VISIT (OUTPATIENT)
Dept: CARDIOLOGY | Facility: CLINIC | Age: 83
End: 2023-05-02
Payer: MEDICARE

## 2023-05-02 VITALS
HEART RATE: 64 BPM | HEIGHT: 66 IN | DIASTOLIC BLOOD PRESSURE: 70 MMHG | OXYGEN SATURATION: 98 % | BODY MASS INDEX: 28.93 KG/M2 | WEIGHT: 180 LBS | SYSTOLIC BLOOD PRESSURE: 122 MMHG | RESPIRATION RATE: 16 BRPM

## 2023-05-02 DIAGNOSIS — I48.0 PAROXYSMAL ATRIAL FIBRILLATION: Primary | ICD-10-CM

## 2023-05-02 DIAGNOSIS — I10 ESSENTIAL HYPERTENSION: ICD-10-CM

## 2023-05-02 DIAGNOSIS — F41.9 ANXIETY: ICD-10-CM

## 2023-05-02 DIAGNOSIS — E78.2 MIXED HYPERLIPIDEMIA: ICD-10-CM

## 2023-05-02 DIAGNOSIS — I51.89 DIASTOLIC DYSFUNCTION: ICD-10-CM

## 2023-05-02 DIAGNOSIS — I35.0 AORTIC STENOSIS, MILD: ICD-10-CM

## 2023-05-02 LAB
BACTERIA UR CULT: ABNORMAL
BACTERIA UR CULT: ABNORMAL
OTHER ANTIBIOTIC SUSC ISLT: ABNORMAL

## 2023-05-02 PROCEDURE — 1159F PR MEDICATION LIST DOCUMENTED IN MEDICAL RECORD: ICD-10-PCS | Mod: CPTII,S$GLB,, | Performed by: INTERNAL MEDICINE

## 2023-05-02 PROCEDURE — 1126F PR PAIN SEVERITY QUANTIFIED, NO PAIN PRESENT: ICD-10-PCS | Mod: CPTII,S$GLB,, | Performed by: INTERNAL MEDICINE

## 2023-05-02 PROCEDURE — 1159F MED LIST DOCD IN RCRD: CPT | Mod: CPTII,S$GLB,, | Performed by: INTERNAL MEDICINE

## 2023-05-02 PROCEDURE — 3288F PR FALLS RISK ASSESSMENT DOCUMENTED: ICD-10-PCS | Mod: CPTII,S$GLB,, | Performed by: INTERNAL MEDICINE

## 2023-05-02 PROCEDURE — 1111F DSCHRG MED/CURRENT MED MERGE: CPT | Mod: CPTII,S$GLB,, | Performed by: INTERNAL MEDICINE

## 2023-05-02 PROCEDURE — 99999 PR PBB SHADOW E&M-EST. PATIENT-LVL III: ICD-10-PCS | Mod: PBBFAC,,, | Performed by: INTERNAL MEDICINE

## 2023-05-02 PROCEDURE — 1101F PT FALLS ASSESS-DOCD LE1/YR: CPT | Mod: CPTII,S$GLB,, | Performed by: INTERNAL MEDICINE

## 2023-05-02 PROCEDURE — 3078F PR MOST RECENT DIASTOLIC BLOOD PRESSURE < 80 MM HG: ICD-10-PCS | Mod: CPTII,S$GLB,, | Performed by: INTERNAL MEDICINE

## 2023-05-02 PROCEDURE — 3074F PR MOST RECENT SYSTOLIC BLOOD PRESSURE < 130 MM HG: ICD-10-PCS | Mod: CPTII,S$GLB,, | Performed by: INTERNAL MEDICINE

## 2023-05-02 PROCEDURE — 3078F DIAST BP <80 MM HG: CPT | Mod: CPTII,S$GLB,, | Performed by: INTERNAL MEDICINE

## 2023-05-02 PROCEDURE — 1160F PR REVIEW ALL MEDS BY PRESCRIBER/CLIN PHARMACIST DOCUMENTED: ICD-10-PCS | Mod: CPTII,S$GLB,, | Performed by: INTERNAL MEDICINE

## 2023-05-02 PROCEDURE — 99214 OFFICE O/P EST MOD 30 MIN: CPT | Mod: S$GLB,,, | Performed by: INTERNAL MEDICINE

## 2023-05-02 PROCEDURE — 99214 PR OFFICE/OUTPT VISIT, EST, LEVL IV, 30-39 MIN: ICD-10-PCS | Mod: S$GLB,,, | Performed by: INTERNAL MEDICINE

## 2023-05-02 PROCEDURE — 1111F PR DISCHARGE MEDS RECONCILED W/ CURRENT OUTPATIENT MED LIST: ICD-10-PCS | Mod: CPTII,S$GLB,, | Performed by: INTERNAL MEDICINE

## 2023-05-02 PROCEDURE — 99999 PR PBB SHADOW E&M-EST. PATIENT-LVL III: CPT | Mod: PBBFAC,,, | Performed by: INTERNAL MEDICINE

## 2023-05-02 PROCEDURE — 3074F SYST BP LT 130 MM HG: CPT | Mod: CPTII,S$GLB,, | Performed by: INTERNAL MEDICINE

## 2023-05-02 PROCEDURE — 1101F PR PT FALLS ASSESS DOC 0-1 FALLS W/OUT INJ PAST YR: ICD-10-PCS | Mod: CPTII,S$GLB,, | Performed by: INTERNAL MEDICINE

## 2023-05-02 PROCEDURE — 3288F FALL RISK ASSESSMENT DOCD: CPT | Mod: CPTII,S$GLB,, | Performed by: INTERNAL MEDICINE

## 2023-05-02 PROCEDURE — 1126F AMNT PAIN NOTED NONE PRSNT: CPT | Mod: CPTII,S$GLB,, | Performed by: INTERNAL MEDICINE

## 2023-05-02 PROCEDURE — 1160F RVW MEDS BY RX/DR IN RCRD: CPT | Mod: CPTII,S$GLB,, | Performed by: INTERNAL MEDICINE

## 2023-05-02 NOTE — PROGRESS NOTES
Subjective:    Patient ID:  Irene العراقي is a 82 y.o. female     Chief Complaint   Patient presents with    Atrial Fibrillation    Hospital Follow Up    Hypertension    Shortness of Breath       HPI:  Ms  Irene العراقي is a 82 y.o. female is here for follow-up.  She was recently in the hospital.    Patient went into atrial fibrillation with rapid ventricular rate.  Her sotalol was increased to 40 mg p.o. t.i.d..  At the present time she feels better her breathing is stable.  She had shortness of breath and difficulty in breathing and recently had an allergic reaction to nitrofurantoin which was recently started.  And she is doing much better at the present time.  She is taking all her medications regularly.        Review of patient's allergies indicates:   Allergen Reactions    Hydrocodone     Meperidine     Meperidine hcl Nausea And Vomiting    Persantine [dipyridamole]     Vicodin [hydrocodone-acetaminophen] Nausea And Vomiting    Nitrofurantoin macrocrystal      Headache , went into Afib        Past Medical History:   Diagnosis Date    Atrial fibrillation 01/25/2021    Hypertension      Past Surgical History:   Procedure Laterality Date    ANGIOGRAM, CORONARY, WITH LEFT HEART CATHETERIZATION Left 4/19/2023    Procedure: Angiogram, Coronary, with Left Heart Cath;  Surgeon: Kevin Redmond MD;  Location: [facility] CATH/EP LAB;  Service: Cardiology;  Laterality: Left;    BREAST SURGERY      COLONOSCOPY N/A 4/16/2023    Procedure: COLONOSCOPY;  Surgeon: Kvng Mensah MD;  Location: [facility] ENDO;  Service: Endoscopy;  Laterality: N/A;    COLONOSCOPY W/ POLYPECTOMY  04/16/2023     Social History     Tobacco Use    Smoking status: Former     Types: Cigarettes    Smokeless tobacco: Never   Substance Use Topics    Alcohol use: Not Currently    Drug use: Not Currently     Family History   Problem Relation Age of Onset    Cancer Mother     Cancer Father         Review of Systems:   Constitution: Negative for diaphoresis and fever.    HEENT: Negative for nosebleeds.    Cardiovascular: Negative for chest pain       No dyspnea on exertion       Intermittent leg swelling        Intermittent palpitations  Respiratory: Negative for shortness of breath and wheezing.    Hematologic/Lymphatic: Negative for bleeding problem. Does not bruise/bleed easily.   Skin: Negative for color change and rash.   Musculoskeletal: Negative for falls and myalgias.   Gastrointestinal: Negative for hematemesis and hematochezia.   Genitourinary: Negative for hematuria.   Neurological: Negative for dizziness and light-headedness.   Psychiatric/Behavioral: Negative for altered mental status and memory loss.          Objective:        Vitals:    05/02/23 1027   BP: 122/70   Pulse: 64   Resp: 16       Lab Results   Component Value Date    WBC 5.22 04/20/2023    HGB 10.4 (L) 04/20/2023    HCT 30.9 (L) 04/20/2023     (L) 04/20/2023    CHOL 173 02/10/2020    TRIG 40 02/10/2020    HDL 78 (H) 02/10/2020    ALT 16 04/10/2023    AST 21 04/10/2023     04/20/2023    K 3.8 04/20/2023     04/20/2023    CREATININE 1.1 04/20/2023    BUN 21 04/20/2023    CO2 23 04/20/2023    TSH 3.090 07/23/2022    INR 1.0 04/17/2023    HGBA1C 5.1 04/11/2023        ECHOCARDIOGRAM RESULTS  Results for orders placed during the hospital encounter of 04/10/23    Echo    Interpretation Summary  · The left ventricle is normal in size with concentric remodeling and normal systolic function.  · Mild left atrial enlargement.  · The estimated ejection fraction is 65%.  · Indeterminate left ventricular diastolic function.  · Normal right ventricular size with normal right ventricular systolic function.  · There is moderate aortic valve stenosis.  · Aortic valve area is 1.24 cm2; peak velocity is 3.51 m/s; mean gradient is 32 mmHg.  · Mild mitral regurgitation.  · Mild tricuspid regurgitation.  · There is mild to moderate pulmonary hypertension with RVSP at 47 mmhg        CURRENT/PREVIOUS VISIT  EKG  Results for orders placed or performed during the hospital encounter of 04/10/23   EKG 12-lead    Collection Time: 04/18/23  7:32 AM    Narrative    Test Reason : Z51.81,Z79.899,    Vent. Rate : 068 BPM     Atrial Rate : 068 BPM     P-R Int : 140 ms          QRS Dur : 078 ms      QT Int : 430 ms       P-R-T Axes : 054 -01 -16 degrees     QTc Int : 457 ms    Normal sinus rhythm  Possible Left atrial enlargement  Nonspecific ST and T wave abnormality  Abnormal ECG  When compared with ECG of 15-APR-2023 13:41,  No significant change was found  Confirmed by Bryan Hayes MD ([id number]) on 4/20/2023 8:47:27 PM    Referred By: [email or web address]   SELF           Confirmed By:Bryan Hayes MD     No valid procedures specified.   Results for orders placed during the hospital encounter of 07/23/22    Nuclear Stress Test    Interpretation Summary    The EKG portion of this study is negative for ischemia.    The patient reported no chest pain during the stress test.    The nuclear portion of this study will be reported separately.      Physical Exam:  CONSTITUTIONAL: No fever, no chills  HEENT: Normocephalic, atraumatic,pupils reactive to light                 NECK:  No JVD no carotid bruit  CVS: S1S2+, RRR, systolic murmurs,   LUNGS: Clear  ABDOMEN: Soft, NT, BS+  EXTREMITIES: No cyanosis, bilateral ankle edema  : No araya catheter  NEURO: AAO X 3  PSY: Normal affect      Medication List with Changes/Refills   Current Medications    ACETAMINOPHEN (TYLENOL) 500 MG TABLET    Take 500 mg by mouth nightly as needed for Pain.    APIXABAN (ELIQUIS) 5 MG TAB    Take 1 tablet (5 mg total) by mouth 2 (two) times daily.    MAGNESIUM OXIDE (MAG-OX) 400 MG (241.3 MG MAGNESIUM) TABLET    TAKE 1 TABLET BY MOUTH ONCE DAILY    PANTOPRAZOLE (PROTONIX) 40 MG TABLET    Take 1 tablet (40 mg total) by mouth once daily.    POLYETHYLENE GLYCOL (GLYCOLAX) 17 GRAM PWPK    Take 17 g by mouth 2 (two) times daily.    SOTALOL (BETAPACE) 80 MG TABLET     Take 0.5 tablets (40 mg total) by mouth 3 (three) times daily.    SPIRONOLACTONE (ALDACTONE) 25 MG TABLET    Take 25 mg by mouth once daily.   Discontinued Medications    NITROFURANTOIN, MACROCRYSTAL-MONOHYDRATE, (MACROBID) 100 MG CAPSULE    Take 1 capsule (100 mg total) by mouth 2 (two) times daily. for 7 days     Coronary Findings    Diagnostic  Dominance: Right  Left Main   The vessel is angiographically normal. Short      Left Anterior Descending   The vessel is large. There is minimal diffuse disease throughout the vessel.      Left Circumflex   The vessel is large. There is minimal diffuse disease throughout the vessel.      Right Coronary Artery   The vessel is large. There is minimal diffuse disease throughout the vessel.      Intervention     No interventions have been documented.           Assessment:       1. Paroxysmal atrial fibrillation    2. Aortic stenosis, mild    3. Essential hypertension    4. Mixed hyperlipidemia    5. Diastolic dysfunction    6. Anxiety         Plan:   1. Paroxysmal atrial fibrillation.    Patient is currently on sotalol 40 mg p.o. q.8 hours.  Continue the same and she is doing well.  She is on Eliquis 5 mg p.o. b.i.d. continue the same no bleeding issues  2. Of blood in stool.  Essential hypertension   Patient's blood pressure is stable at 120 2/70 and patient is currently not on any specific blood pressure medications.    3. Hypokalemia borderline  Patient was started on spironolactone and she has been taking it for quite some time.  Patient to continue take spironolactone 25 mg p.o. q. other the day will check her potassium and magnesium levels.  4. Diastolic dysfunction   Patient does have intermittent lower extremity is swelling.  And is also due to sedentary lifestyle.  And dependent edema.  Discussed with patient to ambulate more and to walk.  And continue spironolactone.    5. Mixed hyperlipidemia   Her primary physician is checking her cholesterol and liver function  test.    6. I will see her back in the office in 6 months time.            Problem List Items Addressed This Visit          Psychiatric    Anxiety       Cardiac/Vascular    Diastolic dysfunction    Paroxysmal atrial fibrillation - Primary    Aortic stenosis, mild     Other Visit Diagnoses       Essential hypertension        Mixed hyperlipidemia                No follow-ups on file.

## 2023-05-03 DIAGNOSIS — I48.0 PAROXYSMAL ATRIAL FIBRILLATION: ICD-10-CM

## 2023-05-04 RX ORDER — SPIRONOLACTONE 25 MG/1
TABLET ORAL
Qty: 90 TABLET | Refills: 3 | Status: ON HOLD | OUTPATIENT
Start: 2023-05-04 | End: 2023-09-12 | Stop reason: HOSPADM

## 2023-05-04 RX ORDER — APIXABAN 5 MG/1
TABLET, FILM COATED ORAL
Qty: 180 TABLET | Refills: 3 | Status: ON HOLD | OUTPATIENT
Start: 2023-05-04 | End: 2023-09-12 | Stop reason: HOSPADM

## 2023-05-08 ENCOUNTER — HOSPITAL ENCOUNTER (EMERGENCY)
Facility: HOSPITAL | Age: 83
Discharge: HOME | End: 2023-05-09
Attending: EMERGENCY MEDICINE
Payer: MEDICARE

## 2023-05-08 DIAGNOSIS — R10.9 ABDOMINAL PAIN: ICD-10-CM

## 2023-05-08 DIAGNOSIS — N30.00 ACUTE CYSTITIS WITHOUT HEMATURIA: Primary | ICD-10-CM

## 2023-05-08 PROCEDURE — 96375 TX/PRO/DX INJ NEW DRUG ADDON: CPT

## 2023-05-08 PROCEDURE — 96365 THER/PROPH/DIAG IV INF INIT: CPT

## 2023-05-08 PROCEDURE — 99285 EMERGENCY DEPT VISIT HI MDM: CPT | Mod: 25

## 2023-05-09 VITALS
HEART RATE: 76 BPM | BODY MASS INDEX: 28.93 KG/M2 | SYSTOLIC BLOOD PRESSURE: 176 MMHG | TEMPERATURE: 98 F | RESPIRATION RATE: 17 BRPM | HEIGHT: 66 IN | WEIGHT: 180 LBS | DIASTOLIC BLOOD PRESSURE: 83 MMHG | OXYGEN SATURATION: 94 %

## 2023-05-09 LAB
ALBUMIN SERPL BCP-MCNC: 3.9 G/DL (ref 3.5–5.2)
ALP SERPL-CCNC: 87 U/L (ref 55–135)
ALT SERPL W/O P-5'-P-CCNC: 11 U/L (ref 10–44)
ANION GAP SERPL CALC-SCNC: 9 MMOL/L (ref 8–16)
AST SERPL-CCNC: 19 U/L (ref 10–40)
BACTERIA #/AREA URNS HPF: ABNORMAL /HPF
BASOPHILS # BLD AUTO: 0.02 K/UL (ref 0–0.2)
BASOPHILS NFR BLD: 0.2 % (ref 0–1.9)
BILIRUB SERPL-MCNC: 0.9 MG/DL (ref 0.1–1)
BILIRUB UR QL STRIP: NEGATIVE
BUN SERPL-MCNC: 20 MG/DL (ref 8–23)
CALCIUM SERPL-MCNC: 9.8 MG/DL (ref 8.7–10.5)
CHLORIDE SERPL-SCNC: 106 MMOL/L (ref 95–110)
CLARITY UR: ABNORMAL
CO2 SERPL-SCNC: 26 MMOL/L (ref 23–29)
COLOR UR: YELLOW
CREAT SERPL-MCNC: 1.1 MG/DL (ref 0.5–1.4)
DIFFERENTIAL METHOD: ABNORMAL
EOSINOPHIL # BLD AUTO: 0.2 K/UL (ref 0–0.5)
EOSINOPHIL NFR BLD: 2 % (ref 0–8)
ERYTHROCYTE [DISTWIDTH] IN BLOOD BY AUTOMATED COUNT: 13.6 % (ref 11.5–14.5)
EST. GFR  (NO RACE VARIABLE): 50.2 ML/MIN/1.73 M^2
GLUCOSE SERPL-MCNC: 104 MG/DL (ref 70–110)
GLUCOSE UR QL STRIP: NEGATIVE
HCT VFR BLD AUTO: 32.9 % (ref 37–48.5)
HGB BLD-MCNC: 10.9 G/DL (ref 12–16)
HGB UR QL STRIP: ABNORMAL
HYALINE CASTS #/AREA URNS LPF: 10 /LPF
IMM GRANULOCYTES # BLD AUTO: 0.05 K/UL (ref 0–0.04)
IMM GRANULOCYTES NFR BLD AUTO: 0.5 % (ref 0–0.5)
KETONES UR QL STRIP: NEGATIVE
LEUKOCYTE ESTERASE UR QL STRIP: ABNORMAL
LIPASE SERPL-CCNC: 35 U/L (ref 4–60)
LYMPHOCYTES # BLD AUTO: 0.7 K/UL (ref 1–4.8)
LYMPHOCYTES NFR BLD: 7.4 % (ref 18–48)
MCH RBC QN AUTO: 31 PG (ref 27–31)
MCHC RBC AUTO-ENTMCNC: 33.1 G/DL (ref 32–36)
MCV RBC AUTO: 94 FL (ref 82–98)
MICROSCOPIC COMMENT: ABNORMAL
MONOCYTES # BLD AUTO: 0.6 K/UL (ref 0.3–1)
MONOCYTES NFR BLD: 5.8 % (ref 4–15)
NEUTROPHILS # BLD AUTO: 8 K/UL (ref 1.8–7.7)
NEUTROPHILS NFR BLD: 84.1 % (ref 38–73)
NITRITE UR QL STRIP: POSITIVE
NRBC BLD-RTO: 0 /100 WBC
PH UR STRIP: 7 [PH] (ref 5–8)
PLATELET # BLD AUTO: 135 K/UL (ref 150–450)
PMV BLD AUTO: 11.6 FL (ref 9.2–12.9)
POTASSIUM SERPL-SCNC: 3.8 MMOL/L (ref 3.5–5.1)
PROT SERPL-MCNC: 6.8 G/DL (ref 6–8.4)
PROT UR QL STRIP: ABNORMAL
RBC # BLD AUTO: 3.52 M/UL (ref 4–5.4)
RBC #/AREA URNS HPF: >100 /HPF (ref 0–4)
SODIUM SERPL-SCNC: 141 MMOL/L (ref 136–145)
SP GR UR STRIP: 1.02 (ref 1–1.03)
SQUAMOUS #/AREA URNS HPF: 0 /HPF
URN SPEC COLLECT METH UR: ABNORMAL
UROBILINOGEN UR STRIP-ACNC: NEGATIVE EU/DL
WBC # BLD AUTO: 9.54 K/UL (ref 3.9–12.7)
WBC #/AREA URNS HPF: 37 /HPF (ref 0–5)

## 2023-05-09 PROCEDURE — 93005 ELECTROCARDIOGRAM TRACING: CPT | Performed by: INTERNAL MEDICINE

## 2023-05-09 PROCEDURE — 96365 THER/PROPH/DIAG IV INF INIT: CPT

## 2023-05-09 PROCEDURE — 87077 CULTURE AEROBIC IDENTIFY: CPT | Performed by: EMERGENCY MEDICINE

## 2023-05-09 PROCEDURE — 63600175 PHARM REV CODE 636 W HCPCS: Performed by: EMERGENCY MEDICINE

## 2023-05-09 PROCEDURE — 96375 TX/PRO/DX INJ NEW DRUG ADDON: CPT

## 2023-05-09 PROCEDURE — 25500020 PHARM REV CODE 255: Performed by: EMERGENCY MEDICINE

## 2023-05-09 PROCEDURE — 93010 EKG 12-LEAD: ICD-10-PCS | Mod: ,,, | Performed by: INTERNAL MEDICINE

## 2023-05-09 PROCEDURE — 80053 COMPREHEN METABOLIC PANEL: CPT | Performed by: EMERGENCY MEDICINE

## 2023-05-09 PROCEDURE — 81001 URINALYSIS AUTO W/SCOPE: CPT | Performed by: EMERGENCY MEDICINE

## 2023-05-09 PROCEDURE — 93010 ELECTROCARDIOGRAM REPORT: CPT | Mod: ,,, | Performed by: INTERNAL MEDICINE

## 2023-05-09 PROCEDURE — 87186 SC STD MICRODIL/AGAR DIL: CPT | Performed by: EMERGENCY MEDICINE

## 2023-05-09 PROCEDURE — 83690 ASSAY OF LIPASE: CPT | Performed by: EMERGENCY MEDICINE

## 2023-05-09 PROCEDURE — 25000003 PHARM REV CODE 250: Performed by: EMERGENCY MEDICINE

## 2023-05-09 PROCEDURE — 85025 COMPLETE CBC W/AUTO DIFF WBC: CPT | Performed by: EMERGENCY MEDICINE

## 2023-05-09 PROCEDURE — 87086 URINE CULTURE/COLONY COUNT: CPT | Performed by: EMERGENCY MEDICINE

## 2023-05-09 RX ORDER — MORPHINE SULFATE 2 MG/ML
2 INJECTION, SOLUTION INTRAMUSCULAR; INTRAVENOUS
Status: COMPLETED | OUTPATIENT
Start: 2023-05-09 | End: 2023-05-09

## 2023-05-09 RX ORDER — SODIUM CHLORIDE 9 MG/ML
1000 INJECTION, SOLUTION INTRAVENOUS
Status: COMPLETED | OUTPATIENT
Start: 2023-05-09 | End: 2023-05-09

## 2023-05-09 RX ORDER — CEFUROXIME AXETIL 500 MG/1
500 TABLET ORAL EVERY 12 HOURS
Qty: 14 TABLET | Refills: 0 | Status: SHIPPED | OUTPATIENT
Start: 2023-05-09 | End: 2023-08-09

## 2023-05-09 RX ORDER — ONDANSETRON 2 MG/ML
4 INJECTION INTRAMUSCULAR; INTRAVENOUS
Status: COMPLETED | OUTPATIENT
Start: 2023-05-09 | End: 2023-05-09

## 2023-05-09 RX ADMIN — ONDANSETRON 4 MG: 2 INJECTION INTRAMUSCULAR; INTRAVENOUS at 12:05

## 2023-05-09 RX ADMIN — IOHEXOL 100 ML: 350 INJECTION, SOLUTION INTRAVENOUS at 01:05

## 2023-05-09 RX ADMIN — SODIUM CHLORIDE 1000 ML: 0.9 INJECTION, SOLUTION INTRAVENOUS at 12:05

## 2023-05-09 RX ADMIN — MORPHINE SULFATE 2 MG: 2 INJECTION, SOLUTION INTRAMUSCULAR; INTRAVENOUS at 12:05

## 2023-05-09 RX ADMIN — CEFTRIAXONE SODIUM 1 G: 1 INJECTION, POWDER, FOR SOLUTION INTRAMUSCULAR; INTRAVENOUS at 04:05

## 2023-05-09 NOTE — ED PROVIDER NOTES
Encounter Date: 5/8/2023       History     Chief Complaint   Patient presents with    Abdominal Pain     Pt presents to the ER via EMS with c/o generalized abdominal pain along with N/V.      Patient presents emergency department with reported abdominal pain states started her lower abdomen become more generalized symptom onset over last 2 days she reports nausea vomiting she denies any diarrhea no blood in her stool at this time she denies any fever chills does report some urinary symptoms as well states that her physician had called in some antibiotics for her urinary tract infection she was only able to take him for 2 days which began having adverse symptoms from them she stopped the antibiotics and not started new antibiotics at this time patient was admitted a month ago for obstipation was treated for proctitis at that time      Review of patient's allergies indicates:   Allergen Reactions    Hydrocodone     Meperidine     Meperidine hcl Nausea And Vomiting    Persantine [dipyridamole]     Vicodin [hydrocodone-acetaminophen] Nausea And Vomiting    Nitrofurantoin macrocrystal      Headache , went into Afib      Past Medical History:   Diagnosis Date    Atrial fibrillation 01/25/2021    Hypertension      Past Surgical History:   Procedure Laterality Date    ANGIOGRAM, CORONARY, WITH LEFT HEART CATHETERIZATION Left 4/19/2023    Procedure: Angiogram, Coronary, with Left Heart Cath;  Surgeon: Kevin Redmond MD;  Location: [facility] CATH/EP LAB;  Service: Cardiology;  Laterality: Left;    BREAST SURGERY      COLONOSCOPY N/A 4/16/2023    Procedure: COLONOSCOPY;  Surgeon: Kvng Mensah MD;  Location: [facility] ENDO;  Service: Endoscopy;  Laterality: N/A;    COLONOSCOPY W/ POLYPECTOMY  04/16/2023     Family History   Problem Relation Age of Onset    Cancer Mother     Cancer Father      Social History     Tobacco Use    Smoking status: Former     Types: Cigarettes    Smokeless tobacco: Never   Substance Use Topics    Alcohol  use: Not Currently    Drug use: Not Currently     Review of Systems   Constitutional:  Negative for chills and fever.   HENT:  Negative for congestion.    Gastrointestinal:  Positive for abdominal pain, nausea and vomiting. Negative for blood in stool and diarrhea.   Genitourinary:  Positive for dysuria and frequency. Negative for hematuria.   Musculoskeletal:  Negative for back pain.   Skin:  Negative for rash.   All other systems reviewed and are negative.    Physical Exam     Initial Vitals   BP Pulse Resp Temp SpO2   05/08/23 2353 05/08/23 2353 05/08/23 2353 05/08/23 2353 05/08/23 2357   (!) 175/84 78 20 97.9 °F (36.6 °C) (!) 94 %      MAP       --                Physical Exam    Constitutional: She appears well-developed and well-nourished. No distress.   HENT:   Head: Normocephalic and atraumatic.   Right Ear: External ear normal.   Left Ear: External ear normal.   Mouth/Throat: Oropharynx is clear and moist.   Eyes: Conjunctivae and EOM are normal. Pupils are equal, round, and reactive to light.   Neck: Neck supple.   Normal range of motion.  Cardiovascular:  Normal rate, regular rhythm, normal heart sounds and intact distal pulses.           Pulmonary/Chest: Breath sounds normal. No respiratory distress.   Abdominal: Abdomen is soft. Bowel sounds are normal. There is generalized abdominal tenderness.   Musculoskeletal:         General: No edema. Normal range of motion.      Cervical back: Normal range of motion and neck supple.     Neurological: She is alert and oriented to person, place, and time. She has normal strength. GCS score is 15. GCS eye subscore is 4. GCS verbal subscore is 5. GCS motor subscore is 6.   Skin: Skin is warm and dry. Capillary refill takes less than 2 seconds. No rash noted.   Psychiatric: She has a normal mood and affect. Her behavior is normal.       ED Course   Procedures  Labs Reviewed   CBC W/ AUTO DIFFERENTIAL - Abnormal; Notable for the following components:       Result Value     RBC 3.52 (*)     Hemoglobin 10.9 (*)     Hematocrit 32.9 (*)     Platelets 135 (*)     Gran # (ANC) 8.0 (*)     Immature Grans (Abs) 0.05 (*)     Lymph # 0.7 (*)     Gran % 84.1 (*)     Lymph % 7.4 (*)     All other components within normal limits   COMPREHENSIVE METABOLIC PANEL - Abnormal; Notable for the following components:    eGFR 50.2 (*)     All other components within normal limits   URINALYSIS, REFLEX TO URINE CULTURE - Abnormal; Notable for the following components:    Appearance, UA Hazy (*)     Protein, UA Trace (*)     Occult Blood UA 3+ (*)     Nitrite, UA Positive (*)     Leukocytes, UA 1+ (*)     All other components within normal limits    Narrative:     Specimen Source->Urine   URINALYSIS MICROSCOPIC - Abnormal; Notable for the following components:    RBC, UA >100 (*)     WBC, UA 37 (*)     Bacteria Many (*)     Hyaline Casts, UA 10 (*)     All other components within normal limits    Narrative:     Specimen Source->Urine   CULTURE, URINE   LIPASE          Imaging Results              CT Abdomen Pelvis With Contrast (Final result)  Result time 05/09/23 01:52:33      Final result by Puneet Theodore MD (05/09/23 01:52:33)                   Narrative:    EXAM DESCRIPTION: CT ABDOMEN PELVIS WITH CONTRAST 5/9/2023 1:49 AM CDT    CLINICAL HISTORY: 82 years, Female, Abdominal abscess/infection suspected    COMPARISON: 4/10/2023    PROCEDURE:  Multiple transaxial tomograms of the abdomen and pelvis were performed from the lung bases to the symphysis pubis utilizing 2 mm slice thickness at 2 mm interval reconstruction, without administration of IV and oral contrast.  Multiplanar reformats in the sagittal and coronal plane were generated and reviewed.  This exam was performed according to our departmental dose-optimization protocol, which includes automated exposure control, adjustment of the mA and/or kV according to patient size and/or use of iterative reconstruction technique.    FINDINGS:  The lack  of IV and oral contrast limits evaluation of solid organs, subtle lesions cannot be excluded.    The lung bases demonstrate small trace bilateral pleural effusions with very minimal compressive atelectatic changes. Mitral annular calcification and aortic valvular calcification.    Grossly the unopacified liver, pancreas, spleen and adrenal glands demonstrate to be within normal limits, no significant focal lesions were identified.  The gallbladder demonstrate layering high density material corresponding to cholelithiasis.    The kidneys demonstrate grossly unremarkable.  There is excretion of contrast media related to most likely previous iodine study. No focal masses were demonstrated.  Grossly the unopacified stomach, small bowel and large bowel demonstrate to be within normal limits. There is no evidence for bowel dilatation/or free air. Mildly prominent small bowel loops although no definitive signs of ileus/or obstruction could be seen. There is minimal diverticulosis within the sigmoid colon  The urinary bladder demonstrate minimal accumulation of contrast media. The uterus is absent. There are no adnexal masses The aorta demonstrate atherosclerotic disease extending into the aortic bifurcation.  There is no retroperitoneal lymphadenopathy.  There is no evidence for ascites. The bone windows demonstrate mild bony osteopenia with no significant skeletal lesions.    IMPRESSION:  Mildly prominent small bowel loops although no definitive signs of ileus/or obstruction could be seen.    Cholelithiasis.    Status post hysterectomy.    Small trace bilateral pleural effusions with very minimal compressive atelectatic changes.    Electronically signed by:  Puneet Theodore MD  5/9/2023 1:52 AM CDT Workstation: [id number]                                     Medications   cefTRIAXone (ROCEPHIN) 1 g in dextrose 5 % 100 mL IVPB (ready to mix) (1 g Intravenous New Bag 5/9/23 0431)   ondansetron injection 4 mg (4 mg Intravenous  Given 5/9/23 0024)   0.9%  NaCl infusion (0 mLs Intravenous Stopped 5/9/23 0431)   morphine injection 2 mg (2 mg Intravenous Given 5/9/23 0024)   iohexoL (OMNIPAQUE 350) injection 100 mL (100 mLs Intravenous Given 5/9/23 0127)     Medical Decision Making:   Differential Diagnosis:   Colitis, urinary tract infection, obstruction  Clinical Tests:   Lab Tests: Ordered and Reviewed  Radiological Study: Ordered and Reviewed  ED Management:  Laboratory evaluation reviewed urinalysis is positive for bacteria CT scan shows no acute intra-abdominal abnormalities patient has received a dose of Rocephin in the emergency department will treat as an outpatient with Ceftin                        Clinical Impression:   Final diagnoses:  [R10.9] Abdominal pain  [N30.00] Acute cystitis without hematuria (Primary)        ED Disposition Condition    Discharge Stable          ED Prescriptions    None       Follow-up Information    None          James Seaman MD  05/09/23 [id number]

## 2023-05-11 LAB — BACTERIA UR CULT: ABNORMAL

## 2023-05-15 ENCOUNTER — OFFICE VISIT (OUTPATIENT)
Dept: FAMILY MEDICINE | Facility: CLINIC | Age: 83
End: 2023-05-15
Payer: MEDICARE

## 2023-05-15 DIAGNOSIS — N30.00 ACUTE CYSTITIS WITHOUT HEMATURIA: Primary | ICD-10-CM

## 2023-05-15 DIAGNOSIS — R10.10 PAIN OF UPPER ABDOMEN: ICD-10-CM

## 2023-05-15 PROCEDURE — 3078F PR MOST RECENT DIASTOLIC BLOOD PRESSURE < 80 MM HG: ICD-10-PCS | Mod: CPTII,,, | Performed by: NURSE PRACTITIONER

## 2023-05-15 PROCEDURE — 1159F PR MEDICATION LIST DOCUMENTED IN MEDICAL RECORD: ICD-10-PCS | Mod: CPTII,,, | Performed by: NURSE PRACTITIONER

## 2023-05-15 PROCEDURE — 1159F MED LIST DOCD IN RCRD: CPT | Mod: CPTII,,, | Performed by: NURSE PRACTITIONER

## 2023-05-15 PROCEDURE — 3078F DIAST BP <80 MM HG: CPT | Mod: CPTII,,, | Performed by: NURSE PRACTITIONER

## 2023-05-15 PROCEDURE — 99215 OFFICE O/P EST HI 40 MIN: CPT | Performed by: NURSE PRACTITIONER

## 2023-05-15 PROCEDURE — 1111F DSCHRG MED/CURRENT MED MERGE: CPT | Mod: CPTII,,, | Performed by: NURSE PRACTITIONER

## 2023-05-15 PROCEDURE — 1101F PT FALLS ASSESS-DOCD LE1/YR: CPT | Mod: CPTII,,, | Performed by: NURSE PRACTITIONER

## 2023-05-15 PROCEDURE — 1126F AMNT PAIN NOTED NONE PRSNT: CPT | Mod: CPTII,,, | Performed by: NURSE PRACTITIONER

## 2023-05-15 PROCEDURE — 1111F PR DISCHARGE MEDS RECONCILED W/ CURRENT OUTPATIENT MED LIST: ICD-10-PCS | Mod: CPTII,,, | Performed by: NURSE PRACTITIONER

## 2023-05-15 PROCEDURE — 3077F PR MOST RECENT SYSTOLIC BLOOD PRESSURE >= 140 MM HG: ICD-10-PCS | Mod: CPTII,,, | Performed by: NURSE PRACTITIONER

## 2023-05-15 PROCEDURE — 3288F FALL RISK ASSESSMENT DOCD: CPT | Mod: CPTII,,, | Performed by: NURSE PRACTITIONER

## 2023-05-15 PROCEDURE — 99213 PR OFFICE/OUTPT VISIT, EST, LEVL III, 20-29 MIN: ICD-10-PCS | Mod: ,,, | Performed by: NURSE PRACTITIONER

## 2023-05-15 PROCEDURE — 3288F PR FALLS RISK ASSESSMENT DOCUMENTED: ICD-10-PCS | Mod: CPTII,,, | Performed by: NURSE PRACTITIONER

## 2023-05-15 PROCEDURE — 1126F PR PAIN SEVERITY QUANTIFIED, NO PAIN PRESENT: ICD-10-PCS | Mod: CPTII,,, | Performed by: NURSE PRACTITIONER

## 2023-05-15 PROCEDURE — 99213 OFFICE O/P EST LOW 20 MIN: CPT | Mod: ,,, | Performed by: NURSE PRACTITIONER

## 2023-05-15 PROCEDURE — 1101F PR PT FALLS ASSESS DOC 0-1 FALLS W/OUT INJ PAST YR: ICD-10-PCS | Mod: CPTII,,, | Performed by: NURSE PRACTITIONER

## 2023-05-15 PROCEDURE — 3077F SYST BP >= 140 MM HG: CPT | Mod: CPTII,,, | Performed by: NURSE PRACTITIONER

## 2023-05-15 NOTE — PROGRESS NOTES
Patient ID: Irene العراقي is a 82 y.o. female.    Chief Complaint: Follow-up (81 yo female here for recheck afib. Pt states she was seen at ED times 1 week ago and dx with UTI. Pt states she is experiencing nausea with upper abdomen pain after taking new antibiotic Ceftin. Pt states she only has 1 day day of completion. Km)    Ms. Casas presents today for follow up after presenting to Ed for abdominal pain and she was found to have UTI. She has taken all medication and she feels that her symptoms have resolved. She also states she has been having abdominal pain intermittently. She has had the pain in the past and she would like referral to GI for further evaluation. She has had follow up with Cardiology and her medication has been adjusted. She denies any other issues or complaints.    Abdominal Pain  This is a recurrent problem. The current episode started more than 1 month ago. The onset quality is undetermined. The problem occurs daily. The problem has been waxing and waning. The pain is located in the generalized abdominal region. The pain is at a severity of 5/10. The pain is moderate. The quality of the pain is aching. The abdominal pain does not radiate. Associated symptoms include arthralgias, dysuria, flatus and nausea. Nothing aggravates the pain. The pain is relieved by Nothing. She has tried nothing for the symptoms. The treatment provided no relief. Prior diagnostic workup includes GI consult.         Past Medical History:   Diagnosis Date    Atrial fibrillation 01/25/2021    Hypertension      Past Surgical History:   Procedure Laterality Date    ANGIOGRAM, CORONARY, WITH LEFT HEART CATHETERIZATION Left 4/19/2023    Procedure: Angiogram, Coronary, with Left Heart Cath;  Surgeon: Kevin Redmond MD;  Location: [facility] CATH/EP LAB;  Service: Cardiology;  Laterality: Left;    BREAST SURGERY      COLONOSCOPY N/A 4/16/2023    Procedure: COLONOSCOPY;  Surgeon: Kvng Mensah MD;  Location: [facility] ENDO;   Service: Endoscopy;  Laterality: N/A;    COLONOSCOPY W/ POLYPECTOMY  04/16/2023         Tobacco History:  reports that she has quit smoking. Her smoking use included cigarettes. She has never used smokeless tobacco.      Review of patient's allergies indicates:   Allergen Reactions    Hydrocodone     Meperidine     Meperidine hcl Nausea And Vomiting    Persantine [dipyridamole]     Vicodin [hydrocodone-acetaminophen] Nausea And Vomiting    Nitrofurantoin macrocrystal      Headache , went into Afib        Current Outpatient Medications:     acetaminophen (TYLENOL) 500 MG tablet, Take 500 mg by mouth nightly as needed for Pain., Disp: , Rfl:     cefUROXime (CEFTIN) 500 MG tablet, Take 1 tablet (500 mg total) by mouth every 12 (twelve) hours., Disp: 14 tablet, Rfl: 0    ELIQUIS 5 mg Tab, TAKE 1 TABLET BY MOUTH TWICE A DAY, Disp: 180 tablet, Rfl: 3    magnesium oxide (MAG-OX) 400 mg (241.3 mg magnesium) tablet, TAKE 1 TABLET BY MOUTH ONCE DAILY (Patient taking differently: Take 400 mg by mouth once daily.), Disp: 90 tablet, Rfl: 3    pantoprazole (PROTONIX) 40 MG tablet, Take 1 tablet (40 mg total) by mouth once daily. (Patient taking differently: Take 40 mg by mouth once daily. Take tab po TID. KM), Disp: 30 tablet, Rfl: 11    sotaloL (BETAPACE) 80 MG tablet, Take 0.5 tablets (40 mg total) by mouth 3 (three) times daily., Disp: 90 tablet, Rfl: 0    spironolactone (ALDACTONE) 25 MG tablet, TAKE 1 TABLET BY MOUTH EVERY DAY (Patient taking differently: Take 1 tab po every other day. KM), Disp: 90 tablet, Rfl: 3    polyethylene glycol (GLYCOLAX) 17 gram PwPk, Take 17 g by mouth 2 (two) times daily., Disp: 60 each, Rfl: 0    Review of Systems   Constitutional:  Positive for appetite change and fatigue.   Gastrointestinal:  Positive for abdominal pain, flatus and nausea.   Genitourinary:  Positive for dysuria.   Musculoskeletal:  Positive for arthralgias and gait problem.   Psychiatric/Behavioral:  The patient is  "nervous/anxious.         Objective:      Vitals:    05/15/23 1343   BP: (!) 154/72   Pulse: 92   Resp: 18   Temp: 97.9 °F (36.6 °C)   TempSrc: Oral   Weight: 80.8 kg (178 lb 1.6 oz)   Height: 5' 6" (1.676 m)     Physical Exam  Constitutional:       Appearance: She is obese.   Cardiovascular:      Rate and Rhythm: Rhythm irregular.   Abdominal:      Palpations: Abdomen is soft.      Tenderness: There is abdominal tenderness.       Neurological:      Mental Status: She is oriented to person, place, and time.         Assessment:       1. Acute cystitis without hematuria    2. Pain of upper abdomen           Plan:       Acute cystitis without hematuria  -     Urinalysis, Reflex to Urine Culture Urine, Clean Catch; Future; Expected date: 05/15/2023    Pain of upper abdomen  -     Ambulatory referral/consult to Gastroenterology; Future; Expected date: 05/22/2023      Follow up in about 6 months (around 11/15/2023), or if symptoms worsen or fail to improve.        5/17/2023 Wanda Kuhn NP      "

## 2023-05-17 ENCOUNTER — LAB VISIT (OUTPATIENT)
Dept: LAB | Facility: HOSPITAL | Age: 83
End: 2023-05-17
Attending: NURSE PRACTITIONER
Payer: MEDICARE

## 2023-05-17 VITALS
DIASTOLIC BLOOD PRESSURE: 70 MMHG | HEART RATE: 92 BPM | HEIGHT: 66 IN | BODY MASS INDEX: 28.63 KG/M2 | SYSTOLIC BLOOD PRESSURE: 144 MMHG | RESPIRATION RATE: 18 BRPM | WEIGHT: 178.13 LBS | TEMPERATURE: 98 F

## 2023-05-17 DIAGNOSIS — N30.00 ACUTE CYSTITIS WITHOUT HEMATURIA: ICD-10-CM

## 2023-05-17 LAB
BACTERIA #/AREA URNS HPF: NEGATIVE /HPF
BILIRUB UR QL STRIP: NEGATIVE
CLARITY UR: CLEAR
COLOR UR: YELLOW
GLUCOSE UR QL STRIP: NEGATIVE
HGB UR QL STRIP: ABNORMAL
HYALINE CASTS #/AREA URNS LPF: 12 /LPF
KETONES UR QL STRIP: NEGATIVE
LEUKOCYTE ESTERASE UR QL STRIP: ABNORMAL
MICROSCOPIC COMMENT: ABNORMAL
NITRITE UR QL STRIP: NEGATIVE
PH UR STRIP: 7 [PH] (ref 5–8)
PROT UR QL STRIP: NEGATIVE
RBC #/AREA URNS HPF: 88 /HPF (ref 0–4)
SP GR UR STRIP: 1 (ref 1–1.03)
SQUAMOUS #/AREA URNS HPF: 2 /HPF
URN SPEC COLLECT METH UR: ABNORMAL
UROBILINOGEN UR STRIP-ACNC: NEGATIVE EU/DL
WBC #/AREA URNS HPF: 2 /HPF (ref 0–5)

## 2023-05-17 PROCEDURE — 81001 URINALYSIS AUTO W/SCOPE: CPT | Performed by: NURSE PRACTITIONER

## 2023-05-19 LAB
EKG 12-LEAD: ABNORMAL
PR INTERVAL: ABNORMAL
PRT AXES: ABNORMAL
QRS DURATION: ABNORMAL
QT/QTC: ABNORMAL
VENTRICULAR RATE: ABNORMAL

## 2023-05-22 ENCOUNTER — TELEPHONE (OUTPATIENT)
Dept: FAMILY MEDICINE | Facility: CLINIC | Age: 83
End: 2023-05-22

## 2023-07-10 ENCOUNTER — LAB VISIT (OUTPATIENT)
Dept: LAB | Facility: HOSPITAL | Age: 83
End: 2023-07-10
Attending: INTERNAL MEDICINE
Payer: COMMERCIAL

## 2023-07-10 DIAGNOSIS — K57.30 DIVERTICULOSIS OF LARGE INTESTINE WITHOUT DIVERTICULITIS: Primary | ICD-10-CM

## 2023-07-10 DIAGNOSIS — T47.4X5A SOAP COLITIS: ICD-10-CM

## 2023-07-10 DIAGNOSIS — K52.89 SOAP COLITIS: ICD-10-CM

## 2023-07-10 DIAGNOSIS — K59.04 CHRONIC IDIOPATHIC CONSTIPATION: ICD-10-CM

## 2023-07-10 PROBLEM — K62.5 RECTAL BLEEDING: Status: RESOLVED | Noted: 2023-04-10 | Resolved: 2023-07-10

## 2023-07-10 LAB
BASOPHILS # BLD AUTO: 0.03 K/UL (ref 0–0.2)
BASOPHILS NFR BLD: 0.6 % (ref 0–1.9)
DIFFERENTIAL METHOD: ABNORMAL
EOSINOPHIL # BLD AUTO: 0.2 K/UL (ref 0–0.5)
EOSINOPHIL NFR BLD: 3.8 % (ref 0–8)
ERYTHROCYTE [DISTWIDTH] IN BLOOD BY AUTOMATED COUNT: 13.1 % (ref 11.5–14.5)
HCT VFR BLD AUTO: 32.4 % (ref 37–48.5)
HGB BLD-MCNC: 10.7 G/DL (ref 12–16)
IMM GRANULOCYTES # BLD AUTO: 0.01 K/UL (ref 0–0.04)
IMM GRANULOCYTES NFR BLD AUTO: 0.2 % (ref 0–0.5)
LYMPHOCYTES # BLD AUTO: 1.4 K/UL (ref 1–4.8)
LYMPHOCYTES NFR BLD: 27.9 % (ref 18–48)
MCH RBC QN AUTO: 31 PG (ref 27–31)
MCHC RBC AUTO-ENTMCNC: 33 G/DL (ref 32–36)
MCV RBC AUTO: 94 FL (ref 82–98)
MONOCYTES # BLD AUTO: 0.5 K/UL (ref 0.3–1)
MONOCYTES NFR BLD: 10.9 % (ref 4–15)
NEUTROPHILS # BLD AUTO: 2.8 K/UL (ref 1.8–7.7)
NEUTROPHILS NFR BLD: 56.6 % (ref 38–73)
NRBC BLD-RTO: 0 /100 WBC
PLATELET # BLD AUTO: 141 K/UL (ref 150–450)
PMV BLD AUTO: 10.6 FL (ref 9.2–12.9)
RBC # BLD AUTO: 3.45 M/UL (ref 4–5.4)
WBC # BLD AUTO: 4.94 K/UL (ref 3.9–12.7)

## 2023-07-10 PROCEDURE — 85025 COMPLETE CBC W/AUTO DIFF WBC: CPT | Performed by: INTERNAL MEDICINE

## 2023-07-10 PROCEDURE — 36415 COLL VENOUS BLD VENIPUNCTURE: CPT | Performed by: INTERNAL MEDICINE

## 2023-07-12 DIAGNOSIS — I48.0 PAROXYSMAL ATRIAL FIBRILLATION: ICD-10-CM

## 2023-07-12 RX ORDER — SOTALOL HYDROCHLORIDE 80 MG/1
40 TABLET ORAL 3 TIMES DAILY
Qty: 135 TABLET | Refills: 3 | Status: ON HOLD | OUTPATIENT
Start: 2023-07-12 | End: 2023-08-11 | Stop reason: HOSPADM

## 2023-07-27 ENCOUNTER — OFFICE VISIT (OUTPATIENT)
Dept: FAMILY MEDICINE | Facility: CLINIC | Age: 83
End: 2023-07-27
Payer: COMMERCIAL

## 2023-07-27 VITALS
SYSTOLIC BLOOD PRESSURE: 144 MMHG | HEIGHT: 66 IN | HEART RATE: 63 BPM | TEMPERATURE: 99 F | OXYGEN SATURATION: 99 % | WEIGHT: 169.88 LBS | DIASTOLIC BLOOD PRESSURE: 68 MMHG | BODY MASS INDEX: 27.3 KG/M2

## 2023-07-27 DIAGNOSIS — U07.1 COVID-19: Primary | ICD-10-CM

## 2023-07-27 DIAGNOSIS — R09.89 SYMPTOMS OF UPPER RESPIRATORY INFECTION (URI): ICD-10-CM

## 2023-07-27 DIAGNOSIS — U07.1 COVID-19 VIRUS DETECTED: ICD-10-CM

## 2023-07-27 LAB
CTP QC/QA: YES
SARS-COV-2 RDRP RESP QL NAA+PROBE: POSITIVE

## 2023-07-27 PROCEDURE — 99213 PR OFFICE/OUTPT VISIT, EST, LEVL III, 20-29 MIN: ICD-10-PCS | Mod: S$GLB,,, | Performed by: FAMILY MEDICINE

## 2023-07-27 PROCEDURE — 3077F PR MOST RECENT SYSTOLIC BLOOD PRESSURE >= 140 MM HG: ICD-10-PCS | Mod: CPTII,S$GLB,, | Performed by: FAMILY MEDICINE

## 2023-07-27 PROCEDURE — 3078F PR MOST RECENT DIASTOLIC BLOOD PRESSURE < 80 MM HG: ICD-10-PCS | Mod: CPTII,S$GLB,, | Performed by: FAMILY MEDICINE

## 2023-07-27 PROCEDURE — 1126F PR PAIN SEVERITY QUANTIFIED, NO PAIN PRESENT: ICD-10-PCS | Mod: CPTII,S$GLB,, | Performed by: FAMILY MEDICINE

## 2023-07-27 PROCEDURE — 1101F PT FALLS ASSESS-DOCD LE1/YR: CPT | Mod: CPTII,S$GLB,, | Performed by: FAMILY MEDICINE

## 2023-07-27 PROCEDURE — 3077F SYST BP >= 140 MM HG: CPT | Mod: CPTII,S$GLB,, | Performed by: FAMILY MEDICINE

## 2023-07-27 PROCEDURE — 1126F AMNT PAIN NOTED NONE PRSNT: CPT | Mod: CPTII,S$GLB,, | Performed by: FAMILY MEDICINE

## 2023-07-27 PROCEDURE — 1101F PR PT FALLS ASSESS DOC 0-1 FALLS W/OUT INJ PAST YR: ICD-10-PCS | Mod: CPTII,S$GLB,, | Performed by: FAMILY MEDICINE

## 2023-07-27 PROCEDURE — 3288F PR FALLS RISK ASSESSMENT DOCUMENTED: ICD-10-PCS | Mod: CPTII,S$GLB,, | Performed by: FAMILY MEDICINE

## 2023-07-27 PROCEDURE — 3288F FALL RISK ASSESSMENT DOCD: CPT | Mod: CPTII,S$GLB,, | Performed by: FAMILY MEDICINE

## 2023-07-27 PROCEDURE — 1159F MED LIST DOCD IN RCRD: CPT | Mod: CPTII,S$GLB,, | Performed by: FAMILY MEDICINE

## 2023-07-27 PROCEDURE — 99213 OFFICE O/P EST LOW 20 MIN: CPT | Mod: S$GLB,,, | Performed by: FAMILY MEDICINE

## 2023-07-27 PROCEDURE — 87635 SARS-COV-2 COVID-19 AMP PRB: CPT | Mod: QW,S$GLB,, | Performed by: FAMILY MEDICINE

## 2023-07-27 PROCEDURE — 87635: ICD-10-PCS | Mod: QW,S$GLB,, | Performed by: FAMILY MEDICINE

## 2023-07-27 PROCEDURE — 3078F DIAST BP <80 MM HG: CPT | Mod: CPTII,S$GLB,, | Performed by: FAMILY MEDICINE

## 2023-07-27 PROCEDURE — 1159F PR MEDICATION LIST DOCUMENTED IN MEDICAL RECORD: ICD-10-PCS | Mod: CPTII,S$GLB,, | Performed by: FAMILY MEDICINE

## 2023-07-27 RX ORDER — POLYETHYLENE GLYCOL 3350 17 G/17G
17 POWDER, FOR SOLUTION ORAL 2 TIMES DAILY
Status: ON HOLD | COMMUNITY
Start: 2023-06-27 | End: 2024-02-19 | Stop reason: HOSPADM

## 2023-07-27 NOTE — PROGRESS NOTES
SUBJECTIVE:   HPI:  Nasal Congestion, Fatigue, and Headache (Since monday)    HPI  Irene العراقي is a 82 y.o. female who comes in for acute visit. Accompanied by her niece.    She was in her usual state of health until Monday, when she began with sneezing, scratchy throat, runny nose, chills. Progressed to feeling extremely tired, lightheaded, headache. No cough, no chest congestion, no SOB. Feeling worse today. Able to eat, drink.  APAP has not helped.   Has sick contact in her niece.        Review of patient's allergies indicates:   Allergen Reactions    Hydrocodone     Meperidine     Meperidine hcl Nausea And Vomiting    Persantine [dipyridamole]     Vicodin [hydrocodone-acetaminophen] Nausea And Vomiting    Nitrofurantoin macrocrystal      Headache , went into Afib        Current Outpatient Medications on File Prior to Visit   Medication Sig Dispense Refill    acetaminophen (TYLENOL) 500 MG tablet Take 500 mg by mouth nightly as needed for Pain.      cefUROXime (CEFTIN) 500 MG tablet Take 1 tablet (500 mg total) by mouth every 12 (twelve) hours. 14 tablet 0    ELIQUIS 5 mg Tab TAKE 1 TABLET BY MOUTH TWICE A  tablet 3    magnesium oxide (MAG-OX) 400 mg (241.3 mg magnesium) tablet TAKE 1 TABLET BY MOUTH ONCE DAILY (Patient taking differently: Take 400 mg by mouth once daily.) 90 tablet 3    MIRALAX 17 gram PwPk Take 17 g by mouth 2 (two) times daily.      sotaloL (BETAPACE) 80 MG tablet Take 0.5 tablets (40 mg total) by mouth 3 (three) times daily. 135 tablet 3    spironolactone (ALDACTONE) 25 MG tablet TAKE 1 TABLET BY MOUTH EVERY DAY 90 tablet 3    pantoprazole (PROTONIX) 40 MG tablet Take 1 tablet (40 mg total) by mouth once daily. (Patient not taking: Reported on 7/27/2023) 30 tablet 11     No current facility-administered medications on file prior to visit.       Past Medical History:   Diagnosis Date    Atrial fibrillation 01/25/2021    Hypertension      Past Surgical History:   Procedure Laterality  "Date    ANGIOGRAM, CORONARY, WITH LEFT HEART CATHETERIZATION Left 4/19/2023    Procedure: Angiogram, Coronary, with Left Heart Cath;  Surgeon: Kevin Redmond MD;  Location: [facility] CATH/EP LAB;  Service: Cardiology;  Laterality: Left;    BREAST SURGERY      COLONOSCOPY N/A 4/16/2023    Procedure: COLONOSCOPY;  Surgeon: Kvng Mensah MD;  Location: [facility] ENDO;  Service: Endoscopy;  Laterality: N/A;    COLONOSCOPY W/ POLYPECTOMY  04/16/2023     Family History   Problem Relation Age of Onset    Cancer Mother     Cancer Father        Review of Systems  As in HPI         OBJECTIVE:      Vitals:    07/27/23 1407   BP: (!) 144/68   BP Location: Right arm   Patient Position: Sitting   BP Method: Large (Automatic)   Pulse: 63   Temp: 98.8 °F (37.1 °C)   SpO2: 99%   Weight: 77.1 kg (169 lb 14.4 oz)   Height: 5' 6" (1.676 m)     Physical Exam  Vitals reviewed.   Constitutional:       Appearance: She is ill-appearing. She is not toxic-appearing or diaphoretic.   HENT:      Right Ear: Tympanic membrane, ear canal and external ear normal.      Left Ear: Tympanic membrane, ear canal and external ear normal.      Nose: Congestion and rhinorrhea present.      Mouth/Throat:      Pharynx: No oropharyngeal exudate.   Eyes:      Conjunctiva/sclera: Conjunctivae normal.   Pulmonary:      Effort: Pulmonary effort is normal.      Breath sounds: Normal breath sounds.   Skin:     General: Skin is warm and dry.   Neurological:      Mental Status: She is alert and oriented to person, place, and time.   Psychiatric:         Behavior: Behavior normal.      POC Covid: POSITIVE      Assessment:       ICD-10-CM ICD-9-CM    1. COVID-19  U07.1 079.89 molnupiravir 200 mg capsule (EUA)      2. Symptoms of upper respiratory infection (URI)  R09.89 786.09 POCT COVID-19 Rapid Screening           Plan:   COVID-19  -     molnupiravir 200 mg capsule (EUA); Take 4 capsules (800 mg total) by mouth every 12 (twelve) hours. for 5 days  Dispense: 40 capsule; " Refill: 0    Symptoms of upper respiratory infection (URI)  -     POCT COVID-19 Rapid Screening      Instructions were discussed with patient, including self quarantine at home, avoiding contact with other people and pets in the home, wearing a mask over nose and mouth if patient must be around others. Wash hands often and have family member clean high touch surfaces often in the home. Patient may use OTC medications prn symptom control. If extreme lethargy, trouble breathing, uncontrolled fever, or confusion develops, present to ER for further treatment. Molnupiravir prescribed.    No follow-ups on file.      7/29/2023 Destiny Moulton M.D.

## 2023-07-27 NOTE — PATIENT INSTRUCTIONS
You may use the following over-the-counter medications as necessary for symptom control: Acetaminophen (Tylenol) for fever, Mucinex DM or similar for cough, antihistamine or decongestant for nasal symptoms. Get plenty of rest and fluids to maintain hydration. Use honey and gargle with warm salt water if sore throat is present. If you develop extreme lethargy, trouble breathing, uncontrolled fevers, or confusion, present to ER for further treatment.

## 2023-08-01 ENCOUNTER — TELEPHONE (OUTPATIENT)
Dept: FAMILY MEDICINE | Facility: CLINIC | Age: 83
End: 2023-08-01

## 2023-08-01 VITALS — DIASTOLIC BLOOD PRESSURE: 83 MMHG | SYSTOLIC BLOOD PRESSURE: 179 MMHG

## 2023-08-01 NOTE — TELEPHONE ENCOUNTER
Spoke with pt regard out of range B/P at previous appt. Pt states she is recovering well from positive Covid. Pt took home B/P with reading of 179/83 right arm sitting. Pt states she is getting better but under a lot stress due to a family member being in the hospital. Pt was very emotional and states she us scheduled sometime in August to see her Cardiologist and refused to schedule a in clinic appt. I adv pt to keep a home diary of her B/P readings in the morning and evening for a week and contact clinic for documentation and review/consult. Pt willing and aware of information given. Pt reassured of instructions given and adv to take meds as prescribed. Pt also instructed to contact in clinic and any further question or concerns and go the nearest ED if she develop and abnormal symptoms. GUERITA

## 2023-08-01 NOTE — TELEPHONE ENCOUNTER
Called pt at 3:06 regard last office visit B/P reading being out of range. NO answer, LVM to contact clinic. KM

## 2023-08-07 ENCOUNTER — TELEPHONE (OUTPATIENT)
Dept: CARDIOLOGY | Facility: CLINIC | Age: 83
End: 2023-08-07
Payer: MEDICARE

## 2023-08-08 ENCOUNTER — TELEPHONE (OUTPATIENT)
Dept: FAMILY MEDICINE | Facility: CLINIC | Age: 83
End: 2023-08-08

## 2023-08-08 VITALS — DIASTOLIC BLOOD PRESSURE: 82 MMHG | SYSTOLIC BLOOD PRESSURE: 144 MMHG

## 2023-08-08 NOTE — TELEPHONE ENCOUNTER
Pt called to give verbal home b/p readings collected at home. Pt states she is feeling well and gaining her strength back due to positive Covid times 2 weeks ago. Verabl B/P readings documented:    08/03/2023: 140/80  08/04/2023: 130/80  08/08/2023: 144/82    Pt states she is scheduled to see Cardio on 08/18/2023 and keep clinic UDT with any changes or med change after seeing cardio. Pt adv she having transportation issues and can not scheduled a in clinic appt but will notify clinic with any further changes or abnormal symptoms. Pt instructed to continue meds as prescribed and if she develop any abnormal signs or symptoms, Go to the nearest ED to be eval/tx and f/u with clinic. Pt aware of information given and satisfied. KM

## 2023-08-09 ENCOUNTER — HOSPITAL ENCOUNTER (INPATIENT)
Facility: HOSPITAL | Age: 83
LOS: 2 days | Discharge: HOME | DRG: 309 | End: 2023-08-11
Attending: EMERGENCY MEDICINE | Admitting: INTERNAL MEDICINE
Payer: MEDICARE

## 2023-08-09 DIAGNOSIS — I48.91 ATRIAL FIBRILLATION WITH RVR: Primary | ICD-10-CM

## 2023-08-09 DIAGNOSIS — R07.9 CHEST PAIN: ICD-10-CM

## 2023-08-09 DIAGNOSIS — R55 PRE-SYNCOPE: ICD-10-CM

## 2023-08-09 LAB
ALBUMIN SERPL BCP-MCNC: 3.6 G/DL (ref 3.5–5.2)
ALP SERPL-CCNC: 101 U/L (ref 55–135)
ALT SERPL W/O P-5'-P-CCNC: 15 U/L (ref 10–44)
ANION GAP SERPL CALC-SCNC: 10 MMOL/L (ref 8–16)
APTT PPP: 26.5 SEC (ref 21–32)
AST SERPL-CCNC: 20 U/L (ref 10–40)
BASOPHILS # BLD AUTO: 0.02 K/UL (ref 0–0.2)
BASOPHILS NFR BLD: 0.3 % (ref 0–1.9)
BILIRUB SERPL-MCNC: 0.7 MG/DL (ref 0.1–1)
BNP SERPL-MCNC: 1492 PG/ML (ref 0–99)
BUN SERPL-MCNC: 29 MG/DL (ref 8–23)
CALCIUM SERPL-MCNC: 9.3 MG/DL (ref 8.7–10.5)
CHLORIDE SERPL-SCNC: 108 MMOL/L (ref 95–110)
CO2 SERPL-SCNC: 21 MMOL/L (ref 23–29)
CREAT SERPL-MCNC: 1.4 MG/DL (ref 0.5–1.4)
D DIMER PPP IA.FEU-MCNC: 0.4 MG/L FEU
DIFFERENTIAL METHOD: ABNORMAL
EOSINOPHIL # BLD AUTO: 0.1 K/UL (ref 0–0.5)
EOSINOPHIL NFR BLD: 1.8 % (ref 0–8)
ERYTHROCYTE [DISTWIDTH] IN BLOOD BY AUTOMATED COUNT: 12.8 % (ref 11.5–14.5)
EST. GFR  (NO RACE VARIABLE): 37.6 ML/MIN/1.73 M^2
GLUCOSE SERPL-MCNC: 97 MG/DL (ref 70–110)
HCT VFR BLD AUTO: 31 % (ref 37–48.5)
HGB BLD-MCNC: 10.4 G/DL (ref 12–16)
IMM GRANULOCYTES # BLD AUTO: 0.02 K/UL (ref 0–0.04)
IMM GRANULOCYTES NFR BLD AUTO: 0.3 % (ref 0–0.5)
INR PPP: 1 (ref 0.8–1.2)
LYMPHOCYTES # BLD AUTO: 1.4 K/UL (ref 1–4.8)
LYMPHOCYTES NFR BLD: 22.7 % (ref 18–48)
MAGNESIUM SERPL-MCNC: 1.9 MG/DL (ref 1.6–2.6)
MCH RBC QN AUTO: 30.3 PG (ref 27–31)
MCHC RBC AUTO-ENTMCNC: 33.5 G/DL (ref 32–36)
MCV RBC AUTO: 90 FL (ref 82–98)
MONOCYTES # BLD AUTO: 0.6 K/UL (ref 0.3–1)
MONOCYTES NFR BLD: 10.3 % (ref 4–15)
NEUTROPHILS # BLD AUTO: 4 K/UL (ref 1.8–7.7)
NEUTROPHILS NFR BLD: 64.6 % (ref 38–73)
NRBC BLD-RTO: 0 /100 WBC
PLATELET # BLD AUTO: 143 K/UL (ref 150–450)
PMV BLD AUTO: 10.9 FL (ref 9.2–12.9)
POTASSIUM SERPL-SCNC: 4.1 MMOL/L (ref 3.5–5.1)
PROCALCITONIN SERPL IA-MCNC: <0.05 NG/ML (ref 0–0.5)
PROT SERPL-MCNC: 6.4 G/DL (ref 6–8.4)
PROTHROMBIN TIME: 11.2 SEC (ref 9–12.5)
RBC # BLD AUTO: 3.43 M/UL (ref 4–5.4)
SODIUM SERPL-SCNC: 139 MMOL/L (ref 136–145)
TROPONIN I SERPL HS-MCNC: 25.4 PG/ML (ref 0–14.9)
TROPONIN I SERPL HS-MCNC: 29.2 PG/ML (ref 0–14.9)
TSH SERPL DL<=0.005 MIU/L-ACNC: 2.94 UIU/ML (ref 0.34–5.6)
WBC # BLD AUTO: 6.12 K/UL (ref 3.9–12.7)

## 2023-08-09 PROCEDURE — 96361 HYDRATE IV INFUSION ADD-ON: CPT

## 2023-08-09 PROCEDURE — 25000003 PHARM REV CODE 250: Performed by: NURSE PRACTITIONER

## 2023-08-09 PROCEDURE — 21400001 HC TELEMETRY ROOM

## 2023-08-09 PROCEDURE — 85025 COMPLETE CBC W/AUTO DIFF WBC: CPT | Performed by: EMERGENCY MEDICINE

## 2023-08-09 PROCEDURE — 85610 PROTHROMBIN TIME: CPT | Performed by: EMERGENCY MEDICINE

## 2023-08-09 PROCEDURE — 99223 PR INITIAL HOSPITAL CARE,LEVL III: ICD-10-PCS | Mod: ,,, | Performed by: INTERNAL MEDICINE

## 2023-08-09 PROCEDURE — 96374 THER/PROPH/DIAG INJ IV PUSH: CPT

## 2023-08-09 PROCEDURE — 25000003 PHARM REV CODE 250: Performed by: INTERNAL MEDICINE

## 2023-08-09 PROCEDURE — 93010 ELECTROCARDIOGRAM REPORT: CPT | Mod: ,,, | Performed by: INTERNAL MEDICINE

## 2023-08-09 PROCEDURE — 83880 ASSAY OF NATRIURETIC PEPTIDE: CPT | Performed by: EMERGENCY MEDICINE

## 2023-08-09 PROCEDURE — 25000003 PHARM REV CODE 250: Performed by: EMERGENCY MEDICINE

## 2023-08-09 PROCEDURE — 99223 1ST HOSP IP/OBS HIGH 75: CPT | Mod: ,,, | Performed by: INTERNAL MEDICINE

## 2023-08-09 PROCEDURE — 93010 EKG 12-LEAD: ICD-10-PCS | Mod: ,,, | Performed by: INTERNAL MEDICINE

## 2023-08-09 PROCEDURE — 84484 ASSAY OF TROPONIN QUANT: CPT | Mod: 91 | Performed by: EMERGENCY MEDICINE

## 2023-08-09 PROCEDURE — 83735 ASSAY OF MAGNESIUM: CPT | Performed by: EMERGENCY MEDICINE

## 2023-08-09 PROCEDURE — 93005 ELECTROCARDIOGRAM TRACING: CPT | Performed by: INTERNAL MEDICINE

## 2023-08-09 PROCEDURE — 85730 THROMBOPLASTIN TIME PARTIAL: CPT | Performed by: EMERGENCY MEDICINE

## 2023-08-09 PROCEDURE — 84443 ASSAY THYROID STIM HORMONE: CPT | Performed by: INTERNAL MEDICINE

## 2023-08-09 PROCEDURE — 80053 COMPREHEN METABOLIC PANEL: CPT | Performed by: EMERGENCY MEDICINE

## 2023-08-09 PROCEDURE — 85379 FIBRIN DEGRADATION QUANT: CPT | Performed by: INTERNAL MEDICINE

## 2023-08-09 PROCEDURE — 99285 EMERGENCY DEPT VISIT HI MDM: CPT | Mod: 25

## 2023-08-09 PROCEDURE — 84145 PROCALCITONIN (PCT): CPT | Performed by: INTERNAL MEDICINE

## 2023-08-09 RX ORDER — DILTIAZEM HYDROCHLORIDE 5 MG/ML
10 INJECTION INTRAVENOUS
Status: DISCONTINUED | OUTPATIENT
Start: 2023-08-09 | End: 2023-08-09

## 2023-08-09 RX ORDER — DILTIAZEM HYDROCHLORIDE 5 MG/ML
10 INJECTION INTRAVENOUS
Status: COMPLETED | OUTPATIENT
Start: 2023-08-09 | End: 2023-08-09

## 2023-08-09 RX ORDER — SPIRONOLACTONE 25 MG/1
25 TABLET ORAL DAILY
Status: DISCONTINUED | OUTPATIENT
Start: 2023-08-09 | End: 2023-08-10

## 2023-08-09 RX ORDER — AMIODARONE HYDROCHLORIDE 200 MG/1
200 TABLET ORAL 3 TIMES DAILY
Status: DISCONTINUED | OUTPATIENT
Start: 2023-08-09 | End: 2023-08-11 | Stop reason: HOSPADM

## 2023-08-09 RX ORDER — DILTIAZEM HYDROCHLORIDE 30 MG/1
30 TABLET, FILM COATED ORAL EVERY 6 HOURS
Status: DISCONTINUED | OUTPATIENT
Start: 2023-08-09 | End: 2023-08-09

## 2023-08-09 RX ORDER — MUPIROCIN 20 MG/G
OINTMENT TOPICAL 2 TIMES DAILY
Status: DISCONTINUED | OUTPATIENT
Start: 2023-08-09 | End: 2023-08-11 | Stop reason: HOSPADM

## 2023-08-09 RX ORDER — TALC
6 POWDER (GRAM) TOPICAL NIGHTLY PRN
Status: DISCONTINUED | OUTPATIENT
Start: 2023-08-09 | End: 2023-08-11 | Stop reason: HOSPADM

## 2023-08-09 RX ORDER — SODIUM CHLORIDE 0.9 % (FLUSH) 0.9 %
10 SYRINGE (ML) INJECTION
Status: DISCONTINUED | OUTPATIENT
Start: 2023-08-09 | End: 2023-08-11 | Stop reason: HOSPADM

## 2023-08-09 RX ORDER — ACETAMINOPHEN 500 MG
500 TABLET ORAL NIGHTLY PRN
Status: DISCONTINUED | OUTPATIENT
Start: 2023-08-09 | End: 2023-08-11 | Stop reason: HOSPADM

## 2023-08-09 RX ORDER — SOTALOL HYDROCHLORIDE 80 MG/1
40 TABLET ORAL DAILY
Status: DISCONTINUED | OUTPATIENT
Start: 2023-08-09 | End: 2023-08-09

## 2023-08-09 RX ADMIN — AMIODARONE HYDROCHLORIDE 200 MG: 200 TABLET ORAL at 08:08

## 2023-08-09 RX ADMIN — AMIODARONE HYDROCHLORIDE 200 MG: 200 TABLET ORAL at 04:08

## 2023-08-09 RX ADMIN — SOTALOL HYDROCHLORIDE 40 MG: 80 TABLET ORAL at 10:08

## 2023-08-09 RX ADMIN — MUPIROCIN 1 G: 20 OINTMENT TOPICAL at 08:08

## 2023-08-09 RX ADMIN — SPIRONOLACTONE 25 MG: 25 TABLET ORAL at 10:08

## 2023-08-09 RX ADMIN — APIXABAN 5 MG: 5 TABLET, FILM COATED ORAL at 10:08

## 2023-08-09 RX ADMIN — DILTIAZEM HYDROCHLORIDE 30 MG: 30 TABLET, FILM COATED ORAL at 01:08

## 2023-08-09 RX ADMIN — DILTIAZEM HYDROCHLORIDE 10 MG: 5 INJECTION, SOLUTION INTRAVENOUS at 02:08

## 2023-08-09 RX ADMIN — APIXABAN 5 MG: 5 TABLET, FILM COATED ORAL at 08:08

## 2023-08-09 RX ADMIN — SODIUM CHLORIDE 500 ML: 0.9 INJECTION, SOLUTION INTRAVENOUS at 10:08

## 2023-08-09 NOTE — H&P
Critical access hospital Medicine   History & Physical   Patient Name: Irene العراقي  MRN: [id number]  Admission Date: 8/9/2023  1:45 AM  Attending Physician: Phillip Barraza MD  Primary Care Provider: Wanda Kuhn FNP-C  Face-to-Face encounter date: 08/09/2023    Patient information was obtained from patient, past medical records, ER physician, and ER records.     HISTORY OF PRESENT ILLNESS:     Irene العراقي is a 82 y.o. White female   With PMH of paroxysmal Afib and HTN. Patient was diagnosed with COVID 7/27. She is currently asymptomatic.    who presents with chest pressure and SOB since yesterday.     Patient states that she is able to tell when she goes in to Afib. Yesterday evening she was cleaning her kitchen, felt burry vision and experienced near syncopal episode. She also felt palpitations and knew she was in Afib. This morning, she was still not feeling well. Her niece came over and check her HR and BP. HR was high and BP was variable. She still have difficulty in breathing. Denies any nausea, vomiting, diarrhea or urinary symptoms. Patient decided to come to ED.     On arrival to ED HR was 134, she got 10 mg IV Cardizem. She is still in Afib HR variable 120-90. BP dropped with the IV Cardizem, however now back up to 130s systolic. Case discussed with the ED provider, old records reviewed. Patient admitted to stepdown unit for closer monitoring.     REVIEW OF SYSTEMS:     All systems reviewed and are negative except as noted per above.    PAST MEDICAL HISTORY:     Past Medical History:   Diagnosis Date    Atrial fibrillation 01/25/2021    Hypertension        PAST SURGICAL HISTORY:     Past Surgical History:   Procedure Laterality Date    ANGIOGRAM, CORONARY, WITH LEFT HEART CATHETERIZATION Left 4/19/2023    Procedure: Angiogram, Coronary, with Left Heart Cath;  Surgeon: Kevin Redmond MD;  Location: [facility] CATH/EP LAB;  Service: Cardiology;  Laterality: Left;    BREAST SURGERY       COLONOSCOPY N/A 4/16/2023    Procedure: COLONOSCOPY;  Surgeon: Kvng Mensah MD;  Location: [facility];  Service: Endoscopy;  Laterality: N/A;    COLONOSCOPY W/ POLYPECTOMY  04/16/2023       ALLERGIES:   Hydrocodone, Meperidine, Meperidine hcl, Persantine [dipyridamole], Vicodin [hydrocodone-acetaminophen], and Nitrofurantoin macrocrystal    FAMILY HISTORY:     Family History   Problem Relation Age of Onset    Cancer Mother     Cancer Father        SOCIAL HISTORY:     Social History     Tobacco Use    Smoking status: Former     Current packs/day: 0.00     Types: Cigarettes    Smokeless tobacco: Never   Substance Use Topics    Alcohol use: Not Currently        Social History     Substance and Sexual Activity   Sexual Activity Not on file        HOME MEDICATIONS:     Prior to Admission medications    Medication Sig Start Date End Date Taking? Authorizing Provider   acetaminophen (TYLENOL) 500 MG tablet Take 500 mg by mouth nightly as needed for Pain.    Provider, Historical   cefUROXime (CEFTIN) 500 MG tablet Take 1 tablet (500 mg total) by mouth every 12 (twelve) hours. 5/9/23   James Seaman MD   ELIQUIS 5 mg Tab TAKE 1 TABLET BY MOUTH TWICE A DAY 5/4/23   Pj Ramirez MD   magnesium oxide (MAG-OX) 400 mg (241.3 mg magnesium) tablet TAKE 1 TABLET BY MOUTH ONCE DAILY  Patient taking differently: Take 400 mg by mouth once daily. 5/10/22   So Valdez, NP   MIRALAX 17 gram PwPk Take 17 g by mouth 2 (two) times daily. 6/27/23   Provider, Historical   pantoprazole (PROTONIX) 40 MG tablet Take 1 tablet (40 mg total) by mouth once daily.  Patient not taking: Reported on 7/27/2023 4/21/23 4/20/24  Reji Meeks MD   sotaloL (BETAPACE) 80 MG tablet Take 0.5 tablets (40 mg total) by mouth 3 (three) times daily. 7/12/23   So Valdez NP   spironolactone (ALDACTONE) 25 MG tablet TAKE 1 TABLET BY MOUTH EVERY DAY 5/4/23   Pj Ramirez MD       PHYSICAL EXAM:     /76   Pulse 87   Temp  "98.8 °F (37.1 °C) (Oral)   Resp 17   Ht 5' 6" (1.676 m)   Wt 76.2 kg (168 lb)   SpO2 95%   BMI 27.12 kg/m²     Gen: Awake and alert, good historian,  HEENT: Eyes with no icterus, not injected.  External ears with no lesions  Nares patent  Mouth moist mucous membranes,  CV: irregular, tachycardic, no murmurs, no edema.  Capillary refill < 2 seconds.  Lungs:  Clear to auscultation bilaterally, no tachypnea or increased work of breathing.  Abdomen:  Soft with active bowel sounds, no tenderness to palpation, no distention.  Musculoskeletal:  Moves all extremities with good strength.  No clubbing.  Skin:  Warm and dry, no obvious wounds or rashes.    Neuro:  No focal deficits.  No numbness or tingling.  Psych:  Calm and cooperative, awake alert and oriented.    LABS AND IMAGING:     Labs Reviewed   CBC W/ AUTO DIFFERENTIAL - Abnormal; Notable for the following components:       Result Value    RBC 3.43 (*)     Hemoglobin 10.4 (*)     Hematocrit 31.0 (*)     Platelets 143 (*)     All other components within normal limits   COMPREHENSIVE METABOLIC PANEL - Abnormal; Notable for the following components:    CO2 21 (*)     BUN 29 (*)     eGFR 37.6 (*)     All other components within normal limits   TROPONIN I HIGH SENSITIVITY - Abnormal; Notable for the following components:    Troponin I High Sensitivity 29.2 (*)     All other components within normal limits   B-TYPE NATRIURETIC PEPTIDE - Abnormal; Notable for the following components:    BNP 1,492 (*)     All other components within normal limits   MAGNESIUM   PROTIME-INR   APTT   TROPONIN I HIGH SENSITIVITY       X-Ray Chest AP Portable    (Results Pending)       ASSESSMENT & PLAN:   Irene العراقي is a 82 y.o. female admitted for    Afib with rapid ventricular response  Borderline BP  Secondary hypercoagulable state  Most recent echo from April 2023 shows normal EF  No signs of infection   K 4.1, mag 1.9  BNP elevated 1492, troponin 29.2  - resume home sotalol  - " Started on PO Cardizem 60 mg Q6H   - Monitor HR and BP  - Follow TSH   - Follow procalcitonin   - Follow D dimer   - cont Michael Barraza MD   [facility] Hospitalist  08/09/2023  4:14 AM

## 2023-08-09 NOTE — Clinical Note
Diagnosis: Atrial fibrillation with RVR [180264]   Future Attending Provider: JUAN RAMON FROST [[id number]]   Place in Observation: [facility] [[id number]]   Admitting Provider:: JUAN RAMON FROST [[id number]]

## 2023-08-09 NOTE — NURSING
Nurses Note -- 4 Eyes      8/9/2023   6:23 PM      Skin assessed during: Admit      [x] No Altered Skin Integrity Present    []Prevention Measures Documented      [] Yes- Altered Skin Integrity Present or Discovered   [] LDA Added if Not in Epic (Describe Wound)   [] New Altered Skin Integrity was Present on Admit and Documented in LDA   [] Wound Image Taken    Wound Care Consulted? No    Attending Nurse:  Mari Morales RN/Staff Member:   [id number]

## 2023-08-09 NOTE — PHARMACY MED REC
"              .  Admission Medication History     The home medication history was taken by Beck Alexander.    You may go to "Admission" then "Reconcile Home Medications" tabs to review and/or act upon these items.     The home medication list has been updated by the Pharmacy department.   Please read ALL comments highlighted in yellow.   Please address this information as you see fit.    Feel free to contact us if you have any questions or require assistance.      The medications listed below were removed from the home medication list. Please reorder if appropriate:  Patient reports no longer taking the following medication(s):  Ceftin 500 mg  Pantoprazole 40 mg      Medications listed below were obtained from: Patient/family and Analytic software- [employer]  No current facility-administered medications on file prior to encounter.     Current Outpatient Medications on File Prior to Encounter   Medication Sig Dispense Refill    acetaminophen (TYLENOL) 500 MG tablet Take 500 mg by mouth nightly as needed for Pain.      ELIQUIS 5 mg Tab TAKE 1 TABLET BY MOUTH TWICE A DAY (Patient taking differently: Take 5 mg by mouth 2 (two) times daily.) 180 tablet 3    magnesium oxide (MAG-OX) 400 mg (241.3 mg magnesium) tablet TAKE 1 TABLET BY MOUTH ONCE DAILY (Patient taking differently: Take 400 mg by mouth once daily.) 90 tablet 3    MIRALAX 17 gram PwPk Take 17 g by mouth 2 (two) times daily.      sotaloL (BETAPACE) 80 MG tablet Take 0.5 tablets (40 mg total) by mouth 3 (three) times daily. 135 tablet 3    spironolactone (ALDACTONE) 25 MG tablet TAKE 1 TABLET BY MOUTH EVERY DAY (Patient taking differently: Take 25 mg by mouth every other day.) 90 tablet 3    [DISCONTINUED] cefUROXime (CEFTIN) 500 MG tablet Take 1 tablet (500 mg total) by mouth every 12 (twelve) hours. 14 tablet 0    [DISCONTINUED] pantoprazole (PROTONIX) 40 MG tablet Take 1 tablet (40 mg total) by mouth once daily. (Patient not taking: Reported on 7/27/2023) 30 " tablet 11             Beck Alexander  EXT 1924

## 2023-08-09 NOTE — CONSULTS
[facility] - Emergency Dept  Department of Cardiology  Consult Note      PATIENT NAME: Irene العراقي    MRN: [id number]  TODAY'S DATE: 08/09/2023  ADMIT DATE: 8/9/2023                          CONSULT REQUESTED BY: Osmani Bello MD    SUBJECTIVE     PRINCIPAL PROBLEM: Paroxysmal atrial fibrillation      REASON FOR CONSULT:    From H&P: Irene العراقي is a 82 y.o. White female   With PMH of paroxysmal Afib and HTN. Patient was diagnosed with COVID 7/27. She is currently asymptomatic.    who presents with chest pressure and SOB since yesterday.      Patient states that she is able to tell when she goes in to Afib. Yesterday evening she was cleaning her kitchen, felt burry vision and experienced near syncopal episode. She also felt palpitations and knew she was in Afib. This morning, she was still not feeling well. Her niece came over and check her HR and BP. HR was high and BP was variable. She still have difficulty in breathing. Denies any nausea, vomiting, diarrhea or urinary symptoms. Patient decided to come to ED.      On arrival to ED HR was 134, she got 10 mg IV Cardizem. She is still in Afib HR variable 120-90. BP dropped with the IV Cardizem, however now back up to 130s systolic. Case discussed with the ED provider, old records reviewed. Patient admitted to stepdown unit for closer monitoring.       HPI:  Patient is an 82-year-old female who presented to the emergency room with complaints of chest discomfort and shortness of breath.  Patient reported she felt like she had gone into AFib and had a near syncopal episode.  The patient found to be in AFib with RVR in the 130s on arrival.  She was recently diagnosed and treated for COVID-19 in late July.  She is currently asymptomatic.  She has a known history of paroxysmal AFib on sotalol as well as hypertension.  Patient underwent angiogram in April 2023 and had no obstructive coronary artery disease at that time.          Review of patient's  allergies indicates:   Allergen Reactions    Hydrocodone     Meperidine     Meperidine hcl Nausea And Vomiting    Persantine [dipyridamole]     Vicodin [hydrocodone-acetaminophen] Nausea And Vomiting    Nitrofurantoin macrocrystal      Headache , went into Afib        Past Medical History:   Diagnosis Date    Atrial fibrillation 01/25/2021    Hypertension      Past Surgical History:   Procedure Laterality Date    ANGIOGRAM, CORONARY, WITH LEFT HEART CATHETERIZATION Left 4/19/2023    Procedure: Angiogram, Coronary, with Left Heart Cath;  Surgeon: Kevin Redmond MD;  Location: [facility] CATH/EP LAB;  Service: Cardiology;  Laterality: Left;    BREAST SURGERY      COLONOSCOPY N/A 4/16/2023    Procedure: COLONOSCOPY;  Surgeon: Kvng Mensah MD;  Location: [facility] ENDO;  Service: Endoscopy;  Laterality: N/A;    COLONOSCOPY W/ POLYPECTOMY  04/16/2023     Social History     Tobacco Use    Smoking status: Former     Current packs/day: 0.00     Types: Cigarettes    Smokeless tobacco: Never   Substance Use Topics    Alcohol use: Not Currently    Drug use: Not Currently        REVIEW OF SYSTEMS  Per HPI  OBJECTIVE     VITAL SIGNS (Most Recent)  Temp: 98.8 °F (37.1 °C) (08/09/23 0148)  Pulse: (!) 118 (08/09/23 1258)  Resp: 14 (08/09/23 0928)  BP: 104/64 (08/09/23 1353)  SpO2: (!) 93 % (08/09/23 1258)    VENTILATION STATUS  Resp: 14 (08/09/23 0928)  SpO2: (!) 93 % (08/09/23 1258)           I & O (Last 24H):No intake or output data in the 24 hours ending 08/09/23 1510    WEIGHTS  Wt Readings from Last 1 Encounters:   08/09/23 0148 76.2 kg (168 lb)       PHYSICAL EXAM  CONSTITUTIONAL: No fever, no chills  HEENT: Normocephalic, atraumatic,pupils reactive to light                 NECK:  No JVD no carotid bruit  CVS: S1S2+, iRRR  LUNGS: Clear  ABDOMEN: Soft, NT, BS+  EXTREMITIES: No cyanosis, edema  : No araya catheter  NEURO: AAO X 3  PSY: Normal affect      HOME MEDICATIONS:  No current facility-administered medications on file prior  to encounter.     Current Outpatient Medications on File Prior to Encounter   Medication Sig Dispense Refill    acetaminophen (TYLENOL) 500 MG tablet Take 500 mg by mouth nightly as needed for Pain.      ELIQUIS 5 mg Tab TAKE 1 TABLET BY MOUTH TWICE A DAY (Patient taking differently: Take 5 mg by mouth 2 (two) times daily.) 180 tablet 3    magnesium oxide (MAG-OX) 400 mg (241.3 mg magnesium) tablet TAKE 1 TABLET BY MOUTH ONCE DAILY (Patient taking differently: Take 400 mg by mouth once daily.) 90 tablet 3    MIRALAX 17 gram PwPk Take 17 g by mouth 2 (two) times daily.      sotaloL (BETAPACE) 80 MG tablet Take 0.5 tablets (40 mg total) by mouth 3 (three) times daily. 135 tablet 3    spironolactone (ALDACTONE) 25 MG tablet TAKE 1 TABLET BY MOUTH EVERY DAY (Patient taking differently: Take 25 mg by mouth every other day.) 90 tablet 3    [DISCONTINUED] cefUROXime (CEFTIN) 500 MG tablet Take 1 tablet (500 mg total) by mouth every 12 (twelve) hours. 14 tablet 0    [DISCONTINUED] pantoprazole (PROTONIX) 40 MG tablet Take 1 tablet (40 mg total) by mouth once daily. (Patient not taking: Reported on 7/27/2023) 30 tablet 11       SCHEDULED MEDS:   apixaban  5 mg Oral BID    diltiaZEM  30 mg Oral Q6H    sotaloL  40 mg Oral Daily    spironolactone  25 mg Oral Daily       CONTINUOUS INFUSIONS:    PRN MEDS:acetaminophen, melatonin, sodium chloride 0.9%    LABS AND DIAGNOSTICS     CBC LAST 3 DAYS  Recent Labs   Lab 08/09/23 0154   WBC 6.12   RBC 3.43*   HGB 10.4*   HCT 31.0*   MCV 90   MCH 30.3   MCHC 33.5   RDW 12.8   *   MPV 10.9   GRAN 64.6  4.0   LYMPH 22.7  1.4   MONO 10.3  0.6   BASO 0.02   NRBC 0       COAGULATION LAST 3 DAYS  Recent Labs   Lab 08/09/23 0154   INR 1.0   APTT 26.5       CHEMISTRY LAST 3 DAYS  Recent Labs   Lab 08/09/23 0154      K 4.1      CO2 21*   ANIONGAP 10   BUN 29*   CREATININE 1.4   GLU 97   CALCIUM 9.3   MG 1.9   ALBUMIN 3.6   PROT 6.4   ALKPHOS 101   ALT 15   AST 20   BILITOT  "0.7       CARDIAC PROFILE LAST 3 DAYS  Recent Labs   Lab 08/09/23  0154 08/09/23  0349   BNP 1,492*  --    TROPONINIHS 29.2* 25.4*       ENDOCRINE LAST 3 DAYS  Recent Labs   Lab 08/09/23  1001   TSH 2.940       LAST ARTERIAL BLOOD GAS  ABG  No results for input(s): "PH", "PO2", "PCO2", "HCO3", "BE" in the last 168 hours.    LAST 7 DAYS MICROBIOLOGY   Microbiology Results (last 7 days)       ** No results found for the last 168 hours. **            MOST RECENT IMAGING  X-Ray Chest AP Portable  Reason: Chest Pain    FINDINGS:    Portable chest with comparison chest x-ray April 15, 2023 show normal cardiomediastinal silhouette.  Interstitial opacities of the bilateral upper lungs again noted. No new confluent airspace opacities. Pulmonary vasculature is normal. No acute osseous abnormality.    IMPRESSION:    Unchanged interstitial thickening of the upper lungs. No new confluent airspace opacity.    Electronically signed by:  Gilbert Mclaughlni DO  8/9/2023 7:28 AM CDT Workstation: [id number]      ECHOCARDIOGRAM RESULTS (last 5)  Results for orders placed during the hospital encounter of 04/10/23    Echo    Interpretation Summary  · The left ventricle is normal in size with concentric remodeling and normal systolic function.  · Mild left atrial enlargement.  · The estimated ejection fraction is 65%.  · Indeterminate left ventricular diastolic function.  · Normal right ventricular size with normal right ventricular systolic function.  · There is moderate aortic valve stenosis.  · Aortic valve area is 1.24 cm2; peak velocity is 3.51 m/s; mean gradient is 32 mmHg.  · Mild mitral regurgitation.  · Mild tricuspid regurgitation.  · There is mild to moderate pulmonary hypertension with RVSP at 47 mmhg      Results for orders placed during the hospital encounter of 07/23/22    Echo    Interpretation Summary  · There is moderate aortic valve stenosis.  · Aortic valve area is 1.49 cm2; peak velocity is m/s; mean gradient is 26 " mmHg.  · Mild aortic regurgitation.  · Mild mitral regurgitation.  · The left ventricle is normal in size with concentric remodeling and normal systolic function.  · The estimated ejection fraction is 60%.  · Grade I left ventricular diastolic dysfunction.  · Normal right ventricular size with normal right ventricular systolic function.  · Mild left atrial enlargement.  · Mild tricuspid regurgitation.  · Intermediate central venous pressure (8 mmHg).  · The estimated PA systolic pressure is 34 mmHg.      Results for orders placed during the hospital encounter of 02/09/20    Echo Color Flow Doppler? Yes; Bubble Contrast? Yes    Interpretation Summary  · Concentric left ventricular remodeling.  · Intravenous Saline (bubble) contrast was used during the study.Study is negative for shunt  · Left ventricular systolic function. The estimated ejection fraction is 65%.  · Grade I (mild) left ventricular diastolic dysfunction consistent with impaired relaxation.  · Normal right ventricular systolic function.  · Mild aortic regurgitation.  · Mild aortic valve stenosis.  · Aortic valve area is 1.54 cm2; peak velocity is 2.59 m/s; mean gradient is 17 mmHg.  · Mild tricuspid regurgitation.  · Normal central venous pressure (3 mmHg).  · The estimated PA systolic pressure is 29 mmHg.      CURRENT/PREVIOUS VISIT EKG  Results for orders placed or performed during the hospital encounter of 08/09/23   EKG 12-lead    Collection Time: 08/09/23  1:45 AM    Narrative    Test Reason : R07.9,    Vent. Rate : 124 BPM     Atrial Rate : 000 BPM     P-R Int : 000 ms          QRS Dur : 074 ms      QT Int : 330 ms       P-R-T Axes : 000 006 025 degrees     QTc Int : 474 ms    Atrial fibrillation with rapid ventricular response  Abnormal ECG  When compared with ECG of 09-MAY-2023 00:29,  Atrial fibrillation has replaced Sinus rhythm  Vent. rate has increased BY  45 BPM    Referred By: [email or web address]   SELF           Confirmed By:             ASSESSMENT/PLAN:     Active Hospital Problems    Diagnosis    *Paroxysmal atrial fibrillation       ASSESSMENT & PLAN:     Afib with Rvr, paroxysmal, symptomatic  Reported h/o bradycardia with higher dose of sotalol  HTN  Diastolic HF  Moderate AS      RECOMMENDATIONS:    Patient is now back in SR and is bradycardic in the 50s. Will start her on amiodarone 200 mg PO TID starting this afternoon.   Stop sotalol. Hold off on oral diltiazem for now.   Give furosemide 20 mg IV x 1 dose now. Strict I&O.   Check limited echo for EF and to evaluate AS.   Will follow.         So Valdez NP  Date of Service: 08/09/2023  3:10 PM

## 2023-08-09 NOTE — ED PROVIDER NOTES
Encounter Date: 8/9/2023       History     Chief Complaint   Patient presents with    Shortness of Breath     Pt in Afib RVR at rate of 140s for ems. Pt denies chest pain. Pt states also has blurry vision     Chief complaint is shortness of breath and blurry like vision.  Patient took her heart rate and was elevated.  She called her neighbor called the ambulance therefore brought her in.  She has been in intermittent atrial fibrillation since 2019.  With the present time she is in AFib with RVR heart rate 124.  No complaints of chest pain patient awake alert cooperative no distress.  Recent catheterization in April this year looked very good.        Review of patient's allergies indicates:   Allergen Reactions    Hydrocodone     Meperidine     Meperidine hcl Nausea And Vomiting    Persantine [dipyridamole]     Vicodin [hydrocodone-acetaminophen] Nausea And Vomiting    Nitrofurantoin macrocrystal      Headache , went into Afib      Past Medical History:   Diagnosis Date    Atrial fibrillation 01/25/2021    Hypertension      Past Surgical History:   Procedure Laterality Date    ANGIOGRAM, CORONARY, WITH LEFT HEART CATHETERIZATION Left 4/19/2023    Procedure: Angiogram, Coronary, with Left Heart Cath;  Surgeon: Kevin Redmond MD;  Location: [facility] CATH/EP LAB;  Service: Cardiology;  Laterality: Left;    BREAST SURGERY      COLONOSCOPY N/A 4/16/2023    Procedure: COLONOSCOPY;  Surgeon: Kvng Mensah MD;  Location: [facility] ENDO;  Service: Endoscopy;  Laterality: N/A;    COLONOSCOPY W/ POLYPECTOMY  04/16/2023     Family History   Problem Relation Age of Onset    Cancer Mother     Cancer Father      Social History     Tobacco Use    Smoking status: Former     Current packs/day: 0.00     Types: Cigarettes    Smokeless tobacco: Never   Substance Use Topics    Alcohol use: Not Currently    Drug use: Not Currently     Review of Systems   Constitutional:  Negative for chills and fever.   HENT:  Negative for ear pain, rhinorrhea  and sore throat.    Eyes:  Negative for pain and visual disturbance.   Respiratory:  Negative for cough and shortness of breath.    Cardiovascular:  Positive for palpitations. Negative for chest pain.   Gastrointestinal:  Negative for abdominal pain, constipation, diarrhea, nausea and vomiting.   Genitourinary:  Negative for dysuria, frequency, hematuria and urgency.   Musculoskeletal:  Negative for back pain, joint swelling and myalgias.   Skin:  Negative for rash.   Neurological:  Negative for dizziness, seizures, weakness and headaches.   Psychiatric/Behavioral:  Negative for dysphoric mood. The patient is not nervous/anxious.        Physical Exam     Initial Vitals [08/09/23 0148]   BP Pulse Resp Temp SpO2   134/83 (!) 134 20 98.8 °F (37.1 °C) 97 %      MAP       --         Physical Exam    Nursing note and vitals reviewed.  Constitutional: She appears well-developed and well-nourished.   HENT:   Head: Normocephalic and atraumatic.   Eyes: Conjunctivae, EOM and lids are normal. Pupils are equal, round, and reactive to light.   Neck: Trachea normal. Neck supple. No thyroid mass and no thyromegaly present.   Normal range of motion.  Cardiovascular:  Normal heart sounds.           Tachycardic Irregular rhythm   Pulmonary/Chest: Effort normal and breath sounds normal.   Abdominal: Abdomen is soft. There is no abdominal tenderness.   Musculoskeletal:         General: Normal range of motion.      Cervical back: Normal range of motion and neck supple.     Neurological: She is alert and oriented to person, place, and time. She has normal strength and normal reflexes. No cranial nerve deficit or sensory deficit.   Skin: Skin is warm and dry.   Psychiatric: She has a normal mood and affect. Her speech is normal and behavior is normal. Judgment and thought content normal.         ED Course   Procedures  Labs Reviewed   CBC W/ AUTO DIFFERENTIAL - Abnormal; Notable for the following components:       Result Value    RBC 3.43  (*)     Hemoglobin 10.4 (*)     Hematocrit 31.0 (*)     Platelets 143 (*)     All other components within normal limits   COMPREHENSIVE METABOLIC PANEL - Abnormal; Notable for the following components:    CO2 21 (*)     BUN 29 (*)     eGFR 37.6 (*)     All other components within normal limits   TROPONIN I HIGH SENSITIVITY - Abnormal; Notable for the following components:    Troponin I High Sensitivity 29.2 (*)     All other components within normal limits   B-TYPE NATRIURETIC PEPTIDE - Abnormal; Notable for the following components:    BNP 1,492 (*)     All other components within normal limits   MAGNESIUM   PROTIME-INR   APTT   TROPONIN I HIGH SENSITIVITY          Imaging Results              X-Ray Chest AP Portable (In process)                      Medications   diltiaZEM injection 10 mg (10 mg Intravenous Given 8/9/23 0247)     Medical Decision Making:   ED Management:  Chief complaint is AFib with RVR.  Differential diagnosis includes STEMI non STEMI electrolyte imbalance hypomagnesemia among others.  Patient in the past has had AFib which converted on its own.  She is on a beta blocker and is also on Eliquis.  The patient states she is compliant with the medicines.  At the present time at the 10 mg of Cardizem the patient's heart rate is down blood pressure decreased as well .  Patient re-examined has no gross rales noted on exam.  However BNP is elevated.  Consult has been put in to admit the patient for AFib with RVR with slowing down of her rate with Cardizem bolus but no drip applied as of yet because slightly low blood pressure.            Attending Attestation:         Attending Critical Care:   Critical Care Times:   Direct Patient Care (initial evaluation, reassessments, and time considering the case)................................................................10 minutes.   Additional History from reviewing old medical records or taking additional history from the family, EMS, PCP,  etc.......................5 minutes.   Ordering, Reviewing, and Interpreting Diagnostic Studies...............................................................................................................5 minutes.   Documentation..................................................................................................................................................................................10 minutes.   Consultation with other Physicians. .................................................................................................................................................5 minutes.   ==============================================================  Total Critical Care Time - exclusive of procedural time: 35 minutes.  ==============================================================  Critical care was necessary to treat or prevent imminent or life-threatening deterioration of the following conditions: cardiac arrhythmia.   Critical care was time spent personally by me on the following activities: examination of patient, obtaining history from patient or relative, review of old charts, ordering lab, x-rays, and/or EKG and discussion with consultants.   Critical Care Condition: critical                        Clinical Impression:   Final diagnoses:  [R07.9] Chest pain  [I48.91] Atrial fibrillation with RVR (Primary)        ED Disposition Condition    Admit Stable                Elena Tomas MD  08/09/23 0321       Elena Tomas MD  09/07/23 0326

## 2023-08-09 NOTE — PLAN OF CARE
Unable to meet with patient at bedside so completed initial assessment on phone with  Roger Diane (Relative) [phone] (Mobile). Roger states patient lives in [place] but is staying in [place] with family for an extended period. Patient / family reports patient DOES NOT have a living will and NO ONE is medical POA.     [facility] - Emergency Dept  Initial Discharge Assessment       Primary Care Provider: Wanda Kuhn FNP-CARYN    Admission Diagnosis: Atrial fibrillation with RVR [I48.91]    Admission Date: 8/9/2023  Expected Discharge Date:     Transition of Care Barriers: (P) None    Payor: BLUE CROSS BLUE SHIELD / Plan: BCBS ALL OUT OF STATE / Product Type: PPO /     Extended Emergency Contact Information  Primary Emergency Contact: Roger Diane  Home Phone: [phone]  Mobile Phone: [phone]  Relation: Relative  Preferred language: English   needed? No  Secondary Emergency Contact: Patricio Licona  Home Phone: [phone]  Mobile Phone: [phone]  Relation: Relative  Preferred language: English   needed? No    Discharge Plan A: (P) Home with family  Discharge Plan B: (P) Home with family      [id number] - [place], [place] - [address]  [address]  [place] [address]  Phone: [phone] Fax: [phone]      Initial Assessment (most recent)       Adult Discharge Assessment - 08/09/23 1014          Discharge Assessment    Assessment Type Discharge Planning Assessment (P)      Confirmed/corrected address, phone number and insurance Yes (P)      Confirmed Demographics Correct on Facesheet (P)      Source of Information family (P)      Communicated YANET with patient/caregiver No (P)      Reason For Admission A-fib with RVR (P)      People in Home sibling(s) (P)      Facility Arrived From: home (P)      Do you expect to return to your current living situation? Yes (P)      Do you have help at home or someone to help you manage your care at home? Yes (P)       Who are your caregiver(s) and their phone number(s)? Roger Diane (Relative)   [phone] (Mobile) (P)      Current cognitive status: Unable to Assess (P)      Equipment Currently Used at Home none (P)      Readmission within 30 days? No (P)      Patient currently being followed by outpatient case management? No (P)      Do you currently have service(s) that help you manage your care at home? No (P)      Do you take prescription medications? Yes (P)      Do you have prescription coverage? Yes (P)      Coverage BCBS (P)      Who is going to help you get home at discharge? Roger Diane (Relative)   [phone] (Mobile) (P)      How do you get to doctors appointments? family or friend will provide;car, drives self (P)      Are you on dialysis? No (P)      Do you take coumadin? No (P)      Discharge Plan A Home with family (P)      Discharge Plan B Home with family (P)      DME Needed Upon Discharge  none (P)      Discharge Plan discussed with: Caregiver (P)      Name(s) and Number(s) Roger Diane (Relative)   [phone] (Mobile) (P)      Transition of Care Barriers None (P)         OTHER    Name(s) of People in Home Roger Diane (Relative)   [phone] (Mobile) (P)

## 2023-08-10 ENCOUNTER — CLINICAL SUPPORT (OUTPATIENT)
Dept: CARDIOLOGY | Facility: HOSPITAL | Age: 83
DRG: 309 | End: 2023-08-10
Attending: INTERNAL MEDICINE
Payer: MEDICARE

## 2023-08-10 VITALS — HEIGHT: 66 IN | WEIGHT: 167.75 LBS | BODY MASS INDEX: 26.96 KG/M2

## 2023-08-10 LAB
ANION GAP SERPL CALC-SCNC: 6 MMOL/L (ref 8–16)
AORTIC ROOT ANNULUS: 3.4 CM
AORTIC VALVE CUSP SEPERATION: 4 CM
AV INDEX (PROSTH): 0.25
AV MEAN GRADIENT: 34 MMHG
AV PEAK GRADIENT: 54 MMHG
AV REGURGITATION PRESSURE HALF TIME: 592 MS
AV VALVE AREA BY VELOCITY RATIO: 0.77 CM²
AV VALVE AREA: 0.77 CM²
AV VELOCITY RATIO: 0.25
BASOPHILS # BLD AUTO: 0.02 K/UL (ref 0–0.2)
BASOPHILS NFR BLD: 0.4 % (ref 0–1.9)
BSA FOR ECHO PROCEDURE: 1.88 M2
BUN SERPL-MCNC: 31 MG/DL (ref 8–23)
CALCIUM SERPL-MCNC: 8.9 MG/DL (ref 8.7–10.5)
CHLORIDE SERPL-SCNC: 110 MMOL/L (ref 95–110)
CO2 SERPL-SCNC: 22 MMOL/L (ref 23–29)
CREAT SERPL-MCNC: 1.7 MG/DL (ref 0.5–1.4)
CV ECHO LV RWT: 0.46 CM
DIFFERENTIAL METHOD: ABNORMAL
DOP CALC AO PEAK VEL: 3.66 M/S
DOP CALC AO VTI: 103 CM
DOP CALC LVOT AREA: 3.1 CM2
DOP CALC LVOT DIAMETER: 2 CM
DOP CALC LVOT PEAK VEL: 0.9 M/S
DOP CALC LVOT STROKE VOLUME: 79.44 CM3
DOP CALCLVOT PEAK VEL VTI: 25.3 CM
ECHO LV POSTERIOR WALL: 1.05 CM (ref 0.6–1.1)
EOSINOPHIL # BLD AUTO: 0.1 K/UL (ref 0–0.5)
EOSINOPHIL NFR BLD: 2.6 % (ref 0–8)
ERYTHROCYTE [DISTWIDTH] IN BLOOD BY AUTOMATED COUNT: 12.7 % (ref 11.5–14.5)
EST. GFR  (NO RACE VARIABLE): 29.8 ML/MIN/1.73 M^2
FRACTIONAL SHORTENING: 36 % (ref 28–44)
GLUCOSE SERPL-MCNC: 82 MG/DL (ref 70–110)
HCT VFR BLD AUTO: 30.9 % (ref 37–48.5)
HGB BLD-MCNC: 9.7 G/DL (ref 12–16)
IMM GRANULOCYTES # BLD AUTO: 0.02 K/UL (ref 0–0.04)
IMM GRANULOCYTES NFR BLD AUTO: 0.4 % (ref 0–0.5)
INTERVENTRICULAR SEPTUM: 1.25 CM (ref 0.6–1.1)
IVC DIAMETER: 3.09 CM
LEFT INTERNAL DIMENSION IN SYSTOLE: 2.92 CM (ref 2.1–4)
LEFT VENTRICLE DIASTOLIC VOLUME INDEX: 52.04 ML/M2
LEFT VENTRICLE DIASTOLIC VOLUME: 96.8 ML
LEFT VENTRICLE MASS INDEX: 103 G/M2
LEFT VENTRICLE SYSTOLIC VOLUME INDEX: 17.6 ML/M2
LEFT VENTRICLE SYSTOLIC VOLUME: 32.8 ML
LEFT VENTRICULAR INTERNAL DIMENSION IN DIASTOLE: 4.59 CM (ref 3.5–6)
LEFT VENTRICULAR MASS: 192.28 G
LVOT MG: 2 MMHG
LVOT MV: 0.62 CM/S
LYMPHOCYTES # BLD AUTO: 1.4 K/UL (ref 1–4.8)
LYMPHOCYTES NFR BLD: 26.4 % (ref 18–48)
MCH RBC QN AUTO: 30 PG (ref 27–31)
MCHC RBC AUTO-ENTMCNC: 31.4 G/DL (ref 32–36)
MCV RBC AUTO: 96 FL (ref 82–98)
MONOCYTES # BLD AUTO: 0.6 K/UL (ref 0.3–1)
MONOCYTES NFR BLD: 11.4 % (ref 4–15)
NEUTROPHILS # BLD AUTO: 3.2 K/UL (ref 1.8–7.7)
NEUTROPHILS NFR BLD: 58.8 % (ref 38–73)
NRBC BLD-RTO: 0 /100 WBC
PISA AR MAX VEL: 4.98 M/S
PISA MRMAX VEL: 6.16 M/S
PLATELET # BLD AUTO: 117 K/UL (ref 150–450)
PMV BLD AUTO: 11.3 FL (ref 9.2–12.9)
POTASSIUM SERPL-SCNC: 3.9 MMOL/L (ref 3.5–5.1)
RA PRESSURE ESTIMATED: 8 MMHG
RBC # BLD AUTO: 3.23 M/UL (ref 4–5.4)
RV TISSUE DOPPLER FREE WALL SYSTOLIC VELOCITY 1 (APICAL 4 CHAMBER VIEW): 13.7 CM/S
SODIUM SERPL-SCNC: 138 MMOL/L (ref 136–145)
TDI LATERAL: 0.08 M/S
TDI SEPTAL: 0.05 M/S
TDI: 0.07 M/S
WBC # BLD AUTO: 5.35 K/UL (ref 3.9–12.7)
Z-SCORE OF LEFT VENTRICULAR DIMENSION IN END DIASTOLE: -1.2
Z-SCORE OF LEFT VENTRICULAR DIMENSION IN END SYSTOLE: -0.71

## 2023-08-10 PROCEDURE — 99233 PR SUBSEQUENT HOSPITAL CARE,LEVL III: ICD-10-PCS | Mod: ,,, | Performed by: INTERNAL MEDICINE

## 2023-08-10 PROCEDURE — 25000003 PHARM REV CODE 250: Performed by: NURSE PRACTITIONER

## 2023-08-10 PROCEDURE — 80048 BASIC METABOLIC PNL TOTAL CA: CPT | Performed by: INTERNAL MEDICINE

## 2023-08-10 PROCEDURE — 36415 COLL VENOUS BLD VENIPUNCTURE: CPT | Performed by: INTERNAL MEDICINE

## 2023-08-10 PROCEDURE — 85025 COMPLETE CBC W/AUTO DIFF WBC: CPT | Performed by: INTERNAL MEDICINE

## 2023-08-10 PROCEDURE — 25000003 PHARM REV CODE 250: Performed by: INTERNAL MEDICINE

## 2023-08-10 PROCEDURE — 93308 ECHO (CUPID ONLY): ICD-10-PCS | Mod: 26,,, | Performed by: GENERAL PRACTICE

## 2023-08-10 PROCEDURE — 21400001 HC TELEMETRY ROOM

## 2023-08-10 PROCEDURE — 93308 TTE F-UP OR LMTD: CPT

## 2023-08-10 PROCEDURE — 93308 TTE F-UP OR LMTD: CPT | Mod: 26,,, | Performed by: GENERAL PRACTICE

## 2023-08-10 PROCEDURE — 99233 SBSQ HOSP IP/OBS HIGH 50: CPT | Mod: ,,, | Performed by: INTERNAL MEDICINE

## 2023-08-10 RX ORDER — POLYETHYLENE GLYCOL 3350 17 G/17G
17 POWDER, FOR SOLUTION ORAL DAILY
Status: DISCONTINUED | OUTPATIENT
Start: 2023-08-10 | End: 2023-08-11 | Stop reason: HOSPADM

## 2023-08-10 RX ADMIN — POLYETHYLENE GLYCOL 3350 17 G: 17 POWDER, FOR SOLUTION ORAL at 09:08

## 2023-08-10 RX ADMIN — AMIODARONE HYDROCHLORIDE 200 MG: 200 TABLET ORAL at 09:08

## 2023-08-10 RX ADMIN — APIXABAN 5 MG: 5 TABLET, FILM COATED ORAL at 08:08

## 2023-08-10 RX ADMIN — AMIODARONE HYDROCHLORIDE 200 MG: 200 TABLET ORAL at 03:08

## 2023-08-10 RX ADMIN — MUPIROCIN 1 G: 20 OINTMENT TOPICAL at 09:08

## 2023-08-10 RX ADMIN — MUPIROCIN 1 G: 20 OINTMENT TOPICAL at 08:08

## 2023-08-10 RX ADMIN — APIXABAN 5 MG: 5 TABLET, FILM COATED ORAL at 09:08

## 2023-08-10 RX ADMIN — AMIODARONE HYDROCHLORIDE 200 MG: 200 TABLET ORAL at 08:08

## 2023-08-10 NOTE — HOSPITAL COURSE
Pt who is on sotalol for A Fib got admitted with A Fib RVR  Pt was evaluated by Cardiology team and was started on amiodarone  Pt reports bradycardia with sotalol and this was DC ed  Later pt was discharged to home

## 2023-08-10 NOTE — PROGRESS NOTES
[facility] - Emergency Dept  Department of Cardiology  Progress Note      PATIENT NAME: Irene العراقي    MRN: [id number]  TODAY'S DATE: 08/10/2023  ADMIT DATE: 8/9/2023                          CONSULT REQUESTED BY: Gera Szymanski MD    SUBJECTIVE     PRINCIPAL PROBLEM: Paroxysmal atrial fibrillation    8/10/23:  Patient seen resting in bed. She was up with nurse earlier and felt a bit lightheaded. She is nervous about going home alone. She is back in SR in the 50s on telemetry.     REASON FOR CONSULT:    From H&P: Ierne العراقي is a 82 y.o. White female   With PMH of paroxysmal Afib and HTN. Patient was diagnosed with COVID 7/27. She is currently asymptomatic.    who presents with chest pressure and SOB since yesterday.      Patient states that she is able to tell when she goes in to Afib. Yesterday evening she was cleaning her kitchen, felt burry vision and experienced near syncopal episode. She also felt palpitations and knew she was in Afib. This morning, she was still not feeling well. Her niece came over and check her HR and BP. HR was high and BP was variable. She still have difficulty in breathing. Denies any nausea, vomiting, diarrhea or urinary symptoms. Patient decided to come to ED.      On arrival to ED HR was 134, she got 10 mg IV Cardizem. She is still in Afib HR variable 120-90. BP dropped with the IV Cardizem, however now back up to 130s systolic. Case discussed with the ED provider, old records reviewed. Patient admitted to stepdown unit for closer monitoring.       HPI:  Patient is an 82-year-old female who presented to the emergency room with complaints of chest discomfort and shortness of breath.  Patient reported she felt like she had gone into AFib and had a near syncopal episode.  The patient found to be in AFib with RVR in the 130s on arrival.  She was recently diagnosed and treated for COVID-19 in late July.  She is currently asymptomatic.  She has a known history of paroxysmal AFib  on sotalol as well as hypertension.  Patient underwent angiogram in April 2023 and had no obstructive coronary artery disease at that time.          Review of patient's allergies indicates:   Allergen Reactions    Hydrocodone     Meperidine     Meperidine hcl Nausea And Vomiting    Persantine [dipyridamole]     Vicodin [hydrocodone-acetaminophen] Nausea And Vomiting    Nitrofurantoin macrocrystal      Headache , went into Afib        Past Medical History:   Diagnosis Date    Atrial fibrillation 01/25/2021    Hypertension      Past Surgical History:   Procedure Laterality Date    ANGIOGRAM, CORONARY, WITH LEFT HEART CATHETERIZATION Left 4/19/2023    Procedure: Angiogram, Coronary, with Left Heart Cath;  Surgeon: Kevin Redmond MD;  Location: [facility] CATH[facility]EP LAB;  Service: Cardiology;  Laterality: Left;    BREAST SURGERY      COLONOSCOPY N/A 4/16/2023    Procedure: COLONOSCOPY;  Surgeon: Kvng Mensah MD;  Location: [facility] ENDO;  Service: Endoscopy;  Laterality: N/A;    COLONOSCOPY W/ POLYPECTOMY  04/16/2023     Social History     Tobacco Use    Smoking status: Former     Current packs/day: 0.00     Types: Cigarettes    Smokeless tobacco: Never   Substance Use Topics    Alcohol use: Not Currently    Drug use: Not Currently        REVIEW OF SYSTEMS  Per HPI  OBJECTIVE     VITAL SIGNS (Most Recent)  Temp: 97.9 °F (36.6 °C) (08/10/23 0700)  Pulse: (!) 56 (08/10/23 0700)  Resp: 20 (08/10/23 0700)  BP: 135/62 (08/10/23 0700)  SpO2: 95 % (08/10/23 0700)    VENTILATION STATUS  Resp: 20 (08/10/23 0700)  SpO2: 95 % (08/10/23 0700)           I & O (Last 24H):  Intake/Output Summary (Last 24 hours) at 8/10/2023 0942  Last data filed at 8/9/2023 2059  Gross per 24 hour   Intake 480 ml   Output --   Net 480 ml       WEIGHTS  Wt Readings from Last 1 Encounters:   08/09/23 1813 76.1 kg (167 lb 12.3 oz)   08/09/23 0148 76.2 kg (168 lb)       PHYSICAL EXAM  CONSTITUTIONAL: No fever, no chills  HEENT: Normocephalic, atraumatic,pupils  reactive to light                 NECK:  No JVD no carotid bruit  CVS: S1S2+, iRRR  LUNGS: Clear  ABDOMEN: Soft, NT, BS+  EXTREMITIES: No cyanosis, edema  : No araya catheter  NEURO: AAO X 3  PSY: Normal affect      HOME MEDICATIONS:  No current facility-administered medications on file prior to encounter.     Current Outpatient Medications on File Prior to Encounter   Medication Sig Dispense Refill    acetaminophen (TYLENOL) 500 MG tablet Take 500 mg by mouth nightly as needed for Pain.      ELIQUIS 5 mg Tab TAKE 1 TABLET BY MOUTH TWICE A DAY (Patient taking differently: Take 5 mg by mouth 2 (two) times daily.) 180 tablet 3    magnesium oxide (MAG-OX) 400 mg (241.3 mg magnesium) tablet TAKE 1 TABLET BY MOUTH ONCE DAILY (Patient taking differently: Take 400 mg by mouth once daily.) 90 tablet 3    MIRALAX 17 gram PwPk Take 17 g by mouth 2 (two) times daily.      sotaloL (BETAPACE) 80 MG tablet Take 0.5 tablets (40 mg total) by mouth 3 (three) times daily. 135 tablet 3    spironolactone (ALDACTONE) 25 MG tablet TAKE 1 TABLET BY MOUTH EVERY DAY (Patient taking differently: Take 25 mg by mouth every other day.) 90 tablet 3       SCHEDULED MEDS:   amiodarone  200 mg Oral TID    apixaban  5 mg Oral BID    mupirocin   Nasal BID    polyethylene glycol  17 g Oral Daily       CONTINUOUS INFUSIONS:    PRN MEDS:acetaminophen, melatonin, sodium chloride 0.9%    LABS AND DIAGNOSTICS     CBC LAST 3 DAYS  Recent Labs   Lab 08/09/23  0154 08/10/23  0504   WBC 6.12 5.35   RBC 3.43* 3.23*   HGB 10.4* 9.7*   HCT 31.0* 30.9*   MCV 90 96   MCH 30.3 30.0   MCHC 33.5 31.4*   RDW 12.8 12.7   * 117*   MPV 10.9 11.3   GRAN 64.6  4.0 58.8  3.2   LYMPH 22.7  1.4 26.4  1.4   MONO 10.3  0.6 11.4  0.6   BASO 0.02 0.02   NRBC 0 0         COAGULATION LAST 3 DAYS  Recent Labs   Lab 08/09/23  0154   INR 1.0   APTT 26.5         CHEMISTRY LAST 3 DAYS  Recent Labs   Lab 08/09/23  0154 08/10/23  0504    138   K 4.1 3.9    110  "  CO2 21* 22*   ANIONGAP 10 6*   BUN 29* 31*   CREATININE 1.4 1.7*   GLU 97 82   CALCIUM 9.3 8.9   MG 1.9  --    ALBUMIN 3.6  --    PROT 6.4  --    ALKPHOS 101  --    ALT 15  --    AST 20  --    BILITOT 0.7  --          CARDIAC PROFILE LAST 3 DAYS  Recent Labs   Lab 08/09/23  0154 08/09/23  0349   BNP 1,492*  --    TROPONINIHS 29.2* 25.4*         ENDOCRINE LAST 3 DAYS  Recent Labs   Lab 08/09/23  1001   TSH 2.940   PROCAL <0.05         LAST ARTERIAL BLOOD GAS  ABG  No results for input(s): "PH", "PO2", "PCO2", "HCO3", "BE" in the last 168 hours.    LAST 7 DAYS MICROBIOLOGY   Microbiology Results (last 7 days)       ** No results found for the last 168 hours. **            MOST RECENT IMAGING  X-Ray Chest AP Portable  Reason: Chest Pain    FINDINGS:    Portable chest with comparison chest x-ray April 15, 2023 show normal cardiomediastinal silhouette.  Interstitial opacities of the bilateral upper lungs again noted. No new confluent airspace opacities. Pulmonary vasculature is normal. No acute osseous abnormality.    IMPRESSION:    Unchanged interstitial thickening of the upper lungs. No new confluent airspace opacity.    Electronically signed by:  Gilbert Mclaughlin DO  8/9/2023 7:28 AM CDT Workstation: [id number]      ECHOCARDIOGRAM RESULTS (last 5)  Results for orders placed during the hospital encounter of 04/10/23    Echo    Interpretation Summary  · The left ventricle is normal in size with concentric remodeling and normal systolic function.  · Mild left atrial enlargement.  · The estimated ejection fraction is 65%.  · Indeterminate left ventricular diastolic function.  · Normal right ventricular size with normal right ventricular systolic function.  · There is moderate aortic valve stenosis.  · Aortic valve area is 1.24 cm2; peak velocity is 3.51 m/s; mean gradient is 32 mmHg.  · Mild mitral regurgitation.  · Mild tricuspid regurgitation.  · There is mild to moderate pulmonary hypertension with RVSP at 47 " mmhg      Results for orders placed during the hospital encounter of 07/23/22    Echo    Interpretation Summary  · There is moderate aortic valve stenosis.  · Aortic valve area is 1.49 cm2; peak velocity is m/s; mean gradient is 26 mmHg.  · Mild aortic regurgitation.  · Mild mitral regurgitation.  · The left ventricle is normal in size with concentric remodeling and normal systolic function.  · The estimated ejection fraction is 60%.  · Grade I left ventricular diastolic dysfunction.  · Normal right ventricular size with normal right ventricular systolic function.  · Mild left atrial enlargement.  · Mild tricuspid regurgitation.  · Intermediate central venous pressure (8 mmHg).  · The estimated PA systolic pressure is 34 mmHg.      Results for orders placed during the hospital encounter of 02/09/20    Echo Color Flow Doppler? Yes; Bubble Contrast? Yes    Interpretation Summary  · Concentric left ventricular remodeling.  · Intravenous Saline (bubble) contrast was used during the study.Study is negative for shunt  · Left ventricular systolic function. The estimated ejection fraction is 65%.  · Grade I (mild) left ventricular diastolic dysfunction consistent with impaired relaxation.  · Normal right ventricular systolic function.  · Mild aortic regurgitation.  · Mild aortic valve stenosis.  · Aortic valve area is 1.54 cm2; peak velocity is 2.59 m/s; mean gradient is 17 mmHg.  · Mild tricuspid regurgitation.  · Normal central venous pressure (3 mmHg).  · The estimated PA systolic pressure is 29 mmHg.      CURRENT/PREVIOUS VISIT EKG  Results for orders placed or performed during the hospital encounter of 08/09/23   EKG 12-lead    Collection Time: 08/09/23  2:41 PM    Narrative    Test Reason :     Vent. Rate : 058 BPM     Atrial Rate : 058 BPM     P-R Int : 138 ms          QRS Dur : 082 ms      QT Int : 434 ms       P-R-T Axes : 047 005 -06 degrees     QTc Int : 426 ms    Sinus bradycardia  Possible Left atrial  enlargement  Borderline Abnormal ECG  When compared with ECG of 09-AUG-2023 01:45,  Sinus rhythm has replaced Atrial fibrillation  Vent. rate has decreased BY  66 BPM  Inverted T waves have replaced nonspecific T wave abnormality in Inferior  leads    Referred By: [email or web address]   SELF           Confirmed By:            ASSESSMENT/PLAN:     Active Hospital Problems    Diagnosis    *Paroxysmal atrial fibrillation       ASSESSMENT & PLAN:     Afib with Rvr, paroxysmal, symptomatic  Reported h/o bradycardia with higher dose of sotalol  HTN  Diastolic HF  Moderate AS      RECOMMENDATIONS:    Continue amiodarone 200 mg PO TID x 5 days then reduce to 200 mg po BID.   Would like her up moving around with staff and see how she feels before dc.   BUN/Creatinine bumped and she recently had COVID. She may be dehydrated. Hold diuresis for now. Encourage oral fluids.   Will follow.     So Valdez NP  Date of Service: 08/10/2023  3:10 PM

## 2023-08-10 NOTE — SUBJECTIVE & OBJECTIVE
Interval History:     Review of Systems   Constitutional:  Negative for activity change and appetite change.   HENT:  Negative for congestion and dental problem.    Eyes:  Negative for discharge and itching.   Respiratory:  Negative for shortness of breath.    Cardiovascular:  Negative for chest pain.   Gastrointestinal:  Negative for abdominal distention and abdominal pain.   Endocrine: Negative for cold intolerance.   Genitourinary:  Negative for difficulty urinating and dysuria.   Musculoskeletal:  Negative for arthralgias and back pain.   Skin:  Negative for color change.   Neurological:  Negative for dizziness and facial asymmetry.   Hematological:  Negative for adenopathy.   Psychiatric/Behavioral:  Negative for agitation and behavioral problems.      Objective:     Vital Signs (Most Recent):  Temp: 97.9 °F (36.6 °C) (08/10/23 1100)  Pulse: 63 (08/10/23 1100)  Resp: 19 (08/10/23 1100)  BP: 138/69 (08/10/23 1100)  SpO2: 95 % (08/10/23 1100) Vital Signs (24h Range):  Temp:  [97.5 °F (36.4 °C)-98.3 °F (36.8 °C)] 97.9 °F (36.6 °C)  Pulse:  [56-63] 63  Resp:  [19-20] 19  SpO2:  [95 %-96 %] 95 %  BP: (132-145)/(61-70) 138/69     Weight: 76.1 kg (167 lb 12.3 oz)  Body mass index is 27.08 kg/m².    Intake/Output Summary (Last 24 hours) at 8/10/2023 1623  Last data filed at 8/9/2023 2059  Gross per 24 hour   Intake 480 ml   Output --   Net 480 ml         Physical Exam  Vitals and nursing note reviewed.   Constitutional:       General: She is not in acute distress.  HENT:      Head: Atraumatic.      Right Ear: External ear normal.      Left Ear: External ear normal.      Nose: Nose normal.      Mouth/Throat:      Mouth: Mucous membranes are moist.   Cardiovascular:      Rate and Rhythm: Normal rate.   Pulmonary:      Effort: Pulmonary effort is normal.   Musculoskeletal:         General: Normal range of motion.      Cervical back: Normal range of motion.   Skin:     General: Skin is warm.   Neurological:      Mental  Status: She is alert and oriented to person, place, and time.   Psychiatric:         Behavior: Behavior normal.             Significant Labs: All pertinent labs within the past 24 hours have been reviewed.  CBC:   Recent Labs   Lab 08/09/23  0154 08/10/23  0504   WBC 6.12 5.35   HGB 10.4* 9.7*   HCT 31.0* 30.9*   * 117*     CMP:   Recent Labs   Lab 08/09/23  0154 08/10/23  0504    138   K 4.1 3.9    110   CO2 21* 22*   GLU 97 82   BUN 29* 31*   CREATININE 1.4 1.7*   CALCIUM 9.3 8.9   PROT 6.4  --    ALBUMIN 3.6  --    BILITOT 0.7  --    ALKPHOS 101  --    AST 20  --    ALT 15  --    ANIONGAP 10 6*       Significant Imaging: I have reviewed all pertinent imaging results/findings within the past 24 hours.

## 2023-08-10 NOTE — PROGRESS NOTES
[facility] hospital Medicine  Progress Note    Patient Name: Irene العراقي  MRN: [id number]  Patient Class: IP- Inpatient   Admission Date: 8/9/2023  Length of Stay: 1 days  Attending Physician: Gera Szymanski MD  Primary Care Provider: Wanda Kuhn FNP-C        Subjective:     Principal Problem:Paroxysmal atrial fibrillation        HPI:  No notes on file    Overview/Hospital Course:  08/10  HR Stable  Dont want to go home today  In bed all the time      Interval History:     Review of Systems   Constitutional:  Negative for activity change and appetite change.   HENT:  Negative for congestion and dental problem.    Eyes:  Negative for discharge and itching.   Respiratory:  Negative for shortness of breath.    Cardiovascular:  Negative for chest pain.   Gastrointestinal:  Negative for abdominal distention and abdominal pain.   Endocrine: Negative for cold intolerance.   Genitourinary:  Negative for difficulty urinating and dysuria.   Musculoskeletal:  Negative for arthralgias and back pain.   Skin:  Negative for color change.   Neurological:  Negative for dizziness and facial asymmetry.   Hematological:  Negative for adenopathy.   Psychiatric/Behavioral:  Negative for agitation and behavioral problems.      Objective:     Vital Signs (Most Recent):  Temp: 97.9 °F (36.6 °C) (08/10/23 1100)  Pulse: 63 (08/10/23 1100)  Resp: 19 (08/10/23 1100)  BP: 138/69 (08/10/23 1100)  SpO2: 95 % (08/10/23 1100) Vital Signs (24h Range):  Temp:  [97.5 °F (36.4 °C)-98.3 °F (36.8 °C)] 97.9 °F (36.6 °C)  Pulse:  [56-63] 63  Resp:  [19-20] 19  SpO2:  [95 %-96 %] 95 %  BP: (132-145)/(61-70) 138/69     Weight: 76.1 kg (167 lb 12.3 oz)  Body mass index is 27.08 kg/m².    Intake/Output Summary (Last 24 hours) at 8/10/2023 1623  Last data filed at 8/9/2023 2059  Gross per 24 hour   Intake 480 ml   Output --   Net 480 ml         Physical Exam  Vitals and nursing note reviewed.   Constitutional:       General: She is not in  acute distress.  HENT:      Head: Atraumatic.      Right Ear: External ear normal.      Left Ear: External ear normal.      Nose: Nose normal.      Mouth/Throat:      Mouth: Mucous membranes are moist.   Cardiovascular:      Rate and Rhythm: Normal rate.   Pulmonary:      Effort: Pulmonary effort is normal.   Musculoskeletal:         General: Normal range of motion.      Cervical back: Normal range of motion.   Skin:     General: Skin is warm.   Neurological:      Mental Status: She is alert and oriented to person, place, and time.   Psychiatric:         Behavior: Behavior normal.             Significant Labs: All pertinent labs within the past 24 hours have been reviewed.  CBC:   Recent Labs   Lab 08/09/23  0154 08/10/23  0504   WBC 6.12 5.35   HGB 10.4* 9.7*   HCT 31.0* 30.9*   * 117*     CMP:   Recent Labs   Lab 08/09/23  0154 08/10/23  0504    138   K 4.1 3.9    110   CO2 21* 22*   GLU 97 82   BUN 29* 31*   CREATININE 1.4 1.7*   CALCIUM 9.3 8.9   PROT 6.4  --    ALBUMIN 3.6  --    BILITOT 0.7  --    ALKPHOS 101  --    AST 20  --    ALT 15  --    ANIONGAP 10 6*       Significant Imaging: I have reviewed all pertinent imaging results/findings within the past 24 hours.      Assessment/Plan:      * Paroxysmal atrial fibrillation  Maintain PO amiodarone  Should sit in chair and move around  Hold sotalol  Home tomorrow        VTE Risk Mitigation (From admission, onward)         Ordered     apixaban tablet 5 mg  2 times daily         08/09/23 0929     IP VTE HIGH RISK PATIENT  Once         08/09/23 0929     Place sequential compression device  Until discontinued         08/09/23 0929                Discharge Planning   YANET: 8/10/2023     Code Status: Full Code   Is the patient medically ready for discharge?:     Reason for patient still in hospital (select all that apply): Treatment  Discharge Plan A: Home with family                  Gera Szymanski MD  Department of Hospital Medicine   [facility]  [facility]

## 2023-08-11 VITALS
DIASTOLIC BLOOD PRESSURE: 79 MMHG | HEIGHT: 66 IN | RESPIRATION RATE: 20 BRPM | OXYGEN SATURATION: 98 % | BODY MASS INDEX: 26.96 KG/M2 | TEMPERATURE: 97 F | WEIGHT: 167.75 LBS | SYSTOLIC BLOOD PRESSURE: 146 MMHG | HEART RATE: 60 BPM

## 2023-08-11 PROBLEM — I48.0 PAROXYSMAL ATRIAL FIBRILLATION: Status: RESOLVED | Noted: 2021-01-28 | Resolved: 2023-08-11

## 2023-08-11 LAB
ANION GAP SERPL CALC-SCNC: 6 MMOL/L (ref 8–16)
BASOPHILS # BLD AUTO: 0.03 K/UL (ref 0–0.2)
BASOPHILS NFR BLD: 0.5 % (ref 0–1.9)
BUN SERPL-MCNC: 33 MG/DL (ref 8–23)
CALCIUM SERPL-MCNC: 9 MG/DL (ref 8.7–10.5)
CHLORIDE SERPL-SCNC: 110 MMOL/L (ref 95–110)
CO2 SERPL-SCNC: 23 MMOL/L (ref 23–29)
CREAT SERPL-MCNC: 1.7 MG/DL (ref 0.5–1.4)
DIFFERENTIAL METHOD: ABNORMAL
EOSINOPHIL # BLD AUTO: 0.1 K/UL (ref 0–0.5)
EOSINOPHIL NFR BLD: 2.2 % (ref 0–8)
ERYTHROCYTE [DISTWIDTH] IN BLOOD BY AUTOMATED COUNT: 12.7 % (ref 11.5–14.5)
EST. GFR  (NO RACE VARIABLE): 29.8 ML/MIN/1.73 M^2
GLUCOSE SERPL-MCNC: 84 MG/DL (ref 70–110)
HCT VFR BLD AUTO: 29.7 % (ref 37–48.5)
HGB BLD-MCNC: 9.8 G/DL (ref 12–16)
IMM GRANULOCYTES # BLD AUTO: 0.02 K/UL (ref 0–0.04)
IMM GRANULOCYTES NFR BLD AUTO: 0.3 % (ref 0–0.5)
LYMPHOCYTES # BLD AUTO: 1.1 K/UL (ref 1–4.8)
LYMPHOCYTES NFR BLD: 16.6 % (ref 18–48)
MCH RBC QN AUTO: 30.3 PG (ref 27–31)
MCHC RBC AUTO-ENTMCNC: 33 G/DL (ref 32–36)
MCV RBC AUTO: 92 FL (ref 82–98)
MONOCYTES # BLD AUTO: 0.5 K/UL (ref 0.3–1)
MONOCYTES NFR BLD: 8.4 % (ref 4–15)
NEUTROPHILS # BLD AUTO: 4.6 K/UL (ref 1.8–7.7)
NEUTROPHILS NFR BLD: 72 % (ref 38–73)
NRBC BLD-RTO: 0 /100 WBC
PLATELET # BLD AUTO: 118 K/UL (ref 150–450)
PMV BLD AUTO: 11 FL (ref 9.2–12.9)
POTASSIUM SERPL-SCNC: 4 MMOL/L (ref 3.5–5.1)
RBC # BLD AUTO: 3.23 M/UL (ref 4–5.4)
SODIUM SERPL-SCNC: 139 MMOL/L (ref 136–145)
WBC # BLD AUTO: 6.44 K/UL (ref 3.9–12.7)

## 2023-08-11 PROCEDURE — 25000003 PHARM REV CODE 250: Performed by: NURSE PRACTITIONER

## 2023-08-11 PROCEDURE — 85025 COMPLETE CBC W/AUTO DIFF WBC: CPT | Performed by: INTERNAL MEDICINE

## 2023-08-11 PROCEDURE — 99233 SBSQ HOSP IP/OBS HIGH 50: CPT | Mod: ,,, | Performed by: INTERNAL MEDICINE

## 2023-08-11 PROCEDURE — 80048 BASIC METABOLIC PNL TOTAL CA: CPT | Performed by: INTERNAL MEDICINE

## 2023-08-11 PROCEDURE — 99233 PR SUBSEQUENT HOSPITAL CARE,LEVL III: ICD-10-PCS | Mod: ,,, | Performed by: INTERNAL MEDICINE

## 2023-08-11 PROCEDURE — 25000003 PHARM REV CODE 250: Performed by: INTERNAL MEDICINE

## 2023-08-11 PROCEDURE — 36415 COLL VENOUS BLD VENIPUNCTURE: CPT | Performed by: INTERNAL MEDICINE

## 2023-08-11 RX ORDER — AMIODARONE HYDROCHLORIDE 200 MG/1
TABLET ORAL
Qty: 57 TABLET | Refills: 0 | Status: SHIPPED | OUTPATIENT
Start: 2023-08-11 | End: 2023-08-18

## 2023-08-11 RX ADMIN — POLYETHYLENE GLYCOL 3350 17 G: 17 POWDER, FOR SOLUTION ORAL at 09:08

## 2023-08-11 RX ADMIN — AMIODARONE HYDROCHLORIDE 200 MG: 200 TABLET ORAL at 04:08

## 2023-08-11 RX ADMIN — AMIODARONE HYDROCHLORIDE 200 MG: 200 TABLET ORAL at 09:08

## 2023-08-11 RX ADMIN — SODIUM CHLORIDE 250 ML: 0.9 INJECTION, SOLUTION INTRAVENOUS at 10:08

## 2023-08-11 RX ADMIN — APIXABAN 5 MG: 5 TABLET, FILM COATED ORAL at 09:08

## 2023-08-11 RX ADMIN — MUPIROCIN 1 G: 20 OINTMENT TOPICAL at 11:08

## 2023-08-11 NOTE — PLAN OF CARE
Problem: Adult Inpatient Plan of Care  Goal: Plan of Care Review  8/11/2023 1304 by Sandra Erwin LPN  Outcome: Ongoing, Progressing  8/11/2023 1303 by Sandra Erwin LPN  Outcome: Ongoing, Progressing  Goal: Patient-Specific Goal (Individualized)  8/11/2023 1304 by Sandra Erwin LPN  Outcome: Ongoing, Progressing  8/11/2023 1303 by Sandra Erwin LPN  Outcome: Ongoing, Progressing  Goal: Absence of Hospital-Acquired Illness or Injury  8/11/2023 1304 by Sandra Erwin LPN  Outcome: Ongoing, Progressing  8/11/2023 1303 by Sandra Erwin LPN  Outcome: Ongoing, Progressing  Goal: Optimal Comfort and Wellbeing  8/11/2023 1304 by Sandra Erwin LPN  Outcome: Ongoing, Progressing  8/11/2023 1303 by Sandra Erwin LPN  Outcome: Ongoing, Progressing  Goal: Readiness for Transition of Care  8/11/2023 1304 by Sandra Erwin LPN  Outcome: Ongoing, Progressing  8/11/2023 1303 by Sandra Erwin LPN  Outcome: Ongoing, Progressing     Problem: Skin Injury Risk Increased  Goal: Skin Health and Integrity  8/11/2023 1304 by Sandra Erwin LPN  Outcome: Ongoing, Progressing  8/11/2023 1303 by Sandra Erwin LPN  Outcome: Ongoing, Progressing

## 2023-08-11 NOTE — PLAN OF CARE
Patient cleared for discharge from case management standpoint.    Follow up appointments scheduled and added to AVS.    Chart and discharge orders reviewed.  Patient discharged home with no further case management needs.       08/11/23 1335   Final Note   Assessment Type Final Discharge Note   Anticipated Discharge Disposition Home   Hospital Resources/Appts/Education Provided Provided patient/caregiver with written discharge plan information;Appointments scheduled and added to AVS   Post-Acute Status   Discharge Delays (!) Personal Transportation

## 2023-08-11 NOTE — PLAN OF CARE
Discharge orders and chart reviewed with no further post-acute discharge needs identified at this time.  At this time, patient is cleared for discharge from Case Management standpoint.        08/11/23 1422   Final Note   Assessment Type Final Discharge Note   Anticipated Discharge Disposition Home   Hospital Resources/Appts/Education Provided Appointments scheduled and added to AVS   Post-Acute Status   Coverage [employer]   Discharge Delays None known at this time

## 2023-08-11 NOTE — PROGRESS NOTES
[facility] - Emergency Dept  Department of Cardiology  Progress Note      PATIENT NAME: Irene العراقي    MRN: [id number]  TODAY'S DATE: 08/11/2023  ADMIT DATE: 8/9/2023                          CONSULT REQUESTED BY: Gera Szymanski MD    SUBJECTIVE     PRINCIPAL PROBLEM: Paroxysmal atrial fibrillation    8/11/23:  Patient seen sitting up in chair with no distress noted. Breathing is stable.     8/10/23:  Patient seen resting in bed. She was up with nurse earlier and felt a bit lightheaded. She is nervous about going home alone. She is back in SR in the 50s on telemetry.     REASON FOR CONSULT:    From H&P: Irene العراقي is a 82 y.o. White female   With PMH of paroxysmal Afib and HTN. Patient was diagnosed with COVID 7/27. She is currently asymptomatic.    who presents with chest pressure and SOB since yesterday.      Patient states that she is able to tell when she goes in to Afib. Yesterday evening she was cleaning her kitchen, felt burry vision and experienced near syncopal episode. She also felt palpitations and knew she was in Afib. This morning, she was still not feeling well. Her niece came over and check her HR and BP. HR was high and BP was variable. She still have difficulty in breathing. Denies any nausea, vomiting, diarrhea or urinary symptoms. Patient decided to come to ED.      On arrival to ED HR was 134, she got 10 mg IV Cardizem. She is still in Afib HR variable 120-90. BP dropped with the IV Cardizem, however now back up to 130s systolic. Case discussed with the ED provider, old records reviewed. Patient admitted to stepdown unit for closer monitoring.       HPI:  Patient is an 82-year-old female who presented to the emergency room with complaints of chest discomfort and shortness of breath.  Patient reported she felt like she had gone into AFib and had a near syncopal episode.  The patient found to be in AFib with RVR in the 130s on arrival.  She was recently diagnosed and treated for  COVID-19 in late July.  She is currently asymptomatic.  She has a known history of paroxysmal AFib on sotalol as well as hypertension.  Patient underwent angiogram in April 2023 and had no obstructive coronary artery disease at that time.          Review of patient's allergies indicates:   Allergen Reactions    Hydrocodone     Meperidine     Meperidine hcl Nausea And Vomiting    Persantine [dipyridamole]     Vicodin [hydrocodone-acetaminophen] Nausea And Vomiting    Nitrofurantoin macrocrystal      Headache , went into Afib        Past Medical History:   Diagnosis Date    Atrial fibrillation 01/25/2021    Hypertension      Past Surgical History:   Procedure Laterality Date    ANGIOGRAM, CORONARY, WITH LEFT HEART CATHETERIZATION Left 4/19/2023    Procedure: Angiogram, Coronary, with Left Heart Cath;  Surgeon: Kevin Redmond MD;  Location: [facility] CATH/EP LAB;  Service: Cardiology;  Laterality: Left;    BREAST SURGERY      COLONOSCOPY N/A 4/16/2023    Procedure: COLONOSCOPY;  Surgeon: Kvng Mensah MD;  Location: [facility] ENDO;  Service: Endoscopy;  Laterality: N/A;    COLONOSCOPY W/ POLYPECTOMY  04/16/2023     Social History     Tobacco Use    Smoking status: Former     Current packs/day: 0.00     Types: Cigarettes    Smokeless tobacco: Never   Substance Use Topics    Alcohol use: Not Currently    Drug use: Not Currently        REVIEW OF SYSTEMS  Per HPI  OBJECTIVE     VITAL SIGNS (Most Recent)  Temp: 97.4 °F (36.3 °C) (08/11/23 1156)  Pulse: 69 (08/11/23 1156)  Resp: 20 (08/11/23 1156)  BP: (!) 149/78 (08/11/23 1156)  SpO2: (!) 94 % (08/11/23 1156)    VENTILATION STATUS  Resp: 20 (08/11/23 1156)  SpO2: (!) 94 % (08/11/23 1156)           I & O (Last 24H):  Intake/Output Summary (Last 24 hours) at 8/11/2023 1333  Last data filed at 8/11/2023 0816  Gross per 24 hour   Intake 820 ml   Output --   Net 820 ml         WEIGHTS  Wt Readings from Last 1 Encounters:   08/09/23 1813 76.1 kg (167 lb 12.3 oz)   08/09/23 0148 76.2 kg  (168 lb)       PHYSICAL EXAM  CONSTITUTIONAL: No fever, no chills  HEENT: Normocephalic, atraumatic,pupils reactive to light                 NECK:  No JVD no carotid bruit  CVS: S1S2+, iRRR  LUNGS: Clear  ABDOMEN: Soft, NT, BS+  EXTREMITIES: No cyanosis, edema  : No araya catheter  NEURO: AAO X 3  PSY: Normal affect      HOME MEDICATIONS:  No current facility-administered medications on file prior to encounter.     Current Outpatient Medications on File Prior to Encounter   Medication Sig Dispense Refill    acetaminophen (TYLENOL) 500 MG tablet Take 500 mg by mouth nightly as needed for Pain.      ELIQUIS 5 mg Tab TAKE 1 TABLET BY MOUTH TWICE A DAY (Patient taking differently: Take 5 mg by mouth 2 (two) times daily.) 180 tablet 3    magnesium oxide (MAG-OX) 400 mg (241.3 mg magnesium) tablet TAKE 1 TABLET BY MOUTH ONCE DAILY (Patient taking differently: Take 400 mg by mouth once daily.) 90 tablet 3    MIRALAX 17 gram PwPk Take 17 g by mouth 2 (two) times daily.      spironolactone (ALDACTONE) 25 MG tablet TAKE 1 TABLET BY MOUTH EVERY DAY (Patient taking differently: Take 25 mg by mouth every other day.) 90 tablet 3    [DISCONTINUED] sotaloL (BETAPACE) 80 MG tablet Take 0.5 tablets (40 mg total) by mouth 3 (three) times daily. 135 tablet 3       SCHEDULED MEDS:   amiodarone  200 mg Oral TID    apixaban  5 mg Oral BID    mupirocin   Nasal BID    polyethylene glycol  17 g Oral Daily       CONTINUOUS INFUSIONS:    PRN MEDS:acetaminophen, melatonin, sodium chloride 0.9%    LABS AND DIAGNOSTICS     CBC LAST 3 DAYS  Recent Labs   Lab 08/09/23  0154 08/10/23  0504 08/11/23  0501   WBC 6.12 5.35 6.44   RBC 3.43* 3.23* 3.23*   HGB 10.4* 9.7* 9.8*   HCT 31.0* 30.9* 29.7*   MCV 90 96 92   MCH 30.3 30.0 30.3   MCHC 33.5 31.4* 33.0   RDW 12.8 12.7 12.7   * 117* 118*   MPV 10.9 11.3 11.0   GRAN 64.6  4.0 58.8  3.2 72.0  4.6   LYMPH 22.7  1.4 26.4  1.4 16.6*  1.1   MONO 10.3  0.6 11.4  0.6 8.4  0.5   BASO 0.02  "0.02 0.03   NRBC 0 0 0         COAGULATION LAST 3 DAYS  Recent Labs   Lab 08/09/23  0154   INR 1.0   APTT 26.5         CHEMISTRY LAST 3 DAYS  Recent Labs   Lab 08/09/23  0154 08/10/23  0504 08/11/23  0501    138 139   K 4.1 3.9 4.0    110 110   CO2 21* 22* 23   ANIONGAP 10 6* 6*   BUN 29* 31* 33*   CREATININE 1.4 1.7* 1.7*   GLU 97 82 84   CALCIUM 9.3 8.9 9.0   MG 1.9  --   --    ALBUMIN 3.6  --   --    PROT 6.4  --   --    ALKPHOS 101  --   --    ALT 15  --   --    AST 20  --   --    BILITOT 0.7  --   --          CARDIAC PROFILE LAST 3 DAYS  Recent Labs   Lab 08/09/23  0154 08/09/23  0349   BNP 1,492*  --    TROPONINIHS 29.2* 25.4*         ENDOCRINE LAST 3 DAYS  Recent Labs   Lab 08/09/23  1001   TSH 2.940   PROCAL <0.05         LAST ARTERIAL BLOOD GAS  ABG  No results for input(s): "PH", "PO2", "PCO2", "HCO3", "BE" in the last 168 hours.    LAST 7 DAYS MICROBIOLOGY   Microbiology Results (last 7 days)       ** No results found for the last 168 hours. **            MOST RECENT IMAGING  Echo    Limited study    Left Ventricle: The left ventricle is normal in size. Mildly increased   wall thickness. There is concentric remodeling. Normal wall motion. There   is normal systolic function with a visually estimated ejection fraction of   65 - 70%. Diastolic function cannot be reliably determined in the presence   of mitral annular calcification.    Aortic Valve: Moderate annular calcification. Moderately restricted   motion. There is moderate to severe stenosis. Aortic valve area by VTI is   0.77 cm². Aortic valve peak velocity is 3.66 m/s. Mean gradient is 34   mmHg. The dimensionless index is 0.25. There is moderate aortic   regurgitation.    Mitral Valve: There is bileaflet sclerosis. Mild posterior mitral   annular calcification. There is moderate to severe regurgitation.   decreased excursion.    Left Atrium: Left atrium is severely dilated.    Right Ventricle: Normal right ventricular cavity size.    " Tricuspid Valve: There is mild regurgitation.    Pulmonic Valve: There is mild regurgitation.    IVC/SVC: Intermediate venous pressure at 8 mmHg.      ECHOCARDIOGRAM RESULTS (last 5)  Results for orders placed during the hospital encounter of 04/10/23    Echo    Interpretation Summary  · The left ventricle is normal in size with concentric remodeling and normal systolic function.  · Mild left atrial enlargement.  · The estimated ejection fraction is 65%.  · Indeterminate left ventricular diastolic function.  · Normal right ventricular size with normal right ventricular systolic function.  · There is moderate aortic valve stenosis.  · Aortic valve area is 1.24 cm2; peak velocity is 3.51 m/s; mean gradient is 32 mmHg.  · Mild mitral regurgitation.  · Mild tricuspid regurgitation.  · There is mild to moderate pulmonary hypertension with RVSP at 47 mmhg      Results for orders placed during the hospital encounter of 07/23/22    Echo    Interpretation Summary  · There is moderate aortic valve stenosis.  · Aortic valve area is 1.49 cm2; peak velocity is m/s; mean gradient is 26 mmHg.  · Mild aortic regurgitation.  · Mild mitral regurgitation.  · The left ventricle is normal in size with concentric remodeling and normal systolic function.  · The estimated ejection fraction is 60%.  · Grade I left ventricular diastolic dysfunction.  · Normal right ventricular size with normal right ventricular systolic function.  · Mild left atrial enlargement.  · Mild tricuspid regurgitation.  · Intermediate central venous pressure (8 mmHg).  · The estimated PA systolic pressure is 34 mmHg.      Results for orders placed during the hospital encounter of 02/09/20    Echo Color Flow Doppler? Yes; Bubble Contrast? Yes    Interpretation Summary  · Concentric left ventricular remodeling.  · Intravenous Saline (bubble) contrast was used during the study.Study is negative for shunt  · Left ventricular systolic function. The estimated ejection  fraction is 65%.  · Grade I (mild) left ventricular diastolic dysfunction consistent with impaired relaxation.  · Normal right ventricular systolic function.  · Mild aortic regurgitation.  · Mild aortic valve stenosis.  · Aortic valve area is 1.54 cm2; peak velocity is 2.59 m/s; mean gradient is 17 mmHg.  · Mild tricuspid regurgitation.  · Normal central venous pressure (3 mmHg).  · The estimated PA systolic pressure is 29 mmHg.      CURRENT/PREVIOUS VISIT EKG  Results for orders placed or performed during the hospital encounter of 08/09/23   EKG 12-lead    Collection Time: 08/09/23  2:41 PM    Narrative    Test Reason :     Vent. Rate : 058 BPM     Atrial Rate : 058 BPM     P-R Int : 138 ms          QRS Dur : 082 ms      QT Int : 434 ms       P-R-T Axes : 047 005 -06 degrees     QTc Int : 426 ms    Sinus bradycardia  Possible Left atrial enlargement  Borderline Abnormal ECG  When compared with ECG of 09-AUG-2023 01:45,  Sinus rhythm has replaced Atrial fibrillation  Vent. rate has decreased BY  66 BPM  Inverted T waves have replaced nonspecific T wave abnormality in Inferior  leads    Referred By: [email or web address]   SELF           Confirmed By:            ASSESSMENT/PLAN:     There are no active hospital problems to display for this patient.      ASSESSMENT & PLAN:     Afib with Rvr, paroxysmal, symptomatic  Reported h/o bradycardia with higher dose of sotalol  HTN  Diastolic HF  Severe AS      RECOMMENDATIONS:    Continue amiodarone 200 mg PO TID x 5 days total then reduce to 200 mg po BID.   Will discuss AS at next visit. I request her nephew join her.   Continue other home meds.       So Valdez NP  Date of Service: 08/11/2023  3:10 PM

## 2023-08-12 NOTE — DISCHARGE SUMMARY
[facility] Medicine  Discharge Summary      Patient Name: Irene العراقي  MRN: [id number]  SHELLI: [id number]  Patient Class: IP- Inpatient  Admission Date: 8/9/2023  Hospital Length of Stay: 2 days  Discharge Date and Time: 8/11/2023  5:15 PM  Attending Physician: No att. providers found   Discharging Provider: Gera Szymanski MD  Primary Care Provider: Wanda Kuhn FNP-C    Primary Care Team: Networked reference to record PCT     HPI:   No notes on file    * No surgery found *      Hospital Course:   Pt who is on sotalol for A Fib got admitted with A Fib RVR  Pt was evaluated by Cardiology team and was started on amiodarone  Pt reports bradycardia with sotalol and this was DC ed  Later pt was discharged to home        Goals of Care Treatment Preferences:  Code Status: Full Code      Consults:   Consults (From admission, onward)        Status Ordering Provider     Inpatient consult to Cardiology  Once        Provider:  Kevin Redmond MD    Completed DANIELA PARSONS          No new Assessment & Plan notes have been filed under this hospital service since the last note was generated.  Service: Hospital Medicine    Final Active Diagnoses:      Problems Resolved During this Admission:    Diagnosis Date Noted Date Resolved POA    PRINCIPAL PROBLEM:  Paroxysmal atrial fibrillation [I48.0] 01/28/2021 08/11/2023 Yes       Discharged Condition: good    Disposition: Home or Self Care    Follow Up:   Follow-up Information     Wanda Kuhn FNP-C Follow up.    Specialty: Family Medicine  Why: Office will call pt with hospital follow up appointment  Contact information:  [address] [place] [id number]  [phone]                       Patient Instructions:      Ambulatory referral/consult to Outpatient Case Management   Referral Priority: Routine Referral Type: Consultation   Referral Reason: Specialty Services Required   Number of Visits Requested: 1           Pending  Diagnostic Studies:     None         Medications:  Reconciled Home Medications:      Medication List      START taking these medications    amiodarone 200 MG Tab  Commonly known as: PACERONE  Take 1 tablet (200 mg total) by mouth 3 (three) times daily for 5 days, THEN 1 tablet (200 mg total) 2 (two) times daily for 21 days.  Start taking on: August 11, 2023        CHANGE how you take these medications    ELIQUIS 5 mg Tab  Generic drug: apixaban  TAKE 1 TABLET BY MOUTH TWICE A DAY  What changed: how much to take     magnesium oxide 400 mg (241.3 mg magnesium) tablet  Commonly known as: MAG-OX  TAKE 1 TABLET BY MOUTH ONCE DAILY  What changed: when to take this     spironolactone 25 MG tablet  Commonly known as: ALDACTONE  TAKE 1 TABLET BY MOUTH EVERY DAY  What changed: when to take this        CONTINUE taking these medications    acetaminophen 500 MG tablet  Commonly known as: TYLENOL  Take 500 mg by mouth nightly as needed for Pain.     MIRALAX 17 gram Pwpk  Generic drug: polyethylene glycol  Take 17 g by mouth 2 (two) times daily.        STOP taking these medications    sotaloL 80 MG tablet  Commonly known as: BETAPACE            Indwelling Lines/Drains at time of discharge:   Lines/Drains/Airways     None               Physical Exam   Cardiovascular: Normal rate.   Neurological: She is alert.     Time spent on the discharge of patient: 43 minutes         Gera Szymanski MD  Department of Hospital Medicine  [facility]

## 2023-08-14 ENCOUNTER — TELEPHONE (OUTPATIENT)
Dept: FAMILY MEDICINE | Facility: CLINIC | Age: 83
End: 2023-08-14

## 2023-08-14 ENCOUNTER — PATIENT OUTREACH (OUTPATIENT)
Dept: FAMILY MEDICINE | Facility: CLINIC | Age: 83
End: 2023-08-14

## 2023-08-14 NOTE — TELEPHONE ENCOUNTER
Discharge Information     Discharge Date:   08/11/23    Primary Discharge Diagnosis:  Paroxysmal atrial fibrillation        Discharge Summary:  Reviewed      Medication & Order Review     Were medication changes made or new medications added?   Yes    If so, has the patient filled the prescriptions?  Yes     Was Home Health ordered? No    If so, has Home Health contacted patient and/or initiated services?  N/A    Name of Home Health Agency? N/A    Durable Medical Equipment ordered?  No     If so, has the DME provider contacted patient and delivered equipment?  N/A    Follow Up               Any problems since discharge? Yes    How is the patient feeling since returning home?  Tiredness    Have you set up recommended follow up appointments? Patient is scheduled to see Cardiology on 0/18/23      Schedule Hospital Follow-up appointment within 7-14 days (preferably 7).  Scheduled patient for 08/21/23 at 1 PM    Notes:  Spoke with Nephew and he stated patient is c/o tiredness only            Emili Prater    
No indicators present

## 2023-08-14 NOTE — TELEPHONE ENCOUNTER
----- Message from Viji Nair LPN sent at 8/14/2023  9:41 AM CDT -----  Regarding: hospital follow up  Please call patient - needs post-hospital phone call within 2 business days of discharge and hospital follow up visit scheduled within 7-14 days if not already scheduled.

## 2023-08-15 ENCOUNTER — PATIENT OUTREACH (OUTPATIENT)
Dept: ADMINISTRATIVE | Facility: CLINIC | Age: 83
End: 2023-08-15
Payer: MEDICARE

## 2023-08-15 NOTE — PROGRESS NOTES
C3 nurse attempted to contact Irene العراقي for a TCC post hospital discharge follow up call. The patient is unable to conduct the call @ this time. The patient requested a callback.    The patient has a scheduled [facility] appointment with Wanda Kuhn FNP-C on 08/21/23 @ 1300. Message sent to Physician staff.

## 2023-08-16 NOTE — PROGRESS NOTES
2nd Attempt made to reach patient for TCC call. Left voicemail please call [phone] leave first name, last name, and  Yane will return your call.

## 2023-08-17 NOTE — PROGRESS NOTES
C3 nurse spoke with Irene العراقي for a TCC post hospital discharge follow up call. The patient has a scheduled [facility] appointment with Wanda Kuhn FNP-C on 08/21/23 @ 1300.

## 2023-08-18 ENCOUNTER — LAB VISIT (OUTPATIENT)
Dept: LAB | Facility: HOSPITAL | Age: 83
End: 2023-08-18
Attending: INTERNAL MEDICINE
Payer: MEDICARE

## 2023-08-18 ENCOUNTER — OFFICE VISIT (OUTPATIENT)
Dept: CARDIOLOGY | Facility: CLINIC | Age: 83
End: 2023-08-18
Payer: COMMERCIAL

## 2023-08-18 VITALS
BODY MASS INDEX: 26.68 KG/M2 | WEIGHT: 166 LBS | HEART RATE: 75 BPM | OXYGEN SATURATION: 97 % | DIASTOLIC BLOOD PRESSURE: 86 MMHG | SYSTOLIC BLOOD PRESSURE: 150 MMHG | HEIGHT: 66 IN

## 2023-08-18 DIAGNOSIS — I35.0 SEVERE AORTIC STENOSIS: ICD-10-CM

## 2023-08-18 DIAGNOSIS — I51.89 DIASTOLIC DYSFUNCTION: ICD-10-CM

## 2023-08-18 DIAGNOSIS — I48.91 ATRIAL FIBRILLATION WITH RVR: Primary | ICD-10-CM

## 2023-08-18 DIAGNOSIS — D64.9 ANEMIA: Primary | ICD-10-CM

## 2023-08-18 DIAGNOSIS — R25.1 TREMORS OF NERVOUS SYSTEM: ICD-10-CM

## 2023-08-18 LAB
BASOPHILS # BLD AUTO: 0.02 K/UL (ref 0–0.2)
BASOPHILS NFR BLD: 0.4 % (ref 0–1.9)
DIFFERENTIAL METHOD: ABNORMAL
EOSINOPHIL # BLD AUTO: 0.1 K/UL (ref 0–0.5)
EOSINOPHIL NFR BLD: 1.6 % (ref 0–8)
ERYTHROCYTE [DISTWIDTH] IN BLOOD BY AUTOMATED COUNT: 13 % (ref 11.5–14.5)
HCT VFR BLD AUTO: 31 % (ref 37–48.5)
HGB BLD-MCNC: 10.3 G/DL (ref 12–16)
IMM GRANULOCYTES # BLD AUTO: 0.01 K/UL (ref 0–0.04)
IMM GRANULOCYTES NFR BLD AUTO: 0.2 % (ref 0–0.5)
LYMPHOCYTES # BLD AUTO: 1 K/UL (ref 1–4.8)
LYMPHOCYTES NFR BLD: 17.8 % (ref 18–48)
MCH RBC QN AUTO: 30.7 PG (ref 27–31)
MCHC RBC AUTO-ENTMCNC: 33.2 G/DL (ref 32–36)
MCV RBC AUTO: 92 FL (ref 82–98)
MONOCYTES # BLD AUTO: 0.6 K/UL (ref 0.3–1)
MONOCYTES NFR BLD: 10.3 % (ref 4–15)
NEUTROPHILS # BLD AUTO: 3.9 K/UL (ref 1.8–7.7)
NEUTROPHILS NFR BLD: 69.7 % (ref 38–73)
NRBC BLD-RTO: 0 /100 WBC
PLATELET # BLD AUTO: 128 K/UL (ref 150–450)
PMV BLD AUTO: 10.9 FL (ref 9.2–12.9)
RBC # BLD AUTO: 3.36 M/UL (ref 4–5.4)
WBC # BLD AUTO: 5.62 K/UL (ref 3.9–12.7)

## 2023-08-18 PROCEDURE — 99999 PR PBB SHADOW E&M-EST. PATIENT-LVL IV: CPT | Mod: PBBFAC,,, | Performed by: NURSE PRACTITIONER

## 2023-08-18 PROCEDURE — 3079F DIAST BP 80-89 MM HG: CPT | Mod: CPTII,S$GLB,, | Performed by: NURSE PRACTITIONER

## 2023-08-18 PROCEDURE — 3077F PR MOST RECENT SYSTOLIC BLOOD PRESSURE >= 140 MM HG: ICD-10-PCS | Mod: CPTII,S$GLB,, | Performed by: NURSE PRACTITIONER

## 2023-08-18 PROCEDURE — 1101F PR PT FALLS ASSESS DOC 0-1 FALLS W/OUT INJ PAST YR: ICD-10-PCS | Mod: CPTII,S$GLB,, | Performed by: NURSE PRACTITIONER

## 2023-08-18 PROCEDURE — 1111F PR DISCHARGE MEDS RECONCILED W/ CURRENT OUTPATIENT MED LIST: ICD-10-PCS | Mod: CPTII,S$GLB,, | Performed by: NURSE PRACTITIONER

## 2023-08-18 PROCEDURE — 1126F PR PAIN SEVERITY QUANTIFIED, NO PAIN PRESENT: ICD-10-PCS | Mod: CPTII,S$GLB,, | Performed by: NURSE PRACTITIONER

## 2023-08-18 PROCEDURE — 1159F MED LIST DOCD IN RCRD: CPT | Mod: CPTII,S$GLB,, | Performed by: NURSE PRACTITIONER

## 2023-08-18 PROCEDURE — 99214 OFFICE O/P EST MOD 30 MIN: CPT | Mod: S$GLB,,, | Performed by: NURSE PRACTITIONER

## 2023-08-18 PROCEDURE — 36415 COLL VENOUS BLD VENIPUNCTURE: CPT | Performed by: INTERNAL MEDICINE

## 2023-08-18 PROCEDURE — 1111F DSCHRG MED/CURRENT MED MERGE: CPT | Mod: CPTII,S$GLB,, | Performed by: NURSE PRACTITIONER

## 2023-08-18 PROCEDURE — 3288F PR FALLS RISK ASSESSMENT DOCUMENTED: ICD-10-PCS | Mod: CPTII,S$GLB,, | Performed by: NURSE PRACTITIONER

## 2023-08-18 PROCEDURE — 1101F PT FALLS ASSESS-DOCD LE1/YR: CPT | Mod: CPTII,S$GLB,, | Performed by: NURSE PRACTITIONER

## 2023-08-18 PROCEDURE — 99214 PR OFFICE/OUTPT VISIT, EST, LEVL IV, 30-39 MIN: ICD-10-PCS | Mod: S$GLB,,, | Performed by: NURSE PRACTITIONER

## 2023-08-18 PROCEDURE — 85025 COMPLETE CBC W/AUTO DIFF WBC: CPT | Performed by: INTERNAL MEDICINE

## 2023-08-18 PROCEDURE — 3079F PR MOST RECENT DIASTOLIC BLOOD PRESSURE 80-89 MM HG: ICD-10-PCS | Mod: CPTII,S$GLB,, | Performed by: NURSE PRACTITIONER

## 2023-08-18 PROCEDURE — 1126F AMNT PAIN NOTED NONE PRSNT: CPT | Mod: CPTII,S$GLB,, | Performed by: NURSE PRACTITIONER

## 2023-08-18 PROCEDURE — 3077F SYST BP >= 140 MM HG: CPT | Mod: CPTII,S$GLB,, | Performed by: NURSE PRACTITIONER

## 2023-08-18 PROCEDURE — 3288F FALL RISK ASSESSMENT DOCD: CPT | Mod: CPTII,S$GLB,, | Performed by: NURSE PRACTITIONER

## 2023-08-18 PROCEDURE — 99999 PR PBB SHADOW E&M-EST. PATIENT-LVL IV: ICD-10-PCS | Mod: PBBFAC,,, | Performed by: NURSE PRACTITIONER

## 2023-08-18 PROCEDURE — 1159F PR MEDICATION LIST DOCUMENTED IN MEDICAL RECORD: ICD-10-PCS | Mod: CPTII,S$GLB,, | Performed by: NURSE PRACTITIONER

## 2023-08-18 RX ORDER — AMIODARONE HYDROCHLORIDE 200 MG/1
200 TABLET ORAL DAILY
Qty: 90 TABLET | Refills: 3 | Status: ON HOLD | OUTPATIENT
Start: 2023-08-18 | End: 2023-09-12 | Stop reason: HOSPADM

## 2023-08-18 RX ORDER — PROPRANOLOL HYDROCHLORIDE 10 MG/1
10 TABLET ORAL 2 TIMES DAILY
Qty: 180 TABLET | Refills: 3 | Status: ON HOLD | OUTPATIENT
Start: 2023-08-18 | End: 2023-09-28 | Stop reason: HOSPADM

## 2023-08-18 NOTE — ASSESSMENT & PLAN NOTE
Discussed these findings with the patient and her nephew.  Will refer her to Dr. GÓMEZ in [place], [place] for evaluation and possible TAVR.  She had nonobstructive disease on an angiogram earlier this year.

## 2023-08-18 NOTE — ASSESSMENT & PLAN NOTE
Patient is maintaining in sinus rhythm.  Will reduce her amiodarone to 200 mg p.o. daily.  I do not believe that the amiodarone is contributing to her tremor but may be rather than lack of sotalol.  Continue with Eliquis 5 mg p.o. b.i.d..

## 2023-08-18 NOTE — PROGRESS NOTES
Subjective:    Patient ID:  Irene العراقي is a 82 y.o. female   Chief Complaint   Patient presents with    Follow-up    Shortness of Breath    Fatigue       HPI:  Patient seen today for hospital follow-up.  She states that she has been doing okay except she is noticing a new tremor.  She does have a familial history of essential tremor as her sister has this as well as multiple other family members.  She denies any bleeding issues.  Her breathing is stable.    Review of patient's allergies indicates:   Allergen Reactions    Hydrocodone     Meperidine     Meperidine hcl Nausea And Vomiting    Persantine [dipyridamole]     Vicodin [hydrocodone-acetaminophen] Nausea And Vomiting    Nitrofurantoin macrocrystal      Headache , went into Afib        Past Medical History:   Diagnosis Date    Atrial fibrillation 01/25/2021    Hypertension      Past Surgical History:   Procedure Laterality Date    ANGIOGRAM, CORONARY, WITH LEFT HEART CATHETERIZATION Left 4/19/2023    Procedure: Angiogram, Coronary, with Left Heart Cath;  Surgeon: Kevin Redmond MD;  Location: [facility] CATH/EP LAB;  Service: Cardiology;  Laterality: Left;    BREAST SURGERY      COLONOSCOPY N/A 4/16/2023    Procedure: COLONOSCOPY;  Surgeon: Kvng Mensah MD;  Location: [facility] ENDO;  Service: Endoscopy;  Laterality: N/A;    COLONOSCOPY W/ POLYPECTOMY  04/16/2023     Social History     Tobacco Use    Smoking status: Former     Current packs/day: 0.00     Types: Cigarettes    Smokeless tobacco: Never   Substance Use Topics    Alcohol use: Not Currently    Drug use: Not Currently     Family History   Problem Relation Age of Onset    Cancer Mother     Cancer Father         Review of Systems:   Per HPI         Objective:        Vitals:    08/18/23 1128   BP: (!) 150/86   Pulse: 75       Lab Results   Component Value Date    WBC 5.62 08/18/2023    HGB 10.3 (L) 08/18/2023    HCT 31.0 (L) 08/18/2023     (L) 08/18/2023    CHOL 173 02/10/2020    TRIG 40 02/10/2020     HDL 78 (H) 02/10/2020    ALT 15 08/09/2023    AST 20 08/09/2023     08/11/2023    K 4.0 08/11/2023     08/11/2023    CREATININE 1.7 (H) 08/11/2023    BUN 33 (H) 08/11/2023    CO2 23 08/11/2023    TSH 2.940 08/09/2023    INR 1.0 08/09/2023    HGBA1C 5.1 04/11/2023        ECHOCARDIOGRAM RESULTS  Results for orders placed during the hospital encounter of 08/09/23    Echo    Interpretation Summary    Limited study    Left Ventricle: The left ventricle is normal in size. Mildly increased wall thickness. There is concentric remodeling. Normal wall motion. There is normal systolic function with a visually estimated ejection fraction of 65 - 70%. Diastolic function cannot be reliably determined in the presence of mitral annular calcification.    Aortic Valve: Moderate annular calcification. Moderately restricted motion. There is moderate to severe stenosis. Aortic valve area by VTI is 0.77 cm². Aortic valve peak velocity is 3.66 m/s. Mean gradient is 34 mmHg. The dimensionless index is 0.25. There is moderate aortic regurgitation.    Mitral Valve: There is bileaflet sclerosis. Mild posterior mitral annular calcification. There is moderate to severe regurgitation. decreased excursion.    Left Atrium: Left atrium is severely dilated.    Right Ventricle: Normal right ventricular cavity size.    Tricuspid Valve: There is mild regurgitation.    Pulmonic Valve: There is mild regurgitation.    IVC/SVC: Intermediate venous pressure at 8 mmHg.        CURRENT/PREVIOUS VISIT EKG  Results for orders placed or performed during the hospital encounter of 08/09/23   EKG 12-lead    Collection Time: 08/09/23  2:41 PM    Narrative    Test Reason :     Vent. Rate : 058 BPM     Atrial Rate : 058 BPM     P-R Int : 138 ms          QRS Dur : 082 ms      QT Int : 434 ms       P-R-T Axes : 047 005 -06 degrees     QTc Int : 426 ms    Sinus bradycardia  Possible Left atrial enlargement  Borderline Abnormal ECG  When compared with ECG of  09-AUG-2023 01:45,  Sinus rhythm has replaced Atrial fibrillation  Vent. rate has decreased BY  66 BPM  Inverted T waves have replaced nonspecific T wave abnormality in Inferior  leads  Confirmed by Justin Ramirez MD ([id number]) on 8/17/2023 7:39:39 PM    Referred By: CYNTHIA   SELF           Confirmed By:Justin Ramirez MD     No valid procedures specified.   Results for orders placed during the hospital encounter of 07/23/22    Nuclear Stress Test    Interpretation Summary    The EKG portion of this study is negative for ischemia.    The patient reported no chest pain during the stress test.    The nuclear portion of this study will be reported separately.      Physical Exam:  CONSTITUTIONAL: No fever, no chills  HEENT: Normocephalic, atraumatic,pupils reactive to light                 NECK:  No JVD no carotid bruit  CVS: S1S2+, RRR, + murmur  LUNGS: Clear  ABDOMEN: Soft, NT, BS+  EXTREMITIES: No cyanosis, edema  : No araya catheter  NEURO: AAO X 3  PSY: Normal affect      Medication List with Changes/Refills   New Medications    PROPRANOLOL (INDERAL) 10 MG TABLET    Take 1 tablet (10 mg total) by mouth 2 (two) times daily.   Current Medications    ELIQUIS 5 MG TAB    TAKE 1 TABLET BY MOUTH TWICE A DAY    MAGNESIUM OXIDE (MAG-OX) 400 MG (241.3 MG MAGNESIUM) TABLET    TAKE 1 TABLET BY MOUTH ONCE DAILY    MIRALAX 17 GRAM PWPK    Take 17 g by mouth 2 (two) times daily.    SPIRONOLACTONE (ALDACTONE) 25 MG TABLET    TAKE 1 TABLET BY MOUTH EVERY DAY   Changed and/or Refilled Medications    Modified Medication Previous Medication    AMIODARONE (PACERONE) 200 MG TAB amiodarone (PACERONE) 200 MG Tab       Take 1 tablet (200 mg total) by mouth once daily.    Take 1 tablet (200 mg total) by mouth 3 (three) times daily for 5 days, THEN 1 tablet (200 mg total) 2 (two) times daily for 21 days.   Discontinued Medications    ACETAMINOPHEN (TYLENOL) 500 MG TABLET    Take 500 mg by mouth nightly as needed for Pain.              Assessment:       1. Atrial fibrillation with RVR    2. Diastolic dysfunction    3. Severe aortic stenosis    4. Tremors of nervous system         Plan:     Problem List Items Addressed This Visit          Unprioritized    Atrial fibrillation with RVR - Primary    Current Assessment & Plan     Patient is maintaining in sinus rhythm.  Will reduce her amiodarone to 200 mg p.o. daily.  I do not believe that the amiodarone is contributing to her tremor but may be rather than lack of sotalol.  Continue with Eliquis 5 mg p.o. b.i.d..         Relevant Medications    propranoloL (INDERAL) 10 MG tablet    amiodarone (PACERONE) 200 MG Tab    Diastolic dysfunction    Current Assessment & Plan     Euvolemic on exam.         Severe aortic stenosis    Current Assessment & Plan     Discussed these findings with the patient and her nephew.  Will refer her to Dr. GÓMEZ in [place], [place] for evaluation and possible TAVR.  She had nonobstructive disease on an angiogram earlier this year.         Relevant Orders    Ambulatory referral/consult to Interventional Cardiology    Tremors of nervous system    Relevant Medications    propranoloL (INDERAL) 10 MG tablet       Follow up in about 6 weeks (around 9/29/2023).

## 2023-08-30 ENCOUNTER — TELEPHONE (OUTPATIENT)
Dept: CARDIOLOGY | Facility: CLINIC | Age: 83
End: 2023-08-30
Payer: MEDICARE

## 2023-08-30 NOTE — TELEPHONE ENCOUNTER
----- Message from Stepan Asher sent at 8/30/2023  9:37 AM CDT -----  Contact: Self  Type: Needs Medical Advice  Who Called:  Patient    Pharmacy name and phone #:    [id number] - [place] - [address]  [address] [address]  Phone: [phone] Fax: [phone]      Best Call Back Number: [phone]    Additional Information: Pt needs refill on amiodarone (PACERONE) 200 MG Tab. States she would like to discuss this medication before refilling.

## 2023-09-09 ENCOUNTER — HOSPITAL ENCOUNTER (INPATIENT)
Facility: HOSPITAL | Age: 83
LOS: 3 days | Discharge: HOME | DRG: 309 | End: 2023-09-12
Attending: EMERGENCY MEDICINE | Admitting: STUDENT IN AN ORGANIZED HEALTH CARE EDUCATION/TRAINING PROGRAM
Payer: MEDICARE

## 2023-09-09 DIAGNOSIS — N18.9 ANEMIA DUE TO CHRONIC KIDNEY DISEASE, UNSPECIFIED CKD STAGE: ICD-10-CM

## 2023-09-09 DIAGNOSIS — D63.1 ANEMIA DUE TO CHRONIC KIDNEY DISEASE, UNSPECIFIED CKD STAGE: ICD-10-CM

## 2023-09-09 DIAGNOSIS — R07.9 CHEST PAIN: ICD-10-CM

## 2023-09-09 DIAGNOSIS — N17.9 AKI (ACUTE KIDNEY INJURY): Primary | ICD-10-CM

## 2023-09-09 DIAGNOSIS — I48.91 A-FIB: ICD-10-CM

## 2023-09-09 PROBLEM — N30.90 CYSTITIS: Status: ACTIVE | Noted: 2023-09-09

## 2023-09-09 LAB
ALBUMIN SERPL BCP-MCNC: 3.7 G/DL (ref 3.5–5.2)
ALP SERPL-CCNC: 66 U/L (ref 55–135)
ALT SERPL W/O P-5'-P-CCNC: 13 U/L (ref 10–44)
ANION GAP SERPL CALC-SCNC: 6 MMOL/L (ref 8–16)
AST SERPL-CCNC: 21 U/L (ref 10–40)
BACTERIA #/AREA URNS HPF: ABNORMAL /HPF
BASOPHILS # BLD AUTO: 0.03 K/UL (ref 0–0.2)
BASOPHILS NFR BLD: 0.7 % (ref 0–1.9)
BILIRUB SERPL-MCNC: 0.7 MG/DL (ref 0.1–1)
BILIRUB UR QL STRIP: NEGATIVE
BNP SERPL-MCNC: 1058 PG/ML (ref 0–99)
BUN SERPL-MCNC: 46 MG/DL (ref 8–23)
CALCIUM SERPL-MCNC: 9.3 MG/DL (ref 8.7–10.5)
CHLORIDE SERPL-SCNC: 111 MMOL/L (ref 95–110)
CK SERPL-CCNC: 84 U/L (ref 20–180)
CLARITY UR: ABNORMAL
CO2 SERPL-SCNC: 21 MMOL/L (ref 23–29)
COLOR UR: YELLOW
CREAT SERPL-MCNC: 2.5 MG/DL (ref 0.5–1.4)
DIFFERENTIAL METHOD: ABNORMAL
EOSINOPHIL # BLD AUTO: 0.1 K/UL (ref 0–0.5)
EOSINOPHIL NFR BLD: 1.8 % (ref 0–8)
ERYTHROCYTE [DISTWIDTH] IN BLOOD BY AUTOMATED COUNT: 13.3 % (ref 11.5–14.5)
EST. GFR  (NO RACE VARIABLE): 18.6 ML/MIN/1.73 M^2
GLUCOSE SERPL-MCNC: 103 MG/DL (ref 70–110)
GLUCOSE UR QL STRIP: NEGATIVE
HCT VFR BLD AUTO: 30.1 % (ref 37–48.5)
HGB BLD-MCNC: 9.9 G/DL (ref 12–16)
HGB UR QL STRIP: ABNORMAL
HYALINE CASTS #/AREA URNS LPF: 4 /LPF
IMM GRANULOCYTES # BLD AUTO: 0.01 K/UL (ref 0–0.04)
IMM GRANULOCYTES NFR BLD AUTO: 0.2 % (ref 0–0.5)
KETONES UR QL STRIP: NEGATIVE
LACTATE SERPL-SCNC: 1.1 MMOL/L (ref 0.5–1.9)
LACTATE SERPL-SCNC: 1.6 MMOL/L (ref 0.5–1.9)
LEUKOCYTE ESTERASE UR QL STRIP: ABNORMAL
LIPASE SERPL-CCNC: 40 U/L (ref 4–60)
LYMPHOCYTES # BLD AUTO: 0.7 K/UL (ref 1–4.8)
LYMPHOCYTES NFR BLD: 16.8 % (ref 18–48)
MAGNESIUM SERPL-MCNC: 2.5 MG/DL (ref 1.6–2.6)
MAGNESIUM SERPL-MCNC: 2.5 MG/DL (ref 1.6–2.6)
MCH RBC QN AUTO: 30.4 PG (ref 27–31)
MCHC RBC AUTO-ENTMCNC: 32.9 G/DL (ref 32–36)
MCV RBC AUTO: 92 FL (ref 82–98)
MICROSCOPIC COMMENT: ABNORMAL
MONOCYTES # BLD AUTO: 0.5 K/UL (ref 0.3–1)
MONOCYTES NFR BLD: 10.5 % (ref 4–15)
NEUTROPHILS # BLD AUTO: 3.1 K/UL (ref 1.8–7.7)
NEUTROPHILS NFR BLD: 70 % (ref 38–73)
NITRITE UR QL STRIP: POSITIVE
NRBC BLD-RTO: 0 /100 WBC
PH UR STRIP: 8 [PH] (ref 5–8)
PLATELET # BLD AUTO: 135 K/UL (ref 150–450)
PMV BLD AUTO: 10.9 FL (ref 9.2–12.9)
POTASSIUM SERPL-SCNC: 4.5 MMOL/L (ref 3.5–5.1)
PROT SERPL-MCNC: 6.4 G/DL (ref 6–8.4)
PROT UR QL STRIP: ABNORMAL
RBC # BLD AUTO: 3.26 M/UL (ref 4–5.4)
RBC #/AREA URNS HPF: >100 /HPF (ref 0–4)
SARS-COV-2 RDRP RESP QL NAA+PROBE: NEGATIVE
SODIUM SERPL-SCNC: 138 MMOL/L (ref 136–145)
SP GR UR STRIP: 1.01 (ref 1–1.03)
SQUAMOUS #/AREA URNS HPF: 2 /HPF
TROPONIN I SERPL HS-MCNC: 30.9 PG/ML (ref 0–14.9)
TROPONIN I SERPL HS-MCNC: 33.3 PG/ML (ref 0–14.9)
TSH SERPL DL<=0.005 MIU/L-ACNC: 3.66 UIU/ML (ref 0.34–5.6)
URN SPEC COLLECT METH UR: ABNORMAL
UROBILINOGEN UR STRIP-ACNC: NEGATIVE EU/DL
WBC # BLD AUTO: 4.4 K/UL (ref 3.9–12.7)
WBC #/AREA URNS HPF: 12 /HPF (ref 0–5)

## 2023-09-09 PROCEDURE — 83735 ASSAY OF MAGNESIUM: CPT | Performed by: EMERGENCY MEDICINE

## 2023-09-09 PROCEDURE — 96375 TX/PRO/DX INJ NEW DRUG ADDON: CPT

## 2023-09-09 PROCEDURE — 80053 COMPREHEN METABOLIC PANEL: CPT | Performed by: EMERGENCY MEDICINE

## 2023-09-09 PROCEDURE — 63600175 PHARM REV CODE 636 W HCPCS: Performed by: EMERGENCY MEDICINE

## 2023-09-09 PROCEDURE — 25000003 PHARM REV CODE 250: Performed by: EMERGENCY MEDICINE

## 2023-09-09 PROCEDURE — 83880 ASSAY OF NATRIURETIC PEPTIDE: CPT | Performed by: EMERGENCY MEDICINE

## 2023-09-09 PROCEDURE — 99285 EMERGENCY DEPT VISIT HI MDM: CPT | Mod: 25

## 2023-09-09 PROCEDURE — 83605 ASSAY OF LACTIC ACID: CPT | Performed by: EMERGENCY MEDICINE

## 2023-09-09 PROCEDURE — 87077 CULTURE AEROBIC IDENTIFY: CPT | Performed by: EMERGENCY MEDICINE

## 2023-09-09 PROCEDURE — 83605 ASSAY OF LACTIC ACID: CPT | Mod: 91 | Performed by: STUDENT IN AN ORGANIZED HEALTH CARE EDUCATION/TRAINING PROGRAM

## 2023-09-09 PROCEDURE — 83690 ASSAY OF LIPASE: CPT | Performed by: EMERGENCY MEDICINE

## 2023-09-09 PROCEDURE — 93005 ELECTROCARDIOGRAM TRACING: CPT | Performed by: GENERAL PRACTICE

## 2023-09-09 PROCEDURE — 83930 ASSAY OF BLOOD OSMOLALITY: CPT | Performed by: STUDENT IN AN ORGANIZED HEALTH CARE EDUCATION/TRAINING PROGRAM

## 2023-09-09 PROCEDURE — 87040 BLOOD CULTURE FOR BACTERIA: CPT | Performed by: EMERGENCY MEDICINE

## 2023-09-09 PROCEDURE — 84484 ASSAY OF TROPONIN QUANT: CPT | Mod: 91 | Performed by: EMERGENCY MEDICINE

## 2023-09-09 PROCEDURE — 51701 INSERT BLADDER CATHETER: CPT

## 2023-09-09 PROCEDURE — 82550 ASSAY OF CK (CPK): CPT | Performed by: EMERGENCY MEDICINE

## 2023-09-09 PROCEDURE — 21400001 HC TELEMETRY ROOM

## 2023-09-09 PROCEDURE — 84443 ASSAY THYROID STIM HORMONE: CPT | Performed by: EMERGENCY MEDICINE

## 2023-09-09 PROCEDURE — 63600175 PHARM REV CODE 636 W HCPCS: Performed by: STUDENT IN AN ORGANIZED HEALTH CARE EDUCATION/TRAINING PROGRAM

## 2023-09-09 PROCEDURE — 93010 ELECTROCARDIOGRAM REPORT: CPT | Mod: 76,,, | Performed by: GENERAL PRACTICE

## 2023-09-09 PROCEDURE — 96365 THER/PROPH/DIAG IV INF INIT: CPT

## 2023-09-09 PROCEDURE — 93010 EKG 12-LEAD: ICD-10-PCS | Mod: 76,,, | Performed by: GENERAL PRACTICE

## 2023-09-09 PROCEDURE — 36415 COLL VENOUS BLD VENIPUNCTURE: CPT | Performed by: EMERGENCY MEDICINE

## 2023-09-09 PROCEDURE — 93010 ELECTROCARDIOGRAM REPORT: CPT | Mod: ,,, | Performed by: GENERAL PRACTICE

## 2023-09-09 PROCEDURE — 85025 COMPLETE CBC W/AUTO DIFF WBC: CPT | Performed by: EMERGENCY MEDICINE

## 2023-09-09 PROCEDURE — 96361 HYDRATE IV INFUSION ADD-ON: CPT

## 2023-09-09 PROCEDURE — 87086 URINE CULTURE/COLONY COUNT: CPT | Performed by: EMERGENCY MEDICINE

## 2023-09-09 PROCEDURE — U0002 COVID-19 LAB TEST NON-CDC: HCPCS | Performed by: EMERGENCY MEDICINE

## 2023-09-09 PROCEDURE — 81001 URINALYSIS AUTO W/SCOPE: CPT | Performed by: EMERGENCY MEDICINE

## 2023-09-09 PROCEDURE — 87186 SC STD MICRODIL/AGAR DIL: CPT | Performed by: EMERGENCY MEDICINE

## 2023-09-09 RX ORDER — SPIRONOLACTONE 25 MG/1
25 TABLET ORAL EVERY OTHER DAY
Status: CANCELLED | OUTPATIENT
Start: 2023-09-10

## 2023-09-09 RX ORDER — FUROSEMIDE 10 MG/ML
20 INJECTION INTRAMUSCULAR; INTRAVENOUS
Status: COMPLETED | OUTPATIENT
Start: 2023-09-09 | End: 2023-09-09

## 2023-09-09 RX ORDER — DIGOXIN 0.25 MG/ML
250 INJECTION INTRAMUSCULAR; INTRAVENOUS
Status: COMPLETED | OUTPATIENT
Start: 2023-09-09 | End: 2023-09-09

## 2023-09-09 RX ORDER — DIGOXIN 0.25 MG/ML
250 INJECTION INTRAMUSCULAR; INTRAVENOUS
Status: DISCONTINUED | OUTPATIENT
Start: 2023-09-09 | End: 2023-09-09

## 2023-09-09 RX ADMIN — DEXTROSE MONOHYDRATE 5 MG/HR: 50 INJECTION, SOLUTION INTRAVENOUS at 09:09

## 2023-09-09 RX ADMIN — FUROSEMIDE 20 MG: 10 INJECTION, SOLUTION INTRAMUSCULAR; INTRAVENOUS at 08:09

## 2023-09-09 RX ADMIN — SODIUM CHLORIDE 250 ML: 0.9 INJECTION, SOLUTION INTRAVENOUS at 06:09

## 2023-09-09 RX ADMIN — CEFTRIAXONE SODIUM 1 G: 1 INJECTION, POWDER, FOR SOLUTION INTRAMUSCULAR; INTRAVENOUS at 08:09

## 2023-09-09 RX ADMIN — DIGOXIN 250 MCG: 0.25 INJECTION INTRAMUSCULAR; INTRAVENOUS at 06:09

## 2023-09-09 RX ADMIN — AMIODARONE HYDROCHLORIDE 1 MG/MIN: 1.8 INJECTION, SOLUTION INTRAVENOUS at 10:09

## 2023-09-09 NOTE — ED PROVIDER NOTES
Encounter Date: 9/9/2023       History     Chief Complaint   Patient presents with    Shortness of Breath     Pt. States she is in A-Fib since 5am     Pt is a 84 y/o F with hx of Afib, Moderate - Severe AS, Tremors, she is here with worsening shortness of breath and palpitations since this morning. She says that she checked her heart rate and knew she was in afib. She has been compliant with the medications.  She denies any nausea, vomiting, chest pain.  She says that she feels short of breath and dizzy.  She is otherwise been doing well at home.  She is not missed any doses of her medication.  She was recently started on amiodarone and tapered off to amiodarone once daily recently.  She denies dysuria, hematuria, abdominal discomfort.  She has been taking her Eliquis as prescribed.          Review of patient's allergies indicates:   Allergen Reactions    Cefuroxime     Hydrocodone     Meperidine     Meperidine hcl Nausea And Vomiting    Persantine [dipyridamole]     Vicodin [hydrocodone-acetaminophen] Nausea And Vomiting    Nitrofurantoin macrocrystal      Headache , went into Afib      Past Medical History:   Diagnosis Date    Atrial fibrillation 01/25/2021    Hypertension      Past Surgical History:   Procedure Laterality Date    ANGIOGRAM, CORONARY, WITH LEFT HEART CATHETERIZATION Left 4/19/2023    Procedure: Angiogram, Coronary, with Left Heart Cath;  Surgeon: Kevin Redmond MD;  Location: [facility] CATH/EP LAB;  Service: Cardiology;  Laterality: Left;    BREAST SURGERY      COLONOSCOPY N/A 4/16/2023    Procedure: COLONOSCOPY;  Surgeon: Kvng Mensah MD;  Location: [facility] ENDO;  Service: Endoscopy;  Laterality: N/A;    COLONOSCOPY W/ POLYPECTOMY  04/16/2023     Family History   Problem Relation Age of Onset    Cancer Mother     Cancer Father      Social History     Tobacco Use    Smoking status: Former     Types: Cigarettes    Smokeless tobacco: Never   Substance Use Topics    Alcohol use: Not Currently    Drug  use: Not Currently     Review of Systems   Constitutional: Negative.  Negative for activity change, appetite change, chills, fatigue and fever.   HENT: Negative.  Negative for congestion, ear pain, rhinorrhea, sore throat and trouble swallowing.    Eyes:  Negative for photophobia, pain, redness and visual disturbance.   Respiratory:  Positive for shortness of breath. Negative for cough, chest tightness and wheezing.    Cardiovascular:  Positive for palpitations. Negative for chest pain and leg swelling.   Gastrointestinal: Negative.  Negative for abdominal pain, blood in stool, constipation, diarrhea, nausea and vomiting.   Genitourinary: Negative.  Negative for decreased urine volume, difficulty urinating, dysuria, flank pain, frequency, pelvic pain and urgency.   Musculoskeletal: Negative.  Negative for arthralgias, back pain, gait problem, myalgias, neck pain and neck stiffness.   Skin: Negative.  Negative for rash.   Neurological: Negative.  Negative for dizziness, syncope, facial asymmetry, speech difficulty, weakness, light-headedness, numbness and headaches.   Psychiatric/Behavioral: Negative.  Negative for confusion.    All other systems reviewed and are negative.      Physical Exam     Initial Vitals [09/09/23 1557]   BP Pulse Resp Temp SpO2   109/76 (!) 118 18 97.6 °F (36.4 °C) 100 %      MAP       --         Physical Exam    Nursing note and vitals reviewed.  Constitutional: She is not diaphoretic. She is active and cooperative.  Non-toxic appearance. She does not have a sickly appearance. She does not appear ill. No distress.   HENT:   Head: Normocephalic and atraumatic.   Right Ear: Tympanic membrane normal.   Left Ear: Tympanic membrane normal.   Nose: Nose normal.   Mouth/Throat: Uvula is midline, oropharynx is clear and moist and mucous membranes are normal. No oral lesions. No uvula swelling. No oropharyngeal exudate, posterior oropharyngeal edema or posterior oropharyngeal erythema.   Eyes:  Conjunctivae, EOM and lids are normal. Pupils are equal, round, and reactive to light. No scleral icterus.   Neck: Trachea normal and phonation normal. Neck supple. No thyroid mass and no thyromegaly present. No stridor present. No JVD present.   Normal range of motion.   Full passive range of motion without pain.     Cardiovascular:  Normal heart sounds, intact distal pulses and normal pulses. An irregularly irregular rhythm present.   Tachycardia present.   Exam reveals no gallop, no distant heart sounds, no friction rub and no decreased pulses.       No murmur heard.  Pulmonary/Chest: Effort normal and breath sounds normal. No accessory muscle usage or stridor. No tachypnea. No respiratory distress. She has no wheezes. She has no rhonchi. She has no rales.   Abdominal: Abdomen is soft. Bowel sounds are normal. She exhibits no distension, no pulsatile midline mass and no mass. There is no abdominal tenderness. There is no rigidity and no guarding.   Musculoskeletal:         General: No tenderness or edema. Normal range of motion.      Right hand: Normal. Normal range of motion. Normal strength. Normal sensation. Normal capillary refill. Normal pulse.      Left hand: Normal. Normal range of motion. Normal strength. Normal sensation. Normal capillary refill. Normal pulse.      Cervical back: Normal, full passive range of motion without pain, normal range of motion and neck supple. No edema, erythema, rigidity or bony tenderness. No spinous process tenderness or muscular tenderness. Normal range of motion.      Thoracic back: Normal. No bony tenderness. Normal range of motion.      Lumbar back: Normal. No bony tenderness. Normal range of motion.      Right foot: Normal. Normal range of motion and normal capillary refill. No tenderness or bony tenderness. Normal pulse.      Left foot: Normal. Normal range of motion and normal capillary refill. No tenderness or bony tenderness. Normal pulse.      Comments: Pulses are  2+ throughout, cap refill is less than 2 sec throughout, extremities are nontender throughout with full range of motion. There is no spinal tenderness to palpation.     Neurological: She is alert and oriented to person, place, and time. She has normal strength. She displays normal reflexes. No cranial nerve deficit or sensory deficit. Gait normal.   No focal deficits.   Skin: Skin is warm, dry and intact. Capillary refill takes less than 2 seconds. No ecchymosis, no petechiae and no rash noted. No erythema. No pallor.   Psychiatric: She has a normal mood and affect. Her speech is normal and behavior is normal. Judgment and thought content normal. Cognition and memory are normal.         ED Course   Procedures  Labs Reviewed   URINALYSIS, REFLEX TO URINE CULTURE - Abnormal; Notable for the following components:       Result Value    Appearance, UA Hazy (*)     Protein, UA Trace (*)     Occult Blood UA 3+ (*)     Nitrite, UA Positive (*)     Leukocytes, UA 2+ (*)     All other components within normal limits    Narrative:     Specimen Source->Urine   CBC W/ AUTO DIFFERENTIAL - Abnormal; Notable for the following components:    RBC 3.26 (*)     Hemoglobin 9.9 (*)     Hematocrit 30.1 (*)     Platelets 135 (*)     Lymph # 0.7 (*)     Lymph % 16.8 (*)     All other components within normal limits   COMPREHENSIVE METABOLIC PANEL - Abnormal; Notable for the following components:    Chloride 111 (*)     CO2 21 (*)     BUN 46 (*)     Creatinine 2.5 (*)     eGFR 18.6 (*)     Anion Gap 6 (*)     All other components within normal limits   TROPONIN I HIGH SENSITIVITY - Abnormal; Notable for the following components:    Troponin I High Sensitivity 30.9 (*)     All other components within normal limits   TROPONIN I HIGH SENSITIVITY - Abnormal; Notable for the following components:    Troponin I High Sensitivity 33.3 (*)     All other components within normal limits   B-TYPE NATRIURETIC PEPTIDE - Abnormal; Notable for the following  components:    BNP 1,058 (*)     All other components within normal limits   URINALYSIS MICROSCOPIC - Abnormal; Notable for the following components:    RBC, UA >100 (*)     WBC, UA 12 (*)     Bacteria Many (*)     Hyaline Casts, UA 4 (*)     All other components within normal limits    Narrative:     Specimen Source->Urine   OSMOLALITY, SERUM - Abnormal; Notable for the following components:    Osmolality 302 (*)     All other components within normal limits   CBC W/ AUTO DIFFERENTIAL - Abnormal; Notable for the following components:    RBC 2.83 (*)     Hemoglobin 8.6 (*)     Hematocrit 26.3 (*)     Platelets 120 (*)     Lymph # 0.9 (*)     All other components within normal limits   BASIC METABOLIC PANEL - Abnormal; Notable for the following components:    Chloride 112 (*)     CO2 20 (*)     BUN 43 (*)     Creatinine 2.2 (*)     Calcium 8.1 (*)     Anion Gap 7 (*)     eGFR 21.7 (*)     All other components within normal limits   LIPASE   CK   MAGNESIUM   TSH   MAGNESIUM   SARS-COV-2 RNA AMPLIFICATION, QUAL   LACTIC ACID, PLASMA   CREATININE, URINE, RANDOM    Narrative:     Specimen Source->Urine   OSMOLALITY, URINE RANDOM    Narrative:     Specimen Source->Urine   SODIUM, URINE, RANDOM    Narrative:     Specimen Source->Urine   LACTIC ACID, PLASMA        ECG Results              EKG 12-lead (Final result)  Result time 09/17/23 12:22:18      Final result by Interface, Lab In [facility] (09/17/23 12:22:18)                   Narrative:    Test Reason : R07.9,    Vent. Rate : 055 BPM     Atrial Rate : 055 BPM     P-R Int : 146 ms          QRS Dur : 090 ms      QT Int : 414 ms       P-R-T Axes : 047 003 -07 degrees     QTc Int : 396 ms    Sinus bradycardia  Possible Left atrial enlargement  LVH  Nonspecific T wave abnormality  Abnormal ECG  When compared with ECG of 09-SEP-2023 16:01,  Significant changes have occurred  Confirmed by Jose D Gatica MD ([id number]) on 9/17/2023 12:22:13 PM    Referred By: CYNTHIA   SELF            Confirmed By:Jose D Gatica MD                                     EKG 12-lead (Final result)  Result time 09/17/23 12:16:38      Final result by Interface, Lab In [facility] (09/17/23 12:16:38)                   Narrative:    Test Reason : I48.91,    Vent. Rate : 114 BPM     Atrial Rate : 000 BPM     P-R Int : 000 ms          QRS Dur : 088 ms      QT Int : 288 ms       P-R-T Axes : 000 -02 027 degrees     QTc Int : 396 ms    Atrial fibrillation with rapid ventricular response  Anterior infarct ,age undetermined  Abnormal ECG  When compared with ECG of 09-AUG-2023 14:41,  Atrial fibrillation has replaced Sinus rhythm  Vent. rate has increased BY  56 BPM  Anterior infarct is now Present  Nonspecific T wave abnormality has replaced inverted T waves in Inferior  leads  Confirmed by En ARRIAGA, Jose D DANIEL ([id number]) on 9/17/2023 12:16:34 PM    Referred By: [email or web address]   SELF           Confirmed By:Jose D Gatica MD                                  Imaging Results              US Retroperitoneal Complete (Final result)  Result time 09/09/23 22:51:47      Final result by Aram Bowie MD (09/09/23 22:51:47)                   Narrative:    EXAMINATION(S):  US RETROPERITONEUM    COMPARISON: CT abdomen May 9, 2023    INDICATION: RICKY    FINDINGS: Routine ultrasound evaluation of the kidneys and retroperitoneum is performed. The right kidney measures 8.7 x 4.4 x 5.7 cm while the left kidney measures 8.6 x 3.6 x 5.5 cm. The left lower pole was partially obscured by bowel gas artifact. Mild diffuse renal cortical thinning is present. No shadowing renal stones or hydronephrosis is evident. No solid or complex cystic renal masses are seen. The urinary bladder, to the extent visualized, appears negative.    IMPRESSION: No acute findings.    Electronically signed by:  Aram Bowie MD  9/9/2023 10:51 PM CDT Workstation: [id number]                                     X-Ray Chest AP Portable (Final result)  Result time 09/09/23  18:16:08      Final result by Roberto Newton MD (09/09/23 18:16:08)                   Narrative:    XR CHEST 1 VIEW, 9/9/2023 4:34 PM CDT    History:  shortnessof breath.    Comparison: 8/9/2023    Findings:  The heart and mediastinum are within normal limits for size, the lungs bilaterally are well inflated. There is scarring at the lung apices, the bilateral upper lobes, and both lung bases; stable chronic findings compared to prior. There is no additional acute abnormality or significant change from prior.    Impression:  Stable chronic changes    Electronically signed by:  Roberto Newton MD  9/9/2023 6:16 PM CDT Workstation: [id number]                                     Medications   amiodarone 360 mg/200 mL (1.8 mg/mL) infusion (0 mg/min Intravenous Stopped 9/10/23 0347)   0.9%  NaCl infusion ( Intravenous New Bag 9/11/23 1600)   sodium chloride 0.9% bolus 250 mL 250 mL (0 mLs Intravenous Stopped 9/9/23 1919)   digoxin injection 250 mcg (250 mcg Intravenous Given 9/9/23 1815)   furosemide injection 20 mg (20 mg Intravenous Given 9/9/23 2003)   cefTRIAXone (ROCEPHIN) 1 g in dextrose 5 % 100 mL IVPB (ready to mix) (0 g Intravenous Stopped 9/9/23 2041)     Medical Decision Making  Amount and/or Complexity of Data Reviewed  Labs: ordered.  Radiology: ordered.    Risk  Prescription drug management.  Decision regarding hospitalization.              Attending Attestation:         Attending Critical Care:   Critical Care Times:   Direct Patient Care (initial evaluation, reassessments, and time considering the case)................................................................10 minutes.   Additional History from reviewing old medical records or taking additional history from the family, EMS, PCP, etc.......................5 minutes.   Ordering, Reviewing, and Interpreting Diagnostic Studies...............................................................................................................6  minutes.   Documentation..................................................................................................................................................................................5 minutes.   Consultation with other Physicians. .................................................................................................................................................5 minutes.   Consultation with the patient's family directly relating to the patient's condition, care, and DNR status (when patient unable)......5 minutes.   ==============================================================  Total Critical Care Time - exclusive of procedural time: 36 minutes.  ==============================================================                     Medical Decision Making:   Clinical Tests:   Lab Tests: Ordered and Reviewed  Radiological Study: Ordered and Reviewed  Medical Tests: Ordered and Reviewed  ED Management:  Emergent evaluation of an 83-year-old female presenting in atrial fibrillation with rapid ventricular response.  Cardizem initiated with improvement.  No chest pain, symptoms have improved.  I have discussed the case with the hospitalist provider who has assumed care will admit.      Clinical Impression:   Final diagnoses:  [R07.9] Chest pain  [I48.91] A-fib        ED Disposition Condition    Admit                 Tiffany Larson MD  10/11/23 1947

## 2023-09-09 NOTE — PHARMACY MED REC
"          Admission Medication History     The home medication history was taken by Lyric Reddy.    You may go to "Admission" then "Reconcile Home Medications" tabs to review and/or act upon these items.     The home medication list has been updated by the Pharmacy department.   Please read ALL comments highlighted in yellow.   Please address this information as you see fit.    Feel free to contact us if you have any questions or require assistance.      Medications listed below were obtained from: Patient/family and Analytic software- [employer]  No current facility-administered medications on file prior to encounter.     Current Outpatient Medications on File Prior to Encounter   Medication Sig Dispense Refill    amiodarone (PACERONE) 200 MG Tab Take 1 tablet (200 mg total) by mouth once daily. 90 tablet 3    ELIQUIS 5 mg Tab TAKE 1 TABLET BY MOUTH TWICE A DAY (Patient taking differently: Take 5 mg by mouth 2 (two) times daily.) 180 tablet 3    magnesium oxide (MAG-OX) 400 mg (241.3 mg magnesium) tablet TAKE 1 TABLET BY MOUTH ONCE DAILY (Patient taking differently: Take 400 mg by mouth once daily.) 90 tablet 3    MIRALAX 17 gram PwPk Take 17 g by mouth 2 (two) times daily.      propranoloL (INDERAL) 10 MG tablet Take 1 tablet (10 mg total) by mouth 2 (two) times daily. 180 tablet 3    spironolactone (ALDACTONE) 25 MG tablet TAKE 1 TABLET BY MOUTH EVERY DAY (Patient taking differently: Take 25 mg by mouth every other day.) 90 tablet 3         Lyric Reddy  [id number]        .          "

## 2023-09-10 LAB
ANION GAP SERPL CALC-SCNC: 7 MMOL/L (ref 8–16)
BASOPHILS # BLD AUTO: 0.03 K/UL (ref 0–0.2)
BASOPHILS NFR BLD: 0.7 % (ref 0–1.9)
BUN SERPL-MCNC: 43 MG/DL (ref 8–23)
CALCIUM SERPL-MCNC: 8.1 MG/DL (ref 8.7–10.5)
CHLORIDE SERPL-SCNC: 112 MMOL/L (ref 95–110)
CO2 SERPL-SCNC: 20 MMOL/L (ref 23–29)
CREAT SERPL-MCNC: 2.2 MG/DL (ref 0.5–1.4)
CREAT UR-MCNC: 29 MG/DL (ref 15–325)
DIFFERENTIAL METHOD: ABNORMAL
EOSINOPHIL # BLD AUTO: 0.1 K/UL (ref 0–0.5)
EOSINOPHIL NFR BLD: 3.3 % (ref 0–8)
ERYTHROCYTE [DISTWIDTH] IN BLOOD BY AUTOMATED COUNT: 13.3 % (ref 11.5–14.5)
EST. GFR  (NO RACE VARIABLE): 21.7 ML/MIN/1.73 M^2
GLUCOSE SERPL-MCNC: 107 MG/DL (ref 70–110)
HCT VFR BLD AUTO: 26.3 % (ref 37–48.5)
HGB BLD-MCNC: 8.6 G/DL (ref 12–16)
IMM GRANULOCYTES # BLD AUTO: 0.01 K/UL (ref 0–0.04)
IMM GRANULOCYTES NFR BLD AUTO: 0.2 % (ref 0–0.5)
IRON SERPL-MCNC: 90 UG/DL (ref 30–160)
LYMPHOCYTES # BLD AUTO: 0.9 K/UL (ref 1–4.8)
LYMPHOCYTES NFR BLD: 20.8 % (ref 18–48)
MCH RBC QN AUTO: 30.4 PG (ref 27–31)
MCHC RBC AUTO-ENTMCNC: 32.7 G/DL (ref 32–36)
MCV RBC AUTO: 93 FL (ref 82–98)
MONOCYTES # BLD AUTO: 0.5 K/UL (ref 0.3–1)
MONOCYTES NFR BLD: 10.8 % (ref 4–15)
NEUTROPHILS # BLD AUTO: 2.7 K/UL (ref 1.8–7.7)
NEUTROPHILS NFR BLD: 64.2 % (ref 38–73)
NRBC BLD-RTO: 0 /100 WBC
OSMOLALITY SERPL: 302 MOSM/KG (ref 275–295)
OSMOLALITY UR: 349 MOSM/KG (ref 50–1200)
PLATELET # BLD AUTO: 120 K/UL (ref 150–450)
PMV BLD AUTO: 10.7 FL (ref 9.2–12.9)
POTASSIUM SERPL-SCNC: 3.7 MMOL/L (ref 3.5–5.1)
RBC # BLD AUTO: 2.83 M/UL (ref 4–5.4)
SATURATED IRON: 25 % (ref 20–50)
SODIUM SERPL-SCNC: 139 MMOL/L (ref 136–145)
SODIUM UR-SCNC: 120 MMOL/L (ref 20–250)
TOTAL IRON BINDING CAPACITY: 353 UG/DL (ref 250–450)
TRANSFERRIN SERPL-MCNC: 252 MG/DL (ref 200–375)
TRANSFERRIN SERPL-MCNC: 252 MG/DL (ref 200–375)
WBC # BLD AUTO: 4.27 K/UL (ref 3.9–12.7)

## 2023-09-10 PROCEDURE — 63600175 PHARM REV CODE 636 W HCPCS: Performed by: STUDENT IN AN ORGANIZED HEALTH CARE EDUCATION/TRAINING PROGRAM

## 2023-09-10 PROCEDURE — 99223 PR INITIAL HOSPITAL CARE,LEVL III: ICD-10-PCS | Mod: ,,, | Performed by: GENERAL PRACTICE

## 2023-09-10 PROCEDURE — 99223 1ST HOSP IP/OBS HIGH 75: CPT | Mod: ,,, | Performed by: GENERAL PRACTICE

## 2023-09-10 PROCEDURE — 80048 BASIC METABOLIC PNL TOTAL CA: CPT | Performed by: STUDENT IN AN ORGANIZED HEALTH CARE EDUCATION/TRAINING PROGRAM

## 2023-09-10 PROCEDURE — 82570 ASSAY OF URINE CREATININE: CPT | Performed by: STUDENT IN AN ORGANIZED HEALTH CARE EDUCATION/TRAINING PROGRAM

## 2023-09-10 PROCEDURE — 25000003 PHARM REV CODE 250: Performed by: GENERAL PRACTICE

## 2023-09-10 PROCEDURE — 83935 ASSAY OF URINE OSMOLALITY: CPT | Performed by: STUDENT IN AN ORGANIZED HEALTH CARE EDUCATION/TRAINING PROGRAM

## 2023-09-10 PROCEDURE — 85025 COMPLETE CBC W/AUTO DIFF WBC: CPT | Performed by: STUDENT IN AN ORGANIZED HEALTH CARE EDUCATION/TRAINING PROGRAM

## 2023-09-10 PROCEDURE — 21400001 HC TELEMETRY ROOM

## 2023-09-10 PROCEDURE — 25000003 PHARM REV CODE 250: Performed by: STUDENT IN AN ORGANIZED HEALTH CARE EDUCATION/TRAINING PROGRAM

## 2023-09-10 PROCEDURE — 83540 ASSAY OF IRON: CPT | Performed by: GENERAL PRACTICE

## 2023-09-10 PROCEDURE — 84300 ASSAY OF URINE SODIUM: CPT | Performed by: STUDENT IN AN ORGANIZED HEALTH CARE EDUCATION/TRAINING PROGRAM

## 2023-09-10 PROCEDURE — 36415 COLL VENOUS BLD VENIPUNCTURE: CPT | Performed by: GENERAL PRACTICE

## 2023-09-10 PROCEDURE — 84466 ASSAY OF TRANSFERRIN: CPT | Performed by: GENERAL PRACTICE

## 2023-09-10 RX ORDER — SODIUM CHLORIDE 9 MG/ML
INJECTION, SOLUTION INTRAVENOUS CONTINUOUS
Status: ACTIVE | OUTPATIENT
Start: 2023-09-10 | End: 2023-09-11

## 2023-09-10 RX ORDER — LANOLIN ALCOHOL/MO/W.PET/CERES
800 CREAM (GRAM) TOPICAL
Status: DISCONTINUED | OUTPATIENT
Start: 2023-09-10 | End: 2023-09-12 | Stop reason: HOSPADM

## 2023-09-10 RX ORDER — SODIUM CHLORIDE 0.9 % (FLUSH) 0.9 %
10 SYRINGE (ML) INJECTION EVERY 6 HOURS PRN
Status: DISCONTINUED | OUTPATIENT
Start: 2023-09-10 | End: 2023-09-12 | Stop reason: HOSPADM

## 2023-09-10 RX ORDER — MORPHINE SULFATE 4 MG/ML
4 INJECTION, SOLUTION INTRAMUSCULAR; INTRAVENOUS EVERY 4 HOURS PRN
Status: DISCONTINUED | OUTPATIENT
Start: 2023-09-10 | End: 2023-09-12 | Stop reason: HOSPADM

## 2023-09-10 RX ORDER — SODIUM,POTASSIUM PHOSPHATES 280-250MG
2 POWDER IN PACKET (EA) ORAL
Status: DISCONTINUED | OUTPATIENT
Start: 2023-09-10 | End: 2023-09-12 | Stop reason: HOSPADM

## 2023-09-10 RX ORDER — PROCHLORPERAZINE EDISYLATE 5 MG/ML
5 INJECTION INTRAMUSCULAR; INTRAVENOUS EVERY 6 HOURS PRN
Status: DISCONTINUED | OUTPATIENT
Start: 2023-09-10 | End: 2023-09-12 | Stop reason: HOSPADM

## 2023-09-10 RX ORDER — ACETAMINOPHEN 325 MG/1
650 TABLET ORAL EVERY 8 HOURS PRN
Status: DISCONTINUED | OUTPATIENT
Start: 2023-09-10 | End: 2023-09-12 | Stop reason: HOSPADM

## 2023-09-10 RX ORDER — POLYETHYLENE GLYCOL 3350 17 G/17G
17 POWDER, FOR SOLUTION ORAL DAILY
Status: DISCONTINUED | OUTPATIENT
Start: 2023-09-10 | End: 2023-09-12 | Stop reason: HOSPADM

## 2023-09-10 RX ORDER — AMIODARONE HYDROCHLORIDE 200 MG/1
200 TABLET ORAL DAILY
Status: DISCONTINUED | OUTPATIENT
Start: 2023-09-10 | End: 2023-09-11

## 2023-09-10 RX ORDER — PROPRANOLOL HYDROCHLORIDE 10 MG/1
10 TABLET ORAL 2 TIMES DAILY
Status: DISCONTINUED | OUTPATIENT
Start: 2023-09-10 | End: 2023-09-12 | Stop reason: HOSPADM

## 2023-09-10 RX ORDER — TALC
6 POWDER (GRAM) TOPICAL NIGHTLY PRN
Status: DISCONTINUED | OUTPATIENT
Start: 2023-09-10 | End: 2023-09-12 | Stop reason: HOSPADM

## 2023-09-10 RX ORDER — ONDANSETRON 2 MG/ML
4 INJECTION INTRAMUSCULAR; INTRAVENOUS EVERY 8 HOURS PRN
Status: DISCONTINUED | OUTPATIENT
Start: 2023-09-10 | End: 2023-09-12 | Stop reason: HOSPADM

## 2023-09-10 RX ORDER — LANOLIN ALCOHOL/MO/W.PET/CERES
400 CREAM (GRAM) TOPICAL DAILY
Status: DISCONTINUED | OUTPATIENT
Start: 2023-09-10 | End: 2023-09-12 | Stop reason: HOSPADM

## 2023-09-10 RX ORDER — HYDROCODONE BITARTRATE AND ACETAMINOPHEN 5; 325 MG/1; MG/1
1 TABLET ORAL EVERY 4 HOURS PRN
Status: DISCONTINUED | OUTPATIENT
Start: 2023-09-10 | End: 2023-09-12 | Stop reason: HOSPADM

## 2023-09-10 RX ADMIN — ACETAMINOPHEN 650 MG: 325 TABLET ORAL at 03:09

## 2023-09-10 RX ADMIN — Medication 400 MG: at 09:09

## 2023-09-10 RX ADMIN — ONDANSETRON 4 MG: 2 INJECTION INTRAMUSCULAR; INTRAVENOUS at 07:09

## 2023-09-10 RX ADMIN — Medication 6 MG: at 09:09

## 2023-09-10 RX ADMIN — CEFTRIAXONE SODIUM 1 G: 1 INJECTION, POWDER, FOR SOLUTION INTRAMUSCULAR; INTRAVENOUS at 09:09

## 2023-09-10 RX ADMIN — ACETAMINOPHEN 650 MG: 325 TABLET ORAL at 09:09

## 2023-09-10 RX ADMIN — APIXABAN 2.5 MG: 2.5 TABLET, FILM COATED ORAL at 09:09

## 2023-09-10 RX ADMIN — APIXABAN 2.5 MG: 2.5 TABLET, FILM COATED ORAL at 12:09

## 2023-09-10 RX ADMIN — SODIUM CHLORIDE: 0.9 INJECTION, SOLUTION INTRAVENOUS at 06:09

## 2023-09-10 RX ADMIN — PROPRANOLOL HYDROCHLORIDE 10 MG: 10 TABLET ORAL at 09:09

## 2023-09-10 RX ADMIN — AMIODARONE HYDROCHLORIDE 0.5 MG/MIN: 1.8 INJECTION, SOLUTION INTRAVENOUS at 03:09

## 2023-09-10 NOTE — ED NOTES
ALERT AND ORIENTED. NAD.  NSR AT MONITOR. AMIODARONE INFUSING TO LAC 20G IV SITE. CALL LIGHT IN REACH.  FALLS PRECAUTIONS.

## 2023-09-10 NOTE — HPI
Pt is a 82 y/o F with hx of Afib, Moderate - Severe AS, Tremors, she is here with worsening shortness of breath and palpitations since this morning. She says that she checked her heart rate and knew she was in afib. She has been compliant with the medications.  She denies any nausea, vomiting, chest pain.  She says that she feels short of breath and dizzy.  She is otherwise been doing well at home.  She is not missed any doses of her medication.  She was recently started on amiodarone and tapered off to amiodarone once daily recently.  She denies dysuria, hematuria, abdominal discomfort.  She has been taking her Eliquis as prescribed.  She has no other updates to her past medical, family, surgical, social history.    In the ED:  T 97.6°, P 118 > 142, /76, O2 sat 100% on room air.  WBC 4.4, hemoglobin 9.9, platelets 135, she has RICKY with creatinine of 2.5 up from 1.8, BNP 1058, troponin 33.3, lactic acid 1.1, TSH 3.6, COVID negative, urinalysis suggests urinary tract infection.  Will admit for AFib with RVR.  She was given digoxin in the ER as well as diltiazem.  Will go ahead and transitioned to amiodarone IV load and drip.

## 2023-09-10 NOTE — CONSULTS
[facility]  Department of Cardiology  Consult Note      PATIENT NAME: Irene العراقي    MRN: [id number]  TODAY'S DATE: 09/10/2023  ADMIT DATE: 9/9/2023                          CONSULT REQUESTED BY: Dmitriy Gray MD    SUBJECTIVE     PRINCIPAL PROBLEM: Atrial fibrillation with RVR      REASON FOR CONSULT:  Recurrent atrial fibrillation      HPI:  Dr. Tovar  Pt is a 82 y/o F with hx of Afib, Moderate - Severe AS, Tremors, she is here with worsening shortness of breath and palpitations since this morning. She says that she checked her heart rate and knew she was in afib. She has been compliant with the medications.  She denies any nausea, vomiting, chest pain.  She says that she feels short of breath and dizzy.  She is otherwise been doing well at home.  She is not missed any doses of her medication.  She was recently started on amiodarone and tapered off to amiodarone once daily recently.  She denies dysuria, hematuria, abdominal discomfort.  She has been taking her Eliquis as prescribed.  She has no other updates to her past medical, family, surgical, social history.     In the ED:  T 97.6°, P 118 > 142, /76, O2 sat 100% on room air.  WBC 4.4, hemoglobin 9.9, platelets 135, she has RICKY with creatinine of 2.5 up from 1.8, BNP 1058, troponin 33.3, lactic acid 1.1, TSH 3.6, COVID negative, urinalysis suggests urinary tract infection.  Will admit for AFib with RVR.  She was given digoxin in the ER as well as diltiazem.  Will go ahead and transitioned to amiodarone IV load and drip.       9/10  Patient is known to our group.  Last seen by Porfirio Valdez 08/18/2023     HPI:  Patient seen today for hospital follow-up.  She states that she has been doing okay except she is noticing a new tremor.  She does have a familial history of essential tremor as her sister has this as well as multiple other family members.  She denies any bleeding issues.  Her breathing is stable  Atrial fibrillation with RVR  - Primary      Current Assessment & Plan       Patient is maintaining in sinus rhythm.  Will reduce her amiodarone to 200 mg p.o. daily.  I do not believe that the amiodarone is contributing to her tremor but may be rather than lack of sotalol.  Continue with Eliquis 5 mg p.o. b.i.d..           Relevant Medications     propranoloL (INDERAL) 10 MG tablet     amiodarone (PACERONE) 200 MG Tab     Diastolic dysfunction     Current Assessment & Plan       Euvolemic on exam.           Severe aortic stenosis     Current Assessment & Plan       Discussed these findings with the patient and her nephew.  Will refer her to Dr. GÓMEZ in [place], [place] for evaluation and possible TAVR.  She had nonobstructive disease on an angiogram earlier this year.           Relevant Orders     Ambulatory referral/consult to Interventional Cardiology     Tremors of nervous system     Relevant Medications     propranoloL (INDERAL) 10 MG tablet         Follow up in about 6 weeks (around 9/29/2023).       Review of patient's allergies indicates:   Allergen Reactions    Hydrocodone     Meperidine     Meperidine hcl Nausea And Vomiting    Persantine [dipyridamole]     Vicodin [hydrocodone-acetaminophen] Nausea And Vomiting    Nitrofurantoin macrocrystal      Headache , went into Afib        Past Medical History:   Diagnosis Date    Atrial fibrillation 01/25/2021    Hypertension      Past Surgical History:   Procedure Laterality Date    ANGIOGRAM, CORONARY, WITH LEFT HEART CATHETERIZATION Left 4/19/2023    Procedure: Angiogram, Coronary, with Left Heart Cath;  Surgeon: Kevin Redmond MD;  Location: [facility] CATH/EP LAB;  Service: Cardiology;  Laterality: Left;    BREAST SURGERY      COLONOSCOPY N/A 4/16/2023    Procedure: COLONOSCOPY;  Surgeon: Kvng Mensah MD;  Location: [facility] ENDO;  Service: Endoscopy;  Laterality: N/A;    COLONOSCOPY W/ POLYPECTOMY  04/16/2023     Social History     Tobacco Use    Smoking status: Former     Types: Cigarettes     Smokeless tobacco: Never   Substance Use Topics    Alcohol use: Not Currently    Drug use: Not Currently        REVIEW OF SYSTEMS  CONSTITUTIONAL: Negative for chills, fatigue and fever.   EYES: No double vision, No blurred vision  NEURO: No headaches, No dizziness  RESPIRATORY: Negative for cough, shortness of breath and wheezing.    CARDIOVASCULAR: Negative for chest pain. Negative for palpitations and leg swelling.   GI: Negative for abdominal pain, No melena, diarrhea, nausea and vomiting.   : Negative for dysuria and frequency, Negative for hematuria  SKIN: Negative for bruising, Negative for edema or discoloration noted.   ENDOCRINE: Negative for polyphagia, Negative for heat intolerance, Negative for cold intolerance  PSYCHIATRIC: Negative for depression, Negative for anxiety, Negative for memory loss  MUSCULOSKELETAL: Negative for neck pain, Negative for muscle weakness, Negative for back pain     OBJECTIVE     VITAL SIGNS (Most Recent)  Temp: 97.8 °F (36.6 °C) (09/10/23 1543)  Pulse: 60 (09/10/23 1543)  Resp: 18 (09/10/23 1543)  BP: (!) 107/56 (09/10/23 1543)  SpO2: (!) 91 % (09/10/23 1543)    VENTILATION STATUS  Resp: 18 (09/10/23 1543)  SpO2: (!) 91 % (09/10/23 1543)           I & O (Last 24H):  Intake/Output Summary (Last 24 hours) at 9/10/2023 1739  Last data filed at 9/10/2023 1304  Gross per 24 hour   Intake 630.91 ml   Output 600 ml   Net 30.91 ml       WEIGHTS  Wt Readings from Last 1 Encounters:   09/10/23 1315 73 kg (160 lb 15 oz)   09/09/23 1557 73.5 kg (162 lb)       PHYSICAL EXAM  GENERAL: well built, well nourished, well-developed in no apparent distress alert and oriented.   HEENT: Normocephalic. Pupils normal and conjunctivae normal.  Mucous membranes normal, no cyanosis or icterus, trachea central,no pallor or icterus is noted..   NECK: No JVD. No bruit..   THYROID: Thyroid not enlarged. No nodules present..   CARDIAC: Regular rate and rhythm.  HARSH 4 6 SYSTOLIC EJECTION MURMUR LEFT  STERNAL BORDER CHEST ANATOMY: normal.   LUNGS: Clear to auscultation. No wheezing or rhonchi..   ABDOMEN: Soft no masses or organomegaly.  No abdomen pulsations or bruits.  Normal bowel sounds. No pulsations and no masses felt, No guarding or rebound.   URINARY: No araya catheter   EXTREMITIES: No cyanosis, clubbing or edema noted at this time., no calf tenderness bilaterally.   PERIPHERAL VASCULAR SYSTEM: Good palpable distal pulses.   CENTRAL NERVOUS SYSTEM: No focal motor or sensory deficits noted.   SKIN: Skin without lesions, moist, well perfused.   MUSCLE STRENGTH & TONE: No noteable weakness, atrophy or abnormal movement.     HOME MEDICATIONS:  No current facility-administered medications on file prior to encounter.     Current Outpatient Medications on File Prior to Encounter   Medication Sig Dispense Refill    amiodarone (PACERONE) 200 MG Tab Take 1 tablet (200 mg total) by mouth once daily. 90 tablet 3    ELIQUIS 5 mg Tab TAKE 1 TABLET BY MOUTH TWICE A DAY (Patient taking differently: Take 5 mg by mouth 2 (two) times daily.) 180 tablet 3    magnesium oxide (MAG-OX) 400 mg (241.3 mg magnesium) tablet TAKE 1 TABLET BY MOUTH ONCE DAILY (Patient taking differently: Take 400 mg by mouth once daily.) 90 tablet 3    MIRALAX 17 gram PwPk Take 17 g by mouth 2 (two) times daily.      propranoloL (INDERAL) 10 MG tablet Take 1 tablet (10 mg total) by mouth 2 (two) times daily. 180 tablet 3    spironolactone (ALDACTONE) 25 MG tablet TAKE 1 TABLET BY MOUTH EVERY DAY (Patient taking differently: Take 25 mg by mouth every other day.) 90 tablet 3       SCHEDULED MEDS:   amiodarone  200 mg Oral Daily    apixaban  2.5 mg Oral BID    cefTRIAXone (ROCEPHIN) IVPB  1 g Intravenous Q24H    magnesium oxide  400 mg Oral Daily    polyethylene glycol  17 g Oral Daily    propranoloL  10 mg Oral BID       CONTINUOUS INFUSIONS:    PRN MEDS:acetaminophen, HYDROcodone-acetaminophen, magnesium oxide, magnesium oxide, melatonin, morphine,  "ondansetron, potassium bicarbonate, potassium bicarbonate, potassium bicarbonate, potassium, sodium phosphates, potassium, sodium phosphates, potassium, sodium phosphates, prochlorperazine, sodium chloride 0.9%    LABS AND DIAGNOSTICS     CBC LAST 3 DAYS  Recent Labs   Lab 09/09/23  1710 09/10/23  0344   WBC 4.40 4.27   RBC 3.26* 2.83*   HGB 9.9* 8.6*   HCT 30.1* 26.3*   MCV 92 93   MCH 30.4 30.4   MCHC 32.9 32.7   RDW 13.3 13.3   * 120*   MPV 10.9 10.7   GRAN 70.0  3.1 64.2  2.7   LYMPH 16.8*  0.7* 20.8  0.9*   MONO 10.5  0.5 10.8  0.5   BASO 0.03 0.03   NRBC 0 0       COAGULATION LAST 3 DAYS  No results for input(s): "LABPT", "INR", "APTT" in the last 168 hours.    CHEMISTRY LAST 3 DAYS  Recent Labs   Lab 09/09/23  1710 09/10/23  0344    139   K 4.5 3.7   * 112*   CO2 21* 20*   ANIONGAP 6* 7*   BUN 46* 43*   CREATININE 2.5* 2.2*    107   CALCIUM 9.3 8.1*   MG 2.5  2.5  --    ALBUMIN 3.7  --    PROT 6.4  --    ALKPHOS 66  --    ALT 13  --    AST 21  --    BILITOT 0.7  --        CARDIAC PROFILE LAST 3 DAYS  Recent Labs   Lab 09/09/23 1710 09/09/23  1831   BNP 1,058*  --    CPK 84  --    TROPONINIHS 30.9* 33.3*       ENDOCRINE LAST 3 DAYS  Recent Labs   Lab 09/09/23  1710   TSH 3.660       LAST ARTERIAL BLOOD GAS  ABG  No results for input(s): "PH", "PO2", "PCO2", "HCO3", "BE" in the last 168 hours.    LAST 7 DAYS MICROBIOLOGY   Microbiology Results (last 7 days)       Procedure Component Value Units Date/Time    Urine culture [[id number]]  (Abnormal) Collected: 09/09/23 9343    Order Status: Completed Specimen: Urine Updated: 09/10/23 0841     Urine Culture, Routine GRAM NEGATIVE MEREDITH, NON-LACTOSE   >100,000 cfu/ml  Identification and susceptibility pending      Narrative:      Specimen Source->Urine    Blood culture #1 **CANNOT BE ORDERED STAT** [[id number]] Collected: 09/09/23 2040    Order Status: Completed Specimen: Blood from Antecubital, Right Updated: 09/10/23 0517    "  Blood Culture, Routine No Growth to date    Narrative:      Collection has been rescheduled by CH10 at 09/09/2023 20:33 Reason:   Pt using restroom   Collection has been rescheduled by CH10 at 09/09/2023 20:33 Reason:   Pt using restroom     Blood culture #2 **CANNOT BE ORDERED STAT** [[id number]] Collected: 09/09/23 2048    Order Status: Completed Specimen: Blood Updated: 09/10/23 0517     Blood Culture, Routine No Growth to date    Narrative:      Collection has been rescheduled by CH10 at 09/09/2023 20:33 Reason:   Pt using restroom   Collection has been rescheduled by CH10 at 09/09/2023 20:33 Reason:   Pt using restroom             MOST RECENT IMAGING  US Retroperitoneal Complete  EXAMINATION(S):  US RETROPERITONEUM    COMPARISON: CT abdomen May 9, 2023    INDICATION: RICKY    FINDINGS: Routine ultrasound evaluation of the kidneys and retroperitoneum is performed. The right kidney measures 8.7 x 4.4 x 5.7 cm while the left kidney measures 8.6 x 3.6 x 5.5 cm. The left lower pole was partially obscured by bowel gas artifact. Mild diffuse renal cortical thinning is present. No shadowing renal stones or hydronephrosis is evident. No solid or complex cystic renal masses are seen. The urinary bladder, to the extent visualized, appears negative.    IMPRESSION: No acute findings.    Electronically signed by:  Aram Bowie MD  9/9/2023 10:51 PM CDT Workstation: [id number]  X-Ray Chest AP Portable  XR CHEST 1 VIEW, 9/9/2023 4:34 PM CDT    History:  shortnessof breath.    Comparison: 8/9/2023    Findings:  The heart and mediastinum are within normal limits for size, the lungs bilaterally are well inflated. There is scarring at the lung apices, the bilateral upper lobes, and both lung bases; stable chronic findings compared to prior. There is no additional acute abnormality or significant change from prior.    Impression:  Stable chronic changes    Electronically signed by:  Roberto Newton MD  9/9/2023 6:16 PM CDT  Workstation: [id number]      ECHOCARDIOGRAM RESULTS (last 5)  Results for orders placed during the hospital encounter of 08/09/23    Echo    Interpretation Summary    Limited study    Left Ventricle: The left ventricle is normal in size. Mildly increased wall thickness. There is concentric remodeling. Normal wall motion. There is normal systolic function with a visually estimated ejection fraction of 65 - 70%. Diastolic function cannot be reliably determined in the presence of mitral annular calcification.    Aortic Valve: Moderate annular calcification. Moderately restricted motion. There is moderate to severe stenosis. Aortic valve area by VTI is 0.77 cm². Aortic valve peak velocity is 3.66 m/s. Mean gradient is 34 mmHg. The dimensionless index is 0.25. There is moderate aortic regurgitation.    Mitral Valve: There is bileaflet sclerosis. Mild posterior mitral annular calcification. There is moderate to severe regurgitation. decreased excursion.    Left Atrium: Left atrium is severely dilated.    Right Ventricle: Normal right ventricular cavity size.    Tricuspid Valve: There is mild regurgitation.    Pulmonic Valve: There is mild regurgitation.    IVC/SVC: Intermediate venous pressure at 8 mmHg.      Results for orders placed during the hospital encounter of 04/10/23    Echo    Interpretation Summary  · The left ventricle is normal in size with concentric remodeling and normal systolic function.  · Mild left atrial enlargement.  · The estimated ejection fraction is 65%.  · Indeterminate left ventricular diastolic function.  · Normal right ventricular size with normal right ventricular systolic function.  · There is moderate aortic valve stenosis.  · Aortic valve area is 1.24 cm2; peak velocity is 3.51 m/s; mean gradient is 32 mmHg.  · Mild mitral regurgitation.  · Mild tricuspid regurgitation.  · There is mild to moderate pulmonary hypertension with RVSP at 47 mmhg      Results for orders placed during the  hospital encounter of 07/23/22    Echo    Interpretation Summary  · There is moderate aortic valve stenosis.  · Aortic valve area is 1.49 cm2; peak velocity is m/s; mean gradient is 26 mmHg.  · Mild aortic regurgitation.  · Mild mitral regurgitation.  · The left ventricle is normal in size with concentric remodeling and normal systolic function.  · The estimated ejection fraction is 60%.  · Grade I left ventricular diastolic dysfunction.  · Normal right ventricular size with normal right ventricular systolic function.  · Mild left atrial enlargement.  · Mild tricuspid regurgitation.  · Intermediate central venous pressure (8 mmHg).  · The estimated PA systolic pressure is 34 mmHg.      Results for orders placed during the hospital encounter of 02/09/20    Echo Color Flow Doppler? Yes; Bubble Contrast? Yes    Interpretation Summary  · Concentric left ventricular remodeling.  · Intravenous Saline (bubble) contrast was used during the study.Study is negative for shunt  · Left ventricular systolic function. The estimated ejection fraction is 65%.  · Grade I (mild) left ventricular diastolic dysfunction consistent with impaired relaxation.  · Normal right ventricular systolic function.  · Mild aortic regurgitation.  · Mild aortic valve stenosis.  · Aortic valve area is 1.54 cm2; peak velocity is 2.59 m/s; mean gradient is 17 mmHg.  · Mild tricuspid regurgitation.  · Normal central venous pressure (3 mmHg).  · The estimated PA systolic pressure is 29 mmHg.      CURRENT/PREVIOUS VISIT EKG  Results for orders placed or performed during the hospital encounter of 09/09/23   EKG 12-lead    Collection Time: 09/09/23 11:27 PM    Narrative    Test Reason : R07.9,    Vent. Rate : 055 BPM     Atrial Rate : 055 BPM     P-R Int : 146 ms          QRS Dur : 090 ms      QT Int : 414 ms       P-R-T Axes : 047 003 -07 degrees     QTc Int : 396 ms    Sinus bradycardia  Possible Left atrial enlargement  LVH  Nonspecific T wave  abnormality  Abnormal ECG  When compared with ECG of 09-SEP-2023 16:01,  Significant changes have occurred    Referred By: [email or web address]   SELF           Confirmed By:            ASSESSMENT/PLAN:     Active Hospital Problems    Diagnosis    *Atrial fibrillation with RVR    Cystitis    RICKY (acute kidney injury)    Tremors of nervous system    Severe aortic stenosis       ASSESSMENT & PLAN:   Paroxysmal atrial fibrillation        RECOMMENDATIONS:  Since the patient has recently been started on amiodarone and her symptoms occurred after decreasing the dose from twice to once a day will increase the dose back to twice a day.  She is currently in sinus rhythm and will try to maintain the rhythm by continuing the larger dose for now.    She has primarily a rest tremor not intention tremor which is probably not secondary to the amiodarone.  She notices a minimal tremor when she tries to use her hand and she may benefit from neurology consult as an outpatient before changing her medications.  Continue propranolol for now    She has a loud murmur and aortic stenosis evaluation treatment may help prevent recurrent Afib.    HER BNP LEVEL IS ELEVATED PROBABLY SECONDARY TO ACUTE KIDNEY INJURY AND ATRIAL FIBRILLATION.  CHEST X-RAY IS CLEAR OF HEART FAILURE    SHE HAS ACUTE KIDNEY INJURY WITH A GFR OF 21.  HER BLOOD PRESSURE IS A LITTLE LOW AND SHE HAS A UTI WILL HYDRATE OVERNIGHT.  SHE MAY NEED RENAL CONSULT.  SHE HAS ANEMIA WITH IRON SATURATION LOW NORMAL MIGHT BENEFIT FROM SOME IRON       Jose D Gatica MD  Date of Service: 09/10/2023  5:39 PM

## 2023-09-10 NOTE — SUBJECTIVE & OBJECTIVE
Interval History:  Patient reports overall improvement of symptoms.  Rate controlled.  On amiodarone drip this morning.    Review of Systems   Constitutional:  Negative for diaphoresis and fatigue.   Respiratory:  Negative for shortness of breath and stridor.    Cardiovascular:  Negative for chest pain and leg swelling.     Objective:     Vital Signs (Most Recent):  Temp: 97.5 °F (36.4 °C) (09/10/23 1315)  Pulse: 60 (09/10/23 1315)  Resp: 20 (09/10/23 1315)  BP: 127/64 (09/10/23 1315)  SpO2: 96 % (09/10/23 1315) Vital Signs (24h Range):  Temp:  [97.4 °F (36.3 °C)-97.9 °F (36.6 °C)] 97.5 °F (36.4 °C)  Pulse:  [] 60  Resp:  [12-37] 20  SpO2:  [92 %-100 %] 96 %  BP: ()/(47-84) 127/64     Weight: 73 kg (160 lb 15 oz)  Body mass index is 25.98 kg/m².    Intake/Output Summary (Last 24 hours) at 9/10/2023 1336  Last data filed at 9/10/2023 1304  Gross per 24 hour   Intake 630.91 ml   Output 600 ml   Net 30.91 ml         Physical Exam  Constitutional:       Appearance: Normal appearance.   Cardiovascular:      Rate and Rhythm: Normal rate. Rhythm irregular.   Pulmonary:      Effort: Respiratory distress present.      Breath sounds: Stridor present.   Neurological:      Mental Status: She is alert.             Significant Labs: All pertinent labs within the past 24 hours have been reviewed.  CBC:   Recent Labs   Lab 09/09/23  1710 09/10/23  0344   WBC 4.40 4.27   HGB 9.9* 8.6*   HCT 30.1* 26.3*   * 120*     CMP:   Recent Labs   Lab 09/09/23  1710 09/10/23  0344    139   K 4.5 3.7   * 112*   CO2 21* 20*    107   BUN 46* 43*   CREATININE 2.5* 2.2*   CALCIUM 9.3 8.1*   PROT 6.4  --    ALBUMIN 3.7  --    BILITOT 0.7  --    ALKPHOS 66  --    AST 21  --    ALT 13  --    ANIONGAP 6* 7*       Significant Imaging: I have reviewed all pertinent imaging results/findings within the past 24 hours.

## 2023-09-10 NOTE — ED NOTES
Converted to NSR, VSS. EKG complete. Pt in NAD at this time with remaining VSS. No complaints/requests. Admitted and boarding until bed available.

## 2023-09-10 NOTE — SUBJECTIVE & OBJECTIVE
Past Medical History:   Diagnosis Date    Atrial fibrillation 01/25/2021    Hypertension        Past Surgical History:   Procedure Laterality Date    ANGIOGRAM, CORONARY, WITH LEFT HEART CATHETERIZATION Left 4/19/2023    Procedure: Angiogram, Coronary, with Left Heart Cath;  Surgeon: eKvin Redmond MD;  Location: [facility] CATH/EP LAB;  Service: Cardiology;  Laterality: Left;    BREAST SURGERY      COLONOSCOPY N/A 4/16/2023    Procedure: COLONOSCOPY;  Surgeon: Kvng Mensah MD;  Location: [facility] ENDO;  Service: Endoscopy;  Laterality: N/A;    COLONOSCOPY W/ POLYPECTOMY  04/16/2023       Review of patient's allergies indicates:   Allergen Reactions    Hydrocodone     Meperidine     Meperidine hcl Nausea And Vomiting    Persantine [dipyridamole]     Vicodin [hydrocodone-acetaminophen] Nausea And Vomiting    Nitrofurantoin macrocrystal      Headache , went into Afib        No current facility-administered medications on file prior to encounter.     Current Outpatient Medications on File Prior to Encounter   Medication Sig    amiodarone (PACERONE) 200 MG Tab Take 1 tablet (200 mg total) by mouth once daily.    ELIQUIS 5 mg Tab TAKE 1 TABLET BY MOUTH TWICE A DAY (Patient taking differently: Take 5 mg by mouth 2 (two) times daily.)    magnesium oxide (MAG-OX) 400 mg (241.3 mg magnesium) tablet TAKE 1 TABLET BY MOUTH ONCE DAILY (Patient taking differently: Take 400 mg by mouth once daily.)    MIRALAX 17 gram PwPk Take 17 g by mouth 2 (two) times daily.    propranoloL (INDERAL) 10 MG tablet Take 1 tablet (10 mg total) by mouth 2 (two) times daily.    spironolactone (ALDACTONE) 25 MG tablet TAKE 1 TABLET BY MOUTH EVERY DAY (Patient taking differently: Take 25 mg by mouth every other day.)     Family History       Problem Relation (Age of Onset)    Cancer Mother, Father          Tobacco Use    Smoking status: Former     Types: Cigarettes    Smokeless tobacco: Never   Substance and Sexual Activity    Alcohol use: Not Currently     Drug use: Not Currently    Sexual activity: Not on file     Review of Systems   All other systems reviewed and are negative.    Objective:     Vital Signs (Most Recent):  Temp: 97.6 °F (36.4 °C) (09/09/23 1557)  Pulse: 109 (09/09/23 2130)  Resp: 20 (09/09/23 2130)  BP: (!) 93/54 (09/09/23 2130)  SpO2: 96 % (09/09/23 2130) Vital Signs (24h Range):  Temp:  [97.6 °F (36.4 °C)] 97.6 °F (36.4 °C)  Pulse:  [104-142] 109  Resp:  [18-26] 20  SpO2:  [92 %-100 %] 96 %  BP: ()/(53-84) 93/54     Weight: 73.5 kg (162 lb)  Body mass index is 26.15 kg/m².     Physical Exam  Constitutional:       Appearance: Normal appearance. She is normal weight.   HENT:      Head: Normocephalic and atraumatic.      Nose: Nose normal.      Mouth/Throat:      Mouth: Mucous membranes are moist.   Eyes:      Conjunctiva/sclera: Conjunctivae normal.   Cardiovascular:      Rate and Rhythm: Tachycardia present. Rhythm irregular.      Pulses: Normal pulses.      Heart sounds: Normal heart sounds. No murmur heard.     No friction rub. No gallop.   Pulmonary:      Effort: Pulmonary effort is normal.      Breath sounds: Normal breath sounds. No wheezing or rales.   Abdominal:      General: Abdomen is flat. Bowel sounds are normal. There is no distension.      Palpations: Abdomen is soft.      Tenderness: There is no abdominal tenderness. There is no guarding.   Musculoskeletal:         General: No swelling. Normal range of motion.      Cervical back: Normal range of motion and neck supple.   Skin:     General: Skin is warm and dry.   Neurological:      General: No focal deficit present.      Mental Status: She is alert.   Psychiatric:         Mood and Affect: Mood normal.         Thought Content: Thought content normal.         Judgment: Judgment normal.                Significant Labs: All pertinent labs within the past 24 hours have been reviewed.    Significant Imaging: I have reviewed all pertinent imaging results/findings within the past 24  hours.

## 2023-09-10 NOTE — PLAN OF CARE
[facility]  Initial Discharge Assessment       Primary Care Provider: Wanda Kuhn FNP-CARYN    Admission Diagnosis: Chest pain [R07.9]    Admission Date: 9/9/2023  Expected Discharge Date:     Presented at bedside to complete discharge assessment. Pt AAOx4s. Demographics, PCP, and insurance verified. No home health. No dialysis. Pt reports ability to complete ADLs with outthe assistance. Pt verbalized plan to discharge home via family  transport. Pt has no other needs to be addressed at this time.     Pt is currently staying with her sister in [place]. Pt's nephew Roger Diane assist with care.     Transition of Care Barriers: None    Payor: BLUE CROSS BLUE SHIELD / Plan: BCBS ALL OUT OF STATE / Product Type: PPO /     Extended Emergency Contact Information  Primary Emergency Contact: Roger Diane  Home Phone: [phone]  Mobile Phone: [phone]  Relation: Relative  Preferred language: English   needed? No  Secondary Emergency Contact: LivanQuangw  [place] Phone: [phone]  Mobile Phone: [phone]  Relation: Relative  Preferred language: English   needed? No    Discharge Plan A: Home with family  Discharge Plan B: Home with family      [id number] [place] - [address]  [address] [address]  Phone: [phone] Fax: [phone]      Initial Assessment (most recent)       Adult Discharge Assessment - 09/10/23 1448          Discharge Assessment    Assessment Type Discharge Planning Assessment     Confirmed/corrected address, phone number and insurance --   Pt's address in [place] is [id number] [place], [place], [place] Address is [address] [place]    Source of Information patient;health record     Reason For Admission Atrial fibrillation with RVR     People in Home other relative(s)     Facility Arrived From: Home     Do you expect to return to your current living situation? Yes     Do you have help at home or someone  to help you manage your care at home? Yes     Who are your caregiver(s) and their phone number(s)? Nephew in [place] [phone] and Nephew in [place]  Patricio Licona [phone]     Prior to hospitilization cognitive status: Alert/Oriented     Current cognitive status: Alert/Oriented     Equipment Currently Used at Home none     Readmission within 30 days? Yes     Patient currently being followed by outpatient case management? No     Do you currently have service(s) that help you manage your care at home? No     Do you take prescription medications? Yes     Do you have prescription coverage? Yes     Coverage [facility] ALL OUT OF STATE     Do you have any problems affording any of your prescribed medications? No     Is the patient taking medications as prescribed? yes     Who is going to help you get home at discharge? Nephew in [place]  Roger [place] [phone] and Nephew in [place]  Patricio Licon[place] [phone]     How do you get to doctors appointments? family or friend will provide     Are you on dialysis? No     Do you take coumadin? No     DME Needed Upon Discharge  none     Discharge Plan discussed with: Patient     Transition of Care Barriers None     Discharge Plan A Home with family     Discharge Plan B Home with family

## 2023-09-10 NOTE — ASSESSMENT & PLAN NOTE
Cystitis noted on admission.  Patient with no symptoms at this time  - continue Rocephin  Reviewed urine cultures, currently showing Gram-negative rods.

## 2023-09-10 NOTE — PROGRESS NOTES
[facility] Medicine  Progress Note    Patient Name: Irene العراقي  MRN: [id number]  Patient Class: IP- Inpatient   Admission Date: 9/9/2023  Length of Stay: 1 days  Attending Physician: Dmitriy Gray MD  Primary Care Provider: Wanda Kuhn FNP-C        Subjective:     Principal Problem:Atrial fibrillation with RVR        HPI:  Pt is a 82 y/o F with hx of Afib, Moderate - Severe AS, Tremors, she is here with worsening shortness of breath and palpitations since this morning. She says that she checked her heart rate and knew she was in afib. She has been compliant with the medications.  She denies any nausea, vomiting, chest pain.  She says that she feels short of breath and dizzy.  She is otherwise been doing well at home.  She is not missed any doses of her medication.  She was recently started on amiodarone and tapered off to amiodarone once daily recently.  She denies dysuria, hematuria, abdominal discomfort.  She has been taking her Eliquis as prescribed.  She has no other updates to her past medical, family, surgical, social history.    In the ED:  T 97.6°, P 118 > 142, /76, O2 sat 100% on room air.  WBC 4.4, hemoglobin 9.9, platelets 135, she has RICKY with creatinine of 2.5 up from 1.8, BNP 1058, troponin 33.3, lactic acid 1.1, TSH 3.6, COVID negative, urinalysis suggests urinary tract infection.  Will admit for AFib with RVR.  She was given digoxin in the ER as well as diltiazem.  Will go ahead and transitioned to amiodarone IV load and drip.         Overview/Hospital Course:  Patient is an 85-year-old female with history of AFib, aortic stenosis, tremors presented with shortness of breath and palpitations.  Patient was found to be in AFib RVR, was treated with digoxin and diltiazem in the emergency room and transition to amiodarone IV with load and drip.  Review of chart shows the patient was recently tapered to amiodarone once daily.  Patient also found to have UTI on  admission likely exacerbating event.  Patient started on IV antibiotics.  Amiodarone drip was transitioned to p.o. amiodarone.  Cardiology consulted.  Anticoagulation continued.  Currently rate controlled.      ROS   Cardio:  Denies chest pain, palpitations  Respiratory:  Denies cough, denies wheezing    Physical exam  Cardio: Normal rate, regular rhythm   Respiratory:  Absent stridor, bilateral breath sounds    Assessment/Plan:      * Atrial fibrillation with RVR  Patient here with AFib and RVR, had been on amiodarone taper and recently transitioned to 200 mg once daily.  She is been taking her Eliquis.  UTI may also have been a trigger  - dc amiodarone IV load and drip-transition to p.o.  - continue propranolol  - continue Eliquis  - cardiology consult, appreciate recommendation      RICKY (acute kidney injury)  RICKY with Cr 2.5 from 1.7.   - unclear etiology.  Currently improving  - given lasix in the ED, hold for now as she does not appear volume overloaded.   - Check urine studies.   - may also be due to cystitis.   - renal u/s ordered.  I reviewed the results, no acute findings  - hold aldactone.     Cystitis  Cystitis noted on admission.  Patient with no symptoms at this time  - continue Rocephin  Reviewed urine cultures, currently showing Gram-negative rods.      Tremors of nervous system  Patient with tremors, continue propranolol      Severe aortic stenosis  Moderate to severe aortic stenosis  She was planned to follow-up with Cardiology in [place]        VTE Risk Mitigation (From admission, onward)         Ordered     apixaban tablet 2.5 mg  2 times daily         09/10/23 0037     IP VTE HIGH RISK PATIENT  Once         09/10/23 0033     Place sequential compression device  Until discontinued         09/10/23 0033                Discharge Planning   YANET:      Code Status: Full Code   Is the patient medically ready for discharge?:     Reason for patient still in hospital (select all that apply): Patient  trending condition and Consult recommendations                     Dmitriy Gray MD  Department of Hospital Medicine   [facility]

## 2023-09-10 NOTE — ASSESSMENT & PLAN NOTE
Patient here with AFib and RVR, had been on amiodarone taper and recently transitioned to 200 mg once daily.  She is been taking her Eliquis.  UTI may also have been a trigger  - start amiodarone IV load and drip  - hopefully can get rate controlled with this and resume oral amiodarone  - continue propranolol  - continue Eliquis  - cardiology consult

## 2023-09-10 NOTE — ASSESSMENT & PLAN NOTE
RICKY with Cr 2.5 from 1.7.   - unclear etiology.  Currently improving  - given lasix in the ED, hold for now as she does not appear volume overloaded.   - Check urine studies.   - may also be due to cystitis.   - renal u/s ordered.  I reviewed the results, no acute findings  - hold aldactone.

## 2023-09-10 NOTE — ASSESSMENT & PLAN NOTE
Cystitis noted on admission.  Patient with no symptoms at this time  - continue Rocephin  - follow up urine cultures

## 2023-09-10 NOTE — PROGRESS NOTES
Pharmacist Renal Dose Adjustment Note    Irene العراقي is a 83 y.o. female being treated with Apixaban.     Patient Data:    Vital Signs (Most Recent):  Temp: 97.6 °F (36.4 °C) (09/09/23 1557)  Pulse: 63 (09/10/23 0020)  Resp: 17 (09/10/23 0020)  BP: (!) 100/58 (09/10/23 0020)  SpO2: 96 % (09/10/23 0020) Vital Signs (72h Range):  Temp:  [97.6 °F (36.4 °C)]   Pulse:  []   Resp:  [16-29]   BP: ()/(47-84)   SpO2:  [92 %-100 %]      Recent Labs   Lab 09/09/23  1710   CREATININE 2.5*     Serum creatinine: 2.5 mg/dL (H) 09/09/23 1710  Estimated creatinine clearance: 17.5 mL/min (A)    Apixaban 5 mg twice a day will be changed to Apixaban 2.5 mg twice a day due to Age > 80 and Scr> 1.5 per Renal dose adjustment protocol.    Pharmacist's Name: Ignacia Dahlen  Pharmacist's Extension: [phone]

## 2023-09-10 NOTE — HOSPITAL COURSE
Patient is an 85-year-old female with history of AFib, aortic stenosis, tremors presented with shortness of breath and palpitations.  Patient was found to be in AFib RVR, was treated with digoxin and diltiazem in the emergency room and transition to amiodarone IV with load and drip.  Review of chart shows the patient was recently tapered to amiodarone once daily.  Patient also found to have UTI on admission likely exacerbating event.  Patient started on IV antibiotics.  Amiodarone drip was transitioned to p.o. amiodarone.  Cardiology consulted.  Anticoagulation continued. Currently rate controlled. Ucx grew pantoea agglomerans, pan sensitive. IV abx continued, will transition to PO on dc. Cardio increased po amio to bid.  RICKY was noted on admission, improved prior to discharge.  Patient instructed to follow up with Cardiology and PCP after discharge.  Heart rate remained controlled.  Patient medically stable for discharge.

## 2023-09-10 NOTE — H&P
[facility] - Emergency Dept  Hospital Medicine  History & Physical    Patient Name: Irene العراقي  MRN: [id number]  Patient Class: IP- Inpatient  Admission Date: 9/9/2023  Attending Physician: Reji Meeks MD   Primary Care Provider: Wanda Kuhn FNP-C         Patient information was obtained from patient and ER records.     Subjective:     Principal Problem:Atrial fibrillation with RVR    Chief Complaint:   Chief Complaint   Patient presents with    Shortness of Breath     Pt. States she is in A-Fib since 5am        HPI: Pt is a 82 y/o F with hx of Afib, Moderate - Severe AS, Tremors, she is here with worsening shortness of breath and palpitations since this morning. She says that she checked her heart rate and knew she was in afib. She has been compliant with the medications.  She denies any nausea, vomiting, chest pain.  She says that she feels short of breath and dizzy.  She is otherwise been doing well at home.  She is not missed any doses of her medication.  She was recently started on amiodarone and tapered off to amiodarone once daily recently.  She denies dysuria, hematuria, abdominal discomfort.  She has been taking her Eliquis as prescribed.  She has no other updates to her past medical, family, surgical, social history.    In the ED:  T 97.6°, P 118 > 142, /76, O2 sat 100% on room air.  WBC 4.4, hemoglobin 9.9, platelets 135, she has RICKY with creatinine of 2.5 up from 1.8, BNP 1058, troponin 33.3, lactic acid 1.1, TSH 3.6, COVID negative, urinalysis suggests urinary tract infection.  Will admit for AFib with RVR.  She was given digoxin in the ER as well as diltiazem.  Will go ahead and transitioned to amiodarone IV load and drip.         Past Medical History:   Diagnosis Date    Atrial fibrillation 01/25/2021    Hypertension        Past Surgical History:   Procedure Laterality Date    ANGIOGRAM, CORONARY, WITH LEFT HEART CATHETERIZATION Left 4/19/2023    Procedure:  Angiogram, Coronary, with Left Heart Cath;  Surgeon: Kevin Redmond MD;  Location: [facility] CATH/EP LAB;  Service: Cardiology;  Laterality: Left;    BREAST SURGERY      COLONOSCOPY N/A 4/16/2023    Procedure: COLONOSCOPY;  Surgeon: Kvng Mensah MD;  Location: [facility] ENDO;  Service: Endoscopy;  Laterality: N/A;    COLONOSCOPY W/ POLYPECTOMY  04/16/2023       Review of patient's allergies indicates:   Allergen Reactions    Hydrocodone     Meperidine     Meperidine hcl Nausea And Vomiting    Persantine [dipyridamole]     Vicodin [hydrocodone-acetaminophen] Nausea And Vomiting    Nitrofurantoin macrocrystal      Headache , went into Afib        No current facility-administered medications on file prior to encounter.     Current Outpatient Medications on File Prior to Encounter   Medication Sig    amiodarone (PACERONE) 200 MG Tab Take 1 tablet (200 mg total) by mouth once daily.    ELIQUIS 5 mg Tab TAKE 1 TABLET BY MOUTH TWICE A DAY (Patient taking differently: Take 5 mg by mouth 2 (two) times daily.)    magnesium oxide (MAG-OX) 400 mg (241.3 mg magnesium) tablet TAKE 1 TABLET BY MOUTH ONCE DAILY (Patient taking differently: Take 400 mg by mouth once daily.)    MIRALAX 17 gram PwPk Take 17 g by mouth 2 (two) times daily.    propranoloL (INDERAL) 10 MG tablet Take 1 tablet (10 mg total) by mouth 2 (two) times daily.    spironolactone (ALDACTONE) 25 MG tablet TAKE 1 TABLET BY MOUTH EVERY DAY (Patient taking differently: Take 25 mg by mouth every other day.)     Family History       Problem Relation (Age of Onset)    Cancer Mother, Father          Tobacco Use    Smoking status: Former     Types: Cigarettes    Smokeless tobacco: Never   Substance and Sexual Activity    Alcohol use: Not Currently    Drug use: Not Currently    Sexual activity: Not on file     Review of Systems   All other systems reviewed and are negative.    Objective:     Vital Signs (Most Recent):  Temp: 97.6 °F (36.4 °C) (09/09/23 1557)  Pulse: 109 (09/09/23  2130)  Resp: 20 (09/09/23 2130)  BP: (!) 93/54 (09/09/23 2130)  SpO2: 96 % (09/09/23 2130) Vital Signs (24h Range):  Temp:  [97.6 °F (36.4 °C)] 97.6 °F (36.4 °C)  Pulse:  [104-142] 109  Resp:  [18-26] 20  SpO2:  [92 %-100 %] 96 %  BP: ()/(53-84) 93/54     Weight: 73.5 kg (162 lb)  Body mass index is 26.15 kg/m².     Physical Exam  Constitutional:       Appearance: Normal appearance. She is normal weight.   HENT:      Head: Normocephalic and atraumatic.      Nose: Nose normal.      Mouth/Throat:      Mouth: Mucous membranes are moist.   Eyes:      Conjunctiva/sclera: Conjunctivae normal.   Cardiovascular:      Rate and Rhythm: Tachycardia present. Rhythm irregular.      Pulses: Normal pulses.      Heart sounds: Normal heart sounds. No murmur heard.     No friction rub. No gallop.   Pulmonary:      Effort: Pulmonary effort is normal.      Breath sounds: Normal breath sounds. No wheezing or rales.   Abdominal:      General: Abdomen is flat. Bowel sounds are normal. There is no distension.      Palpations: Abdomen is soft.      Tenderness: There is no abdominal tenderness. There is no guarding.   Musculoskeletal:         General: No swelling. Normal range of motion.      Cervical back: Normal range of motion and neck supple.   Skin:     General: Skin is warm and dry.   Neurological:      General: No focal deficit present.      Mental Status: She is alert.   Psychiatric:         Mood and Affect: Mood normal.         Thought Content: Thought content normal.         Judgment: Judgment normal.                Significant Labs: All pertinent labs within the past 24 hours have been reviewed.    Significant Imaging: I have reviewed all pertinent imaging results/findings within the past 24 hours.    Assessment/Plan:     * Atrial fibrillation with RVR  Patient here with AFib and RVR, had been on amiodarone taper and recently transitioned to 200 mg once daily.  She is been taking her Eliquis.  UTI may also have been a  trigger  - start amiodarone IV load and drip  - hopefully can get rate controlled with this and resume oral amiodarone  - continue propranolol  - continue Eliquis  - cardiology consult      RICKY (acute kidney injury)  RICKY with Cr 2.5 from 1.7.   - unclear etiology.   - given lasix in the ED, hold for now as she does not appear volume overloaded.   - Check urine studies.   - manage Afib, and trend Cr  - may also be due to cystitis.   - renal u/s ordered.   - hold aldactone.     Cystitis  Cystitis noted on admission.  Patient with no symptoms at this time  - continue Rocephin  - follow up urine cultures      Tremors of nervous system  Patient with tremors, continue propranolol      Severe aortic stenosis  Moderate to severe aortic stenosis  She was planned to follow-up with Cardiology in [place]        VTE Risk Mitigation (From admission, onward)      None                       Reji Meeks MD  Department of Hospital Medicine  [facility] - Emergency Dept

## 2023-09-10 NOTE — ED NOTES
CRITICAL LAB AND PROPRANOLOL HELD SENT TO DR JOYNER. PT NAD AT REST. NSR.  AMIODARONE IN PROGRESS.

## 2023-09-11 LAB
ANION GAP SERPL CALC-SCNC: 6 MMOL/L (ref 8–16)
ANION GAP SERPL CALC-SCNC: 7 MMOL/L (ref 8–16)
ANION GAP SERPL CALC-SCNC: 7 MMOL/L (ref 8–16)
BACTERIA UR CULT: ABNORMAL
BASOPHILS # BLD AUTO: 0.02 K/UL (ref 0–0.2)
BASOPHILS NFR BLD: 0.5 % (ref 0–1.9)
BUN SERPL-MCNC: 38 MG/DL (ref 8–23)
BUN SERPL-MCNC: 42 MG/DL (ref 8–23)
BUN SERPL-MCNC: 42 MG/DL (ref 8–23)
CALCIUM SERPL-MCNC: 8.9 MG/DL (ref 8.7–10.5)
CALCIUM SERPL-MCNC: 8.9 MG/DL (ref 8.7–10.5)
CALCIUM SERPL-MCNC: 9.3 MG/DL (ref 8.7–10.5)
CHLORIDE SERPL-SCNC: 108 MMOL/L (ref 95–110)
CHLORIDE SERPL-SCNC: 111 MMOL/L (ref 95–110)
CHLORIDE SERPL-SCNC: 111 MMOL/L (ref 95–110)
CO2 SERPL-SCNC: 22 MMOL/L (ref 23–29)
CO2 SERPL-SCNC: 22 MMOL/L (ref 23–29)
CO2 SERPL-SCNC: 24 MMOL/L (ref 23–29)
CREAT SERPL-MCNC: 2.1 MG/DL (ref 0.5–1.4)
CREAT SERPL-MCNC: 2.2 MG/DL (ref 0.5–1.4)
CREAT SERPL-MCNC: 2.2 MG/DL (ref 0.5–1.4)
DIFFERENTIAL METHOD: ABNORMAL
EOSINOPHIL # BLD AUTO: 0.2 K/UL (ref 0–0.5)
EOSINOPHIL NFR BLD: 4.8 % (ref 0–8)
ERYTHROCYTE [DISTWIDTH] IN BLOOD BY AUTOMATED COUNT: 13.4 % (ref 11.5–14.5)
EST. GFR  (NO RACE VARIABLE): 21.7 ML/MIN/1.73 M^2
EST. GFR  (NO RACE VARIABLE): 21.7 ML/MIN/1.73 M^2
EST. GFR  (NO RACE VARIABLE): 22.9 ML/MIN/1.73 M^2
GLUCOSE SERPL-MCNC: 85 MG/DL (ref 70–110)
GLUCOSE SERPL-MCNC: 85 MG/DL (ref 70–110)
GLUCOSE SERPL-MCNC: 88 MG/DL (ref 70–110)
HCT VFR BLD AUTO: 26.5 % (ref 37–48.5)
HGB BLD-MCNC: 8.7 G/DL (ref 12–16)
IMM GRANULOCYTES # BLD AUTO: 0.01 K/UL (ref 0–0.04)
IMM GRANULOCYTES NFR BLD AUTO: 0.3 % (ref 0–0.5)
LYMPHOCYTES # BLD AUTO: 0.8 K/UL (ref 1–4.8)
LYMPHOCYTES NFR BLD: 22.3 % (ref 18–48)
MAGNESIUM SERPL-MCNC: 2.3 MG/DL (ref 1.6–2.6)
MCH RBC QN AUTO: 30.5 PG (ref 27–31)
MCHC RBC AUTO-ENTMCNC: 32.8 G/DL (ref 32–36)
MCV RBC AUTO: 93 FL (ref 82–98)
MONOCYTES # BLD AUTO: 0.4 K/UL (ref 0.3–1)
MONOCYTES NFR BLD: 11.5 % (ref 4–15)
NEUTROPHILS # BLD AUTO: 2.3 K/UL (ref 1.8–7.7)
NEUTROPHILS NFR BLD: 60.6 % (ref 38–73)
NRBC BLD-RTO: 0 /100 WBC
PLATELET # BLD AUTO: 113 K/UL (ref 150–450)
PMV BLD AUTO: 10.8 FL (ref 9.2–12.9)
POTASSIUM SERPL-SCNC: 4.4 MMOL/L (ref 3.5–5.1)
RBC # BLD AUTO: 2.85 M/UL (ref 4–5.4)
SODIUM SERPL-SCNC: 138 MMOL/L (ref 136–145)
SODIUM SERPL-SCNC: 140 MMOL/L (ref 136–145)
SODIUM SERPL-SCNC: 140 MMOL/L (ref 136–145)
WBC # BLD AUTO: 3.73 K/UL (ref 3.9–12.7)

## 2023-09-11 PROCEDURE — 25000003 PHARM REV CODE 250: Performed by: STUDENT IN AN ORGANIZED HEALTH CARE EDUCATION/TRAINING PROGRAM

## 2023-09-11 PROCEDURE — 36415 COLL VENOUS BLD VENIPUNCTURE: CPT | Performed by: STUDENT IN AN ORGANIZED HEALTH CARE EDUCATION/TRAINING PROGRAM

## 2023-09-11 PROCEDURE — 80048 BASIC METABOLIC PNL TOTAL CA: CPT | Performed by: STUDENT IN AN ORGANIZED HEALTH CARE EDUCATION/TRAINING PROGRAM

## 2023-09-11 PROCEDURE — 21400001 HC TELEMETRY ROOM

## 2023-09-11 PROCEDURE — 83735 ASSAY OF MAGNESIUM: CPT | Performed by: STUDENT IN AN ORGANIZED HEALTH CARE EDUCATION/TRAINING PROGRAM

## 2023-09-11 PROCEDURE — 80048 BASIC METABOLIC PNL TOTAL CA: CPT | Mod: 91 | Performed by: STUDENT IN AN ORGANIZED HEALTH CARE EDUCATION/TRAINING PROGRAM

## 2023-09-11 PROCEDURE — 63600175 PHARM REV CODE 636 W HCPCS: Performed by: STUDENT IN AN ORGANIZED HEALTH CARE EDUCATION/TRAINING PROGRAM

## 2023-09-11 PROCEDURE — 99233 SBSQ HOSP IP/OBS HIGH 50: CPT | Mod: ,,, | Performed by: GENERAL PRACTICE

## 2023-09-11 PROCEDURE — 99233 PR SUBSEQUENT HOSPITAL CARE,LEVL III: ICD-10-PCS | Mod: ,,, | Performed by: GENERAL PRACTICE

## 2023-09-11 PROCEDURE — 85025 COMPLETE CBC W/AUTO DIFF WBC: CPT | Performed by: STUDENT IN AN ORGANIZED HEALTH CARE EDUCATION/TRAINING PROGRAM

## 2023-09-11 RX ORDER — LANOLIN ALCOHOL/MO/W.PET/CERES
1 CREAM (GRAM) TOPICAL DAILY
Status: DISCONTINUED | OUTPATIENT
Start: 2023-09-11 | End: 2023-09-12 | Stop reason: HOSPADM

## 2023-09-11 RX ORDER — AMIODARONE HYDROCHLORIDE 200 MG/1
200 TABLET ORAL 2 TIMES DAILY
Status: DISCONTINUED | OUTPATIENT
Start: 2023-09-11 | End: 2023-09-12 | Stop reason: HOSPADM

## 2023-09-11 RX ADMIN — Medication 400 MG: at 08:09

## 2023-09-11 RX ADMIN — ONDANSETRON 4 MG: 2 INJECTION INTRAMUSCULAR; INTRAVENOUS at 07:09

## 2023-09-11 RX ADMIN — FERROUS SULFATE TAB 325 MG (65 MG ELEMENTAL FE) 1 EACH: 325 (65 FE) TAB at 08:09

## 2023-09-11 RX ADMIN — CEFTRIAXONE SODIUM 1 G: 1 INJECTION, POWDER, FOR SOLUTION INTRAMUSCULAR; INTRAVENOUS at 09:09

## 2023-09-11 RX ADMIN — POLYETHYLENE GLYCOL 3350 17 G: 17 POWDER, FOR SOLUTION ORAL at 11:09

## 2023-09-11 RX ADMIN — APIXABAN 2.5 MG: 2.5 TABLET, FILM COATED ORAL at 09:09

## 2023-09-11 RX ADMIN — AMIODARONE HYDROCHLORIDE 200 MG: 200 TABLET ORAL at 09:09

## 2023-09-11 RX ADMIN — AMIODARONE HYDROCHLORIDE 200 MG: 200 TABLET ORAL at 08:09

## 2023-09-11 RX ADMIN — APIXABAN 2.5 MG: 2.5 TABLET, FILM COATED ORAL at 08:09

## 2023-09-11 RX ADMIN — SODIUM CHLORIDE: 0.9 INJECTION, SOLUTION INTRAVENOUS at 04:09

## 2023-09-11 NOTE — ASSESSMENT & PLAN NOTE
Patient here with AFib and RVR, had been on amiodarone taper and recently transitioned to 200 mg once daily.  She is been taking her Eliquis.  UTI may also have been a trigger  - dc amiodarone IV load and drip-transition to p.o.  - continue propranolol  - continue Eliquis  - cardiology consult, appreciate recommendation; amio dose increase to BID  Per cardio continue IVFs, AC.

## 2023-09-11 NOTE — SUBJECTIVE & OBJECTIVE
Interval History: Patient reports improvement in symptoms. HR in the 50s.     Review of Systems   Constitutional:  Negative for diaphoresis and fatigue.   Respiratory:  Negative for cough and shortness of breath.      Objective:     Vital Signs (Most Recent):  Temp: 97.3 °F (36.3 °C) (09/11/23 1200)  Pulse: (!) 54 (09/11/23 1200)  Resp: 18 (09/11/23 1200)  BP: 116/65 (09/11/23 1200)  SpO2: 98 % (09/11/23 1200) Vital Signs (24h Range):  Temp:  [97.2 °F (36.2 °C)-97.8 °F (36.6 °C)] 97.3 °F (36.3 °C)  Pulse:  [52-64] 54  Resp:  [17-18] 18  SpO2:  [91 %-98 %] 98 %  BP: (101-123)/(56-69) 116/65     Weight: 73 kg (160 lb 15 oz)  Body mass index is 25.98 kg/m².    Intake/Output Summary (Last 24 hours) at 9/11/2023 1533  Last data filed at 9/11/2023 0812  Gross per 24 hour   Intake 360 ml   Output 300 ml   Net 60 ml         Physical Exam  Cardiovascular:      Rate and Rhythm: Bradycardia present. Rhythm irregular.   Abdominal:      General: There is no distension.      Tenderness: There is no abdominal tenderness.             Significant Labs: All pertinent labs within the past 24 hours have been reviewed.  CBC:   Recent Labs   Lab 09/09/23  1710 09/10/23  0344 09/11/23  0439   WBC 4.40 4.27 3.73*   HGB 9.9* 8.6* 8.7*   HCT 30.1* 26.3* 26.5*   * 120* 113*     CMP:   Recent Labs   Lab 09/09/23  1710 09/10/23  0344 09/11/23  0439 09/11/23  1145    139 140  140 138   K 4.5 3.7 4.4  4.4 4.4   * 112* 111*  111* 108   CO2 21* 20* 22*  22* 24    107 85  85 88   BUN 46* 43* 42*  42* 38*   CREATININE 2.5* 2.2* 2.2*  2.2* 2.1*   CALCIUM 9.3 8.1* 8.9  8.9 9.3   PROT 6.4  --   --   --    ALBUMIN 3.7  --   --   --    BILITOT 0.7  --   --   --    ALKPHOS 66  --   --   --    AST 21  --   --   --    ALT 13  --   --   --    ANIONGAP 6* 7* 7*  7* 6*       Significant Imaging: I have reviewed all pertinent imaging results/findings within the past 24 hours.

## 2023-09-11 NOTE — PROGRESS NOTES
[facility]  Department of Cardiology  Consult Note      PATIENT NAME: Irene العراقي    MRN: [id number]  TODAY'S DATE: 09/11/2023  ADMIT DATE: 9/9/2023                          CONSULT REQUESTED BY: Dmitriy Gray MD    SUBJECTIVE     PRINCIPAL PROBLEM: Atrial fibrillation with RVR      REASON FOR CONSULT:  Recurrent atrial fibrillation      HPI:  Dr. Tovar  Pt is a 84 y/o F with hx of Afib, Moderate - Severe AS, Tremors, she is here with worsening shortness of breath and palpitations since this morning. She says that she checked her heart rate and knew she was in afib. She has been compliant with the medications.  She denies any nausea, vomiting, chest pain.  She says that she feels short of breath and dizzy.  She is otherwise been doing well at home.  She is not missed any doses of her medication.  She was recently started on amiodarone and tapered off to amiodarone once daily recently.  She denies dysuria, hematuria, abdominal discomfort.  She has been taking her Eliquis as prescribed.  She has no other updates to her past medical, family, surgical, social history.     In the ED:  T 97.6°, P 118 > 142, /76, O2 sat 100% on room air.  WBC 4.4, hemoglobin 9.9, platelets 135, she has RICKY with creatinine of 2.5 up from 1.8, BNP 1058, troponin 33.3, lactic acid 1.1, TSH 3.6, COVID negative, urinalysis suggests urinary tract infection.  Will admit for AFib with RVR.  She was given digoxin in the ER as well as diltiazem.  Will go ahead and transitioned to amiodarone IV load and drip.       9/10  Patient is known to our group.  Last seen by Porfirio Valdez 08/18/2023     HPI:  Patient seen today for hospital follow-up.  She states that she has been doing okay except she is noticing a new tremor.  She does have a familial history of essential tremor as her sister has this as well as multiple other family members.  She denies any bleeding issues.  Her breathing is stable  Atrial fibrillation with RVR  - Primary      Current Assessment & Plan       Patient is maintaining in sinus rhythm.  Will reduce her amiodarone to 200 mg p.o. daily.  I do not believe that the amiodarone is contributing to her tremor but may be rather than lack of sotalol.  Continue with Eliquis 5 mg p.o. b.i.d..           Relevant Medications     propranoloL (INDERAL) 10 MG tablet     amiodarone (PACERONE) 200 MG Tab     Diastolic dysfunction     Current Assessment & Plan       Euvolemic on exam.           Severe aortic stenosis     Current Assessment & Plan       Discussed these findings with the patient and her nephew.  Will refer her to Dr. GÓMEZ in [place], [place] for evaluation and possible TAVR.  She had nonobstructive disease on an angiogram earlier this year.           Relevant Orders     Ambulatory referral/consult to Interventional Cardiology     Tremors of nervous system     Relevant Medications     propranoloL (INDERAL) 10 MG tablet         Follow up in about 6 weeks (around 9/29/2023).       Review of patient's allergies indicates:   Allergen Reactions    Hydrocodone     Meperidine     Meperidine hcl Nausea And Vomiting    Persantine [dipyridamole]     Vicodin [hydrocodone-acetaminophen] Nausea And Vomiting    Nitrofurantoin macrocrystal      Headache , went into Afib        Past Medical History:   Diagnosis Date    Atrial fibrillation 01/25/2021    Hypertension      Past Surgical History:   Procedure Laterality Date    ANGIOGRAM, CORONARY, WITH LEFT HEART CATHETERIZATION Left 4/19/2023    Procedure: Angiogram, Coronary, with Left Heart Cath;  Surgeon: Kevin Redmond MD;  Location: [facility] CATH/EP LAB;  Service: Cardiology;  Laterality: Left;    BREAST SURGERY      COLONOSCOPY N/A 4/16/2023    Procedure: COLONOSCOPY;  Surgeon: Kvng Mensah MD;  Location: [facility] ENDO;  Service: Endoscopy;  Laterality: N/A;    COLONOSCOPY W/ POLYPECTOMY  04/16/2023     Social History     Tobacco Use    Smoking status: Former     Types: Cigarettes     Smokeless tobacco: Never   Substance Use Topics    Alcohol use: Not Currently    Drug use: Not Currently        REVIEW OF SYSTEMS  CONSTITUTIONAL: Negative for chills, fatigue and fever.   EYES: No double vision, No blurred vision  NEURO: No headaches, No dizziness  RESPIRATORY: Negative for cough, shortness of breath and wheezing.    CARDIOVASCULAR: Negative for chest pain. Negative for palpitations and leg swelling.   GI: Negative for abdominal pain, No melena, diarrhea, nausea and vomiting.   : Negative for dysuria and frequency, Negative for hematuria  SKIN: Negative for bruising, Negative for edema or discoloration noted.   ENDOCRINE: Negative for polyphagia, Negative for heat intolerance, Negative for cold intolerance  PSYCHIATRIC: Negative for depression, Negative for anxiety, Negative for memory loss  MUSCULOSKELETAL: Negative for neck pain, Negative for muscle weakness, Negative for back pain     OBJECTIVE     VITAL SIGNS (Most Recent)  Temp: 97.3 °F (36.3 °C) (09/11/23 1200)  Pulse: (!) 54 (09/11/23 1200)  Resp: 18 (09/11/23 1200)  BP: 116/65 (09/11/23 1200)  SpO2: 98 % (09/11/23 1200)    VENTILATION STATUS  Resp: 18 (09/11/23 1200)  SpO2: 98 % (09/11/23 1200)           I & O (Last 24H):  Intake/Output Summary (Last 24 hours) at 9/11/2023 1255  Last data filed at 9/11/2023 0812  Gross per 24 hour   Intake 360 ml   Output 900 ml   Net -540 ml       WEIGHTS  Wt Readings from Last 1 Encounters:   09/10/23 1315 73 kg (160 lb 15 oz)   09/09/23 1557 73.5 kg (162 lb)       PHYSICAL EXAM  GENERAL: well built, well nourished, well-developed in no apparent distress alert and oriented.   HEENT: Normocephalic. Pupils normal and conjunctivae normal.  Mucous membranes normal, no cyanosis or icterus, trachea central,no pallor or icterus is noted..   NECK: No JVD. No bruit..   THYROID: Thyroid not enlarged. No nodules present..   CARDIAC: Regular rate and rhythm.  HARSH 4 6 SYSTOLIC EJECTION MURMUR LEFT STERNAL BORDER  CHEST ANATOMY: normal.   LUNGS: Clear to auscultation. No wheezing or rhonchi..   ABDOMEN: Soft no masses or organomegaly.  No abdomen pulsations or bruits.  Normal bowel sounds. No pulsations and no masses felt, No guarding or rebound.   URINARY: No araya catheter   EXTREMITIES: No cyanosis, clubbing or edema noted at this time., no calf tenderness bilaterally.   PERIPHERAL VASCULAR SYSTEM: Good palpable distal pulses.   CENTRAL NERVOUS SYSTEM: No focal motor or sensory deficits noted.   SKIN: Skin without lesions, moist, well perfused.   MUSCLE STRENGTH & TONE: No noteable weakness, atrophy or abnormal movement.     HOME MEDICATIONS:  No current facility-administered medications on file prior to encounter.     Current Outpatient Medications on File Prior to Encounter   Medication Sig Dispense Refill    amiodarone (PACERONE) 200 MG Tab Take 1 tablet (200 mg total) by mouth once daily. 90 tablet 3    ELIQUIS 5 mg Tab TAKE 1 TABLET BY MOUTH TWICE A DAY (Patient taking differently: Take 5 mg by mouth 2 (two) times daily.) 180 tablet 3    magnesium oxide (MAG-OX) 400 mg (241.3 mg magnesium) tablet TAKE 1 TABLET BY MOUTH ONCE DAILY (Patient taking differently: Take 400 mg by mouth once daily.) 90 tablet 3    MIRALAX 17 gram PwPk Take 17 g by mouth 2 (two) times daily.      propranoloL (INDERAL) 10 MG tablet Take 1 tablet (10 mg total) by mouth 2 (two) times daily. 180 tablet 3    spironolactone (ALDACTONE) 25 MG tablet TAKE 1 TABLET BY MOUTH EVERY DAY (Patient taking differently: Take 25 mg by mouth every other day.) 90 tablet 3       SCHEDULED MEDS:   amiodarone  200 mg Oral BID    apixaban  2.5 mg Oral BID    cefTRIAXone (ROCEPHIN) IVPB  1 g Intravenous Q24H    ferrous sulfate  1 tablet Oral Daily    magnesium oxide  400 mg Oral Daily    polyethylene glycol  17 g Oral Daily    propranoloL  10 mg Oral BID       CONTINUOUS INFUSIONS:   sodium chloride 0.9% 100 mL/hr at 09/11/23 0643       PRN MEDS:acetaminophen,  "HYDROcodone-acetaminophen, magnesium oxide, magnesium oxide, melatonin, morphine, ondansetron, potassium bicarbonate, potassium bicarbonate, potassium bicarbonate, potassium, sodium phosphates, potassium, sodium phosphates, potassium, sodium phosphates, prochlorperazine, sodium chloride 0.9%    LABS AND DIAGNOSTICS     CBC LAST 3 DAYS  Recent Labs   Lab 09/09/23  1710 09/10/23  0344 09/11/23  0439   WBC 4.40 4.27 3.73*   RBC 3.26* 2.83* 2.85*   HGB 9.9* 8.6* 8.7*   HCT 30.1* 26.3* 26.5*   MCV 92 93 93   MCH 30.4 30.4 30.5   MCHC 32.9 32.7 32.8   RDW 13.3 13.3 13.4   * 120* 113*   MPV 10.9 10.7 10.8   GRAN 70.0  3.1 64.2  2.7 60.6  2.3   LYMPH 16.8*  0.7* 20.8  0.9* 22.3  0.8*   MONO 10.5  0.5 10.8  0.5 11.5  0.4   BASO 0.03 0.03 0.02   NRBC 0 0 0       COAGULATION LAST 3 DAYS  No results for input(s): "LABPT", "INR", "APTT" in the last 168 hours.    CHEMISTRY LAST 3 DAYS  Recent Labs   Lab 09/09/23  1710 09/10/23  0344 09/11/23  0439 09/11/23  1145    139 140  140 138   K 4.5 3.7 4.4  4.4 4.4   * 112* 111*  111* 108   CO2 21* 20* 22*  22* 24   ANIONGAP 6* 7* 7*  7* 6*   BUN 46* 43* 42*  42* 38*   CREATININE 2.5* 2.2* 2.2*  2.2* 2.1*    107 85  85 88   CALCIUM 9.3 8.1* 8.9  8.9 9.3   MG 2.5  2.5  --  2.3  --    ALBUMIN 3.7  --   --   --    PROT 6.4  --   --   --    ALKPHOS 66  --   --   --    ALT 13  --   --   --    AST 21  --   --   --    BILITOT 0.7  --   --   --        CARDIAC PROFILE LAST 3 DAYS  Recent Labs   Lab 09/09/23  1710 09/09/23  1831   BNP 1,058*  --    CPK 84  --    TROPONINIHS 30.9* 33.3*       ENDOCRINE LAST 3 DAYS  Recent Labs   Lab 09/09/23  1710   TSH 3.660       LAST ARTERIAL BLOOD GAS  ABG  No results for input(s): "PH", "PO2", "PCO2", "HCO3", "BE" in the last 168 hours.    LAST 7 DAYS MICROBIOLOGY   Microbiology Results (last 7 days)       Procedure Component Value Units Date/Time    Urine culture [[id number]]  (Abnormal)  (Susceptibility) Collected: " 09/09/23 1758    Order Status: Completed Specimen: Urine Updated: 09/11/23 0658     Urine Culture, Routine PANTOEA AGGLOMER[employer]  >100,000 cfu/ml      Narrative:      Specimen Source->Urine    Blood culture #1 **CANNOT BE ORDERED STAT** [[id number]] Collected: 09/09/23 2040    Order Status: Completed Specimen: Blood from Antecubital, Right Updated: 09/11/23 0232     Blood Culture, Routine No Growth to date      No Growth to date    Narrative:      Collection has been rescheduled by CH10 at 09/09/2023 20:33 Reason:   Pt using restroom   Collection has been rescheduled by CH10 at 09/09/2023 20:33 Reason:   Pt using restroom     Blood culture #2 **CANNOT BE ORDERED STAT** [[id number]] Collected: 09/09/23 2048    Order Status: Completed Specimen: Blood Updated: 09/11/23 0232     Blood Culture, Routine No Growth to date      No Growth to date    Narrative:      Collection has been rescheduled by 10 at 09/09/2023 20:33 Reason:   Pt using restroom   Collection has been rescheduled by CH10 at 09/09/2023 20:33 Reason:   Pt using restroom             MOST RECENT IMAGING  US Retroperitoneal Complete  EXAMINATION(S):  US RETROPERITONEUM    COMPARISON: CT abdomen May 9, 2023    INDICATION: RICKY    FINDINGS: Routine ultrasound evaluation of the kidneys and retroperitoneum is performed. The right kidney measures 8.7 x 4.4 x 5.7 cm while the left kidney measures 8.6 x 3.6 x 5.5 cm. The left lower pole was partially obscured by bowel gas artifact. Mild diffuse renal cortical thinning is present. No shadowing renal stones or hydronephrosis is evident. No solid or complex cystic renal masses are seen. The urinary bladder, to the extent visualized, appears negative.    IMPRESSION: No acute findings.    Electronically signed by:  Aram Bowie MD  9/9/2023 10:51 PM CDT Workstation: [id number]  X-Ray Chest AP Portable  XR CHEST 1 VIEW, 9/9/2023 4:34 PM CDT    History:  shortnessof breath.    Comparison: 8/9/2023    Findings:  The  heart and mediastinum are within normal limits for size, the lungs bilaterally are well inflated. There is scarring at the lung apices, the bilateral upper lobes, and both lung bases; stable chronic findings compared to prior. There is no additional acute abnormality or significant change from prior.    Impression:  Stable chronic changes    Electronically signed by:  Roberto Newton MD  9/9/2023 6:16 PM CDT Workstation: [id number]      ECHOCARDIOGRAM RESULTS (last 5)  Results for orders placed during the hospital encounter of 08/09/23    Echo    Interpretation Summary    Limited study    Left Ventricle: The left ventricle is normal in size. Mildly increased wall thickness. There is concentric remodeling. Normal wall motion. There is normal systolic function with a visually estimated ejection fraction of 65 - 70%. Diastolic function cannot be reliably determined in the presence of mitral annular calcification.    Aortic Valve: Moderate annular calcification. Moderately restricted motion. There is moderate to severe stenosis. Aortic valve area by VTI is 0.77 cm². Aortic valve peak velocity is 3.66 m/s. Mean gradient is 34 mmHg. The dimensionless index is 0.25. There is moderate aortic regurgitation.    Mitral Valve: There is bileaflet sclerosis. Mild posterior mitral annular calcification. There is moderate to severe regurgitation. decreased excursion.    Left Atrium: Left atrium is severely dilated.    Right Ventricle: Normal right ventricular cavity size.    Tricuspid Valve: There is mild regurgitation.    Pulmonic Valve: There is mild regurgitation.    IVC/SVC: Intermediate venous pressure at 8 mmHg.      Results for orders placed during the hospital encounter of 04/10/23    Echo    Interpretation Summary  · The left ventricle is normal in size with concentric remodeling and normal systolic function.  · Mild left atrial enlargement.  · The estimated ejection fraction is 65%.  · Indeterminate left ventricular  diastolic function.  · Normal right ventricular size with normal right ventricular systolic function.  · There is moderate aortic valve stenosis.  · Aortic valve area is 1.24 cm2; peak velocity is 3.51 m/s; mean gradient is 32 mmHg.  · Mild mitral regurgitation.  · Mild tricuspid regurgitation.  · There is mild to moderate pulmonary hypertension with RVSP at 47 mmhg      Results for orders placed during the hospital encounter of 07/23/22    Echo    Interpretation Summary  · There is moderate aortic valve stenosis.  · Aortic valve area is 1.49 cm2; peak velocity is m/s; mean gradient is 26 mmHg.  · Mild aortic regurgitation.  · Mild mitral regurgitation.  · The left ventricle is normal in size with concentric remodeling and normal systolic function.  · The estimated ejection fraction is 60%.  · Grade I left ventricular diastolic dysfunction.  · Normal right ventricular size with normal right ventricular systolic function.  · Mild left atrial enlargement.  · Mild tricuspid regurgitation.  · Intermediate central venous pressure (8 mmHg).  · The estimated PA systolic pressure is 34 mmHg.      Results for orders placed during the hospital encounter of 02/09/20    Echo Color Flow Doppler? Yes; Bubble Contrast? Yes    Interpretation Summary  · Concentric left ventricular remodeling.  · Intravenous Saline (bubble) contrast was used during the study.Study is negative for shunt  · Left ventricular systolic function. The estimated ejection fraction is 65%.  · Grade I (mild) left ventricular diastolic dysfunction consistent with impaired relaxation.  · Normal right ventricular systolic function.  · Mild aortic regurgitation.  · Mild aortic valve stenosis.  · Aortic valve area is 1.54 cm2; peak velocity is 2.59 m/s; mean gradient is 17 mmHg.  · Mild tricuspid regurgitation.  · Normal central venous pressure (3 mmHg).  · The estimated PA systolic pressure is 29 mmHg.      CURRENT/PREVIOUS VISIT EKG  Results for orders placed or  performed during the hospital encounter of 09/09/23   EKG 12-lead    Collection Time: 09/09/23 11:27 PM    Narrative    Test Reason : R07.9,    Vent. Rate : 055 BPM     Atrial Rate : 055 BPM     P-R Int : 146 ms          QRS Dur : 090 ms      QT Int : 414 ms       P-R-T Axes : 047 003 -07 degrees     QTc Int : 396 ms    Sinus bradycardia  Possible Left atrial enlargement  LVH  Nonspecific T wave abnormality  Abnormal ECG  When compared with ECG of 09-SEP-2023 16:01,  Significant changes have occurred    Referred By: [email or web address]   SELF           Confirmed By:            ASSESSMENT/PLAN:     Active Hospital Problems    Diagnosis    *Atrial fibrillation with RVR    Cystitis    RICKY (acute kidney injury)    Tremors of nervous system    Severe aortic stenosis       ASSESSMENT & PLAN:     Paroxysmal atrial fibrillation  Severe AS  Anemia  Tremors  Cystitis  RICKY        RECOMMENDATIONS:    Continue IVF  Currently SB on telemetry  Continue amiodarone and Eliquis at present dosing  Continue Propranolol   Continue mag ox  Start Iron supplements  CMP tomorrow am   Will follow  Debbie per hospitalist.          Sandra Ramirez NP  Date of Service: 09/11/2023  5:39 PM       As above remains in sinus. HR 56  Nauseated.  CR still elevated getting IVF  Chest clear   Hg 8.7  AGREE WITH ivf for RICKY

## 2023-09-11 NOTE — PROGRESS NOTES
[facility]  Progress Note    Patient Name: Irene العراقي  MRN: [id number]  Patient Class: IP- Inpatient   Admission Date: 9/9/2023  Length of Stay: 2 days  Attending Physician: Dmitriy Gray MD  Primary Care Provider: Wanda Kuhn FNP-C        Subjective:     Principal Problem:Atrial fibrillation with RVR        HPI:  Pt is a 82 y/o F with hx of Afib, Moderate - Severe AS, Tremors, she is here with worsening shortness of breath and palpitations since this morning. She says that she checked her heart rate and knew she was in afib. She has been compliant with the medications.  She denies any nausea, vomiting, chest pain.  She says that she feels short of breath and dizzy.  She is otherwise been doing well at home.  She is not missed any doses of her medication.  She was recently started on amiodarone and tapered off to amiodarone once daily recently.  She denies dysuria, hematuria, abdominal discomfort.  She has been taking her Eliquis as prescribed.  She has no other updates to her past medical, family, surgical, social history.    In the ED:  T 97.6°, P 118 > 142, /76, O2 sat 100% on room air.  WBC 4.4, hemoglobin 9.9, platelets 135, she has RICKY with creatinine of 2.5 up from 1.8, BNP 1058, troponin 33.3, lactic acid 1.1, TSH 3.6, COVID negative, urinalysis suggests urinary tract infection.  Will admit for AFib with RVR.  She was given digoxin in the ER as well as diltiazem.  Will go ahead and transitioned to amiodarone IV load and drip.         Overview/Hospital Course:  Patient is an 85-year-old female with history of AFib, aortic stenosis, tremors presented with shortness of breath and palpitations.  Patient was found to be in AFib RVR, was treated with digoxin and diltiazem in the emergency room and transition to amiodarone IV with load and drip.  Review of chart shows the patient was recently tapered to amiodarone once daily.  Patient also found to have UTI on  admission likely exacerbating event.  Patient started on IV antibiotics.  Amiodarone drip was transitioned to p.o. amiodarone.  Cardiology consulted.  Anticoagulation continued. Currently rate controlled. Ucx grew pantoea agglomerans, pan sensitive. IV abx continued, will transition to PO on dc. Cardio increased po amio to bid.       Interval History: Patient reports improvement in symptoms. HR in the 50s.     Review of Systems   Constitutional:  Negative for diaphoresis and fatigue.   Respiratory:  Negative for cough and shortness of breath.      Objective:     Vital Signs (Most Recent):  Temp: 97.3 °F (36.3 °C) (09/11/23 1200)  Pulse: (!) 54 (09/11/23 1200)  Resp: 18 (09/11/23 1200)  BP: 116/65 (09/11/23 1200)  SpO2: 98 % (09/11/23 1200) Vital Signs (24h Range):  Temp:  [97.2 °F (36.2 °C)-97.8 °F (36.6 °C)] 97.3 °F (36.3 °C)  Pulse:  [52-64] 54  Resp:  [17-18] 18  SpO2:  [91 %-98 %] 98 %  BP: (101-123)/(56-69) 116/65     Weight: 73 kg (160 lb 15 oz)  Body mass index is 25.98 kg/m².    Intake/Output Summary (Last 24 hours) at 9/11/2023 1533  Last data filed at 9/11/2023 0812  Gross per 24 hour   Intake 360 ml   Output 300 ml   Net 60 ml         Physical Exam  Cardiovascular:      Rate and Rhythm: Bradycardia present. Rhythm irregular.   Abdominal:      General: There is no distension.      Tenderness: There is no abdominal tenderness.             Significant Labs: All pertinent labs within the past 24 hours have been reviewed.  CBC:   Recent Labs   Lab 09/09/23  1710 09/10/23  0344 09/11/23  0439   WBC 4.40 4.27 3.73*   HGB 9.9* 8.6* 8.7*   HCT 30.1* 26.3* 26.5*   * 120* 113*     CMP:   Recent Labs   Lab 09/09/23  1710 09/10/23  0344 09/11/23  0439 09/11/23  1145    139 140  140 138   K 4.5 3.7 4.4  4.4 4.4   * 112* 111*  111* 108   CO2 21* 20* 22*  22* 24    107 85  85 88   BUN 46* 43* 42*  42* 38*   CREATININE 2.5* 2.2* 2.2*  2.2* 2.1*   CALCIUM 9.3 8.1* 8.9  8.9 9.3   PROT 6.4   --   --   --    ALBUMIN 3.7  --   --   --    BILITOT 0.7  --   --   --    ALKPHOS 66  --   --   --    AST 21  --   --   --    ALT 13  --   --   --    ANIONGAP 6* 7* 7*  7* 6*       Significant Imaging: I have reviewed all pertinent imaging results/findings within the past 24 hours.      Assessment/Plan:      * Atrial fibrillation with RVR  Patient here with AFib and RVR, had been on amiodarone taper and recently transitioned to 200 mg once daily.  She is been taking her Eliquis.  UTI may also have been a trigger  - dc amiodarone IV load and drip-transition to p.o.  - continue propranolol  - continue Eliquis  - cardiology consult, appreciate recommendation; amio dose increase to BID  Per cardio continue IVFs, AC.       RICKY (acute kidney injury)  RICKY with Cr 2.5 from 1.7.   Likely 2/2 UTI  - given lasix in the ED, hold for now as she does not appear volume overloaded.   - renal u/s ordered.  I reviewed the results, no acute findings  - hold aldactone.   Continue with IVFs     Cystitis  Cystitis noted on admission.  Patient with no symptoms at this time  - continue Rocephin  Reviewed urine cultures, pantoea agglomerans grew -pansensitive. Plan on transitioning to keflex on dc       Tremors of nervous system  Patient with tremors, continue propranolol  Follow up neuro outpatient       Severe aortic stenosis  Moderate to severe aortic stenosis  She was planned to follow-up with Cardiology in [place]        VTE Risk Mitigation (From admission, onward)         Ordered     apixaban tablet 2.5 mg  2 times daily         09/10/23 0037     IP VTE HIGH RISK PATIENT  Once         09/10/23 0033     Place sequential compression device  Until discontinued         09/10/23 0033                Discharge Planning   YANET: 9/12/2023     Code Status: Full Code   Is the patient medically ready for discharge?:     Reason for patient still in hospital (select all that apply): Patient trending condition  Discharge Plan A: Home with family                   Dmitriy Gray MD  Department of Hospital Medicine   Formerly [facility], 1928–1983

## 2023-09-11 NOTE — ASSESSMENT & PLAN NOTE
Cystitis noted on admission.  Patient with no symptoms at this time  - continue Rocephin  Reviewed urine cultures, pantoea agglomerans grew -pansensitive. Plan on transitioning to keflex on dc

## 2023-09-11 NOTE — ASSESSMENT & PLAN NOTE
RICKY with Cr 2.5 from 1.7.   Likely 2/2 UTI  - given lasix in the ED, hold for now as she does not appear volume overloaded.   - renal u/s ordered.  I reviewed the results, no acute findings  - hold aldactone.   Continue with IVFs

## 2023-09-12 VITALS
TEMPERATURE: 98 F | OXYGEN SATURATION: 94 % | HEIGHT: 66 IN | RESPIRATION RATE: 18 BRPM | BODY MASS INDEX: 25.86 KG/M2 | HEART RATE: 67 BPM | DIASTOLIC BLOOD PRESSURE: 72 MMHG | WEIGHT: 160.94 LBS | SYSTOLIC BLOOD PRESSURE: 137 MMHG

## 2023-09-12 PROBLEM — N30.90 CYSTITIS: Status: RESOLVED | Noted: 2023-09-09 | Resolved: 2023-09-12

## 2023-09-12 PROBLEM — I48.91 ATRIAL FIBRILLATION WITH RVR: Status: RESOLVED | Noted: 2021-01-27 | Resolved: 2023-09-12

## 2023-09-12 PROBLEM — N17.9 AKI (ACUTE KIDNEY INJURY): Status: RESOLVED | Noted: 2023-09-09 | Resolved: 2023-09-12

## 2023-09-12 LAB
ANION GAP SERPL CALC-SCNC: 6 MMOL/L (ref 8–16)
BASOPHILS # BLD AUTO: 0.03 K/UL (ref 0–0.2)
BASOPHILS NFR BLD: 0.7 % (ref 0–1.9)
BUN SERPL-MCNC: 36 MG/DL (ref 8–23)
CALCIUM SERPL-MCNC: 8.9 MG/DL (ref 8.7–10.5)
CHLORIDE SERPL-SCNC: 111 MMOL/L (ref 95–110)
CO2 SERPL-SCNC: 23 MMOL/L (ref 23–29)
CREAT SERPL-MCNC: 1.8 MG/DL (ref 0.5–1.4)
DIFFERENTIAL METHOD: ABNORMAL
EOSINOPHIL # BLD AUTO: 0.2 K/UL (ref 0–0.5)
EOSINOPHIL NFR BLD: 4.4 % (ref 0–8)
ERYTHROCYTE [DISTWIDTH] IN BLOOD BY AUTOMATED COUNT: 13.4 % (ref 11.5–14.5)
EST. GFR  (NO RACE VARIABLE): 27.6 ML/MIN/1.73 M^2
GLUCOSE SERPL-MCNC: 81 MG/DL (ref 70–110)
HCT VFR BLD AUTO: 27.8 % (ref 37–48.5)
HGB BLD-MCNC: 9.2 G/DL (ref 12–16)
IMM GRANULOCYTES # BLD AUTO: 0.01 K/UL (ref 0–0.04)
IMM GRANULOCYTES NFR BLD AUTO: 0.2 % (ref 0–0.5)
LYMPHOCYTES # BLD AUTO: 0.8 K/UL (ref 1–4.8)
LYMPHOCYTES NFR BLD: 18.5 % (ref 18–48)
MAGNESIUM SERPL-MCNC: 2.4 MG/DL (ref 1.6–2.6)
MCH RBC QN AUTO: 30.8 PG (ref 27–31)
MCHC RBC AUTO-ENTMCNC: 33.1 G/DL (ref 32–36)
MCV RBC AUTO: 93 FL (ref 82–98)
MONOCYTES # BLD AUTO: 0.5 K/UL (ref 0.3–1)
MONOCYTES NFR BLD: 11.9 % (ref 4–15)
NEUTROPHILS # BLD AUTO: 2.8 K/UL (ref 1.8–7.7)
NEUTROPHILS NFR BLD: 64.3 % (ref 38–73)
NRBC BLD-RTO: 0 /100 WBC
PLATELET # BLD AUTO: 116 K/UL (ref 150–450)
PMV BLD AUTO: 10.8 FL (ref 9.2–12.9)
POTASSIUM SERPL-SCNC: 4.2 MMOL/L (ref 3.5–5.1)
RBC # BLD AUTO: 2.99 M/UL (ref 4–5.4)
SODIUM SERPL-SCNC: 140 MMOL/L (ref 136–145)
WBC # BLD AUTO: 4.28 K/UL (ref 3.9–12.7)

## 2023-09-12 PROCEDURE — 85025 COMPLETE CBC W/AUTO DIFF WBC: CPT | Performed by: STUDENT IN AN ORGANIZED HEALTH CARE EDUCATION/TRAINING PROGRAM

## 2023-09-12 PROCEDURE — 83735 ASSAY OF MAGNESIUM: CPT | Performed by: STUDENT IN AN ORGANIZED HEALTH CARE EDUCATION/TRAINING PROGRAM

## 2023-09-12 PROCEDURE — 36415 COLL VENOUS BLD VENIPUNCTURE: CPT | Performed by: STUDENT IN AN ORGANIZED HEALTH CARE EDUCATION/TRAINING PROGRAM

## 2023-09-12 PROCEDURE — 25000003 PHARM REV CODE 250: Performed by: STUDENT IN AN ORGANIZED HEALTH CARE EDUCATION/TRAINING PROGRAM

## 2023-09-12 PROCEDURE — 80048 BASIC METABOLIC PNL TOTAL CA: CPT | Performed by: STUDENT IN AN ORGANIZED HEALTH CARE EDUCATION/TRAINING PROGRAM

## 2023-09-12 RX ORDER — CEPHALEXIN 500 MG/1
500 CAPSULE ORAL 4 TIMES DAILY
Qty: 16 CAPSULE | Refills: 0 | Status: SHIPPED | OUTPATIENT
Start: 2023-09-12 | End: 2023-09-16

## 2023-09-12 RX ORDER — AMIODARONE HYDROCHLORIDE 200 MG/1
200 TABLET ORAL 2 TIMES DAILY
Qty: 60 TABLET | Refills: 11 | Status: ON HOLD | OUTPATIENT
Start: 2023-09-12 | End: 2023-09-28 | Stop reason: HOSPADM

## 2023-09-12 RX ADMIN — AMIODARONE HYDROCHLORIDE 200 MG: 200 TABLET ORAL at 08:09

## 2023-09-12 RX ADMIN — APIXABAN 2.5 MG: 2.5 TABLET, FILM COATED ORAL at 08:09

## 2023-09-12 RX ADMIN — FERROUS SULFATE TAB 325 MG (65 MG ELEMENTAL FE) 1 EACH: 325 (65 FE) TAB at 08:09

## 2023-09-12 RX ADMIN — Medication 400 MG: at 08:09

## 2023-09-12 NOTE — DISCHARGE SUMMARY
On license of [facility] Medicine  Discharge Summary      Patient Name: Irene العراقي  MRN: [id number]  SHELLI: [id number]  Patient Class: IP- Inpatient  Admission Date: 9/9/2023  Hospital Length of Stay: 3 days  Discharge Date and Time:  09/12/2023 1:19 PM  Attending Physician: No att. providers found   Discharging Provider: Dmitriy Gray MD  Primary Care Provider: Wanda Kuhn FNP-CARYN    Primary Care Team: Networked reference to record PCT     HPI:   Pt is a 84 y/o F with hx of Afib, Moderate - Severe AS, Tremors, she is here with worsening shortness of breath and palpitations since this morning. She says that she checked her heart rate and knew she was in afib. She has been compliant with the medications.  She denies any nausea, vomiting, chest pain.  She says that she feels short of breath and dizzy.  She is otherwise been doing well at home.  She is not missed any doses of her medication.  She was recently started on amiodarone and tapered off to amiodarone once daily recently.  She denies dysuria, hematuria, abdominal discomfort.  She has been taking her Eliquis as prescribed.  She has no other updates to her past medical, family, surgical, social history.    In the ED:  T 97.6°, P 118 > 142, /76, O2 sat 100% on room air.  WBC 4.4, hemoglobin 9.9, platelets 135, she has RICKY with creatinine of 2.5 up from 1.8, BNP 1058, troponin 33.3, lactic acid 1.1, TSH 3.6, COVID negative, urinalysis suggests urinary tract infection.  Will admit for AFib with RVR.  She was given digoxin in the ER as well as diltiazem.  Will go ahead and transitioned to amiodarone IV load and drip.         * No surgery found *      Hospital Course:   Patient is an 85-year-old female with history of AFib, aortic stenosis, tremors presented with shortness of breath and palpitations.  Patient was found to be in AFib RVR, was treated with digoxin and diltiazem in the emergency room and transition to amiodarone IV with load and  drip.  Review of chart shows the patient was recently tapered to amiodarone once daily.  Patient also found to have UTI on admission likely exacerbating event.  Patient started on IV antibiotics.  Amiodarone drip was transitioned to p.o. amiodarone.  Cardiology consulted.  Anticoagulation continued. Currently rate controlled. Ucx grew pantoea agglomerans, pan sensitive. IV abx continued, will transition to PO on dc. Cardio increased po amio to bid.  RICKY was noted on admission, improved prior to discharge.  Patient instructed to follow up with Cardiology and PCP after discharge.  Heart rate remained controlled.  Patient medically stable for discharge.     Physical Exam  Cardiovascular:      Rate and Rhythm:  Normal rate and irregular rhythm  Abdominal:      General: There is no distension.      Tenderness: There is no abdominal tenderness.   Goals of Care Treatment Preferences:  Code Status: Full Code      Consults:   Consults (From admission, onward)          Status Ordering Provider     Inpatient consult to Cardiology  Once        Provider:  Jose D Gatica MD Acknowledged MELANCON, CALEB G.            No new Assessment & Plan notes have been filed under this hospital service since the last note was generated.  Service: Hospital Medicine    Final Active Diagnoses:    Diagnosis Date Noted POA    Tremors of nervous system [R25.1] 08/18/2023 Yes    Severe aortic stenosis [I35.0] 02/21/2022 Yes      Problems Resolved During this Admission:    Diagnosis Date Noted Date Resolved POA    PRINCIPAL PROBLEM:  Atrial fibrillation with RVR [I48.91] 01/27/2021 09/12/2023 Yes    Cystitis [N30.90] 09/09/2023 09/12/2023 Unknown    RICKY (acute kidney injury) [N17.9] 09/09/2023 09/12/2023 Unknown       Discharged Condition: good    Disposition: Home or Self Care    Follow Up:   Follow-up Information       Wanda Kuhn FNP-C. Call in 1 week(s).    Specialty: Family Medicine  Contact information:  [address]  [address]  [facility] [address]  [phone]               Pj Ramirez MD. Call in 1 week(s).    Specialties: Interventional Cardiology, Cardiology, Cardiovascular Disease  Contact information:  [address] [facility]  [address]  [place] [address]  [phone]                           Patient Instructions:      Basic metabolic panel   Standing Status: Future Standing Exp. Date: 11/10/24     Ambulatory referral/consult to Neurology   Standing Status: Future   Referral Priority: Routine Referral Type: Consultation   Referral Reason: Specialty Services Required   Requested Specialty: Neurology   Number of Visits Requested: 1     Activity as tolerated       Significant Diagnostic Studies: Labs:   BMP:   Recent Labs   Lab 09/11/23  0439 09/11/23  1145 09/12/23  0440   GLU 85  85 88 81     140 138 140   K 4.4  4.4 4.4 4.2   *  111* 108 111*   CO2 22*  22* 24 23   BUN 42*  42* 38* 36*   CREATININE 2.2*  2.2* 2.1* 1.8*   CALCIUM 8.9  8.9 9.3 8.9   MG 2.3  --  2.4    and CBC   Recent Labs   Lab 09/11/23  0439 09/12/23  0440   WBC 3.73* 4.28   HGB 8.7* 9.2*   HCT 26.5* 27.8*   * 116*       Pending Diagnostic Studies:       None           Medications:  Reconciled Home Medications:      Medication List        START taking these medications      cephALEXin 500 MG capsule  Commonly known as: KEFLEX  Take 1 capsule (500 mg total) by mouth 4 (four) times daily. for 4 days            CHANGE how you take these medications      amiodarone 200 MG Tab  Commonly known as: PACERONE  Take 1 tablet (200 mg total) by mouth 2 (two) times daily.  What changed: when to take this     apixaban 2.5 mg Tab  Commonly known as: ELIQUIS  Take 1 tablet (2.5 mg total) by mouth 2 (two) times daily.  What changed:   medication strength  how much to take     magnesium oxide 400 mg (241.3 mg magnesium) tablet  Commonly known as: MAG-OX  TAKE 1 TABLET BY MOUTH ONCE DAILY  What changed: when to take this            CONTINUE taking these  medications      MIRALAX 17 gram Pwpk  Generic drug: polyethylene glycol  Take 17 g by mouth 2 (two) times daily.     propranoloL 10 MG tablet  Commonly known as: INDERAL  Take 1 tablet (10 mg total) by mouth 2 (two) times daily.            STOP taking these medications      spironolactone 25 MG tablet  Commonly known as: ALDACTONE              Indwelling Lines/Drains at time of discharge:   Lines/Drains/Airways       Drain  Duration             Female External Urinary Catheter 09/10/23 0700 2 days                    Time spent on the discharge of patient: 31 minutes         Dmitriy Gray MD  Department of Hospital Medicine  [facility]

## 2023-09-12 NOTE — PLAN OF CARE
Patient has scheduled follow up appt with cardiology.  Discharge orders and chart reviewed with no further post-acute discharge needs identified at this time.  At this time, patient is cleared for discharge from Case Management standpoint.        09/12/23 1009   Final Note   Assessment Type Final Discharge Note   Anticipated Discharge Disposition Home   Post-Acute Status   Post-Acute Authorization Other   Other Status No Post-Acute Service Needs

## 2023-09-13 ENCOUNTER — PATIENT OUTREACH (OUTPATIENT)
Dept: FAMILY MEDICINE | Facility: CLINIC | Age: 83
End: 2023-09-13

## 2023-09-13 NOTE — TELEPHONE ENCOUNTER
Discharge Information     Discharge Date:   09/12/2023    Primary Discharge Diagnosis:  RICKY      Discharge Summary:  Reviewed      Medication & Order Review     Were medication changes made or new medications added?   Yes    If so, has the patient filled the prescriptions?  Yes     Was Home Health ordered? No    If so, has Home Health contacted patient and/or initiated services?  N/A    Name of Home Health Agency? N/A    Durable Medical Equipment ordered?  No     If so, has the DME provider contacted patient and delivered equipment?  N/A    Follow Up               Any problems since discharge? No    How is the patient feeling since returning home?  Pt states feeling a little fatigue but nothing abnormal. KM    Have you set up recommended follow up appointments?  (cardiology, surgery, etc.) Pt scheduled with Dr. Ramirez on Friday 09/15/2023.    Schedule Hospital Follow-up appointment within 7-14 days (preferably 7).  HFU with PCP 09/21/2023 at 1:00.    Notes:  Pt states feeling fatigue after D/C but nothing abnormal and taking meds as prescribed. Pt adv she is scheduled with Cardio Friday and will complete lab work after visit. Pt instructed to contact clinic with any further questions, concerns, or abnormal symptoms. Pt satisfied with information given. GUERITA Graves

## 2023-09-15 ENCOUNTER — LAB VISIT (OUTPATIENT)
Dept: LAB | Facility: HOSPITAL | Age: 83
End: 2023-09-15
Attending: INTERNAL MEDICINE
Payer: MEDICARE

## 2023-09-15 ENCOUNTER — PATIENT MESSAGE (OUTPATIENT)
Dept: CARDIOLOGY | Facility: CLINIC | Age: 83
End: 2023-09-15

## 2023-09-15 ENCOUNTER — OFFICE VISIT (OUTPATIENT)
Dept: CARDIOLOGY | Facility: CLINIC | Age: 83
End: 2023-09-15
Payer: COMMERCIAL

## 2023-09-15 VITALS
OXYGEN SATURATION: 99 % | BODY MASS INDEX: 26.52 KG/M2 | WEIGHT: 165 LBS | HEART RATE: 63 BPM | DIASTOLIC BLOOD PRESSURE: 86 MMHG | SYSTOLIC BLOOD PRESSURE: 144 MMHG | HEIGHT: 66 IN

## 2023-09-15 DIAGNOSIS — D64.9 ANEMIA, UNSPECIFIED TYPE: ICD-10-CM

## 2023-09-15 DIAGNOSIS — I48.0 PAF (PAROXYSMAL ATRIAL FIBRILLATION): ICD-10-CM

## 2023-09-15 DIAGNOSIS — N17.9 AKI (ACUTE KIDNEY INJURY): ICD-10-CM

## 2023-09-15 DIAGNOSIS — I35.0 SEVERE AORTIC STENOSIS: Primary | ICD-10-CM

## 2023-09-15 DIAGNOSIS — N18.9 CHRONIC KIDNEY DISEASE, UNSPECIFIED CKD STAGE: ICD-10-CM

## 2023-09-15 DIAGNOSIS — Z79.01 CURRENT USE OF LONG TERM ANTICOAGULATION: ICD-10-CM

## 2023-09-15 DIAGNOSIS — I48.91 A-FIB: ICD-10-CM

## 2023-09-15 DIAGNOSIS — I51.89 DIASTOLIC DYSFUNCTION: ICD-10-CM

## 2023-09-15 LAB
ANION GAP SERPL CALC-SCNC: 5 MMOL/L (ref 8–16)
BACTERIA BLD CULT: NORMAL
BACTERIA BLD CULT: NORMAL
BUN SERPL-MCNC: 40 MG/DL (ref 8–23)
CALCIUM SERPL-MCNC: 9.3 MG/DL (ref 8.7–10.5)
CHLORIDE SERPL-SCNC: 109 MMOL/L (ref 95–110)
CO2 SERPL-SCNC: 23 MMOL/L (ref 23–29)
CREAT SERPL-MCNC: 2.2 MG/DL (ref 0.5–1.4)
EST. GFR  (NO RACE VARIABLE): 21.7 ML/MIN/1.73 M^2
GLUCOSE SERPL-MCNC: 95 MG/DL (ref 70–110)
POTASSIUM SERPL-SCNC: 4.4 MMOL/L (ref 3.5–5.1)
SODIUM SERPL-SCNC: 137 MMOL/L (ref 136–145)

## 2023-09-15 PROCEDURE — 3288F PR FALLS RISK ASSESSMENT DOCUMENTED: ICD-10-PCS | Mod: CPTII,S$GLB,, | Performed by: NURSE PRACTITIONER

## 2023-09-15 PROCEDURE — 99999 PR PBB SHADOW E&M-EST. PATIENT-LVL IV: CPT | Mod: PBBFAC,,, | Performed by: NURSE PRACTITIONER

## 2023-09-15 PROCEDURE — 1160F PR REVIEW ALL MEDS BY PRESCRIBER/CLIN PHARMACIST DOCUMENTED: ICD-10-PCS | Mod: CPTII,S$GLB,, | Performed by: NURSE PRACTITIONER

## 2023-09-15 PROCEDURE — 3079F PR MOST RECENT DIASTOLIC BLOOD PRESSURE 80-89 MM HG: ICD-10-PCS | Mod: CPTII,S$GLB,, | Performed by: NURSE PRACTITIONER

## 2023-09-15 PROCEDURE — 1111F PR DISCHARGE MEDS RECONCILED W/ CURRENT OUTPATIENT MED LIST: ICD-10-PCS | Mod: CPTII,S$GLB,, | Performed by: NURSE PRACTITIONER

## 2023-09-15 PROCEDURE — 1159F PR MEDICATION LIST DOCUMENTED IN MEDICAL RECORD: ICD-10-PCS | Mod: CPTII,S$GLB,, | Performed by: NURSE PRACTITIONER

## 2023-09-15 PROCEDURE — 3077F PR MOST RECENT SYSTOLIC BLOOD PRESSURE >= 140 MM HG: ICD-10-PCS | Mod: CPTII,S$GLB,, | Performed by: NURSE PRACTITIONER

## 2023-09-15 PROCEDURE — 99214 OFFICE O/P EST MOD 30 MIN: CPT | Mod: S$GLB,,, | Performed by: NURSE PRACTITIONER

## 2023-09-15 PROCEDURE — 3079F DIAST BP 80-89 MM HG: CPT | Mod: CPTII,S$GLB,, | Performed by: NURSE PRACTITIONER

## 2023-09-15 PROCEDURE — 36415 COLL VENOUS BLD VENIPUNCTURE: CPT | Performed by: STUDENT IN AN ORGANIZED HEALTH CARE EDUCATION/TRAINING PROGRAM

## 2023-09-15 PROCEDURE — 99214 PR OFFICE/OUTPT VISIT, EST, LEVL IV, 30-39 MIN: ICD-10-PCS | Mod: S$GLB,,, | Performed by: NURSE PRACTITIONER

## 2023-09-15 PROCEDURE — 1101F PR PT FALLS ASSESS DOC 0-1 FALLS W/OUT INJ PAST YR: ICD-10-PCS | Mod: CPTII,S$GLB,, | Performed by: NURSE PRACTITIONER

## 2023-09-15 PROCEDURE — 99999 PR PBB SHADOW E&M-EST. PATIENT-LVL IV: ICD-10-PCS | Mod: PBBFAC,,, | Performed by: NURSE PRACTITIONER

## 2023-09-15 PROCEDURE — 1126F PR PAIN SEVERITY QUANTIFIED, NO PAIN PRESENT: ICD-10-PCS | Mod: CPTII,S$GLB,, | Performed by: NURSE PRACTITIONER

## 2023-09-15 PROCEDURE — 1111F DSCHRG MED/CURRENT MED MERGE: CPT | Mod: CPTII,S$GLB,, | Performed by: NURSE PRACTITIONER

## 2023-09-15 PROCEDURE — 1126F AMNT PAIN NOTED NONE PRSNT: CPT | Mod: CPTII,S$GLB,, | Performed by: NURSE PRACTITIONER

## 2023-09-15 PROCEDURE — 3077F SYST BP >= 140 MM HG: CPT | Mod: CPTII,S$GLB,, | Performed by: NURSE PRACTITIONER

## 2023-09-15 PROCEDURE — 1160F RVW MEDS BY RX/DR IN RCRD: CPT | Mod: CPTII,S$GLB,, | Performed by: NURSE PRACTITIONER

## 2023-09-15 PROCEDURE — 1101F PT FALLS ASSESS-DOCD LE1/YR: CPT | Mod: CPTII,S$GLB,, | Performed by: NURSE PRACTITIONER

## 2023-09-15 PROCEDURE — 80048 BASIC METABOLIC PNL TOTAL CA: CPT | Performed by: STUDENT IN AN ORGANIZED HEALTH CARE EDUCATION/TRAINING PROGRAM

## 2023-09-15 PROCEDURE — 3288F FALL RISK ASSESSMENT DOCD: CPT | Mod: CPTII,S$GLB,, | Performed by: NURSE PRACTITIONER

## 2023-09-15 PROCEDURE — 1159F MED LIST DOCD IN RCRD: CPT | Mod: CPTII,S$GLB,, | Performed by: NURSE PRACTITIONER

## 2023-09-15 NOTE — ASSESSMENT & PLAN NOTE
Euvolemic on exam and not on any diuretics presently.  Her creatinine is 2.2 on labs today.  Recommend evaluation by Nephrology.

## 2023-09-15 NOTE — ASSESSMENT & PLAN NOTE
In sinus rhythm per auscultation.  Continue amiodarone 200 mg p.o. b.i.d. and Eliquis 2.5 mg p.o. b.i.d..  She is also on propranolol 10 mg p.o. b.i.d..

## 2023-09-15 NOTE — PROGRESS NOTES
Subjective:    Patient ID:  Irene العراقي is a 83 y.o. female   Chief Complaint   Patient presents with    Follow-up    Shortness of Breath    Fatigue       HPI:  Patient seen today for follow-up appointment.  She was recently hospitalized with RICKY and also UTI.  Patient was taken off of spironolactone.  Her Eliquis was decreased to 2.5 mg p.o. b.i.d. due to RICKY and advanced age.  Patient was in AFib with RVR during the hospitalization but converted back into sinus rhythm.  She is now back on amiodarone 200 mg p.o. b.i.d..  Her blood pressure is stable.  She has an appointment with Dr. Moncada to discuss TAVR.    Review of patient's allergies indicates:   Allergen Reactions    Hydrocodone     Meperidine     Meperidine hcl Nausea And Vomiting    Persantine [dipyridamole]     Vicodin [hydrocodone-acetaminophen] Nausea And Vomiting    Nitrofurantoin macrocrystal      Headache , went into Afib        Past Medical History:   Diagnosis Date    Atrial fibrillation 01/25/2021    Hypertension      Past Surgical History:   Procedure Laterality Date    ANGIOGRAM, CORONARY, WITH LEFT HEART CATHETERIZATION Left 4/19/2023    Procedure: Angiogram, Coronary, with Left Heart Cath;  Surgeon: Kevin Redmond MD;  Location: [facility] CATH/EP LAB;  Service: Cardiology;  Laterality: Left;    BREAST SURGERY      COLONOSCOPY N/A 4/16/2023    Procedure: COLONOSCOPY;  Surgeon: Kvng Mensah MD;  Location: [facility] ENDO;  Service: Endoscopy;  Laterality: N/A;    COLONOSCOPY W/ POLYPECTOMY  04/16/2023     Social History     Tobacco Use    Smoking status: Former     Types: Cigarettes    Smokeless tobacco: Never   Substance Use Topics    Alcohol use: Not Currently    Drug use: Not Currently     Family History   Problem Relation Age of Onset    Cancer Mother     Cancer Father         Review of Systems:   Per HPI         Objective:        Vitals:    09/15/23 0951   BP: (!) 144/86   Pulse: 63       Lab Results   Component Value Date    WBC 4.28 09/12/2023     HGB 9.2 (L) 09/12/2023    HCT 27.8 (L) 09/12/2023     (L) 09/12/2023    CHOL 173 02/10/2020    TRIG 40 02/10/2020    HDL 78 (H) 02/10/2020    ALT 13 09/09/2023    AST 21 09/09/2023     09/15/2023    K 4.4 09/15/2023     09/15/2023    CREATININE 2.2 (H) 09/15/2023    BUN 40 (H) 09/15/2023    CO2 23 09/15/2023    TSH 3.660 09/09/2023    INR 1.0 08/09/2023    HGBA1C 5.1 04/11/2023        ECHOCARDIOGRAM RESULTS  Results for orders placed during the hospital encounter of 08/09/23    Echo    Interpretation Summary    Limited study    Left Ventricle: The left ventricle is normal in size. Mildly increased wall thickness. There is concentric remodeling. Normal wall motion. There is normal systolic function with a visually estimated ejection fraction of 65 - 70%. Diastolic function cannot be reliably determined in the presence of mitral annular calcification.    Aortic Valve: Moderate annular calcification. Moderately restricted motion. There is moderate to severe stenosis. Aortic valve area by VTI is 0.77 cm². Aortic valve peak velocity is 3.66 m/s. Mean gradient is 34 mmHg. The dimensionless index is 0.25. There is moderate aortic regurgitation.    Mitral Valve: There is bileaflet sclerosis. Mild posterior mitral annular calcification. There is moderate to severe regurgitation. decreased excursion.    Left Atrium: Left atrium is severely dilated.    Right Ventricle: Normal right ventricular cavity size.    Tricuspid Valve: There is mild regurgitation.    Pulmonic Valve: There is mild regurgitation.    IVC/SVC: Intermediate venous pressure at 8 mmHg.        CURRENT/PREVIOUS VISIT EKG  Results for orders placed or performed during the hospital encounter of 09/09/23   EKG 12-lead    Collection Time: 09/09/23 11:27 PM    Narrative    Test Reason : R07.9,    Vent. Rate : 055 BPM     Atrial Rate : 055 BPM     P-R Int : 146 ms          QRS Dur : 090 ms      QT Int : 414 ms       P-R-T Axes : 047 003 -07  degrees     QTc Int : 396 ms    Sinus bradycardia  Possible Left atrial enlargement  LVH  Nonspecific T wave abnormality  Abnormal ECG  When compared with ECG of 09-SEP-2023 16:01,  Significant changes have occurred    Referred By: [email or web address]   SELF           Confirmed By:      No valid procedures specified.   Results for orders placed during the hospital encounter of 07/23/22    Nuclear Stress Test    Interpretation Summary    The EKG portion of this study is negative for ischemia.    The patient reported no chest pain during the stress test.    The nuclear portion of this study will be reported separately.      Physical Exam:  CONSTITUTIONAL: No fever, no chills  HEENT: Normocephalic, atraumatic,pupils reactive to light                 NECK:  No JVD no carotid bruit  CVS: S1S2+, RRR  LUNGS: Clear  ABDOMEN: Soft, NT, BS+  EXTREMITIES: No cyanosis, edema  : No araya catheter  NEURO: AAO X 3  PSY: Normal affect      Medication List with Changes/Refills   Current Medications    AMIODARONE (PACERONE) 200 MG TAB    Take 1 tablet (200 mg total) by mouth 2 (two) times daily.    APIXABAN (ELIQUIS) 2.5 MG TAB    Take 1 tablet (2.5 mg total) by mouth 2 (two) times daily.    CEPHALEXIN (KEFLEX) 500 MG CAPSULE    Take 1 capsule (500 mg total) by mouth 4 (four) times daily. for 4 days    MAGNESIUM OXIDE (MAG-OX) 400 MG (241.3 MG MAGNESIUM) TABLET    TAKE 1 TABLET BY MOUTH ONCE DAILY    MIRALAX 17 GRAM PWPK    Take 17 g by mouth 2 (two) times daily.    PROPRANOLOL (INDERAL) 10 MG TABLET    Take 1 tablet (10 mg total) by mouth 2 (two) times daily.             Assessment:       1. Severe aortic stenosis    2. Anemia, unspecified type    3. Current use of long term anticoagulation    4. Diastolic dysfunction    5. PAF (paroxysmal atrial fibrillation)         Plan:     Problem List Items Addressed This Visit          Unprioritized    Diastolic dysfunction    Current Assessment & Plan     Euvolemic on exam and not on any diuretics  presently.  Her creatinine is 2.2 on labs today.  Recommend evaluation by Nephrology.         PAF (paroxysmal atrial fibrillation)    Current Assessment & Plan     In sinus rhythm per auscultation.  Continue amiodarone 200 mg p.o. b.i.d. and Eliquis 2.5 mg p.o. b.i.d..  She is also on propranolol 10 mg p.o. b.i.d..         Anemia    Current Assessment & Plan     Related to kidney disease?         Severe aortic stenosis - Primary    Current Assessment & Plan     To be seen by Dr. Juliano Moncada for TAVR evaluation.         Current use of long term anticoagulation       Follow up in about 6 weeks (around 10/27/2023).

## 2023-09-18 ENCOUNTER — TELEPHONE (OUTPATIENT)
Dept: CARDIOLOGY | Facility: CLINIC | Age: 83
End: 2023-09-18
Payer: MEDICARE

## 2023-09-18 NOTE — TELEPHONE ENCOUNTER
"----- Message from Camilo Ackerman sent at 9/18/2023  3:45 PM CDT -----  Regarding: fax external referral  Contact: jessica at [phone]  Type: Needs Medical Advice    Who Called:  Jessica Brooks Call Back Number: [phone]    Additional Information: PT has "Internal" referral to Dr Luis Miguel Chavez. Dr Chavez is external practice and has not received referral as of time of this message. Please fax referral today to Dr Chavez at [phone] and call pt to let know done so can call office to schedule.    Thanks.        "

## 2023-09-20 DIAGNOSIS — Z78.0 MENOPAUSE: ICD-10-CM

## 2023-09-21 ENCOUNTER — OFFICE VISIT (OUTPATIENT)
Dept: FAMILY MEDICINE | Facility: CLINIC | Age: 83
End: 2023-09-21
Payer: COMMERCIAL

## 2023-09-21 VITALS
WEIGHT: 166.69 LBS | RESPIRATION RATE: 20 BRPM | DIASTOLIC BLOOD PRESSURE: 82 MMHG | HEART RATE: 64 BPM | SYSTOLIC BLOOD PRESSURE: 140 MMHG | HEIGHT: 66 IN | TEMPERATURE: 98 F | BODY MASS INDEX: 26.79 KG/M2

## 2023-09-21 DIAGNOSIS — Z09 HOSPITAL DISCHARGE FOLLOW-UP: Primary | ICD-10-CM

## 2023-09-21 DIAGNOSIS — R39.15 URINARY URGENCY: ICD-10-CM

## 2023-09-21 LAB
BILIRUB UR QL STRIP: NEGATIVE
GLUCOSE UR QL STRIP: NEGATIVE
KETONES UR QL STRIP: NEGATIVE
LEUKOCYTE ESTERASE UR QL STRIP: NEGATIVE
PH, POC UA: 5 (ref 5–8.5)
POC BLOOD, URINE: POSITIVE
POC NITRATES, URINE: NEGATIVE
PROT UR QL STRIP: POSITIVE
SP GR UR STRIP: 1.02 (ref 1–1.03)
UROBILINOGEN UR STRIP-ACNC: NORMAL (ref 0.2–8)

## 2023-09-21 PROCEDURE — 1126F AMNT PAIN NOTED NONE PRSNT: CPT | Mod: CPTII,S$GLB,, | Performed by: NURSE PRACTITIONER

## 2023-09-21 PROCEDURE — 81003 URINALYSIS AUTO W/O SCOPE: CPT | Mod: QW,S$GLB,, | Performed by: NURSE PRACTITIONER

## 2023-09-21 PROCEDURE — 3077F SYST BP >= 140 MM HG: CPT | Mod: CPTII,S$GLB,, | Performed by: NURSE PRACTITIONER

## 2023-09-21 PROCEDURE — 1101F PT FALLS ASSESS-DOCD LE1/YR: CPT | Mod: CPTII,S$GLB,, | Performed by: NURSE PRACTITIONER

## 2023-09-21 PROCEDURE — 3288F FALL RISK ASSESSMENT DOCD: CPT | Mod: CPTII,S$GLB,, | Performed by: NURSE PRACTITIONER

## 2023-09-21 PROCEDURE — 1159F PR MEDICATION LIST DOCUMENTED IN MEDICAL RECORD: ICD-10-PCS | Mod: CPTII,S$GLB,, | Performed by: NURSE PRACTITIONER

## 2023-09-21 PROCEDURE — 99495 TRANSJ CARE MGMT MOD F2F 14D: CPT | Mod: S$GLB,,, | Performed by: NURSE PRACTITIONER

## 2023-09-21 PROCEDURE — 3288F PR FALLS RISK ASSESSMENT DOCUMENTED: ICD-10-PCS | Mod: CPTII,S$GLB,, | Performed by: NURSE PRACTITIONER

## 2023-09-21 PROCEDURE — 1159F MED LIST DOCD IN RCRD: CPT | Mod: CPTII,S$GLB,, | Performed by: NURSE PRACTITIONER

## 2023-09-21 PROCEDURE — 1126F PR PAIN SEVERITY QUANTIFIED, NO PAIN PRESENT: ICD-10-PCS | Mod: CPTII,S$GLB,, | Performed by: NURSE PRACTITIONER

## 2023-09-21 PROCEDURE — 99495 TCM SERVICES (MODERATE COMPLEXITY): ICD-10-PCS | Mod: S$GLB,,, | Performed by: NURSE PRACTITIONER

## 2023-09-21 PROCEDURE — 3077F PR MOST RECENT SYSTOLIC BLOOD PRESSURE >= 140 MM HG: ICD-10-PCS | Mod: CPTII,S$GLB,, | Performed by: NURSE PRACTITIONER

## 2023-09-21 PROCEDURE — 3079F PR MOST RECENT DIASTOLIC BLOOD PRESSURE 80-89 MM HG: ICD-10-PCS | Mod: CPTII,S$GLB,, | Performed by: NURSE PRACTITIONER

## 2023-09-21 PROCEDURE — 3079F DIAST BP 80-89 MM HG: CPT | Mod: CPTII,S$GLB,, | Performed by: NURSE PRACTITIONER

## 2023-09-21 PROCEDURE — 1111F DSCHRG MED/CURRENT MED MERGE: CPT | Mod: CPTII,S$GLB,, | Performed by: NURSE PRACTITIONER

## 2023-09-21 PROCEDURE — 1111F PR DISCHARGE MEDS RECONCILED W/ CURRENT OUTPATIENT MED LIST: ICD-10-PCS | Mod: CPTII,S$GLB,, | Performed by: NURSE PRACTITIONER

## 2023-09-21 PROCEDURE — 81003 POCT URINALYSIS, DIPSTICK, AUTOMATED, W/O SCOPE: ICD-10-PCS | Mod: QW,S$GLB,, | Performed by: NURSE PRACTITIONER

## 2023-09-21 PROCEDURE — 1101F PR PT FALLS ASSESS DOC 0-1 FALLS W/OUT INJ PAST YR: ICD-10-PCS | Mod: CPTII,S$GLB,, | Performed by: NURSE PRACTITIONER

## 2023-09-21 NOTE — PROGRESS NOTES
SUBJECTIVE:    Patient ID: Irene العراقي is a 83 y.o. female.    Chief Complaint: Hospital Follow Up (84 yo female here for HFU. Pt was dx on 09/12/2023 with RICKY dx. Pt states still feeling little fatigue since D/C. KM//Pt refused flu vaccine. KM)    Ms. Tariq  presents today for hospital discharge follow up. She states she is doing better at the time of this visit. She has finished taking her antibiotics and symptoms are improving somewhat. She is concerned about her acute injury but she has appointment scheduled with Nephrology in the coming weeks. She does not require medication refills at this time. She also has follow up scheduled with Cardiology. She denies any other issues or complaints.         Admit Date: 9/9/23   Discharge Date: 9/12/23  Discharge Facility: Hospital     History of hospital stay: Patient is an 85-year-old female with history of AFib, aortic stenosis, tremors presented with shortness of breath and palpitations.  Patient was found to be in AFib RVR, was treated with digoxin and diltiazem in the emergency room and transition to amiodarone IV with load and drip.  Review of chart shows the patient was recently tapered to amiodarone once daily.  Patient also found to have UTI on admission likely exacerbating event.  Patient started on IV antibiotics.  Amiodarone drip was transitioned to p.o. amiodarone.  Cardiology consulted.  Anticoagulation continued. Currently rate controlled. Ucx grew pantoea agglomerans, pan sensitive. IV abx continued, will transition to PO on dc. Cardio increased po amio to bid.  RICKY was noted on admission, improved prior to discharge.  Patient instructed to follow up with Cardiology and PCP after discharge.  Heart rate remained controlled.  Patient medically stable for discharge.    How is patient feeling since discharge from the hospital? She states she is feeling better since being discharged. The biggest chage was different medications but she has had everything  filled.          Medication Reconciliation:  Medications changed/added/deleted. Added   START taking these medications    cephALEXin 500 MG capsule  Commonly known as: KEFLEX  Take 1 capsule (500 mg total) by mouth 4 (four) times daily. for 4 days          CHANGE how you take these medications    amiodarone 200 MG Tab  Commonly known as: PACERONE  Take 1 tablet (200 mg total) by mouth 2 (two) times daily.  What changed: when to take this      apixaban 2.5 mg Tab  Commonly known as: ELIQUIS  Take 1 tablet (2.5 mg total) by mouth 2 (two) times daily.  What changed:   medication strength  how much to take      magnesium oxide 400 mg (241.3 mg magnesium) tablet  Commonly known as: MAG-OX  TAKE 1 TABLET BY MOUTH ONCE DAILY  What changed: when to take this          CONTINUE taking these medications    MIRALAX 17 gram Pwpk  Generic drug: polyethylene glycol  Take 17 g by mouth 2 (two) times daily.      propranoloL 10 MG tablet  Commonly known as: INDERAL  Take 1 tablet (10 mg total) by mouth 2 (two) times daily.          STOP taking these medications    spironolactone 25 MG tablet  Commonly known as: ALDACTONE        New Prescriptions filled after discharge: yes  Discharge summary reviewed:  yes  Pending test results at discharge reviewed:   not applicable  Follow up appointments scheduled:  yes   with Cardiology  and Nephrology  Follow up labs/tests ordered:   not applicable  Home Health ordered on discharge:   no  Home Health company name:  None  DME ordered at discharge:   no    Transitional Care Note    Family and/or Caretaker present at visit?  Yes.  Diagnostic tests reviewed/disposition: No diagnosic tests pending after this hospitalization.  Disease/illness education: N/A  Home health/community services discussion/referrals: Patient does not have home health established from hospital visit.  They do not need home health.  If needed, we will set up home health for the patient.   Establishment or re-establishment of  referral orders for community resources: No other necessary community resources.   Discussion with other health care providers: No discussion with other health care providers necessary.       Lab Visit on 09/15/2023   Component Date Value Ref Range Status    Sodium 09/15/2023 137  136 - 145 mmol/L Final    Potassium 09/15/2023 4.4  3.5 - 5.1 mmol/L Final    Chloride 09/15/2023 109  95 - 110 mmol/L Final    CO2 09/15/2023 23  23 - 29 mmol/L Final    Glucose 09/15/2023 95  70 - 110 mg/dL Final    BUN 09/15/2023 40 (H)  8 - 23 mg/dL Final    Creatinine 09/15/2023 2.2 (H)  0.5 - 1.4 mg/dL Final    Calcium 09/15/2023 9.3  8.7 - 10.5 mg/dL Final    Anion Gap 09/15/2023 5 (L)  8 - 16 mmol/L Final    eGFR 09/15/2023 21.7 (A)  >60 mL/min/1.73 m^2 Final   No results displayed because visit has over 200 results.      Lab Visit on 08/18/2023   Component Date Value Ref Range Status    WBC 08/18/2023 5.62  3.90 - 12.70 K/uL Final    RBC 08/18/2023 3.36 (L)  4.00 - 5.40 M/uL Final    Hemoglobin 08/18/2023 10.3 (L)  12.0 - 16.0 g/dL Final    Hematocrit 08/18/2023 31.0 (L)  37.0 - 48.5 % Final    MCV 08/18/2023 92  82 - 98 fL Final    MCH 08/18/2023 30.7  27.0 - 31.0 pg Final    MCHC 08/18/2023 33.2  32.0 - 36.0 g/dL Final    RDW 08/18/2023 13.0  11.5 - 14.5 % Final    Platelets 08/18/2023 128 (L)  150 - 450 K/uL Final    MPV 08/18/2023 10.9  9.2 - 12.9 fL Final    Immature Granulocytes 08/18/2023 0.2  0.0 - 0.5 % Final    Gran # (ANC) 08/18/2023 3.9  1.8 - 7.7 K/uL Final    Immature Grans (Abs) 08/18/2023 0.01  0.00 - 0.04 K/uL Final    Lymph # 08/18/2023 1.0  1.0 - 4.8 K/uL Final    Mono # 08/18/2023 0.6  0.3 - 1.0 K/uL Final    Eos # 08/18/2023 0.1  0.0 - 0.5 K/uL Final    Baso # 08/18/2023 0.02  0.00 - 0.20 K/uL Final    nRBC 08/18/2023 0  0 /100 WBC Final    Gran % 08/18/2023 69.7  38.0 - 73.0 % Final    Lymph % 08/18/2023 17.8 (L)  18.0 - 48.0 % Final    Mono % 08/18/2023 10.3  4.0 - 15.0 % Final    Eosinophil % 08/18/2023 1.6   0.0 - 8.0 % Final    Basophil % 08/18/2023 0.4  0.0 - 1.9 % Final    Differential Method 08/18/2023 Automated   Final   Admission on 08/09/2023, Discharged on 08/11/2023   Component Date Value Ref Range Status    Magnesium 08/09/2023 1.9  1.6 - 2.6 mg/dL Final    WBC 08/09/2023 6.12  3.90 - 12.70 K/uL Final    RBC 08/09/2023 3.43 (L)  4.00 - 5.40 M/uL Final    Hemoglobin 08/09/2023 10.4 (L)  12.0 - 16.0 g/dL Final    Hematocrit 08/09/2023 31.0 (L)  37.0 - 48.5 % Final    MCV 08/09/2023 90  82 - 98 fL Final    MCH 08/09/2023 30.3  27.0 - 31.0 pg Final    MCHC 08/09/2023 33.5  32.0 - 36.0 g/dL Final    RDW 08/09/2023 12.8  11.5 - 14.5 % Final    Platelets 08/09/2023 143 (L)  150 - 450 K/uL Final    MPV 08/09/2023 10.9  9.2 - 12.9 fL Final    Immature Granulocytes 08/09/2023 0.3  0.0 - 0.5 % Final    Gran # (ANC) 08/09/2023 4.0  1.8 - 7.7 K/uL Final    Immature Grans (Abs) 08/09/2023 0.02  0.00 - 0.04 K/uL Final    Lymph # 08/09/2023 1.4  1.0 - 4.8 K/uL Final    Mono # 08/09/2023 0.6  0.3 - 1.0 K/uL Final    Eos # 08/09/2023 0.1  0.0 - 0.5 K/uL Final    Baso # 08/09/2023 0.02  0.00 - 0.20 K/uL Final    nRBC 08/09/2023 0  0 /100 WBC Final    Gran % 08/09/2023 64.6  38.0 - 73.0 % Final    Lymph % 08/09/2023 22.7  18.0 - 48.0 % Final    Mono % 08/09/2023 10.3  4.0 - 15.0 % Final    Eosinophil % 08/09/2023 1.8  0.0 - 8.0 % Final    Basophil % 08/09/2023 0.3  0.0 - 1.9 % Final    Differential Method 08/09/2023 Automated   Final    Sodium 08/09/2023 139  136 - 145 mmol/L Final    Potassium 08/09/2023 4.1  3.5 - 5.1 mmol/L Final    Chloride 08/09/2023 108  95 - 110 mmol/L Final    CO2 08/09/2023 21 (L)  23 - 29 mmol/L Final    Glucose 08/09/2023 97  70 - 110 mg/dL Final    BUN 08/09/2023 29 (H)  8 - 23 mg/dL Final    Creatinine 08/09/2023 1.4  0.5 - 1.4 mg/dL Final    Calcium 08/09/2023 9.3  8.7 - 10.5 mg/dL Final    Total Protein 08/09/2023 6.4  6.0 - 8.4 g/dL Final    Albumin 08/09/2023 3.6  3.5 - 5.2 g/dL Final    Total  Bilirubin 08/09/2023 0.7  0.1 - 1.0 mg/dL Final    Alkaline Phosphatase 08/09/2023 101  55 - 135 U/L Final    AST 08/09/2023 20  10 - 40 U/L Final    ALT 08/09/2023 15  10 - 44 U/L Final    eGFR 08/09/2023 37.6 (A)  >60 mL/min/1.73 m^2 Final    Anion Gap 08/09/2023 10  8 - 16 mmol/L Final    Troponin I High Sensitivity 08/09/2023 29.2 (H)  0.0 - 14.9 pg/mL Final    Troponin I High Sensitivity 08/09/2023 25.4 (H)  0.0 - 14.9 pg/mL Final    BNP 08/09/2023 1,492 (H)  0 - 99 pg/mL Final    Prothrombin Time 08/09/2023 11.2  9.0 - 12.5 sec Final    INR 08/09/2023 1.0  0.8 - 1.2 Final    aPTT 08/09/2023 26.5  21.0 - 32.0 sec Final    D-Dimer 08/09/2023 0.40  <0.50 mg/L FEU Final    TSH 08/09/2023 2.940  0.340 - 5.600 uIU/mL Final    Procalcitonin 08/09/2023 <0.05  0.00 - 0.50 ng/mL Final    BSA 08/10/2023 1.88  m2 Final    LVOT stroke volume 08/10/2023 79.44  cm3 Final    LVIDd 08/10/2023 4.59  3.5 - 6.0 cm Final    LV Systolic Volume 08/10/2023 32.80  mL Final    LV Systolic Volume Index 08/10/2023 17.6  mL/m2 Final    LVIDs 08/10/2023 2.92  2.1 - 4.0 cm Final    LV Diastolic Volume 08/10/2023 96.80  mL Final    LV Diastolic Volume Index 08/10/2023 52.04  mL/m2 Final    IVS 08/10/2023 1.25 (A)  0.6 - 1.1 cm Final    LVOT diameter 08/10/2023 2.00  cm Final    LVOT area 08/10/2023 3.1  cm2 Final    FS 08/10/2023 36  28 - 44 % Final    Left Ventricle Relative Wall Thick* 08/10/2023 0.46  cm Final    Posterior Wall 08/10/2023 1.05  0.6 - 1.1 cm Final    LV mass 08/10/2023 192.28  g Final    LV Mass Index 08/10/2023 103  g/m2 Final    TDI LATERAL 08/10/2023 0.08  m/s Final    TDI SEPTAL 08/10/2023 0.05  m/s Final    LVOT peak cade 08/10/2023 0.90  m/s Final    Left Ventricular Outflow Tract Patsy* 08/10/2023 0.62  cm/s Final    Left Ventricular Outflow Tract Patsy* 08/10/2023 2.00  mmHg Final    RV S' 08/10/2023 13.70  cm/s Final    AV regurgitation pressure 1/2 time 08/10/2023 592  ms Final    AR Max Cade 08/10/2023 4.98  m/s Final     AV mean gradient 08/10/2023 34  mmHg Final    AV peak gradient 08/10/2023 54  mmHg Final    Ao peak cade 08/10/2023 3.66  m/s Final    Ao VTI 08/10/2023 103.00  cm Final    LVOT peak VTI 08/10/2023 25.30  cm Final    AV valve area 08/10/2023 0.77  cm² Final    AV Velocity Ratio 08/10/2023 0.25   Final    AV index (prosthetic) 08/10/2023 0.25   Final    PAUL by Velocity Ratio 08/10/2023 0.77  cm² Final    Mr max cade 08/10/2023 6.16  m/s Final    Ao root annulus 08/10/2023 3.40  cm Final    IVC diameter 08/10/2023 3.09  cm Final    Mean e' 08/10/2023 0.07  m/s Final    ZLVIDS 08/10/2023 -0.71   Final    ZLVIDD 08/10/2023 -1.20   Final    AORTIC VALVE CUSP SEPERATION 08/10/2023 4.00  cm Final    Est. RA pres 08/10/2023 8  mmHg Final    WBC 08/10/2023 5.35  3.90 - 12.70 K/uL Final    RBC 08/10/2023 3.23 (L)  4.00 - 5.40 M/uL Final    Hemoglobin 08/10/2023 9.7 (L)  12.0 - 16.0 g/dL Final    Hematocrit 08/10/2023 30.9 (L)  37.0 - 48.5 % Final    MCV 08/10/2023 96  82 - 98 fL Final    MCH 08/10/2023 30.0  27.0 - 31.0 pg Final    MCHC 08/10/2023 31.4 (L)  32.0 - 36.0 g/dL Final    RDW 08/10/2023 12.7  11.5 - 14.5 % Final    Platelets 08/10/2023 117 (L)  150 - 450 K/uL Final    MPV 08/10/2023 11.3  9.2 - 12.9 fL Final    Immature Granulocytes 08/10/2023 0.4  0.0 - 0.5 % Final    Gran # (ANC) 08/10/2023 3.2  1.8 - 7.7 K/uL Final    Immature Grans (Abs) 08/10/2023 0.02  0.00 - 0.04 K/uL Final    Lymph # 08/10/2023 1.4  1.0 - 4.8 K/uL Final    Mono # 08/10/2023 0.6  0.3 - 1.0 K/uL Final    Eos # 08/10/2023 0.1  0.0 - 0.5 K/uL Final    Baso # 08/10/2023 0.02  0.00 - 0.20 K/uL Final    nRBC 08/10/2023 0  0 /100 WBC Final    Gran % 08/10/2023 58.8  38.0 - 73.0 % Final    Lymph % 08/10/2023 26.4  18.0 - 48.0 % Final    Mono % 08/10/2023 11.4  4.0 - 15.0 % Final    Eosinophil % 08/10/2023 2.6  0.0 - 8.0 % Final    Basophil % 08/10/2023 0.4  0.0 - 1.9 % Final    Differential Method 08/10/2023 Automated   Final    Sodium 08/10/2023  138  136 - 145 mmol/L Final    Potassium 08/10/2023 3.9  3.5 - 5.1 mmol/L Final    Chloride 08/10/2023 110  95 - 110 mmol/L Final    CO2 08/10/2023 22 (L)  23 - 29 mmol/L Final    Glucose 08/10/2023 82  70 - 110 mg/dL Final    BUN 08/10/2023 31 (H)  8 - 23 mg/dL Final    Creatinine 08/10/2023 1.7 (H)  0.5 - 1.4 mg/dL Final    Calcium 08/10/2023 8.9  8.7 - 10.5 mg/dL Final    Anion Gap 08/10/2023 6 (L)  8 - 16 mmol/L Final    eGFR 08/10/2023 29.8 (A)  >60 mL/min/1.73 m^2 Final    WBC 08/11/2023 6.44  3.90 - 12.70 K/uL Final    RBC 08/11/2023 3.23 (L)  4.00 - 5.40 M/uL Final    Hemoglobin 08/11/2023 9.8 (L)  12.0 - 16.0 g/dL Final    Hematocrit 08/11/2023 29.7 (L)  37.0 - 48.5 % Final    MCV 08/11/2023 92  82 - 98 fL Final    MCH 08/11/2023 30.3  27.0 - 31.0 pg Final    MCHC 08/11/2023 33.0  32.0 - 36.0 g/dL Final    RDW 08/11/2023 12.7  11.5 - 14.5 % Final    Platelets 08/11/2023 118 (L)  150 - 450 K/uL Final    MPV 08/11/2023 11.0  9.2 - 12.9 fL Final    Immature Granulocytes 08/11/2023 0.3  0.0 - 0.5 % Final    Gran # (ANC) 08/11/2023 4.6  1.8 - 7.7 K/uL Final    Immature Grans (Abs) 08/11/2023 0.02  0.00 - 0.04 K/uL Final    Lymph # 08/11/2023 1.1  1.0 - 4.8 K/uL Final    Mono # 08/11/2023 0.5  0.3 - 1.0 K/uL Final    Eos # 08/11/2023 0.1  0.0 - 0.5 K/uL Final    Baso # 08/11/2023 0.03  0.00 - 0.20 K/uL Final    nRBC 08/11/2023 0  0 /100 WBC Final    Gran % 08/11/2023 72.0  38.0 - 73.0 % Final    Lymph % 08/11/2023 16.6 (L)  18.0 - 48.0 % Final    Mono % 08/11/2023 8.4  4.0 - 15.0 % Final    Eosinophil % 08/11/2023 2.2  0.0 - 8.0 % Final    Basophil % 08/11/2023 0.5  0.0 - 1.9 % Final    Differential Method 08/11/2023 Automated   Final    Sodium 08/11/2023 139  136 - 145 mmol/L Final    Potassium 08/11/2023 4.0  3.5 - 5.1 mmol/L Final    Chloride 08/11/2023 110  95 - 110 mmol/L Final    CO2 08/11/2023 23  23 - 29 mmol/L Final    Glucose 08/11/2023 84  70 - 110 mg/dL Final    BUN 08/11/2023 33 (H)  8 - 23 mg/dL  Final    Creatinine 08/11/2023 1.7 (H)  0.5 - 1.4 mg/dL Final    Calcium 08/11/2023 9.0  8.7 - 10.5 mg/dL Final    Anion Gap 08/11/2023 6 (L)  8 - 16 mmol/L Final    eGFR 08/11/2023 29.8 (A)  >60 mL/min/1.73 m^2 Final   Office Visit on 07/27/2023   Component Date Value Ref Range Status    POC Rapid COVID 07/27/2023 Positive (A)  Negative Final     Acceptable 07/27/2023 Yes   Final   Lab Visit on 07/10/2023   Component Date Value Ref Range Status    WBC 07/10/2023 4.94  3.90 - 12.70 K/uL Final    RBC 07/10/2023 3.45 (L)  4.00 - 5.40 M/uL Final    Hemoglobin 07/10/2023 10.7 (L)  12.0 - 16.0 g/dL Final    Hematocrit 07/10/2023 32.4 (L)  37.0 - 48.5 % Final    MCV 07/10/2023 94  82 - 98 fL Final    MCH 07/10/2023 31.0  27.0 - 31.0 pg Final    MCHC 07/10/2023 33.0  32.0 - 36.0 g/dL Final    RDW 07/10/2023 13.1  11.5 - 14.5 % Final    Platelets 07/10/2023 141 (L)  150 - 450 K/uL Final    MPV 07/10/2023 10.6  9.2 - 12.9 fL Final    Immature Granulocytes 07/10/2023 0.2  0.0 - 0.5 % Final    Gran # (ANC) 07/10/2023 2.8  1.8 - 7.7 K/uL Final    Immature Grans (Abs) 07/10/2023 0.01  0.00 - 0.04 K/uL Final    Lymph # 07/10/2023 1.4  1.0 - 4.8 K/uL Final    Mono # 07/10/2023 0.5  0.3 - 1.0 K/uL Final    Eos # 07/10/2023 0.2  0.0 - 0.5 K/uL Final    Baso # 07/10/2023 0.03  0.00 - 0.20 K/uL Final    nRBC 07/10/2023 0  0 /100 WBC Final    Gran % 07/10/2023 56.6  38.0 - 73.0 % Final    Lymph % 07/10/2023 27.9  18.0 - 48.0 % Final    Mono % 07/10/2023 10.9  4.0 - 15.0 % Final    Eosinophil % 07/10/2023 3.8  0.0 - 8.0 % Final    Basophil % 07/10/2023 0.6  0.0 - 1.9 % Final    Differential Method 07/10/2023 Automated   Final   Lab Visit on 05/17/2023   Component Date Value Ref Range Status    Specimen UA 05/17/2023 Urine, Clean Catch   Final    Color, UA 05/17/2023 Yellow  Yellow, Straw, Isabel Final    Appearance, UA 05/17/2023 Clear  Clear Final    pH, UA 05/17/2023 7.0  5.0 - 8.0 Final    Specific Gravity, UA  05/17/2023 1.005  1.005 - 1.030 Final    Protein, UA 05/17/2023 Negative  Negative Final    Glucose, UA 05/17/2023 Negative  Negative Final    Ketones, UA 05/17/2023 Negative  Negative Final    Bilirubin (UA) 05/17/2023 Negative  Negative Final    Occult Blood UA 05/17/2023 3+ (A)  Negative Final    Nitrite, UA 05/17/2023 Negative  Negative Final    Urobilinogen, UA 05/17/2023 Negative  Negative EU/dL Final    Leukocytes, UA 05/17/2023 Trace (A)  Negative Final    RBC, UA 05/17/2023 88 (H)  0 - 4 /hpf Final    WBC, UA 05/17/2023 2  0 - 5 /hpf Final    Bacteria 05/17/2023 Negative  None-Occ /hpf Final    Squam Epithel, UA 05/17/2023 2  /hpf Final    Hyaline Casts, UA 05/17/2023 12 (A)  0-1/lpf /lpf Final    Microscopic Comment 05/17/2023 SEE COMMENT   Final   Admission on 05/08/2023, Discharged on 05/09/2023   Component Date Value Ref Range Status    WBC 05/09/2023 9.54  3.90 - 12.70 K/uL Final    RBC 05/09/2023 3.52 (L)  4.00 - 5.40 M/uL Final    Hemoglobin 05/09/2023 10.9 (L)  12.0 - 16.0 g/dL Final    Hematocrit 05/09/2023 32.9 (L)  37.0 - 48.5 % Final    MCV 05/09/2023 94  82 - 98 fL Final    MCH 05/09/2023 31.0  27.0 - 31.0 pg Final    MCHC 05/09/2023 33.1  32.0 - 36.0 g/dL Final    RDW 05/09/2023 13.6  11.5 - 14.5 % Final    Platelets 05/09/2023 135 (L)  150 - 450 K/uL Final    MPV 05/09/2023 11.6  9.2 - 12.9 fL Final    Immature Granulocytes 05/09/2023 0.5  0.0 - 0.5 % Final    Gran # (ANC) 05/09/2023 8.0 (H)  1.8 - 7.7 K/uL Final    Immature Grans (Abs) 05/09/2023 0.05 (H)  0.00 - 0.04 K/uL Final    Lymph # 05/09/2023 0.7 (L)  1.0 - 4.8 K/uL Final    Mono # 05/09/2023 0.6  0.3 - 1.0 K/uL Final    Eos # 05/09/2023 0.2  0.0 - 0.5 K/uL Final    Baso # 05/09/2023 0.02  0.00 - 0.20 K/uL Final    nRBC 05/09/2023 0  0 /100 WBC Final    Gran % 05/09/2023 84.1 (H)  38.0 - 73.0 % Final    Lymph % 05/09/2023 7.4 (L)  18.0 - 48.0 % Final    Mono % 05/09/2023 5.8  4.0 - 15.0 % Final    Eosinophil % 05/09/2023 2.0  0.0 -  8.0 % Final    Basophil % 05/09/2023 0.2  0.0 - 1.9 % Final    Differential Method 05/09/2023 Automated   Final    Sodium 05/09/2023 141  136 - 145 mmol/L Final    Potassium 05/09/2023 3.8  3.5 - 5.1 mmol/L Final    Chloride 05/09/2023 106  95 - 110 mmol/L Final    CO2 05/09/2023 26  23 - 29 mmol/L Final    Glucose 05/09/2023 104  70 - 110 mg/dL Final    BUN 05/09/2023 20  8 - 23 mg/dL Final    Creatinine 05/09/2023 1.1  0.5 - 1.4 mg/dL Final    Calcium 05/09/2023 9.8  8.7 - 10.5 mg/dL Final    Total Protein 05/09/2023 6.8  6.0 - 8.4 g/dL Final    Albumin 05/09/2023 3.9  3.5 - 5.2 g/dL Final    Total Bilirubin 05/09/2023 0.9  0.1 - 1.0 mg/dL Final    Alkaline Phosphatase 05/09/2023 87  55 - 135 U/L Final    AST 05/09/2023 19  10 - 40 U/L Final    ALT 05/09/2023 11  10 - 44 U/L Final    Anion Gap 05/09/2023 9  8 - 16 mmol/L Final    eGFR 05/09/2023 50.2 (A)  >60 mL/min/1.73 m^2 Final    Lipase 05/09/2023 35  4 - 60 U/L Final    Specimen UA 05/09/2023 Urine, Clean Catch   Final    Color, UA 05/09/2023 Yellow  Yellow, Straw, Isabel Final    Appearance, UA 05/09/2023 Hazy (A)  Clear Final    pH, UA 05/09/2023 7.0  5.0 - 8.0 Final    Specific Gravity, UA 05/09/2023 1.025  1.005 - 1.030 Final    Protein, UA 05/09/2023 Trace (A)  Negative Final    Glucose, UA 05/09/2023 Negative  Negative Final    Ketones, UA 05/09/2023 Negative  Negative Final    Bilirubin (UA) 05/09/2023 Negative  Negative Final    Occult Blood UA 05/09/2023 3+ (A)  Negative Final    Nitrite, UA 05/09/2023 Positive (A)  Negative Final    Urobilinogen, UA 05/09/2023 Negative  Negative EU/dL Final    Leukocytes, UA 05/09/2023 1+ (A)  Negative Final    RBC, UA 05/09/2023 >100 (H)  0 - 4 /hpf Final    WBC, UA 05/09/2023 37 (H)  0 - 5 /hpf Final    Bacteria 05/09/2023 Many (A)  None-Occ /hpf Final    Squam Epithel, UA 05/09/2023 0  /hpf Final    Hyaline Casts, UA 05/09/2023 10 (A)  0-1/lpf /lpf Final    Microscopic Comment 05/09/2023 SEE COMMENT   Final     Urine Culture, Routine 05/09/2023  (A)   Final                    Value:ESCHERICHIA COLI  >100,000 cfu/ml     Office Visit on 04/25/2023   Component Date Value Ref Range Status    Ventricular Rate 05/19/2023 155 bpm   Final    QT/QTc 05/19/2023 288/462 ms   Final    PRT Axes 05/19/2023 *-11-49   Final    POC Blood, Urine 04/25/2023 Positive (A)  Negative Final    POC Bilirubin, Urine 04/25/2023 Negative  Negative Final    POC Urobilinogen, Urine 04/25/2023 normal  0.2 - 8 Final    POC Ketones, Urine 04/25/2023 Negative  Negative Final    POC Protein, Urine 04/25/2023 Positive (A)  Negative Final    POC Nitrates, Urine 04/25/2023 Negative  Negative Final    POC Glucose, Urine 04/25/2023 Negative  Negative Final    pH, UA 04/25/2023 5.5  5.0 - 8.5 Final    POC Specific Gravity, Urine 04/25/2023 1.020  1.000 - 1.030 Final    POC Leukocytes, Urine 04/25/2023 Positive (A)  Negative Final    Urine Culture, Routine 04/25/2023 Final report (A)   Final    Result 1 04/25/2023 Comment (A)   Final    Antimicrobial Susceptibility 04/25/2023 Comment   Final   No results displayed because visit has over 200 results.      There may be more visits with results that are not included.       Past Medical History:   Diagnosis Date    Atrial fibrillation 01/25/2021    Hypertension      Past Surgical History:   Procedure Laterality Date    ANGIOGRAM, CORONARY, WITH LEFT HEART CATHETERIZATION Left 4/19/2023    Procedure: Angiogram, Coronary, with Left Heart Cath;  Surgeon: Kevin Redmond MD;  Location: [facility] CATH/EP LAB;  Service: Cardiology;  Laterality: Left;    BREAST SURGERY      COLONOSCOPY N/A 4/16/2023    Procedure: COLONOSCOPY;  Surgeon: Kvng Mensah MD;  Location: [facility] ENDO;  Service: Endoscopy;  Laterality: N/A;    COLONOSCOPY W/ POLYPECTOMY  04/16/2023     Family History   Problem Relation Age of Onset    Cancer Mother     Cancer Father        Marital Status: Single  Alcohol History:  reports that she does not currently use  "alcohol.  Tobacco History:  reports that she has quit smoking. Her smoking use included cigarettes. She has never used smokeless tobacco.  Drug History:  reports that she does not currently use drugs.    Review of patient's allergies indicates:   Allergen Reactions    Cefuroxime     Hydrocodone     Meperidine     Meperidine hcl Nausea And Vomiting    Persantine [dipyridamole]     Vicodin [hydrocodone-acetaminophen] Nausea And Vomiting    Nitrofurantoin macrocrystal      Headache , went into Afib        Current Outpatient Medications:     amiodarone (PACERONE) 200 MG Tab, Take 1 tablet (200 mg total) by mouth 2 (two) times daily., Disp: 60 tablet, Rfl: 11    apixaban (ELIQUIS) 2.5 mg Tab, Take 1 tablet (2.5 mg total) by mouth 2 (two) times daily., Disp: 60 tablet, Rfl: 2    magnesium oxide (MAG-OX) 400 mg (241.3 mg magnesium) tablet, TAKE 1 TABLET BY MOUTH ONCE DAILY (Patient taking differently: Take 400 mg by mouth once daily.), Disp: 90 tablet, Rfl: 3    MIRALAX 17 gram PwPk, Take 17 g by mouth 2 (two) times daily., Disp: , Rfl:     propranoloL (INDERAL) 10 MG tablet, Take 1 tablet (10 mg total) by mouth 2 (two) times daily., Disp: 180 tablet, Rfl: 3    Review of Systems   Constitutional:  Positive for activity change and fatigue.   Genitourinary:  Positive for frequency.   Musculoskeletal:  Positive for arthralgias. Negative for joint swelling and myalgias.   Psychiatric/Behavioral:  The patient is nervous/anxious.         Objective:      Vitals:    09/21/23 1303   BP: (!) 160/70   Pulse: 64   Resp: 20   Temp: 98.1 °F (36.7 °C)   TempSrc: Oral   Weight: 75.6 kg (166 lb 11.2 oz)   Height: 5' 6" (1.676 m)     Physical Exam  Vitals reviewed.   Constitutional:       General: She is not in acute distress.     Appearance: Normal appearance. She is obese. She is not ill-appearing, toxic-appearing or diaphoretic.   HENT:      Head: Normocephalic and atraumatic.      Right Ear: Tympanic membrane and external ear normal. " There is no impacted cerumen.      Left Ear: Tympanic membrane and external ear normal. There is no impacted cerumen.      Nose: Nose normal. No congestion or rhinorrhea.      Mouth/Throat:      Mouth: Mucous membranes are moist.      Pharynx: Oropharynx is clear. No oropharyngeal exudate or posterior oropharyngeal erythema.   Eyes:      General: No scleral icterus.        Right eye: No discharge.         Left eye: No discharge.      Extraocular Movements: Extraocular movements intact.      Conjunctiva/sclera: Conjunctivae normal.      Pupils: Pupils are equal, round, and reactive to light.   Neck:      Vascular: No carotid bruit.   Cardiovascular:      Rate and Rhythm: Normal rate. Rhythm irregular.      Pulses: Normal pulses.      Heart sounds: Normal heart sounds. No murmur heard.     No friction rub. No gallop.   Pulmonary:      Effort: Pulmonary effort is normal. No respiratory distress.      Breath sounds: Normal breath sounds. No stridor. No rales.   Chest:      Chest wall: No tenderness.   Abdominal:      General: Abdomen is flat. Bowel sounds are normal.      Palpations: Abdomen is soft. There is no mass.      Tenderness: There is no abdominal tenderness. There is no guarding.   Musculoskeletal:         General: No swelling or signs of injury. Normal range of motion.      Cervical back: Normal range of motion and neck supple. No rigidity or tenderness.      Right lower leg: No edema.      Left lower leg: No edema.   Skin:     General: Skin is warm and dry.      Capillary Refill: Capillary refill takes less than 2 seconds.      Findings: No bruising, lesion or rash.   Neurological:      General: No focal deficit present.      Mental Status: She is alert and oriented to person, place, and time. Mental status is at baseline.      Motor: No weakness.      Coordination: Coordination normal.   Psychiatric:         Mood and Affect: Mood normal.         Behavior: Behavior normal.         Thought Content: Thought  content normal.         Judgment: Judgment normal.         Assessment:       1. Hospital discharge follow-up         Plan:       Hospital discharge follow-up  -     POCT Urinalysis, Dipstick, Automated, W/O Scope    Keep all follow up as scheduled.     No follow-ups on file.

## 2023-09-27 ENCOUNTER — HOSPITAL ENCOUNTER (INPATIENT)
Facility: HOSPITAL | Age: 83
LOS: 1 days | Discharge: HOME | DRG: 309 | End: 2023-09-28
Attending: EMERGENCY MEDICINE | Admitting: STUDENT IN AN ORGANIZED HEALTH CARE EDUCATION/TRAINING PROGRAM
Payer: COMMERCIAL

## 2023-09-27 DIAGNOSIS — I48.91 ATRIAL FIBRILLATION WITH RVR: Primary | ICD-10-CM

## 2023-09-27 DIAGNOSIS — I48.92 ATRIAL FIBRILLATION AND FLUTTER: ICD-10-CM

## 2023-09-27 DIAGNOSIS — I48.91 ATRIAL FIBRILLATION AND FLUTTER: ICD-10-CM

## 2023-09-27 LAB
ALBUMIN SERPL BCP-MCNC: 4.3 G/DL (ref 3.5–5.2)
ALP SERPL-CCNC: 93 U/L (ref 55–135)
ALT SERPL W/O P-5'-P-CCNC: 15 U/L (ref 10–44)
ANION GAP SERPL CALC-SCNC: 10 MMOL/L (ref 8–16)
AST SERPL-CCNC: 19 U/L (ref 10–40)
BACTERIA #/AREA URNS HPF: NEGATIVE /HPF
BACTERIA UR CULT: ABNORMAL
BACTERIA UR CULT: ABNORMAL
BASOPHILS # BLD AUTO: 0.03 K/UL (ref 0–0.2)
BASOPHILS NFR BLD: 0.6 % (ref 0–1.9)
BILIRUB SERPL-MCNC: 0.7 MG/DL (ref 0.1–1)
BILIRUB UR QL STRIP: NEGATIVE
BNP SERPL-MCNC: 779 PG/ML (ref 0–99)
BUN SERPL-MCNC: 28 MG/DL (ref 8–23)
CALCIUM SERPL-MCNC: 9.9 MG/DL (ref 8.7–10.5)
CHLORIDE SERPL-SCNC: 110 MMOL/L (ref 95–110)
CLARITY UR: CLEAR
CO2 SERPL-SCNC: 23 MMOL/L (ref 23–29)
COLOR UR: YELLOW
CREAT SERPL-MCNC: 1.8 MG/DL (ref 0.5–1.4)
DIFFERENTIAL METHOD: ABNORMAL
EOSINOPHIL # BLD AUTO: 0.1 K/UL (ref 0–0.5)
EOSINOPHIL NFR BLD: 2.7 % (ref 0–8)
ERYTHROCYTE [DISTWIDTH] IN BLOOD BY AUTOMATED COUNT: 14.3 % (ref 11.5–14.5)
EST. GFR  (NO RACE VARIABLE): 27.6 ML/MIN/1.73 M^2
FOLATE SERPL-MCNC: 16.5 NG/ML (ref 4–24)
GLUCOSE SERPL-MCNC: 90 MG/DL (ref 70–110)
GLUCOSE UR QL STRIP: NEGATIVE
HCT VFR BLD AUTO: 30.6 % (ref 37–48.5)
HGB BLD-MCNC: 10.1 G/DL (ref 12–16)
HGB UR QL STRIP: ABNORMAL
HYALINE CASTS #/AREA URNS LPF: 5 /LPF
IMM GRANULOCYTES # BLD AUTO: 0.01 K/UL (ref 0–0.04)
IMM GRANULOCYTES NFR BLD AUTO: 0.2 % (ref 0–0.5)
KETONES UR QL STRIP: NEGATIVE
LEUKOCYTE ESTERASE UR QL STRIP: NEGATIVE
LYMPHOCYTES # BLD AUTO: 0.8 K/UL (ref 1–4.8)
LYMPHOCYTES NFR BLD: 15.8 % (ref 18–48)
MAGNESIUM SERPL-MCNC: 2.1 MG/DL (ref 1.6–2.6)
MCH RBC QN AUTO: 31.1 PG (ref 27–31)
MCHC RBC AUTO-ENTMCNC: 33 G/DL (ref 32–36)
MCV RBC AUTO: 94 FL (ref 82–98)
MICROSCOPIC COMMENT: ABNORMAL
MONOCYTES # BLD AUTO: 0.6 K/UL (ref 0.3–1)
MONOCYTES NFR BLD: 12 % (ref 4–15)
NEUTROPHILS # BLD AUTO: 3.3 K/UL (ref 1.8–7.7)
NEUTROPHILS NFR BLD: 68.7 % (ref 38–73)
NITRITE UR QL STRIP: NEGATIVE
NRBC BLD-RTO: 0 /100 WBC
OTHER ANTIBIOTIC SUSC ISLT: ABNORMAL
PH UR STRIP: 8 [PH] (ref 5–8)
PLATELET # BLD AUTO: 112 K/UL (ref 150–450)
PMV BLD AUTO: 11.2 FL (ref 9.2–12.9)
POTASSIUM SERPL-SCNC: 4.4 MMOL/L (ref 3.5–5.1)
PROT SERPL-MCNC: 6.8 G/DL (ref 6–8.4)
PROT UR QL STRIP: NEGATIVE
RBC # BLD AUTO: 3.25 M/UL (ref 4–5.4)
RBC #/AREA URNS HPF: 98 /HPF (ref 0–4)
SODIUM SERPL-SCNC: 143 MMOL/L (ref 136–145)
SP GR UR STRIP: 1.01 (ref 1–1.03)
SQUAMOUS #/AREA URNS HPF: 1 /HPF
TROPONIN I SERPL HS-MCNC: 21.2 PG/ML (ref 0–14.9)
TROPONIN I SERPL HS-MCNC: 23.9 PG/ML (ref 0–14.9)
URN SPEC COLLECT METH UR: ABNORMAL
UROBILINOGEN UR STRIP-ACNC: NEGATIVE EU/DL
VIT B12 SERPL-MCNC: 357 PG/ML (ref 210–950)
WBC # BLD AUTO: 4.76 K/UL (ref 3.9–12.7)
WBC #/AREA URNS HPF: 1 /HPF (ref 0–5)

## 2023-09-27 PROCEDURE — 96365 THER/PROPH/DIAG IV INF INIT: CPT

## 2023-09-27 PROCEDURE — 99223 1ST HOSP IP/OBS HIGH 75: CPT | Mod: ,,, | Performed by: INTERNAL MEDICINE

## 2023-09-27 PROCEDURE — 25000003 PHARM REV CODE 250: Performed by: STUDENT IN AN ORGANIZED HEALTH CARE EDUCATION/TRAINING PROGRAM

## 2023-09-27 PROCEDURE — A4216 STERILE WATER/SALINE, 10 ML: HCPCS | Performed by: STUDENT IN AN ORGANIZED HEALTH CARE EDUCATION/TRAINING PROGRAM

## 2023-09-27 PROCEDURE — 36415 COLL VENOUS BLD VENIPUNCTURE: CPT | Performed by: EMERGENCY MEDICINE

## 2023-09-27 PROCEDURE — 82746 ASSAY OF FOLIC ACID SERUM: CPT | Performed by: EMERGENCY MEDICINE

## 2023-09-27 PROCEDURE — 99223 PR INITIAL HOSPITAL CARE,LEVL III: ICD-10-PCS | Mod: ,,, | Performed by: INTERNAL MEDICINE

## 2023-09-27 PROCEDURE — 93010 ELECTROCARDIOGRAM REPORT: CPT | Mod: 77,,, | Performed by: INTERNAL MEDICINE

## 2023-09-27 PROCEDURE — 93010 EKG 12-LEAD: ICD-10-PCS | Mod: 77,,, | Performed by: INTERNAL MEDICINE

## 2023-09-27 PROCEDURE — 81001 URINALYSIS AUTO W/SCOPE: CPT | Performed by: STUDENT IN AN ORGANIZED HEALTH CARE EDUCATION/TRAINING PROGRAM

## 2023-09-27 PROCEDURE — 93005 ELECTROCARDIOGRAM TRACING: CPT | Performed by: INTERNAL MEDICINE

## 2023-09-27 PROCEDURE — 94761 N-INVAS EAR/PLS OXIMETRY MLT: CPT

## 2023-09-27 PROCEDURE — 25000003 PHARM REV CODE 250: Performed by: NURSE PRACTITIONER

## 2023-09-27 PROCEDURE — 25000003 PHARM REV CODE 250: Performed by: EMERGENCY MEDICINE

## 2023-09-27 PROCEDURE — 83735 ASSAY OF MAGNESIUM: CPT | Performed by: EMERGENCY MEDICINE

## 2023-09-27 PROCEDURE — 80053 COMPREHEN METABOLIC PANEL: CPT | Performed by: EMERGENCY MEDICINE

## 2023-09-27 PROCEDURE — 96366 THER/PROPH/DIAG IV INF ADDON: CPT

## 2023-09-27 PROCEDURE — 83880 ASSAY OF NATRIURETIC PEPTIDE: CPT | Performed by: EMERGENCY MEDICINE

## 2023-09-27 PROCEDURE — 93010 ELECTROCARDIOGRAM REPORT: CPT | Mod: ,,, | Performed by: INTERNAL MEDICINE

## 2023-09-27 PROCEDURE — 93010 EKG 12-LEAD: ICD-10-PCS | Mod: ,,, | Performed by: INTERNAL MEDICINE

## 2023-09-27 PROCEDURE — 82607 VITAMIN B-12: CPT | Performed by: EMERGENCY MEDICINE

## 2023-09-27 PROCEDURE — 85025 COMPLETE CBC W/AUTO DIFF WBC: CPT | Performed by: EMERGENCY MEDICINE

## 2023-09-27 PROCEDURE — 21000000 HC CCU ICU ROOM CHARGE

## 2023-09-27 PROCEDURE — 84484 ASSAY OF TROPONIN QUANT: CPT | Mod: 91 | Performed by: EMERGENCY MEDICINE

## 2023-09-27 PROCEDURE — 99285 EMERGENCY DEPT VISIT HI MDM: CPT | Mod: 25

## 2023-09-27 RX ORDER — SODIUM CHLORIDE 0.9 % (FLUSH) 0.9 %
10 SYRINGE (ML) INJECTION EVERY 12 HOURS
Status: DISCONTINUED | OUTPATIENT
Start: 2023-09-27 | End: 2023-09-28 | Stop reason: HOSPADM

## 2023-09-27 RX ORDER — POLYETHYLENE GLYCOL 3350 17 G/17G
17 POWDER, FOR SOLUTION ORAL 2 TIMES DAILY PRN
Status: DISCONTINUED | OUTPATIENT
Start: 2023-09-27 | End: 2023-09-28 | Stop reason: HOSPADM

## 2023-09-27 RX ORDER — PROPRANOLOL HYDROCHLORIDE 10 MG/1
10 TABLET ORAL 3 TIMES DAILY
Status: DISCONTINUED | OUTPATIENT
Start: 2023-09-27 | End: 2023-09-28 | Stop reason: HOSPADM

## 2023-09-27 RX ORDER — LANOLIN ALCOHOL/MO/W.PET/CERES
400 CREAM (GRAM) TOPICAL DAILY
Status: DISCONTINUED | OUTPATIENT
Start: 2023-09-28 | End: 2023-09-28 | Stop reason: HOSPADM

## 2023-09-27 RX ORDER — ONDANSETRON 2 MG/ML
4 INJECTION INTRAMUSCULAR; INTRAVENOUS EVERY 8 HOURS PRN
Status: DISCONTINUED | OUTPATIENT
Start: 2023-09-27 | End: 2023-09-28 | Stop reason: HOSPADM

## 2023-09-27 RX ORDER — DILTIAZEM HYDROCHLORIDE 5 MG/ML
10 INJECTION INTRAVENOUS
Status: COMPLETED | OUTPATIENT
Start: 2023-09-27 | End: 2023-09-27

## 2023-09-27 RX ORDER — ACETAMINOPHEN 325 MG/1
650 TABLET ORAL EVERY 8 HOURS PRN
Status: DISCONTINUED | OUTPATIENT
Start: 2023-09-27 | End: 2023-09-28 | Stop reason: HOSPADM

## 2023-09-27 RX ORDER — TALC
6 POWDER (GRAM) TOPICAL NIGHTLY PRN
Status: DISCONTINUED | OUTPATIENT
Start: 2023-09-27 | End: 2023-09-28 | Stop reason: HOSPADM

## 2023-09-27 RX ORDER — METOPROLOL TARTRATE 1 MG/ML
5 INJECTION, SOLUTION INTRAVENOUS EVERY 5 MIN PRN
Status: DISCONTINUED | OUTPATIENT
Start: 2023-09-27 | End: 2023-09-28 | Stop reason: HOSPADM

## 2023-09-27 RX ORDER — LANOLIN ALCOHOL/MO/W.PET/CERES
1000 CREAM (GRAM) TOPICAL DAILY
Status: DISCONTINUED | OUTPATIENT
Start: 2023-09-27 | End: 2023-09-28 | Stop reason: HOSPADM

## 2023-09-27 RX ADMIN — POLYETHYLENE GLYCOL 3350 17 G: 17 POWDER, FOR SOLUTION ORAL at 05:09

## 2023-09-27 RX ADMIN — DILTIAZEM HYDROCHLORIDE 5 MG/HR: 100 INJECTION, POWDER, LYOPHILIZED, FOR SOLUTION INTRAVENOUS at 08:09

## 2023-09-27 RX ADMIN — CYANOCOBALAMIN TAB 1000 MCG 1000 MCG: 1000 TAB at 02:09

## 2023-09-27 RX ADMIN — ACETAMINOPHEN 650 MG: 325 TABLET ORAL at 08:09

## 2023-09-27 RX ADMIN — APIXABAN 2.5 MG: 2.5 TABLET, FILM COATED ORAL at 08:09

## 2023-09-27 RX ADMIN — DILTIAZEM HYDROCHLORIDE 10 MG: 5 INJECTION INTRAVENOUS at 08:09

## 2023-09-27 RX ADMIN — PROPRANOLOL HYDROCHLORIDE 10 MG: 10 TABLET ORAL at 08:09

## 2023-09-27 RX ADMIN — SODIUM CHLORIDE 10 ML: 9 INJECTION INTRAMUSCULAR; INTRAVENOUS; SUBCUTANEOUS at 09:09

## 2023-09-27 NOTE — ED PROVIDER NOTES
Encounter Date: 9/27/2023       History     Chief Complaint   Patient presents with    Palpitations     HX  OF AFIB     Patient presents complaining of heart racing.  Patient has a history of atrial fibrillation and is on amiodarone.  She is on Eliquis for anticoagulation.  Patient notices symptoms of heart racing this morning at 2:00 a.m..  She complains of shortness of breath.  No chest pain.  At the worst symptoms are moderate.      Review of patient's allergies indicates:   Allergen Reactions    Cefuroxime     Hydrocodone     Meperidine     Meperidine hcl Nausea And Vomiting    Persantine [dipyridamole]     Vicodin [hydrocodone-acetaminophen] Nausea And Vomiting    Nitrofurantoin macrocrystal      Headache , went into Afib      Past Medical History:   Diagnosis Date    Atrial fibrillation 01/25/2021    Hypertension      Past Surgical History:   Procedure Laterality Date    ANGIOGRAM, CORONARY, WITH LEFT HEART CATHETERIZATION Left 4/19/2023    Procedure: Angiogram, Coronary, with Left Heart Cath;  Surgeon: Kevin Redmond MD;  Location: [facility] CATH/EP LAB;  Service: Cardiology;  Laterality: Left;    BREAST SURGERY      COLONOSCOPY N/A 4/16/2023    Procedure: COLONOSCOPY;  Surgeon: Kvng Mensah MD;  Location: [facility] ENDO;  Service: Endoscopy;  Laterality: N/A;    COLONOSCOPY W/ POLYPECTOMY  04/16/2023     Family History   Problem Relation Age of Onset    Cancer Mother     Cancer Father      Social History     Tobacco Use    Smoking status: Former     Types: Cigarettes    Smokeless tobacco: Never   Substance Use Topics    Alcohol use: Not Currently    Drug use: Not Currently     Review of Systems   All other systems reviewed and are negative.      Physical Exam     Initial Vitals [09/27/23 0722]   BP Pulse Resp Temp SpO2   137/84 (!) 121 20 97.7 °F (36.5 °C) 97 %      MAP       --         Physical Exam    Nursing note and vitals reviewed.  Constitutional: She appears well-developed and well-nourished.   Pleasant,  polite   HENT:   Head: Normocephalic and atraumatic.   Eyes: EOM are normal.   Neck: Neck supple.   Normal range of motion.  Cardiovascular:            Irregularly irregular, tachycardic   Pulmonary/Chest: Breath sounds normal. No respiratory distress.   Abdominal: Abdomen is soft.   Musculoskeletal:         General: Normal range of motion.      Cervical back: Normal range of motion and neck supple.     Neurological: She is alert and oriented to person, place, and time.   Skin: Skin is warm and dry. Capillary refill takes less than 2 seconds.   Psychiatric: She has a normal mood and affect. Her behavior is normal. Judgment and thought content normal.         ED Course   Procedures  Labs Reviewed   CBC W/ AUTO DIFFERENTIAL - Abnormal; Notable for the following components:       Result Value    RBC 3.25 (*)     Hemoglobin 10.1 (*)     Hematocrit 30.6 (*)     MCH 31.1 (*)     Platelets 112 (*)     Lymph # 0.8 (*)     Lymph % 15.8 (*)     All other components within normal limits   COMPREHENSIVE METABOLIC PANEL - Abnormal; Notable for the following components:    BUN 28 (*)     Creatinine 1.8 (*)     eGFR 27.6 (*)     All other components within normal limits   TROPONIN I HIGH SENSITIVITY - Abnormal; Notable for the following components:    Troponin I High Sensitivity 23.9 (*)     All other components within normal limits   B-TYPE NATRIURETIC PEPTIDE - Abnormal; Notable for the following components:     (*)     All other components within normal limits   MAGNESIUM   VITAMIN B12   FOLATE   TROPONIN I HIGH SENSITIVITY   URINALYSIS, REFLEX TO URINE CULTURE        ECG Results              EKG 12-lead (In process)  Result time 09/27/23 07:33:39      In process by Interface, Lab In [facility] (09/27/23 07:33:39)                   Narrative:    Test Reason : R07.9,    Vent. Rate : 119 BPM     Atrial Rate : 131 BPM     P-R Int : 000 ms          QRS Dur : 088 ms      QT Int : 292 ms       P-R-T Axes : 000 -03 027 degrees      QTc Int : 410 ms    Atrial fibrillation with rapid ventricular response  Nonspecific ST abnormality  Abnormal ECG  When compared with ECG of 09-SEP-2023 23:27,  Atrial fibrillation has replaced Sinus rhythm  Vent. rate has increased BY  64 BPM  Nonspecific T wave abnormality has replaced inverted T waves in Inferior  leads  T wave inversion no longer evident in Anterior leads    Referred By:             Confirmed By:                                   Imaging Results              X-Ray Chest AP Portable (Final result)  Result time 09/27/23 07:57:21      Final result by Nate Cisse MD (09/27/23 07:57:21)                   Narrative:    Reason: Chest Pain Palpitations    FINDINGS:  Portable chest at 746 compared with 9/9/2023 shows normal cardiomediastinal silhouette.    Slight prominence of pulmonary interstitial opacities remain diffuse, without significant change. No new confluent alveolar consolidation, pleural effusion, or pneumothorax. Pulmonary vasculature is normal. No acute osseous abnormality.    IMPRESSION:  No acute cardiopulmonary abnormality.    Electronically signed by:  Nate Cisse MD  09/27/2023 07:57 AM CDT Workstation: [id number]                                     Medications   diltiaZEM 100 mg in dextrose 5% 100 mL IVPB (ready to mix) (titrating) (12.5 mg/hr Intravenous Rate/Dose Change 9/27/23 0935)   sodium chloride 0.9% flush 10 mL (has no administration in time range)   melatonin tablet 6 mg (has no administration in time range)   acetaminophen tablet 650 mg (has no administration in time range)   ondansetron injection 4 mg (has no administration in time range)   apixaban tablet 2.5 mg (has no administration in time range)   magnesium oxide tablet 400 mg (has no administration in time range)   polyethylene glycol packet 17 g (has no administration in time range)   diltiaZEM injection 10 mg (10 mg Intravenous Given 9/27/23 0813)     Medical Decision Making  No apparent distress      Considerations include electrolyte abnormalities, acute kidney injury, congestive heart failure, atrial fibrillation with rapid ventricular rate    MDM    Patient presents for emergent evaluation of acute palpitations that poses a possible threat to life and/or bodily function.    In the ED patient found to have acute atrial fibrillation with rapid ventricular rate.   I ordered labs and personally reviewed them.  Labs significant for elevated BNP and mildly elevated high sensitivity troponin.    I ordered X-rays and personally reviewed them and reviewed the radiologist interpretation.  Xray significant for no acute cardiopulmonary process.    I ordered EKG and personally reviewed it.  EKG significant for irregularly irregular with rapid ventricular rate, no ST elevation.        Admission MDM  I discussed the patient presentation labs, ekg, X-rays, findings with the consultant(s) hospitalist   Patient was managed in the ED with IV Cardizem.    The response to treatment was improved.    Patient required emergent consultation to hospitalist for admission.    Attending Critical Care:   Critical Care Times:   Direct Patient Care (initial evaluation, reassessments, and time considering the case)................................................................10 minutes.   Additional History from reviewing old medical records or taking additional history from the family, EMS, PCP, etc.......................10 minutes.   Ordering, Reviewing, and Interpreting Diagnostic Studies...............................................................................................................10 minutes.   Documentation..................................................................................................................................................................................5 minutes.   ==============================================================  · Total Critical Care Time - exclusive of procedural time:  35 minutes.  ==============================================================  Critical care was necessary to treat or prevent imminent or life-threatening deterioration of the following conditions:  Atrial fibrillation with rapid ventricular rate.   Critical care was time spent personally by me on the following activities: obtaining history from patient or relative, examination of patient, review of x-rays / CT sent with the patient, ordering lab, x-rays, and/or EKG, development of treatment plan with patient or relative, ordering and performing treatments and interventions, interpretation of cardiac measurements and re-evaluation of patient's conition.   Critical Care Condition: potentially life-threatening     Amount and/or Complexity of Data Reviewed  Labs: ordered. Decision-making details documented in ED Course.  Radiology: ordered.    Risk  Prescription drug management.  Decision regarding hospitalization.               ED Course as of 09/27/23 1017   Wed Sep 27, 2023   0926 Sodium: 143 [AP]   0926 Potassium: 4.4 [AP]   0926 Chloride: 110 [AP]   0926 CO2: 23 [AP]   0926 Glucose: 90 [AP]   0926 BUN(!): 28 [AP]   0926 Creatinine(!): 1.8 [AP]   0926 PROTEIN TOTAL: 6.8 [AP]   0926 Albumin: 4.3 [AP]   0926 Alkaline Phosphatase: 93 [AP]   0926 AST: 19 [AP]   0926 ALT: 15 [AP]   0926 Anion Gap: 10 [AP]   0926 Magnesium : 2.1 [AP]   0926 Troponin I High Sensitivity(!): 23.9 [AP]   0926 BNP(!): 779 [AP]   0926 WBC: 4.76 [AP]   0926 Hemoglobin(!): 10.1 [AP]   0926 Hematocrit(!): 30.6 [AP]   0926 Platelets(!): 112 [AP]      ED Course User Index  [AP] Patricio Desir MD                    Clinical Impression:   Final diagnoses:  [I48.91] Atrial fibrillation with RVR (Primary)        ED Disposition Condition    Admit Stable                Patricio Desir MD  09/27/23 1018

## 2023-09-27 NOTE — H&P
[facility] Medicine  History & Physical    Patient Name: Irene العراقي  MRN: [id number]  Patient Class: IP- Inpatient  Admission Date: 9/27/2023  Attending Physician: Roger Garcia MD   Primary Care Provider: Wanda Kuhn FNP-C         Patient information was obtained from patient, past medical records and ER records.     Subjective:     Principal Problem:Atrial fibrillation with RVR    Chief Complaint:   Chief Complaint   Patient presents with    Palpitations     HX  OF AFIB        HPI: Patient is a 83-year-old female with hx of Afib, Moderate - Severe AS, and tremors who presents with palpitations and fast heart rate.  Patient says she knows her symptoms around 2 AM this morning.  Patient thought she was in AFib.  Patient checked her pulse and found to be above 140.  Patient took 1 extra amiodarone pill that time.  Patient did note she had some mild chest tightness but denies any chest pain or shortness for breath.  Patient says she has been compliant with her medications.  In ED, patient was found to be in AFib with RVR. WBC 4.7 hemoglobin 10.1.  Platelets 112.  BUN 28 and creatinine 1.8.  .  Troponin 23.9.  Chest x-ray with no acute findings.      Past Medical History:   Diagnosis Date    Atrial fibrillation 01/25/2021    Hypertension        Past Surgical History:   Procedure Laterality Date    ANGIOGRAM, CORONARY, WITH LEFT HEART CATHETERIZATION Left 4/19/2023    Procedure: Angiogram, Coronary, with Left Heart Cath;  Surgeon: Kevin Redmond MD;  Location: [facility] CATH/EP LAB;  Service: Cardiology;  Laterality: Left;    BREAST SURGERY      COLONOSCOPY N/A 4/16/2023    Procedure: COLONOSCOPY;  Surgeon: Kvng Mensah MD;  Location: [facility] ENDO;  Service: Endoscopy;  Laterality: N/A;    COLONOSCOPY W/ POLYPECTOMY  04/16/2023       Review of patient's allergies indicates:   Allergen Reactions    Cefuroxime     Hydrocodone     Meperidine     Meperidine hcl Nausea And  Vomiting    Persantine [dipyridamole]     Vicodin [hydrocodone-acetaminophen] Nausea And Vomiting    Nitrofurantoin macrocrystal      Headache , went into Afib        No current facility-administered medications on file prior to encounter.     Current Outpatient Medications on File Prior to Encounter   Medication Sig    amiodarone (PACERONE) 200 MG Tab Take 1 tablet (200 mg total) by mouth 2 (two) times daily.    apixaban (ELIQUIS) 2.5 mg Tab Take 1 tablet (2.5 mg total) by mouth 2 (two) times daily.    magnesium oxide (MAG-OX) 400 mg (241.3 mg magnesium) tablet TAKE 1 TABLET BY MOUTH ONCE DAILY (Patient taking differently: Take 400 mg by mouth once daily.)    MIRALAX 17 gram PwPk Take 17 g by mouth 2 (two) times daily.    propranoloL (INDERAL) 10 MG tablet Take 1 tablet (10 mg total) by mouth 2 (two) times daily.     Family History       Problem Relation (Age of Onset)    Cancer Mother, Father          Tobacco Use    Smoking status: Former     Types: Cigarettes    Smokeless tobacco: Never   Substance and Sexual Activity    Alcohol use: Not Currently    Drug use: Not Currently    Sexual activity: Not on file     Review of Systems   Constitutional: Negative.    HENT: Negative.     Eyes: Negative.    Respiratory: Negative.  Negative for cough, shortness of breath, wheezing and stridor.    Cardiovascular:  Positive for palpitations. Negative for leg swelling.        Chest tightness   Gastrointestinal: Negative.    Endocrine: Negative.    Genitourinary: Negative.    Musculoskeletal: Negative.    Skin: Negative.    Neurological: Negative.    Hematological: Negative.    Psychiatric/Behavioral: Negative.       Objective:     Vital Signs (Most Recent):  Temp: 97.7 °F (36.5 °C) (09/27/23 0722)  Pulse: 92 (09/27/23 1042)  Resp: (!) 26 (09/27/23 1042)  BP: 123/64 (09/27/23 1042)  SpO2: (!) 93 % (09/27/23 1007) Vital Signs (24h Range):  Temp:  [97.7 °F (36.5 °C)] 97.7 °F (36.5 °C)  Pulse:  [] 92  Resp:   [15-26] 26  SpO2:  [89 %-97 %] 93 %  BP: ()/(55-84) 123/64     Weight: 74.8 kg (165 lb)  Body mass index is 26.63 kg/m².     Physical Exam  Vitals and nursing note reviewed.   Constitutional:       General: She is not in acute distress.     Appearance: Normal appearance.   HENT:      Head: Normocephalic and atraumatic.      Nose: Nose normal.   Eyes:      Extraocular Movements: Extraocular movements intact.      Conjunctiva/sclera: Conjunctivae normal.      Pupils: Pupils are equal, round, and reactive to light.   Cardiovascular:      Rate and Rhythm: Tachycardia present. Rhythm irregular.   Pulmonary:      Effort: Pulmonary effort is normal. No respiratory distress.      Breath sounds: Normal breath sounds. No stridor. No wheezing or rales.   Abdominal:      General: Bowel sounds are normal. There is no distension.      Palpations: Abdomen is soft.      Tenderness: There is no abdominal tenderness.   Musculoskeletal:         General: No swelling or tenderness. Normal range of motion.      Cervical back: Normal range of motion and neck supple.      Right lower leg: No edema.      Left lower leg: No edema.   Skin:     General: Skin is warm and dry.   Neurological:      General: No focal deficit present.      Mental Status: She is alert and oriented to person, place, and time.   Psychiatric:         Mood and Affect: Mood normal.         Behavior: Behavior normal.         Thought Content: Thought content normal.         Judgment: Judgment normal.              CRANIAL NERVES     CN III, IV, VI   Pupils are equal, round, and reactive to light.       Significant Labs: All pertinent labs within the past 24 hours have been reviewed.  Recent Lab Results         09/27/23  0806        Albumin 4.3       Alkaline Phosphatase 93       ALT 15       Anion Gap 10       AST 19       Baso # 0.03       Basophil % 0.6       BILIRUBIN TOTAL 0.7  Comment: For infants and newborns, interpretation of results should be based  on  gestational age, weight and in agreement with clinical  observations.    Premature Infant recommended reference ranges:  Up to 24 hours.............<8.0 mg/dL  Up to 48 hours............<12.0 mg/dL  3-5 days..................<15.0 mg/dL  6-29 days.................<15.0 mg/dL           Comment: Values of less than 100 pg/ml are consistent with non-CHF populations.       BUN 28       Calcium 9.9       Chloride 110       CO2 23       Creatinine 1.8       Differential Method Automated       eGFR 27.6       Eos # 0.1       Eosinophil % 2.7       Glucose 90       Gran # (ANC) 3.3       Gran % 68.7       Hematocrit 30.6       Hemoglobin 10.1       Immature Grans (Abs) 0.01  Comment: Mild elevation in immature granulocytes is non specific and   can be seen in a variety of conditions including stress response,   acute inflammation, trauma and pregnancy. Correlation with other   laboratory and clinical findings is essential.         Immature Granulocytes 0.2       Lymph # 0.8       Lymph % 15.8       Magnesium  2.1       MCH 31.1       MCHC 33.0       MCV 94       Mono # 0.6       Mono % 12.0       MPV 11.2       nRBC 0       Platelets 112       Potassium 4.4       PROTEIN TOTAL 6.8       RBC 3.25       RDW 14.3       Sodium 143       Troponin I High Sensitivity 23.9  Comment: Troponin results differ between methods. Do not use   results between Troponin methods interchangeably.    Alkaline Phospatase levels above 400 U/L may   cause false positive results.    Access hsTnI should not be used for patients taking   Asfotase oscar (Strensiq).         WBC 4.76               Significant Imaging: I have reviewed all pertinent imaging results/findings within the past 24 hours.    Assessment/Plan:     * Atrial fibrillation with RVR  Continue diltiazem drip and Eliquis.  Cardiology consulted.  Monitor on telemetry.    Severe aortic stenosis  Patient to follow up with Dr. Juliano Moncada for TAVR evaluation.    Anemia  Recent iron  panel with normal limits.  Check B12 and folate.  May be related to CKD.  CBC daily.        VTE Risk Mitigation (From admission, onward)         Ordered     apixaban tablet 2.5 mg  2 times daily         09/27/23 1013     IP VTE HIGH RISK PATIENT  Once         09/27/23 1011     Place sequential compression device  Until discontinued         09/27/23 1011                           Roger Garcia MD  Department of Hospital Medicine  [facility]

## 2023-09-27 NOTE — NURSING
Nurses Note -- 4 Eyes      9/27/2023   1130      Skin assessed during: Admit      [] No Altered Skin Integrity Present    []Prevention Measures Documented      [x] Yes- Altered Skin Integrity Present or Discovered   [x] LDA Added if Not in Epic (Describe Wound)   [x] New Altered Skin Integrity was Present on Admit and Documented in LDA   [x] Wound Image Taken    Wound Care Consulted? No    Attending Nurse:  DC Umana RN     Second RN/Staff Member:  TIFFANI Steel RN

## 2023-09-27 NOTE — SUBJECTIVE & OBJECTIVE
Past Medical History:   Diagnosis Date    Atrial fibrillation 01/25/2021    Hypertension        Past Surgical History:   Procedure Laterality Date    ANGIOGRAM, CORONARY, WITH LEFT HEART CATHETERIZATION Left 4/19/2023    Procedure: Angiogram, Coronary, with Left Heart Cath;  Surgeon: Kevin Redmond MD;  Location: [facility] CATH/EP LAB;  Service: Cardiology;  Laterality: Left;    BREAST SURGERY      COLONOSCOPY N/A 4/16/2023    Procedure: COLONOSCOPY;  Surgeon: Kvng Mensah MD;  Location: [facility] ENDO;  Service: Endoscopy;  Laterality: N/A;    COLONOSCOPY W/ POLYPECTOMY  04/16/2023       Review of patient's allergies indicates:   Allergen Reactions    Cefuroxime     Hydrocodone     Meperidine     Meperidine hcl Nausea And Vomiting    Persantine [dipyridamole]     Vicodin [hydrocodone-acetaminophen] Nausea And Vomiting    Nitrofurantoin macrocrystal      Headache , went into Afib        No current facility-administered medications on file prior to encounter.     Current Outpatient Medications on File Prior to Encounter   Medication Sig    amiodarone (PACERONE) 200 MG Tab Take 1 tablet (200 mg total) by mouth 2 (two) times daily.    apixaban (ELIQUIS) 2.5 mg Tab Take 1 tablet (2.5 mg total) by mouth 2 (two) times daily.    magnesium oxide (MAG-OX) 400 mg (241.3 mg magnesium) tablet TAKE 1 TABLET BY MOUTH ONCE DAILY (Patient taking differently: Take 400 mg by mouth once daily.)    MIRALAX 17 gram PwPk Take 17 g by mouth 2 (two) times daily.    propranoloL (INDERAL) 10 MG tablet Take 1 tablet (10 mg total) by mouth 2 (two) times daily.     Family History       Problem Relation (Age of Onset)    Cancer Mother, Father          Tobacco Use    Smoking status: Former     Types: Cigarettes    Smokeless tobacco: Never   Substance and Sexual Activity    Alcohol use: Not Currently    Drug use: Not Currently    Sexual activity: Not on file     Review of Systems   Constitutional: Negative.    HENT: Negative.     Eyes: Negative.     Respiratory: Negative.  Negative for cough, shortness of breath, wheezing and stridor.    Cardiovascular:  Positive for palpitations. Negative for leg swelling.        Chest tightness   Gastrointestinal: Negative.    Endocrine: Negative.    Genitourinary: Negative.    Musculoskeletal: Negative.    Skin: Negative.    Neurological: Negative.    Hematological: Negative.    Psychiatric/Behavioral: Negative.       Objective:     Vital Signs (Most Recent):  Temp: 97.7 °F (36.5 °C) (09/27/23 0722)  Pulse: 92 (09/27/23 1042)  Resp: (!) 26 (09/27/23 1042)  BP: 123/64 (09/27/23 1042)  SpO2: (!) 93 % (09/27/23 1007) Vital Signs (24h Range):  Temp:  [97.7 °F (36.5 °C)] 97.7 °F (36.5 °C)  Pulse:  [] 92  Resp:  [15-26] 26  SpO2:  [89 %-97 %] 93 %  BP: ()/(55-84) 123/64     Weight: 74.8 kg (165 lb)  Body mass index is 26.63 kg/m².     Physical Exam  Vitals and nursing note reviewed.   Constitutional:       General: She is not in acute distress.     Appearance: Normal appearance.   HENT:      Head: Normocephalic and atraumatic.      Nose: Nose normal.   Eyes:      Extraocular Movements: Extraocular movements intact.      Conjunctiva/sclera: Conjunctivae normal.      Pupils: Pupils are equal, round, and reactive to light.   Cardiovascular:      Rate and Rhythm: Tachycardia present. Rhythm irregular.   Pulmonary:      Effort: Pulmonary effort is normal. No respiratory distress.      Breath sounds: Normal breath sounds. No stridor. No wheezing or rales.   Abdominal:      General: Bowel sounds are normal. There is no distension.      Palpations: Abdomen is soft.      Tenderness: There is no abdominal tenderness.   Musculoskeletal:         General: No swelling or tenderness. Normal range of motion.      Cervical back: Normal range of motion and neck supple.      Right lower leg: No edema.      Left lower leg: No edema.   Skin:     General: Skin is warm and dry.   Neurological:      General: No focal deficit present.       Mental Status: She is alert and oriented to person, place, and time.   Psychiatric:         Mood and Affect: Mood normal.         Behavior: Behavior normal.         Thought Content: Thought content normal.         Judgment: Judgment normal.              CRANIAL NERVES     CN III, IV, VI   Pupils are equal, round, and reactive to light.       Significant Labs: All pertinent labs within the past 24 hours have been reviewed.  Recent Lab Results         09/27/23  0806        Albumin 4.3       Alkaline Phosphatase 93       ALT 15       Anion Gap 10       AST 19       Baso # 0.03       Basophil % 0.6       BILIRUBIN TOTAL 0.7  Comment: For infants and newborns, interpretation of results should be based  on gestational age, weight and in agreement with clinical  observations.    Premature Infant recommended reference ranges:  Up to 24 hours.............<8.0 mg/dL  Up to 48 hours............<12.0 mg/dL  3-5 days..................<15.0 mg/dL  6-29 days.................<15.0 mg/dL           Comment: Values of less than 100 pg/ml are consistent with non-CHF populations.       BUN 28       Calcium 9.9       Chloride 110       CO2 23       Creatinine 1.8       Differential Method Automated       eGFR 27.6       Eos # 0.1       Eosinophil % 2.7       Glucose 90       Gran # (ANC) 3.3       Gran % 68.7       Hematocrit 30.6       Hemoglobin 10.1       Immature Grans (Abs) 0.01  Comment: Mild elevation in immature granulocytes is non specific and   can be seen in a variety of conditions including stress response,   acute inflammation, trauma and pregnancy. Correlation with other   laboratory and clinical findings is essential.         Immature Granulocytes 0.2       Lymph # 0.8       Lymph % 15.8       Magnesium  2.1       MCH 31.1       MCHC 33.0       MCV 94       Mono # 0.6       Mono % 12.0       MPV 11.2       nRBC 0       Platelets 112       Potassium 4.4       PROTEIN TOTAL 6.8       RBC 3.25       RDW 14.3        Sodium 143       Troponin I High Sensitivity 23.9  Comment: Troponin results differ between methods. Do not use   results between Troponin methods interchangeably.    Alkaline Phospatase levels above 400 U/L may   cause false positive results.    Access hsTnI should not be used for patients taking   Asfotase oscar (Strensiq).         WBC 4.76               Significant Imaging: I have reviewed all pertinent imaging results/findings within the past 24 hours.

## 2023-09-27 NOTE — HPI
Patient is a 83-year-old female with hx of Afib, Moderate - Severe AS, and tremors who presents with palpitations and fast heart rate.  Patient says she knows her symptoms around 2 AM this morning.  Patient thought she was in AFib.  Patient checked her pulse and found to be above 140.  Patient took 1 extra amiodarone pill that time.  Patient did note she had some mild chest tightness but denies any chest pain or shortness for breath.  Patient says she has been compliant with her medications.  In ED, patient was found to be in AFib with RVR. WBC 4.7 hemoglobin 10.1.  Platelets 112.  BUN 28 and creatinine 1.8.  .  Troponin 23.9.  Chest x-ray with no acute findings.

## 2023-09-27 NOTE — CONSULTS
[facility]  Department of Cardiology  Consult Note      PATIENT NAME: Irene العراقي    MRN: [id number]  TODAY'S DATE: 09/27/2023  ADMIT DATE: 9/27/2023                          CONSULT REQUESTED BY: Roger Garcia MD    SUBJECTIVE     PRINCIPAL PROBLEM: Atrial fibrillation with RVR      REASON FOR CONSULT:    From H&P: Patient is a 83-year-old female with hx of Afib, Moderate - Severe AS, and tremors who presents with palpitations and fast heart rate.  Patient says she knows her symptoms around 2 AM this morning.  Patient thought she was in AFib.  Patient checked her pulse and found to be above 140.  Patient took 1 extra amiodarone pill that time.  Patient did note she had some mild chest tightness but denies any chest pain or shortness for breath.  Patient says she has been compliant with her medications.  In ED, patient was found to be in AFib with RVR. WBC 4.7 hemoglobin 10.1.  Platelets 112.  BUN 28 and creatinine 1.8.  .  Troponin 23.9.  Chest x-ray with no acute findings.    HPI:    Patient is an 83-year-old female who presented to the emergency room with palpitations and elevated heart rate.  Patient has a history of paroxysmal AFib and was recently changed from sotalol to amiodarone.  Patient states that she has been under a lot of stress and worried about her sister and also with her other health issues relying on her nephew has caused her a lot of stress as well.  However she states that when she noted the AFib come on she did not panic and she took an additional amiodarone pill.  She waited until her nephew was up and then ask him about bringing her to the hospital.    Review of patient's allergies indicates:   Allergen Reactions    Cefuroxime     Hydrocodone     Meperidine     Meperidine hcl Nausea And Vomiting    Persantine [dipyridamole]     Vicodin [hydrocodone-acetaminophen] Nausea And Vomiting    Nitrofurantoin macrocrystal      Headache , went into Afib        Past Medical  History:   Diagnosis Date    Atrial fibrillation 01/25/2021    Hypertension      Past Surgical History:   Procedure Laterality Date    ANGIOGRAM, CORONARY, WITH LEFT HEART CATHETERIZATION Left 4/19/2023    Procedure: Angiogram, Coronary, with Left Heart Cath;  Surgeon: Kevin Redmond MD;  Location: [facility] CATH/EP LAB;  Service: Cardiology;  Laterality: Left;    BREAST SURGERY      COLONOSCOPY N/A 4/16/2023    Procedure: COLONOSCOPY;  Surgeon: Kvng Mensah MD;  Location: [facility] ENDO;  Service: Endoscopy;  Laterality: N/A;    COLONOSCOPY W/ POLYPECTOMY  04/16/2023     Social History     Tobacco Use    Smoking status: Former     Types: Cigarettes    Smokeless tobacco: Never   Substance Use Topics    Alcohol use: Not Currently    Drug use: Not Currently        REVIEW OF SYSTEMS  CONSTITUTIONAL: Negative for chills, fatigue and fever.   EYES: No double vision, No blurred vision  NEURO: No headaches, No dizziness; positive for tremors  RESPIRATORY: Negative for cough, shortness of breath and wheezing.    CARDIOVASCULAR: Negative for chest pain. +for palpitations  GI: Negative for abdominal pain, No melena, diarrhea, nausea and vomiting.   : Negative for dysuria and frequency, Negative for hematuria  SKIN: Negative for bruising, Negative for edema or discoloration noted.   PSYCHIATRIC: Negative for depression, Negative for anxiety, Negative for memory loss  MUSCULOSKELETAL: Negative for neck pain, Negative for muscle weakness, Negative for back pain     OBJECTIVE     VITAL SIGNS (Most Recent)  Temp: 97.8 °F (36.6 °C) (09/27/23 1147)  Pulse: (!) 54 (09/27/23 1400)  Resp: 20 (09/27/23 1400)  BP: (!) 112/59 (09/27/23 1400)  SpO2: 98 % (09/27/23 1400)    VENTILATION STATUS  Resp: 20 (09/27/23 1400)  SpO2: 98 % (09/27/23 1400)           I & O (Last 24H):  Intake/Output Summary (Last 24 hours) at 9/27/2023 1429  Last data filed at 9/27/2023 1343  Gross per 24 hour   Intake 120 ml   Output 150 ml   Net -30 ml       WEIGHTS  Wt  "Readings from Last 1 Encounters:   09/27/23 1147 74.8 kg (164 lb 14.5 oz)   09/27/23 0722 74.8 kg (165 lb)       PHYSICAL EXAM  CONSTITUTIONAL: No fever, no chills  HEENT: Normocephalic, atraumatic,pupils reactive to light                 NECK:  No JVD no carotid bruit  CVS: S1S2+, iRRR  LUNGS: Clear  ABDOMEN: Soft, NT, BS+  EXTREMITIES: No cyanosis, edema  : No araya catheter  NEURO: AAO X 3  PSY: Normal affect      HOME MEDICATIONS:  No current facility-administered medications on file prior to encounter.     Current Outpatient Medications on File Prior to Encounter   Medication Sig Dispense Refill    amiodarone (PACERONE) 200 MG Tab Take 1 tablet (200 mg total) by mouth 2 (two) times daily. 60 tablet 11    apixaban (ELIQUIS) 2.5 mg Tab Take 1 tablet (2.5 mg total) by mouth 2 (two) times daily. 60 tablet 2    magnesium oxide (MAG-OX) 400 mg (241.3 mg magnesium) tablet TAKE 1 TABLET BY MOUTH ONCE DAILY (Patient taking differently: Take 400 mg by mouth once daily.) 90 tablet 3    MIRALAX 17 gram PwPk Take 17 g by mouth 2 (two) times daily.      propranoloL (INDERAL) 10 MG tablet Take 1 tablet (10 mg total) by mouth 2 (two) times daily. 180 tablet 3       SCHEDULED MEDS:   apixaban  2.5 mg Oral BID    cyanocobalamin  1,000 mcg Oral Daily    [START ON 9/28/2023] magnesium oxide  400 mg Oral Daily    propranoloL  10 mg Oral TID    sodium chloride 0.9%  10 mL Intravenous Q12H       CONTINUOUS INFUSIONS:   dilTIAZem Stopped (09/27/23 1230)       PRN MEDS:acetaminophen, melatonin, metoprolol, ondansetron, polyethylene glycol    LABS AND DIAGNOSTICS     CBC LAST 3 DAYS  Recent Labs   Lab 09/27/23  0806   WBC 4.76   RBC 3.25*   HGB 10.1*   HCT 30.6*   MCV 94   MCH 31.1*   MCHC 33.0   RDW 14.3   *   MPV 11.2   GRAN 68.7  3.3   LYMPH 15.8*  0.8*   MONO 12.0  0.6   BASO 0.03   NRBC 0       COAGULATION LAST 3 DAYS  No results for input(s): "LABPT", "INR", "APTT" in the last 168 hours.    CHEMISTRY LAST 3 " "DAYS  Recent Labs   Lab 09/27/23  0806      K 4.4      CO2 23   ANIONGAP 10   BUN 28*   CREATININE 1.8*   GLU 90   CALCIUM 9.9   MG 2.1   ALBUMIN 4.3   PROT 6.8   ALKPHOS 93   ALT 15   AST 19   BILITOT 0.7       CARDIAC PROFILE LAST 3 DAYS  Recent Labs   Lab 09/27/23  0806 09/27/23  1059   *  --    TROPONINIHS 23.9* 21.2*       ENDOCRINE LAST 3 DAYS  No results for input(s): "TSH", "PROCAL" in the last 168 hours.    LAST ARTERIAL BLOOD GAS  ABG  No results for input(s): "PH", "PO2", "PCO2", "HCO3", "BE" in the last 168 hours.    LAST 7 DAYS MICROBIOLOGY   Microbiology Results (last 7 days)       ** No results found for the last 168 hours. **            MOST RECENT IMAGING  X-Ray Chest AP Portable  Reason: Chest Pain Palpitations    FINDINGS:  Portable chest at 746 compared with 9/9/2023 shows normal cardiomediastinal silhouette.    Slight prominence of pulmonary interstitial opacities remain diffuse, without significant change. No new confluent alveolar consolidation, pleural effusion, or pneumothorax. Pulmonary vasculature is normal. No acute osseous abnormality.    IMPRESSION:  No acute cardiopulmonary abnormality.    Electronically signed by:  Nate Cisse MD  09/27/2023 07:57 AM CDT Workstation: [id number]      ECHOCARDIOGRAM RESULTS (last 5)  Results for orders placed during the hospital encounter of 08/09/23    Echo    Interpretation Summary    Limited study    Left Ventricle: The left ventricle is normal in size. Mildly increased wall thickness. There is concentric remodeling. Normal wall motion. There is normal systolic function with a visually estimated ejection fraction of 65 - 70%. Diastolic function cannot be reliably determined in the presence of mitral annular calcification.    Aortic Valve: Moderate annular calcification. Moderately restricted motion. There is moderate to severe stenosis. Aortic valve area by VTI is 0.77 cm². Aortic valve peak velocity is 3.66 m/s. Mean gradient is " 34 mmHg. The dimensionless index is 0.25. There is moderate aortic regurgitation.    Mitral Valve: There is bileaflet sclerosis. Mild posterior mitral annular calcification. There is moderate to severe regurgitation. decreased excursion.    Left Atrium: Left atrium is severely dilated.    Right Ventricle: Normal right ventricular cavity size.    Tricuspid Valve: There is mild regurgitation.    Pulmonic Valve: There is mild regurgitation.    IVC/SVC: Intermediate venous pressure at 8 mmHg.      Results for orders placed during the hospital encounter of 04/10/23    Echo    Interpretation Summary  · The left ventricle is normal in size with concentric remodeling and normal systolic function.  · Mild left atrial enlargement.  · The estimated ejection fraction is 65%.  · Indeterminate left ventricular diastolic function.  · Normal right ventricular size with normal right ventricular systolic function.  · There is moderate aortic valve stenosis.  · Aortic valve area is 1.24 cm2; peak velocity is 3.51 m/s; mean gradient is 32 mmHg.  · Mild mitral regurgitation.  · Mild tricuspid regurgitation.  · There is mild to moderate pulmonary hypertension with RVSP at 47 mmhg      Results for orders placed during the hospital encounter of 07/23/22    Echo    Interpretation Summary  · There is moderate aortic valve stenosis.  · Aortic valve area is 1.49 cm2; peak velocity is m/s; mean gradient is 26 mmHg.  · Mild aortic regurgitation.  · Mild mitral regurgitation.  · The left ventricle is normal in size with concentric remodeling and normal systolic function.  · The estimated ejection fraction is 60%.  · Grade I left ventricular diastolic dysfunction.  · Normal right ventricular size with normal right ventricular systolic function.  · Mild left atrial enlargement.  · Mild tricuspid regurgitation.  · Intermediate central venous pressure (8 mmHg).  · The estimated PA systolic pressure is 34 mmHg.      Results for orders placed during  the hospital encounter of 02/09/20    Echo Color Flow Doppler? Yes; Bubble Contrast? Yes    Interpretation Summary  · Concentric left ventricular remodeling.  · Intravenous Saline (bubble) contrast was used during the study.Study is negative for shunt  · Left ventricular systolic function. The estimated ejection fraction is 65%.  · Grade I (mild) left ventricular diastolic dysfunction consistent with impaired relaxation.  · Normal right ventricular systolic function.  · Mild aortic regurgitation.  · Mild aortic valve stenosis.  · Aortic valve area is 1.54 cm2; peak velocity is 2.59 m/s; mean gradient is 17 mmHg.  · Mild tricuspid regurgitation.  · Normal central venous pressure (3 mmHg).  · The estimated PA systolic pressure is 29 mmHg.      CURRENT/PREVIOUS VISIT EKG  Results for orders placed or performed during the hospital encounter of 09/27/23   EKG 12-lead    Collection Time: 09/27/23 12:45 PM    Narrative    Test Reason : I48.91,I48.92,    Vent. Rate : 051 BPM     Atrial Rate : 051 BPM     P-R Int : 150 ms          QRS Dur : 084 ms      QT Int : 446 ms       P-R-T Axes : 049 014 012 degrees     QTc Int : 411 ms    Sinus bradycardia  Otherwise normal ECG  When compared with ECG of 27-SEP-2023 07:24,  Sinus rhythm has replaced Atrial fibrillation  Vent. rate has decreased BY  68 BPM  Inverted T waves have replaced nonspecific T wave abnormality in Inferior  leads    Referred By: [email or web address]   SELF           Confirmed By:            ASSESSMENT/PLAN:     Active Hospital Problems    Diagnosis    *Atrial fibrillation with RVR    Severe aortic stenosis    Anemia       ASSESSMENT & PLAN:     Afib with RVR  Severe aortic stenosis   Chronic anemia   CKD  Anxiety  Tremors       RECOMMENDATIONS:    She has converted back into SR in the 50s on telemetry since being seen this morning. Dilt drip has been stopped.   Increase propranolol to 10 mg po TID. Hold if HR is less than 60 bpm.   Patient will likely need a sleep study  to evaluate for sleep apnea as her Afib comes on frequently during the night while sleeping. She does have a thick tongue and suspicion of sleep apnea is high.   Resume amiodarone 200 mg po TID x 5 days then 200 mg pO BID.   She is scheduled to see Dr. Moncada for TAVR eval on 10/6/23. Hopeful for dc home soon.             So Valdez NP  Date of Service: 09/27/2023  2:29 PM   This is a readmission this month for 83-year-old with history of paroxysmal atrial fibrillation has been treated with intravenous amiodarone now on oral amiodarone therapy had breakthrough with recurrence of palpitations around 1:  30 in the morning with elevated heart rates up to 140 beats per minute.  Her BNP is elevated at 779  I have personally interviewed and examined the patient, I have reviewed the Nurse Practitioner's history and physical, assessment, and plan. I have personally evaluated the patient at bedside and agree with the findings and made appropriate changes as necessary in recommendations.    Sinus rhythm has been restored since this morning  Recommend to maintain on amiodarone as discussed above and   Increase the Inderal while she is here and upon discharge changed to Inderal LA 60 mg daily.  She needs sleep study at the earliest  Encouraged her to proceed with preliminary evaluation for TAVR.  As scheduled.  Continue to monitor on telemetry overnight and increase physical activity    Justin Ramirez MD  [facility]  09/27/2023 2:41 PM

## 2023-09-27 NOTE — NURSING
Received pt from ED RN with all monitors on cardizem gtt @15mg/hr, chart, and pt belongings. Pt oriented to room, bed in lowest position and locked, bed alarm on, and call light in reach. NAD.

## 2023-09-28 VITALS
HEART RATE: 54 BPM | TEMPERATURE: 98 F | RESPIRATION RATE: 20 BRPM | BODY MASS INDEX: 26.47 KG/M2 | OXYGEN SATURATION: 100 % | HEIGHT: 66 IN | WEIGHT: 164.69 LBS | SYSTOLIC BLOOD PRESSURE: 143 MMHG | DIASTOLIC BLOOD PRESSURE: 69 MMHG

## 2023-09-28 PROBLEM — U07.1 GASTROENTERITIS DUE TO COVID-19 VIRUS: Status: RESOLVED | Noted: 2021-01-09 | Resolved: 2023-09-28

## 2023-09-28 PROBLEM — K59.04 CHRONIC IDIOPATHIC CONSTIPATION: Status: ACTIVE | Noted: 2023-09-28

## 2023-09-28 PROBLEM — J96.01 ACUTE RESPIRATORY FAILURE WITH HYPOXIA AND HYPERCARBIA: Status: RESOLVED | Noted: 2021-01-09 | Resolved: 2023-09-28

## 2023-09-28 PROBLEM — U07.1 PNEUMONIA DUE TO COVID-19 VIRUS: Status: RESOLVED | Noted: 2021-01-09 | Resolved: 2023-09-28

## 2023-09-28 PROBLEM — N18.4 STAGE 4 CHRONIC KIDNEY DISEASE: Status: ACTIVE | Noted: 2023-09-28

## 2023-09-28 PROBLEM — K57.30 DIVERTICULOSIS OF LARGE INTESTINE WITHOUT PERFORATION OR ABSCESS WITHOUT BLEEDING: Status: ACTIVE | Noted: 2023-09-28

## 2023-09-28 PROBLEM — M81.0 OSTEOPOROSIS: Status: ACTIVE | Noted: 2023-09-28

## 2023-09-28 PROBLEM — I51.89 DIASTOLIC DYSFUNCTION: Status: RESOLVED | Noted: 2021-01-27 | Resolved: 2023-09-28

## 2023-09-28 PROBLEM — J12.82 PNEUMONIA DUE TO COVID-19 VIRUS: Status: RESOLVED | Noted: 2021-01-09 | Resolved: 2023-09-28

## 2023-09-28 PROBLEM — R33.9 URINARY RETENTION: Status: RESOLVED | Noted: 2023-04-10 | Resolved: 2023-09-28

## 2023-09-28 PROBLEM — K62.89 PROCTITIS: Status: RESOLVED | Noted: 2023-04-10 | Resolved: 2023-09-28

## 2023-09-28 PROBLEM — Z80.0 FAMILY HISTORY OF RECTAL CANCER: Status: ACTIVE | Noted: 2018-11-06

## 2023-09-28 PROBLEM — K63.5 COLON POLYPS: Status: ACTIVE | Noted: 2023-09-28

## 2023-09-28 PROBLEM — U07.1 COVID-19: Status: RESOLVED | Noted: 2021-01-27 | Resolved: 2023-09-28

## 2023-09-28 PROBLEM — A08.39 GASTROENTERITIS DUE TO COVID-19 VIRUS: Status: RESOLVED | Noted: 2021-01-09 | Resolved: 2023-09-28

## 2023-09-28 PROBLEM — R07.9 CHEST PAIN: Status: RESOLVED | Noted: 2021-01-27 | Resolved: 2023-09-28

## 2023-09-28 PROBLEM — E87.6 HYPOKALEMIA: Status: RESOLVED | Noted: 2021-01-09 | Resolved: 2023-09-28

## 2023-09-28 PROBLEM — F32.A DEPRESSION: Status: ACTIVE | Noted: 2018-11-06

## 2023-09-28 PROBLEM — J96.02 ACUTE RESPIRATORY FAILURE WITH HYPOXIA AND HYPERCARBIA: Status: RESOLVED | Noted: 2021-01-09 | Resolved: 2023-09-28

## 2023-09-28 PROBLEM — M20.41 ACQUIRED HAMMER TOE OF RIGHT FOOT: Status: ACTIVE | Noted: 2017-10-06

## 2023-09-28 LAB
ANION GAP SERPL CALC-SCNC: 5 MMOL/L (ref 8–16)
BUN SERPL-MCNC: 29 MG/DL (ref 8–23)
CALCIUM SERPL-MCNC: 9 MG/DL (ref 8.7–10.5)
CHLORIDE SERPL-SCNC: 108 MMOL/L (ref 95–110)
CO2 SERPL-SCNC: 24 MMOL/L (ref 23–29)
CREAT SERPL-MCNC: 1.9 MG/DL (ref 0.5–1.4)
ERYTHROCYTE [DISTWIDTH] IN BLOOD BY AUTOMATED COUNT: 14.2 % (ref 11.5–14.5)
EST. GFR  (NO RACE VARIABLE): 25.9 ML/MIN/1.73 M^2
GLUCOSE SERPL-MCNC: 85 MG/DL (ref 70–110)
HCT VFR BLD AUTO: 26.6 % (ref 37–48.5)
HGB BLD-MCNC: 8.6 G/DL (ref 12–16)
MCH RBC QN AUTO: 30.7 PG (ref 27–31)
MCHC RBC AUTO-ENTMCNC: 32.3 G/DL (ref 32–36)
MCV RBC AUTO: 95 FL (ref 82–98)
PLATELET # BLD AUTO: 106 K/UL (ref 150–450)
PMV BLD AUTO: 11.4 FL (ref 9.2–12.9)
POTASSIUM SERPL-SCNC: 4.5 MMOL/L (ref 3.5–5.1)
RBC # BLD AUTO: 2.8 M/UL (ref 4–5.4)
SODIUM SERPL-SCNC: 137 MMOL/L (ref 136–145)
WBC # BLD AUTO: 4.17 K/UL (ref 3.9–12.7)

## 2023-09-28 PROCEDURE — 25000003 PHARM REV CODE 250: Performed by: STUDENT IN AN ORGANIZED HEALTH CARE EDUCATION/TRAINING PROGRAM

## 2023-09-28 PROCEDURE — 36415 COLL VENOUS BLD VENIPUNCTURE: CPT | Performed by: STUDENT IN AN ORGANIZED HEALTH CARE EDUCATION/TRAINING PROGRAM

## 2023-09-28 PROCEDURE — 80048 BASIC METABOLIC PNL TOTAL CA: CPT | Performed by: STUDENT IN AN ORGANIZED HEALTH CARE EDUCATION/TRAINING PROGRAM

## 2023-09-28 PROCEDURE — 99232 SBSQ HOSP IP/OBS MODERATE 35: CPT | Mod: ,,, | Performed by: INTERNAL MEDICINE

## 2023-09-28 PROCEDURE — 85027 COMPLETE CBC AUTOMATED: CPT | Performed by: STUDENT IN AN ORGANIZED HEALTH CARE EDUCATION/TRAINING PROGRAM

## 2023-09-28 PROCEDURE — 25000003 PHARM REV CODE 250: Performed by: NURSE PRACTITIONER

## 2023-09-28 PROCEDURE — 99232 PR SUBSEQUENT HOSPITAL CARE,LEVL II: ICD-10-PCS | Mod: ,,, | Performed by: INTERNAL MEDICINE

## 2023-09-28 PROCEDURE — 94761 N-INVAS EAR/PLS OXIMETRY MLT: CPT

## 2023-09-28 RX ORDER — AMIODARONE HYDROCHLORIDE 200 MG/1
TABLET ORAL
Qty: 75 TABLET | Refills: 0 | Status: SHIPPED | OUTPATIENT
Start: 2023-09-28 | End: 2023-10-31

## 2023-09-28 RX ORDER — AMIODARONE HYDROCHLORIDE 200 MG/1
200 TABLET ORAL 3 TIMES DAILY
Status: DISCONTINUED | OUTPATIENT
Start: 2023-09-28 | End: 2023-09-28 | Stop reason: HOSPADM

## 2023-09-28 RX ORDER — PROPRANOLOL HYDROCHLORIDE 60 MG/1
60 CAPSULE, EXTENDED RELEASE ORAL DAILY
Qty: 30 CAPSULE | Refills: 2 | Status: ON HOLD | OUTPATIENT
Start: 2023-09-28 | End: 2023-11-03 | Stop reason: HOSPADM

## 2023-09-28 RX ADMIN — CYANOCOBALAMIN TAB 1000 MCG 1000 MCG: 1000 TAB at 08:09

## 2023-09-28 RX ADMIN — Medication 400 MG: at 08:09

## 2023-09-28 RX ADMIN — PROPRANOLOL HYDROCHLORIDE 10 MG: 10 TABLET ORAL at 08:09

## 2023-09-28 RX ADMIN — APIXABAN 2.5 MG: 2.5 TABLET, FILM COATED ORAL at 08:09

## 2023-09-28 NOTE — PROGRESS NOTES
"Formerly [facility]  Department of Cardiology  Progress Note      PATIENT NAME: Irene العراقي    MRN: [id number]  TODAY'S DATE: 09/28/2023  ADMIT DATE: 9/27/2023                          CONSULT REQUESTED BY: Brian Marques MD    SUBJECTIVE     PRINCIPAL PROBLEM: Atrial fibrillation with RVR      REASON FOR CONSULT:    INTERVAL HISTORY:  9/28/23  Pt OOB in chair, anxious to get home  No shortness of breath. Endorses transient chest "tightness"  Sinus paty in 50s on tele; BP stable      From H&P: Patient is a 83-year-old female with hx of Afib, Moderate - Severe AS, and tremors who presents with palpitations and fast heart rate.  Patient says she knows her symptoms around 2 AM this morning.  Patient thought she was in AFib.  Patient checked her pulse and found to be above 140.  Patient took 1 extra amiodarone pill that time.  Patient did note she had some mild chest tightness but denies any chest pain or shortness for breath.  Patient says she has been compliant with her medications.  In ED, patient was found to be in AFib with RVR. WBC 4.7 hemoglobin 10.1.  Platelets 112.  BUN 28 and creatinine 1.8.  .  Troponin 23.9.  Chest x-ray with no acute findings.    HPI:    Patient is an 83-year-old female who presented to the emergency room with palpitations and elevated heart rate.  Patient has a history of paroxysmal AFib and was recently changed from sotalol to amiodarone.  Patient states that she has been under a lot of stress and worried about her sister and also with her other health issues relying on her nephew has caused her a lot of stress as well.  However she states that when she noted the AFib come on she did not panic and she took an additional amiodarone pill.  She waited until her nephew was up and then ask him about bringing her to the hospital.    Review of patient's allergies indicates:   Allergen Reactions    Cefuroxime     Hydrocodone     Meperidine     Meperidine hcl Nausea And Vomiting "    Persantine [dipyridamole]     Vicodin [hydrocodone-acetaminophen] Nausea And Vomiting    Nitrofurantoin macrocrystal      Headache , went into Afib        Past Medical History:   Diagnosis Date    Atrial fibrillation 01/25/2021    Hypertension      Past Surgical History:   Procedure Laterality Date    ANGIOGRAM, CORONARY, WITH LEFT HEART CATHETERIZATION Left 4/19/2023    Procedure: Angiogram, Coronary, with Left Heart Cath;  Surgeon: Kevin Redmond MD;  Location: [facility] CATH/EP LAB;  Service: Cardiology;  Laterality: Left;    BREAST SURGERY      COLONOSCOPY N/A 4/16/2023    Procedure: COLONOSCOPY;  Surgeon: Kvng Mensah MD;  Location: [facility] ENDO;  Service: Endoscopy;  Laterality: N/A;    COLONOSCOPY W/ POLYPECTOMY  04/16/2023     Social History     Tobacco Use    Smoking status: Former     Types: Cigarettes    Smokeless tobacco: Never   Substance Use Topics    Alcohol use: Not Currently    Drug use: Not Currently        REVIEW OF SYSTEMS  CONSTITUTIONAL: Negative for chills, fatigue and fever.   NEURO: No headaches, No dizziness; positive for tremors  RESPIRATORY: Negative for cough, shortness of breath, orwheezing.    CARDIOVASCULAR: occasional chest tightness  PSYCHIATRIC: worried about sisters procedure today  MUSCULOSKELETAL: Negative for neck pain, Negative for muscle weakness, Negative for back pain     OBJECTIVE     VITAL SIGNS (Most Recent)  Temp: 97.6 °F (36.4 °C) (09/28/23 0701)  Pulse: (!) 54 (09/28/23 1100)  Resp: 20 (09/28/23 1100)  BP: (!) 143/69 (09/28/23 1100)  SpO2: 100 % (09/28/23 1100)    VENTILATION STATUS  Resp: 20 (09/28/23 1100)  SpO2: 100 % (09/28/23 1100)           I & O (Last 24H):  Intake/Output Summary (Last 24 hours) at 9/28/2023 1115  Last data filed at 9/28/2023 0902  Gross per 24 hour   Intake 420 ml   Output 1450 ml   Net -1030 ml         WEIGHTS  Wt Readings from Last 1 Encounters:   09/28/23 0511 74.7 kg (164 lb 10.9 oz)   09/27/23 1147 74.8 kg (164 lb 14.5 oz)   09/27/23 0722  "74.8 kg (165 lb)       PHYSICAL EXAM    CVS: S1S2+, iRRR, + systolic murmur  LUNGS: Clear  ABDOMEN: Soft, NT, BS+  EXTREMITIES: No cyanosis, edema  : No araya catheter  NEURO: AAO X 3  PSY: tearful      HOME MEDICATIONS:  No current facility-administered medications on file prior to encounter.     Current Outpatient Medications on File Prior to Encounter   Medication Sig Dispense Refill    apixaban (ELIQUIS) 2.5 mg Tab Take 1 tablet (2.5 mg total) by mouth 2 (two) times daily. 60 tablet 2    magnesium oxide (MAG-OX) 400 mg (241.3 mg magnesium) tablet TAKE 1 TABLET BY MOUTH ONCE DAILY (Patient taking differently: Take 400 mg by mouth once daily.) 90 tablet 3    MIRALAX 17 gram PwPk Take 17 g by mouth 2 (two) times daily.      [DISCONTINUED] amiodarone (PACERONE) 200 MG Tab Take 1 tablet (200 mg total) by mouth 2 (two) times daily. 60 tablet 11    [DISCONTINUED] propranoloL (INDERAL) 10 MG tablet Take 1 tablet (10 mg total) by mouth 2 (two) times daily. 180 tablet 3       SCHEDULED MEDS:   amiodarone  200 mg Oral TID    apixaban  2.5 mg Oral BID    cyanocobalamin  1,000 mcg Oral Daily    magnesium oxide  400 mg Oral Daily    propranoloL  10 mg Oral TID    sodium chloride 0.9%  10 mL Intravenous Q12H       CONTINUOUS INFUSIONS:        PRN MEDS:acetaminophen, melatonin, metoprolol, ondansetron, polyethylene glycol    LABS AND DIAGNOSTICS     CBC LAST 3 DAYS  Recent Labs   Lab 09/27/23  0806 09/28/23  0253   WBC 4.76 4.17   RBC 3.25* 2.80*   HGB 10.1* 8.6*   HCT 30.6* 26.6*   MCV 94 95   MCH 31.1* 30.7   MCHC 33.0 32.3   RDW 14.3 14.2   * 106*   MPV 11.2 11.4   GRAN 68.7  3.3  --    LYMPH 15.8*  0.8*  --    MONO 12.0  0.6  --    BASO 0.03  --    NRBC 0  --          COAGULATION LAST 3 DAYS  No results for input(s): "LABPT", "INR", "APTT" in the last 168 hours.    CHEMISTRY LAST 3 DAYS  Recent Labs   Lab 09/27/23  0806 09/28/23  0253    137   K 4.4 4.5    108   CO2 23 24   ANIONGAP 10 5*   BUN 28* " "29*   CREATININE 1.8* 1.9*   GLU 90 85   CALCIUM 9.9 9.0   MG 2.1  --    ALBUMIN 4.3  --    PROT 6.8  --    ALKPHOS 93  --    ALT 15  --    AST 19  --    BILITOT 0.7  --          CARDIAC PROFILE LAST 3 DAYS  Recent Labs   Lab 09/27/23  0806 09/27/23  1059   *  --    TROPONINIHS 23.9* 21.2*         ENDOCRINE LAST 3 DAYS  No results for input(s): "TSH", "PROCAL" in the last 168 hours.    LAST ARTERIAL BLOOD GAS  ABG  No results for input(s): "PH", "PO2", "PCO2", "HCO3", "BE" in the last 168 hours.    LAST 7 DAYS MICROBIOLOGY   Microbiology Results (last 7 days)       ** No results found for the last 168 hours. **            MOST RECENT IMAGING  X-Ray Chest AP Portable  Reason: Chest Pain Palpitations    FINDINGS:  Portable chest at 746 compared with 9/9/2023 shows normal cardiomediastinal silhouette.    Slight prominence of pulmonary interstitial opacities remain diffuse, without significant change. No new confluent alveolar consolidation, pleural effusion, or pneumothorax. Pulmonary vasculature is normal. No acute osseous abnormality.    IMPRESSION:  No acute cardiopulmonary abnormality.    Electronically signed by:  Nate Cisse MD  09/27/2023 07:57 AM CDT Workstation: [id number]      ECHOCARDIOGRAM RESULTS (last 5)  Results for orders placed during the hospital encounter of 08/09/23    Echo    Interpretation Summary    Limited study    Left Ventricle: The left ventricle is normal in size. Mildly increased wall thickness. There is concentric remodeling. Normal wall motion. There is normal systolic function with a visually estimated ejection fraction of 65 - 70%. Diastolic function cannot be reliably determined in the presence of mitral annular calcification.    Aortic Valve: Moderate annular calcification. Moderately restricted motion. There is moderate to severe stenosis. Aortic valve area by VTI is 0.77 cm². Aortic valve peak velocity is 3.66 m/s. Mean gradient is 34 mmHg. The dimensionless index is 0.25. " There is moderate aortic regurgitation.    Mitral Valve: There is bileaflet sclerosis. Mild posterior mitral annular calcification. There is moderate to severe regurgitation. decreased excursion.    Left Atrium: Left atrium is severely dilated.    Right Ventricle: Normal right ventricular cavity size.    Tricuspid Valve: There is mild regurgitation.    Pulmonic Valve: There is mild regurgitation.    IVC/SVC: Intermediate venous pressure at 8 mmHg.      Results for orders placed during the hospital encounter of 04/10/23    Echo    Interpretation Summary  · The left ventricle is normal in size with concentric remodeling and normal systolic function.  · Mild left atrial enlargement.  · The estimated ejection fraction is 65%.  · Indeterminate left ventricular diastolic function.  · Normal right ventricular size with normal right ventricular systolic function.  · There is moderate aortic valve stenosis.  · Aortic valve area is 1.24 cm2; peak velocity is 3.51 m/s; mean gradient is 32 mmHg.  · Mild mitral regurgitation.  · Mild tricuspid regurgitation.  · There is mild to moderate pulmonary hypertension with RVSP at 47 mmhg      Results for orders placed during the hospital encounter of 07/23/22    Echo    Interpretation Summary  · There is moderate aortic valve stenosis.  · Aortic valve area is 1.49 cm2; peak velocity is m/s; mean gradient is 26 mmHg.  · Mild aortic regurgitation.  · Mild mitral regurgitation.  · The left ventricle is normal in size with concentric remodeling and normal systolic function.  · The estimated ejection fraction is 60%.  · Grade I left ventricular diastolic dysfunction.  · Normal right ventricular size with normal right ventricular systolic function.  · Mild left atrial enlargement.  · Mild tricuspid regurgitation.  · Intermediate central venous pressure (8 mmHg).  · The estimated PA systolic pressure is 34 mmHg.      Results for orders placed during the hospital encounter of 02/09/20    Echo  Color Flow Doppler? Yes; Bubble Contrast? Yes    Interpretation Summary  · Concentric left ventricular remodeling.  · Intravenous Saline (bubble) contrast was used during the study.Study is negative for shunt  · Left ventricular systolic function. The estimated ejection fraction is 65%.  · Grade I (mild) left ventricular diastolic dysfunction consistent with impaired relaxation.  · Normal right ventricular systolic function.  · Mild aortic regurgitation.  · Mild aortic valve stenosis.  · Aortic valve area is 1.54 cm2; peak velocity is 2.59 m/s; mean gradient is 17 mmHg.  · Mild tricuspid regurgitation.  · Normal central venous pressure (3 mmHg).  · The estimated PA systolic pressure is 29 mmHg.      CURRENT/PREVIOUS VISIT EKG  Results for orders placed or performed during the hospital encounter of 09/27/23   EKG 12-lead    Collection Time: 09/27/23 12:45 PM    Narrative    Test Reason : I48.91,I48.92,    Vent. Rate : 051 BPM     Atrial Rate : 051 BPM     P-R Int : 150 ms          QRS Dur : 084 ms      QT Int : 446 ms       P-R-T Axes : 049 014 012 degrees     QTc Int : 411 ms    Sinus bradycardia  Otherwise normal ECG  When compared with ECG of 27-SEP-2023 07:24,  Sinus rhythm has replaced Atrial fibrillation  Vent. rate has decreased BY  68 BPM  Inverted T waves have replaced nonspecific T wave abnormality in Inferior  leads    Referred By: [email or web address]   SELF           Confirmed By:            ASSESSMENT/PLAN:     Active Hospital Problems    Diagnosis    *Atrial fibrillation with RVR    Stage 4 chronic kidney disease    Severe aortic stenosis    Hypertension    Anemia    Thrombocytopenia       ASSESSMENT & PLAN:     Afib with RVR  Severe aortic stenosis   Chronic anemia   RICKY/CKD  Anxiety  Tremors       RECOMMENDATIONS:    She has converted back into SR in the 50s on telemetry since being seen this morning. Will continue amiodarone 200 mg BID for another month.   Apixaban 2.5 mg BID for anticoagulation in  PAF  Continue propranolol 10 mg po TID. Hold if HR is less than 60 bpm.   Electrolytes optimal today. Continue mag ox 400 mg QHS at home  Patient will likely need a sleep study to evaluate for sleep apnea as her Afib comes on frequently during the night while sleeping. She does have a thick tongue and suspicion of sleep apnea is high.   5.   Pt is scheduled to see Dr. Moncada next week regarding TAVR work up. Pt needs cardiology follow up in next 2 weeks with BMP.            Yara Gage NP  Date of Service: 09/28/2023  2:29 PM   09/28/2023:   Patient is maintaining fairly steady rhythm overnight.  Continue on amiodarone therapy 200 b.i.d. she is scheduled to have evaluation regarding initiating traveled workup.  She is anxious to go home and is clinically stable to pursue further cardiac workup as an outpatient.    09/27/2023:  This is a readmission this month for 83-year-old with history of paroxysmal atrial fibrillation has been treated with intravenous amiodarone now on oral amiodarone therapy had breakthrough with recurrence of palpitations around 1:  30 in the morning with elevated heart rates up to 140 beats per minute.  Her BNP is elevated at 779  I have personally interviewed and examined the patient, I have reviewed the Nurse Practitioner's history and physical, assessment, and plan. I have personally evaluated the patient at bedside and agree with the findings and made appropriate changes as necessary in recommendations.    Sinus rhythm has been restored since this morning  Recommend to maintain on amiodarone as discussed above and   Increase the Inderal while she is here and upon discharge changed to Inderal LA 60 mg daily.  She needs sleep study at the earliest  Encouraged her to proceed with preliminary evaluation for TAVR.  As scheduled.  Continue to monitor on telemetry overnight and increase physical activity    Justin Ramirez MD  Department of Cardiology  [facility]  09/28/2023  2:41 PM

## 2023-09-28 NOTE — PLAN OF CARE
Formerly [facility]  Initial Discharge Assessment       Primary Care Provider: Wanda Kuhn, DEBORAHP-C    Admission Diagnosis: Atrial fibrillation with RVR [I48.91]    Admission Date: 9/27/2023  Expected Discharge Date: 9/28/2023    Transition of Care Barriers: None    Payor: BLUE CROSS BLUE SHIELD / Plan: BCBS ALL OUT OF STATE / Product Type: PPO /     Extended Emergency Contact Information  Primary Emergency Contact: Roger Diane  Address: [address]           [place] [address] [facility]  Home Phone: [phone]  Mobile Phone: [phone]  Relation: Relative  Preferred language: English   needed? No  Secondary Emergency Contact: Patricio Licona  Home Phone: [phone]  Mobile Phone: [phone]  Relation: Relative  Preferred language: English   needed? No    Discharge Plan A: Home with family  Discharge Plan B: Home with family      [id number] [place] - [address]  [address] [place] [address]  Phone: [phone] Fax: [phone]    DC assessment completed at bedside with pt, information on facesheet verified.  Pt's listed address is permanent in [place] but she is currently staying with her twin sister who is not doing well, pt unsure of when she will return to [place]. Sister's address added as temporary address. Nephew, Roger, will drive her home. Wanda Kuhn is PCP. [facility] Denies coumadin, HH, HD, DME. Independent with ADLs. Insurance verified. Pt states she also has Medicare but it is not listed. Denies POA. With recent admission 2 weeks ago. Planning to DC home.        Initial Assessment (most recent)       Adult Discharge Assessment - 09/28/23 1049          Discharge Assessment    Assessment Type Discharge Planning Assessment     Confirmed/corrected address, phone number and insurance Yes     Confirmed Demographics Correct on Facesheet     Source of Information patient     Communicated YANET with  patient/caregiver Yes     People in Home sibling(s)     Facility Arrived From: sister's house     Do you expect to return to your current living situation? Yes     Do you have help at home or someone to help you manage your care at home? No     Prior to hospitilization cognitive status: Alert/Oriented     Current cognitive status: Alert/Oriented     Equipment Currently Used at Home none     Readmission within 30 days? Yes     Patient currently being followed by outpatient case management? No     Do you currently have service(s) that help you manage your care at home? No     Do you take prescription medications? Yes     Do you have prescription coverage? Yes     Coverage BCBS     Do you have any problems affording any of your prescribed medications? No     Is the patient taking medications as prescribed? yes     Who is going to help you get home at discharge? nephew Roger [phone]     How do you get to doctors appointments? family or friend will provide     Are you on dialysis? No     Do you take coumadin? No     DME Needed Upon Discharge  none     Discharge Plan discussed with: Patient     Transition of Care Barriers None     Discharge Plan A Home with family     Discharge Plan B Home with family

## 2023-09-28 NOTE — PLAN OF CARE
Pt clear for DC from CM standpoint. Discahrging home.     After cleared by cardiology and after Dr. Marques rounds.    Hospital follow up scheduled with PCP for 10/5 @ 8:40 per smart scheduling suggestion within 7 days.      09/28/23 1059   Final Note   Assessment Type Final Discharge Note   Anticipated Discharge Disposition Home   Hospital Resources/Appts/Education Provided Appointments scheduled and added to AVS

## 2023-09-28 NOTE — HOSPITAL COURSE
Irene العراقي is an 83 year old female with a past medical history of Afib, aortic stenosis, HTN, CKD, anemia and thrombocytopenia who presented with Afib with RVR which was controlled with a diltiazem infusion. Cardiology was consulted and transitioned the patient to her PO amiodarone and increased the dose of Inderal. She was monitored overnight and did well. She was discharged 9/28/2023 and will follow up with her PCP and with Cardiology in the outpatient setting. An outpatient sleep study was also ordered.

## 2023-09-28 NOTE — DISCHARGE SUMMARY
Former[facility] Medicine  Discharge Summary      Patient Name: Irene العراقي  MRN: [id number]  [facility]: [id number]  Patient Class: IP- Inpatient  Admission Date: 9/27/2023  Hospital Length of Stay: 1 days  Discharge Date and Time: No discharge date for patient encounter.  Attending Physician: Brian Marques MD   Discharging Provider: Brian Marques MD  Primary Care Provider: Wanda Kuhn FNP-C    Primary Care Team: Networked reference to record PCT     HPI:   Patient is a 83-year-old female with hx of Afib, Moderate - Severe AS, and tremors who presents with palpitations and fast heart rate.  Patient says she knows her symptoms around 2 AM this morning.  Patient thought she was in AFib.  Patient checked her pulse and found to be above 140.  Patient took 1 extra amiodarone pill that time.  Patient did note she had some mild chest tightness but denies any chest pain or shortness for breath.  Patient says she has been compliant with her medications.  In ED, patient was found to be in AFib with RVR. WBC 4.7 hemoglobin 10.1.  Platelets 112.  BUN 28 and creatinine 1.8.  .  Troponin 23.9.  Chest x-ray with no acute findings.      * No surgery found *      Hospital Course:   Irene العراقي is an 83 year old female with a past medical history of Afib, aortic stenosis, HTN, CKD, anemia and thrombocytopenia who presented with Afib with RVR which was controlled with a diltiazem infusion. Cardiology was consulted and transitioned the patient to her PO amiodarone and increased the dose of Inderal. She was monitored overnight and did well. She was discharged 9/28/2023 and will follow up with her PCP and with Cardiology in the outpatient setting. An outpatient sleep study was also ordered.       Goals of Care Treatment Preferences:  Code Status: Full Code      Consults:   Consults (From admission, onward)        Status Ordering Provider     IP consult to case management  Once        Provider:  (Not  yet assigned)    Completed AMBER DEWEY     Inpatient consult to Cardiology  Once        Provider:  Arielle Estrada MD    Completed AMBER DEWEY          Cardiac/Vascular  * Atrial fibrillation with RVR  -Eliquis 2.5 BID  -Telemetry  -Amiodarone 300 TID for five days followed by 200 BID  -Inderal 60 daily  -Follow up with Cardiology    Hypertension  -Continue propranolol      Severe aortic stenosis  -Follow up with Dr. Moncada 10/6/2023    Renal/  Stage 4 chronic kidney disease  Stable.  -Renally dose medications  -Avoid nephrotoxic agents  -Follow up with PCP      Oncology  Anemia  In setting of CKD. Stable.      Other  Thrombocytopenia  Stable.        Final Active Diagnoses:    Diagnosis Date Noted POA    PRINCIPAL PROBLEM:  Atrial fibrillation with RVR [I48.91] 09/27/2023 Yes    Stage 4 chronic kidney disease [N18.4] 09/28/2023 Yes    Severe aortic stenosis [I35.0] 02/21/2022 Yes    Hypertension [I10] 02/21/2022 Yes    Anemia [D64.9] 01/28/2021 Yes    Thrombocytopenia [D69.6] 01/09/2021 Yes      Problems Resolved During this Admission:       Discharged Condition: stable    Disposition: Home or Self Care    Follow Up:   Follow-up Information     Wanda Kuhn FNP-CARYN Follow up on 11/15/2023.    Specialty: Family Medicine  Contact information:  [address] [place] [address]  [phone]             Juliano Moncada MD Follow up on 10/6/2023.    Specialties: Interventional Cardiology, Cardiology  Contact information:  [facility] [facility] [address]  [phone]                       Patient Instructions:      Diet Cardiac     Notify your health care provider if you experience any of the following:  increased confusion or weakness     Notify your health care provider if you experience any of the following:  persistent dizziness, light-headedness, or visual disturbances     Notify your health care provider if you experience any of the following:  severe persistent headache      Notify your health care provider if you experience any of the following:  difficulty breathing or increased cough     Notify your health care provider if you experience any of the following:  severe uncontrolled pain     Notify your health care provider if you experience any of the following:  persistent nausea and vomiting or diarrhea     Notify your health care provider if you experience any of the following:  temperature >100.4     Polysomnogram (CPAP will be added if patient meets diagnostic criteria.)   Standing Status: Future Standing Exp. Date: 09/28/24     Activity as tolerated       Significant Diagnostic Studies: Labs: All labs within the past 24 hours have been reviewed    Pending Diagnostic Studies:     None         Medications:  Reconciled Home Medications:      Medication List      START taking these medications    propranoloL 60 MG 24 hr capsule  Commonly known as: INDERAL LA  Take 1 capsule (60 mg total) by mouth once daily.  Replaces: propranoloL 10 MG tablet        CHANGE how you take these medications    amiodarone 200 MG Tab  Commonly known as: PACERONE  Take 1 tablet (200 mg total) by mouth 3 (three) times daily for 5 days, THEN 1 tablet (200 mg total) 2 (two) times daily.  Start taking on: September 28, 2023  What changed: See the new instructions.     magnesium oxide 400 mg (241.3 mg magnesium) tablet  Commonly known as: MAG-OX  TAKE 1 TABLET BY MOUTH ONCE DAILY  What changed: when to take this        CONTINUE taking these medications    apixaban 2.5 mg Tab  Commonly known as: ELIQUIS  Take 1 tablet (2.5 mg total) by mouth 2 (two) times daily.     MIRALAX 17 gram Pwpk  Generic drug: polyethylene glycol  Take 17 g by mouth 2 (two) times daily.        STOP taking these medications    propranoloL 10 MG tablet  Commonly known as: INDERAL  Replaced by: propranoloL 60 MG 24 hr capsule            Indwelling Lines/Drains at time of discharge:   Lines/Drains/Airways     None                 Time  spent on the discharge of patient: 32 minutes         Brian Marques MD  Department of Hospital Medicine  [facility]

## 2023-09-28 NOTE — NURSING
Iv d/c. Tolerated well. D/C instructions, follow up apps, and meds reviewed with pt. Verbalized understanding. Pt brought down via wheelchair with all belongings to family transport. NAD.

## 2023-09-28 NOTE — CARE UPDATE
09/28/23 0806   Patient Assessment/Suction   Level of Consciousness (AVPU) alert   PRE-TX-O2   Device (Oxygen Therapy) room air   Pulse Oximetry Type Continuous   $ Pulse Oximetry - Multiple Charge Pulse Oximetry - Multiple   Preset CPAP/BiPAP Settings   Mode Of Delivery Standby

## 2023-09-28 NOTE — ASSESSMENT & PLAN NOTE
-Continue propranolol    
-Eliquis 2.5 BID  -Telemetry  -Amiodarone 300 TID for five days followed by 200 BID  -Inderal 60 daily  -Follow up with Cardiology  
-Follow up with Dr. Moncada 10/6/2023  
Continue diltiazem drip and Eliquis.  Cardiology consulted.  Monitor on telemetry.  
In setting of CKD. Stable.    
Patient to follow up with Dr. Juliano Moncada for TAVR evaluation.  
Recent iron panel with normal limits.  Check B12 and folate.  May be related to CKD.  CBC daily.    
Stable.    
Stable.  -Renally dose medications  -Avoid nephrotoxic agents  -Follow up with PCP    
Yes

## 2023-09-29 ENCOUNTER — PATIENT OUTREACH (OUTPATIENT)
Dept: ADMINISTRATIVE | Facility: CLINIC | Age: 83
End: 2023-09-29
Payer: MEDICARE

## 2023-09-29 NOTE — PROGRESS NOTES
C3 nurse attempted to contact Irene Jollyyury and nephew for a TCC post hospital discharge follow up call. No answer. Left voicemail with callback information. The patient has a scheduled [facility] appointment with Wanda Kuhn FNP-C  on 10/05/2023 @ 8:40 AM.

## 2023-10-02 NOTE — PROGRESS NOTES
2nd Attempt made to reach patient for TCC call. Left voicemail please call [phone] leave first name, last name, and .  I will return your call.

## 2023-10-02 NOTE — PROGRESS NOTES
3rd Attempt made to reach patient for TCC call. Left voicemail please call [phone] leave first name, last name, and .  I will return your call.

## 2023-10-03 NOTE — PROGRESS NOTES
"C3 nurse spoke with Irene العراقي  for a TCC post hospital discharge follow up call. The patient had a scheduled HOSPFU appointment with Wanda Kuhn W., FNP-C on 10/05/2023 @ 8:40 AM that she requested I cancel due to "too much going on, sister in hospital...I'd rather call and make the appt. myself". Offered [facility] HOSPFU appointment, patient declined offer.  Encouraged patient to schedule a HOSPFU with her PCP within 5-7 days post discharge, either on or before 10/9/2023, patient voiced understanding.                   "

## 2023-10-16 ENCOUNTER — PATIENT OUTREACH (OUTPATIENT)
Dept: ADMINISTRATIVE | Facility: OTHER | Age: 83
End: 2023-10-16
Payer: MEDICARE

## 2023-10-16 NOTE — PROGRESS NOTES
CHW - Case Closure    This Community Health Worker spoke to patient via telephone today.     Pt/Caregiver reported: pt stated she doesn't need community resources at this time (see sdoh completed on April 11,2023)    Pt/Caregiver denied any additional needs at this time and agrees with episode closure at this time.  Provided patient with Community Health Worker's contact information and encouraged him/her to contact this Community Health Worker if additional needs arise.

## 2023-10-18 ENCOUNTER — OFFICE VISIT (OUTPATIENT)
Dept: CARDIOLOGY | Facility: CLINIC | Age: 83
End: 2023-10-18
Payer: COMMERCIAL

## 2023-10-18 VITALS
WEIGHT: 172 LBS | HEIGHT: 66 IN | BODY MASS INDEX: 27.64 KG/M2 | RESPIRATION RATE: 16 BRPM | DIASTOLIC BLOOD PRESSURE: 62 MMHG | OXYGEN SATURATION: 98 % | HEART RATE: 67 BPM | SYSTOLIC BLOOD PRESSURE: 104 MMHG

## 2023-10-18 DIAGNOSIS — E78.2 MIXED HYPERLIPIDEMIA: ICD-10-CM

## 2023-10-18 DIAGNOSIS — N18.4 STAGE 4 CHRONIC KIDNEY DISEASE: ICD-10-CM

## 2023-10-18 DIAGNOSIS — I35.0 SEVERE AORTIC STENOSIS: ICD-10-CM

## 2023-10-18 DIAGNOSIS — I10 ESSENTIAL HYPERTENSION: ICD-10-CM

## 2023-10-18 DIAGNOSIS — G93.31 POSTVIRAL FATIGUE SYNDROME: ICD-10-CM

## 2023-10-18 DIAGNOSIS — I48.0 PAF (PAROXYSMAL ATRIAL FIBRILLATION): Primary | ICD-10-CM

## 2023-10-18 DIAGNOSIS — Z79.01 CURRENT USE OF LONG TERM ANTICOAGULATION: ICD-10-CM

## 2023-10-18 PROCEDURE — 99999 PR PBB SHADOW E&M-EST. PATIENT-LVL III: CPT | Mod: PBBFAC,,, | Performed by: INTERNAL MEDICINE

## 2023-10-18 PROCEDURE — 3078F PR MOST RECENT DIASTOLIC BLOOD PRESSURE < 80 MM HG: ICD-10-PCS | Mod: CPTII,S$GLB,, | Performed by: INTERNAL MEDICINE

## 2023-10-18 PROCEDURE — 1159F MED LIST DOCD IN RCRD: CPT | Mod: CPTII,S$GLB,, | Performed by: INTERNAL MEDICINE

## 2023-10-18 PROCEDURE — 1159F PR MEDICATION LIST DOCUMENTED IN MEDICAL RECORD: ICD-10-PCS | Mod: CPTII,S$GLB,, | Performed by: INTERNAL MEDICINE

## 2023-10-18 PROCEDURE — 3074F PR MOST RECENT SYSTOLIC BLOOD PRESSURE < 130 MM HG: ICD-10-PCS | Mod: CPTII,S$GLB,, | Performed by: INTERNAL MEDICINE

## 2023-10-18 PROCEDURE — 1160F RVW MEDS BY RX/DR IN RCRD: CPT | Mod: CPTII,S$GLB,, | Performed by: INTERNAL MEDICINE

## 2023-10-18 PROCEDURE — 3288F PR FALLS RISK ASSESSMENT DOCUMENTED: ICD-10-PCS | Mod: CPTII,S$GLB,, | Performed by: INTERNAL MEDICINE

## 2023-10-18 PROCEDURE — 1126F PR PAIN SEVERITY QUANTIFIED, NO PAIN PRESENT: ICD-10-PCS | Mod: CPTII,S$GLB,, | Performed by: INTERNAL MEDICINE

## 2023-10-18 PROCEDURE — 3074F SYST BP LT 130 MM HG: CPT | Mod: CPTII,S$GLB,, | Performed by: INTERNAL MEDICINE

## 2023-10-18 PROCEDURE — 1126F AMNT PAIN NOTED NONE PRSNT: CPT | Mod: CPTII,S$GLB,, | Performed by: INTERNAL MEDICINE

## 2023-10-18 PROCEDURE — 99215 OFFICE O/P EST HI 40 MIN: CPT | Mod: S$GLB,,, | Performed by: INTERNAL MEDICINE

## 2023-10-18 PROCEDURE — 1101F PR PT FALLS ASSESS DOC 0-1 FALLS W/OUT INJ PAST YR: ICD-10-PCS | Mod: CPTII,S$GLB,, | Performed by: INTERNAL MEDICINE

## 2023-10-18 PROCEDURE — 1111F DSCHRG MED/CURRENT MED MERGE: CPT | Mod: CPTII,S$GLB,, | Performed by: INTERNAL MEDICINE

## 2023-10-18 PROCEDURE — 1101F PT FALLS ASSESS-DOCD LE1/YR: CPT | Mod: CPTII,S$GLB,, | Performed by: INTERNAL MEDICINE

## 2023-10-18 PROCEDURE — 3288F FALL RISK ASSESSMENT DOCD: CPT | Mod: CPTII,S$GLB,, | Performed by: INTERNAL MEDICINE

## 2023-10-18 PROCEDURE — 1111F PR DISCHARGE MEDS RECONCILED W/ CURRENT OUTPATIENT MED LIST: ICD-10-PCS | Mod: CPTII,S$GLB,, | Performed by: INTERNAL MEDICINE

## 2023-10-18 PROCEDURE — 3078F DIAST BP <80 MM HG: CPT | Mod: CPTII,S$GLB,, | Performed by: INTERNAL MEDICINE

## 2023-10-18 PROCEDURE — 99215 PR OFFICE/OUTPT VISIT, EST, LEVL V, 40-54 MIN: ICD-10-PCS | Mod: S$GLB,,, | Performed by: INTERNAL MEDICINE

## 2023-10-18 PROCEDURE — 99999 PR PBB SHADOW E&M-EST. PATIENT-LVL III: ICD-10-PCS | Mod: PBBFAC,,, | Performed by: INTERNAL MEDICINE

## 2023-10-18 PROCEDURE — 1160F PR REVIEW ALL MEDS BY PRESCRIBER/CLIN PHARMACIST DOCUMENTED: ICD-10-PCS | Mod: CPTII,S$GLB,, | Performed by: INTERNAL MEDICINE

## 2023-10-18 RX ORDER — FUROSEMIDE 20 MG/1
20 TABLET ORAL EVERY OTHER DAY
Qty: 15 TABLET | Refills: 11 | Status: ON HOLD | OUTPATIENT
Start: 2023-10-18 | End: 2023-11-03 | Stop reason: SDUPTHER

## 2023-10-18 RX ORDER — POTASSIUM CHLORIDE 600 MG/1
8 TABLET, FILM COATED, EXTENDED RELEASE ORAL EVERY OTHER DAY
Qty: 15 TABLET | Refills: 11 | Status: ON HOLD | OUTPATIENT
Start: 2023-10-18 | End: 2023-11-03 | Stop reason: SDUPTHER

## 2023-10-18 NOTE — PROGRESS NOTES
Subjective:    Patient ID:  Irene العراقي is a 83 y.o. female     Chief Complaint   Patient presents with    Atrial Fibrillation    Shortness of Breath    Hypertension       HPI:  Ms Irene العراقي is a 83 y.o. female is here for follow-up.    Patient very recently lost her twin sister and she is grieving at the present time.  Since yesterday patient has been having increased wheezing and she has noticed that there is more swelling in her feet.  And in the past few weeks patient had been more sedentary because she was with her sister who was 2nd nursing home and she was basically seated with her for long periods of time.  Patient also had blood work which showed that she has underlying renal insufficiency with a BUN of 29 the creatinine of 1.9.  And she was also found to be significantly anemic with a hemoglobin of 8.6 and hematocrit of 21.6  Patient had a chest x-ray done which did not show any significant fluid buildup.  Patient was evaluated by Dr. Gotti and she is scheduled to have a CT scan of the chest to be evaluated for TAVR program.  At the present time patient is feeling better she still has some shortness of breath and denies any chest pain.  Denies any loss of consciousness.    Review of patient's allergies indicates:   Allergen Reactions    Cefuroxime     Hydrocodone     Meperidine     Meperidine hcl Nausea And Vomiting    Persantine [dipyridamole]     Vicodin [hydrocodone-acetaminophen] Nausea And Vomiting    Nitrofurantoin macrocrystal      Headache , went into Afib        Past Medical History:   Diagnosis Date    Atrial fibrillation 01/25/2021    Hypertension      Past Surgical History:   Procedure Laterality Date    ANGIOGRAM, CORONARY, WITH LEFT HEART CATHETERIZATION Left 4/19/2023    Procedure: Angiogram, Coronary, with Left Heart Cath;  Surgeon: Kevin Redmond MD;  Location: [facility] CATH/EP LAB;  Service: Cardiology;  Laterality: Left;    BREAST SURGERY      COLONOSCOPY N/A 4/16/2023    Procedure:  COLONOSCOPY;  Surgeon: Kvng Mensah MD;  Location: [facility];  Service: Endoscopy;  Laterality: N/A;    COLONOSCOPY W/ POLYPECTOMY  04/16/2023     Social History     Tobacco Use    Smoking status: Former     Types: Cigarettes    Smokeless tobacco: Never   Substance Use Topics    Alcohol use: Not Currently    Drug use: Not Currently     Family History   Problem Relation Age of Onset    Cancer Mother     Cancer Father         Review of Systems:   Constitution: Negative for diaphoresis and fever.   HEENT: Negative for nosebleeds.    Cardiovascular: Negative for chest pain       Recurrent dyspnea on exertion       No leg swelling        No palpitations  Respiratory:  Positive for shortness of breath and wheezing.    Hematologic/Lymphatic: Negative for bleeding problem. Does not bruise/bleed easily.   Skin: Negative for color change and rash.   Musculoskeletal: Negative for falls and myalgias.   Gastrointestinal: Negative for hematemesis and hematochezia.   Genitourinary: Negative for hematuria.   Neurological: Negative for dizziness and light-headedness.   Psychiatric/Behavioral: Negative for altered mental status and memory loss.          Objective:        Vitals:    10/18/23 1505   BP: 104/62   Pulse: 67   Resp: 16       Lab Results   Component Value Date    WBC 4.17 09/28/2023    HGB 8.6 (L) 09/28/2023    HCT 26.6 (L) 09/28/2023     (L) 09/28/2023    CHOL 173 02/10/2020    TRIG 40 02/10/2020    HDL 78 (H) 02/10/2020    ALT 15 09/27/2023    AST 19 09/27/2023     09/28/2023    K 4.5 09/28/2023     09/28/2023    CREATININE 1.9 (H) 09/28/2023    BUN 29 (H) 09/28/2023    CO2 24 09/28/2023    TSH 3.660 09/09/2023    INR 1.0 08/09/2023    HGBA1C 5.1 04/11/2023        ECHOCARDIOGRAM RESULTS  Results for orders placed during the hospital encounter of 08/09/23    Echo    Interpretation Summary    Limited study    Left Ventricle: The left ventricle is normal in size. Mildly increased wall thickness. There  is concentric remodeling. Normal wall motion. There is normal systolic function with a visually estimated ejection fraction of 65 - 70%. Diastolic function cannot be reliably determined in the presence of mitral annular calcification.    Aortic Valve: Moderate annular calcification. Moderately restricted motion. There is moderate to severe stenosis. Aortic valve area by VTI is 0.77 cm². Aortic valve peak velocity is 3.66 m/s. Mean gradient is 34 mmHg. The dimensionless index is 0.25. There is moderate aortic regurgitation.    Mitral Valve: There is bileaflet sclerosis. Mild posterior mitral annular calcification. There is moderate to severe regurgitation. decreased excursion.    Left Atrium: Left atrium is severely dilated.    Right Ventricle: Normal right ventricular cavity size.    Tricuspid Valve: There is mild regurgitation.    Pulmonic Valve: There is mild regurgitation.    IVC/SVC: Intermediate venous pressure at 8 mmHg.        CURRENT/PREVIOUS VISIT EKG  Results for orders placed or performed during the hospital encounter of 09/27/23   EKG 12-lead    Collection Time: 09/27/23 12:45 PM    Narrative    Test Reason : I48.91,I48.92,    Vent. Rate : 051 BPM     Atrial Rate : 051 BPM     P-R Int : 150 ms          QRS Dur : 084 ms      QT Int : 446 ms       P-R-T Axes : 049 014 012 degrees     QTc Int : 411 ms    Sinus bradycardia  Otherwise normal ECG  When compared with ECG of 27-SEP-2023 07:24,  Sinus rhythm has replaced Atrial fibrillation  Vent. rate has decreased BY  68 BPM  Inverted T waves have replaced nonspecific T wave abnormality in Inferior  leads  Confirmed by Justin Ramirez MD ([id number]) on 9/29/2023 6:42:08 PM    Referred By: [email or web address]   SELF           Confirmed By:Justin Ramirez MD     No valid procedures specified.   Results for orders placed during the hospital encounter of 07/23/22    Nuclear Stress Test    Interpretation Summary    The EKG portion of this study is negative for ischemia.    The  patient reported no chest pain during the stress test.    The nuclear portion of this study will be reported separately.      Physical Exam:  CONSTITUTIONAL: No fever, no chills  HEENT: Normocephalic, atraumatic,pupils reactive to light                 NECK:  No JVD no carotid bruit  CVS: S1S2+, RRR, ejection systolic murmurs,   LUNGS: Clear  ABDOMEN: Soft, NT, BS+  EXTREMITIES: No cyanosis, edema  : No araya catheter  NEURO: AAO X 3  PSY: Normal affect      Medication List with Changes/Refills   Current Medications    AMIODARONE (PACERONE) 200 MG TAB    Take 1 tablet (200 mg total) by mouth 3 (three) times daily for 5 days, THEN 1 tablet (200 mg total) 2 (two) times daily.    APIXABAN (ELIQUIS) 2.5 MG TAB    Take 1 tablet (2.5 mg total) by mouth 2 (two) times daily.    MAGNESIUM OXIDE (MAG-OX) 400 MG (241.3 MG MAGNESIUM) TABLET    TAKE 1 TABLET BY MOUTH ONCE DAILY    MIRALAX 17 GRAM PWPK    Take 17 g by mouth 2 (two) times daily.    PROPRANOLOL (INDERAL LA) 60 MG 24 HR CAPSULE    Take 1 capsule (60 mg total) by mouth once daily.             Assessment:       1. PAF (paroxysmal atrial fibrillation)    2. Severe aortic stenosis    3. Essential hypertension    4. Mixed hyperlipidemia    5. Postviral fatigue syndrome    6. Current use of long term anticoagulation    7. Stage 4 chronic kidney disease         Plan:   1. Paroxysmal atrial fibrillation   Patient is currently in sinus rhythm.  She is on amiodarone 200 mg p.o. daily continue the same.  Currently her heart rate is around 56 beats per minute.  She is also on Eliquis 2.5 mg p.o. b.i.d..  She is on Inderal 60 mg p.o. daily.    2. Severe aortic stenosis   Patient is being followed up by Dr. Gotti and she is scheduled to have a CT of the chest and being evaluated for possible TAVR very soon.  Patient is also in mild congestive heart failure probably secondary due to aortic stenosis.    3. Essential hypertension   Patient's blood pressure is stable at 104 over 62.   And she is on propranolol 60 mg p.o. daily.  Continue the same.  4. Chronic kidney disease   Patient's BUN is 29 and creatinine is 1.9 and that is retaining fluid in the body.  Patient would benefit from taking small dose of Lasix about 20 mg p.o. Q as needed.  She will also need potassium along with the Lasix for the 1 time.  5. Anemia  Patient's hemoglobin is 8.6 and hematocrit is 21.6 and her anemia might be the problem.  Along with chronic kidney disease and aortic stenosis contributing to her congestive heart failure.  6. Congestive heart failure   Patient has diastolic heart failure and contributed by multiple other issues including aortic stenosis and renal insufficiency as well as anemia.  Patient will do well with Lasix.  7. EKG   Reviewed EKG independently patient is in sinus bradycardia with a heart rate of 56 beats per minute leftward axis no acute ST T-wave changes can not rule out lateral wall infarct.  8. I will see her back in the office in 4 weeks time.              Problem List Items Addressed This Visit          Cardiac/Vascular    PAF (paroxysmal atrial fibrillation) - Primary    Severe aortic stenosis       Renal/    Stage 4 chronic kidney disease       ID    Postviral fatigue syndrome       Hematology    Current use of long term anticoagulation     Other Visit Diagnoses       Essential hypertension        Mixed hyperlipidemia                No follow-ups on file.

## 2023-10-26 PROBLEM — R06.02 SOB (SHORTNESS OF BREATH): Status: ACTIVE | Noted: 2023-10-26

## 2023-10-27 ENCOUNTER — LAB VISIT (OUTPATIENT)
Dept: LAB | Facility: HOSPITAL | Age: 83
End: 2023-10-27
Attending: NURSE PRACTITIONER
Payer: MEDICARE

## 2023-10-27 DIAGNOSIS — R06.09 DOE (DYSPNEA ON EXERTION): ICD-10-CM

## 2023-10-27 DIAGNOSIS — I50.30 NYHA CLASS 3 HEART FAILURE WITH PRESERVED EJECTION FRACTION: ICD-10-CM

## 2023-10-27 DIAGNOSIS — I35.0 SEVERE AORTIC VALVE STENOSIS: ICD-10-CM

## 2023-10-27 DIAGNOSIS — I50.33 ACUTE ON CHRONIC DIASTOLIC CHF (CONGESTIVE HEART FAILURE), NYHA CLASS 3: ICD-10-CM

## 2023-10-27 LAB
ABO + RH BLD: NORMAL
ALBUMIN SERPL BCP-MCNC: 4 G/DL (ref 3.5–5.2)
ALP SERPL-CCNC: 116 U/L (ref 55–135)
ALT SERPL W/O P-5'-P-CCNC: 19 U/L (ref 10–44)
ANION GAP SERPL CALC-SCNC: 7 MMOL/L (ref 8–16)
AST SERPL-CCNC: 22 U/L (ref 10–40)
BASOPHILS # BLD AUTO: 0.03 K/UL (ref 0–0.2)
BASOPHILS NFR BLD: 0.6 % (ref 0–1.9)
BILIRUB SERPL-MCNC: 0.7 MG/DL (ref 0.1–1)
BLD GP AB SCN CELLS X3 SERPL QL: NORMAL
BUN SERPL-MCNC: 29 MG/DL (ref 8–23)
CALCIUM SERPL-MCNC: 9.5 MG/DL (ref 8.7–10.5)
CHLORIDE SERPL-SCNC: 106 MMOL/L (ref 95–110)
CO2 SERPL-SCNC: 25 MMOL/L (ref 23–29)
CREAT SERPL-MCNC: 2 MG/DL (ref 0.5–1.4)
DIFFERENTIAL METHOD: ABNORMAL
EOSINOPHIL # BLD AUTO: 0.1 K/UL (ref 0–0.5)
EOSINOPHIL NFR BLD: 2.1 % (ref 0–8)
ERYTHROCYTE [DISTWIDTH] IN BLOOD BY AUTOMATED COUNT: 14.1 % (ref 11.5–14.5)
EST. GFR  (NO RACE VARIABLE): 24.3 ML/MIN/1.73 M^2
GLUCOSE SERPL-MCNC: 86 MG/DL (ref 70–110)
HCT VFR BLD AUTO: 28.9 % (ref 37–48.5)
HGB BLD-MCNC: 9.6 G/DL (ref 12–16)
IMM GRANULOCYTES # BLD AUTO: 0.03 K/UL (ref 0–0.04)
IMM GRANULOCYTES NFR BLD AUTO: 0.6 % (ref 0–0.5)
LYMPHOCYTES # BLD AUTO: 0.8 K/UL (ref 1–4.8)
LYMPHOCYTES NFR BLD: 15 % (ref 18–48)
MCH RBC QN AUTO: 31.5 PG (ref 27–31)
MCHC RBC AUTO-ENTMCNC: 33.2 G/DL (ref 32–36)
MCV RBC AUTO: 95 FL (ref 82–98)
MONOCYTES # BLD AUTO: 0.5 K/UL (ref 0.3–1)
MONOCYTES NFR BLD: 10.4 % (ref 4–15)
NEUTROPHILS # BLD AUTO: 3.7 K/UL (ref 1.8–7.7)
NEUTROPHILS NFR BLD: 71.3 % (ref 38–73)
NRBC BLD-RTO: 0 /100 WBC
PLATELET # BLD AUTO: 124 K/UL (ref 150–450)
PMV BLD AUTO: 11.4 FL (ref 9.2–12.9)
POTASSIUM SERPL-SCNC: 3.9 MMOL/L (ref 3.5–5.1)
PROT SERPL-MCNC: 6.5 G/DL (ref 6–8.4)
RBC # BLD AUTO: 3.05 M/UL (ref 4–5.4)
SODIUM SERPL-SCNC: 138 MMOL/L (ref 136–145)
SPECIMEN OUTDATE: NORMAL
WBC # BLD AUTO: 5.19 K/UL (ref 3.9–12.7)

## 2023-10-27 PROCEDURE — 85025 COMPLETE CBC W/AUTO DIFF WBC: CPT | Performed by: NURSE PRACTITIONER

## 2023-10-27 PROCEDURE — 80053 COMPREHEN METABOLIC PANEL: CPT | Performed by: NURSE PRACTITIONER

## 2023-10-27 PROCEDURE — 86900 BLOOD TYPING SEROLOGIC ABO: CPT | Performed by: NURSE PRACTITIONER

## 2023-10-27 PROCEDURE — 36415 COLL VENOUS BLD VENIPUNCTURE: CPT | Performed by: NURSE PRACTITIONER

## 2023-10-31 ENCOUNTER — OFFICE VISIT (OUTPATIENT)
Dept: VASCULAR SURGERY | Facility: CLINIC | Age: 83
End: 2023-10-31
Payer: MEDICARE

## 2023-10-31 VITALS
HEIGHT: 66 IN | HEART RATE: 51 BPM | WEIGHT: 162.13 LBS | RESPIRATION RATE: 18 BRPM | SYSTOLIC BLOOD PRESSURE: 141 MMHG | DIASTOLIC BLOOD PRESSURE: 63 MMHG | BODY MASS INDEX: 26.06 KG/M2

## 2023-10-31 DIAGNOSIS — I35.0 SEVERE AORTIC VALVE STENOSIS: Primary | ICD-10-CM

## 2023-10-31 PROBLEM — R00.1 BRADYCARDIA: Status: ACTIVE | Noted: 2023-10-31

## 2023-10-31 PROCEDURE — 3288F FALL RISK ASSESSMENT DOCD: CPT | Mod: CPTII,S$GLB,, | Performed by: THORACIC SURGERY (CARDIOTHORACIC VASCULAR SURGERY)

## 2023-10-31 PROCEDURE — 1101F PT FALLS ASSESS-DOCD LE1/YR: CPT | Mod: CPTII,S$GLB,, | Performed by: THORACIC SURGERY (CARDIOTHORACIC VASCULAR SURGERY)

## 2023-10-31 PROCEDURE — 1126F AMNT PAIN NOTED NONE PRSNT: CPT | Mod: CPTII,S$GLB,, | Performed by: THORACIC SURGERY (CARDIOTHORACIC VASCULAR SURGERY)

## 2023-10-31 PROCEDURE — 3078F PR MOST RECENT DIASTOLIC BLOOD PRESSURE < 80 MM HG: ICD-10-PCS | Mod: CPTII,S$GLB,, | Performed by: THORACIC SURGERY (CARDIOTHORACIC VASCULAR SURGERY)

## 2023-10-31 PROCEDURE — 99205 PR OFFICE/OUTPT VISIT, NEW, LEVL V, 60-74 MIN: ICD-10-PCS | Mod: S$GLB,,, | Performed by: THORACIC SURGERY (CARDIOTHORACIC VASCULAR SURGERY)

## 2023-10-31 PROCEDURE — 3078F DIAST BP <80 MM HG: CPT | Mod: CPTII,S$GLB,, | Performed by: THORACIC SURGERY (CARDIOTHORACIC VASCULAR SURGERY)

## 2023-10-31 PROCEDURE — 3077F SYST BP >= 140 MM HG: CPT | Mod: CPTII,S$GLB,, | Performed by: THORACIC SURGERY (CARDIOTHORACIC VASCULAR SURGERY)

## 2023-10-31 PROCEDURE — 99999 PR PBB SHADOW E&M-EST. PATIENT-LVL III: ICD-10-PCS | Mod: PBBFAC,,, | Performed by: THORACIC SURGERY (CARDIOTHORACIC VASCULAR SURGERY)

## 2023-10-31 PROCEDURE — 99999 PR PBB SHADOW E&M-EST. PATIENT-LVL III: CPT | Mod: PBBFAC,,, | Performed by: THORACIC SURGERY (CARDIOTHORACIC VASCULAR SURGERY)

## 2023-10-31 PROCEDURE — 1101F PR PT FALLS ASSESS DOC 0-1 FALLS W/OUT INJ PAST YR: ICD-10-PCS | Mod: CPTII,S$GLB,, | Performed by: THORACIC SURGERY (CARDIOTHORACIC VASCULAR SURGERY)

## 2023-10-31 PROCEDURE — 3288F PR FALLS RISK ASSESSMENT DOCUMENTED: ICD-10-PCS | Mod: CPTII,S$GLB,, | Performed by: THORACIC SURGERY (CARDIOTHORACIC VASCULAR SURGERY)

## 2023-10-31 PROCEDURE — 1126F PR PAIN SEVERITY QUANTIFIED, NO PAIN PRESENT: ICD-10-PCS | Mod: CPTII,S$GLB,, | Performed by: THORACIC SURGERY (CARDIOTHORACIC VASCULAR SURGERY)

## 2023-10-31 PROCEDURE — 3077F PR MOST RECENT SYSTOLIC BLOOD PRESSURE >= 140 MM HG: ICD-10-PCS | Mod: CPTII,S$GLB,, | Performed by: THORACIC SURGERY (CARDIOTHORACIC VASCULAR SURGERY)

## 2023-10-31 PROCEDURE — 99205 OFFICE O/P NEW HI 60 MIN: CPT | Mod: S$GLB,,, | Performed by: THORACIC SURGERY (CARDIOTHORACIC VASCULAR SURGERY)

## 2023-10-31 NOTE — PROGRESS NOTES
DATE OF CONSULTATION: 10/31/2023     CONSULT REQUESTED BY:  Dr. Juliano Moncada     REASON FOR CONSULTATION:  Severe, symptomatic AS-TAVR evaluation     HPI:   The patient is an 83-year-old  female with known moderate aortic valve stenosis.  She has been followed with serial echocardiography for 2-3 years.    In September 2023, the patient was seen in the emergency department with extreme exertional dyspnea, lower extremity edema and dizziness.  She was noted to have a urinary tract infection as well.    Later in the same month, the patient was readmitted with atrial fibrillation and rapid ventricular response.  She converted to normal sinus rhythm with digoxin, diltiazem, and amiodarone.    In light of her intolerance to atrial fibrillation, the patient has been referred for aortic valve replacement for the severe aortic valve stenosis.      Echocardiography, 08/09/2023, demonstrates an ejection fraction of 65%, aortic valve area of 0.77 cm2, velocities of 366 centimeters/second, and a mean gradient of 34 with moderate aortic valve regurgitation.  There is also moderate to severe mitral valve regurgitation and mild tricuspid valve regurgitation.    Thorough evaluation for transcatheter aortic valve replacement demonstrates that she is a good candidate.  She has been scheduled for tomorrow, 11/01/2023.    The patient has significant medical comorbidities making transcatheter delivery of the aortic valve superior to a surgical aortic valve replacement.  She has significant anemia (hematocrit 28.9%), chronic kidney disease (creatinine 2.0), and paroxysmal atrial fibrillation since 2019.  She has developed bilateral upper extremity pill rolling tremors.  In addition to her advanced age, she is quite frail and feeble     Data Review:  In preparation for this consultation, I have reviewed the patient's entire [facility] medical record.  I have personally reviewed images of the most recent echocardiogram, coronary  angiogram and CT/TAVR images.      Past Medical History:   Diagnosis Date    Atrial fibrillation 01/25/2021    Hypertension         Hypertension, paroxysmal atrial fibrillation, chronic kidney disease, anemia, tremors     Past Surgical History:   Procedure Laterality Date    ANGIOGRAM, CORONARY, WITH LEFT HEART CATHETERIZATION Left 4/19/2023    Procedure: Angiogram, Coronary, with Left Heart Cath;  Surgeon: Kevin Redmond MD;  Location: [facility] CATH/EP LAB;  Service: Cardiology;  Laterality: Left;    BREAST SURGERY      COLONOSCOPY N/A 4/16/2023    Procedure: COLONOSCOPY;  Surgeon: Kvng Mensah MD;  Location: [facility] ENDO;  Service: Endoscopy;  Laterality: N/A;    COLONOSCOPY W/ POLYPECTOMY  04/16/2023        Left heart catheterization, bilateral breast [id number], cataract repair, hysterectomy     Social History     Socioeconomic History    Marital status: Single   Tobacco Use    Smoking status: Former     Types: Cigarettes    Smokeless tobacco: Never   Substance and Sexual Activity    Alcohol use: Not Currently    Drug use: Not Currently     Social Determinants of Health     Financial Resource Strain: Low Risk  (4/11/2023)    Overall Financial Resource Strain (CARDIA)     Difficulty of Paying Living Expenses: Not very hard   Food Insecurity: No Food Insecurity (4/11/2023)    Hunger Vital Sign     Worried About Running Out of Food in the Last Year: Never true     Ran Out of Food in the Last Year: Never true   Transportation Needs: No Transportation Needs (4/11/2023)    PRAPARE - Transportation     Lack of Transportation (Medical): No     Lack of Transportation (Non-Medical): No   Physical Activity: Inactive (4/11/2023)    Exercise Vital Sign     Days of Exercise per Week: 0 days     Minutes of Exercise per Session: 0 min   Stress: Stress Concern Present (4/11/2023)    [employer] - Occupational Stress Questionnaire     Feeling of Stress : To some extent   Social Connections:  Socially Isolated (4/11/2023)    Social Connection and Isolation Panel [NHANES]     Frequency of Communication with Friends and Family: More than three times a week     Frequency of Social Gatherings with Friends and Family: More than three times a week     Attends [employer] Services: Never     Active Member of Clubs or Organizations: No     Attends Club or Organization Meetings: Never     Marital Status: Never    Housing Stability: Low Risk  (4/11/2023)    Housing Stability Vital Sign     Unable to Pay for Housing in the Last Year: No     Number of Places Lived in the Last Year: 2     Unstable Housing in the Last Year: No        The patient has a 50-55 pack-year history of tobacco abuse.  She quit smoking cigarettes at the age of 60.  She has not consumed alcoholic beverages since 2014.  The patient is .  She has a very supportive Nephew.  Up until a year or so ago, the patient lives a busy and active lifestyle.  Her exercise intolerance has decreased significantly.  The patient is a retired nursing assistant.  She does not receive regular dental care.  She only has 3 lower teeth which are in poor condition.       Allergies:  Many, including cefuroxime      Prior to Admission Meds (Last known outpatient meds at time of note signature)   Prior to Admission medications    Medication Sig Start Date End Date Taking? Authorizing Provider   amiodarone (PACERONE) 200 MG Tab Take 1 tablet (200 mg total) by mouth 3 (three) times daily for 5 days, THEN 1 tablet (200 mg total) 2 (two) times daily. 9/28/23 11/2/23 Yes Brian Marques MD   apixaban (ELIQUIS) 2.5 mg Tab Take 1 tablet (2.5 mg total) by mouth 2 (two) times daily. 9/12/23 12/11/23 Yes Dmitriy Gray MD   chlorhexidine (PERIDEX) 0.12 % solution Use as directed 15 mLs in the mouth or throat 2 (two) times daily. for 5 days 10/27/23 11/1/23 Yes Brianna Germain NP   magnesium oxide (MAG-OX) 400 mg (241.3 mg magnesium) tablet TAKE 1 TABLET BY MOUTH ONCE  DAILY 5/10/22  Yes So Valdez NP   MIRALAX 17 gram PwPk Take 17 g by mouth 2 (two) times daily. 6/27/23  Yes Provider, Historical   mupirocin (BACTROBAN) 2 % ointment by Nasal route 2 (two) times daily. 10/27/23  Yes Brianna Germain NP   propranoloL (INDERAL LA) 60 MG 24 hr capsule Take 1 capsule (60 mg total) by mouth once daily. 9/28/23 9/27/24 Yes Brian Marques MD   furosemide (LASIX) 20 MG tablet Take 1 tablet (20 mg total) by mouth every other day.  Patient not taking: Reported on 10/31/2023 10/18/23 10/17/24  Pj Ramirez MD   potassium chloride (KLOR-CON) 8 MEQ TbSR Take 1 tablet (8 mEq total) by mouth every other day.  Patient not taking: Reported on 10/31/2023 10/18/23 10/17/24  Pj Ramirez MD        Review of Systems:   Constitutional: no fever, no chills, no appetite change, no unexpected weight change   Dermatological: no jaundice, no rash, no nodules, no ulcers, no pruritis   HEENT: no vision change, no hearing change, no nasal discharge, no sore throat   Neck: no unusual stiffness, no swollen glands, no goiter   Respiratory: (+) dyspnea, no cough, no hemoptysis, no wheezing,  Cardiovascular: no chest pain, no palpitations, (+) edema   Gastrointestinal: no abdominal discomfort   Musculoskeletal: no new joint pain, no joint swelling, no myalgia   : no dysuria, no frequency, no gross hematuria   HEME: no prolonged bleeding, no excessive bruising, no blood clots, no adenopathy   Endocrine: no excessive thirst, no unusual intolerance of heat or cold   Neurological: no confusion, no seizures, (+) pre-syncope, no falls   Psychological: no anxiety, no depression     Objective:   Vitals:    10/31/23 0910   BP: (!) 141/63   Pulse: (!) 51   Resp: 18       Physical Exam:   Constitutional: Alert, appears stated age, cooperative and no distress.  Normal body habitus, no obvious deformities, good attention to grooming.   Head: Normocephalic, without obvious abnormality, atraumatic   Eyes:  Conjunctivae/corneas clear. PERRL, EOM's intact. No exudate.  Nose: Nares normal. Septum midline. Mucosa normal. No drainage or sinus tenderness.   Throat: Lips, mucosa, and tongue normal; poor dentition  Neck: No adenopathy, (+) carotid bruit, no JVD, supple, symmetrical, trachea midline, and thyroid not enlarged, symmetric, no tenderness/mass/nodules.   Back: Symmetric, no curvature ROM normal No CVA tenderness.   Lungs: Clear to auscultation bilaterally and good air exchange. No tachypnea. No use of accessory muscles.   Heart: Regular rate and rhythm, S1, S2 normal, systolic murmur, no click, rub or gallop. PMI nondisplaced.   Abdomen: Soft, non-tender, bowel sounds normal; No hepatosplenomegaly. No hernias   Aorta: no evidence of aneurysm   Musculoskeletal: No evidence of kyphosis or scoliosis. Normal gait. Normal muscle strength and tone. No evidence of limb atrophy or abnormal movements.   Extremities: Normal, atraumatic, no clubbing, cyanosis, or edema.  There are no venous varicosities   Pulses: 2+ and symmetric in both radial and femoral regions.   Skin: Skin color, texture, turgor normal.  No rashes or lesions.  Lymph nodes: Cervical, supraclavicular, and axillary nodes normal   Neurologic/psychiatric: Cranial nerves 2-12 are grossly intact No obvious abnormality. Oriented to person, place, and time. No depression, anxiety or agitation.     Assessment:  Patient Active Problem List    Diagnosis Date Noted    SOB (shortness of breath) 10/26/2023    Osteoporosis 09/28/2023    Diverticulosis of large intestine without perforation or abscess without bleeding 09/28/2023    Colon polyps 09/28/2023    Chronic idiopathic constipation 09/28/2023    Stage 4 chronic kidney disease 09/28/2023    Atrial fibrillation with RVR 09/27/2023    Tremors of nervous system 08/18/2023    Constipation 04/10/2023    Severe aortic stenosis 02/21/2022    Current use of long term anticoagulation 02/21/2022    Postviral fatigue  syndrome 02/21/2022    Hypertension 02/21/2022    History of COVID-19 04/29/2021    Anxiety 02/02/2021    PAF (paroxysmal atrial fibrillation) 01/28/2021    Anemia 01/28/2021    Abnormal CXR--post COVID-19 01/27/2021    Thrombocytopenia 01/09/2021    Peripheral vertigo of both ears 02/09/2020    Family history of rectal cancer 11/06/2018    Depression 11/06/2018    Acquired hammer toe of right foot 10/06/2017    Primary osteoarthritis of right knee 06/03/2016          Plan:  The patient has NYHA class III symptoms-extreme exertional dyspnea and lower extremity edema.    Echocardiography demonstrates progression of her aortic valve stenosis since 2019, it is currently severe.    She will benefit from aortic valve replacement.  Transcatheter delivery of the prosthesis will be the best option for her as surgical aortic valve replacement is much higher risk.    I had a long, nearly 1 hour, discussion with the patient and her nephew in the office this morning.  I reviewed the anatomy and pathophysiology of aortic valve stenosis with them.  I discussed the potential risks and expected benefits of transcatheter aortic valve replacement.  Many questions were asked and answered.      I calculated and discussed the 30-day STS risk for morbidity or mortality with them.    Procedure Type: Isolated AVR  Perioperative Outcome Estimate %  Operative Mortality 10%  Morbidity & Mortality 23.3%  Stroke 1.75%  Renal Failure 10.9%  Reoperation 5.25%  Prolonged Ventilation 14.9%  Deep Sternal Wound Infection 0.073%  Long Hospital Stay (>14 days) 13.2%  Short Hospital Stay (<6 days)* 13.4%    The patient and her nephew seem to understand and are anxious to proceed with transcatheter aortic valve replacement tomorrow.  They are aware that Dr. Guzman will be the stand by surgeon for the procedure.    Today, I educated the patient and her nephew about the importance of antibiotic dental prophylaxis especially with the new aortic valve  prosthesis.  I have recommended Augmentin 875/125-iii by mouth 1 hour prior to dental intervention-lifetime.    Thank you, Dr. Moncada, for allowing me to participate in the care of this patient.

## 2023-11-01 PROBLEM — R06.09 DOE (DYSPNEA ON EXERTION): Status: ACTIVE | Noted: 2023-10-26

## 2023-11-01 PROBLEM — Z95.2 S/P TAVR (TRANSCATHETER AORTIC VALVE REPLACEMENT): Status: ACTIVE | Noted: 2023-11-01

## 2023-11-01 PROBLEM — I50.30 NYHA CLASS 3 HEART FAILURE WITH PRESERVED EJECTION FRACTION: Status: ACTIVE | Noted: 2023-11-01

## 2023-11-01 PROBLEM — Z95.3 S/P TAVR (TRANSCATHETER AORTIC VALVE REPLACEMENT): Status: ACTIVE | Noted: 2023-11-01

## 2023-11-02 PROBLEM — I44.2 CHB (COMPLETE HEART BLOCK): Status: ACTIVE | Noted: 2023-11-02

## 2023-11-04 PROBLEM — Z79.899 ON AMIODARONE THERAPY: Status: ACTIVE | Noted: 2023-11-04

## 2023-11-15 ENCOUNTER — OFFICE VISIT (OUTPATIENT)
Dept: FAMILY MEDICINE | Facility: CLINIC | Age: 83
End: 2023-11-15
Payer: MEDICARE

## 2023-11-15 VITALS
RESPIRATION RATE: 20 BRPM | BODY MASS INDEX: 26.17 KG/M2 | SYSTOLIC BLOOD PRESSURE: 146 MMHG | TEMPERATURE: 98 F | HEIGHT: 66 IN | HEART RATE: 72 BPM | DIASTOLIC BLOOD PRESSURE: 62 MMHG | WEIGHT: 162.81 LBS

## 2023-11-15 DIAGNOSIS — I10 HYPERTENSION, UNSPECIFIED TYPE: ICD-10-CM

## 2023-11-15 DIAGNOSIS — I35.0 SEVERE AORTIC VALVE STENOSIS: Primary | ICD-10-CM

## 2023-11-15 PROCEDURE — 3077F SYST BP >= 140 MM HG: CPT | Mod: CPTII,S$GLB,, | Performed by: NURSE PRACTITIONER

## 2023-11-15 PROCEDURE — 1101F PT FALLS ASSESS-DOCD LE1/YR: CPT | Mod: CPTII,S$GLB,, | Performed by: NURSE PRACTITIONER

## 2023-11-15 PROCEDURE — 3288F PR FALLS RISK ASSESSMENT DOCUMENTED: ICD-10-PCS | Mod: CPTII,S$GLB,, | Performed by: NURSE PRACTITIONER

## 2023-11-15 PROCEDURE — 1111F DSCHRG MED/CURRENT MED MERGE: CPT | Mod: CPTII,S$GLB,, | Performed by: NURSE PRACTITIONER

## 2023-11-15 PROCEDURE — 1111F PR DISCHARGE MEDS RECONCILED W/ CURRENT OUTPATIENT MED LIST: ICD-10-PCS | Mod: CPTII,S$GLB,, | Performed by: NURSE PRACTITIONER

## 2023-11-15 PROCEDURE — 99213 PR OFFICE/OUTPT VISIT, EST, LEVL III, 20-29 MIN: ICD-10-PCS | Mod: S$GLB,,, | Performed by: NURSE PRACTITIONER

## 2023-11-15 PROCEDURE — 3078F DIAST BP <80 MM HG: CPT | Mod: CPTII,S$GLB,, | Performed by: NURSE PRACTITIONER

## 2023-11-15 PROCEDURE — 1101F PR PT FALLS ASSESS DOC 0-1 FALLS W/OUT INJ PAST YR: ICD-10-PCS | Mod: CPTII,S$GLB,, | Performed by: NURSE PRACTITIONER

## 2023-11-15 PROCEDURE — 99213 OFFICE O/P EST LOW 20 MIN: CPT | Mod: S$GLB,,, | Performed by: NURSE PRACTITIONER

## 2023-11-15 PROCEDURE — 3288F FALL RISK ASSESSMENT DOCD: CPT | Mod: CPTII,S$GLB,, | Performed by: NURSE PRACTITIONER

## 2023-11-15 PROCEDURE — 1126F PR PAIN SEVERITY QUANTIFIED, NO PAIN PRESENT: ICD-10-PCS | Mod: CPTII,S$GLB,, | Performed by: NURSE PRACTITIONER

## 2023-11-15 PROCEDURE — 3078F PR MOST RECENT DIASTOLIC BLOOD PRESSURE < 80 MM HG: ICD-10-PCS | Mod: CPTII,S$GLB,, | Performed by: NURSE PRACTITIONER

## 2023-11-15 PROCEDURE — 3077F PR MOST RECENT SYSTOLIC BLOOD PRESSURE >= 140 MM HG: ICD-10-PCS | Mod: CPTII,S$GLB,, | Performed by: NURSE PRACTITIONER

## 2023-11-15 PROCEDURE — 1126F AMNT PAIN NOTED NONE PRSNT: CPT | Mod: CPTII,S$GLB,, | Performed by: NURSE PRACTITIONER

## 2023-11-15 NOTE — PROGRESS NOTES
Patient ID: Irene العراقي is a 83 y.o. female.    Chief Complaint: Follow-up (84 yo female here for 6 month follow up. Pt was adm on 11/01/2023 with severe aortic valve stenosis dx. Pt states feeling fatigue since discharge. KM//Pt refused flu vaccine. KM)    Ms Tariq presents today for follow up. She was in the hospital and has follow up with cardiology on tomorrow. She states she is fatigued but she feels better overall since being discharged. She denies any new issues or complaints at the time of this visit.       Past Medical History:   Diagnosis Date    Anemia, unspecified     Atrial fibrillation 01/25/2021    CKD (chronic kidney disease)     Hypertension      Past Surgical History:   Procedure Laterality Date    A-V CARDIAC PACEMAKER INSERTION  11/2/2023    Procedure: Dual Chamber PPM RM 2617 ([employer]);  Surgeon: Andreas James III, MD;  Location: [facility] CATH;  Service: Cardiology;;    ANGIOGRAM, CORONARY, WITH LEFT HEART CATHETERIZATION Left 4/19/2023    Procedure: Angiogram, Coronary, with Left Heart Cath;  Surgeon: Kevin Redmond MD;  Location: [facility] CATH/EP LAB;  Service: Cardiology;  Laterality: Left;    BREAST SURGERY      COLONOSCOPY N/A 4/16/2023    Procedure: COLONOSCOPY;  Surgeon: Kvng Mensah MD;  Location: [facility] ENDO;  Service: Endoscopy;  Laterality: N/A;    COLONOSCOPY W/ POLYPECTOMY  04/16/2023    TRANSCATHETER AORTIC VALVE REPLACEMENT (TAVR)  11/1/2023    Procedure: (TAVR);  Surgeon: Juliano Moncada MD;  Location: [facility] CATH;  Service: Cardiology;;    TRANSCATHETER AORTIC VALVE REPLACEMENT (TAVR)  11/1/2023    Procedure: (TAVR)- Surgeon;  Surgeon: Adebayo Guzman MD;  Location: [facility] CATH;  Service: Peripheral Vascular;;         Tobacco History:  reports that she has quit smoking. Her smoking use included cigarettes. She has been exposed to tobacco smoke. She has never used smokeless tobacco.      Review of patient's allergies indicates:   Allergen Reactions    Cefuroxime      Hydrocodone     Meperidine     Meperidine hcl Nausea And Vomiting    Persantine [dipyridamole]     Nitrofurantoin macrocrystal      Headache , went into Afib     Vicodin [hydrocodone-acetaminophen] Nausea And Vomiting       Current Outpatient Medications:     amiodarone (PACERONE) 200 MG Tab, Take 1 tablet (200 mg total) by mouth once daily., Disp: 90 tablet, Rfl: 3    apixaban (ELIQUIS) 2.5 mg Tab, Take 1 tablet (2.5 mg total) by mouth 2 (two) times daily., Disp: 60 tablet, Rfl: 2    furosemide (LASIX) 20 MG tablet, Take 1 tablet (20 mg total) by mouth daily as needed. Take for swelling or wt gain > 2 lbs / 24 hr, Disp: 15 tablet, Rfl: 11    magnesium oxide (MAG-OX) 400 mg (241.3 mg magnesium) tablet, TAKE 1 TABLET BY MOUTH ONCE DAILY, Disp: 90 tablet, Rfl: 3    MIRALAX 17 gram PwPk, Take 17 g by mouth 2 (two) times daily., Disp: , Rfl:     potassium chloride (KLOR-CON) 8 MEQ TbSR, Take 1 tablet (8 mEq total) by mouth daily as needed. Take along with Lasix, Disp: 15 tablet, Rfl: 11    propranoloL (INDERAL) 10 MG tablet, Take 1 tablet (10 mg total) by mouth 2 (two) times daily., Disp: 60 tablet, Rfl: 11    docusate sodium (COLACE) 100 MG capsule, Take 1 capsule (100 mg total) by mouth 2 (two) times daily., Disp: 60 capsule, Rfl: 5    iron ag,[id number]-[id number] (NIFEREX, SUMALATE-QUATREFOLIC,) 150 mg iron- 60 mg-1 mg Tab, Take 1 tablet by mouth once daily., Disp: 90 tablet, Rfl: 3    iron-vitamin C 100-250 mg, ICAR-C, (ICAR-C) 100-250 mg Tab, Take 1 tablet by mouth once daily., Disp: 30 tablet, Rfl: 2    Review of Systems   Constitutional:  Positive for activity change. Negative for unexpected weight change.   HENT:  Negative for hearing loss, rhinorrhea and trouble swallowing.    Eyes:  Negative for discharge and visual disturbance.   Respiratory:  Positive for wheezing. Negative for chest tightness.    Cardiovascular:  Negative for chest pain and palpitations.   Gastrointestinal:  Negative for constipation,  "diarrhea and vomiting.   Genitourinary:  Negative for difficulty urinating and hematuria.   Musculoskeletal:  Negative for neck pain.   Neurological:  Negative for headaches.   Psychiatric/Behavioral:  Negative for dysphoric mood.           Objective:      Vitals:    11/15/23 1412 11/15/23 1451   BP: (!) 178/64 (!) 146/62   Pulse: 72    Resp: 20    Temp: 98 °F (36.7 °C)    TempSrc: Oral    Weight: 73.8 kg (162 lb 12.8 oz)    Height: 5' 6" (1.676 m)      Physical Exam  Constitutional:       Appearance: She is obese. She is ill-appearing.   Cardiovascular:      Heart sounds: Normal heart sounds.      Comments: Pacemaker in place. Healing well. Dried blood at site.   Pulmonary:      Effort: Pulmonary effort is normal.      Breath sounds: Normal breath sounds.   Skin:     General: Skin is warm and dry.   Neurological:      Mental Status: She is oriented to person, place, and time. Mental status is at baseline.           Assessment:       1. Severe aortic valve stenosis    2. Hypertension, unspecified type           Plan:       Severe aortic valve stenosis  Keep follow up with cardiology.     Hypertension, unspecified type  Keep follow up with cardiology.    No follow-ups on file.    Patient is moving back to [place] in early 2024 11/16/2023 Wanda Kuhn NP      "

## 2023-11-16 ENCOUNTER — OFFICE VISIT (OUTPATIENT)
Dept: CARDIOLOGY | Facility: CLINIC | Age: 83
End: 2023-11-16
Payer: MEDICARE

## 2023-11-16 ENCOUNTER — LAB VISIT (OUTPATIENT)
Dept: LAB | Facility: HOSPITAL | Age: 83
End: 2023-11-16
Attending: INTERNAL MEDICINE
Payer: MEDICARE

## 2023-11-16 VITALS
HEIGHT: 66 IN | SYSTOLIC BLOOD PRESSURE: 138 MMHG | OXYGEN SATURATION: 98 % | BODY MASS INDEX: 25.55 KG/M2 | RESPIRATION RATE: 16 BRPM | WEIGHT: 159 LBS | DIASTOLIC BLOOD PRESSURE: 80 MMHG

## 2023-11-16 DIAGNOSIS — I48.91 ATRIAL FIBRILLATION WITH RVR: ICD-10-CM

## 2023-11-16 DIAGNOSIS — I44.2 CHB (COMPLETE HEART BLOCK): ICD-10-CM

## 2023-11-16 DIAGNOSIS — N18.9 CHRONIC KIDNEY DISEASE, UNSPECIFIED CKD STAGE: ICD-10-CM

## 2023-11-16 DIAGNOSIS — E78.2 MIXED HYPERLIPIDEMIA: ICD-10-CM

## 2023-11-16 DIAGNOSIS — I35.0 SEVERE AORTIC VALVE STENOSIS: ICD-10-CM

## 2023-11-16 DIAGNOSIS — Z79.01 CURRENT USE OF LONG TERM ANTICOAGULATION: ICD-10-CM

## 2023-11-16 DIAGNOSIS — Z95.0 CARDIAC PACEMAKER IN SITU: ICD-10-CM

## 2023-11-16 DIAGNOSIS — I35.0 SEVERE AORTIC VALVE STENOSIS: Primary | ICD-10-CM

## 2023-11-16 DIAGNOSIS — Z95.2 S/P TAVR (TRANSCATHETER AORTIC VALVE REPLACEMENT): ICD-10-CM

## 2023-11-16 LAB
ANION GAP SERPL CALC-SCNC: 7 MMOL/L (ref 8–16)
BUN SERPL-MCNC: 24 MG/DL (ref 8–23)
CALCIUM SERPL-MCNC: 9.8 MG/DL (ref 8.7–10.5)
CHLORIDE SERPL-SCNC: 105 MMOL/L (ref 95–110)
CO2 SERPL-SCNC: 25 MMOL/L (ref 23–29)
CREAT SERPL-MCNC: 1.9 MG/DL (ref 0.5–1.4)
ERYTHROCYTE [DISTWIDTH] IN BLOOD BY AUTOMATED COUNT: 14.3 % (ref 11.5–14.5)
EST. GFR  (NO RACE VARIABLE): 25.9 ML/MIN/1.73 M^2
GLUCOSE SERPL-MCNC: 86 MG/DL (ref 70–110)
HCT VFR BLD AUTO: 30.3 % (ref 37–48.5)
HGB BLD-MCNC: 9.7 G/DL (ref 12–16)
MCH RBC QN AUTO: 30.8 PG (ref 27–31)
MCHC RBC AUTO-ENTMCNC: 32 G/DL (ref 32–36)
MCV RBC AUTO: 96 FL (ref 82–98)
PLATELET # BLD AUTO: 132 K/UL (ref 150–450)
PMV BLD AUTO: 10.5 FL (ref 9.2–12.9)
POTASSIUM SERPL-SCNC: 4.4 MMOL/L (ref 3.5–5.1)
RBC # BLD AUTO: 3.15 M/UL (ref 4–5.4)
SODIUM SERPL-SCNC: 137 MMOL/L (ref 136–145)
WBC # BLD AUTO: 6.35 K/UL (ref 3.9–12.7)

## 2023-11-16 PROCEDURE — 3075F SYST BP GE 130 - 139MM HG: CPT | Mod: CPTII,S$GLB,, | Performed by: INTERNAL MEDICINE

## 2023-11-16 PROCEDURE — 1111F DSCHRG MED/CURRENT MED MERGE: CPT | Mod: CPTII,S$GLB,, | Performed by: INTERNAL MEDICINE

## 2023-11-16 PROCEDURE — 93000 ELECTROCARDIOGRAM COMPLETE: CPT | Mod: S$GLB,,, | Performed by: INTERNAL MEDICINE

## 2023-11-16 PROCEDURE — 3288F PR FALLS RISK ASSESSMENT DOCUMENTED: ICD-10-PCS | Mod: CPTII,S$GLB,, | Performed by: INTERNAL MEDICINE

## 2023-11-16 PROCEDURE — 99999 PR PBB SHADOW E&M-EST. PATIENT-LVL IV: CPT | Mod: PBBFAC,,, | Performed by: INTERNAL MEDICINE

## 2023-11-16 PROCEDURE — 1159F PR MEDICATION LIST DOCUMENTED IN MEDICAL RECORD: ICD-10-PCS | Mod: CPTII,S$GLB,, | Performed by: INTERNAL MEDICINE

## 2023-11-16 PROCEDURE — 1160F RVW MEDS BY RX/DR IN RCRD: CPT | Mod: CPTII,S$GLB,, | Performed by: INTERNAL MEDICINE

## 2023-11-16 PROCEDURE — 1101F PT FALLS ASSESS-DOCD LE1/YR: CPT | Mod: CPTII,S$GLB,, | Performed by: INTERNAL MEDICINE

## 2023-11-16 PROCEDURE — 3288F FALL RISK ASSESSMENT DOCD: CPT | Mod: CPTII,S$GLB,, | Performed by: INTERNAL MEDICINE

## 2023-11-16 PROCEDURE — 99214 PR OFFICE/OUTPT VISIT, EST, LEVL IV, 30-39 MIN: ICD-10-PCS | Mod: S$GLB,,, | Performed by: INTERNAL MEDICINE

## 2023-11-16 PROCEDURE — 3075F PR MOST RECENT SYSTOLIC BLOOD PRESS GE 130-139MM HG: ICD-10-PCS | Mod: CPTII,S$GLB,, | Performed by: INTERNAL MEDICINE

## 2023-11-16 PROCEDURE — 36415 COLL VENOUS BLD VENIPUNCTURE: CPT | Performed by: INTERNAL MEDICINE

## 2023-11-16 PROCEDURE — 1160F PR REVIEW ALL MEDS BY PRESCRIBER/CLIN PHARMACIST DOCUMENTED: ICD-10-PCS | Mod: CPTII,S$GLB,, | Performed by: INTERNAL MEDICINE

## 2023-11-16 PROCEDURE — 1101F PR PT FALLS ASSESS DOC 0-1 FALLS W/OUT INJ PAST YR: ICD-10-PCS | Mod: CPTII,S$GLB,, | Performed by: INTERNAL MEDICINE

## 2023-11-16 PROCEDURE — 1159F MED LIST DOCD IN RCRD: CPT | Mod: CPTII,S$GLB,, | Performed by: INTERNAL MEDICINE

## 2023-11-16 PROCEDURE — 3079F PR MOST RECENT DIASTOLIC BLOOD PRESSURE 80-89 MM HG: ICD-10-PCS | Mod: CPTII,S$GLB,, | Performed by: INTERNAL MEDICINE

## 2023-11-16 PROCEDURE — 85027 COMPLETE CBC AUTOMATED: CPT | Performed by: INTERNAL MEDICINE

## 2023-11-16 PROCEDURE — 99214 OFFICE O/P EST MOD 30 MIN: CPT | Mod: S$GLB,,, | Performed by: INTERNAL MEDICINE

## 2023-11-16 PROCEDURE — 93000 EKG 12-LEAD: ICD-10-PCS | Mod: S$GLB,,, | Performed by: INTERNAL MEDICINE

## 2023-11-16 PROCEDURE — 1111F PR DISCHARGE MEDS RECONCILED W/ CURRENT OUTPATIENT MED LIST: ICD-10-PCS | Mod: CPTII,S$GLB,, | Performed by: INTERNAL MEDICINE

## 2023-11-16 PROCEDURE — 99999 PR PBB SHADOW E&M-EST. PATIENT-LVL IV: ICD-10-PCS | Mod: PBBFAC,,, | Performed by: INTERNAL MEDICINE

## 2023-11-16 PROCEDURE — 3079F DIAST BP 80-89 MM HG: CPT | Mod: CPTII,S$GLB,, | Performed by: INTERNAL MEDICINE

## 2023-11-16 PROCEDURE — 1126F AMNT PAIN NOTED NONE PRSNT: CPT | Mod: CPTII,S$GLB,, | Performed by: INTERNAL MEDICINE

## 2023-11-16 PROCEDURE — 80048 BASIC METABOLIC PNL TOTAL CA: CPT | Performed by: INTERNAL MEDICINE

## 2023-11-16 PROCEDURE — 1126F PR PAIN SEVERITY QUANTIFIED, NO PAIN PRESENT: ICD-10-PCS | Mod: CPTII,S$GLB,, | Performed by: INTERNAL MEDICINE

## 2023-11-16 RX ORDER — IRON,CARBONYL/ASCORBIC ACID 100-250 MG
1 TABLET ORAL DAILY
Qty: 30 TABLET | Refills: 2 | Status: ON HOLD | OUTPATIENT
Start: 2023-11-16 | End: 2023-12-21 | Stop reason: HOSPADM

## 2023-11-16 RX ORDER — DOCUSATE SODIUM 100 MG/1
100 CAPSULE, LIQUID FILLED ORAL 2 TIMES DAILY
Qty: 60 CAPSULE | Refills: 5 | Status: SHIPPED | OUTPATIENT
Start: 2023-11-16 | End: 2024-05-14

## 2023-11-16 NOTE — PROGRESS NOTES
Subjective:    Patient ID:  Irene العراقي is a 83 y.o. female     Chief Complaint   Patient presents with    Hospital Follow Up    Atrial Fibrillation    Hypertension       HPI:  Ms Irene العراقي is a 83 y.o. female here for follow-up.    Patient underwent TAVR and permanent pacemaker implantation recently.  Patient is fatigued and tired and feels a little will not down.  However she is breathing better and feeling better.    She denies any chest discomfort denies any loss of consciousness falls or head injury.    Review of patient's allergies indicates:   Allergen Reactions    Cefuroxime     Hydrocodone     Meperidine     Meperidine hcl Nausea And Vomiting    Persantine [dipyridamole]     Nitrofurantoin macrocrystal      Headache , went into Afib     Vicodin [hydrocodone-acetaminophen] Nausea And Vomiting       Past Medical History:   Diagnosis Date    Anemia, unspecified     Atrial fibrillation 01/25/2021    CKD (chronic kidney disease)     Hypertension      Past Surgical History:   Procedure Laterality Date    A-V CARDIAC PACEMAKER INSERTION  11/2/2023    Procedure: Dual Chamber PPM RM 2617 (Medtronic);  Surgeon: Andreas James III, MD;  Location: [facility] CATH;  Service: Cardiology;;    ANGIOGRAM, CORONARY, WITH LEFT HEART CATHETERIZATION Left 4/19/2023    Procedure: Angiogram, Coronary, with Left Heart Cath;  Surgeon: Kevin Redmond MD;  Location: [facility] CATH/EP LAB;  Service: Cardiology;  Laterality: Left;    BREAST SURGERY      COLONOSCOPY N/A 4/16/2023    Procedure: COLONOSCOPY;  Surgeon: Kvng Mensah MD;  Location: [facility] ENDO;  Service: Endoscopy;  Laterality: N/A;    COLONOSCOPY W/ POLYPECTOMY  04/16/2023    TRANSCATHETER AORTIC VALVE REPLACEMENT (TAVR)  11/1/2023    Procedure: (TAVR);  Surgeon: Juliano Moncada MD;  Location: [facility] CATH;  Service: Cardiology;;    TRANSCATHETER AORTIC VALVE REPLACEMENT (TAVR)  11/1/2023    Procedure: (TAVR)- Surgeon;  Surgeon: Adebayo Guzman MD;  Location: [facility] CATH;   Service: Peripheral Vascular;;     Social History     Tobacco Use    Smoking status: Former     Types: Cigarettes     Passive exposure: Past    Smokeless tobacco: Never   Substance Use Topics    Alcohol use: Not Currently    Drug use: Not Currently     Family History   Problem Relation Age of Onset    Cancer Mother     Cancer Father         Review of Systems:   Constitution: Negative for diaphoresis and fever.   HEENT: Negative for nosebleeds.    Cardiovascular: Negative for chest pain       Intermittent dyspnea on exertion       No leg swelling        No palpitations  Respiratory: Negative for shortness of breath and wheezing.    Hematologic/Lymphatic: Negative for bleeding problem. Does not bruise/bleed easily.   Skin: Negative for color change and rash.   Musculoskeletal: Negative for falls and myalgias.   Gastrointestinal: Negative for hematemesis and hematochezia.   Genitourinary: Negative for hematuria.   Neurological: Negative for dizziness and light-headedness.   Psychiatric/Behavioral: Negative for altered mental status and memory loss.          Objective:        Vitals:    11/16/23 1551   BP: 138/80   Pulse: (P) 83   Resp: 16       Lab Results   Component Value Date    WBC 5.17 11/06/2023    HGB 8.2 (L) 11/06/2023    HCT 25.0 (L) 11/06/2023    PLT 91 (L) 11/06/2023    CHOL 173 02/10/2020    TRIG 40 02/10/2020    HDL 78 (H) 02/10/2020    ALT 25 11/04/2023    AST 35 11/04/2023     (L) 11/06/2023    K 4.2 11/06/2023     11/06/2023    CREATININE 1.85 (H) 11/06/2023    BUN 31 (H) 11/06/2023    CO2 26 11/06/2023    TSH 7.870 (H) 11/04/2023    INR 1.0 08/09/2023    HGBA1C 5.1 04/11/2023        ECHOCARDIOGRAM RESULTS  Results for orders placed during the hospital encounter of 11/01/23    Echo Saline Bubble? No    Interpretation Summary    Left Ventricle: The left ventricle is normal in size. Normal wall thickness. Normal wall motion. There is normal systolic function with a visually estimated ejection  fraction of 55 - 60%. There is normal diastolic function.    Right Ventricle: Normal right ventricular cavity size. Wall thickness is normal. Right ventricle wall motion  is normal. Systolic function is normal.    Left Atrium: Left atrium is mildly dilated.    Right Atrium: Right atrium is mildly dilated.    Aortic Valve: There is a transcatheter valve replacement in the aortic position. It is reported to be a 29 mm Medtronic valve.    Mitral Valve: There is mild regurgitation.    Tricuspid Valve: There is mild regurgitation.    Pulmonic Valve: There is mild regurgitation.    IVC/SVC: Normal venous pressure at 3 mmHg.        CURRENT/PREVIOUS VISIT EKG  Results for orders placed or performed during the hospital encounter of 11/01/23   EKG 12-LEAD starting tomorrow    Collection Time: 11/02/23  6:59 AM    Narrative    Test Reason : Z95.2,    Vent. Rate : 050 BPM     Atrial Rate : 066 BPM     P-R Int : 000 ms          QRS Dur : 174 ms      QT Int : 528 ms       P-R-T Axes : 063 -57 084 degrees     QTc Int : 481 ms    Sinus rhythm with complete heart block and Ventricular-paced rhythm  Abnormal ECG  When compared with ECG of 01-NOV-2023 09:03,  Electronic ventricular pacemaker has replaced Sinus rhythm  Confirmed by Malu Gale MD ([id number]) on 11/2/2023 11:52:39 AM    Referred By: CRISTINA GÓMEZ MD           Confirmed By:Malu Gale MD     No valid procedures specified.   Results for orders placed during the hospital encounter of 07/23/22    Nuclear Stress Test    Interpretation Summary    The EKG portion of this study is negative for ischemia.    The patient reported no chest pain during the stress test.    The nuclear portion of this study will be reported separately.      Physical Exam:  CONSTITUTIONAL: No fever, no chills  HEENT: Normocephalic, atraumatic,pupils reactive to light                 NECK:  No JVD no carotid bruit  Chest wall :  Pacemaker site is healing well no hematoma, no evidence of infection or any  oozing.  CVS: S1S2+, RRR, systolic murmurs,   LUNGS: Clear  ABDOMEN: Soft, NT, BS+  EXTREMITIES: No cyanosis, edema  : No araya catheter  NEURO: AAO X 3  PSY: Normal affect      Medication List with Changes/Refills   Current Medications    AMIODARONE (PACERONE) 200 MG TAB    Take 1 tablet (200 mg total) by mouth once daily.    APIXABAN (ELIQUIS) 2.5 MG TAB    Take 1 tablet (2.5 mg total) by mouth 2 (two) times daily.    FUROSEMIDE (LASIX) 20 MG TABLET    Take 1 tablet (20 mg total) by mouth daily as needed. Take for swelling or wt gain > 2 lbs / 24 hr    IRON AG,AA-V-MI1-B12-ZN-SA-STO (NIFEREX, SUMALATE-QUATREFOLIC,) 150 MG IRON- 60 MG-1 MG TAB    Take 1 tablet by mouth once daily.    MAGNESIUM OXIDE (MAG-OX) 400 MG (241.3 MG MAGNESIUM) TABLET    TAKE 1 TABLET BY MOUTH ONCE DAILY    MIRALAX 17 GRAM PWPK    Take 17 g by mouth 2 (two) times daily.    POTASSIUM CHLORIDE (KLOR-CON) 8 MEQ TBSR    Take 1 tablet (8 mEq total) by mouth daily as needed. Take along with Lasix    PROPRANOLOL (INDERAL) 10 MG TABLET    Take 1 tablet (10 mg total) by mouth 2 (two) times daily.             Assessment:       1. Severe aortic valve stenosis    2. S/P TAVR (transcatheter aortic valve replacement)    3. CHB (complete heart block)    4. Current use of long term anticoagulation    5. Atrial fibrillation with RVR    6. Cardiac pacemaker in situ    7. Mixed hyperlipidemia    8. Chronic kidney disease, unspecified CKD stage         Plan:   1. Severe aortic stenosis stay was status post TAVR.    Patient is doing much better.  As far as TAVR is concerned she is not as short winded and able to breathe better.  And patient is due to follow-up with the cardiologist in week or 2 weeks time.    2. Complete heart block status post permanent pacemaker implantation.  Patient is doing well at the present time she is ventricular paced and probably does not feel as good with ventricular pacing.  3. Patient is also anemic and that is contributing to  her fatigue and tiredness.  4. Paroxysmal atrial fibrillation  Patient was started on amiodarone currently she is on 200 mg p.o. daily continue the same.  5. Diastolic heart failure   Patient was on furosemide and potassium.  Now she only takes it as needed.  Thus far since her returned to her home she has not required the furosemide.  6. EKG   Reviewed EKG independently patient is in AV dual paced rhythm with occasional premature complexes with a heart rate of 68 beats per minute.  7. Continue iron and also magnesium oxide.  And she is also on propranolol 10 mg p.o. b.i.d. continue the same.  8. Will have her pacemaker interrogated and we can follow-up in the office.  And patient would also need follow-up of the echocardiogram.  9. I will see her back in the office in 4 months time.              Problem List Items Addressed This Visit          Cardiac/Vascular    Severe aortic valve stenosis - Primary    Atrial fibrillation with RVR    Relevant Orders    IN OFFICE EKG 12-LEAD (to [place])    S/P TAVR (transcatheter aortic valve replacement)    CHB (complete heart block)       Hematology    Current use of long term anticoagulation     Other Visit Diagnoses       Cardiac pacemaker in situ        Mixed hyperlipidemia        Chronic kidney disease, unspecified CKD stage                No follow-ups on file.

## 2023-12-11 ENCOUNTER — HOSPITAL ENCOUNTER (INPATIENT)
Facility: HOSPITAL | Age: 83
LOS: 9 days | Discharge: NURSING, REHAB OR LONG-TERM CARE | DRG: 493 | End: 2023-12-21
Attending: EMERGENCY MEDICINE | Admitting: STUDENT IN AN ORGANIZED HEALTH CARE EDUCATION/TRAINING PROGRAM
Payer: MEDICARE

## 2023-12-11 DIAGNOSIS — I95.1 ORTHOSTASIS: ICD-10-CM

## 2023-12-11 DIAGNOSIS — M79.604 RIGHT LEG PAIN: ICD-10-CM

## 2023-12-11 DIAGNOSIS — R55 NEAR SYNCOPE: ICD-10-CM

## 2023-12-11 DIAGNOSIS — R07.9 CHEST PAIN: ICD-10-CM

## 2023-12-11 DIAGNOSIS — Z95.2 S/P TAVR (TRANSCATHETER AORTIC VALVE REPLACEMENT): ICD-10-CM
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
## 2023-12-11 DIAGNOSIS — S82.841A CLOSED BIMALLEOLAR FRACTURE OF RIGHT ANKLE, INITIAL ENCOUNTER: ICD-10-CM

## 2023-12-11 DIAGNOSIS — R55 POSTURAL DIZZINESS WITH PRESYNCOPE: ICD-10-CM

## 2023-12-11 DIAGNOSIS — S82.841A BIMALLEOLAR FRACTURE, RIGHT, CLOSED, INITIAL ENCOUNTER: Primary | ICD-10-CM

## 2023-12-11 DIAGNOSIS — R42 POSTURAL DIZZINESS WITH PRESYNCOPE: ICD-10-CM

## 2023-12-11 PROBLEM — S82.891A CLOSED FRACTURE OF RIGHT ANKLE: Status: ACTIVE | Noted: 2023-12-11

## 2023-12-11 LAB
ALBUMIN SERPL BCP-MCNC: 3.7 G/DL (ref 3.5–5.2)
ALP SERPL-CCNC: 92 U/L (ref 55–135)
ALT SERPL W/O P-5'-P-CCNC: 25 U/L (ref 10–44)
ANION GAP SERPL CALC-SCNC: 12 MMOL/L (ref 8–16)
AST SERPL-CCNC: 28 U/L (ref 10–40)
BACTERIA #/AREA URNS HPF: ABNORMAL /HPF
BASOPHILS # BLD AUTO: 0.03 K/UL (ref 0–0.2)
BASOPHILS NFR BLD: 0.4 % (ref 0–1.9)
BILIRUB SERPL-MCNC: 0.8 MG/DL (ref 0.1–1)
BILIRUB UR QL STRIP: NEGATIVE
BNP SERPL-MCNC: 508 PG/ML (ref 0–99)
BUN SERPL-MCNC: 25 MG/DL (ref 8–23)
CALCIUM SERPL-MCNC: 9.7 MG/DL (ref 8.7–10.5)
CHLORIDE SERPL-SCNC: 109 MMOL/L (ref 95–110)
CLARITY UR: ABNORMAL
CO2 SERPL-SCNC: 17 MMOL/L (ref 23–29)
COLOR UR: YELLOW
CREAT SERPL-MCNC: 1.8 MG/DL (ref 0.5–1.4)
DIFFERENTIAL METHOD: ABNORMAL
EOSINOPHIL # BLD AUTO: 0.1 K/UL (ref 0–0.5)
EOSINOPHIL NFR BLD: 1.2 % (ref 0–8)
ERYTHROCYTE [DISTWIDTH] IN BLOOD BY AUTOMATED COUNT: 13.2 % (ref 11.5–14.5)
EST. GFR  (NO RACE VARIABLE): 28 ML/MIN/1.73 M^2
GLUCOSE SERPL-MCNC: 85 MG/DL (ref 70–110)
GLUCOSE UR QL STRIP: NEGATIVE
HCT VFR BLD AUTO: 32.8 % (ref 37–48.5)
HGB BLD-MCNC: 10.5 G/DL (ref 12–16)
HGB UR QL STRIP: ABNORMAL
HYALINE CASTS #/AREA URNS LPF: 1 /LPF
IMM GRANULOCYTES # BLD AUTO: 0.02 K/UL (ref 0–0.04)
IMM GRANULOCYTES NFR BLD AUTO: 0.3 % (ref 0–0.5)
KETONES UR QL STRIP: NEGATIVE
LEUKOCYTE ESTERASE UR QL STRIP: ABNORMAL
LYMPHOCYTES # BLD AUTO: 0.7 K/UL (ref 1–4.8)
LYMPHOCYTES NFR BLD: 9.4 % (ref 18–48)
MCH RBC QN AUTO: 30.4 PG (ref 27–31)
MCHC RBC AUTO-ENTMCNC: 32 G/DL (ref 32–36)
MCV RBC AUTO: 95 FL (ref 82–98)
MICROSCOPIC COMMENT: ABNORMAL
MONOCYTES # BLD AUTO: 0.7 K/UL (ref 0.3–1)
MONOCYTES NFR BLD: 10.3 % (ref 4–15)
NEUTROPHILS # BLD AUTO: 5.7 K/UL (ref 1.8–7.7)
NEUTROPHILS NFR BLD: 78.4 % (ref 38–73)
NITRITE UR QL STRIP: NEGATIVE
NON-SQ EPI CELLS #/AREA URNS HPF: 3 /HPF
NRBC BLD-RTO: 0 /100 WBC
PH UR STRIP: 7 [PH] (ref 5–8)
PLATELET # BLD AUTO: 102 K/UL (ref 150–450)
PMV BLD AUTO: 10.1 FL (ref 9.2–12.9)
POTASSIUM SERPL-SCNC: 4.2 MMOL/L (ref 3.5–5.1)
PROT SERPL-MCNC: 6.4 G/DL (ref 6–8.4)
PROT UR QL STRIP: NEGATIVE
RBC # BLD AUTO: 3.45 M/UL (ref 4–5.4)
RBC #/AREA URNS HPF: 12 /HPF (ref 0–4)
SODIUM SERPL-SCNC: 138 MMOL/L (ref 136–145)
SP GR UR STRIP: 1 (ref 1–1.03)
SQUAMOUS #/AREA URNS HPF: 1 /HPF
TROPONIN I SERPL DL<=0.01 NG/ML-MCNC: 0.01 NG/ML (ref 0–0.03)
TROPONIN I SERPL DL<=0.01 NG/ML-MCNC: 0.02 NG/ML (ref 0–0.03)
URN SPEC COLLECT METH UR: ABNORMAL
UROBILINOGEN UR STRIP-ACNC: NEGATIVE EU/DL
WBC # BLD AUTO: 7.21 K/UL (ref 3.9–12.7)
WBC #/AREA URNS HPF: 15 /HPF (ref 0–5)
YEAST URNS QL MICRO: ABNORMAL

## 2023-12-11 PROCEDURE — 93010 EKG 12-LEAD: ICD-10-PCS | Mod: ,,, | Performed by: GENERAL PRACTICE

## 2023-12-11 PROCEDURE — 85025 COMPLETE CBC W/AUTO DIFF WBC: CPT | Performed by: EMERGENCY MEDICINE

## 2023-12-11 PROCEDURE — 25000003 PHARM REV CODE 250: Performed by: NURSE PRACTITIONER

## 2023-12-11 PROCEDURE — 25000003 PHARM REV CODE 250: Performed by: EMERGENCY MEDICINE

## 2023-12-11 PROCEDURE — 93005 ELECTROCARDIOGRAM TRACING: CPT

## 2023-12-11 PROCEDURE — 36415 COLL VENOUS BLD VENIPUNCTURE: CPT | Performed by: NURSE PRACTITIONER

## 2023-12-11 PROCEDURE — 99285 EMERGENCY DEPT VISIT HI MDM: CPT | Mod: 25

## 2023-12-11 PROCEDURE — 96361 HYDRATE IV INFUSION ADD-ON: CPT

## 2023-12-11 PROCEDURE — 87086 URINE CULTURE/COLONY COUNT: CPT | Performed by: EMERGENCY MEDICINE

## 2023-12-11 PROCEDURE — 83880 ASSAY OF NATRIURETIC PEPTIDE: CPT | Performed by: EMERGENCY MEDICINE

## 2023-12-11 PROCEDURE — G0378 HOSPITAL OBSERVATION PER HR: HCPCS

## 2023-12-11 PROCEDURE — 87077 CULTURE AEROBIC IDENTIFY: CPT | Performed by: EMERGENCY MEDICINE

## 2023-12-11 PROCEDURE — 93010 ELECTROCARDIOGRAM REPORT: CPT | Mod: ,,, | Performed by: GENERAL PRACTICE

## 2023-12-11 PROCEDURE — 96374 THER/PROPH/DIAG INJ IV PUSH: CPT

## 2023-12-11 PROCEDURE — 36415 COLL VENOUS BLD VENIPUNCTURE: CPT | Performed by: EMERGENCY MEDICINE

## 2023-12-11 PROCEDURE — 87186 SC STD MICRODIL/AGAR DIL: CPT | Performed by: EMERGENCY MEDICINE

## 2023-12-11 PROCEDURE — 80053 COMPREHEN METABOLIC PANEL: CPT | Performed by: EMERGENCY MEDICINE

## 2023-12-11 PROCEDURE — 63600175 PHARM REV CODE 636 W HCPCS: Performed by: EMERGENCY MEDICINE

## 2023-12-11 PROCEDURE — 84484 ASSAY OF TROPONIN QUANT: CPT | Performed by: NURSE PRACTITIONER

## 2023-12-11 PROCEDURE — 81000 URINALYSIS NONAUTO W/SCOPE: CPT | Performed by: EMERGENCY MEDICINE

## 2023-12-11 RX ORDER — ONDANSETRON 2 MG/ML
4 INJECTION INTRAMUSCULAR; INTRAVENOUS
Status: COMPLETED | OUTPATIENT
Start: 2023-12-11 | End: 2023-12-11

## 2023-12-11 RX ORDER — SODIUM CHLORIDE 0.9 % (FLUSH) 0.9 %
10 SYRINGE (ML) INJECTION EVERY 12 HOURS PRN
Status: DISCONTINUED | OUTPATIENT
Start: 2023-12-11 | End: 2023-12-21 | Stop reason: HOSPADM

## 2023-12-11 RX ORDER — LANOLIN ALCOHOL/MO/W.PET/CERES
400 CREAM (GRAM) TOPICAL DAILY
Status: DISCONTINUED | OUTPATIENT
Start: 2023-12-12 | End: 2023-12-21 | Stop reason: HOSPADM

## 2023-12-11 RX ORDER — SODIUM,POTASSIUM PHOSPHATES 280-250MG
2 POWDER IN PACKET (EA) ORAL
Status: DISCONTINUED | OUTPATIENT
Start: 2023-12-11 | End: 2023-12-21 | Stop reason: HOSPADM

## 2023-12-11 RX ORDER — NALOXONE HCL 0.4 MG/ML
0.02 VIAL (ML) INJECTION
Status: DISCONTINUED | OUTPATIENT
Start: 2023-12-11 | End: 2023-12-21 | Stop reason: HOSPADM

## 2023-12-11 RX ORDER — GLUCAGON 1 MG
1 KIT INJECTION
Status: DISCONTINUED | OUTPATIENT
Start: 2023-12-11 | End: 2023-12-21 | Stop reason: HOSPADM

## 2023-12-11 RX ORDER — DOCUSATE SODIUM 100 MG/1
100 CAPSULE, LIQUID FILLED ORAL 2 TIMES DAILY
Status: DISCONTINUED | OUTPATIENT
Start: 2023-12-11 | End: 2023-12-21 | Stop reason: HOSPADM

## 2023-12-11 RX ORDER — IBUPROFEN 200 MG
24 TABLET ORAL
Status: DISCONTINUED | OUTPATIENT
Start: 2023-12-11 | End: 2023-12-21 | Stop reason: HOSPADM

## 2023-12-11 RX ORDER — LANOLIN ALCOHOL/MO/W.PET/CERES
800 CREAM (GRAM) TOPICAL
Status: DISCONTINUED | OUTPATIENT
Start: 2023-12-11 | End: 2023-12-21 | Stop reason: HOSPADM

## 2023-12-11 RX ORDER — ACETAMINOPHEN 500 MG
1000 TABLET ORAL
Status: COMPLETED | OUTPATIENT
Start: 2023-12-11 | End: 2023-12-11

## 2023-12-11 RX ORDER — AMIODARONE HYDROCHLORIDE 200 MG/1
200 TABLET ORAL DAILY
Status: DISCONTINUED | OUTPATIENT
Start: 2023-12-12 | End: 2023-12-17

## 2023-12-11 RX ORDER — PROPRANOLOL HYDROCHLORIDE 10 MG/1
10 TABLET ORAL 2 TIMES DAILY
Status: DISCONTINUED | OUTPATIENT
Start: 2023-12-11 | End: 2023-12-17

## 2023-12-11 RX ORDER — ACETAMINOPHEN 325 MG/1
650 TABLET ORAL EVERY 4 HOURS PRN
Status: DISCONTINUED | OUTPATIENT
Start: 2023-12-11 | End: 2023-12-21 | Stop reason: HOSPADM

## 2023-12-11 RX ORDER — IBUPROFEN 200 MG
16 TABLET ORAL
Status: DISCONTINUED | OUTPATIENT
Start: 2023-12-11 | End: 2023-12-21 | Stop reason: HOSPADM

## 2023-12-11 RX ORDER — ONDANSETRON 2 MG/ML
4 INJECTION INTRAMUSCULAR; INTRAVENOUS EVERY 8 HOURS PRN
Status: DISCONTINUED | OUTPATIENT
Start: 2023-12-11 | End: 2023-12-21 | Stop reason: HOSPADM

## 2023-12-11 RX ADMIN — ONDANSETRON 4 MG: 2 INJECTION INTRAMUSCULAR; INTRAVENOUS at 10:12

## 2023-12-11 RX ADMIN — PROPRANOLOL HYDROCHLORIDE 10 MG: 10 TABLET ORAL at 08:12

## 2023-12-11 RX ADMIN — ACETAMINOPHEN 650 MG: 325 TABLET ORAL at 08:12

## 2023-12-11 RX ADMIN — DOCUSATE SODIUM 100 MG: 100 CAPSULE, LIQUID FILLED ORAL at 08:12

## 2023-12-11 RX ADMIN — ACETAMINOPHEN 1000 MG: 500 TABLET ORAL at 12:12

## 2023-12-11 RX ADMIN — SODIUM CHLORIDE 250 ML: 9 INJECTION, SOLUTION INTRAVENOUS at 10:12

## 2023-12-11 NOTE — NURSING
Pt alert and oriented, denies any SOB or chest pain, c/o right ankle pain. Vital sign WNL. Iv intact and dry. Updated pt with care, verbalized understanding.

## 2023-12-11 NOTE — ED PROVIDER NOTES
Encounter Date: 12/11/2023       History     Chief Complaint   Patient presents with    Fall    Ankle Injury     Rt. Ankle swelling    Dizziness     Blurred vision x 3 days      Patient is an 83-year-old female with a past medical history of atrial fibrillation chronic kidney disease hypertension recent TAVR just over 1 month ago and subsequent dual AV pacemaker presents the emergency room for evaluation of generalized weakness and fatigue when she stands up.  She gets lightheaded and dizzy.  She fell this morning.  She has right ankle pain.  She also has right arm pain.  She denies any significant headache neck pain chest pain shortness of breath black or bloody stools urinary complaints fevers cough cold congestion runny nose sore throat.      Review of patient's allergies indicates:   Allergen Reactions    Cefuroxime     Hydrocodone     Meperidine     Meperidine hcl Nausea And Vomiting    Persantine [dipyridamole]     Nitrofurantoin macrocrystal      Headache , went into Afib     Vicodin [hydrocodone-acetaminophen] Nausea And Vomiting     Past Medical History:   Diagnosis Date    Anemia, unspecified     Atrial fibrillation 01/25/2021    CKD (chronic kidney disease)     Hypertension      Past Surgical History:   Procedure Laterality Date    A-V CARDIAC PACEMAKER INSERTION  11/2/2023    Procedure: Dual Chamber PPM RM 2617 (Medtronic);  Surgeon: Andreas James III, MD;  Location: [facility] CATH;  Service: Cardiology;;    ANGIOGRAM, CORONARY, WITH LEFT HEART CATHETERIZATION Left 4/19/2023    Procedure: Angiogram, Coronary, with Left Heart Cath;  Surgeon: Kevin Redmond MD;  Location: [facility] CATH/EP LAB;  Service: Cardiology;  Laterality: Left;    BREAST SURGERY      COLONOSCOPY N/A 4/16/2023    Procedure: COLONOSCOPY;  Surgeon: Kvng Mensah MD;  Location: [facility] ENDO;  Service: Endoscopy;  Laterality: N/A;    COLONOSCOPY W/ POLYPECTOMY  04/16/2023    TRANSCATHETER AORTIC VALVE REPLACEMENT (TAVR)  11/1/2023    Procedure:  (TAVR);  Surgeon: Juliano Moncada MD;  Location: [facility] CATH;  Service: Cardiology;;    TRANSCATHETER AORTIC VALVE REPLACEMENT (TAVR)  11/1/2023    Procedure: (TAVR)- Surgeon;  Surgeon: Adebayo Guzman MD;  Location: [facility] CATH;  Service: Peripheral Vascular;;     Family History   Problem Relation Age of Onset    Cancer Mother     Cancer Father      Social History     Tobacco Use    Smoking status: Former     Types: Cigarettes     Passive exposure: Past    Smokeless tobacco: Never   Substance Use Topics    Alcohol use: Not Currently    Drug use: Not Currently     Review of Systems   Constitutional:  Negative for fever.   HENT:  Negative for ear pain, rhinorrhea and sore throat.    Eyes:  Negative for pain and visual disturbance.   Respiratory:  Negative for cough, chest tightness and shortness of breath.    Cardiovascular:  Negative for chest pain.   Gastrointestinal:  Negative for abdominal pain, diarrhea, nausea and vomiting.   Genitourinary:  Negative for difficulty urinating and dysuria.   Musculoskeletal:  Positive for arthralgias, gait problem and joint swelling.   Skin:  Negative for rash.   Neurological:  Positive for syncope and light-headedness. Negative for weakness, numbness and headaches.   All other systems reviewed and are negative.      Physical Exam     Initial Vitals [12/11/23 0835]   BP Pulse Resp Temp SpO2   (!) 181/82 64 18 97.8 °F (36.6 °C) 95 %      MAP       --         Physical Exam    Nursing note and vitals reviewed.  Constitutional: She appears well-developed and well-nourished.  Non-toxic appearance. No distress.   HENT:   Head: Normocephalic and atraumatic.   Mouth/Throat: Oropharynx is clear and moist.   Eyes: Conjunctivae and EOM are normal. Pupils are equal, round, and reactive to light.   Neck: Neck supple. No JVD present.   Normal range of motion.  Cardiovascular:  Normal rate, regular rhythm and intact distal pulses.     Exam reveals no gallop and no friction rub.       Murmur  heard.  Pacemaker left anterior chest wall site shows Dermabond with some ecchymosis but no significant erythema   Pulmonary/Chest: Breath sounds normal. No respiratory distress. She has no decreased breath sounds. She has no wheezes. She has no rhonchi. She has no rales.   Abdominal: Abdomen is soft. Bowel sounds are normal. She exhibits no distension. There is no abdominal tenderness.   Musculoskeletal:         General: Tenderness (R ankle ttp pain and swelilng) present.      Cervical back: Normal range of motion and neck supple.      Comments: Joint swelling right ankle, no significant knee swelling,      Neurological: She is alert and oriented to person, place, and time. She has normal strength. No cranial nerve deficit.   Skin: Skin is warm and dry. No rash noted.   Psychiatric: She has a normal mood and affect.         ED Course   Procedures  Labs Reviewed   CBC W/ AUTO DIFFERENTIAL - Abnormal; Notable for the following components:       Result Value    RBC 3.45 (*)     Hemoglobin 10.5 (*)     Hematocrit 32.8 (*)     Platelets 102 (*)     Lymph # 0.7 (*)     Gran % 78.4 (*)     Lymph % 9.4 (*)     All other components within normal limits   COMPREHENSIVE METABOLIC PANEL - Abnormal; Notable for the following components:    CO2 17 (*)     BUN 25 (*)     Creatinine 1.8 (*)     eGFR 28 (*)     All other components within normal limits   B-TYPE NATRIURETIC PEPTIDE - Abnormal; Notable for the following components:     (*)     All other components within normal limits   URINALYSIS, REFLEX TO URINE CULTURE   URINALYSIS MICROSCOPIC          Imaging Results              X-Ray Humerus 2 View Right (In process)                       X-Ray Tibia Fibula 2 View Right (Final result)  Result time 12/11/23 10:26:06      Final result by Lorie Mujica MD (12/11/23 10:26:06)                   Impression:      There is a displaced lateral malleolar fracture with adjacent soft tissue swelling.  In addition there is a  suspected nondisplaced medial malleolar fracture.  CT should be obtained for confirmation.  This report was flagged in Epic as abnormal.      Electronically signed by: Lorie Mujica MD  Date:    12/11/2023  Time:    10:26               Narrative:    EXAMINATION:  XR TIBIA FIBULA 2 VIEW RIGHT    CLINICAL HISTORY:  Pain in right leg    TECHNIQUE:  AP and lateral views of the right tibia and fibula were performed.    COMPARISON:  None.    FINDINGS:  There is a displaced lateral malleolar fracture with adjacent soft tissue swelling.  There is osteopenia the patient is status post total right knee arthroplasty.  There is a faint lucency overlying the medial malleolus thought to represent nondisplaced medial malleolar fracture.                                       X-Ray Ankle Complete Right (Final result)  Result time 12/11/23 09:30:26      Final result by Lorie Mujica MD (12/11/23 09:30:26)                   Impression:      There is a transverse, displaced lateral malleolar fracture.  There is adjacent soft tissue swelling.      Electronically signed by: Lorie Mujica MD  Date:    12/11/2023  Time:    09:30               Narrative:    EXAMINATION:  XR ANKLE COMPLETE 3 VIEW RIGHT    CLINICAL HISTORY:  Unspecified fall, initial encounter    TECHNIQUE:  AP, lateral, and oblique images of the right ankle were performed.    COMPARISON:  None    FINDINGS:  There is a transverse, displaced fracture of the lateral malleolus.  There is adjacent soft tissue swelling.  No other fractures are identified.  There is osteopenia.                                       X-Ray Chest AP Portable (Final result)  Result time 12/11/23 09:29:26      Final result by Lorie Mujica MD (12/11/23 09:29:26)                   Impression:      Overall pulmonary aeration has improved from 11/05/2023.      Electronically signed by: Lorie Mujica MD  Date:    12/11/2023  Time:    09:29               Narrative:    EXAMINATION:  XR CHEST  AP PORTABLE    CLINICAL HISTORY:  Syncope and collapse    TECHNIQUE:  Single frontal view of the chest was performed.    COMPARISON:  11/05/2023    FINDINGS:  The patient has a left-sided pacer.  There is no pneumothorax or pleural effusion.  The cardiac silhouette is within normal limits.                                       Medications - No data to display  Medical Decision Making  Patient appears to be having orthostasis when she sits up.  I suspect it is worse when she stands up.  She got weak and fatigued than almost had a syncopal episode this morning.  She has no chest pain or shortness of breath.  There are no signs of significant anemia or RICKY.  Awaiting urinalysis to evaluate for urinary tract infection although I doubt this is the true cause of the symptoms.  She needs to be admitted for echocardiogram to make sure she is not developing any significant recurrent stenosis status post TAVR.  Her pacemaker appears to be functioning appropriately.  I do not think she is septic or toxic.  Chest x-ray does not show any overt pulmonary edema.  I will give her a small bolus of fluids.  Hospital medicine will admit for further evaluation.  This could be a medication side effect from amiodarone propranolol.  I am unable to obtain orthostatics here given that she has an ankle fracture.    Amount and/or Complexity of Data Reviewed  Labs: ordered.  Radiology: ordered.               ED Course as of 12/11/23 1041   Mon Dec 11, 2023   0947 Rate 60 AV dual paced rhythm no significant ST segment elevation or depression. [JS]      ED Course User Index  [JS] Puneet Santiago MD                           Clinical Impression:  Final diagnoses:  [R55] Near syncope  [W19.XXXA] Fall  [M79.604] Right leg pain  [S82.841A] Bimalleolar fracture, right, closed, initial encounter (Primary)  [I95.1] Orthostasis          ED Disposition Condition    Observation Stable                Puneet Santiago MD  12/11/23 1041

## 2023-12-11 NOTE — PHARMACY MED REC
"              .        Admission Medication History     The home medication history was taken by Janine Thomas.    You may go to "Admission" then "Reconcile Home Medications" tabs to review and/or act upon these items.     The home medication list has been updated by the Pharmacy department.   Please read ALL comments highlighted in yellow.   Please address this information as you see fit.    Feel free to contact us if you have any questions or require assistance.        Medications listed below were obtained from: Patient/family and Analytic software- [employer]  No current facility-administered medications on file prior to encounter.     Current Outpatient Medications on File Prior to Encounter   Medication Sig Dispense Refill    amiodarone (PACERONE) 200 MG Tab Take 1 tablet (200 mg total) by mouth once daily. 90 tablet 3    apixaban (ELIQUIS) 2.5 mg Tab Take 1 tablet (2.5 mg total) by mouth 2 (two) times daily. 60 tablet 2    docusate sodium (COLACE) 100 MG capsule Take 1 capsule (100 mg total) by mouth 2 (two) times daily. 60 capsule 5    furosemide (LASIX) 20 MG tablet Take 1 tablet (20 mg total) by mouth daily as needed. Take for swelling or wt gain > 2 lbs / 24 hr 15 tablet 11    iron ag,so-P-PD3-B12-Zn-sa-sto (NIFEREX, SUMALATE-QUATREFOLIC,) 150 mg iron- 60 mg-1 mg Tab Take 1 tablet by mouth once daily. 90 tablet 3    magnesium oxide (MAG-OX) 400 mg (241.3 mg magnesium) tablet TAKE 1 TABLET BY MOUTH ONCE DAILY (Patient taking differently: Take 400 mg by mouth once daily.) 90 tablet 3    MIRALAX 17 gram PwPk Take 17 g by mouth 2 (two) times daily.      potassium chloride (KLOR-CON) 8 MEQ TbSR Take 1 tablet (8 mEq total) by mouth daily as needed. Take along with Lasix 15 tablet 11    propranoloL (INDERAL) 10 MG tablet Take 1 tablet (10 mg total) by mouth 2 (two) times daily. 60 tablet 11    iron-vitamin C 100-250 mg, ICAR-C, (ICAR-C) 100-250 mg Tab Take 1 tablet by mouth once daily. (Patient not taking: " Reported on 12/11/2023) 30 tablet 2       Potential issues to be addressed PRIOR TO DISCHARGE  Patient reported not taking the following medications: (Iron-Vitamin C). These medications remain on the home medication list. Please address accordingly.     Janine Thomas  EXT [phone]

## 2023-12-11 NOTE — ED NOTES
Assumed care:  Irene العراقي is awake, alert and oriented x 3, skin warm and dry, in NAD.  Patient arrived via EMS for fall at home.  States that she has been dizzy since yesterday and today passed out.  Right ankle swollen.  Patient placed on cardiac monitor and continuous pulse ox.    Patient identifiers for Irene العراقي checked and correct.  LOC:  Irene العراقي is awake, alert, and aware of environment with an appropriate affect. She is oriented x 3 and speaking appropriately.  APPEARANCE:  She is resting comfortably and in no acute distress. She is clean and well groomed, patient's clothing is properly fastened.  SKIN:  The skin is warm and dry. She has normal skin turgor and moist mucus membranes. Skin is intact; no bruising or breakdown noted.  MUSCULOSKELETAL:  She is moving all extremities well, no obvious deformities noted. Pulses intact.  Right ankle pain and swelling  RESPIRATORY:  Airway is open and patent. Respirations are spontaneous and non-labored with normal effort and rate.  CARDIAC:  She has a normal rate and rhythm. No peripheral edema noted. Capillary refill < 3 seconds.  ABDOMEN:  No distention noted.  Soft and non-tender upon palpation.  dysuria  NEUROLOGICAL:  PERRL. Facial expression is symmetrical. Hand grasps are equal bilaterally. Normal sensation in all extremities when touched with finger.  Dizziness, LOC  Allergies reported:    Review of patient's allergies indicates:   Allergen Reactions    Cefuroxime     Hydrocodone     Meperidine     Meperidine hcl Nausea And Vomiting    Persantine [dipyridamole]     Nitrofurantoin macrocrystal      Headache , went into Afib     Vicodin [hydrocodone-acetaminophen] Nausea And Vomiting

## 2023-12-12 DIAGNOSIS — S82.841A BIMALLEOLAR FRACTURE, RIGHT, CLOSED, INITIAL ENCOUNTER: ICD-10-CM

## 2023-12-12 DIAGNOSIS — S82.841A CLOSED BIMALLEOLAR FRACTURE OF RIGHT ANKLE, INITIAL ENCOUNTER: Primary | ICD-10-CM

## 2023-12-12 PROBLEM — N39.0 UTI (URINARY TRACT INFECTION): Status: ACTIVE | Noted: 2023-12-12

## 2023-12-12 LAB
ALBUMIN SERPL BCP-MCNC: 3.1 G/DL (ref 3.5–5.2)
ALP SERPL-CCNC: 84 U/L (ref 55–135)
ALT SERPL W/O P-5'-P-CCNC: 23 U/L (ref 10–44)
ANION GAP SERPL CALC-SCNC: 7 MMOL/L (ref 8–16)
AORTIC ROOT ANNULUS: 2.04 CM
AORTIC VALVE CUSP SEPERATION: 1.17 CM
ASCENDING AORTA: 2.4 CM
AST SERPL-CCNC: 25 U/L (ref 10–40)
AV INDEX (PROSTH): 0.87
AV MEAN GRADIENT: 10 MMHG
AV PEAK GRADIENT: 17 MMHG
AV REGURGITATION PRESSURE HALF TIME: 650.49 MS
AV VALVE AREA BY VELOCITY RATIO: 2.87 CM²
AV VALVE AREA: 2.68 CM²
AV VELOCITY RATIO: 0.93
BASOPHILS # BLD AUTO: 0.02 K/UL (ref 0–0.2)
BASOPHILS NFR BLD: 0.3 % (ref 0–1.9)
BILIRUB SERPL-MCNC: 0.7 MG/DL (ref 0.1–1)
BSA FOR ECHO PROCEDURE: 1.83 M2
BUN SERPL-MCNC: 26 MG/DL (ref 8–23)
CALCIUM SERPL-MCNC: 8.8 MG/DL (ref 8.7–10.5)
CHLORIDE SERPL-SCNC: 108 MMOL/L (ref 95–110)
CO2 SERPL-SCNC: 22 MMOL/L (ref 23–29)
CREAT SERPL-MCNC: 1.6 MG/DL (ref 0.5–1.4)
CV ECHO LV RWT: 0.4 CM
DIFFERENTIAL METHOD: ABNORMAL
DOP CALC AO PEAK VEL: 2.05 M/S
DOP CALC AO VTI: 41.1 CM
DOP CALC LVOT AREA: 3.1 CM2
DOP CALC LVOT DIAMETER: 1.98 CM
DOP CALC LVOT PEAK VEL: 1.91 M/S
DOP CALC LVOT STROKE VOLUME: 110.18 CM3
DOP CALC MV VTI: 46.9 CM
DOP CALCLVOT PEAK VEL VTI: 35.8 CM
E WAVE DECELERATION TIME: 417.37 MSEC
E/A RATIO: 0.69
ECHO LV POSTERIOR WALL: 1.01 CM (ref 0.6–1.1)
EOSINOPHIL # BLD AUTO: 0.1 K/UL (ref 0–0.5)
EOSINOPHIL NFR BLD: 1.9 % (ref 0–8)
ERYTHROCYTE [DISTWIDTH] IN BLOOD BY AUTOMATED COUNT: 13.1 % (ref 11.5–14.5)
EST. GFR  (NO RACE VARIABLE): 32 ML/MIN/1.73 M^2
FRACTIONAL SHORTENING: 19 % (ref 28–44)
GLUCOSE SERPL-MCNC: 84 MG/DL (ref 70–110)
HCT VFR BLD AUTO: 27.5 % (ref 37–48.5)
HGB BLD-MCNC: 9.1 G/DL (ref 12–16)
IMM GRANULOCYTES # BLD AUTO: 0.02 K/UL (ref 0–0.04)
IMM GRANULOCYTES NFR BLD AUTO: 0.3 % (ref 0–0.5)
INTERVENTRICULAR SEPTUM: 1.03 CM (ref 0.6–1.1)
IVRT: 190.29 MSEC
LA MAJOR: 5.09 CM
LEFT ATRIUM VOLUME INDEX MOD: 38.1 ML/M2
LEFT ATRIUM VOLUME MOD: 68.92 CM3
LEFT INTERNAL DIMENSION IN SYSTOLE: 4.02 CM (ref 2.1–4)
LEFT VENTRICLE DIASTOLIC VOLUME INDEX: 64.9 ML/M2
LEFT VENTRICLE DIASTOLIC VOLUME: 117.47 ML
LEFT VENTRICLE MASS INDEX: 103 G/M2
LEFT VENTRICLE SYSTOLIC VOLUME INDEX: 39.2 ML/M2
LEFT VENTRICLE SYSTOLIC VOLUME: 70.95 ML
LEFT VENTRICULAR INTERNAL DIMENSION IN DIASTOLE: 4.99 CM (ref 3.5–6)
LEFT VENTRICULAR MASS: 186.28 G
LVOT MG: 6.84 MMHG
LVOT MV: 1.17 CM/S
LYMPHOCYTES # BLD AUTO: 0.7 K/UL (ref 1–4.8)
LYMPHOCYTES NFR BLD: 11.3 % (ref 18–48)
MAGNESIUM SERPL-MCNC: 2 MG/DL (ref 1.6–2.6)
MCH RBC QN AUTO: 31.1 PG (ref 27–31)
MCHC RBC AUTO-ENTMCNC: 33.1 G/DL (ref 32–36)
MCV RBC AUTO: 94 FL (ref 82–98)
MONOCYTES # BLD AUTO: 0.9 K/UL (ref 0.3–1)
MONOCYTES NFR BLD: 14.9 % (ref 4–15)
MV MEAN GRADIENT: 3 MMHG
MV PEAK A VEL: 1.36 M/S
MV PEAK E VEL: 0.94 M/S
MV PEAK GRADIENT: 10 MMHG
MV STENOSIS PRESSURE HALF TIME: 122.04 MS
MV VALVE AREA BY CONTINUITY EQUATION: 2.35 CM2
MV VALVE AREA P 1/2 METHOD: 1.8 CM2
NEUTROPHILS # BLD AUTO: 4.1 K/UL (ref 1.8–7.7)
NEUTROPHILS NFR BLD: 71.3 % (ref 38–73)
NRBC BLD-RTO: 0 /100 WBC
OHS LV EJECTION FRACTION SIMPSONS BIPLANE MOD: 42 %
PISA AR MAX VEL: 3.93 M/S
PISA MRMAX VEL: 4.99 M/S
PISA TR MAX VEL: 2.9 M/S
PLATELET # BLD AUTO: 92 K/UL (ref 150–450)
PMV BLD AUTO: 10.2 FL (ref 9.2–12.9)
POTASSIUM SERPL-SCNC: 4.4 MMOL/L (ref 3.5–5.1)
PROT SERPL-MCNC: 5.4 G/DL (ref 6–8.4)
PV MV: 1.16 M/S
PV PEAK GRADIENT: 9 MMHG
PV PEAK VELOCITY: 1.46 M/S
RA MAJOR: 4.55 CM
RA PRESSURE ESTIMATED: 8 MMHG
RBC # BLD AUTO: 2.93 M/UL (ref 4–5.4)
RIGHT VENTRICULAR END-DIASTOLIC DIMENSION: 3.41 CM
RIGHT VENTRICULAR LENGTH IN DIASTOLE (APICAL 4-CHAMBER VIEW): 5.48 CM
RV TB RVSP: 11 MMHG
SODIUM SERPL-SCNC: 137 MMOL/L (ref 136–145)
STJ: 2.26 CM
TR MAX PG: 34 MMHG
TV REST PULMONARY ARTERY PRESSURE: 42 MMHG
WBC # BLD AUTO: 5.82 K/UL (ref 3.9–12.7)
Z-SCORE OF LEFT VENTRICULAR DIMENSION IN END DIASTOLE: -0.04
Z-SCORE OF LEFT VENTRICULAR DIMENSION IN END SYSTOLE: 2.07

## 2023-12-12 PROCEDURE — 25000003 PHARM REV CODE 250: Performed by: PHYSICIAN ASSISTANT

## 2023-12-12 PROCEDURE — 25000003 PHARM REV CODE 250: Performed by: NURSE PRACTITIONER

## 2023-12-12 PROCEDURE — 96375 TX/PRO/DX INJ NEW DRUG ADDON: CPT

## 2023-12-12 PROCEDURE — 96376 TX/PRO/DX INJ SAME DRUG ADON: CPT

## 2023-12-12 PROCEDURE — 80053 COMPREHEN METABOLIC PANEL: CPT | Performed by: NURSE PRACTITIONER

## 2023-12-12 PROCEDURE — 99223 1ST HOSP IP/OBS HIGH 75: CPT | Mod: 25,,, | Performed by: STUDENT IN AN ORGANIZED HEALTH CARE EDUCATION/TRAINING PROGRAM

## 2023-12-12 PROCEDURE — 83735 ASSAY OF MAGNESIUM: CPT | Performed by: NURSE PRACTITIONER

## 2023-12-12 PROCEDURE — 63600175 PHARM REV CODE 636 W HCPCS: Performed by: NURSE PRACTITIONER

## 2023-12-12 PROCEDURE — 36415 COLL VENOUS BLD VENIPUNCTURE: CPT | Performed by: NURSE PRACTITIONER

## 2023-12-12 PROCEDURE — 21400001 HC TELEMETRY ROOM

## 2023-12-12 PROCEDURE — 99223 PR INITIAL HOSPITAL CARE,LEVL III: ICD-10-PCS | Mod: 25,,, | Performed by: STUDENT IN AN ORGANIZED HEALTH CARE EDUCATION/TRAINING PROGRAM

## 2023-12-12 PROCEDURE — 85025 COMPLETE CBC W/AUTO DIFF WBC: CPT | Performed by: NURSE PRACTITIONER

## 2023-12-12 RX ORDER — OXYCODONE HYDROCHLORIDE 5 MG/1
10 TABLET ORAL EVERY 4 HOURS PRN
Status: DISCONTINUED | OUTPATIENT
Start: 2023-12-12 | End: 2023-12-16

## 2023-12-12 RX ORDER — CEFAZOLIN SODIUM 2 G/50ML
2 SOLUTION INTRAVENOUS
Status: CANCELLED | OUTPATIENT
Start: 2023-12-12

## 2023-12-12 RX ORDER — OXYCODONE HYDROCHLORIDE 5 MG/1
5 TABLET ORAL EVERY 4 HOURS PRN
Status: DISCONTINUED | OUTPATIENT
Start: 2023-12-12 | End: 2023-12-16

## 2023-12-12 RX ORDER — SCOLOPAMINE TRANSDERMAL SYSTEM 1 MG/1
1 PATCH, EXTENDED RELEASE TRANSDERMAL
Status: DISCONTINUED | OUTPATIENT
Start: 2023-12-12 | End: 2023-12-14

## 2023-12-12 RX ORDER — ONDANSETRON 2 MG/ML
4 INJECTION INTRAMUSCULAR; INTRAVENOUS EVERY 6 HOURS PRN
Status: DISCONTINUED | OUTPATIENT
Start: 2023-12-12 | End: 2023-12-12

## 2023-12-12 RX ADMIN — DOCUSATE SODIUM 100 MG: 100 CAPSULE, LIQUID FILLED ORAL at 08:12

## 2023-12-12 RX ADMIN — CEFTRIAXONE SODIUM 1 G: 1 INJECTION, POWDER, FOR SOLUTION INTRAMUSCULAR; INTRAVENOUS at 10:12

## 2023-12-12 RX ADMIN — Medication 400 MG: at 08:12

## 2023-12-12 RX ADMIN — AMIODARONE HYDROCHLORIDE 200 MG: 200 TABLET ORAL at 08:12

## 2023-12-12 RX ADMIN — ONDANSETRON 4 MG: 2 INJECTION INTRAMUSCULAR; INTRAVENOUS at 02:12

## 2023-12-12 RX ADMIN — SCOPALAMINE 1 PATCH: 1 PATCH, EXTENDED RELEASE TRANSDERMAL at 08:12

## 2023-12-12 RX ADMIN — PROPRANOLOL HYDROCHLORIDE 10 MG: 10 TABLET ORAL at 08:12

## 2023-12-12 RX ADMIN — OXYCODONE HYDROCHLORIDE 10 MG: 10 TABLET ORAL at 09:12

## 2023-12-12 RX ADMIN — OXYCODONE 5 MG: 5 TABLET ORAL at 08:12

## 2023-12-12 RX ADMIN — ONDANSETRON 4 MG: 2 INJECTION INTRAMUSCULAR; INTRAVENOUS at 12:12

## 2023-12-12 NOTE — PLAN OF CARE
CM attempted to complete discharge planning assessment however the pt was sleeping. CM will return to follow up . Pt is scheduled to be transferred to [facility] for leg surgery with Dr. Garrison. CM following for pts discharge needs.    12/12/23 6868   Discharge Assessment   Assessment Type Discharge Planning Assessment

## 2023-12-12 NOTE — PLAN OF CARE
Problem: Adult Inpatient Plan of Care  Goal: Plan of Care Review  12/12/2023 0155 by Dick Neil RN  Outcome: Ongoing, Progressing  12/12/2023 0154 by Dick Neil RN  Outcome: Ongoing, Progressing     Problem: Adult Inpatient Plan of Care  Goal: Optimal Comfort and Wellbeing  12/12/2023 0155 by Dick Neil RN  Outcome: Ongoing, Progressing  12/12/2023 0154 by Dick Neil RN  Outcome: Ongoing, Progressing     Problem: Adult Inpatient Plan of Care  Goal: Absence of Hospital-Acquired Illness or Injury  12/12/2023 0155 by Dick Neil RN  Outcome: Ongoing, Progressing  12/12/2023 0154 by Dick Neil RN  Outcome: Ongoing, Progressing     Problem: Adjustment to Illness (Chronic Kidney Disease)  Goal: Optimal Coping with Chronic Illness  Outcome: Ongoing, Progressing     Problem: Fall Injury Risk  Goal: Absence of Fall and Fall-Related Injury  12/12/2023 0155 by Dick Neil RN  Outcome: Ongoing, Progressing  12/12/2023 0154 by Dick Neil RN  Outcome: Ongoing, Progressing

## 2023-12-12 NOTE — ASSESSMENT & PLAN NOTE
Splint applied in ED  Ortho consult appreciated - plan for transfer to [facility] main for surgical repair in AM, pt will need cardiac clearance  Fall precautions  Prn pain control

## 2023-12-12 NOTE — ASSESSMENT & PLAN NOTE
Creatine stable for now. BMP reviewed- noted Estimated Creatinine Clearance: 24.1 mL/min (A) (based on SCr of 1.8 mg/dL (H)). according to latest data. 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  Monitor UOP and serial BMP and adjust therapy as needed. Renally dose meds. Avoid nephrotoxic medications and procedures.

## 2023-12-12 NOTE — ASSESSMENT & PLAN NOTE
Unclear etiology  Possibly medication induced?  Obtain echo  Cardiology consulted  Troponin trended and negative  Telemetry monitoring  CT head to r/o CVA

## 2023-12-12 NOTE — PROGRESS NOTES
"[facility] Medicine  Progress Note    Patient Name: Irene العراقي  MRN: [id number]  Patient Class: IP- Inpatient   Admission Date: 12/11/2023  Length of Stay: 0 days  Attending Physician: Madisyn Green MD  Primary Care Provider: Wanda Kuhn FNP-C        Subjective:     Principal Problem:Postural dizziness with presyncope        HPI:  Irene العراقي is an 83 yr old female with Pmhx of  atrial fibrillation, CKD, HTN, 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 presenting today for lightheadedness, weakness and presyncope. Pt reports yesterday she was experiencing fatigue, lightheadedness and generalized weakness and she states symptoms were mild. This morning when she woke up her symptoms persisted and became severe. Pt states she was in the kitchen making breakfast and felt severe lightheadedness and felt like she was going to pass out so she eased her self down on the ground and crawled to the phone to call her daughter. Pt denies cp, sob, n/v or diaphoresis. Pt reports when EMS arrived her BP was in the 180's systolic. Pt experienced trauma to her right ankle with the fall. Xray confirmed displaced lateral malleolar fracture and splint was applied in ED. Pt will be admitted to hospital med services for further workup and management.       Overview/Hospital Course:  No notes on file    Interval History: Notes reviewed, no acute events overnight. Eval by ortho this AM who rec transfer to [facility] for surgical repair of right ankle tomorrow. Ortho aware pt will need cardiac clearance prior to surgery. Pt c/o pain to her right ankle today and received oxy 5mg. Pt feels like the pain med is too strong and she is reporting feeling "woozy" now after med admin. She states her ankle pain has improved. She reports feeling lightheaded and weak this morning and states symptoms are similar to what she was feeling when she arrived to ED. She denies cp or sob. Will await " echo and cards consult and recs. I spoke with hosp med team at [facility] main and pt accepted by Dr. Jones. Pt aggreeable for transfer.     Review of Systems   Constitutional:  Negative for chills, fatigue and fever.   Respiratory:  Negative for cough and shortness of breath.    Cardiovascular:  Negative for chest pain.   Gastrointestinal:  Negative for abdominal pain, diarrhea, nausea and vomiting.   Musculoskeletal:  Positive for arthralgias and gait problem.   Neurological:  Positive for weakness and light-headedness. Negative for dizziness.   All other systems reviewed and are negative.    Objective:     Vital Signs (Most Recent):  Temp: 97.1 °F (36.2 °C) (12/12/23 1146)  Pulse: 64 (12/12/23 1146)  Resp: 16 (12/12/23 1146)  BP: (!) 118/58 (12/12/23 1146)  SpO2: (!) 92 % (12/12/23 1146) Vital Signs (24h Range):  Temp:  [97.1 °F (36.2 °C)-98.2 °F (36.8 °C)] 97.1 °F (36.2 °C)  Pulse:  [61-70] 64  Resp:  [16-20] 16  SpO2:  [92 %-96 %] 92 %  BP: (118-173)/(57-72) 118/58     Weight: 72.1 kg (159 lb)  Body mass index is 25.66 kg/m².    Intake/Output Summary (Last 24 hours) at 12/12/2023 1246  Last data filed at 12/12/2023 0800  Gross per 24 hour   Intake 960 ml   Output 575 ml   Net 385 ml         Physical Exam  Vitals and nursing note reviewed.   Constitutional:       General: She is not in acute distress.     Appearance: Normal appearance. She is well-developed. She is not ill-appearing or diaphoretic.   HENT:      Head: Normocephalic and atraumatic.      Right Ear: External ear normal.      Left Ear: External ear normal.      Nose: Nose normal. No congestion or rhinorrhea.      Mouth/Throat:      Mouth: Mucous membranes are moist.      Pharynx: Oropharynx is clear. No oropharyngeal exudate or posterior oropharyngeal erythema.   Eyes:      General: No scleral icterus.     Conjunctiva/sclera: Conjunctivae normal.      Pupils: Pupils are equal, round, and reactive to light.   Neck:      Vascular: No JVD.   Cardiovascular:       Rate and Rhythm: Normal rate and regular rhythm.      Pulses: Normal pulses.      Heart sounds: Normal heart sounds. No murmur heard.  Pulmonary:      Effort: Pulmonary effort is normal. No respiratory distress.      Breath sounds: Normal breath sounds. No stridor. No wheezing, rhonchi or rales.   Abdominal:      General: Bowel sounds are normal. There is no distension.      Palpations: Abdomen is soft.      Tenderness: There is no abdominal tenderness.   Musculoskeletal:         General: No swelling or tenderness.      Cervical back: Normal range of motion and neck supple.      Comments: Right lower ext with splint intact, cap refill <3 sec   Skin:     General: Skin is warm and dry.      Capillary Refill: Capillary refill takes 2 to 3 seconds.      Coloration: Skin is not jaundiced or pale.      Findings: No erythema.      Comments: Left CW pacemaker site CDI, no erythema or drainage, surgical glue intact and beginning to fall off   Neurological:      General: No focal deficit present.      Mental Status: She is alert and oriented to person, place, and time.      Cranial Nerves: No cranial nerve deficit.      Sensory: No sensory deficit.   Psychiatric:         Mood and Affect: Mood normal.         Behavior: Behavior normal.         Thought Content: Thought content normal.             Significant Labs: All pertinent labs within the past 24 hours have been reviewed.  CBC:   Recent Labs   Lab 12/11/23  0955 12/12/23  0509   WBC 7.21 5.82   HGB 10.5* 9.1*   HCT 32.8* 27.5*   * 92*     CMP:   Recent Labs   Lab 12/11/23  0955 12/12/23  0508    137   K 4.2 4.4    108   CO2 17* 22*   GLU 85 84   BUN 25* 26*   CREATININE 1.8* 1.6*   CALCIUM 9.7 8.8   PROT 6.4 5.4*   ALBUMIN 3.7 3.1*   BILITOT 0.8 0.7   ALKPHOS 92 84   AST 28 25   ALT 25 23   ANIONGAP 12 7*       Significant Imaging: I have reviewed all pertinent imaging results/findings within the past 24 hours.    Assessment/Plan:      * Postural  dizziness with presyncope  Unclear etiology  Possibly medication induced?  Obtain echo  Cardiology consulted  Troponin trended and negative  Telemetry monitoring  CT head to r/o CVA        Closed fracture of right ankle  Splint applied in ED  Ortho consult appreciated - plan for transfer to [facility] main for surgical repair in AM, pt will need cardiac clearance  Fall precautions  Prn pain control      S/P TAVR (transcatheter aortic valve replacement)  Recent TAVR on 11/1 by Dr. Moncada  Pt developed complete heart block post procedure and underwent pacemaker placement on 11/2  Cont eliquis    Stage 4 chronic kidney disease  Creatine stable for now. BMP reviewed- noted Estimated Creatinine Clearance: 24.1 mL/min (A) (based on SCr of 1.8 mg/dL (H)). according to latest data. 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  Monitor UOP and serial BMP and adjust therapy as needed. Renally dose meds. Avoid nephrotoxic medications and procedures.    Hypertension  Chronic, controlled. Latest blood pressure and vitals reviewed-     Temp:  [97.6 °F (36.4 °C)-98.2 °F (36.8 °C)]   Pulse:  [61-70]   Resp:  [16-20]   BP: (124-188)/(57-82)   SpO2:  [92 %-97 %] .   Home meds for hypertension were reviewed and noted below.   Hypertension Medications               furosemide (LASIX) 20 MG tablet Take 1 tablet (20 mg total) by mouth daily as needed. Take for swelling or wt gain > 2 lbs / 24 hr    propranoloL (INDERAL) 10 MG tablet Take 1 tablet (10 mg total) by mouth 2 (two) times daily.            While in the hospital, will manage blood pressure as follows; Continue home antihypertensive regimen    Will utilize p.r.n. blood pressure medication only if patient's blood pressure greater than 180/110 and she develops symptoms such as worsening chest pain or shortness of breath.    PAF (paroxysmal atrial fibrillation)  Patient with Long standing persistent (>12 months) atrial fibrillation which is controlled currently with Amiodarone.  Patient is currently in sinus rhythm paced rhythm.[id number] Score: 3. Anticoagulation indicated. Anticoagulation done with eliquis .      VTE Risk Mitigation (From admission, onward)           Ordered     IP VTE HIGH RISK PATIENT  Once         12/11/23 1524     Place sequential compression device  Until discontinued         12/11/23 1524     Reason for No Pharmacological VTE Prophylaxis  Once        Question:  Reasons:  Answer:  Already adequately anticoagulated on oral Anticoagulants    12/11/23 1524                    Discharge Planning   YANET: 12/12/2023     Code Status: Full Code   Is the patient medically ready for discharge?:     Reason for patient still in hospital (select all that apply): Treatment and Consult recommendations                     Sandra Pederson NP  Department of Hospital Medicine   [facility]/Surg

## 2023-12-12 NOTE — HOSPITAL COURSE
Ms. Tariq is a 84 yo w/pmhx of  atrial fibrillation, CKD, HTN, 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 who presented w/syncope and R ankle fracture. She was initially admitted for [facility] and then transferred to [facility] per ortho request. Patient was evaluated by Cardiology and underwent echocardiogram and pacemaker interrogation at [facility] prior to discharge. She underwent R ORIF on 12/12 w/ortho. Hospital course complicated by orthostatic hypotension and patient was started on midodrine. GI consulted for dysphagia and recommended conservative management 2/2 co-morbidities. She was discharged to SNF.

## 2023-12-12 NOTE — ASSESSMENT & PLAN NOTE
Definitely has a displaced lateral malleolus Fx. I do not see obvious evidence of a lateral malleolus Fx but the radiologist is suspicious for this; therefore, CT of the ankle was ordered.     Will need ORIF R ankle tomorrow with Dr. Garrison at [facility]. Transfer is being worked. NPO after MN.  Meds for pain management.  Will follow at [facility]  Hospitalist coordinating with Cards regarding consult and eval

## 2023-12-12 NOTE — ASSESSMENT & PLAN NOTE
Unclear etiology  Possibly medication induced  Obtain echo  Cardiology consulted  Troponin trended and negative  Telemetry monitoring

## 2023-12-12 NOTE — H&P
[facility] Medicine  History & Physical    Patient Name: Irene العراقي  MRN: [id number]  Patient Class: OP- Observation  Admission Date: 12/11/2023  Attending Physician: Madisyn Green MD   Primary Care Provider: Wanda Kuhn FNP-C         Patient information was obtained from patient, past medical records, and ER records.     Subjective:     Principal Problem:Postural dizziness with presyncope    Chief Complaint:   Chief Complaint   Patient presents with    Fall    Ankle Injury     Rt. Ankle swelling    Dizziness     Blurred vision x 3 days         HPI: Irene اعلراقي is an 83 yr old female with Pmhx of  atrial fibrillation, CKD, HTN, 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 presenting today for lightheadedness, weakness and presyncope. Pt reports yesterday she was experiencing fatigue, lightheadedness and generalized weakness and she states symptoms were mild. This morning when she woke up her symptoms persisted and became severe. Pt states she was in the kitchen making breakfast and felt severe lightheadedness and felt like she was going to pass out so she eased her self down on the ground and crawled to the phone to call her daughter. Pt denies cp, sob, n/v or diaphoresis. Pt reports when EMS arrived her BP was in the 180's systolic. Pt experienced trauma to her right ankle with the fall. Xray confirmed displaced lateral malleolar fracture and splint was applied in ED. Pt will be admitted to [facility] med services for further workup and management.       Past Medical History:   Diagnosis Date    Anemia, unspecified     Atrial fibrillation 01/25/2021    CKD (chronic kidney disease)     Hypertension        Past Surgical History:   Procedure Laterality Date    A-V CARDIAC PACEMAKER INSERTION  11/2/2023    Procedure: Dual Chamber PPM RM 2617 ([employer]);  Surgeon: Andreas James III, MD;  Location: [facility] CATH;  Service: Cardiology;;    ANGIOGRAM, CORONARY,  WITH LEFT HEART CATHETERIZATION Left 4/19/2023    Procedure: Angiogram, Coronary, with Left Heart Cath;  Surgeon: Kevin Redmond MD;  Location: [facility] CATH/EP LAB;  Service: Cardiology;  Laterality: Left;    BREAST SURGERY      COLONOSCOPY N/A 4/16/2023    Procedure: COLONOSCOPY;  Surgeon: Kvng Mensah MD;  Location: [facility] ENDO;  Service: Endoscopy;  Laterality: N/A;    COLONOSCOPY W/ POLYPECTOMY  04/16/2023    TRANSCATHETER AORTIC VALVE REPLACEMENT (TAVR)  11/1/2023    Procedure: (TAVR);  Surgeon: Juliano Moncada MD;  Location: [facility] CATH;  Service: Cardiology;;    TRANSCATHETER AORTIC VALVE REPLACEMENT (TAVR)  11/1/2023    Procedure: (TAVR)- Surgeon;  Surgeon: Adebayo Guzman MD;  Location: [facility] CATH;  Service: Peripheral Vascular;;       Review of patient's allergies indicates:   Allergen Reactions    Cefuroxime     Hydrocodone     Meperidine     Meperidine hcl Nausea And Vomiting    Persantine [dipyridamole]     Nitrofurantoin macrocrystal      Headache , went into Afib     Vicodin [hydrocodone-acetaminophen] Nausea And Vomiting       No current facility-administered medications on file prior to encounter.     Current Outpatient Medications on File Prior to Encounter   Medication Sig    amiodarone (PACERONE) 200 MG Tab Take 1 tablet (200 mg total) by mouth once daily.    apixaban (ELIQUIS) 2.5 mg Tab Take 1 tablet (2.5 mg total) by mouth 2 (two) times daily.    docusate sodium (COLACE) 100 MG capsule Take 1 capsule (100 mg total) by mouth 2 (two) times daily.    furosemide (LASIX) 20 MG tablet Take 1 tablet (20 mg total) by mouth daily as needed. Take for swelling or wt gain > 2 lbs / 24 hr    iron ag,[id number]-sa-sto (NIFEREX, SUMALATE-QUATREFOLIC,) 150 mg iron- 60 mg-1 mg Tab Take 1 tablet by mouth once daily.    magnesium oxide (MAG-OX) 400 mg (241.3 mg magnesium) tablet TAKE 1 TABLET BY MOUTH ONCE DAILY (Patient taking differently: Take 400 mg by mouth once daily.)    MIRALAX 17 gram PwPk Take 17  g by mouth 2 (two) times daily.    potassium chloride (KLOR-CON) 8 MEQ TbSR Take 1 tablet (8 mEq total) by mouth daily as needed. Take along with Lasix    propranoloL (INDERAL) 10 MG tablet Take 1 tablet (10 mg total) by mouth 2 (two) times daily.    iron-vitamin C 100-250 mg, ICAR-C, (ICAR-C) 100-250 mg Tab Take 1 tablet by mouth once daily. (Patient not taking: Reported on 12/11/2023)     Family History       Problem Relation (Age of Onset)    Cancer Mother, Father          Tobacco Use    Smoking status: Former     Types: Cigarettes     Passive exposure: Past    Smokeless tobacco: Never   Substance and Sexual Activity    Alcohol use: Not Currently    Drug use: Not Currently    Sexual activity: Not on file     Review of Systems   Constitutional:  Negative for chills, fatigue and fever.   Respiratory:  Negative for cough and shortness of breath.    Cardiovascular:  Negative for chest pain.   Gastrointestinal:  Negative for abdominal pain, diarrhea, nausea and vomiting.   Musculoskeletal:  Positive for arthralgias and gait problem.   Neurological:  Positive for weakness and light-headedness. Negative for dizziness.   All other systems reviewed and are negative.    Objective:     Vital Signs (Most Recent):  Temp: 98.2 °F (36.8 °C) (12/11/23 1519)  Pulse: 66 (12/11/23 1519)  Resp: 16 (12/11/23 1519)  BP: (!) 151/65 (12/11/23 1519)  SpO2: (!) 92 % (12/11/23 1519) Vital Signs (24h Range):  Temp:  [97.8 °F (36.6 °C)-98.2 °F (36.8 °C)] 98.2 °F (36.8 °C)  Pulse:  [61-68] 66  Resp:  [16-20] 16  SpO2:  [92 %-97 %] 92 %  BP: (151-188)/(65-82) 151/65     Weight: 72.1 kg (159 lb)  Body mass index is 25.66 kg/m².     Physical Exam  Vitals and nursing note reviewed.   Constitutional:       General: She is not in acute distress.     Appearance: Normal appearance. She is well-developed. She is not ill-appearing or diaphoretic.   HENT:      Head: Normocephalic and atraumatic.      Right Ear: External ear normal.      Left Ear:  External ear normal.      Nose: Nose normal. No congestion or rhinorrhea.      Mouth/Throat:      Mouth: Mucous membranes are moist.      Pharynx: Oropharynx is clear. No oropharyngeal exudate or posterior oropharyngeal erythema.   Eyes:      General: No scleral icterus.     Conjunctiva/sclera: Conjunctivae normal.      Pupils: Pupils are equal, round, and reactive to light.   Neck:      Vascular: No JVD.   Cardiovascular:      Rate and Rhythm: Normal rate and regular rhythm.      Pulses: Normal pulses.      Heart sounds: Normal heart sounds. No murmur heard.  Pulmonary:      Effort: Pulmonary effort is normal. No respiratory distress.      Breath sounds: Normal breath sounds. No stridor. No wheezing, rhonchi or rales.   Abdominal:      General: Bowel sounds are normal. There is no distension.      Palpations: Abdomen is soft.      Tenderness: There is no abdominal tenderness.   Musculoskeletal:         General: No swelling or tenderness.      Cervical back: Normal range of motion and neck supple.      Comments: Right lower ext with splint intact, cap refill <3 sec   Skin:     General: Skin is warm and dry.      Capillary Refill: Capillary refill takes 2 to 3 seconds.      Coloration: Skin is not jaundiced or pale.      Findings: No erythema.      Comments: Left CW pacemaker site CDI, no erythema or drainage, surgical glue intact and beginning to fall off   Neurological:      General: No focal deficit present.      Mental Status: She is alert and oriented to person, place, and time.      Cranial Nerves: No cranial nerve deficit.      Sensory: No sensory deficit.   Psychiatric:         Mood and Affect: Mood normal.         Behavior: Behavior normal.         Thought Content: Thought content normal.          CRANIAL NERVES     CN III, IV, VI   Pupils are equal, round, and reactive to light.     Significant Labs: All pertinent labs within the past 24 hours have been reviewed.  CBC:   Recent Labs   Lab 12/11/23  9861    WBC 7.21   HGB 10.5*   HCT 32.8*   *     CMP:   Recent Labs   Lab 12/11/23  0955      K 4.2      CO2 17*   GLU 85   BUN 25*   CREATININE 1.8*   CALCIUM 9.7   PROT 6.4   ALBUMIN 3.7   BILITOT 0.8   ALKPHOS 92   AST 28   ALT 25   ANIONGAP 12     Troponin:   Recent Labs   Lab 12/11/23  1541 12/11/23  1858   TROPONINI 0.014 0.024       Significant Imaging: I have reviewed all pertinent imaging results/findings within the past 24 hours.  Assessment/Plan:     * Postural dizziness with presyncope  Unclear etiology  Possibly medication induced  Obtain echo  Cardiology consulted  Troponin trended and negative  Telemetry monitoring        Closed fracture of right ankle  Splint applied in ED  Consult ortho  Fall precautions  Prn pain control      S/P TAVR (transcatheter aortic valve replacement)  Recent TAVR on 11/1 by Dr. Moncada  Pt develed complete heart block post procedure and underwent pacemaker placement on 11/2  Cont eliquis    Stage 4 chronic kidney disease  Creatine stable for now. BMP reviewed- noted Estimated Creatinine Clearance: 24.1 mL/min (A) (based on SCr of 1.8 mg/dL (H)). according to latest data. 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  Monitor UOP and serial BMP and adjust therapy as needed. Renally dose meds. Avoid nephrotoxic medications and procedures.    Hypertension  Chronic, controlled. Latest blood pressure and vitals reviewed-     Temp:  [97.6 °F (36.4 °C)-98.2 °F (36.8 °C)]   Pulse:  [61-70]   Resp:  [16-20]   BP: (124-188)/(57-82)   SpO2:  [92 %-97 %] .   Home meds for hypertension were reviewed and noted below.   Hypertension Medications               furosemide (LASIX) 20 MG tablet Take 1 tablet (20 mg total) by mouth daily as needed. Take for swelling or wt gain > 2 lbs / 24 hr    propranoloL (INDERAL) 10 MG tablet Take 1 tablet (10 mg total) by mouth 2 (two) times daily.            While in the hospital, will manage blood pressure as follows; Continue home  antihypertensive regimen    Will utilize p.r.n. blood pressure medication only if patient's blood pressure greater than 180/110 and she develops symptoms such as worsening chest pain or shortness of breath.    PAF (paroxysmal atrial fibrillation)  Patient with Long standing persistent (>12 months) atrial fibrillation which is controlled currently with Amiodarone. Patient is currently in sinus rhythm paced rhythm.[id number] Score: 3. Anticoagulation indicated. Anticoagulation done with eliquis .      VTE Risk Mitigation (From admission, onward)           Ordered     IP VTE HIGH RISK PATIENT  Once         12/11/23 1524     Place sequential compression device  Until discontinued         12/11/23 1524     Reason for No Pharmacological VTE Prophylaxis  Once        Question:  Reasons:  Answer:  Already adequately anticoagulated on oral Anticoagulants    12/11/23 1524                         On 12/11/2023, patient should be placed in hospital observation services under my care in collaboration with Dr. Madisyn Green MD.           Sandra Pederson NP  Department of Hospital Medicine  [facility]/Surg

## 2023-12-12 NOTE — CONSULTS
[facility]  Department of Cardiology  Consult Note      PATIENT NAME: Irene العراقي    MRN: [id number]  TODAY'S DATE: 12/12/2023  ADMIT DATE: 12/11/2023                          CONSULT REQUESTED BY: Madisyn Green MD    SUBJECTIVE     PRINCIPAL PROBLEM: Postural dizziness with presyncope      REASON FOR CONSULT:  Presyncope      HPI:  Patient is an 82-year-old female who presented to the hospital with significant dizziness.  She felt significant dizziness while making breakfast indication to the point where she felt like she was going to pass out.  She did not lose her consciousness.  She tried to ease herself to the floor but did end sustaining a fall and had trauma to her right ankle.  She has displaced malleolar fracture on x-ray.  She denies experiencing any chest pain, shortness of breath, palpitations or diaphoresis prior or after the episode.  She reports some nausea associated with it.  Her blood pressure was elevated in the 180s systolic upon EMS arrival.  Troponin normal x2.  BNP slightly elevated at 508 but she is not clinically volume overloaded.  Twelve lead EKG showed a and V paced rhythm with heart rate of 60 beats per minute and ST-T changes secondary to pacing.  Ortho has evaluated the patient and planning to do R ankle ORIF with Dr. Garrison.  Currently, she is laying in her bed. Says she still has some dizziness. It gets worse when she moves her head forward. Associated some nausea and headache. No arrhythmia on tele. She says she had some dizziness years ago and was told it was vertigo but she has not had issues with for a long time.    She had TAVR on 11/01/2023 with Dr. Moncada for severe aortic stenosis.  It was complicated by complete heart block and she ended up getting a Medtronic permanent pacemaker the day after.  She is she has been doing well overall since her discharge postprocedure.  Denies having any exertional chest pain, shortness of breath, PND, orthopnea or  peripheral edema.  Soft groin with no evidence of hematoma bilaterally.   She had a cath in 4/2023 which showed no evidence of obstructive CAD.     From H&P 12/11/2023  HPI: Irene العراقي is an 83 yr old female with Pmhx of  atrial fibrillation, CKD, HTN, 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 presenting today for lightheadedness, weakness and presyncope. Pt reports yesterday she was experiencing fatigue, lightheadedness and generalized weakness and she states symptoms were mild. This morning when she woke up her symptoms persisted and became severe. Pt states she was in the kitchen making breakfast and felt severe lightheadedness and felt like she was going to pass out so she eased her self down on the ground and crawled to the phone to call her daughter. Pt denies cp, sob, n/v or diaphoresis. Pt reports when EMS arrived her BP was in the 180's systolic. Pt experienced trauma to her right ankle with the fall. Xray confirmed displaced lateral malleolar fracture and splint was applied in ED. Pt will be admitted to hospital med services for further workup and management.      Review of patient's allergies indicates:   Allergen Reactions    Cefuroxime     Hydrocodone     Meperidine     Meperidine hcl Nausea And Vomiting    Persantine [dipyridamole]     Nitrofurantoin macrocrystal      Headache , went into Afib     Vicodin [hydrocodone-acetaminophen] Nausea And Vomiting       Past Medical History:   Diagnosis Date    Anemia, unspecified     Atrial fibrillation 01/25/2021    CKD (chronic kidney disease)     Hypertension      Past Surgical History:   Procedure Laterality Date    A-V CARDIAC PACEMAKER INSERTION  11/2/2023    Procedure: Dual Chamber PPM RM 2617 (Medtronic);  Surgeon: Andreas James III, MD;  Location: [facility] CATH;  Service: Cardiology;;    ANGIOGRAM, CORONARY, WITH LEFT HEART CATHETERIZATION Left 4/19/2023    Procedure: Angiogram, Coronary, with Left Heart Cath;  Surgeon: Kevin Redmond  MD;  Location: [facility] CATH/EP LAB;  Service: Cardiology;  Laterality: Left;    BREAST SURGERY      COLONOSCOPY N/A 4/16/2023    Procedure: COLONOSCOPY;  Surgeon: Kvng Mensah MD;  Location: [facility] ENDO;  Service: Endoscopy;  Laterality: N/A;    COLONOSCOPY W/ POLYPECTOMY  04/16/2023    TRANSCATHETER AORTIC VALVE REPLACEMENT (TAVR)  11/1/2023    Procedure: (TAVR);  Surgeon: Juliano Moncada MD;  Location: [facility] CATH;  Service: Cardiology;;    TRANSCATHETER AORTIC VALVE REPLACEMENT (TAVR)  11/1/2023    Procedure: (TAVR)- Surgeon;  Surgeon: Adebayo Guzman MD;  Location: [facility] CATH;  Service: Peripheral Vascular;;     Social History     Tobacco Use    Smoking status: Former     Types: Cigarettes     Passive exposure: Past    Smokeless tobacco: Never   Substance Use Topics    Alcohol use: Not Currently    Drug use: Not Currently        REVIEW OF SYSTEMS  CONSTITUTIONAL: Negative for chills, fatigue and fever.   EYES: No double vision, +blurred vision  NEURO: +headaches, + dizziness  RESPIRATORY: Negative for cough, shortness of breath and wheezing.    CARDIOVASCULAR: Negative for chest pain. Negative for palpitations and leg swelling.   GI: Negative for abdominal pain, No melena, diarrhea, and vomiting, +nausea   : Negative for dysuria and frequency, Negative for hematuria  SKIN: Negative for edema or discoloration noted.   ENDOCRINE: Negative for polyphagia, Negative for heat intolerance, Negative for cold intolerance  PSYCHIATRIC: Negative for depression, Negative for anxiety, Negative for memory loss  MUSCULOSKELETAL: Negative for neck pain, Negative for muscle weakness, Negative for back pain, + right ankle pain    OBJECTIVE     VITAL SIGNS (Most Recent)  Temp: 97.4 °F (36.3 °C) (12/12/23 0819)  Pulse: 70 (12/12/23 0819)  Resp: 16 (12/12/23 0837)  BP: (!) 146/68 (12/12/23 0819)  SpO2: (!) 92 % (12/12/23 0819)    VENTILATION STATUS  Resp: 16 (12/12/23 0837)  SpO2: (!) 92 % (12/12/23 0819)           I & O (Last  24H):  Intake/Output Summary (Last 24 hours) at 2023 1142  Last data filed at 2023 0800  Gross per 24 hour   Intake 1210 ml   Output 575 ml   Net 635 ml       WEIGHTS  Wt Readings from Last 1 Encounters:   23 0416 72 kg (158 lb 11.7 oz)   23 1534 72.1 kg (159 lb)   23 0835 72.1 kg (159 lb)       PHYSICAL EXAM  GENERAL: in no apparent distress alert and oriented.   HEENT: Normocephalic. Pupils normal and conjunctivae normal.,no pallor or icterus is noted..   NECK: No JVD. No bruit.  CARDIAC: Regular rate and rhythm. S1 is normal.S2 is normal. No gallops, clicks or murmurs noted at this time.  CHEST ANATOMY: normal. PPM site clean, no tenderness  LUNGS: Clear to auscultation. No wheezing or rhonchi..   ABDOMEN: Soft.  Normal bowel sounds. No pulsations and no masses felt, No guarding or rebound.   EXTREMITIES: No cyanosis, clubbing or edema noted at this time, R ankle splint in place  PERIPHERAL VASCULAR SYSTEM: Good palpable distal pulses.   CENTRAL NERVOUS SYSTEM: No focal motor or sensory deficits noted.   SKIN:  moist, well perfused.   MUSCLE STRENGTH & TONE: No noteable weakness, atrophy or abnormal movement.     HOME MEDICATIONS:  No current facility-administered medications on file prior to encounter.     Current Outpatient Medications on File Prior to Encounter   Medication Sig Dispense Refill    amiodarone (PACERONE) 200 MG Tab Take 1 tablet (200 mg total) by mouth once daily. 90 tablet 3    [] apixaban (ELIQUIS) 2.5 mg Tab Take 1 tablet (2.5 mg total) by mouth 2 (two) times daily. 60 tablet 2    docusate sodium (COLACE) 100 MG capsule Take 1 capsule (100 mg total) by mouth 2 (two) times daily. 60 capsule 5    furosemide (LASIX) 20 MG tablet Take 1 tablet (20 mg total) by mouth daily as needed. Take for swelling or wt gain > 2 lbs / 24 hr 15 tablet 11    iron ag,gh-G-[id number]-Zn-sa-sto (NIFEREX, SUMALATE-QUATREFOLIC,) 150 mg iron- 60 mg-1 mg Tab Take 1 tablet by mouth once  "daily. 90 tablet 3    magnesium oxide (MAG-OX) 400 mg (241.3 mg magnesium) tablet TAKE 1 TABLET BY MOUTH ONCE DAILY (Patient taking differently: Take 400 mg by mouth once daily.) 90 tablet 3    MIRALAX 17 gram PwPk Take 17 g by mouth 2 (two) times daily.      potassium chloride (KLOR-CON) 8 MEQ TbSR Take 1 tablet (8 mEq total) by mouth daily as needed. Take along with Lasix 15 tablet 11    propranoloL (INDERAL) 10 MG tablet Take 1 tablet (10 mg total) by mouth 2 (two) times daily. 60 tablet 11    iron-vitamin C 100-250 mg, ICAR-C, (ICAR-C) 100-250 mg Tab Take 1 tablet by mouth once daily. (Patient not taking: Reported on 12/11/2023) 30 tablet 2       SCHEDULED MEDS:   amiodarone  200 mg Oral Daily    cefTRIAXone (ROCEPHIN) IVPB  1 g Intravenous Q24H    docusate sodium  100 mg Oral BID    magnesium oxide  400 mg Oral Daily    propranoloL  10 mg Oral BID    scopolamine  1 patch Transdermal Q3 Days       CONTINUOUS INFUSIONS:    PRN MEDS:acetaminophen, dextrose 10%, dextrose 10%, glucagon (human recombinant), glucose, glucose, magnesium oxide, magnesium oxide, naloxone, ondansetron, ondansetron, oxyCODONE, oxyCODONE, potassium bicarbonate, potassium bicarbonate, potassium bicarbonate, potassium, sodium phosphates, potassium, sodium phosphates, potassium, sodium phosphates, sodium chloride 0.9%    LABS AND DIAGNOSTICS     CBC LAST 3 DAYS  Recent Labs   Lab 12/11/23  0955 12/12/23  0509   WBC 7.21 5.82   RBC 3.45* 2.93*   HGB 10.5* 9.1*   HCT 32.8* 27.5*   MCV 95 94   MCH 30.4 31.1*   MCHC 32.0 33.1   RDW 13.2 13.1   * 92*   MPV 10.1 10.2   GRAN 78.4*  5.7 71.3  4.1   LYMPH 9.4*  0.7* 11.3*  0.7*   MONO 10.3  0.7 14.9  0.9   BASO 0.03 0.02   NRBC 0 0       COAGULATION LAST 3 DAYS  No results for input(s): "LABPT", "INR", "APTT" in the last 168 hours.    CHEMISTRY LAST 3 DAYS  Recent Labs   Lab 12/11/23  0955 12/12/23  0508    137   K 4.2 4.4    108   CO2 17* 22*   ANIONGAP 12 7*   BUN 25* 26* " "  CREATININE 1.8* 1.6*   GLU 85 84   CALCIUM 9.7 8.8   MG  --  2.0   ALBUMIN 3.7 3.1*   PROT 6.4 5.4*   ALKPHOS 92 84   ALT 25 23   AST 28 25   BILITOT 0.8 0.7       CARDIAC PROFILE LAST 3 DAYS  Recent Labs   Lab 12/11/23  0955 12/11/23  1541 12/11/23  1858   *  --   --    TROPONINI  --  0.014 0.024       ENDOCRINE LAST 3 DAYS  No results for input(s): "TSH", "PROCAL" in the last 168 hours.    LAST ARTERIAL BLOOD GAS  ABG  No results for input(s): "PH", "PO2", "PCO2", "HCO3", "BE" in the last 168 hours.    LAST 7 DAYS MICROBIOLOGY   Microbiology Results (last 7 days)       Procedure Component Value Units Date/Time    Urine culture [[id number]]  (Abnormal) Collected: 12/11/23 0947    Order Status: Completed Specimen: Urine Updated: 12/12/23 0716     Urine Culture, Routine GRAM NEGATIVE MEREDITH, LACTOSE   >100,000 cfu/ml  Identification and susceptibility pending      Narrative:      Specimen Source->Urine            MOST RECENT IMAGING  CT Ankle (Including Hindfoot) Without Contrast Right  Narrative: EXAMINATION:  CT ANKLE (INCLUDING HINDFOOT) WITHOUT CONTRAST RIGHT    CLINICAL HISTORY:  Fracture, ankle;question of medial malleolus Fx;    TECHNIQUE:  Noncontrast transaxial CT images through the right ankle.  Multiplanar reformats.  3D reformats.    COMPARISON:  12/11/2023    FINDINGS:  There is an obliquely oriented mildly displaced fracture along the distal fibula with fracture plane just above the level of the tibiotalar joint.  There is a nondisplaced fracture through the tip of the medial malleolus.  There is a minimally displaced fracture along the posterior malleolus.  There is no overt widening of the medial clear space.  No malalignment elsewhere.    Spurs along the plantar calcaneus and dorsal talus head.  Retained metallic sutures along the 1st metatarsal shaft and 3rd metatarsal head.  Relative preservation of joint spaces.    Diffuse subcutaneous soft tissue edema about the ankle.  Tendons are " normally located at the ankle.  Atherosclerosis.  Impression: Trimalleolar ankle fracture.    Electronically signed by: Giovany Huerta  Date:    12/12/2023  Time:    11:31      ECHOCARDIOGRAM RESULTS (last 5)  Results for orders placed during the hospital encounter of 12/01/23    Echo    Interpretation Summary    Left Ventricle: The left ventricle is normal in size. Normal wall thickness. There is normal systolic function with a visually estimated ejection fraction of 55 - 60%. Grade I diastolic dysfunction.    Right Ventricle: Normal right ventricular cavity size. Wall thickness is normal. Right ventricle wall motion  is normal. Systolic function is normal.    Left Atrium: Left atrium is mildly dilated.    Right Atrium: Right atrium is mildly dilated.    Aortic Valve: There is a transcatheter valve replacement in the aortic position that is appropriately positioned. It is reported to be a Medtronic valve. There is mild stenosis. Aortic valve area by VTI is 2.97 cm². Aortic valve peak velocity is 1.45 m/s. Mean gradient is 4 mmHg. The dimensionless index is 0.95. There is mild to moderate aortic regurgitation (appears perivalvular).    Mitral Valve: Moderately calcified leaflets. There is moderate mitral annular calcification present. There is mild stenosis. The mean pressure gradient across the mitral valve is 3 mmHg at a heart rate of  bpm. There is mild regurgitation.    Tricuspid Valve: There is mild regurgitation.    IVC/SVC: Normal venous pressure at 3 mmHg.      Results for orders placed during the hospital encounter of 11/01/23    Echo Saline Bubble? No    Interpretation Summary    Left Ventricle: The left ventricle is normal in size. Normal wall thickness. Normal wall motion. There is normal systolic function with a visually estimated ejection fraction of 55 - 60%. There is normal diastolic function.    Right Ventricle: Normal right ventricular cavity size. Wall thickness is normal. Right ventricle wall  motion  is normal. Systolic function is normal.    Left Atrium: Left atrium is mildly dilated.    Right Atrium: Right atrium is mildly dilated.    Aortic Valve: There is a transcatheter valve replacement in the aortic position. It is reported to be a 29 mm Medtronic valve.    Mitral Valve: There is mild regurgitation.    Tricuspid Valve: There is mild regurgitation.    Pulmonic Valve: There is mild regurgitation.    IVC/SVC: Normal venous pressure at 3 mmHg.      Echo Saline Bubble? No    Interpretation Summary    Limited 2D echocardiogram done intraoperatively doing TAVR procedure.    Normal biventricular function.    Well-positioned self expanding aortic valve with no perivalvular or central valvular regurgitation    No pericardial effusion.      Results for orders placed during the hospital encounter of 08/09/23    Echo    Interpretation Summary    Limited study    Left Ventricle: The left ventricle is normal in size. Mildly increased wall thickness. There is concentric remodeling. Normal wall motion. There is normal systolic function with a visually estimated ejection fraction of 65 - 70%. Diastolic function cannot be reliably determined in the presence of mitral annular calcification.    Aortic Valve: Moderate annular calcification. Moderately restricted motion. There is moderate to severe stenosis. Aortic valve area by VTI is 0.77 cm². Aortic valve peak velocity is 3.66 m/s. Mean gradient is 34 mmHg. The dimensionless index is 0.25. There is moderate aortic regurgitation.    Mitral Valve: There is bileaflet sclerosis. Mild posterior mitral annular calcification. There is moderate to severe regurgitation. decreased excursion.    Left Atrium: Left atrium is severely dilated.    Right Ventricle: Normal right ventricular cavity size.    Tricuspid Valve: There is mild regurgitation.    Pulmonic Valve: There is mild regurgitation.    IVC/SVC: Intermediate venous pressure at 8 mmHg.      Results for orders placed  during the hospital encounter of 04/10/23    Echo    Interpretation Summary  · The left ventricle is normal in size with concentric remodeling and normal systolic function.  · Mild left atrial enlargement.  · The estimated ejection fraction is 65%.  · Indeterminate left ventricular diastolic function.  · Normal right ventricular size with normal right ventricular systolic function.  · There is moderate aortic valve stenosis.  · Aortic valve area is 1.24 cm2; peak velocity is 3.51 m/s; mean gradient is 32 mmHg.  · Mild mitral regurgitation.  · Mild tricuspid regurgitation.  · There is mild to moderate pulmonary hypertension with RVSP at 47 mmhg      CURRENT/PREVIOUS VISIT EKG  Results for orders placed or performed during the hospital encounter of 12/01/23   EKG 12-lead    Collection Time: 12/01/23  1:55 PM    Narrative    Test Reason :     Vent. Rate : 070 BPM     Atrial Rate : 070 BPM     P-R Int : 102 ms          QRS Dur : 182 ms      QT Int : 486 ms       P-R-T Axes : 073 022 063 degrees     QTc Int : 524 ms    AV sequential or dual chamber electronic pacemaker  When compared with ECG of 16-NOV-2023 15:52,  Premature ventricular complexes are no longer Present  Vent. rate has increased BY   2 BPM  Confirmed by PORTIA SHAH MD [id number]) on 12/1/2023 1:57:22 PM    Referred By: CHELY RIVAS           Confirmed By:PORTIA SHAH MD             ASSESSMENT & PLAN:   Presyncope  Dizziness  History of AS S/p TAVR with Medtronic FX 29 mm valve, 11/1/2023  CHB after TAVR s/p Medtronic dual chamber PPM, 11/2/2023  Closed fracture of right ankle  PAF  Hypertension    - Will obtain an echo for heart function and structural evaluation.  - Will interrogate the device to check for any significant arrhythmia. So far on tele, there is no evidence of any significant arrhythmia or device malfunction. She is either A sensed and V paced or A and V paced suggesting good sensing and capture. No sustained RVR episodes on  tele.  - She is still complaining of dizziness associated with nausea and headache. She says the dizziness is worse when she moves her head forward. With her being symptomatic at rest with no arrhythmia on tele and no hypotension, I would suggest obtaining imaging studies to rule out CVA as well, especially a posterior stroke.  - Continue amiodarone 200 mg daily. Ok to hold Eliquis in anticipation of surgery. Restart after surgery.  - Presentation, labwork and EKG findings do not suggest ACS.    Kelley Flynn MD  Date of Service: 12/12/2023  11:42 AM

## 2023-12-12 NOTE — ASSESSMENT & PLAN NOTE
Recent TAVR on 11/1 by Dr. Moncada  Pt develed complete heart block post procedure and underwent pacemaker placement on 11/2  Cont eliquis

## 2023-12-12 NOTE — CONSULTS
[facility]/Surg  Orthopedics  Consult Note    Patient Name: Irene العراقي  MRN: [id number]  Admission Date: 12/11/2023  Hospital Length of Stay: 0 days  Attending Provider: Madisyn Green MD  Primary Care Provider: Wanda Kuhn FNP-C    Patient information was obtained from patient and ER records.     Consults  Subjective:     Principal Problem:Postural dizziness with presyncope    Chief Complaint:   Chief Complaint   Patient presents with    Fall    Ankle Injury     Rt. Ankle swelling    Dizziness     Blurred vision x 3 days         HPI: Ortho consult for R ankle transverse displaced lateral malleolar fracture.     Per H&P:  Irene العراقي is an 83 yr old female with Pmhx of  atrial fibrillation, CKD, HTN, 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 presenting today for lightheadedness, weakness and presyncope. Pt reports yesterday she was experiencing fatigue, lightheadedness and generalized weakness and she states symptoms were mild. This morning when she woke up her symptoms persisted and became severe. Pt states she was in the kitchen making breakfast and felt severe lightheadedness and felt like she was going to pass out so she eased her self down on the ground and crawled to the phone to call her daughter. Pt denies cp, sob, n/v or diaphoresis. Pt reports when EMS arrived her BP was in the 180's systolic. Pt experienced trauma to her right ankle with the fall. Xray confirmed displaced lateral malleolar fracture and splint was applied in ED. Pt will be admitted to hospital med services for further workup and management.      Past Medical History:   Diagnosis Date    Anemia, unspecified     Atrial fibrillation 01/25/2021    CKD (chronic kidney disease)     Hypertension        Past Surgical History:   Procedure Laterality Date    A-V CARDIAC PACEMAKER INSERTION  11/2/2023    Procedure: Dual Chamber PPM RM 2617 ([employer]tr[employer]);  Surgeon: Andreas James III, MD;  Location:  [facility] CATH;  Service: Cardiology;;    ANGIOGRAM, CORONARY, WITH LEFT HEART CATHETERIZATION Left 4/19/2023    Procedure: Angiogram, Coronary, with Left Heart Cath;  Surgeon: Kevin Redmond MD;  Location: [facility] CATH/EP LAB;  Service: Cardiology;  Laterality: Left;    BREAST SURGERY      COLONOSCOPY N/A 4/16/2023    Procedure: COLONOSCOPY;  Surgeon: Kvng Mensah MD;  Location: [facility] ENDO;  Service: Endoscopy;  Laterality: N/A;    COLONOSCOPY W/ POLYPECTOMY  04/16/2023    TRANSCATHETER AORTIC VALVE REPLACEMENT (TAVR)  11/1/2023    Procedure: (TAVR);  Surgeon: Juliano Moncada MD;  Location: [facility] CATH;  Service: Cardiology;;    TRANSCATHETER AORTIC VALVE REPLACEMENT (TAVR)  11/1/2023    Procedure: (TAVR)- Surgeon;  Surgeon: Adebayo Guzman MD;  Location: [facility] CATH;  Service: Peripheral Vascular;;       Review of patient's allergies indicates:   Allergen Reactions    Cefuroxime     Hydrocodone     Meperidine     Meperidine hcl Nausea And Vomiting    Persantine [dipyridamole]     Nitrofurantoin macrocrystal      Headache , went into Afib     Vicodin [hydrocodone-acetaminophen] Nausea And Vomiting       Current Facility-Administered Medications   Medication    acetaminophen tablet 650 mg    amiodarone tablet 200 mg    dextrose 10% bolus 125 mL 125 mL    dextrose 10% bolus 250 mL 250 mL    docusate sodium capsule 100 mg    glucagon (human recombinant) injection 1 mg    glucose chewable tablet 16 g    glucose chewable tablet 24 g    magnesium oxide tablet 400 mg    magnesium oxide tablet 800 mg    magnesium oxide tablet 800 mg    naloxone 0.4 mg/mL injection 0.02 mg    ondansetron injection 4 mg    ondansetron injection 4 mg    oxyCODONE immediate release tablet 10 mg    oxyCODONE immediate release tablet 5 mg    potassium bicarbonate disintegrating tablet 35 mEq    potassium bicarbonate disintegrating tablet 50 mEq    potassium bicarbonate disintegrating tablet 60 mEq    potassium, sodium phosphates 280-160-250 mg  "packet 2 packet    potassium, sodium phosphates 280-160-250 mg packet 2 packet    potassium, sodium phosphates 280-160-250 mg packet 2 packet    propranoloL tablet 10 mg    scopolamine 1.3-1.5 mg (1 mg over 3 days) 1 patch    sodium chloride 0.9% flush 10 mL     Family History       Problem Relation (Age of Onset)    Cancer Mother, Father          Tobacco Use    Smoking status: Former     Types: Cigarettes     Passive exposure: Past    Smokeless tobacco: Never   Substance and Sexual Activity    Alcohol use: Not Currently    Drug use: Not Currently    Sexual activity: Not on file     Review of Systems   Unable to perform ROS    Objective:     Vital Signs (Most Recent):  Temp: 98.1 °F (36.7 °C) (12/12/23 0416)  Pulse: 66 (12/12/23 0416)  Resp: 18 (12/12/23 0416)  BP: 125/60 (12/12/23 0416)  SpO2: (!) 94 % (12/12/23 0416) Vital Signs (24h Range):  Temp:  [97.6 °F (36.4 °C)-98.2 °F (36.8 °C)] 98.1 °F (36.7 °C)  Pulse:  [61-70] 66  Resp:  [16-20] 18  SpO2:  [92 %-97 %] 94 %  BP: (120-188)/(57-82) 125/60     Weight: 72 kg (158 lb 11.7 oz)  Height: 5' 6" (167.6 cm)  Body mass index is 25.62 kg/m².      Intake/Output Summary (Last 24 hours) at 12/12/2023 0811  Last data filed at 12/12/2023 0605  Gross per 24 hour   Intake 970 ml   Output 575 ml   Net 395 ml        General    Nursing note and vitals reviewed.  Constitutional: She is oriented to person, place, and time. She appears well-developed and well-nourished.   Pulmonary/Chest: Effort normal.   Neurological: She is alert and oriented to person, place, and time.   Psychiatric: She has a normal mood and affect. Her behavior is normal.         Right Ankle/Foot Exam     Comments:  RLE DNVI. Splint in place (did not remove). Elevated on pillow.           Significant Labs: CBC:   Recent Labs   Lab 12/11/23  0955 12/12/23  0509   WBC 7.21 5.82   HGB 10.5* 9.1*   HCT 32.8* 27.5*   * 92*     CMP:   Recent Labs   Lab 12/11/23  0955 12/12/23  0508    137   K 4.2 4.4 "    108   CO2 17* 22*   GLU 85 84   BUN 25* 26*   CREATININE 1.8* 1.6*   CALCIUM 9.7 8.8   PROT 6.4 5.4*   ALBUMIN 3.7 3.1*   BILITOT 0.8 0.7   ALKPHOS 92 84   AST 28 25   ALT 25 23   ANIONGAP 12 7*     All pertinent labs within the past 24 hours have been reviewed.    Significant Imaging: R ankle X-rays: There is a transverse, displaced lateral malleolar fracture.  There is adjacent soft tissue swelling.    R Tib/ Fib X-rays::  There is a displaced lateral malleolar fracture with adjacent soft tissue swelling.  In addition there is a suspected nondisplaced medial malleolar fracture.  CT should be obtained for confirmation.  This report was flagged in Epic as abnormal.   Assessment/Plan:     Closed fracture of right ankle  Definitely has a displaced lateral malleolus Fx. I do not see obvious evidence of a lateral malleolus Fx but the radiologist is suspicious for this; therefore, CT of the ankle was ordered.     Will need ORIF R ankle tomorrow with Dr. Garrison at [facility]. Transfer is being worked. NPO after MN.  Meds for pain management.  Will follow at [facility]  Hospitalist coordinating with Cards regarding consult and eval        Thank you for your consult. I will follow-up with patient. Please contact us if you have any additional questions.    MARIE AZAR  Orthopedics  [facility]/Surg

## 2023-12-12 NOTE — SUBJECTIVE & OBJECTIVE
Past Medical History:   Diagnosis Date    Anemia, unspecified     Atrial fibrillation 01/25/2021    CKD (chronic kidney disease)     Hypertension        Past Surgical History:   Procedure Laterality Date    A-V CARDIAC PACEMAKER INSERTION  11/2/2023    Procedure: Dual Chamber PPM RM 2617 ([employer]);  Surgeon: Andreas James III, MD;  Location: [facility] CATH;  Service: Cardiology;;    ANGIOGRAM, CORONARY, WITH LEFT HEART CATHETERIZATION Left 4/19/2023    Procedure: Angiogram, Coronary, with Left Heart Cath;  Surgeon: Kevin Redmond MD;  Location: [facility] CATH/EP LAB;  Service: Cardiology;  Laterality: Left;    BREAST SURGERY      COLONOSCOPY N/A 4/16/2023    Procedure: COLONOSCOPY;  Surgeon: Kvng Mensah MD;  Location: [facility] ENDO;  Service: Endoscopy;  Laterality: N/A;    COLONOSCOPY W/ POLYPECTOMY  04/16/2023    TRANSCATHETER AORTIC VALVE REPLACEMENT (TAVR)  11/1/2023    Procedure: (TAVR);  Surgeon: Juliano Moncada MD;  Location: [facility] CATH;  Service: Cardiology;;    TRANSCATHETER AORTIC VALVE REPLACEMENT (TAVR)  11/1/2023    Procedure: (TAVR)- Surgeon;  Surgeon: Adebayo Guzman MD;  Location: [facility] CATH;  Service: Peripheral Vascular;;       Review of patient's allergies indicates:   Allergen Reactions    Cefuroxime     Hydrocodone     Meperidine     Meperidine hcl Nausea And Vomiting    Persantine [dipyridamole]     Nitrofurantoin macrocrystal      Headache , went into Afib     Vicodin [hydrocodone-acetaminophen] Nausea And Vomiting       No current facility-administered medications on file prior to encounter.     Current Outpatient Medications on File Prior to Encounter   Medication Sig    amiodarone (PACERONE) 200 MG Tab Take 1 tablet (200 mg total) by mouth once daily.    apixaban (ELIQUIS) 2.5 mg Tab Take 1 tablet (2.5 mg total) by mouth 2 (two) times daily.    docusate sodium (COLACE) 100 MG capsule Take 1 capsule (100 mg total) by mouth 2 (two) times daily.    furosemide (LASIX) 20 MG tablet Take 1 tablet  (20 mg total) by mouth daily as needed. Take for swelling or wt gain > 2 lbs / 24 hr    iron ag,[id number] (NIFEREX, SUMALATE-QUATREFOLIC,) 150 mg iron- 60 mg-1 mg Tab Take 1 tablet by mouth once daily.    magnesium oxide (MAG-OX) 400 mg (241.3 mg magnesium) tablet TAKE 1 TABLET BY MOUTH ONCE DAILY (Patient taking differently: Take 400 mg by mouth once daily.)    MIRALAX 17 gram PwPk Take 17 g by mouth 2 (two) times daily.    potassium chloride (KLOR-CON) 8 MEQ TbSR Take 1 tablet (8 mEq total) by mouth daily as needed. Take along with Lasix    propranoloL (INDERAL) 10 MG tablet Take 1 tablet (10 mg total) by mouth 2 (two) times daily.    iron-vitamin C 100-250 mg, ICAR-C, (ICAR-C) 100-250 mg Tab Take 1 tablet by mouth once daily. (Patient not taking: Reported on 12/11/2023)     Family History       Problem Relation (Age of Onset)    Cancer Mother, Father          Tobacco Use    Smoking status: Former     Types: Cigarettes     Passive exposure: Past    Smokeless tobacco: Never   Substance and Sexual Activity    Alcohol use: Not Currently    Drug use: Not Currently    Sexual activity: Not on file     Review of Systems   Constitutional:  Negative for chills, fatigue and fever.   Respiratory:  Negative for cough and shortness of breath.    Cardiovascular:  Negative for chest pain.   Gastrointestinal:  Negative for abdominal pain, diarrhea, nausea and vomiting.   Musculoskeletal:  Positive for arthralgias and gait problem.   Neurological:  Positive for weakness and light-headedness. Negative for dizziness.   All other systems reviewed and are negative.    Objective:     Vital Signs (Most Recent):  Temp: 98.2 °F (36.8 °C) (12/11/23 1519)  Pulse: 66 (12/11/23 1519)  Resp: 16 (12/11/23 1519)  BP: (!) 151/65 (12/11/23 1519)  SpO2: (!) 92 % (12/11/23 1519) Vital Signs (24h Range):  Temp:  [97.8 °F (36.6 °C)-98.2 °F (36.8 °C)] 98.2 °F (36.8 °C)  Pulse:  [61-68] 66  Resp:  [16-20] 16  SpO2:  [92 %-97 %] 92 %  BP:  (151-188)/(65-82) 151/65     Weight: 72.1 kg (159 lb)  Body mass index is 25.66 kg/m².     Physical Exam  Vitals and nursing note reviewed.   Constitutional:       General: She is not in acute distress.     Appearance: Normal appearance. She is well-developed. She is not ill-appearing or diaphoretic.   HENT:      Head: Normocephalic and atraumatic.      Right Ear: External ear normal.      Left Ear: External ear normal.      Nose: Nose normal. No congestion or rhinorrhea.      Mouth/Throat:      Mouth: Mucous membranes are moist.      Pharynx: Oropharynx is clear. No oropharyngeal exudate or posterior oropharyngeal erythema.   Eyes:      General: No scleral icterus.     Conjunctiva/sclera: Conjunctivae normal.      Pupils: Pupils are equal, round, and reactive to light.   Neck:      Vascular: No JVD.   Cardiovascular:      Rate and Rhythm: Normal rate and regular rhythm.      Pulses: Normal pulses.      Heart sounds: Normal heart sounds. No murmur heard.  Pulmonary:      Effort: Pulmonary effort is normal. No respiratory distress.      Breath sounds: Normal breath sounds. No stridor. No wheezing, rhonchi or rales.   Abdominal:      General: Bowel sounds are normal. There is no distension.      Palpations: Abdomen is soft.      Tenderness: There is no abdominal tenderness.   Musculoskeletal:         General: No swelling or tenderness.      Cervical back: Normal range of motion and neck supple.      Comments: Right lower ext with splint intact, cap refill <3 sec   Skin:     General: Skin is warm and dry.      Capillary Refill: Capillary refill takes 2 to 3 seconds.      Coloration: Skin is not jaundiced or pale.      Findings: No erythema.      Comments: Left CW pacemaker site CDI, no erythema or drainage, surgical glue intact and beginning to fall off   Neurological:      General: No focal deficit present.      Mental Status: She is alert and oriented to person, place, and time.      Cranial Nerves: No cranial nerve  deficit.      Sensory: No sensory deficit.   Psychiatric:         Mood and Affect: Mood normal.         Behavior: Behavior normal.         Thought Content: Thought content normal.          CRANIAL NERVES     CN III, IV, VI   Pupils are equal, round, and reactive to light.     Significant Labs: All pertinent labs within the past 24 hours have been reviewed.  CBC:   Recent Labs   Lab 12/11/23  0955   WBC 7.21   HGB 10.5*   HCT 32.8*   *     CMP:   Recent Labs   Lab 12/11/23  0955      K 4.2      CO2 17*   GLU 85   BUN 25*   CREATININE 1.8*   CALCIUM 9.7   PROT 6.4   ALBUMIN 3.7   BILITOT 0.8   ALKPHOS 92   AST 28   ALT 25   ANIONGAP 12     Troponin:   Recent Labs   Lab 12/11/23  1541 12/11/23  1858   TROPONINI 0.014 0.024       Significant Imaging: I have reviewed all pertinent imaging results/findings within the past 24 hours.

## 2023-12-12 NOTE — NURSING
Nurses Note -- 4 Eyes      12/12/2023   6:56 AM      Skin assessed during: Admit      [] No Altered Skin Integrity Present    []Prevention Measures Documented      [x] Yes- Altered Skin Integrity Present or Discovered   [] LDA Added if Not in Epic (Describe Wound)   [] New Altered Skin Integrity was Present on Admit and Documented in LDA   [] Wound Image Taken    Wound Care Consulted? No    Attending Nurse:  Elder Morales RN/Staff Member:  Reva Padron RN

## 2023-12-12 NOTE — ASSESSMENT & PLAN NOTE
Recent TAVR on 11/1 by Dr. Moncada  Pt developed complete heart block post procedure and underwent pacemaker placement on 11/2  Cont eliquis

## 2023-12-12 NOTE — HPI
Ms. العراقي is an 83 yr old female with Pmhx of  atrial fibrillation, CKD, HTN, 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 presenting today for lightheadedness, weakness and presyncope. Pt reports yesterday she was experiencing fatigue, lightheadedness and generalized weakness and she states symptoms were mild. This morning when she woke up her symptoms persisted and became severe. Pt states she was in the kitchen making breakfast and felt severe lightheadedness and felt like she was going to pass out so she eased her self down on the ground and crawled to the phone to call her daughter. Pt denies cp, sob, n/v or diaphoresis. Pt reports when EMS arrived her BP was in the 180's systolic. Pt experienced trauma to her right ankle with the fall. Xray confirmed displaced lateral malleolar fracture and splint was applied in ED. Pt will be admitted to hospital med services for further workup and management.

## 2023-12-12 NOTE — ASSESSMENT & PLAN NOTE
Chronic, controlled. Latest blood pressure and vitals reviewed-     Temp:  [97.6 °F (36.4 °C)-98.2 °F (36.8 °C)]   Pulse:  [61-70]   Resp:  [16-20]   BP: (124-188)/(57-82)   SpO2:  [92 %-97 %] .   Home meds for hypertension were reviewed and noted below.   Hypertension Medications               furosemide (LASIX) 20 MG tablet Take 1 tablet (20 mg total) by mouth daily as needed. Take for swelling or wt gain > 2 lbs / 24 hr    propranoloL (INDERAL) 10 MG tablet Take 1 tablet (10 mg total) by mouth 2 (two) times daily.            While in the hospital, will manage blood pressure as follows; Continue home antihypertensive regimen    Will utilize p.r.n. blood pressure medication only if patient's blood pressure greater than 180/110 and she develops symptoms such as worsening chest pain or shortness of breath.

## 2023-12-12 NOTE — ASSESSMENT & PLAN NOTE
Patient with Long standing persistent (>12 months) atrial fibrillation which is controlled currently with Amiodarone. Patient is currently in sinus rhythm paced rhythm.[id number] Score: 3. Anticoagulation indicated. Anticoagulation done with eliquis .

## 2023-12-12 NOTE — SUBJECTIVE & OBJECTIVE
"Interval History: Notes reviewed, no acute events overnight. Eval by ortho this AM who rec transfer to [facility] for surgical repair of right ankle tomorrow. Ortho aware pt will need cardiac clearance prior to surgery. Pt c/o pain to her right ankle today and received oxy 5mg. Pt feels like the pain med is too strong and she is reporting feeling "woozy" now after med admin. She states her ankle pain has improved. She reports feeling lightheaded and weak this morning and states symptoms are similar to what she was feeling when she arrived to ED. She denies cp or sob. Will await echo and cards consult and recs. I spoke with hosp med team at [facility] and pt accepted by Dr. Jones. Pt aggreeable for transfer.     Review of Systems   Constitutional:  Negative for chills, fatigue and fever.   Respiratory:  Negative for cough and shortness of breath.    Cardiovascular:  Negative for chest pain.   Gastrointestinal:  Negative for abdominal pain, diarrhea, nausea and vomiting.   Musculoskeletal:  Positive for arthralgias and gait problem.   Neurological:  Positive for weakness and light-headedness. Negative for dizziness.   All other systems reviewed and are negative.    Objective:     Vital Signs (Most Recent):  Temp: 97.1 °F (36.2 °C) (12/12/23 1146)  Pulse: 64 (12/12/23 1146)  Resp: 16 (12/12/23 1146)  BP: (!) 118/58 (12/12/23 1146)  SpO2: (!) 92 % (12/12/23 1146) Vital Signs (24h Range):  Temp:  [97.1 °F (36.2 °C)-98.2 °F (36.8 °C)] 97.1 °F (36.2 °C)  Pulse:  [61-70] 64  Resp:  [16-20] 16  SpO2:  [92 %-96 %] 92 %  BP: (118-173)/(57-72) 118/58     Weight: 72.1 kg (159 lb)  Body mass index is 25.66 kg/m².    Intake/Output Summary (Last 24 hours) at 12/12/2023 1246  Last data filed at 12/12/2023 0800  Gross per 24 hour   Intake 960 ml   Output 575 ml   Net 385 ml         Physical Exam  Vitals and nursing note reviewed.   Constitutional:       General: She is not in acute distress.     Appearance: Normal appearance. She is " well-developed. She is not ill-appearing or diaphoretic.   HENT:      Head: Normocephalic and atraumatic.      Right Ear: External ear normal.      Left Ear: External ear normal.      Nose: Nose normal. No congestion or rhinorrhea.      Mouth/Throat:      Mouth: Mucous membranes are moist.      Pharynx: Oropharynx is clear. No oropharyngeal exudate or posterior oropharyngeal erythema.   Eyes:      General: No scleral icterus.     Conjunctiva/sclera: Conjunctivae normal.      Pupils: Pupils are equal, round, and reactive to light.   Neck:      Vascular: No JVD.   Cardiovascular:      Rate and Rhythm: Normal rate and regular rhythm.      Pulses: Normal pulses.      Heart sounds: Normal heart sounds. No murmur heard.  Pulmonary:      Effort: Pulmonary effort is normal. No respiratory distress.      Breath sounds: Normal breath sounds. No stridor. No wheezing, rhonchi or rales.   Abdominal:      General: Bowel sounds are normal. There is no distension.      Palpations: Abdomen is soft.      Tenderness: There is no abdominal tenderness.   Musculoskeletal:         General: No swelling or tenderness.      Cervical back: Normal range of motion and neck supple.      Comments: Right lower ext with splint intact, cap refill <3 sec   Skin:     General: Skin is warm and dry.      Capillary Refill: Capillary refill takes 2 to 3 seconds.      Coloration: Skin is not jaundiced or pale.      Findings: No erythema.      Comments: Left CW pacemaker site CDI, no erythema or drainage, surgical glue intact and beginning to fall off   Neurological:      General: No focal deficit present.      Mental Status: She is alert and oriented to person, place, and time.      Cranial Nerves: No cranial nerve deficit.      Sensory: No sensory deficit.   Psychiatric:         Mood and Affect: Mood normal.         Behavior: Behavior normal.         Thought Content: Thought content normal.             Significant Labs: All pertinent labs within the past  24 hours have been reviewed.  CBC:   Recent Labs   Lab 12/11/23  0955 12/12/23  0509   WBC 7.21 5.82   HGB 10.5* 9.1*   HCT 32.8* 27.5*   * 92*     CMP:   Recent Labs   Lab 12/11/23  0955 12/12/23  0508    137   K 4.2 4.4    108   CO2 17* 22*   GLU 85 84   BUN 25* 26*   CREATININE 1.8* 1.6*   CALCIUM 9.7 8.8   PROT 6.4 5.4*   ALBUMIN 3.7 3.1*   BILITOT 0.8 0.7   ALKPHOS 92 84   AST 28 25   ALT 25 23   ANIONGAP 12 7*       Significant Imaging: I have reviewed all pertinent imaging results/findings within the past 24 hours.

## 2023-12-12 NOTE — SUBJECTIVE & OBJECTIVE
Past Medical History:   Diagnosis Date    Anemia, unspecified     Atrial fibrillation 01/25/2021    CKD (chronic kidney disease)     Hypertension        Past Surgical History:   Procedure Laterality Date    A-V CARDIAC PACEMAKER INSERTION  11/2/2023    Procedure: Dual Chamber PPM RM 2617 ([employer]tronic);  Surgeon: Andreas James III, MD;  Location: [facility] CATH;  Service: Cardiology;;    ANGIOGRAM, CORONARY, WITH LEFT HEART CATHETERIZATION Left 4/19/2023    Procedure: Angiogram, Coronary, with Left Heart Cath;  Surgeon: Kevin Redmond MD;  Location: [facility] CATH/EP LAB;  Service: Cardiology;  Laterality: Left;    BREAST SURGERY      COLONOSCOPY N/A 4/16/2023    Procedure: COLONOSCOPY;  Surgeon: Kvng Mensah MD;  Location: [facility] ENDO;  Service: Endoscopy;  Laterality: N/A;    COLONOSCOPY W/ POLYPECTOMY  04/16/2023    TRANSCATHETER AORTIC VALVE REPLACEMENT (TAVR)  11/1/2023    Procedure: (TAVR);  Surgeon: Juliano Moncada MD;  Location: [facility] CATH;  Service: Cardiology;;    TRANSCATHETER AORTIC VALVE REPLACEMENT (TAVR)  11/1/2023    Procedure: (TAVR)- Surgeon;  Surgeon: Adebayo Guzman MD;  Location: [facility] CATH;  Service: Peripheral Vascular;;       Review of patient's allergies indicates:   Allergen Reactions    Cefuroxime     Hydrocodone     Meperidine     Meperidine hcl Nausea And Vomiting    Persantine [dipyridamole]     Nitrofurantoin macrocrystal      Headache , went into Afib     Vicodin [hydrocodone-acetaminophen] Nausea And Vomiting       Current Facility-Administered Medications   Medication    acetaminophen tablet 650 mg    amiodarone tablet 200 mg    dextrose 10% bolus 125 mL 125 mL    dextrose 10% bolus 250 mL 250 mL    docusate sodium capsule 100 mg    glucagon (human recombinant) injection 1 mg    glucose chewable tablet 16 g    glucose chewable tablet 24 g    magnesium oxide tablet 400 mg    magnesium oxide tablet 800 mg    magnesium oxide tablet 800 mg    naloxone 0.4 mg/mL injection 0.02 mg     "ondansetron injection 4 mg    ondansetron injection 4 mg    oxyCODONE immediate release tablet 10 mg    oxyCODONE immediate release tablet 5 mg    potassium bicarbonate disintegrating tablet 35 mEq    potassium bicarbonate disintegrating tablet 50 mEq    potassium bicarbonate disintegrating tablet 60 mEq    potassium, sodium phosphates 280-160-250 mg packet 2 packet    potassium, sodium phosphates 280-160-250 mg packet 2 packet    potassium, sodium phosphates 280-160-250 mg packet 2 packet    propranoloL tablet 10 mg    scopolamine 1.3-1.5 mg (1 mg over 3 days) 1 patch    sodium chloride 0.9% flush 10 mL     Family History       Problem Relation (Age of Onset)    Cancer Mother, Father          Tobacco Use    Smoking status: Former     Types: Cigarettes     Passive exposure: Past    Smokeless tobacco: Never   Substance and Sexual Activity    Alcohol use: Not Currently    Drug use: Not Currently    Sexual activity: Not on file     Review of Systems   Unable to perform ROS    Objective:     Vital Signs (Most Recent):  Temp: 98.1 °F (36.7 °C) (12/12/23 0416)  Pulse: 66 (12/12/23 0416)  Resp: 18 (12/12/23 0416)  BP: 125/60 (12/12/23 0416)  SpO2: (!) 94 % (12/12/23 0416) Vital Signs (24h Range):  Temp:  [97.6 °F (36.4 °C)-98.2 °F (36.8 °C)] 98.1 °F (36.7 °C)  Pulse:  [61-70] 66  Resp:  [16-20] 18  SpO2:  [92 %-97 %] 94 %  BP: (120-188)/(57-82) 125/60     Weight: 72 kg (158 lb 11.7 oz)  Height: 5' 6" (167.6 cm)  Body mass index is 25.62 kg/m².      Intake/Output Summary (Last 24 hours) at 12/12/2023 0811  Last data filed at 12/12/2023 0605  Gross per 24 hour   Intake 970 ml   Output 575 ml   Net 395 ml        General    Nursing note and vitals reviewed.  Constitutional: She is oriented to person, place, and time. She appears well-developed and well-nourished.   Pulmonary/Chest: Effort normal.   Neurological: She is alert and oriented to person, place, and time.   Psychiatric: She has a normal mood and affect. Her behavior " is normal.         Right Ankle/Foot Exam     Comments:  RLE DNVI. Splint in place (did not remove). Elevated on pillow.           Significant Labs: CBC:   Recent Labs   Lab 12/11/23  0955 12/12/23  0509   WBC 7.21 5.82   HGB 10.5* 9.1*   HCT 32.8* 27.5*   * 92*     CMP:   Recent Labs   Lab 12/11/23  0955 12/12/23  0508    137   K 4.2 4.4    108   CO2 17* 22*   GLU 85 84   BUN 25* 26*   CREATININE 1.8* 1.6*   CALCIUM 9.7 8.8   PROT 6.4 5.4*   ALBUMIN 3.7 3.1*   BILITOT 0.8 0.7   ALKPHOS 92 84   AST 28 25   ALT 25 23   ANIONGAP 12 7*     All pertinent labs within the past 24 hours have been reviewed.    Significant Imaging: R ankle X-rays: There is a transverse, displaced lateral malleolar fracture.  There is adjacent soft tissue swelling.    R Tib/ Fib X-rays::  There is a displaced lateral malleolar fracture with adjacent soft tissue swelling.  In addition there is a suspected nondisplaced medial malleolar fracture.  CT should be obtained for confirmation.  This report was flagged in Epic as abnormal.

## 2023-12-13 ENCOUNTER — ANESTHESIA EVENT (OUTPATIENT)
Dept: SURGERY | Facility: HOSPITAL | Age: 83
DRG: 493 | End: 2023-12-13
Payer: MEDICARE

## 2023-12-13 ENCOUNTER — ANESTHESIA (OUTPATIENT)
Dept: SURGERY | Facility: HOSPITAL | Age: 83
DRG: 493 | End: 2023-12-13
Payer: MEDICARE

## 2023-12-13 LAB
ALBUMIN SERPL BCP-MCNC: 3.3 G/DL (ref 3.5–5.2)
ALP SERPL-CCNC: 87 U/L (ref 55–135)
ALT SERPL W/O P-5'-P-CCNC: 21 U/L (ref 10–44)
ANION GAP SERPL CALC-SCNC: 6 MMOL/L (ref 8–16)
AST SERPL-CCNC: 24 U/L (ref 10–40)
BACTERIA UR CULT: ABNORMAL
BASOPHILS # BLD AUTO: 0.02 K/UL (ref 0–0.2)
BASOPHILS NFR BLD: 0.4 % (ref 0–1.9)
BILIRUB SERPL-MCNC: 0.6 MG/DL (ref 0.1–1)
BUN SERPL-MCNC: 25 MG/DL (ref 8–23)
CALCIUM SERPL-MCNC: 8.9 MG/DL (ref 8.7–10.5)
CHLORIDE SERPL-SCNC: 106 MMOL/L (ref 95–110)
CO2 SERPL-SCNC: 25 MMOL/L (ref 23–29)
CREAT SERPL-MCNC: 1.6 MG/DL (ref 0.5–1.4)
DIFFERENTIAL METHOD: ABNORMAL
EOSINOPHIL # BLD AUTO: 0.1 K/UL (ref 0–0.5)
EOSINOPHIL NFR BLD: 2.7 % (ref 0–8)
ERYTHROCYTE [DISTWIDTH] IN BLOOD BY AUTOMATED COUNT: 13.1 % (ref 11.5–14.5)
EST. GFR  (NO RACE VARIABLE): 31.8 ML/MIN/1.73 M^2
GLUCOSE SERPL-MCNC: 88 MG/DL (ref 70–110)
HCT VFR BLD AUTO: 27.2 % (ref 37–48.5)
HGB BLD-MCNC: 9 G/DL (ref 12–16)
IMM GRANULOCYTES # BLD AUTO: 0.03 K/UL (ref 0–0.04)
IMM GRANULOCYTES NFR BLD AUTO: 0.6 % (ref 0–0.5)
LYMPHOCYTES # BLD AUTO: 0.7 K/UL (ref 1–4.8)
LYMPHOCYTES NFR BLD: 14.5 % (ref 18–48)
MAGNESIUM SERPL-MCNC: 1.9 MG/DL (ref 1.6–2.6)
MCH RBC QN AUTO: 31 PG (ref 27–31)
MCHC RBC AUTO-ENTMCNC: 33.1 G/DL (ref 32–36)
MCV RBC AUTO: 94 FL (ref 82–98)
MONOCYTES # BLD AUTO: 0.9 K/UL (ref 0.3–1)
MONOCYTES NFR BLD: 16.9 % (ref 4–15)
NEUTROPHILS # BLD AUTO: 3.3 K/UL (ref 1.8–7.7)
NEUTROPHILS NFR BLD: 64.9 % (ref 38–73)
NRBC BLD-RTO: 0 /100 WBC
PLATELET # BLD AUTO: 87 K/UL (ref 150–450)
PMV BLD AUTO: 10.9 FL (ref 9.2–12.9)
POTASSIUM SERPL-SCNC: 4.1 MMOL/L (ref 3.5–5.1)
PROT SERPL-MCNC: 5.4 G/DL (ref 6–8.4)
RBC # BLD AUTO: 2.9 M/UL (ref 4–5.4)
SODIUM SERPL-SCNC: 137 MMOL/L (ref 136–145)
WBC # BLD AUTO: 5.1 K/UL (ref 3.9–12.7)

## 2023-12-13 PROCEDURE — 37000008 HC ANESTHESIA 1ST 15 MINUTES: Performed by: ORTHOPAEDIC SURGERY

## 2023-12-13 PROCEDURE — 63600175 PHARM REV CODE 636 W HCPCS: Performed by: STUDENT IN AN ORGANIZED HEALTH CARE EDUCATION/TRAINING PROGRAM

## 2023-12-13 PROCEDURE — 71000033 HC RECOVERY, INTIAL HOUR: Performed by: ORTHOPAEDIC SURGERY

## 2023-12-13 PROCEDURE — 71000039 HC RECOVERY, EACH ADD'L HOUR: Performed by: ORTHOPAEDIC SURGERY

## 2023-12-13 PROCEDURE — 25000003 PHARM REV CODE 250: Performed by: NURSE PRACTITIONER

## 2023-12-13 PROCEDURE — 85025 COMPLETE CBC W/AUTO DIFF WBC: CPT | Performed by: NURSE PRACTITIONER

## 2023-12-13 PROCEDURE — 27201423 OPTIME MED/SURG SUP & DEVICES STERILE SUPPLY: Performed by: ORTHOPAEDIC SURGERY

## 2023-12-13 PROCEDURE — 21400001 HC TELEMETRY ROOM

## 2023-12-13 PROCEDURE — 37000009 HC ANESTHESIA EA ADD 15 MINS: Performed by: ORTHOPAEDIC SURGERY

## 2023-12-13 PROCEDURE — 25000003 PHARM REV CODE 250: Performed by: STUDENT IN AN ORGANIZED HEALTH CARE EDUCATION/TRAINING PROGRAM

## 2023-12-13 PROCEDURE — D9220A PRA ANESTHESIA: ICD-10-PCS | Mod: CRNA,,, | Performed by: STUDENT IN AN ORGANIZED HEALTH CARE EDUCATION/TRAINING PROGRAM

## 2023-12-13 PROCEDURE — C1713 ANCHOR/SCREW BN/BN,TIS/BN: HCPCS | Performed by: ORTHOPAEDIC SURGERY

## 2023-12-13 PROCEDURE — 83735 ASSAY OF MAGNESIUM: CPT | Performed by: NURSE PRACTITIONER

## 2023-12-13 PROCEDURE — 63600175 PHARM REV CODE 636 W HCPCS: Performed by: NURSE PRACTITIONER

## 2023-12-13 PROCEDURE — 80053 COMPREHEN METABOLIC PANEL: CPT | Performed by: NURSE PRACTITIONER

## 2023-12-13 PROCEDURE — 36000709 HC OR TIME LEV III EA ADD 15 MIN: Performed by: ORTHOPAEDIC SURGERY

## 2023-12-13 PROCEDURE — 36415 COLL VENOUS BLD VENIPUNCTURE: CPT | Performed by: NURSE PRACTITIONER

## 2023-12-13 PROCEDURE — 27822 TREATMENT OF ANKLE FRACTURE: CPT | Mod: RT,,, | Performed by: ORTHOPAEDIC SURGERY

## 2023-12-13 PROCEDURE — 36000708 HC OR TIME LEV III 1ST 15 MIN: Performed by: ORTHOPAEDIC SURGERY

## 2023-12-13 PROCEDURE — D9220A PRA ANESTHESIA: ICD-10-PCS | Mod: ANES,,, | Performed by: ANESTHESIOLOGY

## 2023-12-13 PROCEDURE — D9220A PRA ANESTHESIA: Mod: CRNA,,, | Performed by: STUDENT IN AN ORGANIZED HEALTH CARE EDUCATION/TRAINING PROGRAM

## 2023-12-13 PROCEDURE — 94761 N-INVAS EAR/PLS OXIMETRY MLT: CPT

## 2023-12-13 PROCEDURE — D9220A PRA ANESTHESIA: Mod: ANES,,, | Performed by: ANESTHESIOLOGY

## 2023-12-13 PROCEDURE — 27822 PR OPEN TX TRIMALLEOLAR ANKLE FX W/O FIX PST LIP: ICD-10-PCS | Mod: RT,,, | Performed by: ORTHOPAEDIC SURGERY

## 2023-12-13 PROCEDURE — 27000221 HC OXYGEN, UP TO 24 HOURS

## 2023-12-13 DEVICE — IMPLANTABLE DEVICE: Type: IMPLANTABLE DEVICE | Site: ANKLE | Status: FUNCTIONAL

## 2023-12-13 RX ORDER — OXYCODONE HYDROCHLORIDE 5 MG/1
5 TABLET ORAL
Status: DISCONTINUED | OUTPATIENT
Start: 2023-12-13 | End: 2023-12-13 | Stop reason: HOSPADM

## 2023-12-13 RX ORDER — ONDANSETRON 2 MG/ML
INJECTION INTRAMUSCULAR; INTRAVENOUS
Status: DISCONTINUED | OUTPATIENT
Start: 2023-12-13 | End: 2023-12-13

## 2023-12-13 RX ORDER — SODIUM CHLORIDE, SODIUM LACTATE, POTASSIUM CHLORIDE, CALCIUM CHLORIDE 600; 310; 30; 20 MG/100ML; MG/100ML; MG/100ML; MG/100ML
INJECTION, SOLUTION INTRAVENOUS CONTINUOUS PRN
Status: DISCONTINUED | OUTPATIENT
Start: 2023-12-13 | End: 2023-12-13

## 2023-12-13 RX ORDER — FENTANYL CITRATE 50 UG/ML
25 INJECTION, SOLUTION INTRAMUSCULAR; INTRAVENOUS ONCE AS NEEDED
Status: DISCONTINUED | OUTPATIENT
Start: 2023-12-13 | End: 2023-12-14

## 2023-12-13 RX ORDER — SUCCINYLCHOLINE CHLORIDE 20 MG/ML
INJECTION INTRAMUSCULAR; INTRAVENOUS
Status: DISCONTINUED | OUTPATIENT
Start: 2023-12-13 | End: 2023-12-13

## 2023-12-13 RX ORDER — DEXAMETHASONE SODIUM PHOSPHATE 4 MG/ML
INJECTION, SOLUTION INTRA-ARTICULAR; INTRALESIONAL; INTRAMUSCULAR; INTRAVENOUS; SOFT TISSUE
Status: DISCONTINUED | OUTPATIENT
Start: 2023-12-13 | End: 2023-12-13

## 2023-12-13 RX ORDER — DIPHENHYDRAMINE HYDROCHLORIDE 50 MG/ML
12.5 INJECTION INTRAMUSCULAR; INTRAVENOUS
Status: DISCONTINUED | OUTPATIENT
Start: 2023-12-13 | End: 2023-12-13 | Stop reason: HOSPADM

## 2023-12-13 RX ORDER — PROPOFOL 10 MG/ML
VIAL (ML) INTRAVENOUS
Status: DISCONTINUED | OUTPATIENT
Start: 2023-12-13 | End: 2023-12-13

## 2023-12-13 RX ORDER — LIDOCAINE HYDROCHLORIDE 20 MG/ML
INJECTION, SOLUTION EPIDURAL; INFILTRATION; INTRACAUDAL; PERINEURAL
Status: DISCONTINUED | OUTPATIENT
Start: 2023-12-13 | End: 2023-12-13

## 2023-12-13 RX ORDER — CEFAZOLIN SODIUM 1 G/3ML
INJECTION, POWDER, FOR SOLUTION INTRAMUSCULAR; INTRAVENOUS
Status: DISCONTINUED | OUTPATIENT
Start: 2023-12-13 | End: 2023-12-13

## 2023-12-13 RX ORDER — HYDROMORPHONE HYDROCHLORIDE 1 MG/ML
0.2 INJECTION, SOLUTION INTRAMUSCULAR; INTRAVENOUS; SUBCUTANEOUS EVERY 5 MIN PRN
Status: DISCONTINUED | OUTPATIENT
Start: 2023-12-13 | End: 2023-12-13

## 2023-12-13 RX ORDER — ONDANSETRON 2 MG/ML
4 INJECTION INTRAMUSCULAR; INTRAVENOUS DAILY PRN
Status: DISCONTINUED | OUTPATIENT
Start: 2023-12-13 | End: 2023-12-13 | Stop reason: HOSPADM

## 2023-12-13 RX ORDER — FAMOTIDINE 10 MG/ML
INJECTION INTRAVENOUS
Status: DISCONTINUED | OUTPATIENT
Start: 2023-12-13 | End: 2023-12-13

## 2023-12-13 RX ORDER — FENTANYL CITRATE 50 UG/ML
INJECTION, SOLUTION INTRAMUSCULAR; INTRAVENOUS
Status: DISCONTINUED | OUTPATIENT
Start: 2023-12-13 | End: 2023-12-13

## 2023-12-13 RX ORDER — ACETAMINOPHEN 10 MG/ML
INJECTION, SOLUTION INTRAVENOUS
Status: DISCONTINUED | OUTPATIENT
Start: 2023-12-13 | End: 2023-12-13

## 2023-12-13 RX ORDER — ROCURONIUM BROMIDE 10 MG/ML
INJECTION, SOLUTION INTRAVENOUS
Status: DISCONTINUED | OUTPATIENT
Start: 2023-12-13 | End: 2023-12-13

## 2023-12-13 RX ADMIN — PROPRANOLOL HYDROCHLORIDE 10 MG: 10 TABLET ORAL at 08:12

## 2023-12-13 RX ADMIN — Medication 200 MG: at 10:12

## 2023-12-13 RX ADMIN — SUGAMMADEX 200 MG: 100 INJECTION, SOLUTION INTRAVENOUS at 11:12

## 2023-12-13 RX ADMIN — ACETAMINOPHEN 1000 MG: 10 INJECTION, SOLUTION INTRAVENOUS at 10:12

## 2023-12-13 RX ADMIN — AMIODARONE HYDROCHLORIDE 200 MG: 200 TABLET ORAL at 07:12

## 2023-12-13 RX ADMIN — ONDANSETRON 4 MG: 2 INJECTION INTRAMUSCULAR; INTRAVENOUS at 12:12

## 2023-12-13 RX ADMIN — DEXAMETHASONE SODIUM PHOSPHATE 4 MG: 4 INJECTION, SOLUTION INTRAMUSCULAR; INTRAVENOUS at 10:12

## 2023-12-13 RX ADMIN — FAMOTIDINE 20 MG: 10 INJECTION, SOLUTION INTRAVENOUS at 10:12

## 2023-12-13 RX ADMIN — OXYCODONE HYDROCHLORIDE 5 MG: 5 TABLET ORAL at 01:12

## 2023-12-13 RX ADMIN — SODIUM CHLORIDE, SODIUM LACTATE, POTASSIUM CHLORIDE, AND CALCIUM CHLORIDE: .6; .31; .03; .02 INJECTION, SOLUTION INTRAVENOUS at 10:12

## 2023-12-13 RX ADMIN — ROCURONIUM BROMIDE 25 MG: 10 INJECTION, SOLUTION INTRAVENOUS at 11:12

## 2023-12-13 RX ADMIN — CEFTRIAXONE SODIUM 1 G: 1 INJECTION, POWDER, FOR SOLUTION INTRAMUSCULAR; INTRAVENOUS at 02:12

## 2023-12-13 RX ADMIN — ROCURONIUM BROMIDE 5 MG: 10 INJECTION, SOLUTION INTRAVENOUS at 10:12

## 2023-12-13 RX ADMIN — FENTANYL CITRATE 50 MCG: 50 INJECTION, SOLUTION INTRAMUSCULAR; INTRAVENOUS at 10:12

## 2023-12-13 RX ADMIN — ONDANSETRON 4 MG: 2 INJECTION INTRAMUSCULAR; INTRAVENOUS at 10:12

## 2023-12-13 RX ADMIN — PROPOFOL 100 MG: 10 INJECTION, EMULSION INTRAVENOUS at 10:12

## 2023-12-13 RX ADMIN — LIDOCAINE HYDROCHLORIDE 40 MG: 20 INJECTION, SOLUTION INTRAVENOUS at 10:12

## 2023-12-13 RX ADMIN — DOCUSATE SODIUM 100 MG: 100 CAPSULE, LIQUID FILLED ORAL at 08:12

## 2023-12-13 RX ADMIN — CEFAZOLIN 2 G: 330 INJECTION, POWDER, FOR SOLUTION INTRAMUSCULAR; INTRAVENOUS at 10:12

## 2023-12-13 RX ADMIN — ONDANSETRON 4 MG: 2 INJECTION INTRAMUSCULAR; INTRAVENOUS at 02:12

## 2023-12-13 NOTE — ASSESSMENT & PLAN NOTE
Cardiology on board  Continue cardiac monitoring  PT/OT after surgery  Because of fracture- can't take orthostats  Echo pending

## 2023-12-13 NOTE — OP NOTE
[facility]  Surgery Department  Operative Note    SUMMARY     Date of Procedure: 12/13/2023     Procedure:  Open reduction internal fixation of right ankle trimalleolar fracture    Surgeon(s) and Role:     * Chaitanya Garrison MD - Primary    Assisting Surgeon:  Andrez    Pre-Operative Diagnosis: Closed bimalleolar fracture of right ankle, initial encounter [S82.841A]    Post-Operative Diagnosis: Post-Op Diagnosis Codes:     * Closed bimalleolar fracture of right ankle, initial encounter [S82.841A]    Anesthesia: General      Description of the Findings of the Procedure:  Patient was placed on the operative table in supine position.  The right lower extremity was prepped and draped in the usual sterile manner for surgery.  Incision was made from the lateral malleolar proximally 7 cm.  It was carried down sharply through the skin.  There was a large fracture hematoma which was irrigated free.  Her bone was extraordinarily soft but I was able to obtain a anatomic reduction.  At this point I placed a 7 hole plate over the fracture.  I secured proximally with a lag screw.  C-arm confirmed that the fracture was anatomically reduced.  I now filled the rest of the screw holes with locking screws.  At this point C-arm confirmed that the fracture was an anatomic position and the plate was in perfect position.  I now rigorously studied the medial malleolus.  It was quite stable.  It did not move at all even with rigorous motion.  I therefore felt that given her poor skin quality and very soft bone the best thing was to not surgically stabilize the medial malleolus.  I therefore copiously irrigated I closed the subcu with 2-0 Vicryl and the skin with 4-0 Monocryl.  Sterile dressings were applied and the patient was noted to be in stable condition pending neurovascular check    Complications: No    Estimated Blood Loss (EBL): * No values recorded between 12/13/2023 11:22 AM and 12/13/2023 11:40 AM *            Implants:   Implant Name Type Inv. Item Serial No.  Lot No. LRB No. Used Action   LOG [id number] - SYNTHES 3.0 CANNULATED SCREW SET - 1           LOG [id number] - SYNTHES 3.5 CANNULATED SCREW SET - 1           LOG [id number] - SYNTHES 4.0 CANNULATED SCREW SET - 1           LOG [id number] - SYNTHES SMALL FRAGMENT - 1               Specimens:   Specimen (24h ago, onward)      None                    Condition: Good    Disposition: PACU - hemodynamically stable.              Discharge Note    SUMMARY     Admit Date: 12/11/2023    Discharge Date and Time:  12/13/2023 11:40 AM    Hospital Course (synopsis of major diagnoses, care, treatment, and services provided during the course of the hospital stay): Uneventful      Final Diagnosis: Post-Op Diagnosis Codes:     * Closed bimalleolar fracture of right ankle, initial encounter [S82.841A]    Disposition: Home or Self Care    Follow Up/Patient Instructions:     Medications:  Reconciled Home Medications:   Current Discharge Medication List        CONTINUE these medications which have NOT CHANGED    Details   amiodarone (PACERONE) 200 MG Tab Take 1 tablet (200 mg total) by mouth once daily.  Qty: 90 tablet, Refills: 3      docusate sodium (COLACE) 100 MG capsule Take 1 capsule (100 mg total) by mouth 2 (two) times daily.  Qty: 60 capsule, Refills: 5      furosemide (LASIX) 20 MG tablet Take 1 tablet (20 mg total) by mouth daily as needed. Take for swelling or wt gain > 2 lbs / 24 hr  Qty: 15 tablet, Refills: 11      iron ag,ym-M-OA5-B12-Zn-sa-sto (NIFEREX, SUMALATE-QUATREFOLIC,) 150 mg iron- 60 mg-1 mg Tab Take 1 tablet by mouth once daily.  Qty: 90 tablet, Refills: 3      magnesium oxide (MAG-OX) 400 mg (241.3 mg magnesium) tablet TAKE 1 TABLET BY MOUTH ONCE DAILY  Qty: 90 tablet, Refills: 3    Associated Diagnoses: Paroxysmal atrial fibrillation      MIRALAX 17 gram PwPk Take 17 g by mouth 2 (two) times daily.      potassium chloride (KLOR-CON) 8 MEQ TbSR Take 1 tablet  (8 mEq total) by mouth daily as needed. Take along with Lasix  Qty: 15 tablet, Refills: 11      propranoloL (INDERAL) 10 MG tablet Take 1 tablet (10 mg total) by mouth 2 (two) times daily.  Qty: 60 tablet, Refills: 11    Comments: .      iron-vitamin C 100-250 mg, ICAR-C, (ICAR-C) 100-250 mg Tab Take 1 tablet by mouth once daily.  Qty: 30 tablet, Refills: 2           STOP taking these medications       apixaban (ELIQUIS) 2.5 mg Tab Comments:   Reason for Stopping:             No discharge procedures on file.

## 2023-12-13 NOTE — ANESTHESIA PREPROCEDURE EVALUATION
12/13/2023  Irene العراقي is a 83 y.o., female.      Results for orders placed or performed during the hospital encounter of 12/01/23   EKG 12-lead    Collection Time: 12/01/23  1:55 PM    Narrative    Test Reason :     Vent. Rate : 070 BPM     Atrial Rate : 070 BPM     P-R Int : 102 ms          QRS Dur : 182 ms      QT Int : 486 ms       P-R-T Axes : 073 022 063 degrees     QTc Int : 524 ms    AV sequential or dual chamber electronic pacemaker  When compared with ECG of 16-NOV-2023 15:52,  Premature ventricular complexes are no longer Present  Vent. rate has increased BY   2 BPM  Confirmed by PORTIA SHAH MD ([id number]) on 12/1/2023 1:57:22 PM    Referred By: CHELY RIVAS           Confirmed By:PORTIA SHAH MD        Imaging Results              X-Ray Humerus 2 View Right (Final result)  Result time 12/11/23 10:53:24      Final result by Lorie Mujica MD (12/11/23 10:53:24)                   Impression:      There are degenerative changes of the right shoulder without evidence acute bony injury of the right humerus.      Electronically signed by: Lorie Mujica MD  Date:    12/11/2023  Time:    10:53               Narrative:    EXAMINATION:  XR HUMERUS 2 VIEW RIGHT    CLINICAL HISTORY:  Unspecified fall, initial encounter    TECHNIQUE:  Two view    COMPARISON:  None    FINDINGS:  There is no evidence fracture or dislocation. There is osteopenia. There are degenerative changes of the right shoulder.                                        X-Ray Tibia Fibula 2 View Right (Final result)  Result time 12/11/23 10:26:06      Final result by Lorie Mujica MD (12/11/23 10:26:06)                   Impression:      There is a displaced lateral malleolar fracture with adjacent soft tissue swelling.  In addition there is a suspected nondisplaced medial malleolar fracture.  CT should be obtained for  confirmation.  This report was flagged in Epic as abnormal.      Electronically signed by: Lorie Mujica MD  Date:    12/11/2023  Time:    10:26               Narrative:    EXAMINATION:  XR TIBIA FIBULA 2 VIEW RIGHT    CLINICAL HISTORY:  Pain in right leg    TECHNIQUE:  AP and lateral views of the right tibia and fibula were performed.    COMPARISON:  None.    FINDINGS:  There is a displaced lateral malleolar fracture with adjacent soft tissue swelling.  There is osteopenia the patient is status post total right knee arthroplasty.  There is a faint lucency overlying the medial malleolus thought to represent nondisplaced medial malleolar fracture.                                       X-Ray Ankle Complete Right (Final result)  Result time 12/11/23 09:30:26      Final result by Lorie Mujica MD (12/11/23 09:30:26)                   Impression:      There is a transverse, displaced lateral malleolar fracture.  There is adjacent soft tissue swelling.      Electronically signed by: Lorie Mujica MD  Date:    12/11/2023  Time:    09:30               Narrative:    EXAMINATION:  XR ANKLE COMPLETE 3 VIEW RIGHT    CLINICAL HISTORY:  Unspecified fall, initial encounter    TECHNIQUE:  AP, lateral, and oblique images of the right ankle were performed.    COMPARISON:  None    FINDINGS:  There is a transverse, displaced fracture of the lateral malleolus.  There is adjacent soft tissue swelling.  No other fractures are identified.  There is osteopenia.                                       X-Ray Chest AP Portable (Final result)  Result time 12/11/23 09:29:26      Final result by Lorie Mujica MD (12/11/23 09:29:26)                   Impression:      Overall pulmonary aeration has improved from 11/05/2023.      Electronically signed by: Lorie Mujica MD  Date:    12/11/2023  Time:    09:29               Narrative:    EXAMINATION:  XR CHEST AP PORTABLE    CLINICAL HISTORY:  Syncope and collapse    TECHNIQUE:  Single  frontal view of the chest was performed.    COMPARISON:  11/05/2023    FINDINGS:  The patient has a left-sided pacer.  There is no pneumothorax or pleural effusion.  The cardiac silhouette is within normal limits.                                       Lab Results   Component Value Date    WBC 5.10 12/13/2023    HGB 9.0 (L) 12/13/2023    HCT 27.2 (L) 12/13/2023    MCV 94 12/13/2023    PLT 87 (L) 12/13/2023     BMP  Lab Results   Component Value Date     12/13/2023    K 4.1 12/13/2023     12/13/2023    CO2 25 12/13/2023    BUN 25 (H) 12/13/2023    CREATININE 1.6 (H) 12/13/2023    CALCIUM 8.9 12/13/2023    ANIONGAP 6 (L) 12/13/2023    GLU 88 12/13/2023    GLU 84 12/12/2023    GLU 85 12/11/2023       Results for orders placed during the hospital encounter of 07/23/22    Echo    Interpretation Summary  · There is moderate aortic valve stenosis.  · Aortic valve area is 1.49 cm2; peak velocity is m/s; mean gradient is 26 mmHg.  · Mild aortic regurgitation.  · Mild mitral regurgitation.  · The left ventricle is normal in size with concentric remodeling and normal systolic function.  · The estimated ejection fraction is 60%.  · Grade I left ventricular diastolic dysfunction.  · Normal right ventricular size with normal right ventricular systolic function.  · Mild left atrial enlargement.  · Mild tricuspid regurgitation.  · Intermediate central venous pressure (8 mmHg).  · The estimated PA systolic pressure is 34 mmHg.            Pre-op Assessment    I have reviewed the Patient Summary Reports.     I have reviewed the Nursing Notes. I have reviewed the NPO Status.   I have reviewed the Medications.     Review of Systems  Anesthesia Hx:  No problems with previous Anesthesia             Denies Family Hx of Anesthesia complications.   Personal Hx of Anesthesia complications, Post-Operative Nausea/Vomiting                    Social:  Non-Smoker, No Alcohol Use       Hematology/Oncology:    Oncology Normal    --  Anemia:               Hematology Comments: Thrombocytopenia     Eliquis                     EENT/Dental:  EENT/Dental Normal           Cardiovascular:    Pacemaker Hypertension, well controlled Valvular problems/Murmurs, AS   Dysrhythmias (Paroxysmal AFib.  Patient was in sinus, however this morning she went into AFib.) atrial fibrillation       ABDULLAHI  ECG has been reviewed. Moderate aortic valve stenosis.    [employer]                         Pulmonary:  Pneumonia (h/o Covid pneumonia)       HX of Acute respiratory failure with hypoxia and hypercarbia    No home oxygen therapy of inhaler use.               Renal/:  Chronic Renal Disease (stage 4), CKD                Hepatic/GI:  Hepatic/GI Normal                 Musculoskeletal:  Arthritis (right knee)   Right Ankle fracture.  Tremors.            Neurological:  Neurology Normal           Neuro Symptoms of vertigo                            Endocrine:  Endocrine Normal            Dermatological:  Skin Normal    Psych:   anxiety                 Physical Exam  General: Well nourished, Cooperative, Alert and Oriented    Airway:  Mallampati: II   Mouth Opening: Normal  TM Distance: > 6 cm  Tongue: Normal  Neck ROM: Normal ROM    Dental:  Dentures    Chest/Lungs:  Clear to auscultation, Normal Respiratory Rate    Heart:  Rate: Normal  Rhythm: Regular Rhythm        Anesthesia Plan  Type of Anesthesia, risks & benefits discussed:    Anesthesia Type: Gen ETT  Intra-op Monitoring Plan: Standard ASA Monitors  Post Op Pain Control Plan:   (medical reason for not using multimodal pain management)  Induction:  IV  Informed Consent: Informed consent signed with the Patient and all parties understand the risks and agree with anesthesia plan.  All questions answered.   ASA Score: 3  Anesthesia Plan Notes: Multimodal analgesia with ofirmev 1000mg, decadron 8 mg.  PONV prophylaxis with Pepcid 20 mg IV, and Zofran 4 mg IV.          Ready For Surgery From Anesthesia Perspective.      .

## 2023-12-13 NOTE — ASSESSMENT & PLAN NOTE
Splint applied in ED  Ortho consulted -surgical repair in AM  Fall precautions  Prn pain control

## 2023-12-13 NOTE — PLAN OF CARE
Problem: Adult Inpatient Plan of Care  Goal: Plan of Care Review  Outcome: Ongoing, Progressing  Goal: Patient-Specific Goal (Individualized)  Outcome: Ongoing, Progressing     Problem: Skin Injury Risk Increased  Goal: Skin Health and Integrity  Outcome: Ongoing, Progressing     Pt alert and oriented. Pain meds given as needed. Call light in reach.

## 2023-12-13 NOTE — ASSESSMENT & PLAN NOTE
Creatine stable for now. BMP reviewed- noted Estimated Creatinine Clearance: 24.9 mL/min (A) (based on SCr of 1.6 mg/dL (H)). according to latest data. 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  Monitor UOP and serial BMP and adjust therapy as needed. Renally dose meds. Avoid nephrotoxic medications and procedures.

## 2023-12-13 NOTE — ASSESSMENT & PLAN NOTE
Chronic, controlled. Latest blood pressure and vitals reviewed-     Temp:  [97.1 °F (36.2 °C)-98.2 °F (36.8 °C)]   Pulse:  [64-72]   Resp:  [16-18]   BP: (118-151)/(57-68)   SpO2:  [92 %-95 %] .   Home meds for hypertension were reviewed and noted below.   Hypertension Medications               furosemide (LASIX) 20 MG tablet Take 1 tablet (20 mg total) by mouth daily as needed. Take for swelling or wt gain > 2 lbs / 24 hr    propranoloL (INDERAL) 10 MG tablet Take 1 tablet (10 mg total) by mouth 2 (two) times daily.            While in the hospital, will manage blood pressure as follows; Continue home antihypertensive regimen    Will utilize p.r.n. blood pressure medication only if patient's blood pressure greater than 180/110 and she develops symptoms such as worsening chest pain or shortness of breath.

## 2023-12-13 NOTE — H&P
Formerly [facility], [facility] Medicine  History & Physical    Patient Name: Irene العراقي  MRN: [id number]  Patient Class: IP- Inpatient  Admission Date: 12/11/2023  Attending Physician: Jada Zepeda MD  Primary Care Provider: Wanda Kuhn FNP-C         Patient information was obtained from patient and ER records.     Subjective:     Principal Problem:Postural dizziness with presyncope    Chief Complaint:   Chief Complaint   Patient presents with    Fall    Ankle Injury     Rt. Ankle swelling    Dizziness     Blurred vision x 3 days         HPI: Ms. العراقي is an 83 yr old female with Pmhx of  atrial fibrillation, CKD, HTN, 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 presenting today for lightheadedness, weakness and presyncope. Pt reports yesterday she was experiencing fatigue, lightheadedness and generalized weakness and she states symptoms were mild. This morning when she woke up her symptoms persisted and became severe. Pt states she was in the kitchen making breakfast and felt severe lightheadedness and felt like she was going to pass out so she eased her self down on the ground and crawled to the phone to call her daughter. Pt denies cp, sob, n/v or diaphoresis. Pt reports when EMS arrived her BP was in the 180's systolic. Pt experienced trauma to her right ankle with the fall. Xray confirmed displaced lateral malleolar fracture and splint was applied in ED. Pt will be admitted to hospital med services for further workup and management.       Past Medical History:   Diagnosis Date    Anemia, unspecified     Atrial fibrillation 01/25/2021    CKD (chronic kidney disease)     Hypertension        Past Surgical History:   Procedure Laterality Date    A-V CARDIAC PACEMAKER INSERTION  11/2/2023    Procedure: Dual Chamber PPM RM 2617 ([employer]);  Surgeon: Andreas James III, MD;  Location: [facility] CATH;  Service: Cardiology;;    ANGIOGRAM, CORONARY, WITH LEFT HEART CATHETERIZATION Left  2023    Procedure: Angiogram, Coronary, with Left Heart Cath;  Surgeon: Kevin Redmond MD;  Location: [facility] CATH/EP LAB;  Service: Cardiology;  Laterality: Left;    BREAST SURGERY      COLONOSCOPY N/A 2023    Procedure: COLONOSCOPY;  Surgeon: Kvng Mensah MD;  Location: [facility] ENDO;  Service: Endoscopy;  Laterality: N/A;    COLONOSCOPY W/ POLYPECTOMY  2023    TRANSCATHETER AORTIC VALVE REPLACEMENT (TAVR)  2023    Procedure: (TAVR);  Surgeon: Juliano Moncada MD;  Location: [facility] CATH;  Service: Cardiology;;    TRANSCATHETER AORTIC VALVE REPLACEMENT (TAVR)  2023    Procedure: (TAVR)- Surgeon;  Surgeon: Adebayo Guzman MD;  Location: [facility] CATH;  Service: Peripheral Vascular;;       Review of patient's allergies indicates:   Allergen Reactions    Cefuroxime     Hydrocodone     Meperidine     Meperidine hcl Nausea And Vomiting    Persantine [dipyridamole]     Nitrofurantoin macrocrystal      Headache , went into Af[facility]     Vicodin [hydrocodone-acetaminophen] Nausea And Vomiting       No current facility-administered medications on file prior to encounter.     Current Outpatient Medications on File Prior to Encounter   Medication Sig    amiodarone (PACERONE) 200 MG Tab Take 1 tablet (200 mg total) by mouth once daily.    [] apixaban (ELIQUIS) 2.5 mg Tab Take 1 tablet (2.5 mg total) by mouth 2 (two) times daily.    docusate sodium (COLACE) 100 MG capsule Take 1 capsule (100 mg total) by mouth 2 (two) times daily.    furosemide (LASIX) 20 MG tablet Take 1 tablet (20 mg total) by mouth daily as needed. Take for swelling or wt gain > 2 lbs / 24 hr    iron ag,hg-B-MY2-B12-Zn-sa-sto (NIFEREX, SUMALATE-QUATREFOLIC,) 150 mg iron- 60 mg-1 mg Tab Take 1 tablet by mouth once daily.    magnesium oxide (MAG-OX) 400 mg (241.3 mg magnesium) tablet TAKE 1 TABLET BY MOUTH ONCE DAILY (Patient taking differently: Take 400 mg by mouth once daily.)    MIRALAX 17 gram PwPk Take 17 g by mouth 2 (two) times  daily.    potassium chloride (KLOR-CON) 8 MEQ TbSR Take 1 tablet (8 mEq total) by mouth daily as needed. Take along with Lasix    propranoloL (INDERAL) 10 MG tablet Take 1 tablet (10 mg total) by mouth 2 (two) times daily.    iron-vitamin C 100-250 mg, ICAR-C, (ICAR-C) 100-250 mg Tab Take 1 tablet by mouth once daily. (Patient not taking: Reported on 12/11/2023)     Family History       Problem Relation (Age of Onset)    Cancer Mother, Father          Tobacco Use    Smoking status: Former     Types: Cigarettes     Passive exposure: Past    Smokeless tobacco: Never   Substance and Sexual Activity    Alcohol use: Not Currently    Drug use: Not Currently    Sexual activity: Not on file     Review of Systems  Objective:     Vital Signs (Most Recent):  Temp: 98.2 °F (36.8 °C) (12/12/23 1930)  Pulse: 69 (12/12/23 2022)  Resp: 16 (12/12/23 1930)  BP: (!) 151/62 (12/12/23 2022)  SpO2: (!) 93 % (12/12/23 1930) Vital Signs (24h Range):  Temp:  [97.1 °F (36.2 °C)-98.2 °F (36.8 °C)] 98.2 °F (36.8 °C)  Pulse:  [64-72] 69  Resp:  [16-18] 16  SpO2:  [92 %-95 %] 93 %  BP: (118-151)/(57-68) 151/62     Weight: 70 kg (154 lb 5.2 oz)  Body mass index is 24.91 kg/m².     Physical Exam           Significant Labs: All pertinent labs within the past 24 hours have been reviewed.  BMP:   Recent Labs   Lab 12/12/23  0508   GLU 84      K 4.4      CO2 22*   BUN 26*   CREATININE 1.6*   CALCIUM 8.8   MG 2.0     CBC:   Recent Labs   Lab 12/11/23  0955 12/12/23  0509   WBC 7.21 5.82   HGB 10.5* 9.1*   HCT 32.8* 27.5*   * 92*     CMP:   Recent Labs   Lab 12/11/23  0955 12/12/23  0508    137   K 4.2 4.4    108   CO2 17* 22*   GLU 85 84   BUN 25* 26*   CREATININE 1.8* 1.6*   CALCIUM 9.7 8.8   PROT 6.4 5.4*   ALBUMIN 3.7 3.1*   BILITOT 0.8 0.7   ALKPHOS 92 84   AST 28 25   ALT 25 23   ANIONGAP 12 7*     Magnesium:   Recent Labs   Lab 12/12/23  0508   MG 2.0       Significant Imaging: I have reviewed all pertinent imaging  results/findings within the past 24 hours.  Assessment/Plan:     * Postural dizziness with presyncope  Unclear etiology- Stroke? Parkinson's disease?   Obtain echo  Cardiology consulted  Troponin trended and negative  Telemetry monitoring  CT head to r/o CVA    Because of fracture- can't take orthostats- pt though looks dehyrated   Neurology should be consulted to r/o posterior stroke        UTI (urinary tract infection)  Cx growing Likely Ecoli-   C/w Rocephin     Probably the cause for her presyncope?      Closed fracture of right ankle  Splint applied in ED  Ortho consulted -surgical repair in AM  Fall precautions  Prn pain control      S/P TAVR (transcatheter aortic valve replacement)  Recent TAVR on 11/1 by Dr. Moncada  Pt developed complete heart block post procedure and underwent pacemaker placement on 11/2  Hold eliquis for surgery    Stage 4 chronic kidney disease  Creatine stable for now. BMP reviewed- noted Estimated Creatinine Clearance: 24.9 mL/min (A) (based on SCr of 1.6 mg/dL (H)). according to latest data. 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  Monitor UOP and serial BMP and adjust therapy as needed. Renally dose meds. Avoid nephrotoxic medications and procedures.    Hypertension  Chronic, controlled. Latest blood pressure and vitals reviewed-     Temp:  [97.1 °F (36.2 °C)-98.2 °F (36.8 °C)]   Pulse:  [64-72]   Resp:  [16-18]   BP: (118-151)/(57-68)   SpO2:  [92 %-95 %] .   Home meds for hypertension were reviewed and noted below.   Hypertension Medications               furosemide (LASIX) 20 MG tablet Take 1 tablet (20 mg total) by mouth daily as needed. Take for swelling or wt gain > 2 lbs / 24 hr    propranoloL (INDERAL) 10 MG tablet Take 1 tablet (10 mg total) by mouth 2 (two) times daily.            While in the hospital, will manage blood pressure as follows; Continue home antihypertensive regimen    Will utilize p.r.n. blood pressure medication only if patient's blood pressure  greater than 180/110 and she develops symptoms such as worsening chest pain or shortness of breath.    PAF (paroxysmal atrial fibrillation)  Patient with Long standing persistent (>12 months) atrial fibrillation which is controlled currently with Amiodarone. Patient is currently in sinus rhythm paced rhythm.[id number] Score: 3. Anticoagulation indicated. Anticoagulation done with eliquis .      VTE Risk Mitigation (From admission, onward)           Ordered     IP VTE HIGH RISK PATIENT  Once         12/11/23 1524     Place sequential compression device  Until discontinued         12/11/23 1524     Reason for No Pharmacological VTE Prophylaxis  Once        Question:  Reasons:  Answer:  Already adequately anticoagulated on oral Anticoagulants    12/11/23 1524                               Echocardiogram completed.  Dr. Flynn to interpret.       Jada Zepeda MD  Department of Hospital Medicine  [facility]

## 2023-12-13 NOTE — ASSESSMENT & PLAN NOTE
Patient for surgery this morning  Eliquis currently being held  Fall precautions  Prn pain control

## 2023-12-13 NOTE — ASSESSMENT & PLAN NOTE
Chronic, controlled. Latest blood pressure and vitals reviewed-     Temp:  [97.1 °F (36.2 °C)-98.6 °F (37 °C)]   Pulse:  [61-73]   Resp:  [16]   BP: (118-153)/(49-62)   SpO2:  [86 %-98 %] .   Home meds for hypertension were reviewed and noted below.   Hypertension Medications               furosemide (LASIX) 20 MG tablet Take 1 tablet (20 mg total) by mouth daily as needed. Take for swelling or wt gain > 2 lbs / 24 hr    propranoloL (INDERAL) 10 MG tablet Take 1 tablet (10 mg total) by mouth 2 (two) times daily.            While in the hospital, will manage blood pressure as follows; Continue home antihypertensive regimen    Will utilize p.r.n. blood pressure medication only if patient's blood pressure greater than 180/110 and she develops symptoms such as worsening chest pain or shortness of breath.

## 2023-12-13 NOTE — ASSESSMENT & PLAN NOTE
Patient with Long standing persistent (>12 months) atrial fibrillation which is controlled currently with Amiodarone. Patient is currently in sinus rhythm paced rhythm.[id number] Score: 3. Anticoagulation indicated. Anticoagulation done with eliquis which is currently being held.  Obtain clearance to resume it after surgery

## 2023-12-13 NOTE — ASSESSMENT & PLAN NOTE
Unclear etiology- Stroke? Parkinson's disease?   Obtain echo  Cardiology consulted  Troponin trended and negative  Telemetry monitoring  CT head to r/o CVA    Because of fracture- can't take orthostats- pt though looks dehyrated   Neurology should be consulted to r/o posterior stroke

## 2023-12-13 NOTE — PROGRESS NOTES
"[facility] Medicine  Progress Note    Patient Name: Irene العراقي  MRN: [id number]  Patient Class: IP- Inpatient   Admission Date: 12/11/2023  Length of Stay: 1 days  Attending Physician: Kalli Duke MD  Primary Care Provider: Wanda Kuhn FNP-C        Subjective:     Principal Problem:Postural dizziness with presyncope        HPI:  Ms. العراقي is an 83 yr old female with Pmhx of  atrial fibrillation, CKD, HTN, 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 presenting today for lightheadedness, weakness and presyncope. Pt reports yesterday she was experiencing fatigue, lightheadedness and generalized weakness and she states symptoms were mild. This morning when she woke up her symptoms persisted and became severe. Pt states she was in the kitchen making breakfast and felt severe lightheadedness and felt like she was going to pass out so she eased her self down on the ground and crawled to the phone to call her daughter. Pt denies cp, sob, n/v or diaphoresis. Pt reports when EMS arrived her BP was in the 180's systolic. Pt experienced trauma to her right ankle with the fall. Xray confirmed displaced lateral malleolar fracture and splint was applied in ED. Pt will be admitted to hospital med services for further workup and management.       Overview/Hospital Course:   12/12: Notes reviewed, no acute events overnight. Eval by ortho this AM who rec transfer to [facility] for surgical repair of right ankle tomorrow. Ortho aware pt will need cardiac clearance prior to surgery. Pt c/o pain to her right ankle today and received oxy 5mg. Pt feels like the pain med is too strong and she is reporting feeling "woozy" now after med admin. She states her ankle pain has improved. She reports feeling lightheaded and weak this morning and states symptoms are similar to what she was feeling when she arrived to ED. She denies cp or sob. Will await echo and cards consult and recs. I " spoke with hosp med team at [facility] and pt accepted by Dr. Jones. Pt aggreeable for transfer.      12/13: Patient was admitted for syncope over at [facility] was transferred to [facility] on request of orthopedic surgeon surgical repair of right ankle fracture. Patient was evaluated by Cardiology and underwent echocardiogram and pacemaker interrogation at [facility] prior to discharge and patient's Eliquis was held prior to surgery.  Patient is currently being prepped for surgery today and has no acute overnight events.        Review of Systems   Constitutional:  Negative for fever.   Respiratory:  Negative for shortness of breath.    Cardiovascular:  Negative for chest pain and palpitations.   Gastrointestinal:  Negative for abdominal pain, diarrhea and vomiting.   Neurological:  Positive for weakness.   All other systems reviewed and are negative.    Objective:     Vital Signs (Most Recent):  Temp: 98.5 °F (36.9 °C) (12/13/23 0700)  Pulse: 61 (12/13/23 0700)  Resp: 16 (12/13/23 0700)  BP: (!) 124/49 (ebony notified) (12/13/23 0700)  SpO2: 97 % (12/13/23 0700) Vital Signs (24h Range):  Temp:  [97.1 °F (36.2 °C)-98.6 °F (37 °C)] 98.5 °F (36.9 °C)  Pulse:  [61-73] 61  Resp:  [16] 16  SpO2:  [86 %-98 %] 97 %  BP: (118-153)/(49-62) 124/49     Weight: 70 kg (154 lb 5.2 oz)  Body mass index is 24.91 kg/m².    Intake/Output Summary (Last 24 hours) at 12/13/2023 1049  Last data filed at 12/13/2023 0459  Gross per 24 hour   Intake 720 ml   Output 300 ml   Net 420 ml         Physical Exam  Vitals and nursing note reviewed.   HENT:      Head: Normocephalic.      Mouth/Throat:      Mouth: Mucous membranes are moist.   Eyes:      Pupils: Pupils are equal, round, and reactive to light.   Cardiovascular:      Rate and Rhythm: Normal rate.   Pulmonary:      Effort: Pulmonary effort is normal.      Comments: On nasal oxygen  Abdominal:      Palpations: Abdomen is soft.   Musculoskeletal:      Cervical back: Normal range of motion.  "  Neurological:      Mental Status: Mental status is at baseline.             Significant Labs: All pertinent labs within the past 24 hours have been reviewed.  BMP:   Recent Labs   Lab 12/13/23  0603   GLU 88      K 4.1      CO2 25   BUN 25*   CREATININE 1.6*   CALCIUM 8.9   MG 1.9     CBC:   Recent Labs   Lab 12/12/23  0509 12/13/23  0603   WBC 5.82 5.10   HGB 9.1* 9.0*   HCT 27.5* 27.2*   PLT 92* 87*     CMP:   Recent Labs   Lab 12/12/23  0508 12/13/23  0603    137   K 4.4 4.1    106   CO2 22* 25   GLU 84 88   BUN 26* 25*   CREATININE 1.6* 1.6*   CALCIUM 8.8 8.9   PROT 5.4* 5.4*   ALBUMIN 3.1* 3.3*   BILITOT 0.7 0.6   ALKPHOS 84 87   AST 25 24   ALT 23 21   ANIONGAP 7* 6*     Cardiac Markers: No results for input(s): "CKMB", "MYOGLOBIN", "BNP", "TROPISTAT" in the last 48 hours.  Coagulation: No results for input(s): "PT", "INR", "APTT" in the last 48 hours.  Troponin:   Recent Labs   Lab 12/11/23  1541 12/11/23  1858   TROPONINI 0.014 0.024     Urine Studies: No results for input(s): "COLORU", "APPEARANCEUA", "PHUR", "SPECGRAV", "PROTEINUA", "GLUCUA", "KETONESU", "BILIRUBINUA", "OCCULTUA", "NITRITE", "UROBILINOGEN", "LEUKOCYTESUR", "RBCUA", "WBCUA", "BACTERIA", "SQUAMEPITHEL", "HYALINECASTS" in the last 48 hours.    Invalid input(s): "WRIGHTSUR"    Significant Imaging: I have reviewed all pertinent imaging results/findings within the past 24 hours.  Imaging Results              X-Ray Humerus 2 View Right (Final result)  Result time 12/11/23 10:53:24      Final result by Lorie Mujica MD (12/11/23 10:53:24)                   Impression:      There are degenerative changes of the right shoulder without evidence acute bony injury of the right humerus.      Electronically signed by: Lorie Mujica MD  Date:    12/11/2023  Time:    10:53               Narrative:    EXAMINATION:  XR HUMERUS 2 VIEW RIGHT    CLINICAL HISTORY:  Unspecified fall, initial encounter    TECHNIQUE:  Two " view    COMPARISON:  None    FINDINGS:  There is no evidence fracture or dislocation. There is osteopenia. There are degenerative changes of the right shoulder.                                        X-Ray Tibia Fibula 2 View Right (Final result)  Result time 12/11/23 10:26:06      Final result by Lorie Mujica MD (12/11/23 10:26:06)                   Impression:      There is a displaced lateral malleolar fracture with adjacent soft tissue swelling.  In addition there is a suspected nondisplaced medial malleolar fracture.  CT should be obtained for confirmation.  This report was flagged in Epic as abnormal.      Electronically signed by: Lorie Mujica MD  Date:    12/11/2023  Time:    10:26               Narrative:    EXAMINATION:  XR TIBIA FIBULA 2 VIEW RIGHT    CLINICAL HISTORY:  Pain in right leg    TECHNIQUE:  AP and lateral views of the right tibia and fibula were performed.    COMPARISON:  None.    FINDINGS:  There is a displaced lateral malleolar fracture with adjacent soft tissue swelling.  There is osteopenia the patient is status post total right knee arthroplasty.  There is a faint lucency overlying the medial malleolus thought to represent nondisplaced medial malleolar fracture.                                       X-Ray Ankle Complete Right (Final result)  Result time 12/11/23 09:30:26      Final result by Lorie Mujica MD (12/11/23 09:30:26)                   Impression:      There is a transverse, displaced lateral malleolar fracture.  There is adjacent soft tissue swelling.      Electronically signed by: Lorie Mujica MD  Date:    12/11/2023  Time:    09:30               Narrative:    EXAMINATION:  XR ANKLE COMPLETE 3 VIEW RIGHT    CLINICAL HISTORY:  Unspecified fall, initial encounter    TECHNIQUE:  AP, lateral, and oblique images of the right ankle were performed.    COMPARISON:  None    FINDINGS:  There is a transverse, displaced fracture of the lateral malleolus.  There is  adjacent soft tissue swelling.  No other fractures are identified.  There is osteopenia.                                       X-Ray Chest AP Portable (Final result)  Result time 12/11/23 09:29:26      Final result by Lorie Mujica MD (12/11/23 09:29:26)                   Impression:      Overall pulmonary aeration has improved from 11/05/2023.      Electronically signed by: Lorie Mujcia MD  Date:    12/11/2023  Time:    09:29               Narrative:    EXAMINATION:  XR CHEST AP PORTABLE    CLINICAL HISTORY:  Syncope and collapse    TECHNIQUE:  Single frontal view of the chest was performed.    COMPARISON:  11/05/2023    FINDINGS:  The patient has a left-sided pacer.  There is no pneumothorax or pleural effusion.  The cardiac silhouette is within normal limits.                                        Assessment/Plan:      * Postural dizziness with presyncope  Cardiology on board  Continue cardiac monitoring  PT/OT after surgery  Because of fracture- can't take orthostats  Echo pending    Closed fracture of right ankle  Patient for surgery this morning  Eliquis currently being held  Fall precautions  Prn pain control      UTI (urinary tract infection)  Grew Klebsiella sensitive to Rocephin  Probably the cause for her presyncope?      PAF (paroxysmal atrial fibrillation)  Patient with Long standing persistent (>12 months) atrial fibrillation which is controlled currently with Amiodarone. Patient is currently in sinus rhythm paced rhythm.[id number] Score: 3. Anticoagulation indicated. Anticoagulation done with eliquis which is currently being held.  Obtain clearance to resume it after surgery    Hypertension  Chronic, controlled. Latest blood pressure and vitals reviewed-     Temp:  [97.1 °F (36.2 °C)-98.6 °F (37 °C)]   Pulse:  [61-73]   Resp:  [16]   BP: (118-153)/(49-62)   SpO2:  [86 %-98 %] .   Home meds for hypertension were reviewed and noted below.   Hypertension Medications               furosemide  (LASIX) 20 MG tablet Take 1 tablet (20 mg total) by mouth daily as needed. Take for swelling or wt gain > 2 lbs / 24 hr    propranoloL (INDERAL) 10 MG tablet Take 1 tablet (10 mg total) by mouth 2 (two) times daily.            While in the hospital, will manage blood pressure as follows; Continue home antihypertensive regimen    Will utilize p.r.n. blood pressure medication only if patient's blood pressure greater than 180/110 and she develops symptoms such as worsening chest pain or shortness of breath.    S/P TAVR (transcatheter aortic valve replacement)  Recent TAVR on 11/1 by Dr. Moncada  Pt developed complete heart block post procedure and underwent pacemaker placement on 11/2  Hold eliquis for surgery    Stage 4 chronic kidney disease  Creatine stable for now. BMP reviewed- noted Estimated Creatinine Clearance: 24.9 mL/min (A) (based on SCr of 1.6 mg/dL (H)). according to latest data. 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  Monitor UOP and serial BMP and adjust therapy as needed. Renally dose meds. Avoid nephrotoxic medications and procedures.      VTE Risk Mitigation (From admission, onward)           Ordered     IP VTE HIGH RISK PATIENT  Once         12/11/23 1524     Place sequential compression device  Until discontinued         12/11/23 1524     Reason for No Pharmacological VTE Prophylaxis  Once        Question:  Reasons:  Answer:  Already adequately anticoagulated on oral Anticoagulants    12/11/23 1524                    Discharge Planning   YANET: 12/14/2023     Code Status: Full Code   Is the patient medically ready for discharge?:     Reason for patient still in hospital (select all that apply): Patient trending condition  Discharge Plan A: Home with family, Home Health                  Kalli Duke MD  Department of Hospital Medicine   [facility]

## 2023-12-13 NOTE — TRANSFER OF CARE
"Anesthesia Transfer of Care Note    Patient: Irene العراقي    Procedure(s) Performed: Procedure(s) (LRB):  ORIF, ANKLE, RIGHT (Right)    Patient location: PACU    Anesthesia Type: general    Transport from OR: Transported from OR on 2-3 L/min O2 by NC with adequate spontaneous ventilation    Post pain: adequate analgesia    Post assessment: no apparent anesthetic complications and tolerated procedure well    Post vital signs: stable    Level of consciousness: awake    Nausea/Vomiting: no nausea/vomiting    Complications: none    Transfer of care protocol was followed      Last vitals: Visit Vitals  BP (!) 124/49 (BP Location: Right arm, Patient Position: Lying)   Pulse 61   Temp 36.9 °C (98.5 °F) (Oral)   Resp 16   Ht 5' 6" (1.676 m)   Wt 70 kg (154 lb 5.2 oz)   SpO2 97%   Breastfeeding No   BMI 24.91 kg/m²     "

## 2023-12-13 NOTE — SUBJECTIVE & OBJECTIVE
Past Medical History:   Diagnosis Date    Anemia, unspecified     Atrial fibrillation 2021    CKD (chronic kidney disease)     Hypertension        Past Surgical History:   Procedure Laterality Date    A-V CARDIAC PACEMAKER INSERTION  2023    Procedure: Dual Chamber PPM RM 2617 ([employer]);  Surgeon: Andreas James III, MD;  Location: [facility] CATH;  Service: Cardiology;;    ANGIOGRAM, CORONARY, WITH LEFT HEART CATHETERIZATION Left 2023    Procedure: Angiogram, Coronary, with Left Heart Cath;  Surgeon: Kevin Redmond MD;  Location: [facility] CATH/EP LAB;  Service: Cardiology;  Laterality: Left;    BREAST SURGERY      COLONOSCOPY N/A 2023    Procedure: COLONOSCOPY;  Surgeon: Kvng Mensah MD;  Location: [facility] ENDO;  Service: Endoscopy;  Laterality: N/A;    COLONOSCOPY W/ POLYPECTOMY  2023    TRANSCATHETER AORTIC VALVE REPLACEMENT (TAVR)  2023    Procedure: (TAVR);  Surgeon: Juliano Moncada MD;  Location: [facility] CATH;  Service: Cardiology;;    TRANSCATHETER AORTIC VALVE REPLACEMENT (TAVR)  2023    Procedure: (TAVR)- Surgeon;  Surgeon: Adebayo Guzman MD;  Location: [facility] CATH;  Service: Peripheral Vascular;;       Review of patient's allergies indicates:   Allergen Reactions    Cefuroxime     Hydrocodone     Meperidine     Meperidine hcl Nausea And Vomiting    Persantine [dipyridamole]     Nitrofurantoin macrocrystal      Headache , went into Afib     Vicodin [hydrocodone-acetaminophen] Nausea And Vomiting       No current facility-administered medications on file prior to encounter.     Current Outpatient Medications on File Prior to Encounter   Medication Sig    amiodarone (PACERONE) 200 MG Tab Take 1 tablet (200 mg total) by mouth once daily.    [] apixaban (ELIQUIS) 2.5 mg Tab Take 1 tablet (2.5 mg total) by mouth 2 (two) times daily.    docusate sodium (COLACE) 100 MG capsule Take 1 capsule (100 mg total) by mouth 2 (two) times daily.    furosemide (LASIX) 20 MG tablet Take  1 tablet (20 mg total) by mouth daily as needed. Take for swelling or wt gain > 2 lbs / 24 hr    iron ag,na-L-[id number]-Zn-sa-sto (NIFEREX, SUMALATE-QUATREFOLIC,) 150 mg iron- 60 mg-1 mg Tab Take 1 tablet by mouth once daily.    magnesium oxide (MAG-OX) 400 mg (241.3 mg magnesium) tablet TAKE 1 TABLET BY MOUTH ONCE DAILY (Patient taking differently: Take 400 mg by mouth once daily.)    MIRALAX 17 gram PwPk Take 17 g by mouth 2 (two) times daily.    potassium chloride (KLOR-CON) 8 MEQ TbSR Take 1 tablet (8 mEq total) by mouth daily as needed. Take along with Lasix    propranoloL (INDERAL) 10 MG tablet Take 1 tablet (10 mg total) by mouth 2 (two) times daily.    iron-vitamin C 100-250 mg, ICAR-C, (ICAR-C) 100-250 mg Tab Take 1 tablet by mouth once daily. (Patient not taking: Reported on 12/11/2023)     Family History       Problem Relation (Age of Onset)    Cancer Mother, Father          Tobacco Use    Smoking status: Former     Types: Cigarettes     Passive exposure: Past    Smokeless tobacco: Never   Substance and Sexual Activity    Alcohol use: Not Currently    Drug use: Not Currently    Sexual activity: Not on file     Review of Systems  Objective:     Vital Signs (Most Recent):  Temp: 98.2 °F (36.8 °C) (12/12/23 1930)  Pulse: 69 (12/12/23 2022)  Resp: 16 (12/12/23 1930)  BP: (!) 151/62 (12/12/23 2022)  SpO2: (!) 93 % (12/12/23 1930) Vital Signs (24h Range):  Temp:  [97.1 °F (36.2 °C)-98.2 °F (36.8 °C)] 98.2 °F (36.8 °C)  Pulse:  [64-72] 69  Resp:  [16-18] 16  SpO2:  [92 %-95 %] 93 %  BP: (118-151)/(57-68) 151/62     Weight: 70 kg (154 lb 5.2 oz)  Body mass index is 24.91 kg/m².     Physical Exam           Significant Labs: All pertinent labs within the past 24 hours have been reviewed.  BMP:   Recent Labs   Lab 12/12/23  0508   GLU 84      K 4.4      CO2 22*   BUN 26*   CREATININE 1.6*   CALCIUM 8.8   MG 2.0     CBC:   Recent Labs   Lab 12/11/23  0955 12/12/23  0509   WBC 7.21 5.82   HGB 10.5* 9.1*    HCT 32.8* 27.5*   * 92*     CMP:   Recent Labs   Lab 12/11/23  0955 12/12/23  0508    137   K 4.2 4.4    108   CO2 17* 22*   GLU 85 84   BUN 25* 26*   CREATININE 1.8* 1.6*   CALCIUM 9.7 8.8   PROT 6.4 5.4*   ALBUMIN 3.7 3.1*   BILITOT 0.8 0.7   ALKPHOS 92 84   AST 28 25   ALT 25 23   ANIONGAP 12 7*     Magnesium:   Recent Labs   Lab 12/12/23  0508   MG 2.0       Significant Imaging: I have reviewed all pertinent imaging results/findings within the past 24 hours.

## 2023-12-13 NOTE — SUBJECTIVE & OBJECTIVE
Review of Systems   Constitutional:  Negative for fever.   Respiratory:  Negative for shortness of breath.    Cardiovascular:  Negative for chest pain and palpitations.   Gastrointestinal:  Negative for abdominal pain, diarrhea and vomiting.   Neurological:  Positive for weakness.   All other systems reviewed and are negative.    Objective:     Vital Signs (Most Recent):  Temp: 98.5 °F (36.9 °C) (12/13/23 0700)  Pulse: 61 (12/13/23 0700)  Resp: 16 (12/13/23 0700)  BP: (!) 124/49 (ebony notified) (12/13/23 0700)  SpO2: 97 % (12/13/23 0700) Vital Signs (24h Range):  Temp:  [97.1 °F (36.2 °C)-98.6 °F (37 °C)] 98.5 °F (36.9 °C)  Pulse:  [61-73] 61  Resp:  [16] 16  SpO2:  [86 %-98 %] 97 %  BP: (118-153)/(49-62) 124/49     Weight: 70 kg (154 lb 5.2 oz)  Body mass index is 24.91 kg/m².    Intake/Output Summary (Last 24 hours) at 12/13/2023 1049  Last data filed at 12/13/2023 0459  Gross per 24 hour   Intake 720 ml   Output 300 ml   Net 420 ml         Physical Exam  Vitals and nursing note reviewed.   HENT:      Head: Normocephalic.      Mouth/Throat:      Mouth: Mucous membranes are moist.   Eyes:      Pupils: Pupils are equal, round, and reactive to light.   Cardiovascular:      Rate and Rhythm: Normal rate.   Pulmonary:      Effort: Pulmonary effort is normal.      Comments: On nasal oxygen  Abdominal:      Palpations: Abdomen is soft.   Musculoskeletal:      Cervical back: Normal range of motion.   Neurological:      Mental Status: Mental status is at baseline.             Significant Labs: All pertinent labs within the past 24 hours have been reviewed.  BMP:   Recent Labs   Lab 12/13/23  0603   GLU 88      K 4.1      CO2 25   BUN 25*   CREATININE 1.6*   CALCIUM 8.9   MG 1.9     CBC:   Recent Labs   Lab 12/12/23  0509 12/13/23  0603   WBC 5.82 5.10   HGB 9.1* 9.0*   HCT 27.5* 27.2*   PLT 92* 87*     CMP:   Recent Labs   Lab 12/12/23  0508 12/13/23  0603    137   K 4.4 4.1    106   CO2 22* 25  "  GLU 84 88   BUN 26* 25*   CREATININE 1.6* 1.6*   CALCIUM 8.8 8.9   PROT 5.4* 5.4*   ALBUMIN 3.1* 3.3*   BILITOT 0.7 0.6   ALKPHOS 84 87   AST 25 24   ALT 23 21   ANIONGAP 7* 6*     Cardiac Markers: No results for input(s): "CKMB", "MYOGLOBIN", "BNP", "TROPISTAT" in the last 48 hours.  Coagulation: No results for input(s): "PT", "INR", "APTT" in the last 48 hours.  Troponin:   Recent Labs   Lab 12/11/23  1541 12/11/23  1858   TROPONINI 0.014 0.024     Urine Studies: No results for input(s): "COLORU", "APPEARANCEUA", "PHUR", "SPECGRAV", "PROTEINUA", "GLUCUA", "KETONESU", "BILIRUBINUA", "OCCULTUA", "NITRITE", "UROBILINOGEN", "LEUKOCYTESUR", "RBCUA", "WBCUA", "BACTERIA", "SQUAMEPITHEL", "HYALINECASTS" in the last 48 hours.    Invalid input(s): "WRIGHTSUR"    Significant Imaging: I have reviewed all pertinent imaging results/findings within the past 24 hours.  Imaging Results              X-Ray Humerus 2 View Right (Final result)  Result time 12/11/23 10:53:24      Final result by Lorie Mujica MD (12/11/23 10:53:24)                   Impression:      There are degenerative changes of the right shoulder without evidence acute bony injury of the right humerus.      Electronically signed by: Lorie Mujica MD  Date:    12/11/2023  Time:    10:53               Narrative:    EXAMINATION:  XR HUMERUS 2 VIEW RIGHT    CLINICAL HISTORY:  Unspecified fall, initial encounter    TECHNIQUE:  Two view    COMPARISON:  None    FINDINGS:  There is no evidence fracture or dislocation. There is osteopenia. There are degenerative changes of the right shoulder.                                        X-Ray Tibia Fibula 2 View Right (Final result)  Result time 12/11/23 10:26:06      Final result by Lorie Mujica MD (12/11/23 10:26:06)                   Impression:      There is a displaced lateral malleolar fracture with adjacent soft tissue swelling.  In addition there is a suspected nondisplaced medial malleolar fracture.  CT " should be obtained for confirmation.  This report was flagged in Epic as abnormal.      Electronically signed by: Lorie Mujica MD  Date:    12/11/2023  Time:    10:26               Narrative:    EXAMINATION:  XR TIBIA FIBULA 2 VIEW RIGHT    CLINICAL HISTORY:  Pain in right leg    TECHNIQUE:  AP and lateral views of the right tibia and fibula were performed.    COMPARISON:  None.    FINDINGS:  There is a displaced lateral malleolar fracture with adjacent soft tissue swelling.  There is osteopenia the patient is status post total right knee arthroplasty.  There is a faint lucency overlying the medial malleolus thought to represent nondisplaced medial malleolar fracture.                                       X-Ray Ankle Complete Right (Final result)  Result time 12/11/23 09:30:26      Final result by Lorie Mujica MD (12/11/23 09:30:26)                   Impression:      There is a transverse, displaced lateral malleolar fracture.  There is adjacent soft tissue swelling.      Electronically signed by: Lorie Mujica MD  Date:    12/11/2023  Time:    09:30               Narrative:    EXAMINATION:  XR ANKLE COMPLETE 3 VIEW RIGHT    CLINICAL HISTORY:  Unspecified fall, initial encounter    TECHNIQUE:  AP, lateral, and oblique images of the right ankle were performed.    COMPARISON:  None    FINDINGS:  There is a transverse, displaced fracture of the lateral malleolus.  There is adjacent soft tissue swelling.  No other fractures are identified.  There is osteopenia.                                       X-Ray Chest AP Portable (Final result)  Result time 12/11/23 09:29:26      Final result by Lorie Mujica MD (12/11/23 09:29:26)                   Impression:      Overall pulmonary aeration has improved from 11/05/2023.      Electronically signed by: Lorie Mujica MD  Date:    12/11/2023  Time:    09:29               Narrative:    EXAMINATION:  XR CHEST AP PORTABLE    CLINICAL HISTORY:  Syncope and  collapse    TECHNIQUE:  Single frontal view of the chest was performed.    COMPARISON:  11/05/2023    FINDINGS:  The patient has a left-sided pacer.  There is no pneumothorax or pleural effusion.  The cardiac silhouette is within normal limits.

## 2023-12-13 NOTE — ANESTHESIA PROCEDURE NOTES
Intubation    Date/Time: 12/13/2023 10:48 AM    Performed by: Halina Rosen CRNA  Authorized by: Mikey Machado MD    Intubation:     Induction:  Intravenous    Intubated:  Postinduction    Mask Ventilation:  Easy mask    Attempts:  1    Attempted By:  CRNA    Method of Intubation:  Video laryngoscopy    Blade:  Pinto 3    Laryngeal View Grade: Grade I - full view of cords      Difficult Airway Encountered?: No      Complications:  None    Airway Device:  Oral endotracheal tube    Airway Device Size:  7.5    Style/Cuff Inflation:  Cuffed    Secured at:  The lips    Placement Verified By:  Capnometry    Complicating Factors:  None    Findings Post-Intubation:  BS equal bilateral and atraumatic/condition of teeth unchanged

## 2023-12-13 NOTE — ASSESSMENT & PLAN NOTE
Recent TAVR on 11/1 by Dr. Moncada  Pt developed complete heart block post procedure and underwent pacemaker placement on 11/2  Hold [facility] surgery

## 2023-12-13 NOTE — ASSESSMENT & PLAN NOTE
Recent TAVR on 11/1 by Dr. Moncada  Pt developed complete heart block post procedure and underwent pacemaker placement on 11/2  Hold [facility] surgery

## 2023-12-13 NOTE — ASSESSMENT & PLAN NOTE
Patient with Long standing persistent (>12 months) atrial fibrillation which is controlled currently with Amiodarone. Patient is currently in sinus rhythm paced rhythm.[id number] Score: 3. Anticoagulation indicated. Anticoagulation done with eliquis .

## 2023-12-14 LAB
ALBUMIN SERPL BCP-MCNC: 3.5 G/DL (ref 3.5–5.2)
ALP SERPL-CCNC: 92 U/L (ref 55–135)
ALT SERPL W/O P-5'-P-CCNC: 20 U/L (ref 10–44)
ANION GAP SERPL CALC-SCNC: 7 MMOL/L (ref 8–16)
AST SERPL-CCNC: 25 U/L (ref 10–40)
BASOPHILS # BLD AUTO: 0.02 K/UL (ref 0–0.2)
BASOPHILS NFR BLD: 0.2 % (ref 0–1.9)
BILIRUB SERPL-MCNC: 0.5 MG/DL (ref 0.1–1)
BUN SERPL-MCNC: 28 MG/DL (ref 8–23)
CALCIUM SERPL-MCNC: 9 MG/DL (ref 8.7–10.5)
CHLORIDE SERPL-SCNC: 104 MMOL/L (ref 95–110)
CO2 SERPL-SCNC: 23 MMOL/L (ref 23–29)
CREAT SERPL-MCNC: 1.6 MG/DL (ref 0.5–1.4)
DIFFERENTIAL METHOD: ABNORMAL
EOSINOPHIL # BLD AUTO: 0 K/UL (ref 0–0.5)
EOSINOPHIL NFR BLD: 0 % (ref 0–8)
ERYTHROCYTE [DISTWIDTH] IN BLOOD BY AUTOMATED COUNT: 13 % (ref 11.5–14.5)
EST. GFR  (NO RACE VARIABLE): 31.8 ML/MIN/1.73 M^2
GLUCOSE SERPL-MCNC: 133 MG/DL (ref 70–110)
HCT VFR BLD AUTO: 27.6 % (ref 37–48.5)
HGB BLD-MCNC: 9.2 G/DL (ref 12–16)
IMM GRANULOCYTES # BLD AUTO: 0.04 K/UL (ref 0–0.04)
IMM GRANULOCYTES NFR BLD AUTO: 0.4 % (ref 0–0.5)
LYMPHOCYTES # BLD AUTO: 0.5 K/UL (ref 1–4.8)
LYMPHOCYTES NFR BLD: 4.8 % (ref 18–48)
MAGNESIUM SERPL-MCNC: 2 MG/DL (ref 1.6–2.6)
MCH RBC QN AUTO: 31 PG (ref 27–31)
MCHC RBC AUTO-ENTMCNC: 33.3 G/DL (ref 32–36)
MCV RBC AUTO: 93 FL (ref 82–98)
MONOCYTES # BLD AUTO: 1 K/UL (ref 0.3–1)
MONOCYTES NFR BLD: 9.7 % (ref 4–15)
NEUTROPHILS # BLD AUTO: 8.4 K/UL (ref 1.8–7.7)
NEUTROPHILS NFR BLD: 84.9 % (ref 38–73)
NRBC BLD-RTO: 0 /100 WBC
PLATELET # BLD AUTO: 102 K/UL (ref 150–450)
PMV BLD AUTO: 10.8 FL (ref 9.2–12.9)
POTASSIUM SERPL-SCNC: 4.6 MMOL/L (ref 3.5–5.1)
PROT SERPL-MCNC: 5.9 G/DL (ref 6–8.4)
RBC # BLD AUTO: 2.97 M/UL (ref 4–5.4)
SODIUM SERPL-SCNC: 134 MMOL/L (ref 136–145)
WBC # BLD AUTO: 9.85 K/UL (ref 3.9–12.7)

## 2023-12-14 PROCEDURE — 94761 N-INVAS EAR/PLS OXIMETRY MLT: CPT

## 2023-12-14 PROCEDURE — 85025 COMPLETE CBC W/AUTO DIFF WBC: CPT | Performed by: NURSE PRACTITIONER

## 2023-12-14 PROCEDURE — 94799 UNLISTED PULMONARY SVC/PX: CPT

## 2023-12-14 PROCEDURE — 99233 SBSQ HOSP IP/OBS HIGH 50: CPT | Mod: ,,, | Performed by: STUDENT IN AN ORGANIZED HEALTH CARE EDUCATION/TRAINING PROGRAM

## 2023-12-14 PROCEDURE — 25000003 PHARM REV CODE 250: Performed by: ORTHOPAEDIC SURGERY

## 2023-12-14 PROCEDURE — 99233 PR SUBSEQUENT HOSPITAL CARE,LEVL III: ICD-10-PCS | Mod: ,,, | Performed by: STUDENT IN AN ORGANIZED HEALTH CARE EDUCATION/TRAINING PROGRAM

## 2023-12-14 PROCEDURE — 25000003 PHARM REV CODE 250: Performed by: NURSE PRACTITIONER

## 2023-12-14 PROCEDURE — 25000003 PHARM REV CODE 250: Performed by: PHYSICIAN ASSISTANT

## 2023-12-14 PROCEDURE — 63600175 PHARM REV CODE 636 W HCPCS: Performed by: INTERNAL MEDICINE

## 2023-12-14 PROCEDURE — 63600175 PHARM REV CODE 636 W HCPCS: Performed by: NURSE PRACTITIONER

## 2023-12-14 PROCEDURE — 21400001 HC TELEMETRY ROOM

## 2023-12-14 PROCEDURE — 27000221 HC OXYGEN, UP TO 24 HOURS

## 2023-12-14 PROCEDURE — 80053 COMPREHEN METABOLIC PANEL: CPT | Performed by: NURSE PRACTITIONER

## 2023-12-14 PROCEDURE — 25000003 PHARM REV CODE 250: Performed by: INTERNAL MEDICINE

## 2023-12-14 PROCEDURE — 99900035 HC TECH TIME PER 15 MIN (STAT)

## 2023-12-14 PROCEDURE — 83735 ASSAY OF MAGNESIUM: CPT | Performed by: NURSE PRACTITIONER

## 2023-12-14 PROCEDURE — 36415 COLL VENOUS BLD VENIPUNCTURE: CPT | Performed by: NURSE PRACTITIONER

## 2023-12-14 RX ORDER — LEVOFLOXACIN 500 MG/1
500 TABLET, FILM COATED ORAL DAILY
Status: DISCONTINUED | OUTPATIENT
Start: 2023-12-14 | End: 2023-12-14

## 2023-12-14 RX ORDER — LEVOFLOXACIN 500 MG/1
500 TABLET, FILM COATED ORAL ONCE
Status: COMPLETED | OUTPATIENT
Start: 2023-12-14 | End: 2023-12-14

## 2023-12-14 RX ORDER — POLYETHYLENE GLYCOL 3350 17 G/17G
17 POWDER, FOR SOLUTION ORAL DAILY
Status: DISCONTINUED | OUTPATIENT
Start: 2023-12-14 | End: 2023-12-16

## 2023-12-14 RX ORDER — LEVOFLOXACIN 250 MG/1
250 TABLET ORAL DAILY
Status: DISCONTINUED | OUTPATIENT
Start: 2023-12-15 | End: 2023-12-21

## 2023-12-14 RX ADMIN — PROPRANOLOL HYDROCHLORIDE 10 MG: 10 TABLET ORAL at 09:12

## 2023-12-14 RX ADMIN — OXYCODONE 5 MG: 5 TABLET ORAL at 02:12

## 2023-12-14 RX ADMIN — ONDANSETRON 4 MG: 2 INJECTION INTRAMUSCULAR; INTRAVENOUS at 02:12

## 2023-12-14 RX ADMIN — LEVOFLOXACIN 500 MG: 500 TABLET, FILM COATED ORAL at 02:12

## 2023-12-14 RX ADMIN — DOCUSATE SODIUM 100 MG: 100 CAPSULE, LIQUID FILLED ORAL at 09:12

## 2023-12-14 RX ADMIN — OXYCODONE HYDROCHLORIDE 10 MG: 10 TABLET ORAL at 11:12

## 2023-12-14 RX ADMIN — POLYETHYLENE GLYCOL 3350 17 G: 17 POWDER, FOR SOLUTION ORAL at 11:12

## 2023-12-14 RX ADMIN — CEFTRIAXONE SODIUM 1 G: 1 INJECTION, POWDER, FOR SOLUTION INTRAMUSCULAR; INTRAVENOUS at 06:12

## 2023-12-14 RX ADMIN — AMIODARONE HYDROCHLORIDE 200 MG: 200 TABLET ORAL at 09:12

## 2023-12-14 RX ADMIN — Medication 400 MG: at 09:12

## 2023-12-14 RX ADMIN — PROPRANOLOL HYDROCHLORIDE 10 MG: 10 TABLET ORAL at 08:12

## 2023-12-14 RX ADMIN — DOCUSATE SODIUM 100 MG: 100 CAPSULE, LIQUID FILLED ORAL at 08:12

## 2023-12-14 RX ADMIN — OXYCODONE 5 MG: 5 TABLET ORAL at 07:12

## 2023-12-14 RX ADMIN — ACETAMINOPHEN 650 MG: 325 TABLET ORAL at 01:12

## 2023-12-14 NOTE — ANESTHESIA POSTPROCEDURE EVALUATION
Anesthesia Post Evaluation    Patient: Irene العراقي    Procedure(s) Performed: Procedure(s) (LRB):  ORIF, ANKLE (Right)    Final Anesthesia Type: general      Patient location during evaluation: PACU  Patient participation: Yes- Able to Participate  Level of consciousness: awake and alert  Post-procedure vital signs: reviewed and stable  Pain management: adequate  Airway patency: patent    PONV status at discharge: No PONV  Anesthetic complications: no      Cardiovascular status: hemodynamically stable  Respiratory status: unassisted, spontaneous ventilation and room air  Hydration status: euvolemic  Follow-up not needed.              Vitals Value Taken Time   /57 12/13/23 1921   Temp 36.3 °C (97.4 °F) 12/13/23 1921   Pulse 68 12/13/23 2123   Resp 18 12/13/23 2123   SpO2 95 % 12/13/23 2123         Event Time   Out of Recovery 12/13/2023 13:19:20         Pain/Xochilt Score: Pain Rating Prior to Med Admin: 2 (12/13/2023  1:02 PM)  Pain Rating Post Med Admin: 3 (12/12/2023  9:37 AM)  Xochilt Score: 9 (12/13/2023  1:00 PM)           CPAP compliance report from 11/20/2019-12/19/2019 on CPAP 9 cm H2O reviewed. Compliance is poor 0%. Patient used CPAP 10/30 days, no days with greater than 4 hours of use. AHI is slightly elevated 7.1 however minimal use on report. Please call patient and inform should use BIPAP at least 4 hours a night and ideally all night every night for maximum benefit. Please call patient and make aware that DME said CPAP is going to be picked up for noncompliance. If patient wants to keep CPAP (which I recommend he does) please call DME to see what he needs to do to keep it. Can see sooner if needed.

## 2023-12-14 NOTE — PROGRESS NOTES
[facility]  Orthopedics  Progress Note    Patient Name: Irene العراقي  MRN: [id number]  Admission Date: 12/11/2023  Hospital Length of Stay: 2 days  Attending Provider: Kalli Duke MD  Primary Care Provider: Wanda Kuhn FNP-C  Follow-up For: Procedure(s) (LRB):  ORIF, ANKLE (Right)    Post-Operative Day: 1 Day Post-Op  Subjective:     Principal Problem:Postural dizziness with presyncope    Principal Orthopedic Problem: S/P R ankle ORIF    Interval History: mild confusion    Review of patient's allergies indicates:   Allergen Reactions    Cefuroxime     Hydrocodone     Meperidine     Meperidine hcl Nausea And Vomiting    Persantine [dipyridamole]     Nitrofurantoin macrocrystal      Headache , went into Afib     Vicodin [hydrocodone-acetaminophen] Nausea And Vomiting       Current Facility-Administered Medications   Medication    acetaminophen tablet 650 mg    amiodarone tablet 200 mg    cefTRIAXone (ROCEPHIN) 1 g in dextrose 5 % in water (D5W) 100 mL IVPB (MB+)    dextrose 50% injection 12.5 g    dextrose 50% injection 25 g    docusate sodium capsule 100 mg    fentaNYL injection 25 mcg    glucagon (human recombinant) injection 1 mg    glucose chewable tablet 16 g    glucose chewable tablet 24 g    magnesium oxide tablet 400 mg    magnesium oxide tablet 800 mg    magnesium oxide tablet 800 mg    naloxone 0.4 mg/mL injection 0.02 mg    ondansetron injection 4 mg    oxyCODONE immediate release tablet 5 mg    oxyCODONE immediate release tablet Tab 10 mg    potassium bicarbonate disintegrating tablet 35 mEq    potassium bicarbonate disintegrating tablet 50 mEq    potassium bicarbonate disintegrating tablet 60 mEq    potassium, sodium phosphates 280-160-250 mg packet 2 packet    potassium, sodium phosphates 280-160-250 mg packet 2 packet    potassium, sodium phosphates 280-160-250 mg packet 2 packet    propranoloL tablet 10 mg    scopolamine 1.3-1.5 mg (1 mg over 3 days) 1 patch    sodium  "chloride 0.9% flush 10 mL     Objective:     Vital Signs (Most Recent):  Temp: 98.6 °F (37 °C) (12/14/23 0328)  Pulse: 65 (12/14/23 0328)  Resp: 16 (12/14/23 0328)  BP: (!) 121/58 (12/14/23 0328)  SpO2: 96 % (12/14/23 0328) Vital Signs (24h Range):  Temp:  [97.1 °F (36.2 °C)-98.6 °F (37 °C)] 98.6 °F (37 °C)  Pulse:  [57-68] 65  Resp:  [12-28] 16  SpO2:  [92 %-99 %] 96 %  BP: (121-159)/(49-71) 121/58     Weight: 70 kg (154 lb 5.2 oz)  Height: 5' 6" (167.6 cm)  Body mass index is 24.91 kg/m².      Intake/Output Summary (Last 24 hours) at 12/14/2023 0653  Last data filed at 12/14/2023 0331  Gross per 24 hour   Intake 260 ml   Output 400 ml   Net -140 ml        General    Nursing note and vitals reviewed.  Constitutional: She appears well-developed and well-nourished.   Pulmonary/Chest: Effort normal.   Neurological: She is alert.   Psychiatric: She has a normal mood and affect.         Right Ankle/Foot Exam     Comments:  RLE DNVI. Post-op dressings C/D/I           Significant Labs: CBC:   Recent Labs   Lab 12/13/23  0603   WBC 5.10   HGB 9.0*   HCT 27.2*   PLT 87*     CMP:   Recent Labs   Lab 12/13/23  0603 12/14/23  0614    134*   K 4.1 4.6    104   CO2 25 23   GLU 88 133*   BUN 25* 28*   CREATININE 1.6* 1.6*   CALCIUM 8.9 9.0   PROT 5.4* 5.9*   ALBUMIN 3.3* 3.5   BILITOT 0.6 0.5   ALKPHOS 87 92   AST 24 25   ALT 21 20   ANIONGAP 6* 7*     All pertinent labs within the past 24 hours have been reviewed.    Significant Imaging: None  Assessment/Plan:     Closed fracture of right ankle  RLE NWB  PT and OT for mobility  DC planning          MARIE AZAR  Orthopedics  [facility]    "

## 2023-12-14 NOTE — PROGRESS NOTES
[facility]/Surg  Department of Cardiology  Consult Note      PATIENT NAME: Irene العراقي    MRN: [id number]  TODAY'S DATE: 12/14/2023  ADMIT DATE: 12/11/2023                          CONSULT REQUESTED BY: Gera Szymanski MD    SUBJECTIVE     PRINCIPAL PROBLEM: Postural dizziness with presyncope      REASON FOR CONSULT:  Presyncope    INTERVAL HISTORY    12/14/23    NAD.  Resting in bed.  Reports dizziness this am with turning head.  Has not been out of bed.  Reports confusion this am- didn't know where she was.  Labs reviewed and stable.       HPI:  Patient is an 82-year-old female who presented to the hospital with significant dizziness.  She felt significant dizziness while making breakfast indication to the point where she felt like she was going to pass out.  She did not lose her consciousness.  She tried to ease herself to the floor but did end sustaining a fall and had trauma to her right ankle.  She has displaced malleolar fracture on x-ray.  She denies experiencing any chest pain, shortness of breath, palpitations or diaphoresis prior or after the episode.  She reports some nausea associated with it.  Her blood pressure was elevated in the 180s systolic upon EMS arrival.  Troponin normal x2.  BNP slightly elevated at 508 but she is not clinically volume overloaded.  Twelve lead EKG showed a and V paced rhythm with heart rate of 60 beats per minute and ST-T changes secondary to pacing.  Ortho has evaluated the patient and planning to do R ankle ORIF with Dr. Garrison.  Currently, she is laying in her bed. Says she still has some dizziness. It gets worse when she moves her head forward. Associated some nausea and headache. No arrhythmia on tele. She says she had some dizziness years ago and was told it was vertigo but she has not had issues with for a long time.    She had TAVR on 11/01/2023 with Dr. Moncada for severe aortic stenosis.  It was complicated by complete heart block and she ended up getting  a Medtronic permanent pacemaker the day after.  She is she has been doing well overall since her discharge postprocedure.  Denies having any exertional chest pain, shortness of breath, PND, orthopnea or peripheral edema.  Soft groin with no evidence of hematoma bilaterally.   She had a cath in 4/2023 which showed no evidence of obstructive CAD.     From H&P 12/11/2023  HPI: Irene العراقي is an 83 yr old female with Pmhx of  atrial fibrillation, CKD, HTN, 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 presenting today for lightheadedness, weakness and presyncope. Pt reports yesterday she was experiencing fatigue, lightheadedness and generalized weakness and she states symptoms were mild. This morning when she woke up her symptoms persisted and became severe. Pt states she was in the kitchen making breakfast and felt severe lightheadedness and felt like she was going to pass out so she eased her self down on the ground and crawled to the phone to call her daughter. Pt denies cp, sob, n/v or diaphoresis. Pt reports when EMS arrived her BP was in the 180's systolic. Pt experienced trauma to her right ankle with the fall. Xray confirmed displaced lateral malleolar fracture and splint was applied in ED. Pt will be admitted to hospital med services for further workup and management.      Review of patient's allergies indicates:   Allergen Reactions    Cefuroxime     Hydrocodone     Meperidine     Meperidine hcl Nausea And Vomiting    Persantine [dipyridamole]     Nitrofurantoin macrocrystal      Headache , went into Afib     Vicodin [hydrocodone-acetaminophen] Nausea And Vomiting       Past Medical History:   Diagnosis Date    Anemia, unspecified     Atrial fibrillation 01/25/2021    CKD (chronic kidney disease)     Hypertension      Past Surgical History:   Procedure Laterality Date    A-V CARDIAC PACEMAKER INSERTION  11/2/2023    Procedure: Dual Chamber PPM RM 2617 (Medtronic);  Surgeon: Andreas James III,  MD;  Location: [facility] CATH;  Service: Cardiology;;    ANGIOGRAM, CORONARY, WITH LEFT HEART CATHETERIZATION Left 4/19/2023    Procedure: Angiogram, Coronary, with Left Heart Cath;  Surgeon: Kevin Redmond MD;  Location: [facility] CATH/EP LAB;  Service: Cardiology;  Laterality: Left;    BREAST SURGERY      COLONOSCOPY N/A 4/16/2023    Procedure: COLONOSCOPY;  Surgeon: Kvng Mensah MD;  Location: [facility] ENDO;  Service: Endoscopy;  Laterality: N/A;    COLONOSCOPY W/ POLYPECTOMY  04/16/2023    OPEN REDUCTION AND INTERNAL FIXATION (ORIF) OF INJURY OF ANKLE Right 12/13/2023    Procedure: ORIF, ANKLE;  Surgeon: Chaitanya Garrison MD;  Location: [facility] OR;  Service: Orthopedics;  Laterality: Right;  Synthes    TRANSCATHETER AORTIC VALVE REPLACEMENT (TAVR)  11/1/2023    Procedure: (TAVR);  Surgeon: Juliano Moncada MD;  Location: [facility] CATH;  Service: Cardiology;;    TRANSCATHETER AORTIC VALVE REPLACEMENT (TAVR)  11/1/2023    Procedure: (TAVR)- Surgeon;  Surgeon: Adebayo Guzman MD;  Location: [facility] CATH;  Service: Peripheral Vascular;;     Social History     Tobacco Use    Smoking status: Former     Types: Cigarettes     Passive exposure: Past    Smokeless tobacco: Never   Substance Use Topics    Alcohol use: Not Currently    Drug use: Not Currently        As per HPI    OBJECTIVE     VITAL SIGNS (Most Recent)  Temp: 98.3 °F (36.8 °C) (12/14/23 1100)  Pulse: 63 (12/14/23 1100)  Resp: 16 (12/14/23 1100)  BP: (!) 105/54 (12/14/23 1100)  SpO2: 96 % (12/14/23 1100)    VENTILATION STATUS  Resp: 16 (12/14/23 1100)  SpO2: 96 % (12/14/23 1100)           I & O (Last 24H):  Intake/Output Summary (Last 24 hours) at 12/14/2023 1607  Last data filed at 12/14/2023 1159  Gross per 24 hour   Intake 360 ml   Output 400 ml   Net -40 ml       WEIGHTS  Wt Readings from Last 1 Encounters:   12/12/23 1930 70 kg (154 lb 5.2 oz)   12/12/23 1146 72.1 kg (159 lb)   12/12/23 0416 72 kg (158 lb 11.7 oz)   12/11/23 1534 72.1 kg (159 lb)   12/11/23 0835 72.1 kg  (159 lb)       PHYSICAL EXAM  CONSTITUTIONAL: NAD  HEENT: Normocephalic. No pallor  NECK: no JVD  LUNGS: CTA b/l  HEART: regular rate and rhythm, S1, S2 normal, no murmur   ABDOMEN: soft, non-tender, bowel sounds normal  EXTREMITIES: No edema  SKIN: No rash  NEURO: AAO X 3  PSYCH: anxious, cooperative    HOME MEDICATIONS:  No current facility-administered medications on file prior to encounter.     Current Outpatient Medications on File Prior to Encounter   Medication Sig Dispense Refill    amiodarone (PACERONE) 200 MG Tab Take 1 tablet (200 mg total) by mouth once daily. 90 tablet 3    docusate sodium (COLACE) 100 MG capsule Take 1 capsule (100 mg total) by mouth 2 (two) times daily. 60 capsule 5    furosemide (LASIX) 20 MG tablet Take 1 tablet (20 mg total) by mouth daily as needed. Take for swelling or wt gain > 2 lbs / 24 hr 15 tablet 11    iron ag,[id number]-sa-sto (NIFEREX, SUMALATE-QUATREFOLIC,) 150 mg iron- 60 mg-1 mg Tab Take 1 tablet by mouth once daily. 90 tablet 3    magnesium oxide (MAG-OX) 400 mg (241.3 mg magnesium) tablet TAKE 1 TABLET BY MOUTH ONCE DAILY (Patient taking differently: Take 400 mg by mouth once daily.) 90 tablet 3    MIRALAX 17 gram PwPk Take 17 g by mouth 2 (two) times daily.      potassium chloride (KLOR-CON) 8 MEQ TbSR Take 1 tablet (8 mEq total) by mouth daily as needed. Take along with Lasix 15 tablet 11    propranoloL (INDERAL) 10 MG tablet Take 1 tablet (10 mg total) by mouth 2 (two) times daily. 60 tablet 11    iron-vitamin C 100-250 mg, ICAR-C, (ICAR-C) 100-250 mg Tab Take 1 tablet by mouth once daily. (Patient not taking: Reported on 12/11/2023) 30 tablet 2       SCHEDULED MEDS:   amiodarone  200 mg Oral Daily    cefTRIAXone (ROCEPHIN) IVPB  1 g Intravenous Q24H    docusate sodium  100 mg Oral BID    [START ON 12/15/2023] levoFLOXacin  250 mg Oral Daily    magnesium oxide  400 mg Oral Daily    polyethylene glycol  17 g Oral Daily    propranoloL  10 mg Oral BID     "scopolamine  1 patch Transdermal Q3 Days       CONTINUOUS INFUSIONS:    PRN MEDS:acetaminophen, dextrose 50%, dextrose 50%, fentaNYL, glucagon (human recombinant), glucose, glucose, magnesium oxide, magnesium oxide, naloxone, ondansetron, oxyCODONE, oxyCODONE, potassium bicarbonate, potassium bicarbonate, potassium bicarbonate, potassium, sodium phosphates, potassium, sodium phosphates, potassium, sodium phosphates, sodium chloride 0.9%    LABS AND DIAGNOSTICS     CBC LAST 3 DAYS  Recent Labs   Lab 12/12/23  0509 12/13/23  0603 12/14/23  0615   WBC 5.82 5.10 9.85   RBC 2.93* 2.90* 2.97*   HGB 9.1* 9.0* 9.2*   HCT 27.5* 27.2* 27.6*   MCV 94 94 93   MCH 31.1* 31.0 31.0   MCHC 33.1 33.1 33.3   RDW 13.1 13.1 13.0   PLT 92* 87* 102*   MPV 10.2 10.9 10.8   GRAN 71.3  4.1 64.9  3.3 84.9*  8.4*   LYMPH 11.3*  0.7* 14.5*  0.7* 4.8*  0.5*   MONO 14.9  0.9 16.9*  0.9 9.7  1.0   BASO 0.02 0.02 0.02   NRBC 0 0 0       COAGULATION LAST 3 DAYS  No results for input(s): "LABPT", "INR", "APTT" in the last 168 hours.    CHEMISTRY LAST 3 DAYS  Recent Labs   Lab 12/12/23  0508 12/13/23  0603 12/14/23  0614    137 134*   K 4.4 4.1 4.6    106 104   CO2 22* 25 23   ANIONGAP 7* 6* 7*   BUN 26* 25* 28*   CREATININE 1.6* 1.6* 1.6*   GLU 84 88 133*   CALCIUM 8.8 8.9 9.0   MG 2.0 1.9 2.0   ALBUMIN 3.1* 3.3* 3.5   PROT 5.4* 5.4* 5.9*   ALKPHOS 84 87 92   ALT 23 21 20   AST 25 24 25   BILITOT 0.7 0.6 0.5       CARDIAC PROFILE LAST 3 DAYS  Recent Labs   Lab 12/11/23  0955 12/11/23  1541 12/11/23  1858   *  --   --    TROPONINI  --  0.014 0.024       ENDOCRINE LAST 3 DAYS  No results for input(s): "TSH", "PROCAL" in the last 168 hours.    LAST ARTERIAL BLOOD GAS  ABG  No results for input(s): "PH", "PO2", "PCO2", "HCO3", "BE" in the last 168 hours.    LAST 7 DAYS MICROBIOLOGY   Microbiology Results (last 7 days)       Procedure Component Value Units Date/Time    Urine culture [[id number]]  (Abnormal)  (Susceptibility) " Collected: 12/11/23 0947    Order Status: Completed Specimen: Urine Updated: 12/13/23 0652     Urine Culture, Routine KLEBSIELLA PNEUMONIAE  >100,000 cfu/ml      Narrative:      Specimen Source->Urine            MOST RECENT IMAGING  FL Flouro Usage  See Notes    This procedure was auto-finalized.      ECHOCARDIOGRAM RESULTS (last 5)  Results for orders placed during the hospital encounter of 12/01/23    Echo    Interpretation Summary    Left Ventricle: The left ventricle is normal in size. Normal wall thickness. There is normal systolic function with a visually estimated ejection fraction of 55 - 60%. Grade I diastolic dysfunction.    Right Ventricle: Normal right ventricular cavity size. Wall thickness is normal. Right ventricle wall motion  is normal. Systolic function is normal.    Left Atrium: Left atrium is mildly dilated.    Right Atrium: Right atrium is mildly dilated.    Aortic Valve: There is a transcatheter valve replacement in the aortic position that is appropriately positioned. It is reported to be a Medtronic valve. There is mild stenosis. Aortic valve area by VTI is 2.97 cm². Aortic valve peak velocity is 1.45 m/s. Mean gradient is 4 mmHg. The dimensionless index is 0.95. There is mild to moderate aortic regurgitation (appears perivalvular).    Mitral Valve: Moderately calcified leaflets. There is moderate mitral annular calcification present. There is mild stenosis. The mean pressure gradient across the mitral valve is 3 mmHg at a heart rate of  bpm. There is mild regurgitation.    Tricuspid Valve: There is mild regurgitation.    IVC/SVC: Normal venous pressure at 3 mmHg.      Results for orders placed during the hospital encounter of 11/01/23    Echo Saline Bubble? No    Interpretation Summary    Left Ventricle: The left ventricle is normal in size. Normal wall thickness. Normal wall motion. There is normal systolic function with a visually estimated ejection fraction of 55 - 60%. There is normal  diastolic function.    Right Ventricle: Normal right ventricular cavity size. Wall thickness is normal. Right ventricle wall motion  is normal. Systolic function is normal.    Left Atrium: Left atrium is mildly dilated.    Right Atrium: Right atrium is mildly dilated.    Aortic Valve: There is a transcatheter valve replacement in the aortic position. It is reported to be a 29 mm Medtronic valve.    Mitral Valve: There is mild regurgitation.    Tricuspid Valve: There is mild regurgitation.    Pulmonic Valve: There is mild regurgitation.    IVC/SVC: Normal venous pressure at 3 mmHg.      Echo Saline Bubble? No    Interpretation Summary    Limited 2D echocardiogram done intraoperatively doing TAVR procedure.    Normal biventricular function.    Well-positioned self expanding aortic valve with no perivalvular or central valvular regurgitation    No pericardial effusion.      Results for orders placed during the hospital encounter of 08/09/23    Echo    Interpretation Summary    Limited study    Left Ventricle: The left ventricle is normal in size. Mildly increased wall thickness. There is concentric remodeling. Normal wall motion. There is normal systolic function with a visually estimated ejection fraction of 65 - 70%. Diastolic function cannot be reliably determined in the presence of mitral annular calcification.    Aortic Valve: Moderate annular calcification. Moderately restricted motion. There is moderate to severe stenosis. Aortic valve area by VTI is 0.77 cm². Aortic valve peak velocity is 3.66 m/s. Mean gradient is 34 mmHg. The dimensionless index is 0.25. There is moderate aortic regurgitation.    Mitral Valve: There is bileaflet sclerosis. Mild posterior mitral annular calcification. There is moderate to severe regurgitation. decreased excursion.    Left Atrium: Left atrium is severely dilated.    Right Ventricle: Normal right ventricular cavity size.    Tricuspid Valve: There is mild regurgitation.     Pulmonic Valve: There is mild regurgitation.    IVC/SVC: Intermediate venous pressure at 8 mmHg.      Results for orders placed during the hospital encounter of 04/10/23    Echo    Interpretation Summary  · The left ventricle is normal in size with concentric remodeling and normal systolic function.  · Mild left atrial enlargement.  · The estimated ejection fraction is 65%.  · Indeterminate left ventricular diastolic function.  · Normal right ventricular size with normal right ventricular systolic function.  · There is moderate aortic valve stenosis.  · Aortic valve area is 1.24 cm2; peak velocity is 3.51 m/s; mean gradient is 32 mmHg.  · Mild mitral regurgitation.  · Mild tricuspid regurgitation.  · There is mild to moderate pulmonary hypertension with RVSP at 47 mmhg      CURRENT/PREVIOUS VISIT EKG  Results for orders placed or performed during the hospital encounter of 12/01/23   EKG 12-lead    Collection Time: 12/01/23  1:55 PM    Narrative    Test Reason :     Vent. Rate : 070 BPM     Atrial Rate : 070 BPM     P-R Int : 102 ms          QRS Dur : 182 ms      QT Int : 486 ms       P-R-T Axes : 073 022 063 degrees     QTc Int : 524 ms    AV sequential or dual chamber electronic pacemaker  When compared with ECG of 16-NOV-2023 15:52,  Premature ventricular complexes are no longer Present  Vent. rate has increased BY   2 BPM  Confirmed by PORTIA SHAH MD ([id number]) on 12/1/2023 1:57:22 PM    Referred By: CHELY RIVAS           Confirmed By:PORTIA SHAH MD             ASSESSMENT & PLAN:   Presyncope  Dizziness  History of AS S/p TAVR with Medtronic FX 29 mm valve, 11/1/2023  CHB after TAVR s/p Medtronic dual chamber PPM, 11/2/2023  Closed fracture of right ankle  PAF  Hypertension    - Dizziness with head and eye movement.  Confusion overnight and intermittently today per patient. She has not yet been OOB.  Recommend neurology consultation.  Consider CTA head.  - No events on device interrogation.  -  Continue amiodarone 200 mg daily. Restart Eliquis when cleared by surgery.  - No further recommendations at this time.  We will sign off.     Zulema Hernández NP  Date of Service: 12/14/2023  11:42 AM

## 2023-12-14 NOTE — PROGRESS NOTES
Pharmacist Renal Dose Adjustment Note    Irene العراقي is a 83 y.o. female being treated with the medication levofloxacin    Patient Data:    Vital Signs (Most Recent):  Temp: 98.3 °F (36.8 °C) (12/14/23 1100)  Pulse: 63 (12/14/23 1100)  Resp: 16 (12/14/23 1100)  BP: (!) 105/54 (12/14/23 1100)  SpO2: 96 % (12/14/23 1100) Vital Signs (72h Range):  Temp:  [97.1 °F (36.2 °C)-98.6 °F (37 °C)]   Pulse:  [57-73]   Resp:  [12-28]   BP: (105-173)/(49-72)   SpO2:  [86 %-99 %]      Recent Labs   Lab 12/12/23  0508 12/13/23  0603 12/14/23  0614   CREATININE 1.6* 1.6* 1.6*     Serum creatinine: 1.6 mg/dL (H) 12/14/23 0614  Estimated creatinine clearance: 24.9 mL/min (A)    Medication:levofloxacin dose: 500 mg frequency q24h will be changed to medication:levofloxacin dose:500 mg x 1 dose then 250 mg frequency:q24h  Reason: CrCl < 50 mL/min    Pharmacist's Name: Lance Prado  Pharmacist's Extension: [phone]

## 2023-12-14 NOTE — SUBJECTIVE & OBJECTIVE
Principal Problem:Postural dizziness with presyncope    Principal Orthopedic Problem: S/P R ankle ORIF    Interval History: mild confusion    Review of patient's allergies indicates:   Allergen Reactions    Cefuroxime     Hydrocodone     Meperidine     Meperidine hcl Nausea And Vomiting    Persantine [dipyridamole]     Nitrofurantoin macrocrystal      Headache , went into Afib     Vicodin [hydrocodone-acetaminophen] Nausea And Vomiting       Current Facility-Administered Medications   Medication    acetaminophen tablet 650 mg    amiodarone tablet 200 mg    cefTRIAXone (ROCEPHIN) 1 g in dextrose 5 % in water (D5W) 100 mL IVPB (MB+)    dextrose 50% injection 12.5 g    dextrose 50% injection 25 g    docusate sodium capsule 100 mg    fentaNYL injection 25 mcg    glucagon (human recombinant) injection 1 mg    glucose chewable tablet 16 g    glucose chewable tablet 24 g    magnesium oxide tablet 400 mg    magnesium oxide tablet 800 mg    magnesium oxide tablet 800 mg    naloxone 0.4 mg/mL injection 0.02 mg    ondansetron injection 4 mg    oxyCODONE immediate release tablet 5 mg    oxyCODONE immediate release tablet Tab 10 mg    potassium bicarbonate disintegrating tablet 35 mEq    potassium bicarbonate disintegrating tablet 50 mEq    potassium bicarbonate disintegrating tablet 60 mEq    potassium, sodium phosphates 280-160-250 mg packet 2 packet    potassium, sodium phosphates 280-160-250 mg packet 2 packet    potassium, sodium phosphates 280-160-250 mg packet 2 packet    propranoloL tablet 10 mg    scopolamine 1.3-1.5 mg (1 mg over 3 days) 1 patch    sodium chloride 0.9% flush 10 mL     Objective:     Vital Signs (Most Recent):  Temp: 98.6 °F (37 °C) (12/14/23 0328)  Pulse: 65 (12/14/23 0328)  Resp: 16 (12/14/23 0328)  BP: (!) 121/58 (12/14/23 0328)  SpO2: 96 % (12/14/23 0328) Vital Signs (24h Range):  Temp:  [97.1 °F (36.2 °C)-98.6 °F (37 °C)] 98.6 °F (37 °C)  Pulse:  [57-68] 65  Resp:  [12-28] 16  SpO2:  [92 %-99 %] 96  "%  BP: (121-159)/(49-71) 121/58     Weight: 70 kg (154 lb 5.2 oz)  Height: 5' 6" (167.6 cm)  Body mass index is 24.91 kg/m².      Intake/Output Summary (Last 24 hours) at 12/14/2023 0653  Last data filed at 12/14/2023 0331  Gross per 24 hour   Intake 260 ml   Output 400 ml   Net -140 ml        General    Nursing note and vitals reviewed.  Constitutional: She appears well-developed and well-nourished.   Pulmonary/Chest: Effort normal.   Neurological: She is alert.   Psychiatric: She has a normal mood and affect.         Right Ankle/Foot Exam     Comments:  RLE DNVI. Post-op dressings C/D/I           Significant Labs: CBC:   Recent Labs   Lab 12/13/23  0603   WBC 5.10   HGB 9.0*   HCT 27.2*   PLT 87*     CMP:   Recent Labs   Lab 12/13/23  0603 12/14/23  0614    134*   K 4.1 4.6    104   CO2 25 23   GLU 88 133*   BUN 25* 28*   CREATININE 1.6* 1.6*   CALCIUM 8.9 9.0   PROT 5.4* 5.9*   ALBUMIN 3.3* 3.5   BILITOT 0.6 0.5   ALKPHOS 87 92   AST 24 25   ALT 21 20   ANIONGAP 6* 7*     All pertinent labs within the past 24 hours have been reviewed.    Significant Imaging: None  "

## 2023-12-14 NOTE — PLAN OF CARE
Problem: Adult Inpatient Plan of Care  Goal: Plan of Care Review  Outcome: Ongoing, Progressing  Goal: Patient-Specific Goal (Individualized)  Outcome: Ongoing, Progressing  Goal: Absence of Hospital-Acquired Illness or Injury  Outcome: Ongoing, Progressing  Goal: Optimal Comfort and Wellbeing  Outcome: Ongoing, Progressing     Problem: Fall Injury Risk  Goal: Absence of Fall and Fall-Related Injury  Outcome: Ongoing, Progressing     Problem: Skin Injury Risk Increased  Goal: Skin Health and Integrity  Outcome: Ongoing, Progressing      Statement Selected

## 2023-12-15 LAB
ALBUMIN SERPL BCP-MCNC: 3.4 G/DL (ref 3.5–5.2)
ALP SERPL-CCNC: 83 U/L (ref 55–135)
ALT SERPL W/O P-5'-P-CCNC: 19 U/L (ref 10–44)
ANION GAP SERPL CALC-SCNC: 8 MMOL/L (ref 8–16)
AST SERPL-CCNC: 29 U/L (ref 10–40)
BASOPHILS # BLD AUTO: 0.02 K/UL (ref 0–0.2)
BASOPHILS NFR BLD: 0.3 % (ref 0–1.9)
BILIRUB SERPL-MCNC: 0.5 MG/DL (ref 0.1–1)
BUN SERPL-MCNC: 30 MG/DL (ref 8–23)
CALCIUM SERPL-MCNC: 9.3 MG/DL (ref 8.7–10.5)
CHLORIDE SERPL-SCNC: 103 MMOL/L (ref 95–110)
CO2 SERPL-SCNC: 24 MMOL/L (ref 23–29)
CREAT SERPL-MCNC: 1.6 MG/DL (ref 0.5–1.4)
DIFFERENTIAL METHOD: ABNORMAL
EOSINOPHIL # BLD AUTO: 0.1 K/UL (ref 0–0.5)
EOSINOPHIL NFR BLD: 1.7 % (ref 0–8)
ERYTHROCYTE [DISTWIDTH] IN BLOOD BY AUTOMATED COUNT: 13.2 % (ref 11.5–14.5)
EST. GFR  (NO RACE VARIABLE): 31.8 ML/MIN/1.73 M^2
GLUCOSE SERPL-MCNC: 87 MG/DL (ref 70–110)
HCT VFR BLD AUTO: 27.4 % (ref 37–48.5)
HGB BLD-MCNC: 8.9 G/DL (ref 12–16)
IMM GRANULOCYTES # BLD AUTO: 0.02 K/UL (ref 0–0.04)
IMM GRANULOCYTES NFR BLD AUTO: 0.3 % (ref 0–0.5)
LYMPHOCYTES # BLD AUTO: 0.6 K/UL (ref 1–4.8)
LYMPHOCYTES NFR BLD: 9.8 % (ref 18–48)
MAGNESIUM SERPL-MCNC: 2.1 MG/DL (ref 1.6–2.6)
MCH RBC QN AUTO: 30.7 PG (ref 27–31)
MCHC RBC AUTO-ENTMCNC: 32.5 G/DL (ref 32–36)
MCV RBC AUTO: 95 FL (ref 82–98)
MONOCYTES # BLD AUTO: 0.8 K/UL (ref 0.3–1)
MONOCYTES NFR BLD: 12.9 % (ref 4–15)
NEUTROPHILS # BLD AUTO: 4.9 K/UL (ref 1.8–7.7)
NEUTROPHILS NFR BLD: 75 % (ref 38–73)
NRBC BLD-RTO: 0 /100 WBC
PLATELET # BLD AUTO: 100 K/UL (ref 150–450)
PMV BLD AUTO: 10.7 FL (ref 9.2–12.9)
POTASSIUM SERPL-SCNC: 4.6 MMOL/L (ref 3.5–5.1)
PROT SERPL-MCNC: 5.7 G/DL (ref 6–8.4)
RBC # BLD AUTO: 2.9 M/UL (ref 4–5.4)
SODIUM SERPL-SCNC: 135 MMOL/L (ref 136–145)
WBC # BLD AUTO: 6.51 K/UL (ref 3.9–12.7)

## 2023-12-15 PROCEDURE — 94799 UNLISTED PULMONARY SVC/PX: CPT

## 2023-12-15 PROCEDURE — 25000003 PHARM REV CODE 250: Performed by: ORTHOPAEDIC SURGERY

## 2023-12-15 PROCEDURE — 80053 COMPREHEN METABOLIC PANEL: CPT | Performed by: ORTHOPAEDIC SURGERY

## 2023-12-15 PROCEDURE — 51702 INSERT TEMP BLADDER CATH: CPT

## 2023-12-15 PROCEDURE — 27000221 HC OXYGEN, UP TO 24 HOURS

## 2023-12-15 PROCEDURE — 99900031 HC PATIENT EDUCATION (STAT)

## 2023-12-15 PROCEDURE — 99900035 HC TECH TIME PER 15 MIN (STAT)

## 2023-12-15 PROCEDURE — 97162 PT EVAL MOD COMPLEX 30 MIN: CPT

## 2023-12-15 PROCEDURE — 97166 OT EVAL MOD COMPLEX 45 MIN: CPT

## 2023-12-15 PROCEDURE — 85025 COMPLETE CBC W/AUTO DIFF WBC: CPT | Performed by: ORTHOPAEDIC SURGERY

## 2023-12-15 PROCEDURE — 97535 SELF CARE MNGMENT TRAINING: CPT

## 2023-12-15 PROCEDURE — 83735 ASSAY OF MAGNESIUM: CPT | Performed by: ORTHOPAEDIC SURGERY

## 2023-12-15 PROCEDURE — 94761 N-INVAS EAR/PLS OXIMETRY MLT: CPT

## 2023-12-15 PROCEDURE — 25000003 PHARM REV CODE 250: Performed by: INTERNAL MEDICINE

## 2023-12-15 PROCEDURE — 36415 COLL VENOUS BLD VENIPUNCTURE: CPT | Performed by: ORTHOPAEDIC SURGERY

## 2023-12-15 PROCEDURE — 12000002 HC ACUTE/MED SURGE SEMI-PRIVATE ROOM

## 2023-12-15 PROCEDURE — 97530 THERAPEUTIC ACTIVITIES: CPT

## 2023-12-15 RX ORDER — MUPIROCIN 20 MG/G
OINTMENT TOPICAL 2 TIMES DAILY
Status: COMPLETED | OUTPATIENT
Start: 2023-12-15 | End: 2023-12-19

## 2023-12-15 RX ORDER — ALPRAZOLAM 0.5 MG/1
0.5 TABLET ORAL 3 TIMES DAILY PRN
Status: DISCONTINUED | OUTPATIENT
Start: 2023-12-15 | End: 2023-12-21 | Stop reason: HOSPADM

## 2023-12-15 RX ORDER — MUPIROCIN 20 MG/G
OINTMENT TOPICAL 2 TIMES DAILY
Status: CANCELLED | OUTPATIENT
Start: 2023-12-15 | End: 2023-12-20

## 2023-12-15 RX ORDER — MECLIZINE HCL 12.5 MG 12.5 MG/1
50 TABLET ORAL 3 TIMES DAILY PRN
Status: DISCONTINUED | OUTPATIENT
Start: 2023-12-15 | End: 2023-12-21 | Stop reason: HOSPADM

## 2023-12-15 RX ADMIN — LEVOFLOXACIN 250 MG: 250 TABLET, FILM COATED ORAL at 05:12

## 2023-12-15 RX ADMIN — DOCUSATE SODIUM 100 MG: 100 CAPSULE, LIQUID FILLED ORAL at 08:12

## 2023-12-15 RX ADMIN — AMIODARONE HYDROCHLORIDE 200 MG: 200 TABLET ORAL at 08:12

## 2023-12-15 RX ADMIN — ALPRAZOLAM 0.5 MG: 0.5 TABLET ORAL at 09:12

## 2023-12-15 RX ADMIN — POLYETHYLENE GLYCOL 3350 17 G: 17 POWDER, FOR SOLUTION ORAL at 09:12

## 2023-12-15 RX ADMIN — MUPIROCIN 1 G: 20 OINTMENT TOPICAL at 08:12

## 2023-12-15 RX ADMIN — MUPIROCIN: 20 OINTMENT TOPICAL at 12:12

## 2023-12-15 RX ADMIN — MECLIZINE HYDROCHLORIDE 50 MG: 12.5 TABLET ORAL at 08:12

## 2023-12-15 RX ADMIN — PROPRANOLOL HYDROCHLORIDE 10 MG: 10 TABLET ORAL at 08:12

## 2023-12-15 NOTE — ASSESSMENT & PLAN NOTE
Recent TAVR on 11/1 by Dr. Moncada  Pt developed complete heart block post procedure and underwent pacemaker placement on 11/2

## 2023-12-15 NOTE — ASSESSMENT & PLAN NOTE
Open reduction internal fixation of right ankle trimalleolar fracture   PT/OT  Probably need SNF Placement

## 2023-12-15 NOTE — PT/OT/SLP EVAL
"Physical Therapy Evaluation    Patient Name:  Irene العراقي   MRN:  [id number]    Recommendations:     Discharge Recommendations: Moderate Intensity Therapy   Discharge Equipment Recommendations: to be determined by next level of care   Barriers to discharge: Decreased caregiver support    Assessment:     Irene العراقي is a 83 y.o. female admitted with a medical diagnosis of Closed fracture of right ankle.  She presents with the following impairments/functional limitations: weakness, impaired endurance, impaired self care skills, impaired functional mobility, gait instability, decreased lower extremity function, decreased safety awareness, pain, impaired cardiopulmonary response to activity, orthopedic precautions     Rehab Prognosis: Good and Fair; patient would benefit from acute skilled PT services to address these deficits and reach maximum level of function.    Recent Surgery: Procedure(s) (LRB):  ORIF, ANKLE (Right) 2 Days Post-Op    Plan:     During this hospitalization, patient to be seen daily to address the identified rehab impairments via therapeutic activities, therapeutic exercises and progress toward the following goals:    Plan of Care Expires:  01/15/24    Subjective     Chief Complaint: decreased mobility  Patient/Family Comments/goals: Progressive mobilization  Pain/Comfort:  Pain Rating 1:  (Did not rate)  Location - Side 1: Right  Location 1: ankle  Pain Addressed 1: Reposition, Distraction, Cessation of Activity    Patients cultural, spiritual, Denominational conflicts given the current situation:      Living Environment:  Pt lives at home alone in  a [facility]   Prior to admission, patients level of function was Mod I with rollator.  Equipment used at home: rollator,f  DME owned (not currently used): wheelchair.  Upon discharge, patient will have assistance from facility/ family member who live "2 doors down from me.".    Objective:     Communicated with nurse prior to session.  Patient found supine " with araya catheter, oxygen, telemetry  upon PT entry to room.    General Precautions: Standard, fall  Orthopedic Precautions:RLE non weight bearing   Braces:    Respiratory Status: Nasal cannula, flow 2 L/min    Exams:  Cognitive Exam:  Patient is oriented to Person, Place, Time, and Situation  RLE ROM: impaired . Long cast in place  RLE Strength: 3/5  LLE ROM: WFL  LLE Strength: WFL    Functional Mobility:  Bed Mobility:     Rolling Left:  moderate assistance  Rolling Right: moderate assistance  Supine to Sit: moderate assistance  Sit to Supine: moderate assistance  Balance: unsupported sitting balance      AM-PAC 6 CLICK MOBILITY  Total Score:        Treatment & Education:  Pt was educated on safety, use of call light, PT POC/DC recommendations. Bed mobility and balance as indicated above    Patient left supine with all lines intact, call button in reach, bed alarm on, and nurse notified.    GOALS:   Multidisciplinary Problems       Physical Therapy Goals          Problem: Physical Therapy    Goal Priority Disciplines Outcome Goal Variances Interventions   Physical Therapy Goal     PT, PT/OT      Description: Goals to be met by: 1/15/2024     Patient will increase functional independence with mobility by performin. Supine to sit with Contact Guard Assistance  2. Sit to stand transfer with Contact Guard Assistance  3. Bed to chair transfer with Minimal Assistance using Rolling Walker                         History:     Past Medical History:   Diagnosis Date    Anemia, unspecified     Atrial fibrillation 2021    CKD (chronic kidney disease)     Hypertension        Past Surgical History:   Procedure Laterality Date    A-V CARDIAC PACEMAKER INSERTION  2023    Procedure: Dual Chamber PPM RM 2617 ([employer]);  Surgeon: Andreas James III, MD;  Location: [facility] CATH;  Service: Cardiology;;    ANGIOGRAM, CORONARY, WITH LEFT HEART CATHETERIZATION Left 2023    Procedure: Angiogram, Coronary, with  Left Heart Cath;  Surgeon: Kevin Redmond MD;  Location: [facility] CATH/EP LAB;  Service: Cardiology;  Laterality: Left;    BREAST SURGERY      COLONOSCOPY N/A 4/16/2023    Procedure: COLONOSCOPY;  Surgeon: Kvng Mensah MD;  Location: [facility] ENDO;  Service: Endoscopy;  Laterality: N/A;    COLONOSCOPY W/ POLYPECTOMY  04/16/2023    OPEN REDUCTION AND INTERNAL FIXATION (ORIF) OF INJURY OF ANKLE Right 12/13/2023    Procedure: ORIF, ANKLE;  Surgeon: Chaitanya Garrison MD;  Location: [facility] OR;  Service: Orthopedics;  Laterality: Right;  Synthes    TRANSCATHETER AORTIC VALVE REPLACEMENT (TAVR)  11/1/2023    Procedure: (TAVR);  Surgeon: Juliano Moncada MD;  [email or web address] CATH;  Service: Cardiology;;    TRANSCATHETER AORTIC VALVE REPLACEMENT (TAVR)  11/1/2023    Procedure: (TAVR)- Surgeon;  Surgeon: Adebayo Guzman MD;  Location: [email or web address] CATH;  Service: Peripheral Vascular;;       Time Tracking:     PT Received On: 12/15/23  PT Start Time: 1400     PT Stop Time: 1418  PT Total Time (min): 18 min     Billable Minutes: Evaluation 9 minutes  and Therapeutic Activity 9 minutes      12/15/2023

## 2023-12-15 NOTE — SUBJECTIVE & OBJECTIVE
Interval History:       Review of Systems   Constitutional:  Negative for activity change and appetite change.   HENT:  Negative for congestion and dental problem.    Eyes:  Negative for discharge and itching.   Respiratory:  Negative for shortness of breath.    Cardiovascular:  Negative for chest pain.   Gastrointestinal:  Negative for abdominal distention and abdominal pain.   Endocrine: Negative for cold intolerance.   Genitourinary:  Negative for difficulty urinating and dysuria.   Musculoskeletal:  Positive for arthralgias. Negative for back pain.   Skin:  Negative for color change.   Neurological:  Negative for dizziness and facial asymmetry.   Hematological:  Negative for adenopathy.   Psychiatric/Behavioral:  Negative for agitation and behavioral problems.      Objective:     Vital Signs (Most Recent):  Temp: 98.2 °F (36.8 °C) (12/14/23 1941)  Pulse: 64 (12/14/23 1941)  Resp: 18 (12/14/23 1941)  BP: 139/63 (12/14/23 1941)  SpO2: 98 % (12/14/23 1941) Vital Signs (24h Range):  Temp:  [98 °F (36.7 °C)-98.6 °F (37 °C)] 98.2 °F (36.8 °C)  Pulse:  [60-65] 64  Resp:  [16-18] 18  SpO2:  [95 %-98 %] 98 %  BP: (105-139)/(54-63) 139/63     Weight: 70 kg (154 lb 5.2 oz)  Body mass index is 24.91 kg/m².    Intake/Output Summary (Last 24 hours) at 12/14/2023 2155  Last data filed at 12/14/2023 1802  Gross per 24 hour   Intake 360 ml   Output 400 ml   Net -40 ml         Physical Exam  Vitals and nursing note reviewed.   Constitutional:       General: She is not in acute distress.  HENT:      Head: Atraumatic.      Right Ear: External ear normal.      Left Ear: External ear normal.      Nose: Nose normal.      Mouth/Throat:      Mouth: Mucous membranes are moist.   Eyes:      Extraocular Movements: Extraocular movements intact.   Cardiovascular:      Rate and Rhythm: Normal rate.   Pulmonary:      Effort: Pulmonary effort is normal.   Musculoskeletal:         General: Normal range of motion.      Cervical back: Normal range  of motion.      Comments: Bandage intact   Skin:     General: Skin is warm.   Neurological:      Mental Status: She is alert and oriented to person, place, and time.   Psychiatric:         Behavior: Behavior normal.             Significant Labs: All pertinent labs within the past 24 hours have been reviewed.  CBC:   Recent Labs   Lab 12/13/23  0603 12/14/23  0615   WBC 5.10 9.85   HGB 9.0* 9.2*   HCT 27.2* 27.6*   PLT 87* 102*     CMP:   Recent Labs   Lab 12/13/23  0603 12/14/23  0614    134*   K 4.1 4.6    104   CO2 25 23   GLU 88 133*   BUN 25* 28*   CREATININE 1.6* 1.6*   CALCIUM 8.9 9.0   PROT 5.4* 5.9*   ALBUMIN 3.3* 3.5   BILITOT 0.6 0.5   ALKPHOS 87 92   AST 24 25   ALT 21 20   ANIONGAP 6* 7*       Significant Imaging: I have reviewed all pertinent imaging results/findings within the past 24 hours.

## 2023-12-15 NOTE — PROGRESS NOTES
"[facility] Medicine  Progress Note    Patient Name: Irene العراقي  MRN: [id number]  Patient Class: IP- Inpatient   Admission Date: 12/11/2023  Length of Stay: 2 days  Attending Physician: Gera Szymanski MD  Primary Care Provider: Wanda Kuhn, DEBORAHP-C        Subjective:     Principal Problem:Closed fracture of right ankle        HPI:  Ms. العراقي is an 83 yr old female with Pmhx of  atrial fibrillation, CKD, HTN, 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 presenting today for lightheadedness, weakness and presyncope. Pt reports yesterday she was experiencing fatigue, lightheadedness and generalized weakness and she states symptoms were mild. This morning when she woke up her symptoms persisted and became severe. Pt states she was in the kitchen making breakfast and felt severe lightheadedness and felt like she was going to pass out so she eased her self down on the ground and crawled to the phone to call her daughter. Pt denies cp, sob, n/v or diaphoresis. Pt reports when EMS arrived her BP was in the 180's systolic. Pt experienced trauma to her right ankle with the fall. Xray confirmed displaced lateral malleolar fracture and splint was applied in ED. Pt will be admitted to hospital med services for further workup and management.       Overview/Hospital Course:   12/12: Notes reviewed, no acute events overnight. Eval by ortho this AM who rec transfer to [facility] for surgical repair of right ankle tomorrow. Ortho aware pt will need cardiac clearance prior to surgery. Pt c/o pain to her right ankle today and received oxy 5mg. Pt feels like the pain med is too strong and she is reporting feeling "woozy" now after med admin. She states her ankle pain has improved. She reports feeling lightheaded and weak this morning and states symptoms are similar to what she was feeling when she arrived to ED. She denies cp or sob. Will await echo and cards consult and recs. I spoke with hosp " med team at [facility] and pt accepted by Dr. Jones. Pt aggreeable for transfer.      12/13: Patient was admitted for syncope over at [facility] was transferred to [facility] on request of orthopedic surgeon surgical repair of right ankle fracture. Patient was evaluated by Cardiology and underwent echocardiogram and pacemaker interrogation at [facility] prior to discharge and patient's Eliquis was held prior to surgery.  Patient is currently being prepped for surgery today and has no acute overnight events.      12/14  Overall feels better  Pt thinks she need SNF placement  No new issues     Interval History:       Review of Systems   Constitutional:  Negative for activity change and appetite change.   HENT:  Negative for congestion and dental problem.    Eyes:  Negative for discharge and itching.   Respiratory:  Negative for shortness of breath.    Cardiovascular:  Negative for chest pain.   Gastrointestinal:  Negative for abdominal distention and abdominal pain.   Endocrine: Negative for cold intolerance.   Genitourinary:  Negative for difficulty urinating and dysuria.   Musculoskeletal:  Positive for arthralgias. Negative for back pain.   Skin:  Negative for color change.   Neurological:  Negative for dizziness and facial asymmetry.   Hematological:  Negative for adenopathy.   Psychiatric/Behavioral:  Negative for agitation and behavioral problems.      Objective:     Vital Signs (Most Recent):  Temp: 98.2 °F (36.8 °C) (12/14/23 1941)  Pulse: 64 (12/14/23 1941)  Resp: 18 (12/14/23 1941)  BP: 139/63 (12/14/23 1941)  SpO2: 98 % (12/14/23 1941) Vital Signs (24h Range):  Temp:  [98 °F (36.7 °C)-98.6 °F (37 °C)] 98.2 °F (36.8 °C)  Pulse:  [60-65] 64  Resp:  [16-18] 18  SpO2:  [95 %-98 %] 98 %  BP: (105-139)/(54-63) 139/63     Weight: 70 kg (154 lb 5.2 oz)  Body mass index is 24.91 kg/m².    Intake/Output Summary (Last 24 hours) at 12/14/2023 2159  Last data filed at 12/14/2023 1802  Gross per 24 hour   Intake 360 ml    Output 400 ml   Net -40 ml         Physical Exam  Vitals and nursing note reviewed.   Constitutional:       General: She is not in acute distress.  HENT:      Head: Atraumatic.      Right Ear: External ear normal.      Left Ear: External ear normal.      Nose: Nose normal.      Mouth/Throat:      Mouth: Mucous membranes are moist.   Eyes:      Extraocular Movements: Extraocular movements intact.   Cardiovascular:      Rate and Rhythm: Normal rate.   Pulmonary:      Effort: Pulmonary effort is normal.   Musculoskeletal:         General: Normal range of motion.      Cervical back: Normal range of motion.      Comments: Bandage intact   Skin:     General: Skin is warm.   Neurological:      Mental Status: She is alert and oriented to person, place, and time.   Psychiatric:         Behavior: Behavior normal.             Significant Labs: All pertinent labs within the past 24 hours have been reviewed.  CBC:   Recent Labs   Lab 12/13/23  0603 12/14/23  0615   WBC 5.10 9.85   HGB 9.0* 9.2*   HCT 27.2* 27.6*   PLT 87* 102*     CMP:   Recent Labs   Lab 12/13/23  0603 12/14/23  0614    134*   K 4.1 4.6    104   CO2 25 23   GLU 88 133*   BUN 25* 28*   CREATININE 1.6* 1.6*   CALCIUM 8.9 9.0   PROT 5.4* 5.9*   ALBUMIN 3.3* 3.5   BILITOT 0.6 0.5   ALKPHOS 87 92   AST 24 25   ALT 21 20   ANIONGAP 6* 7*       Significant Imaging: I have reviewed all pertinent imaging results/findings within the past 24 hours.    Assessment/Plan:      * Closed fracture of right ankle  Open reduction internal fixation of right ankle trimalleolar fracture   PT/OT  Probably need SNF Placement      UTI (urinary tract infection)  Maintain PO abx      Postural dizziness with presyncope  Resolved     S/P TAVR (transcatheter aortic valve replacement)  Recent TAVR on 11/1 by Dr. Moncada  Pt developed complete heart block post procedure and underwent pacemaker placement on 11/2      Stage 4 chronic kidney disease  Creatine stable for now. BMP  reviewed- noted Estimated Creatinine Clearance: 24.9 mL/min (A) (based on SCr of 1.6 mg/dL (H)). according to latest data. 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  Monitor UOP and serial BMP and adjust therapy as needed. Renally dose meds. Avoid nephrotoxic medications and procedures.    Hypertension  Chronic, controlled. Latest blood pressure and vitals reviewed-     Temp:  [98 °F (36.7 °C)-98.6 °F (37 °C)]   Pulse:  [60-65]   Resp:  [16-18]   BP: (105-139)/(54-63)   SpO2:  [95 %-98 %] .   Home meds for hypertension were reviewed and noted below.   Hypertension Medications               furosemide (LASIX) 20 MG tablet Take 1 tablet (20 mg total) by mouth daily as needed. Take for swelling or wt gain > 2 lbs / 24 hr    propranoloL (INDERAL) 10 MG tablet Take 1 tablet (10 mg total) by mouth 2 (two) times daily.            While in the hospital, will manage blood pressure as follows; Continue home antihypertensive regimen    Will utilize p.r.n. blood pressure medication only if patient's blood pressure greater than 180/110 and she develops symptoms such as worsening chest pain or shortness of breath.    PAF (paroxysmal atrial fibrillation)  Patient with Long standing persistent (>12 months) atrial fibrillation which is controlled currently with Amiodarone. Patient is currently in sinus rhythm paced rhythm.[id number] Score: 3. Anticoagulation indicated. Anticoagulation done with eliquis which is currently being held.  Obtain clearance to resume it after surgery      VTE Risk Mitigation (From admission, onward)           Ordered     IP VTE HIGH RISK PATIENT  Once         12/14/23 0640     Place KRISTINA hose  Until discontinued         12/14/23 0640     Place sequential compression device  Until discontinued         12/14/23 0640     Place sequential compression device  Until discontinued         12/11/23 2194     Reason for No Pharmacological VTE Prophylaxis  Once        Question:  Reasons:  Answer:  Already  adequately anticoagulated on oral Anticoagulants    12/11/23 1524                    Discharge Planning   YANET: 12/16/2023     Code Status: Full Code   Is the patient medically ready for discharge?:     Reason for patient still in hospital (select all that apply): Pending disposition  Discharge Plan A: Home with family, Home Health                  Gera Szymanski MD  Department of Hospital Medicine   [facility] hospital

## 2023-12-15 NOTE — ASSESSMENT & PLAN NOTE
Patient with Long standing persistent (>12 months) atrial fibrillation which is controlled currently with Amiodarone. Patient is currently in sinus rhythm paced rhythm.[id number] Score: 3. Anticoagulation indicated. Anticoagulation done with eliquis which is currently being held.  Obtain clearance to resume it after surgery

## 2023-12-15 NOTE — ASSESSMENT & PLAN NOTE
Chronic, controlled. Latest blood pressure and vitals reviewed-     Temp:  [98 °F (36.7 °C)-98.6 °F (37 °C)]   Pulse:  [60-65]   Resp:  [16-18]   BP: (105-139)/(54-63)   SpO2:  [95 %-98 %] .   Home meds for hypertension were reviewed and noted below.   Hypertension Medications               furosemide (LASIX) 20 MG tablet Take 1 tablet (20 mg total) by mouth daily as needed. Take for swelling or wt gain > 2 lbs / 24 hr    propranoloL (INDERAL) 10 MG tablet Take 1 tablet (10 mg total) by mouth 2 (two) times daily.            While in the hospital, will manage blood pressure as follows; Continue home antihypertensive regimen    Will utilize p.r.n. blood pressure medication only if patient's blood pressure greater than 180/110 and she develops symptoms such as worsening chest pain or shortness of breath.

## 2023-12-15 NOTE — PT/OT/SLP EVAL
Occupational Therapy   Evaluation    Name: Irene العراقي  MRN: [id number]  Admitting Diagnosis: Closed fracture of right ankle  Recent Surgery: Procedure(s) (LRB):  ORIF, ANKLE (Right) 2 Days Post-Op    Recommendations:     Discharge Recommendations: Moderate Intensity Therapy  Discharge Equipment Recommendations:  to be determined by next level of care  Barriers to discharge:  Decreased caregiver support    Assessment:     Irene العراقي is a 83 y.o. female with a medical diagnosis of Closed fracture of right ankle.  Upon arrival, patient in bed asleep but awoke easily to voice. Patient somnolent initially, but noted improved mental alertness with bed mobility. While seated EOB, patient reported dizziness. Patient then stated dizziness was improving. After sitting EOB for ~8 min, patient reported worsening dizziness and requested to return supine in bed. Further participation limited due to dizziness. Performance deficits affecting function: weakness, impaired endurance, impaired self care skills, impaired functional mobility, gait instability, impaired balance, decreased lower extremity function, decreased coordination, decreased ROM, orthopedic precautions.      Rehab Prognosis: Good; patient would benefit from acute skilled OT services to address these deficits and reach maximum level of function.       Plan:     Patient to be seen 5 x/week to address the above listed problems via self-care/home management, therapeutic activities, therapeutic exercises  Plan of Care Expires: 01/12/24  Plan of Care Reviewed with: patient    Subjective     Chief Complaint: dizziness  Patient/Family Comments/goals: none    Occupational Profile:  Living Environment: Patient lives alone in a [facility] with 1-2 KRISTEL.   Previous level of function: Patient was independent with ADLs and ambulatory with rollator.   Equipment Used at Home: rollator, wheelchair  Assistance upon Discharge: Patient has limited assistance at home and would benefit  Writer left message on home phone stating script was sent to pharmacy.    from SNF placement.     Pain/Comfort:  Pain Rating 1:  (not rated)  Location - Side 1: Right  Location - Orientation 1: generalized  Location 1: ankle  Pain Addressed 1: Reposition, Distraction  Pain Rating Post-Intervention 1:  (not rated)    Patients cultural, spiritual, Mu-ism conflicts given the current situation: no    Objective:     Communicated with: nurse Garcia prior to session.  Patient found HOB elevated with araya catheter, oxygen, telemetry upon OT entry to room.    General Precautions: Standard, fall  Orthopedic Precautions: RLE non weight bearing  Braces:  (RLE splint)  Respiratory Status: Nasal cannula, flow 1 L/min    Occupational Performance:    Bed Mobility:    Patient completed Scooting/Bridging with moderate assistance  Patient completed Supine to Sit with moderate assistance  Patient completed Sit to Supine with maximal assistance    Functional Mobility/Transfers:  Not performed due to c/o dizziness at EOB    Activities of Daily Living:  Feeding:  stand by assistance with eating lunch while seated EOB  Grooming: stand by assistance with facial hygiene while seated EOB  Lower Body Dressing: maximal assistance to don/doff L sock in bed  Toileting: dependence with araya cath in place    Cognitive/Visual Perceptual:  Cognitive/Psychosocial Skills:     -       Oriented to: x4   -       Follows Commands/attention:Follows multistep  commands  -       Communication: clear/fluent  -       Safety awareness/insight to disability: impaired   -       Mood/Affect/Coping skills/emotional control: Appropriate to situation and Cooperative  Visual/Perceptual:      -Intact     Physical Exam:  Postural examination/scapula alignment:    -       Rounded shoulders  -       Forward head  Upper Extremity Range of Motion:     -       Right Upper Extremity: WFL  -       Left Upper Extremity: WFL  Upper Extremity Strength:    -       Right Upper Extremity: WFL  -       Left Upper Extremity: WFL   Strength:    -        Right Upper Extremity: WFL  -       Left Upper Extremity: WFL  Fine Motor Coordination:    -       Intact  Gross motor coordination:   WFL in BUE    AMPAC 6 Click ADL:  AMPAC Total Score: 16    Treatment & Education:  OT ed pt on OT role & POC as well as discharge recommendations.  OT ed patient on safety with walker use for functional mobility with cues for hand placement & sequencing.   Therapist reviewed with patient RLE NWB precautions with mobility.    Patient left HOB elevated with all lines intact, call button in reach, bed alarm on, and nurse notified    GOALS:   Multidisciplinary Problems       Occupational Therapy Goals          Problem: Occupational Therapy    Goal Priority Disciplines Outcome Interventions   Occupational Therapy Goal     OT, PT/OT Ongoing, Progressing    Description: Goals to be met by: 1/12/2024     Patient will increase functional independence with ADLs by performing:    LE Dressing with Minimal Assistance and Assistive Devices as needed.  Grooming while seated with Supervision.  Toileting from bedside commode with Minimal Assistance for hygiene and clothing management.   Supine to sit with Contact Guard Assistance.  Toilet transfer to bedside commode with Minimal Assistance and maintaining weight-bearing precaution(s).                         History:     Past Medical History:   Diagnosis Date    Anemia, unspecified     Atrial fibrillation 01/25/2021    CKD (chronic kidney disease)     Hypertension          Past Surgical History:   Procedure Laterality Date    A-V CARDIAC PACEMAKER INSERTION  11/2/2023    Procedure: Dual Chamber PPM RM 2617 ([employer]);  Surgeon: Andreas James III, MD;  Location: [facility] CATH;  Service: Cardiology;;    ANGIOGRAM, CORONARY, WITH LEFT HEART CATHETERIZATION Left 4/19/2023    Procedure: Angiogram, Coronary, with Left Heart Cath;  Surgeon: Kevin Redmond MD;  Location[facility] CATH/EP LAB;  Service: Cardiology;  Laterality: Left;    BREAST SURGERY       COLONOSCOPY N/A 4/16/2023    Procedure: COLONOSCOPY;  Surgeon: Kvng Mensah MD;  Location: [facility] ENDO;  Service: Endoscopy;  Laterality: N/A;    COLONOSCOPY W/ POLYPECTOMY  04/16/2023    OPEN REDUCTION AND INTERNAL FIXATION (ORIF) OF INJURY OF ANKLE Right 12/13/2023    Procedure: ORIF, ANKLE;  Surgeon: Chaitanya Garrison MD;  Location: [facility] OR;  Service: Orthopedics;  Laterality: Right;  Synthes    TRANSCATHETER AORTIC VALVE REPLACEMENT (TAVR)  11/1/2023    Procedure: (TAVR);  Surgeon: Juliano Moncada MD;  Location: [facility] CATH;  Service: Cardiology;;    TRANSCATHETER AORTIC VALVE REPLACEMENT (TAVR)  11/1/2023    Procedure: (TAVR)- Surgeon;  Surgeon: Adebayo Guzman MD;  Location: [facility] CATH;  Service: Peripheral Vascular;;       Time Tracking:     OT Date of Treatment: 12/15/23  OT Start Time: 1158  OT Stop Time: 1232  OT Total Time (min): 34 min    Billable Minutes:Evaluation 10  Self Care/Home Management 24    12/15/2023

## 2023-12-15 NOTE — NURSING
Report given to Jose HDZ. Patient transported to 1123 via bed by nursing extenders. Cardiac monitor in place. Chart sent with patient. Patients phone, , eye glasses and two personal belongings bags transferred with patient.

## 2023-12-15 NOTE — PROGRESS NOTES
[facility]  Orthopedics  Progress Note    Patient Name: Irene العراقي  MRN: [id number]  Admission Date: 12/11/2023  Hospital Length of Stay: 3 days  Attending Provider: Gera Szymanski MD  Primary Care Provider: Wanda Kuhn FNP-C  Follow-up For: Procedure(s) (LRB):  ORIF, ANKLE (Right)    Post-Operative Day: 2 Days Post-Op  Subjective:     Principal Problem:Closed fracture of right ankle    Principal Orthopedic Problem: R Ankle Fx     Interval History: S/P ORIF R Ankle    Review of patient's allergies indicates:   Allergen Reactions    Cefuroxime     Hydrocodone     Meperidine     Meperidine hcl Nausea And Vomiting    Persantine [dipyridamole]     Nitrofurantoin macrocrystal      Headache , went into Afib     Vicodin [hydrocodone-acetaminophen] Nausea And Vomiting       Current Facility-Administered Medications   Medication    acetaminophen tablet 650 mg    amiodarone tablet 200 mg    dextrose 50% injection 12.5 g    dextrose 50% injection 25 g    docusate sodium capsule 100 mg    glucagon (human recombinant) injection 1 mg    glucose chewable tablet 16 g    glucose chewable tablet 24 g    levoFLOXacin tablet 250 mg    magnesium oxide tablet 400 mg    magnesium oxide tablet 800 mg    magnesium oxide tablet 800 mg    naloxone 0.4 mg/mL injection 0.02 mg    ondansetron injection 4 mg    oxyCODONE immediate release tablet 5 mg    oxyCODONE immediate release tablet Tab 10 mg    polyethylene glycol packet 17 g    potassium bicarbonate disintegrating tablet 35 mEq    potassium bicarbonate disintegrating tablet 50 mEq    potassium bicarbonate disintegrating tablet 60 mEq    potassium, sodium phosphates 280-160-250 mg packet 2 packet    potassium, sodium phosphates 280-160-250 mg packet 2 packet    potassium, sodium phosphates 280-160-250 mg packet 2 packet    propranoloL tablet 10 mg    sodium chloride 0.9% flush 10 mL     Objective:     Vital Signs (Most Recent):  Temp: 97.9 °F (36.6 °C) (12/15/23  "0333)  Pulse: 67 (12/15/23 0333)  Resp: 18 (12/15/23 0333)  BP: (!) 110/56 (12/15/23 0333)  SpO2: (!) 94 % (12/15/23 0333) Vital Signs (24h Range):  Temp:  [97.9 °F (36.6 °C)-98.4 °F (36.9 °C)] 97.9 °F (36.6 °C)  Pulse:  [60-69] 67  Resp:  [16-18] 18  SpO2:  [93 %-98 %] 94 %  BP: (105-139)/(54-63) 110/56     Weight: 69.6 kg (153 lb 7 oz)  Height: 5' 6" (167.6 cm)  Body mass index is 24.77 kg/m².      Intake/Output Summary (Last 24 hours) at 12/15/2023 0629  Last data filed at 12/15/2023 0253  Gross per 24 hour   Intake 360 ml   Output 1700 ml   Net -1340 ml        General    Nursing note and vitals reviewed.  Constitutional: She appears well-developed and well-nourished.   Neurological: She is alert.   Psychiatric: She has a normal mood and affect. Her behavior is normal.         Right Ankle/Foot Exam     Comments:  Cap refill <3 sec    DSG C/D/I, DNVI        Vascular Exam       Edema  Right Lower Leg: absent       Significant Labs: BMP:   Recent Labs   Lab 12/15/23  0446   GLU 87   *   K 4.6      CO2 24   BUN 30*   CREATININE 1.6*   CALCIUM 9.3   MG 2.1     CBC:   Recent Labs   Lab 12/14/23  0615 12/15/23  0446   WBC 9.85 6.51   HGB 9.2* 8.9*   HCT 27.6* 27.4*   * 100*     CMP:   Recent Labs   Lab 12/14/23  0614 12/15/23  0446   * 135*   K 4.6 4.6    103   CO2 23 24   * 87   BUN 28* 30*   CREATININE 1.6* 1.6*   CALCIUM 9.0 9.3   PROT 5.9* 5.7*   ALBUMIN 3.5 3.4*   BILITOT 0.5 0.5   ALKPHOS 92 83   AST 25 29   ALT 20 19   ANIONGAP 7* 8     Coagulation: No results for input(s): "LABPROT", "INR", "APTT" in the last 48 hours.  Urine Culture: No results for input(s): "LABURIN" in the last 48 hours.  Urine Studies: No results for input(s): "COLORU", "APPEARANCEUA", "PHUR", "SPECGRAV", "PROTEINUA", "GLUCUA", "KETONESU", "BILIRUBINUA", "OCCULTUA", "NITRITE", "UROBILINOGEN", "LEUKOCYTESUR", "RBCUA", "WBCUA", "BACTERIA", "SQUAMEPITHEL", "HYALINECASTS" in the last 48 hours.    Invalid " "input(s): "KAR"  All pertinent labs within the past 24 hours have been reviewed.    Significant Imaging: None  Assessment/Plan:     * Closed fracture of right ankle  POD#2    NWB RLE  PT and OT for mobility  DC planning          MARIE Saini  Orthopedics  [facility]    "

## 2023-12-15 NOTE — SUBJECTIVE & OBJECTIVE
"Principal Problem:Closed fracture of right ankle    Principal Orthopedic Problem: R Ankle Fx     Interval History: S/P ORIF R Ankle    Review of patient's allergies indicates:   Allergen Reactions    Cefuroxime     Hydrocodone     Meperidine     Meperidine hcl Nausea And Vomiting    Persantine [dipyridamole]     Nitrofurantoin macrocrystal      Headache , went into Afib     Vicodin [hydrocodone-acetaminophen] Nausea And Vomiting       Current Facility-Administered Medications   Medication    acetaminophen tablet 650 mg    amiodarone tablet 200 mg    dextrose 50% injection 12.5 g    dextrose 50% injection 25 g    docusate sodium capsule 100 mg    glucagon (human recombinant) injection 1 mg    glucose chewable tablet 16 g    glucose chewable tablet 24 g    levoFLOXacin tablet 250 mg    magnesium oxide tablet 400 mg    magnesium oxide tablet 800 mg    magnesium oxide tablet 800 mg    naloxone 0.4 mg/mL injection 0.02 mg    ondansetron injection 4 mg    oxyCODONE immediate release tablet 5 mg    oxyCODONE immediate release tablet Tab 10 mg    polyethylene glycol packet 17 g    potassium bicarbonate disintegrating tablet 35 mEq    potassium bicarbonate disintegrating tablet 50 mEq    potassium bicarbonate disintegrating tablet 60 mEq    potassium, sodium phosphates 280-160-250 mg packet 2 packet    potassium, sodium phosphates 280-160-250 mg packet 2 packet    potassium, sodium phosphates 280-160-250 mg packet 2 packet    propranoloL tablet 10 mg    sodium chloride 0.9% flush 10 mL     Objective:     Vital Signs (Most Recent):  Temp: 97.9 °F (36.6 °C) (12/15/23 0333)  Pulse: 67 (12/15/23 0333)  Resp: 18 (12/15/23 0333)  BP: (!) 110/56 (12/15/23 0333)  SpO2: (!) 94 % (12/15/23 0333) Vital Signs (24h Range):  Temp:  [97.9 °F (36.6 °C)-98.4 °F (36.9 °C)] 97.9 °F (36.6 °C)  Pulse:  [60-69] 67  Resp:  [16-18] 18  SpO2:  [93 %-98 %] 94 %  BP: (105-139)/(54-63) 110/56     Weight: 69.6 kg (153 lb 7 oz)  Height: 5' 6" (167.6 " "cm)  Body mass index is 24.77 kg/m².      Intake/Output Summary (Last 24 hours) at 12/15/2023 0629  Last data filed at 12/15/2023 0253  Gross per 24 hour   Intake 360 ml   Output 1700 ml   Net -1340 ml        General    Nursing note and vitals reviewed.  Constitutional: She appears well-developed and well-nourished.   Neurological: She is alert.   Psychiatric: She has a normal mood and affect. Her behavior is normal.         Right Ankle/Foot Exam     Comments:  Cap refill <3 sec    DSG C/D/I, DNVI        Vascular Exam       Edema  Right Lower Leg: absent       Significant Labs: BMP:   Recent Labs   Lab 12/15/23  0446   GLU 87   *   K 4.6      CO2 24   BUN 30*   CREATININE 1.6*   CALCIUM 9.3   MG 2.1     CBC:   Recent Labs   Lab 12/14/23  0615 12/15/23  0446   WBC 9.85 6.51   HGB 9.2* 8.9*   HCT 27.6* 27.4*   * 100*     CMP:   Recent Labs   Lab 12/14/23  0614 12/15/23  0446   * 135*   K 4.6 4.6    103   CO2 23 24   * 87   BUN 28* 30*   CREATININE 1.6* 1.6*   CALCIUM 9.0 9.3   PROT 5.9* 5.7*   ALBUMIN 3.5 3.4*   BILITOT 0.5 0.5   ALKPHOS 92 83   AST 25 29   ALT 20 19   ANIONGAP 7* 8     Coagulation: No results for input(s): "LABPROT", "INR", "APTT" in the last 48 hours.  Urine Culture: No results for input(s): "LABURIN" in the last 48 hours.  Urine Studies: No results for input(s): "COLORU", "APPEARANCEUA", "PHUR", "SPECGRAV", "PROTEINUA", "GLUCUA", "KETONESU", "BILIRUBINUA", "OCCULTUA", "NITRITE", "UROBILINOGEN", "LEUKOCYTESUR", "RBCUA", "WBCUA", "BACTERIA", "SQUAMEPITHEL", "HYALINECASTS" in the last 48 hours.    Invalid input(s): "WRIGHTSUR"  All pertinent labs within the past 24 hours have been reviewed.    Significant Imaging: None  "

## 2023-12-15 NOTE — PLAN OF CARE
Problem: Occupational Therapy  Goal: Occupational Therapy Goal  Description: Goals to be met by: 1/12/2024     Patient will increase functional independence with ADLs by performing:    LE Dressing with Minimal Assistance and Assistive Devices as needed.  Grooming while seated with Supervision.  Toileting from bedside commode with Minimal Assistance for hygiene and clothing management.   Supine to sit with Contact Guard Assistance.  Toilet transfer to bedside commode with Minimal Assistance and maintaining weight-bearing precaution(s).    Outcome: Ongoing, Progressing

## 2023-12-15 NOTE — PLAN OF CARE
Problem: Adult Inpatient Plan of Care  Goal: Plan of Care Review  Outcome: Ongoing, Progressing  Goal: Patient-Specific Goal (Individualized)  Outcome: Ongoing, Progressing  Goal: Absence of Hospital-Acquired Illness or Injury  Outcome: Ongoing, Progressing  Goal: Optimal Comfort and Wellbeing  Outcome: Ongoing, Progressing  Goal: Readiness for Transition of Care  Outcome: Ongoing, Progressing     Problem: Fall Injury Risk  Goal: Absence of Fall and Fall-Related Injury  Outcome: Ongoing, Progressing     Problem: Skin Injury Risk Increased  Goal: Skin Health and Integrity  Outcome: Ongoing, Progressing     Problem: Adjustment to Illness (Chronic Kidney Disease)  Goal: Optimal Coping with Chronic Illness  Outcome: Ongoing, Progressing

## 2023-12-15 NOTE — PLAN OF CARE
Problem: Adult Inpatient Plan of Care  Goal: Plan of Care Review  Outcome: Ongoing, Progressing  Goal: Patient-Specific Goal (Individualized)  Outcome: Ongoing, Progressing  Goal: Absence of Hospital-Acquired Illness or Injury  Outcome: Ongoing, Progressing  Goal: Optimal Comfort and Wellbeing  Outcome: Ongoing, Progressing  Goal: Readiness for Transition of Care  Outcome: Ongoing, Progressing     Problem: Fall Injury Risk  Goal: Absence of Fall and Fall-Related Injury  Outcome: Ongoing, Progressing     Problem: Skin Injury Risk Increased  Goal: Skin Health and Integrity  Outcome: Ongoing, Progressing     Problem: Adjustment to Illness (Chronic Kidney Disease)  Goal: Optimal Coping with Chronic Illness  Outcome: Ongoing, Progressing     Problem: Infection  Goal: Absence of Infection Signs and Symptoms  Outcome: Ongoing, Progressing

## 2023-12-16 PROBLEM — R13.10 DYSPHAGIA: Status: ACTIVE | Noted: 2023-12-16

## 2023-12-16 LAB
ALBUMIN SERPL BCP-MCNC: 3.1 G/DL (ref 3.5–5.2)
ALP SERPL-CCNC: 83 U/L (ref 55–135)
ALT SERPL W/O P-5'-P-CCNC: 19 U/L (ref 10–44)
ANION GAP SERPL CALC-SCNC: 3 MMOL/L (ref 8–16)
AST SERPL-CCNC: 27 U/L (ref 10–40)
BASOPHILS # BLD AUTO: 0.01 K/UL (ref 0–0.2)
BASOPHILS NFR BLD: 0.2 % (ref 0–1.9)
BILIRUB SERPL-MCNC: 0.7 MG/DL (ref 0.1–1)
BUN SERPL-MCNC: 28 MG/DL (ref 8–23)
CALCIUM SERPL-MCNC: 9 MG/DL (ref 8.7–10.5)
CHLORIDE SERPL-SCNC: 102 MMOL/L (ref 95–110)
CO2 SERPL-SCNC: 28 MMOL/L (ref 23–29)
CREAT SERPL-MCNC: 1.6 MG/DL (ref 0.5–1.4)
DIFFERENTIAL METHOD: ABNORMAL
EOSINOPHIL # BLD AUTO: 0.1 K/UL (ref 0–0.5)
EOSINOPHIL NFR BLD: 2.6 % (ref 0–8)
ERYTHROCYTE [DISTWIDTH] IN BLOOD BY AUTOMATED COUNT: 13.2 % (ref 11.5–14.5)
EST. GFR  (NO RACE VARIABLE): 31.8 ML/MIN/1.73 M^2
GLUCOSE SERPL-MCNC: 115 MG/DL (ref 70–110)
GLUCOSE SERPL-MCNC: 88 MG/DL (ref 70–110)
HCT VFR BLD AUTO: 25.8 % (ref 37–48.5)
HGB BLD-MCNC: 8.4 G/DL (ref 12–16)
IMM GRANULOCYTES # BLD AUTO: 0.03 K/UL (ref 0–0.04)
IMM GRANULOCYTES NFR BLD AUTO: 0.6 % (ref 0–0.5)
LYMPHOCYTES # BLD AUTO: 0.7 K/UL (ref 1–4.8)
LYMPHOCYTES NFR BLD: 14.3 % (ref 18–48)
MAGNESIUM SERPL-MCNC: 2 MG/DL (ref 1.6–2.6)
MCH RBC QN AUTO: 30.7 PG (ref 27–31)
MCHC RBC AUTO-ENTMCNC: 32.6 G/DL (ref 32–36)
MCV RBC AUTO: 94 FL (ref 82–98)
MONOCYTES # BLD AUTO: 0.7 K/UL (ref 0.3–1)
MONOCYTES NFR BLD: 14.1 % (ref 4–15)
NEUTROPHILS # BLD AUTO: 3.4 K/UL (ref 1.8–7.7)
NEUTROPHILS NFR BLD: 68.2 % (ref 38–73)
NRBC BLD-RTO: 0 /100 WBC
PLATELET # BLD AUTO: 102 K/UL (ref 150–450)
PMV BLD AUTO: 10.3 FL (ref 9.2–12.9)
POTASSIUM SERPL-SCNC: 4.8 MMOL/L (ref 3.5–5.1)
PROT SERPL-MCNC: 5.3 G/DL (ref 6–8.4)
RBC # BLD AUTO: 2.74 M/UL (ref 4–5.4)
SODIUM SERPL-SCNC: 133 MMOL/L (ref 136–145)
WBC # BLD AUTO: 5.04 K/UL (ref 3.9–12.7)

## 2023-12-16 PROCEDURE — 83735 ASSAY OF MAGNESIUM: CPT | Performed by: ORTHOPAEDIC SURGERY

## 2023-12-16 PROCEDURE — 99900031 HC PATIENT EDUCATION (STAT)

## 2023-12-16 PROCEDURE — 85025 COMPLETE CBC W/AUTO DIFF WBC: CPT | Performed by: ORTHOPAEDIC SURGERY

## 2023-12-16 PROCEDURE — 25000003 PHARM REV CODE 250: Performed by: ORTHOPAEDIC SURGERY

## 2023-12-16 PROCEDURE — 97110 THERAPEUTIC EXERCISES: CPT

## 2023-12-16 PROCEDURE — 12000002 HC ACUTE/MED SURGE SEMI-PRIVATE ROOM

## 2023-12-16 PROCEDURE — 80053 COMPREHEN METABOLIC PANEL: CPT | Performed by: ORTHOPAEDIC SURGERY

## 2023-12-16 PROCEDURE — 27000221 HC OXYGEN, UP TO 24 HOURS

## 2023-12-16 PROCEDURE — 25000003 PHARM REV CODE 250: Performed by: INTERNAL MEDICINE

## 2023-12-16 PROCEDURE — 36415 COLL VENOUS BLD VENIPUNCTURE: CPT | Performed by: ORTHOPAEDIC SURGERY

## 2023-12-16 PROCEDURE — 94761 N-INVAS EAR/PLS OXIMETRY MLT: CPT

## 2023-12-16 PROCEDURE — 97530 THERAPEUTIC ACTIVITIES: CPT

## 2023-12-16 RX ORDER — OXYCODONE HYDROCHLORIDE 5 MG/1
5 TABLET ORAL EVERY 4 HOURS PRN
Status: DISCONTINUED | OUTPATIENT
Start: 2023-12-16 | End: 2023-12-21 | Stop reason: HOSPADM

## 2023-12-16 RX ORDER — POLYETHYLENE GLYCOL 3350 17 G/17G
17 POWDER, FOR SOLUTION ORAL DAILY
Status: DISCONTINUED | OUTPATIENT
Start: 2023-12-16 | End: 2023-12-21 | Stop reason: HOSPADM

## 2023-12-16 RX ADMIN — MUPIROCIN 1 G: 20 OINTMENT TOPICAL at 09:12

## 2023-12-16 RX ADMIN — POLYETHYLENE GLYCOL 3350 17 G: 17 POWDER, FOR SOLUTION ORAL at 09:12

## 2023-12-16 RX ADMIN — MUPIROCIN 1 G: 20 OINTMENT TOPICAL at 08:12

## 2023-12-16 RX ADMIN — DOCUSATE SODIUM 100 MG: 100 CAPSULE, LIQUID FILLED ORAL at 08:12

## 2023-12-16 RX ADMIN — OXYCODONE HYDROCHLORIDE 5 MG: 5 TABLET ORAL at 08:12

## 2023-12-16 RX ADMIN — DOCUSATE SODIUM 100 MG: 100 CAPSULE, LIQUID FILLED ORAL at 09:12

## 2023-12-16 RX ADMIN — Medication 400 MG: at 09:12

## 2023-12-16 RX ADMIN — PROPRANOLOL HYDROCHLORIDE 10 MG: 10 TABLET ORAL at 08:12

## 2023-12-16 RX ADMIN — POLYETHYLENE GLYCOL 3350 17 G: 17 POWDER, FOR SOLUTION ORAL at 05:12

## 2023-12-16 RX ADMIN — PROPRANOLOL HYDROCHLORIDE 10 MG: 10 TABLET ORAL at 09:12

## 2023-12-16 RX ADMIN — OXYCODONE 5 MG: 5 TABLET ORAL at 05:12

## 2023-12-16 RX ADMIN — AMIODARONE HYDROCHLORIDE 200 MG: 200 TABLET ORAL at 09:12

## 2023-12-16 RX ADMIN — LEVOFLOXACIN 250 MG: 250 TABLET, FILM COATED ORAL at 05:12

## 2023-12-16 RX ADMIN — APIXABAN 2.5 MG: 2.5 TABLET, FILM COATED ORAL at 08:12

## 2023-12-16 NOTE — PT/OT/SLP PROGRESS
Physical Therapy Treatment    Patient Name:  Irene العراقي   MRN:  [id number]    Recommendations:     Discharge Recommendations: Moderate Intensity Therapy  Discharge Equipment Recommendations: none  Barriers to discharge: Decreased caregiver support    Assessment:     Irene العراقي is a 83 y.o. female admitted with a medical diagnosis of Closed fracture of right ankle.  She presents with the following impairments/functional limitations: weakness, impaired endurance, impaired functional mobility, impaired balance, decreased lower extremity function, decreased upper extremity function, decreased safety awareness, pain, impaired cardiopulmonary response to activity, orthopedic precautions .    Pt seen for thera ex in supine. Min assist to sit EOB with lots of encouragement and education. Pt maintaining sitting well. Pt progressed to OOB chair with max assist with CNA present. NWB maintained RLE. Pt repositioned in chair with set up assist for lunch. Pt then started c/o not feeling well with lightheadedness then decreased responsiveness- nursing called at bedside- RRT called by nursing and pt returned to bed total assist. Pt became more responsive after sternal rub from nursing.  Pt will benefit from SNF..    Rehab Prognosis: Fair; patient would benefit from acute skilled PT services to address these deficits and reach maximum level of function.    Recent Surgery: Procedure(s) (LRB):  ORIF, ANKLE (Right) 3 Days Post-Op    Plan:     During this hospitalization, patient to be seen daily to address the identified rehab impairments via therapeutic activities, therapeutic exercises and progress toward the following goals:    Plan of Care Expires:  01/15/24    Subjective   Pt stated she never got  and that her twin sister  recently  Stated has 3 nephews in [place] and 2 here  Stated that she wanted to return to [place]    Chief Complaint: shoulders hurt, R arm bruising from fall at home  Patient/Family Comments/goals: get  well  Pain/Comfort:  Pain Rating 1: 0/10      Objective:     Communicated with nurse Bains prior to session.  Patient found HOB elevated with araya catheter, telemetry upon PT entry to room.     General Precautions: Standard, fall, pacemaker  Orthopedic Precautions: RLE non weight bearing  Braces:  (posterior splint)  Respiratory Status: Room air     Functional Mobility:  Bed Mobility:     Rolling Right: minimum assistance  Scooting: minimum assistance  Supine to Sit: minimum assistance  Sit to Supine: total assistance  Transfers:     Sit to Stand:  maximal assistance and of 2 persons with rolling walker  Bed to Chair: maximal assistance and of 2 persons with  no AD  using  Squat Pivot      AM-PAC 6 CLICK MOBILITY  Turning over in bed (including adjusting bedclothes, sheets and blankets)?: 3  Sitting down on and standing up from a chair with arms (e.g., wheelchair, bedside commode, etc.): 2  Moving from lying on back to sitting on the side of the bed?: 3  Moving to and from a bed to a chair (including a wheelchair)?: 2  Need to walk in hospital room?: 1  Climbing 3-5 steps with a railing?: 1  Basic Mobility Total Score: 12       Treatment & Education:  Patient was educated on the importance of OOB activity and functional mobility to negate negative effects of prolonged bed rest during hospitalization, safe transfers and ambulation, and D/C planning   Thear ex in supine and EOB  OOB chair assist x2 briefly then returned to bed due to decreased responsiveness. Assist from nursing and RRT team    Patient left  bed  with  RRT team present..    GOALS:   Multidisciplinary Problems       Physical Therapy Goals          Problem: Physical Therapy    Goal Priority Disciplines Outcome Goal Variances Interventions   Physical Therapy Goal     PT, PT/OT Ongoing, Progressing     Description: Goals to be met by: 1/15/2024     Patient will increase functional independence with mobility by performin. Supine to sit with  Contact Guard Assistance  2. Sit to stand transfer with Contact Guard Assistance  3. Bed to chair transfer with Minimal Assistance using Rolling Walker                         Time Tracking:     PT Received On: 12/16/23  PT Start Time: 1145     PT Stop Time: 1221  PT Total Time (min): 36 min     Billable Minutes: Therapeutic Activity 20 and Therapeutic Exercise 16    Treatment Type: Treatment  PT/PTA: PT     Number of PTA visits since last PT visit: 0     12/16/2023

## 2023-12-16 NOTE — PROGRESS NOTES
[facility]  Orthopedics  Progress Note    Patient Name: Irene العراقي  MRN: [id number]  Admission Date: 12/11/2023  Hospital Length of Stay: 4 days  Attending Provider: Gera Szymanski MD  Primary Care Provider: Wanda Kuhn FNP-C  Follow-up For: Procedure(s) (LRB):  ORIF, ANKLE (Right)    Post-Operative Day: 3 Days Post-Op  Subjective:     Principal Problem:Closed fracture of right ankle    Principal Orthopedic Problem: S/P R ankle trimalleolar ORIF    Interval History: DC planning    Review of patient's allergies indicates:   Allergen Reactions    Cefuroxime     Hydrocodone     Meperidine     Meperidine hcl Nausea And Vomiting    Persantine [dipyridamole]     Nitrofurantoin macrocrystal      Headache , went into Afib     Vicodin [hydrocodone-acetaminophen] Nausea And Vomiting       Current Facility-Administered Medications   Medication    acetaminophen tablet 650 mg    ALPRAZolam tablet 0.5 mg    amiodarone tablet 200 mg    dextrose 50% injection 12.5 g    dextrose 50% injection 25 g    docusate sodium capsule 100 mg    glucagon (human recombinant) injection 1 mg    glucose chewable tablet 16 g    glucose chewable tablet 24 g    levoFLOXacin tablet 250 mg    magnesium oxide tablet 400 mg    magnesium oxide tablet 800 mg    magnesium oxide tablet 800 mg    meclizine tablet 50 mg    mupirocin 2 % ointment    naloxone 0.4 mg/mL injection 0.02 mg    ondansetron injection 4 mg    oxyCODONE immediate release tablet 10 mg    oxyCODONE immediate release tablet 5 mg    polyethylene glycol packet 17 g    potassium bicarbonate disintegrating tablet 35 mEq    potassium bicarbonate disintegrating tablet 50 mEq    potassium bicarbonate disintegrating tablet 60 mEq    potassium, sodium phosphates 280-160-250 mg packet 2 packet    potassium, sodium phosphates 280-160-250 mg packet 2 packet    potassium, sodium phosphates 280-160-250 mg packet 2 packet    propranoloL tablet 10 mg    sodium chloride 0.9% flush  "10 mL     Objective:     Vital Signs (Most Recent):  Temp: 97.9 °F (36.6 °C) (12/16/23 0745)  Pulse: 67 (12/16/23 0745)  Resp: 17 (12/16/23 0745)  BP: (!) 116/58 (12/16/23 0745)  SpO2: (!) 93 % (12/16/23 0745) Vital Signs (24h Range):  Temp:  [97.9 °F (36.6 °C)-98.1 °F (36.7 °C)] 97.9 °F (36.6 °C)  Pulse:  [60-86] 67  Resp:  [16-18] 17  SpO2:  [92 %-98 %] 93 %  BP: (116-143)/(58-71) 116/58     Weight: 69.6 kg (153 lb 7 oz)  Height: 5' 6" (167.6 cm)  Body mass index is 24.77 kg/m².      Intake/Output Summary (Last 24 hours) at 12/16/2023 1053  Last data filed at 12/16/2023 0830  Gross per 24 hour   Intake 780 ml   Output 2450 ml   Net -1670 ml        General    Nursing note and vitals reviewed.  Constitutional: She is oriented to person, place, and time. She appears well-developed and well-nourished.   Pulmonary/Chest: Effort normal and breath sounds normal.   Neurological: She is alert and oriented to person, place, and time.   Psychiatric: She has a normal mood and affect. Her behavior is normal.         Right Ankle/Foot Exam     Comments:  RLE DNVI (distal neurovascular intact). Post-op dressing and splint intact; no drainage.           Significant Labs: CBC:   Recent Labs   Lab 12/15/23  0446 12/16/23  0557   WBC 6.51 5.04   HGB 8.9* 8.4*   HCT 27.4* 25.8*   * 102*     CMP:   Recent Labs   Lab 12/15/23  0446 12/16/23  0557   * 133*   K 4.6 4.8    102   CO2 24 28   GLU 87 88   BUN 30* 28*   CREATININE 1.6* 1.6*   CALCIUM 9.3 9.0   PROT 5.7* 5.3*   ALBUMIN 3.4* 3.1*   BILITOT 0.5 0.7   ALKPHOS 83 83   AST 29 27   ALT 19 19   ANIONGAP 8 3*     All pertinent labs within the past 24 hours have been reviewed.    Significant Imaging: None  Assessment/Plan:     * Closed fracture of right ankle  POD#3    NWB RLE  PT and OT for mobility  DC planning  Plan to DC post-op splint and dressing tomorrow an apply Cam-walker boot  Needs WC with elevating leg rest          RAVINDRA YOUNG, " PA  Orthopedics  [facility]

## 2023-12-16 NOTE — PROGRESS NOTES
"[facility] Medicine  Progress Note    Patient Name: Irene العراقي  MRN: [id number]  Patient Class: IP- Inpatient   Admission Date: 12/11/2023  Length of Stay: 3 days  Attending Physician: Gera Szymanski MD  Primary Care Provider: Wanda Kuhn, DEBORAHP-C        Subjective:     Principal Problem:Closed fracture of right ankle        HPI:  Ms. العراقي is an 83 yr old female with Pmhx of  atrial fibrillation, CKD, HTN, 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 presenting today for lightheadedness, weakness and presyncope. Pt reports yesterday she was experiencing fatigue, lightheadedness and generalized weakness and she states symptoms were mild. This morning when she woke up her symptoms persisted and became severe. Pt states she was in the kitchen making breakfast and felt severe lightheadedness and felt like she was going to pass out so she eased her self down on the ground and crawled to the phone to call her daughter. Pt denies cp, sob, n/v or diaphoresis. Pt reports when EMS arrived her BP was in the 180's systolic. Pt experienced trauma to her right ankle with the fall. Xray confirmed displaced lateral malleolar fracture and splint was applied in ED. Pt will be admitted to hospital med services for further workup and management.       Overview/Hospital Course:   12/12: Notes reviewed, no acute events overnight. Eval by ortho this AM who rec transfer to [facility] for surgical repair of right ankle tomorrow. Ortho aware pt will need cardiac clearance prior to surgery. Pt c/o pain to her right ankle today and received oxy 5mg. Pt feels like the pain med is too strong and she is reporting feeling "woozy" now after med admin. She states her ankle pain has improved. She reports feeling lightheaded and weak this morning and states symptoms are similar to what she was feeling when she arrived to ED. She denies cp or sob. Will await echo and cards consult and recs. I spoke with hosp " med team at [facility] and pt accepted by Dr. Jones. Pt aggreeable for transfer.      12/13: Patient was admitted for syncope over at [facility] was transferred to [facility] on request of orthopedic surgeon surgical repair of right ankle fracture. Patient was evaluated by Cardiology and underwent echocardiogram and pacemaker interrogation at [facility] prior to discharge and patient's Eliquis was held prior to surgery.  Patient is currently being prepped for surgery today and has no acute overnight events.      12/14  Overall feels better  Pt thinks she need SNF placement  No new issues     12/15  Pt worked with PT/OT  She had panic attack today AM     Interval History:       Review of Systems   Constitutional:  Negative for activity change and appetite change.   HENT:  Negative for congestion and dental problem.    Eyes:  Negative for discharge and itching.   Respiratory:  Negative for shortness of breath.    Cardiovascular:  Negative for chest pain.   Gastrointestinal:  Negative for abdominal distention and abdominal pain.   Endocrine: Negative for cold intolerance.   Genitourinary:  Negative for difficulty urinating and dysuria.   Musculoskeletal:  Negative for arthralgias and back pain.   Skin:  Negative for color change.   Neurological:  Negative for dizziness and facial asymmetry.   Hematological:  Negative for adenopathy.   Psychiatric/Behavioral:  Negative for agitation and behavioral problems.      Objective:     Vital Signs (Most Recent):  Temp: (P) 98.1 °F (36.7 °C) (12/15/23 1657)  Pulse: (P) 73 (12/15/23 1657)  Resp: (P) 18 (12/15/23 1657)  BP: (!) (P) 130/59 (12/15/23 1657)  SpO2: (!) (P) 92 % (12/15/23 1657) Vital Signs (24h Range):  Temp:  [97.8 °F (36.6 °C)-98.2 °F (36.8 °C)] (P) 98.1 °F (36.7 °C)  Pulse:  [60-73] (P) 73  Resp:  [16-18] (P) 18  SpO2:  [92 %-98 %] (P) 92 %  BP: (110-139)/(56-71) (P) 130/59     Weight: 69.6 kg (153 lb 7 oz)  Body mass index is 24.77 kg/m².    Intake/Output Summary (Last 24  hours) at 12/15/2023 1851  Last data filed at 12/15/2023 1748  Gross per 24 hour   Intake 480 ml   Output 2700 ml   Net -2220 ml         Physical Exam  Vitals and nursing note reviewed.   Constitutional:       General: She is not in acute distress.  HENT:      Head: Atraumatic.      Right Ear: External ear normal.      Left Ear: External ear normal.      Nose: Nose normal.      Mouth/Throat:      Mouth: Mucous membranes are moist.   Eyes:      Extraocular Movements: Extraocular movements intact.   Cardiovascular:      Rate and Rhythm: Normal rate.   Pulmonary:      Effort: Pulmonary effort is normal.   Abdominal:      Palpations: Abdomen is soft.   Musculoskeletal:         General: Normal range of motion.      Cervical back: Normal range of motion.      Comments: RLE bandage intact   Skin:     General: Skin is warm.   Neurological:      Mental Status: She is alert and oriented to person, place, and time.   Psychiatric:         Behavior: Behavior normal.             Significant Labs: All pertinent labs within the past 24 hours have been reviewed.  CBC:   Recent Labs   Lab 12/14/23  0615 12/15/23  0446   WBC 9.85 6.51   HGB 9.2* 8.9*   HCT 27.6* 27.4*   * 100*     CMP:   Recent Labs   Lab 12/14/23  0614 12/15/23  0446   * 135*   K 4.6 4.6    103   CO2 23 24   * 87   BUN 28* 30*   CREATININE 1.6* 1.6*   CALCIUM 9.0 9.3   PROT 5.9* 5.7*   ALBUMIN 3.5 3.4*   BILITOT 0.5 0.5   ALKPHOS 92 83   AST 25 29   ALT 20 19   ANIONGAP 7* 8       Significant Imaging: I have reviewed all pertinent imaging results/findings within the past 24 hours.    Assessment/Plan:      * Closed fracture of right ankle  Open reduction internal fixation of right ankle trimalleolar fracture   PT/OT  Probably need SNF Placement  Medically stable       UTI (urinary tract infection)  Maintain PO abx      Postural dizziness with presyncope  Resolved     S/P TAVR (transcatheter aortic valve replacement)  Recent TAVR on 11/1 by  Dr. Moncada  Pt developed complete heart block post procedure and underwent pacemaker placement on 11/2      Stage 4 chronic kidney disease  Creatine stable for now. BMP reviewed- noted Estimated Creatinine Clearance: 24.9 mL/min (A) (based on SCr of 1.6 mg/dL (H)). according to latest data. 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  Monitor UOP and serial BMP and adjust therapy as needed. Renally dose meds. Avoid nephrotoxic medications and procedures.    Hypertension  Chronic, controlled. Latest blood pressure and vitals reviewed-     Temp:  [98 °F (36.7 °C)-98.6 °F (37 °C)]   Pulse:  [60-65]   Resp:  [16-18]   BP: (105-139)/(54-63)   SpO2:  [95 %-98 %] .   Home meds for hypertension were reviewed and noted below.   Hypertension Medications               furosemide (LASIX) 20 MG tablet Take 1 tablet (20 mg total) by mouth daily as needed. Take for swelling or wt gain > 2 lbs / 24 hr    propranoloL (INDERAL) 10 MG tablet Take 1 tablet (10 mg total) by mouth 2 (two) times daily.            While in the hospital, will manage blood pressure as follows; Continue home antihypertensive regimen    Will utilize p.r.n. blood pressure medication only if patient's blood pressure greater than 180/110 and she develops symptoms such as worsening chest pain or shortness of breath.    PAF (paroxysmal atrial fibrillation)  Patient with Long standing persistent (>12 months) atrial fibrillation which is controlled currently with Amiodarone. Patient is currently in sinus rhythm paced rhythm.[id number] Score: 3. Anticoagulation indicated. Anticoagulation done with eliquis which is currently being held.  Obtain clearance to resume it after surgery      VTE Risk Mitigation (From admission, onward)           Ordered     IP VTE HIGH RISK PATIENT  Once         12/14/23 0640     Place KRISTINA hose  Until discontinued         12/14/23 0640     Place sequential compression device  Until discontinued         12/14/23 0640     Place  sequential compression device  Until discontinued         12/11/23 1524     Reason for No Pharmacological VTE Prophylaxis  Once        Question:  Reasons:  Answer:  Already adequately anticoagulated on oral Anticoagulants    12/11/23 1524                    Discharge Planning   YANET: 12/16/2023     Code Status: Full Code   Is the patient medically ready for discharge?:     Reason for patient still in hospital (select all that apply): Pending disposition  Discharge Plan A: Home with family, Home Health                  Gera Szymanski MD  Department of Hospital Medicine   [facility]

## 2023-12-16 NOTE — SUBJECTIVE & OBJECTIVE
Principal Problem:Closed fracture of right ankle    Principal Orthopedic Problem: S/P R ankle trimalleolar ORIF    Interval History: DC planning    Review of patient's allergies indicates:   Allergen Reactions    Cefuroxime     Hydrocodone     Meperidine     Meperidine hcl Nausea And Vomiting    Persantine [dipyridamole]     Nitrofurantoin macrocrystal      Headache , went into Afib     Vicodin [hydrocodone-acetaminophen] Nausea And Vomiting       Current Facility-Administered Medications   Medication    acetaminophen tablet 650 mg    ALPRAZolam tablet 0.5 mg    amiodarone tablet 200 mg    dextrose 50% injection 12.5 g    dextrose 50% injection 25 g    docusate sodium capsule 100 mg    glucagon (human recombinant) injection 1 mg    glucose chewable tablet 16 g    glucose chewable tablet 24 g    levoFLOXacin tablet 250 mg    magnesium oxide tablet 400 mg    magnesium oxide tablet 800 mg    magnesium oxide tablet 800 mg    meclizine tablet 50 mg    mupirocin 2 % ointment    naloxone 0.4 mg/mL injection 0.02 mg    ondansetron injection 4 mg    oxyCODONE immediate release tablet 10 mg    oxyCODONE immediate release tablet 5 mg    polyethylene glycol packet 17 g    potassium bicarbonate disintegrating tablet 35 mEq    potassium bicarbonate disintegrating tablet 50 mEq    potassium bicarbonate disintegrating tablet 60 mEq    potassium, sodium phosphates 280-160-250 mg packet 2 packet    potassium, sodium phosphates 280-160-250 mg packet 2 packet    potassium, sodium phosphates 280-160-250 mg packet 2 packet    propranoloL tablet 10 mg    sodium chloride 0.9% flush 10 mL     Objective:     Vital Signs (Most Recent):  Temp: 97.9 °F (36.6 °C) (12/16/23 0745)  Pulse: 67 (12/16/23 0745)  Resp: 17 (12/16/23 0745)  BP: (!) 116/58 (12/16/23 0745)  SpO2: (!) 93 % (12/16/23 0745) Vital Signs (24h Range):  Temp:  [97.9 °F (36.6 °C)-98.1 °F (36.7 °C)] 97.9 °F (36.6 °C)  Pulse:  [60-86] 67  Resp:  [16-18] 17  SpO2:  [92 %-98 %] 93  "%  BP: (116-143)/(58-71) 116/58     Weight: 69.6 kg (153 lb 7 oz)  Height: 5' 6" (167.6 cm)  Body mass index is 24.77 kg/m².      Intake/Output Summary (Last 24 hours) at 12/16/2023 1053  Last data filed at 12/16/2023 0830  Gross per 24 hour   Intake 780 ml   Output 2450 ml   Net -1670 ml        General    Nursing note and vitals reviewed.  Constitutional: She is oriented to person, place, and time. She appears well-developed and well-nourished.   Pulmonary/Chest: Effort normal and breath sounds normal.   Neurological: She is alert and oriented to person, place, and time.   Psychiatric: She has a normal mood and affect. Her behavior is normal.         Right Ankle/Foot Exam     Comments:  RLE DNVI (distal neurovascular intact). Post-op dressing and splint intact; no drainage.           Significant Labs: CBC:   Recent Labs   Lab 12/15/23  0446 12/16/23  0557   WBC 6.51 5.04   HGB 8.9* 8.4*   HCT 27.4* 25.8*   * 102*     CMP:   Recent Labs   Lab 12/15/23  0446 12/16/23  0557   * 133*   K 4.6 4.8    102   CO2 24 28   GLU 87 88   BUN 30* 28*   CREATININE 1.6* 1.6*   CALCIUM 9.3 9.0   PROT 5.7* 5.3*   ALBUMIN 3.4* 3.1*   BILITOT 0.5 0.7   ALKPHOS 83 83   AST 29 27   ALT 19 19   ANIONGAP 8 3*     All pertinent labs within the past 24 hours have been reviewed.    Significant Imaging: None  "

## 2023-12-16 NOTE — ASSESSMENT & PLAN NOTE
POD#3    NWB RLE  PT and OT for mobility  DC planning  Plan to DC post-op splint and dressing tomorrow an apply Cam-walker boot  Needs WC with elevating leg rest

## 2023-12-16 NOTE — CARE UPDATE
12/16/23 0742   PRE-TX-O2   Device (Oxygen Therapy) nasal cannula   $ Is the patient on Low Flow Oxygen? Yes   SpO2 95 %   Pulse Oximetry Type Intermittent   $ Pulse Oximetry - Multiple Charge Pulse Oximetry - Multiple   Pulse 62   Resp 18   Education   $ Education 15 min

## 2023-12-16 NOTE — SUBJECTIVE & OBJECTIVE
Interval History:       Review of Systems   Constitutional:  Negative for activity change and appetite change.   HENT:  Negative for congestion and dental problem.    Eyes:  Negative for discharge and itching.   Respiratory:  Negative for shortness of breath.    Cardiovascular:  Negative for chest pain.   Gastrointestinal:  Negative for abdominal distention and abdominal pain.   Endocrine: Negative for cold intolerance.   Genitourinary:  Negative for difficulty urinating and dysuria.   Musculoskeletal:  Negative for arthralgias and back pain.   Skin:  Negative for color change.   Neurological:  Negative for dizziness and facial asymmetry.   Hematological:  Negative for adenopathy.   Psychiatric/Behavioral:  Negative for agitation and behavioral problems.      Objective:     Vital Signs (Most Recent):  Temp: (P) 98.1 °F (36.7 °C) (12/15/23 1657)  Pulse: (P) 73 (12/15/23 1657)  Resp: (P) 18 (12/15/23 1657)  BP: (!) (P) 130/59 (12/15/23 1657)  SpO2: (!) (P) 92 % (12/15/23 1657) Vital Signs (24h Range):  Temp:  [97.8 °F (36.6 °C)-98.2 °F (36.8 °C)] (P) 98.1 °F (36.7 °C)  Pulse:  [60-73] (P) 73  Resp:  [16-18] (P) 18  SpO2:  [92 %-98 %] (P) 92 %  BP: (110-139)/(56-71) (P) 130/59     Weight: 69.6 kg (153 lb 7 oz)  Body mass index is 24.77 kg/m².    Intake/Output Summary (Last 24 hours) at 12/15/2023 1851  Last data filed at 12/15/2023 1748  Gross per 24 hour   Intake 480 ml   Output 2700 ml   Net -2220 ml         Physical Exam  Vitals and nursing note reviewed.   Constitutional:       General: She is not in acute distress.  HENT:      Head: Atraumatic.      Right Ear: External ear normal.      Left Ear: External ear normal.      Nose: Nose normal.      Mouth/Throat:      Mouth: Mucous membranes are moist.   Eyes:      Extraocular Movements: Extraocular movements intact.   Cardiovascular:      Rate and Rhythm: Normal rate.   Pulmonary:      Effort: Pulmonary effort is normal.   Abdominal:      Palpations: Abdomen is soft.    Musculoskeletal:         General: Normal range of motion.      Cervical back: Normal range of motion.      Comments: RLE bandage intact   Skin:     General: Skin is warm.   Neurological:      Mental Status: She is alert and oriented to person, place, and time.   Psychiatric:         Behavior: Behavior normal.             Significant Labs: All pertinent labs within the past 24 hours have been reviewed.  CBC:   Recent Labs   Lab 12/14/23  0615 12/15/23  0446   WBC 9.85 6.51   HGB 9.2* 8.9*   HCT 27.6* 27.4*   * 100*     CMP:   Recent Labs   Lab 12/14/23  0614 12/15/23  0446   * 135*   K 4.6 4.6    103   CO2 23 24   * 87   BUN 28* 30*   CREATININE 1.6* 1.6*   CALCIUM 9.0 9.3   PROT 5.9* 5.7*   ALBUMIN 3.5 3.4*   BILITOT 0.5 0.5   ALKPHOS 92 83   AST 25 29   ALT 20 19   ANIONGAP 7* 8       Significant Imaging: I have reviewed all pertinent imaging results/findings within the past 24 hours.

## 2023-12-16 NOTE — PLAN OF CARE
Problem: Physical Therapy  Goal: Physical Therapy Goal  Description: Goals to be met by: 1/15/2024     Patient will increase functional independence with mobility by performin. Supine to sit with Contact Guard Assistance  2. Sit to stand transfer with Contact Guard Assistance  3. Bed to chair transfer with Minimal Assistance using Rolling Walker    Outcome: Ongoing, Progressing   Pt seen for thera ex with lots of encouragement for EOB and OOB/NWB. Pt requiring max assist x2. Pt c/o not feeling well while in chair with decreased responsiveness- nursing made aware and pt placed in bed. RRT called by nursing

## 2023-12-16 NOTE — RESPIRATORY THERAPY
12/15/23 2030   Patient Assessment/Suction   Level of Consciousness (AVPU) responds to voice   Respiratory Effort Normal;Unlabored   Expansion/Accessory Muscles/Retractions no use of accessory muscles;no retractions   All Lung Fields Breath Sounds Anterior:;diminished;equal bilaterally   Rhythm/Pattern, Respiratory unlabored;pattern regular;depth regular   Cough Frequency no cough   PRE-TX-O2   Device (Oxygen Therapy) nasal cannula   $ Is the patient on Low Flow Oxygen? Yes   Flow (L/min) 2   SpO2 (!) 93 %   Pulse Oximetry Type Intermittent   $ Pulse Oximetry - Multiple Charge Pulse Oximetry - Multiple   Pulse 86   Resp 17   Positioning HOB elevated 30 degrees   Education   $ Education 15 min;Other (see comment)  (O2 CHECK)   Respiratory Evaluation   $ Care Plan Tech Time 15 min   $ Eval/Re-eval Charges Re-evaluation

## 2023-12-16 NOTE — NURSING
Nurse called to room by PT & found pt to only respond to pain while sitting in the chair. Pt responsive to sternal rub. Pt transferred to bed. RRT called at 1220. MD Stephon at bedside. Primary nurse at the bedside. RRT canceled. VSS. CHANDAN WNL.

## 2023-12-16 NOTE — ASSESSMENT & PLAN NOTE
Open reduction internal fixation of right ankle trimalleolar fracture   PT/OT  Probably need SNF Placement  Medically stable

## 2023-12-17 LAB
ALBUMIN SERPL BCP-MCNC: 3.2 G/DL (ref 3.5–5.2)
ALP SERPL-CCNC: 116 U/L (ref 55–135)
ALT SERPL W/O P-5'-P-CCNC: 21 U/L (ref 10–44)
ANION GAP SERPL CALC-SCNC: 4 MMOL/L (ref 8–16)
AST SERPL-CCNC: 31 U/L (ref 10–40)
BASOPHILS # BLD AUTO: 0.02 K/UL (ref 0–0.2)
BASOPHILS NFR BLD: 0.4 % (ref 0–1.9)
BILIRUB SERPL-MCNC: 0.7 MG/DL (ref 0.1–1)
BUN SERPL-MCNC: 32 MG/DL (ref 8–23)
CALCIUM SERPL-MCNC: 8.8 MG/DL (ref 8.7–10.5)
CHLORIDE SERPL-SCNC: 100 MMOL/L (ref 95–110)
CO2 SERPL-SCNC: 28 MMOL/L (ref 23–29)
CREAT SERPL-MCNC: 1.7 MG/DL (ref 0.5–1.4)
DIFFERENTIAL METHOD: ABNORMAL
EOSINOPHIL # BLD AUTO: 0.1 K/UL (ref 0–0.5)
EOSINOPHIL NFR BLD: 2.3 % (ref 0–8)
ERYTHROCYTE [DISTWIDTH] IN BLOOD BY AUTOMATED COUNT: 13 % (ref 11.5–14.5)
EST. GFR  (NO RACE VARIABLE): 29.6 ML/MIN/1.73 M^2
GLUCOSE SERPL-MCNC: 120 MG/DL (ref 70–110)
HCT VFR BLD AUTO: 26.1 % (ref 37–48.5)
HGB BLD-MCNC: 8.6 G/DL (ref 12–16)
IMM GRANULOCYTES # BLD AUTO: 0.02 K/UL (ref 0–0.04)
IMM GRANULOCYTES NFR BLD AUTO: 0.4 % (ref 0–0.5)
LYMPHOCYTES # BLD AUTO: 0.5 K/UL (ref 1–4.8)
LYMPHOCYTES NFR BLD: 9.1 % (ref 18–48)
MCH RBC QN AUTO: 31.2 PG (ref 27–31)
MCHC RBC AUTO-ENTMCNC: 33 G/DL (ref 32–36)
MCV RBC AUTO: 95 FL (ref 82–98)
MONOCYTES # BLD AUTO: 0.8 K/UL (ref 0.3–1)
MONOCYTES NFR BLD: 15 % (ref 4–15)
NEUTROPHILS # BLD AUTO: 3.8 K/UL (ref 1.8–7.7)
NEUTROPHILS NFR BLD: 72.8 % (ref 38–73)
NRBC BLD-RTO: 0 /100 WBC
PLATELET # BLD AUTO: 109 K/UL (ref 150–450)
PMV BLD AUTO: 10.5 FL (ref 9.2–12.9)
POTASSIUM SERPL-SCNC: 4.5 MMOL/L (ref 3.5–5.1)
PROT SERPL-MCNC: 5.4 G/DL (ref 6–8.4)
RBC # BLD AUTO: 2.76 M/UL (ref 4–5.4)
SODIUM SERPL-SCNC: 132 MMOL/L (ref 136–145)
WBC # BLD AUTO: 5.19 K/UL (ref 3.9–12.7)

## 2023-12-17 PROCEDURE — 36415 COLL VENOUS BLD VENIPUNCTURE: CPT | Performed by: INTERNAL MEDICINE

## 2023-12-17 PROCEDURE — 12000002 HC ACUTE/MED SURGE SEMI-PRIVATE ROOM

## 2023-12-17 PROCEDURE — 27000221 HC OXYGEN, UP TO 24 HOURS

## 2023-12-17 PROCEDURE — 97535 SELF CARE MNGMENT TRAINING: CPT

## 2023-12-17 PROCEDURE — 94761 N-INVAS EAR/PLS OXIMETRY MLT: CPT

## 2023-12-17 PROCEDURE — 80053 COMPREHEN METABOLIC PANEL: CPT | Performed by: INTERNAL MEDICINE

## 2023-12-17 PROCEDURE — 99900035 HC TECH TIME PER 15 MIN (STAT)

## 2023-12-17 PROCEDURE — 92610 EVALUATE SWALLOWING FUNCTION: CPT

## 2023-12-17 PROCEDURE — 97110 THERAPEUTIC EXERCISES: CPT

## 2023-12-17 PROCEDURE — 25000003 PHARM REV CODE 250: Performed by: ORTHOPAEDIC SURGERY

## 2023-12-17 PROCEDURE — 25000003 PHARM REV CODE 250: Performed by: INTERNAL MEDICINE

## 2023-12-17 PROCEDURE — 94799 UNLISTED PULMONARY SVC/PX: CPT

## 2023-12-17 PROCEDURE — 99900031 HC PATIENT EDUCATION (STAT)

## 2023-12-17 PROCEDURE — 97530 THERAPEUTIC ACTIVITIES: CPT

## 2023-12-17 PROCEDURE — 92523 SPEECH SOUND LANG COMPREHEN: CPT

## 2023-12-17 PROCEDURE — 85025 COMPLETE CBC W/AUTO DIFF WBC: CPT | Performed by: INTERNAL MEDICINE

## 2023-12-17 RX ORDER — FAMOTIDINE 20 MG/1
20 TABLET, FILM COATED ORAL DAILY
Status: DISCONTINUED | OUTPATIENT
Start: 2023-12-17 | End: 2023-12-21 | Stop reason: HOSPADM

## 2023-12-17 RX ORDER — SODIUM CHLORIDE 9 MG/ML
INJECTION, SOLUTION INTRAVENOUS CONTINUOUS
Status: DISCONTINUED | OUTPATIENT
Start: 2023-12-17 | End: 2023-12-17

## 2023-12-17 RX ORDER — SODIUM CHLORIDE 9 MG/ML
INJECTION, SOLUTION INTRAVENOUS CONTINUOUS
Status: ACTIVE | OUTPATIENT
Start: 2023-12-17 | End: 2023-12-17

## 2023-12-17 RX ADMIN — APIXABAN 2.5 MG: 2.5 TABLET, FILM COATED ORAL at 09:12

## 2023-12-17 RX ADMIN — Medication 400 MG: at 09:12

## 2023-12-17 RX ADMIN — DOCUSATE SODIUM 100 MG: 100 CAPSULE, LIQUID FILLED ORAL at 08:12

## 2023-12-17 RX ADMIN — SODIUM CHLORIDE: 0.9 INJECTION, SOLUTION INTRAVENOUS at 02:12

## 2023-12-17 RX ADMIN — SODIUM CHLORIDE: 0.9 INJECTION, SOLUTION INTRAVENOUS at 09:12

## 2023-12-17 RX ADMIN — ACETAMINOPHEN 650 MG: 325 TABLET ORAL at 08:12

## 2023-12-17 RX ADMIN — FAMOTIDINE 20 MG: 20 TABLET ORAL at 01:12

## 2023-12-17 RX ADMIN — POLYETHYLENE GLYCOL 3350 17 G: 17 POWDER, FOR SOLUTION ORAL at 09:12

## 2023-12-17 RX ADMIN — MUPIROCIN 1 G: 20 OINTMENT TOPICAL at 09:12

## 2023-12-17 RX ADMIN — LEVOFLOXACIN 250 MG: 250 TABLET, FILM COATED ORAL at 05:12

## 2023-12-17 RX ADMIN — MUPIROCIN 1 G: 20 OINTMENT TOPICAL at 08:12

## 2023-12-17 RX ADMIN — DOCUSATE SODIUM 100 MG: 100 CAPSULE, LIQUID FILLED ORAL at 09:12

## 2023-12-17 RX ADMIN — APIXABAN 2.5 MG: 2.5 TABLET, FILM COATED ORAL at 08:12

## 2023-12-17 NOTE — ASSESSMENT & PLAN NOTE
Patient with Long standing persistent (>12 months) atrial fibrillation which is controlled currently with Amiodarone. Patient is currently in sinus rhythm paced rhythm.[id number] Score: 3. Anticoagulation indicated. Anticoagulation done with eliquis which is eliquis restarted

## 2023-12-17 NOTE — PROGRESS NOTES
[facility]  Orthopedics  Progress Note    Patient Name: Irene العراقي  MRN: [id number]  Admission Date: 12/11/2023  Hospital Length of Stay: 5 days  Attending Provider: Gera Szymanski MD  Primary Care Provider: Wanda Kuhn FNP-C  Follow-up For: Procedure(s) (LRB):  ORIF, ANKLE (Right)    Post-Operative Day: 4 Days Post-Op  Subjective:     Principal Problem:Closed fracture of right ankle    Principal Orthopedic Problem: S/P R ankle trimalleolar ORIF    Interval History: DC planning    Review of patient's allergies indicates:   Allergen Reactions    Cefuroxime     Hydrocodone     Meperidine     Meperidine hcl Nausea And Vomiting    Persantine [dipyridamole]     Nitrofurantoin macrocrystal      Headache , went into Afib     Vicodin [hydrocodone-acetaminophen] Nausea And Vomiting       Current Facility-Administered Medications   Medication    acetaminophen tablet 650 mg    ALPRAZolam tablet 0.5 mg    amiodarone tablet 200 mg    apixaban tablet 2.5 mg    dextrose 50% injection 12.5 g    dextrose 50% injection 25 g    docusate sodium capsule 100 mg    glucagon (human recombinant) injection 1 mg    glucose chewable tablet 16 g    glucose chewable tablet 24 g    levoFLOXacin tablet 250 mg    magnesium oxide tablet 400 mg    magnesium oxide tablet 800 mg    magnesium oxide tablet 800 mg    meclizine tablet 50 mg    mupirocin 2 % ointment    naloxone 0.4 mg/mL injection 0.02 mg    ondansetron injection 4 mg    oxyCODONE immediate release tablet 5 mg    polyethylene glycol packet 17 g    potassium bicarbonate disintegrating tablet 35 mEq    potassium bicarbonate disintegrating tablet 50 mEq    potassium bicarbonate disintegrating tablet 60 mEq    potassium, sodium phosphates 280-160-250 mg packet 2 packet    potassium, sodium phosphates 280-160-250 mg packet 2 packet    potassium, sodium phosphates 280-160-250 mg packet 2 packet    propranoloL tablet 10 mg    sodium chloride 0.9% flush 10 mL  "    Objective:     Vital Signs (Most Recent):  Temp: 97.8 °F (36.6 °C) (12/17/23 0529)  Pulse: 74 (12/17/23 0745)  Resp: 19 (12/17/23 0745)  BP: 111/63 (12/17/23 0529)  SpO2: 97 % (12/17/23 0745) Vital Signs (24h Range):  Temp:  [97.2 °F (36.2 °C)-98.5 °F (36.9 °C)] 97.8 °F (36.6 °C)  Pulse:  [66-99] 74  Resp:  [1-19] 19  SpO2:  [93 %-98 %] 97 %  BP: (111-132)/(50-75) 111/63     Weight: 69.6 kg (153 lb 7 oz)  Height: 5' 6" (167.6 cm)  Body mass index is 24.77 kg/m².      Intake/Output Summary (Last 24 hours) at 12/17/2023 0801  Last data filed at 12/17/2023 0632  Gross per 24 hour   Intake 840 ml   Output 1650 ml   Net -810 ml         General    Nursing note and vitals reviewed.  Constitutional: She is oriented to person, place, and time. She appears well-developed and well-nourished.   Pulmonary/Chest: Effort normal and breath sounds normal.   Neurological: She is alert and oriented to person, place, and time.   Psychiatric: She has a normal mood and affect. Her behavior is normal.         Right Ankle/Foot Exam     Comments:  RLE DNVI (distal neurovascular intact). Post-op dressing and splint intact; no drainage.           Significant Labs: CBC:   Recent Labs   Lab 12/16/23  0557   WBC 5.04   HGB 8.4*   HCT 25.8*   *       CMP:   Recent Labs   Lab 12/16/23  0557   *   K 4.8      CO2 28   GLU 88   BUN 28*   CREATININE 1.6*   CALCIUM 9.0   PROT 5.3*   ALBUMIN 3.1*   BILITOT 0.7   ALKPHOS 83   AST 27   ALT 19   ANIONGAP 3*       All pertinent labs within the past 24 hours have been reviewed.    Significant Imaging: None  Assessment/Plan:     * Closed fracture of right ankle  POD#4    NWB RLE  PT and OT for mobility  DC planning  Plan to DC post-op splint and dressing tomorrow an apply Cam-walker boot  Needs WC with elevating leg rest    Post-op/ Cam walker boot NOT in room this am          AMRIE AZAR  Orthopedics  [facility]    "

## 2023-12-17 NOTE — PLAN OF CARE
12/17/23 1150   Post-Acute Status   Post-Acute Authorization HME   [facility] Status Referrals Sent   Discharge Plan   Discharge Plan A Skilled Nursing Facility   Discharge Plan B Skilled Nursing Facility     [employer] boot order received. Contact surgery to inquire if boot in stock. To no avail. Order fax to [facility] [phone]  [phone] fax. Spoke with on call Rusty regarding delivery of boot to pt's bedside.

## 2023-12-17 NOTE — PLAN OF CARE
Problem: Adult Inpatient Plan of Care  Goal: Plan of Care Review  Outcome: Ongoing, Progressing  Goal: Patient-Specific Goal (Individualized)  Outcome: Ongoing, Progressing  Goal: Absence of Hospital-Acquired Illness or Injury  Outcome: Ongoing, Progressing  Goal: Optimal Comfort and Wellbeing  Outcome: Ongoing, Progressing  Goal: Readiness for Transition of Care  Outcome: Ongoing, Progressing     Problem: Fall Injury Risk  Goal: Absence of Fall and Fall-Related Injury  Outcome: Ongoing, Progressing     Problem: Skin Injury Risk Increased  Goal: Skin Health and Integrity  Outcome: Ongoing, Progressing     Problem: Adjustment to Illness (Chronic Kidney Disease)  Goal: Optimal Coping with Chronic Illness  Outcome: Ongoing, Progressing     Problem: Infection  Goal: Absence of Infection Signs and Symptoms  Outcome: Ongoing, Progressing     Problem: Mobility Impairment  Goal: Optimal Mobility  Outcome: Ongoing, Progressing

## 2023-12-17 NOTE — SUBJECTIVE & OBJECTIVE
Principal Problem:Closed fracture of right ankle    Principal Orthopedic Problem: S/P R ankle trimalleolar ORIF    Interval History: DC planning    Review of patient's allergies indicates:   Allergen Reactions    Cefuroxime     Hydrocodone     Meperidine     Meperidine hcl Nausea And Vomiting    Persantine [dipyridamole]     Nitrofurantoin macrocrystal      Headache , went into Afib     Vicodin [hydrocodone-acetaminophen] Nausea And Vomiting       Current Facility-Administered Medications   Medication    acetaminophen tablet 650 mg    ALPRAZolam tablet 0.5 mg    amiodarone tablet 200 mg    apixaban tablet 2.5 mg    dextrose 50% injection 12.5 g    dextrose 50% injection 25 g    docusate sodium capsule 100 mg    glucagon (human recombinant) injection 1 mg    glucose chewable tablet 16 g    glucose chewable tablet 24 g    levoFLOXacin tablet 250 mg    magnesium oxide tablet 400 mg    magnesium oxide tablet 800 mg    magnesium oxide tablet 800 mg    meclizine tablet 50 mg    mupirocin 2 % ointment    naloxone 0.4 mg/mL injection 0.02 mg    ondansetron injection 4 mg    oxyCODONE immediate release tablet 5 mg    polyethylene glycol packet 17 g    potassium bicarbonate disintegrating tablet 35 mEq    potassium bicarbonate disintegrating tablet 50 mEq    potassium bicarbonate disintegrating tablet 60 mEq    potassium, sodium phosphates 280-160-250 mg packet 2 packet    potassium, sodium phosphates 280-160-250 mg packet 2 packet    potassium, sodium phosphates 280-160-250 mg packet 2 packet    propranoloL tablet 10 mg    sodium chloride 0.9% flush 10 mL     Objective:     Vital Signs (Most Recent):  Temp: 97.8 °F (36.6 °C) (12/17/23 0529)  Pulse: 74 (12/17/23 0745)  Resp: 19 (12/17/23 0745)  BP: 111/63 (12/17/23 0529)  SpO2: 97 % (12/17/23 0745) Vital Signs (24h Range):  Temp:  [97.2 °F (36.2 °C)-98.5 °F (36.9 °C)] 97.8 °F (36.6 °C)  Pulse:  [66-99] 74  Resp:  [1-19] 19  SpO2:  [93 %-98 %] 97 %  BP: (111-132)/(50-75)  "111/63     Weight: 69.6 kg (153 lb 7 oz)  Height: 5' 6" (167.6 cm)  Body mass index is 24.77 kg/m².      Intake/Output Summary (Last 24 hours) at 12/17/2023 0801  Last data filed at 12/17/2023 0632  Gross per 24 hour   Intake 840 ml   Output 1650 ml   Net -810 ml          General    Nursing note and vitals reviewed.  Constitutional: She is oriented to person, place, and time. She appears well-developed and well-nourished.   Pulmonary/Chest: Effort normal and breath sounds normal.   Neurological: She is alert and oriented to person, place, and time.   Psychiatric: She has a normal mood and affect. Her behavior is normal.         Right Ankle/Foot Exam     Comments:  RLE DNVI (distal neurovascular intact). Post-op dressing and splint intact; no drainage.           Significant Labs: CBC:   Recent Labs   Lab 12/16/23  0557   WBC 5.04   HGB 8.4*   HCT 25.8*   *       CMP:   Recent Labs   Lab 12/16/23  0557   *   K 4.8      CO2 28   GLU 88   BUN 28*   CREATININE 1.6*   CALCIUM 9.0   PROT 5.3*   ALBUMIN 3.1*   BILITOT 0.7   ALKPHOS 83   AST 27   ALT 19   ANIONGAP 3*       All pertinent labs within the past 24 hours have been reviewed.    Significant Imaging: None  "

## 2023-12-17 NOTE — PT/OT/SLP EVAL
Speech Language Pathology Evaluation  Cognitive/Bedside Swallow    Patient Name:  Irene العراقي   MRN:  [id number]  Admitting Diagnosis: Closed fracture of right ankle    Recommendations:                  General Recommendations:  Cognitive-linguistic therapy  Diet recommendations:  Regular Diet - IDDSI Level 7, Thin liquids - IDDSI Level 0   Aspiration Precautions: Standard aspiration precautions   General Precautions: Standard, other (see comments) (Sitting 90 degrees to assist in swallowing)  Communication strategies:  none    Assessment:     Irene العراقي is a 83 y.o. female with an SLP diagnosis of Cognitive-Linguistic Impairment. The pt was admitted with a diagnosis of closed ankle fracture. The pt reported new onset confusion beginning in November 2023 after pace maker placement. Preliminary evaluation reveals deficits in memory with additional assessment needed. Oral-pharyngeal swallowing appears normal, however the pt reported pain during and after swallows of all consistencies  indicating possible esophageal dysfunction. Multiple swallows were needed before the pain dissipated. GI consult recommended and communicated to attending physician.     History:     Past Medical History:   Diagnosis Date    Anemia, unspecified     Atrial fibrillation 01/25/2021    CKD (chronic kidney disease)     Hypertension        Past Surgical History:   Procedure Laterality Date    A-V CARDIAC PACEMAKER INSERTION  11/2/2023    Procedure: Dual Chamber PPM RM 2617 ([employer]);  Surgeon: Andreas James III, MD;  Location: [facility] CATH;  Service: Cardiology;;    ANGIOGRAM, CORONARY, WITH LEFT HEART CATHETERIZATION Left 4/19/2023    Procedure: Angiogram, Coronary, with Left Heart Cath;  Surgeon: Kevin Redmond MD;  Location: [facility] CATH/EP LAB;  Service: Cardiology;  Laterality: Left;    BREAST SURGERY      COLONOSCOPY N/A 4/16/2023    Procedure: COLONOSCOPY;  Surgeon: Kvng Mensah MD;  Location: [facility] ENDO;  Service: Endoscopy;   "Laterality: N/A;    COLONOSCOPY W/ POLYPECTOMY  2023    OPEN REDUCTION AND INTERNAL FIXATION (ORIF) OF INJURY OF ANKLE Right 2023    Procedure: ORIF, ANKLE;  Surgeon: Chaitanya Garrison MD;  Location: [facility] OR;  Service: Orthopedics;  Laterality: Right;  Synthes    TRANSCATHETER AORTIC VALVE REPLACEMENT (TAVR)  2023    Procedure: (TAVR);  Surgeon: Juliano Moncada MD;  Location: [facility] CATH;  Service: Cardiology;;    TRANSCATHETER AORTIC VALVE REPLACEMENT (TAVR)  2023    Procedure: (TAVR)- Surgeon;  Surgeon: Adebayo Guzman MD;  Location: [facility] CATH;  Service: Peripheral Vascular;;       Social History: Patient lives alone by  her report but with family living 2 doors from her house. She reported driving up until aorta and pace maker placement in Nov. She is aware of her confusion and understands driving  may not be safe.    Prior Intubation HX:  na    Modified Barium Swallow: na    Chest X-Rays: .    Prior diet: reg/thin.    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    Subjective     The pt is generally oriented to her situation but confused about the details stating. "I didn't have surgery, wait did I have surgery. I don't think I had surgery."   "I have pain when I swallow but I think it's because of the way I'm sitting" When asked for clarification she stated, "This started after my pace maker placement in Nov ()"  Patient goals: To get back to [place] after getting stronger (currently living in her twin sisters home locally. Her sister  2 months ago per pt report)     Pain/Comfort:       Respiratory Status: Nasal cannula, flow 2 L/min    Objective:     Cognitive Status:    Orientation: Generally oriented x 3 however she cannot accurately recall course of events since admit re: her care, such as whether she had surgery or not on her ankle.      Receptive Language:   Comprehension: Functional for simple commands, additional assessment needed " for more complex         Pragmatics:    WFL, affect is appropriate    Expressive Language:  Verbal:    Functional however suspect some word finding deficits, additional assessment rec.  Nonverbal:         Motor Speech:  Not formally assessed, functional for communication    Voice:   WFL    Visual-Spatial:  Not assessed    Reading:   Not assessed      Written Expression:   Not assessed    Oral Musculature Evaluation  Oral Musculature: WFL  Dentition: present and adequate, upper and lower dentures  Secretion Management: adequate  Mucosal Quality: good  Mandibular Strength and Mobility: WFL  Oral Labial Strength and Mobility: WFL  Lingual Strength and Mobility: WFL  Velar Elevation: WFL (Pt could not elevate volitionally upon phonating however, nasality appeared WFL.)  Buccal Strength and Mobility: WFL  Volitional Cough: Weak  Volitional Swallow: Laryngeal elevation noted upon palpation  Voice Prior to PO Intake: Fair  Oral Musculature Comments: Normal    Bedside Swallow Eval:   Consistencies Assessed:  Thin liquids .  Puree .  Mixed consistencies .  Solids .    3 oz water test  Oral Phase:   WFL    Pharyngeal Phase:   globus sensation  no overt clinical signs/symptoms of aspiration  multiple spontaneous swallows    Compensatory Strategies  Liquid wash swallows to help with pain in chest.    Completed oral care with the pt. She required assistance with cleaning dentures.     Treatment: Pt educated re: results/recs of evaluation, SLP role and POC. Receptive to information provided.      Goals:   Multidisciplinary Problems       SLP Goals          Problem: SLP    Goal Priority Disciplines Outcome   SLP Goal     SLP                        Plan:     Patient to be seen:  3 x/week   Plan of Care expires:     Plan of Care reviewed with:  patient   SLP Follow-Up:  Yes       Discharge recommendations:  Therapy Intensity Recommendations at Discharge: Moderate Intensity Therapy   Barriers to Discharge:  Level of Skilled  Assistance Needed .    Time Tracking:     SLP Treatment Date:   12/17/23  Speech Start Time:  1042  Speech Stop Time:  1129     Speech Total Time (min):  47 min    Billable Minutes: Eval 16 , Eval Swallow and Oral Function 16, Self Care/Home Management Training 15, and Total Time 47    12/17/2023

## 2023-12-17 NOTE — PROGRESS NOTES
"Pending sale to [facility]  Progress Note    Patient Name: Irene العراقي  MRN: [id number]  Patient Class: IP- Inpatient   Admission Date: 12/11/2023  Length of Stay: 5 days  Attending Physician: Gera Szymanski MD  Primary Care Provider: Wanda Kuhn, DEBORAHP-C        Subjective:     Principal Problem:Closed fracture of right ankle        HPI:  Ms. العراقي is an 83 yr old female with Pmhx of  atrial fibrillation, CKD, HTN, 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 presenting today for lightheadedness, weakness and presyncope. Pt reports yesterday she was experiencing fatigue, lightheadedness and generalized weakness and she states symptoms were mild. This morning when she woke up her symptoms persisted and became severe. Pt states she was in the kitchen making breakfast and felt severe lightheadedness and felt like she was going to pass out so she eased her self down on the ground and crawled to the phone to call her daughter. Pt denies cp, sob, n/v or diaphoresis. Pt reports when EMS arrived her BP was in the 180's systolic. Pt experienced trauma to her right ankle with the fall. Xray confirmed displaced lateral malleolar fracture and splint was applied in ED. Pt will be admitted to hospital med services for further workup and management.       Overview/Hospital Course:   12/12: Notes reviewed, no acute events overnight. Eval by ortho this AM who rec transfer to [facility] for surgical repair of right ankle tomorrow. Ortho aware pt will need cardiac clearance prior to surgery. Pt c/o pain to her right ankle today and received oxy 5mg. Pt feels like the pain med is too strong and she is reporting feeling "woozy" now after med admin. She states her ankle pain has improved. She reports feeling lightheaded and weak this morning and states symptoms are similar to what she was feeling when she arrived to ED. She denies cp or sob. Will await echo and cards consult and recs. I spoke with hosp " med team at [facility] and pt accepted by Dr. Jones. Pt aggreeable for transfer.      12/13: Patient was admitted for syncope over at [facility] was transferred to [facility] on request of orthopedic surgeon surgical repair of right ankle fracture. Patient was evaluated by Cardiology and underwent echocardiogram and pacemaker interrogation at [facility] prior to discharge and patient's Eliquis was held prior to surgery.  Patient is currently being prepped for surgery today and has no acute overnight events.      12/14  Overall feels better  Pt thinks she need SNF placement  No new issues     12/15  Pt worked with PT/OT  She had panic attack today AM     12/16  Today was sitting on chair and was unresponsive  Suddenly pt became responsive after sternal rub  Medically stable and awaiting SNF placement       12/17  Pt evaluated by ST/SLP team  Recommended GI consult for dysphagia    Interval History:     Review of Systems   Constitutional:  Negative for activity change and appetite change.   HENT:  Negative for congestion and dental problem.    Eyes:  Negative for discharge and itching.   Respiratory:  Negative for shortness of breath.    Cardiovascular:  Negative for chest pain.   Gastrointestinal:  Negative for abdominal distention and abdominal pain.   Endocrine: Negative for cold intolerance.   Genitourinary:  Negative for difficulty urinating and dysuria.   Musculoskeletal:  Negative for arthralgias and back pain.   Skin:  Negative for color change.   Neurological:  Negative for dizziness and facial asymmetry.   Hematological:  Negative for adenopathy.   Psychiatric/Behavioral:  Negative for agitation and behavioral problems.      Objective:     Vital Signs (Most Recent):  Temp: 97.2 °F (36.2 °C) (12/17/23 1146)  Pulse: 67 (12/17/23 0831)  Resp: 18 (12/17/23 1146)  BP: (!) 132/52 (12/17/23 1146)  SpO2: 96 % (12/17/23 1146) Vital Signs (24h Range):  Temp:  [97.2 °F (36.2 °C)-98.5 °F (36.9 °C)] 97.2 °F (36.2 °C)  Pulse:   [66-99] 67  Resp:  [16-19] 18  SpO2:  [95 %-98 %] 96 %  BP: ()/(37-72) 132/52     Weight: 69.6 kg (153 lb 7 oz)  Body mass index is 24.77 kg/m².    Intake/Output Summary (Last 24 hours) at 12/17/2023 1443  Last data filed at 12/17/2023 1147  Gross per 24 hour   Intake 480 ml   Output 1850 ml   Net -1370 ml         Physical Exam  Vitals and nursing note reviewed.   Constitutional:       General: She is not in acute distress.  HENT:      Head: Atraumatic.      Right Ear: External ear normal.      Left Ear: External ear normal.      Nose: Nose normal.      Mouth/Throat:      Mouth: Mucous membranes are moist.   Eyes:      Extraocular Movements: Extraocular movements intact.   Cardiovascular:      Rate and Rhythm: Normal rate.   Pulmonary:      Effort: Pulmonary effort is normal.   Musculoskeletal:         General: Normal range of motion.      Cervical back: Normal range of motion.   Skin:     General: Skin is warm and dry.   Neurological:      Mental Status: She is alert and oriented to person, place, and time.   Psychiatric:         Behavior: Behavior normal.             Significant Labs: All pertinent labs within the past 24 hours have been reviewed.  CBC:   Recent Labs   Lab 12/16/23  0557 12/17/23  1329   WBC 5.04 5.19   HGB 8.4* 8.6*   HCT 25.8* 26.1*   * 109*     CMP:   Recent Labs   Lab 12/16/23  0557 12/17/23  1329   * 132*   K 4.8 4.5    100   CO2 28 28   GLU 88 120*   BUN 28* 32*   CREATININE 1.6* 1.7*   CALCIUM 9.0 8.8   PROT 5.3* 5.4*   ALBUMIN 3.1* 3.2*   BILITOT 0.7 0.7   ALKPHOS 83 116   AST 27 31   ALT 19 21   ANIONGAP 3* 4*       Significant Imaging: I have reviewed all pertinent imaging results/findings within the past 24 hours.    Assessment/Plan:      * Closed fracture of right ankle  Open reduction internal fixation of right ankle trimalleolar fracture   PT/OT  Need SNF Placement  Medically stable       Dysphagia  C/o dysphagia  ST/SLP team recommended GI MD consult        UTI (urinary tract infection)  Maintain PO abx      Postural dizziness with presyncope  Resolved     S/P TAVR (transcatheter aortic valve replacement)  Recent TAVR on 11/1 by Dr. Moncada  Pt developed complete heart block post procedure and underwent pacemaker placement on 11/2      Stage 4 chronic kidney disease  Creatine stable for now. BMP reviewed- noted Estimated Creatinine Clearance: 24.9 mL/min (A) (based on SCr of 1.6 mg/dL (H)). according to latest data. 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  Monitor UOP and serial BMP and adjust therapy as needed. Renally dose meds. Avoid nephrotoxic medications and procedures.    Hypertension  Chronic, controlled. Latest blood pressure and vitals reviewed-     Temp:  [98 °F (36.7 °C)-98.6 °F (37 °C)]   Pulse:  [60-65]   Resp:  [16-18]   BP: (105-139)/(54-63)   SpO2:  [95 %-98 %] .   Home meds for hypertension were reviewed and noted below.   Hypertension Medications               furosemide (LASIX) 20 MG tablet Take 1 tablet (20 mg total) by mouth daily as needed. Take for swelling or wt gain > 2 lbs / 24 hr    propranoloL (INDERAL) 10 MG tablet Take 1 tablet (10 mg total) by mouth 2 (two) times daily.            While in the hospital, will manage blood pressure as follows; Continue home antihypertensive regimen    Will utilize p.r.n. blood pressure medication only if patient's blood pressure greater than 180/110 and she develops symptoms such as worsening chest pain or shortness of breath.    PAF (paroxysmal atrial fibrillation)  Patient with Long standing persistent (>12 months) atrial fibrillation which is controlled currently with Amiodarone. Patient is currently in sinus rhythm paced rhythm.[id number] Score: 3. Anticoagulation indicated. Anticoagulation done with eliquis which is eliquis restarted       VTE Risk Mitigation (From admission, onward)           Ordered     apixaban tablet 2.5 mg  2 times daily         12/16/23 8679     IP VTE HIGH  RISK PATIENT  Once         12/14/23 0640     Place KRISTINA hose  Until discontinued         12/14/23 0640     Place sequential compression device  Until discontinued         12/14/23 0640     Place sequential compression device  Until discontinued         12/11/23 1524     Reason for No Pharmacological VTE Prophylaxis  Once        Question:  Reasons:  Answer:  Already adequately anticoagulated on oral Anticoagulants    12/11/23 1524                    Discharge Planning   YANET: 12/16/2023     Code Status: Full Code   Is the patient medically ready for discharge?:     Reason for patient still in hospital (select all that apply): Pending disposition  Discharge Plan A: Skilled Nursing Facility                  Gera Szymanski MD  Department of Hospital Medicine   [facility]

## 2023-12-17 NOTE — ASSESSMENT & PLAN NOTE
Open reduction internal fixation of right ankle trimalleolar fracture   PT/OT  Need SNF Placement  Medically stable

## 2023-12-17 NOTE — PROGRESS NOTES
"[facility] Medicine  Progress Note    Patient Name: Irene العراقي  MRN: [id number]  Patient Class: IP- Inpatient   Admission Date: 12/11/2023  Length of Stay: 4 days  Attending Physician: Gera Szymanski MD  Primary Care Provider: Wanda Kuhn, DEBORAHP-C        Subjective:     Principal Problem:Closed fracture of right ankle        HPI:  Ms. العراقي is an 83 yr old female with Pmhx of  atrial fibrillation, CKD, HTN, 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 presenting today for lightheadedness, weakness and presyncope. Pt reports yesterday she was experiencing fatigue, lightheadedness and generalized weakness and she states symptoms were mild. This morning when she woke up her symptoms persisted and became severe. Pt states she was in the kitchen making breakfast and felt severe lightheadedness and felt like she was going to pass out so she eased her self down on the ground and crawled to the phone to call her daughter. Pt denies cp, sob, n/v or diaphoresis. Pt reports when EMS arrived her BP was in the 180's systolic. Pt experienced trauma to her right ankle with the fall. Xray confirmed displaced lateral malleolar fracture and splint was applied in ED. Pt will be admitted to hospital med services for further workup and management.       Overview/Hospital Course:   12/12: Notes reviewed, no acute events overnight. Eval by ortho this AM who rec transfer to [facility] for surgical repair of right ankle tomorrow. Ortho aware pt will need cardiac clearance prior to surgery. Pt c/o pain to her right ankle today and received oxy 5mg. Pt feels like the pain med is too strong and she is reporting feeling "woozy" now after med admin. She states her ankle pain has improved. She reports feeling lightheaded and weak this morning and states symptoms are similar to what she was feeling when she arrived to ED. She denies cp or sob. Will await echo and cards consult and recs. I spoke with hosp " med team at [facility] and pt accepted by Dr. Jones. Pt aggreeable for transfer.      12/13: Patient was admitted for syncope over at [facility] was transferred to [facility] on request of orthopedic surgeon surgical repair of right ankle fracture. Patient was evaluated by Cardiology and underwent echocardiogram and pacemaker interrogation at [facility] prior to discharge and patient's Eliquis was held prior to surgery.  Patient is currently being prepped for surgery today and has no acute overnight events.      12/14  Overall feels better  Pt thinks she need SNF placement  No new issues     12/15  Pt worked with PT/OT  She had panic attack today AM     12/16  Today was sitting on chair and was unresponsive  Suddenly pt became responsive after sternal rub  Medically stable and awaiting SNF placement       Interval History:       Review of Systems   Constitutional:  Negative for activity change and appetite change.   HENT:  Negative for congestion and dental problem.    Eyes:  Negative for discharge and itching.   Respiratory:  Negative for shortness of breath.    Cardiovascular:  Negative for chest pain.   Gastrointestinal:  Negative for abdominal distention and abdominal pain.   Endocrine: Negative for cold intolerance.   Genitourinary:  Negative for difficulty urinating and dysuria.   Musculoskeletal:  Negative for arthralgias and back pain.   Skin:  Negative for color change.   Neurological:  Negative for dizziness and facial asymmetry.   Hematological:  Negative for adenopathy.   Psychiatric/Behavioral:  Negative for agitation and behavioral problems.      Objective:     Vital Signs (Most Recent):  Temp: 97.2 °F (36.2 °C) (12/16/23 1709)  Pulse: 99 (12/16/23 1709)  Resp: 18 (12/16/23 1709)  BP: 130/72 (12/16/23 1709)  SpO2: 95 % (12/16/23 1709) Vital Signs (24h Range):  Temp:  [97.2 °F (36.2 °C)-98.1 °F (36.7 °C)] 97.2 °F (36.2 °C)  Pulse:  [60-99] 99  Resp:  [1-18] 18  SpO2:  [93 %-98 %] 95 %  BP: (116-143)/(50-75)  130/72     Weight: 69.6 kg (153 lb 7 oz)  Body mass index is 24.77 kg/m².    Intake/Output Summary (Last 24 hours) at 12/16/2023 1836  Last data filed at 12/16/2023 1710  Gross per 24 hour   Intake 660 ml   Output 2100 ml   Net -1440 ml         Physical Exam  Vitals and nursing note reviewed.   Constitutional:       General: She is not in acute distress.  HENT:      Head: Atraumatic.      Right Ear: External ear normal.      Left Ear: External ear normal.      Nose: Nose normal.      Mouth/Throat:      Mouth: Mucous membranes are moist.   Eyes:      Extraocular Movements: Extraocular movements intact.   Cardiovascular:      Rate and Rhythm: Normal rate.   Pulmonary:      Effort: Pulmonary effort is normal.   Musculoskeletal:         General: Normal range of motion.      Cervical back: Normal range of motion.   Skin:     General: Skin is warm.   Neurological:      Mental Status: She is alert and oriented to person, place, and time.   Psychiatric:         Behavior: Behavior normal.             Significant Labs: All pertinent labs within the past 24 hours have been reviewed.  CBC:   Recent Labs   Lab 12/15/23  0446 12/16/23  0557   WBC 6.51 5.04   HGB 8.9* 8.4*   HCT 27.4* 25.8*   * 102*     CMP:   Recent Labs   Lab 12/15/23  0446 12/16/23  0557   * 133*   K 4.6 4.8    102   CO2 24 28   GLU 87 88   BUN 30* 28*   CREATININE 1.6* 1.6*   CALCIUM 9.3 9.0   PROT 5.7* 5.3*   ALBUMIN 3.4* 3.1*   BILITOT 0.5 0.7   ALKPHOS 83 83   AST 29 27   ALT 19 19   ANIONGAP 8 3*       Significant Imaging: I have reviewed all pertinent imaging results/findings within the past 24 hours.    Assessment/Plan:      * Closed fracture of right ankle  Open reduction internal fixation of right ankle trimalleolar fracture   PT/OT  Need SNF Placement  Medically stable       Dysphagia  C/o dysphagia  Consult ST/SLP team      UTI (urinary tract infection)  Maintain PO abx      Postural dizziness with presyncope  Resolved     S/P TAVR  (transcatheter aortic valve replacement)  Recent TAVR on 11/1 by Dr. Moncada  Pt developed complete heart block post procedure and underwent pacemaker placement on 11/2      Stage 4 chronic kidney disease  Creatine stable for now. BMP reviewed- noted Estimated Creatinine Clearance: 24.9 mL/min (A) (based on SCr of 1.6 mg/dL (H)). according to latest data. 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  Monitor UOP and serial BMP and adjust therapy as needed. Renally dose meds. Avoid nephrotoxic medications and procedures.    Hypertension  Chronic, controlled. Latest blood pressure and vitals reviewed-     Temp:  [98 °F (36.7 °C)-98.6 °F (37 °C)]   Pulse:  [60-65]   Resp:  [16-18]   BP: (105-139)/(54-63)   SpO2:  [95 %-98 %] .   Home meds for hypertension were reviewed and noted below.   Hypertension Medications               furosemide (LASIX) 20 MG tablet Take 1 tablet (20 mg total) by mouth daily as needed. Take for swelling or wt gain > 2 lbs / 24 hr    propranoloL (INDERAL) 10 MG tablet Take 1 tablet (10 mg total) by mouth 2 (two) times daily.            While in the hospital, will manage blood pressure as follows; Continue home antihypertensive regimen    Will utilize p.r.n. blood pressure medication only if patient's blood pressure greater than 180/110 and she develops symptoms such as worsening chest pain or shortness of breath.    PAF (paroxysmal atrial fibrillation)  Patient with Long standing persistent (>12 months) atrial fibrillation which is controlled currently with Amiodarone. Patient is currently in sinus rhythm paced rhythm.[id number] Score: 3. Anticoagulation indicated. Anticoagulation done with eliquis which is currently being held.  Obtain clearance to resume it after surgery      VTE Risk Mitigation (From admission, onward)           Ordered     IP VTE HIGH RISK PATIENT  Once         12/14/23 0640     Place KRISTINA hose  Until discontinued         12/14/23 0640     Place sequential compression  device  Until discontinued         12/14/23 0640     Place sequential compression device  Until discontinued         12/11/23 1524     Reason for No Pharmacological VTE Prophylaxis  Once        Question:  Reasons:  Answer:  Already adequately anticoagulated on oral Anticoagulants    12/11/23 1524                    Discharge Planning   YANET: 12/16/2023     Code Status: Full Code   Is the patient medically ready for discharge?:     Reason for patient still in hospital (select all that apply): Pending disposition  Discharge Plan A: Home with family, Home Health                  Gera Szymanski MD  Department of Hospital Medicine   [facility]

## 2023-12-17 NOTE — ASSESSMENT & PLAN NOTE
POD#4    NWB RLE  PT and OT for mobility  DC planning  Plan to DC post-op splint and dressing tomorrow an apply Cam-walker boot  Needs WC with elevating leg rest    Post-op/ Cam walker boot NOT in room this am

## 2023-12-17 NOTE — SUBJECTIVE & OBJECTIVE
Interval History:     Review of Systems   Constitutional:  Negative for activity change and appetite change.   HENT:  Negative for congestion and dental problem.    Eyes:  Negative for discharge and itching.   Respiratory:  Negative for shortness of breath.    Cardiovascular:  Negative for chest pain.   Gastrointestinal:  Negative for abdominal distention and abdominal pain.   Endocrine: Negative for cold intolerance.   Genitourinary:  Negative for difficulty urinating and dysuria.   Musculoskeletal:  Negative for arthralgias and back pain.   Skin:  Negative for color change.   Neurological:  Negative for dizziness and facial asymmetry.   Hematological:  Negative for adenopathy.   Psychiatric/Behavioral:  Negative for agitation and behavioral problems.      Objective:     Vital Signs (Most Recent):  Temp: 97.2 °F (36.2 °C) (12/17/23 1146)  Pulse: 67 (12/17/23 0831)  Resp: 18 (12/17/23 1146)  BP: (!) 132/52 (12/17/23 1146)  SpO2: 96 % (12/17/23 1146) Vital Signs (24h Range):  Temp:  [97.2 °F (36.2 °C)-98.5 °F (36.9 °C)] 97.2 °F (36.2 °C)  Pulse:  [66-99] 67  Resp:  [16-19] 18  SpO2:  [95 %-98 %] 96 %  BP: ()/(37-72) 132/52     Weight: 69.6 kg (153 lb 7 oz)  Body mass index is 24.77 kg/m².    Intake/Output Summary (Last 24 hours) at 12/17/2023 1443  Last data filed at 12/17/2023 1147  Gross per 24 hour   Intake 480 ml   Output 1850 ml   Net -1370 ml         Physical Exam  Vitals and nursing note reviewed.   Constitutional:       General: She is not in acute distress.  HENT:      Head: Atraumatic.      Right Ear: External ear normal.      Left Ear: External ear normal.      Nose: Nose normal.      Mouth/Throat:      Mouth: Mucous membranes are moist.   Eyes:      Extraocular Movements: Extraocular movements intact.   Cardiovascular:      Rate and Rhythm: Normal rate.   Pulmonary:      Effort: Pulmonary effort is normal.   Musculoskeletal:         General: Normal range of motion.      Cervical back: Normal range  of motion.   Skin:     General: Skin is warm and dry.   Neurological:      Mental Status: She is alert and oriented to person, place, and time.   Psychiatric:         Behavior: Behavior normal.             Significant Labs: All pertinent labs within the past 24 hours have been reviewed.  CBC:   Recent Labs   Lab 12/16/23  0557 12/17/23  1329   WBC 5.04 5.19   HGB 8.4* 8.6*   HCT 25.8* 26.1*   * 109*     CMP:   Recent Labs   Lab 12/16/23  0557 12/17/23  1329   * 132*   K 4.8 4.5    100   CO2 28 28   GLU 88 120*   BUN 28* 32*   CREATININE 1.6* 1.7*   CALCIUM 9.0 8.8   PROT 5.3* 5.4*   ALBUMIN 3.1* 3.2*   BILITOT 0.7 0.7   ALKPHOS 83 116   AST 27 31   ALT 19 21   ANIONGAP 3* 4*       Significant Imaging: I have reviewed all pertinent imaging results/findings within the past 24 hours.

## 2023-12-17 NOTE — PT/OT/SLP PROGRESS
Physical Therapy Treatment    Patient Name:  Irene العراقي   MRN:  [id number]    Recommendations:     Discharge Recommendations: Moderate Intensity Therapy  Discharge Equipment Recommendations: none  Barriers to discharge: Decreased caregiver support    Assessment:     Irene العراقي is a 83 y.o. female admitted with a medical diagnosis of Closed fracture of right ankle.  She presents with the following impairments/functional limitations: weakness, impaired endurance, impaired functional mobility, impaired balance, decreased lower extremity function, decreased upper extremity function, decreased safety awareness, pain, impaired cardiopulmonary response to activity, orthopedic precautions .    Pt seen supine in bed, tearful this am. Pt's twin sister  2023. Pt completed thera ex in supine. Min assist to sit EOB and mod assist x2 to stand with RW and NWB R LE- on posterior splint and awaiting cam walker boot.   Pt c/o weakness and lightheadedness post standing.  SNF.    Rehab Prognosis: Fair; patient would benefit from acute skilled PT services to address these deficits and reach maximum level of function.    Recent Surgery: Procedure(s) (LRB):  ORIF, ANKLE (Right) 4 Days Post-Op    Plan:     During this hospitalization, patient to be seen daily to address the identified rehab impairments via therapeutic activities, therapeutic exercises and progress toward the following goals:    Plan of Care Expires:  01/15/24    Subjective     Chief Complaint: lightheaded, teary this am  Patient/Family Comments/goals: get well to go back to [place]  Pain/Comfort:  Pain Rating 1: 0/10      Objective:     Communicated with nurse Banks prior to session.  Patient found HOB elevated with araya catheter, telemetry upon PT entry to room.     General Precautions: Standard, fall  Orthopedic Precautions: RLE non weight bearing  Braces:  (posterior splint)  Respiratory Status: Room air     Functional Mobility:  Bed Mobility:     Rolling  Right: minimum assistance  Scooting: minimum assistance  Supine to Sit: minimum assistance  Sit to Supine: moderate assistance  Transfers:     Sit to Stand:  moderate assistance and of 2 persons with rolling walker      AM-PAC 6 CLICK MOBILITY          Treatment & Education:  Patient was educated on the importance of OOB activity and functional mobility to negate negative effects of prolonged bed rest during hospitalization, safe transfers and ambulation, and D/C planning   Thera ex in supine  EOB sitting and standing with c/o weakness/lightheaded/anxious  Back ro bed and repositioned    Patient left HOB elevated with all lines intact, call button in reach, and bed alarm on..    GOALS:   Multidisciplinary Problems       Physical Therapy Goals          Problem: Physical Therapy    Goal Priority Disciplines Outcome Goal Variances Interventions   Physical Therapy Goal     PT, PT/OT Ongoing, Progressing     Description: Goals to be met by: 1/15/2024     Patient will increase functional independence with mobility by performin. Supine to sit with Contact Guard Assistance  2. Sit to stand transfer with Contact Guard Assistance  3. Bed to chair transfer with Minimal Assistance using Rolling Walker                         Time Tracking:     PT Received On: 23  PT Start Time: 1126     PT Stop Time: 1153  PT Total Time (min): 27 min     Billable Minutes: Therapeutic Activity 17 and Therapeutic Exercise 10    Treatment Type: Treatment  PT/PTA: PT     Number of PTA visits since last PT visit: 0     2023

## 2023-12-17 NOTE — SUBJECTIVE & OBJECTIVE
Interval History:       Review of Systems   Constitutional:  Negative for activity change and appetite change.   HENT:  Negative for congestion and dental problem.    Eyes:  Negative for discharge and itching.   Respiratory:  Negative for shortness of breath.    Cardiovascular:  Negative for chest pain.   Gastrointestinal:  Negative for abdominal distention and abdominal pain.   Endocrine: Negative for cold intolerance.   Genitourinary:  Negative for difficulty urinating and dysuria.   Musculoskeletal:  Negative for arthralgias and back pain.   Skin:  Negative for color change.   Neurological:  Negative for dizziness and facial asymmetry.   Hematological:  Negative for adenopathy.   Psychiatric/Behavioral:  Negative for agitation and behavioral problems.      Objective:     Vital Signs (Most Recent):  Temp: 97.2 °F (36.2 °C) (12/16/23 1709)  Pulse: 99 (12/16/23 1709)  Resp: 18 (12/16/23 1709)  BP: 130/72 (12/16/23 1709)  SpO2: 95 % (12/16/23 1709) Vital Signs (24h Range):  Temp:  [97.2 °F (36.2 °C)-98.1 °F (36.7 °C)] 97.2 °F (36.2 °C)  Pulse:  [60-99] 99  Resp:  [1-18] 18  SpO2:  [93 %-98 %] 95 %  BP: (116-143)/(50-75) 130/72     Weight: 69.6 kg (153 lb 7 oz)  Body mass index is 24.77 kg/m².    Intake/Output Summary (Last 24 hours) at 12/16/2023 1836  Last data filed at 12/16/2023 1710  Gross per 24 hour   Intake 660 ml   Output 2100 ml   Net -1440 ml         Physical Exam  Vitals and nursing note reviewed.   Constitutional:       General: She is not in acute distress.  HENT:      Head: Atraumatic.      Right Ear: External ear normal.      Left Ear: External ear normal.      Nose: Nose normal.      Mouth/Throat:      Mouth: Mucous membranes are moist.   Eyes:      Extraocular Movements: Extraocular movements intact.   Cardiovascular:      Rate and Rhythm: Normal rate.   Pulmonary:      Effort: Pulmonary effort is normal.   Musculoskeletal:         General: Normal range of motion.      Cervical back: Normal range of  motion.   Skin:     General: Skin is warm.   Neurological:      Mental Status: She is alert and oriented to person, place, and time.   Psychiatric:         Behavior: Behavior normal.             Significant Labs: All pertinent labs within the past 24 hours have been reviewed.  CBC:   Recent Labs   Lab 12/15/23  0446 12/16/23  0557   WBC 6.51 5.04   HGB 8.9* 8.4*   HCT 27.4* 25.8*   * 102*     CMP:   Recent Labs   Lab 12/15/23  0446 12/16/23  0557   * 133*   K 4.6 4.8    102   CO2 24 28   GLU 87 88   BUN 30* 28*   CREATININE 1.6* 1.6*   CALCIUM 9.3 9.0   PROT 5.7* 5.3*   ALBUMIN 3.4* 3.1*   BILITOT 0.5 0.7   ALKPHOS 83 83   AST 29 27   ALT 19 19   ANIONGAP 8 3*       Significant Imaging: I have reviewed all pertinent imaging results/findings within the past 24 hours.

## 2023-12-17 NOTE — CARE UPDATE
12/17/23 0745   PRE-TX-O2   Device (Oxygen Therapy) nasal cannula   $ Is the patient on Low Flow Oxygen? Yes   Flow (L/min) 2   SpO2 97 %   Pulse Oximetry Type Intermittent   $ Pulse Oximetry - Multiple Charge Pulse Oximetry - Multiple   Pulse 74   Resp 19   Education   $ Education 15 min

## 2023-12-17 NOTE — PLAN OF CARE
Problem: Physical Therapy  Goal: Physical Therapy Goal  Description: Goals to be met by: 1/15/2024     Patient will increase functional independence with mobility by performin. Supine to sit with Contact Guard Assistance  2. Sit to stand transfer with Contact Guard Assistance  3. Bed to chair transfer with Minimal Assistance using Rolling Walker    Outcome: Ongoing, Progressing   Pt seen for thera ex and EOB sitting. Stood with RW mod assist x2 with NWB RLE.

## 2023-12-18 PROBLEM — I95.1 POSTURAL HYPOTENSION: Status: ACTIVE | Noted: 2023-12-18

## 2023-12-18 LAB
ALBUMIN SERPL BCP-MCNC: 3.1 G/DL (ref 3.5–5.2)
ALP SERPL-CCNC: 170 U/L (ref 55–135)
ALT SERPL W/O P-5'-P-CCNC: 30 U/L (ref 10–44)
ANION GAP SERPL CALC-SCNC: 7 MMOL/L (ref 8–16)
AST SERPL-CCNC: 44 U/L (ref 10–40)
BILIRUB SERPL-MCNC: 0.7 MG/DL (ref 0.1–1)
BUN SERPL-MCNC: 30 MG/DL (ref 8–23)
CALCIUM SERPL-MCNC: 9 MG/DL (ref 8.7–10.5)
CHLORIDE SERPL-SCNC: 104 MMOL/L (ref 95–110)
CO2 SERPL-SCNC: 24 MMOL/L (ref 23–29)
CREAT SERPL-MCNC: 1.5 MG/DL (ref 0.5–1.4)
ERYTHROCYTE [DISTWIDTH] IN BLOOD BY AUTOMATED COUNT: 12.7 % (ref 11.5–14.5)
EST. GFR  (NO RACE VARIABLE): 34.4 ML/MIN/1.73 M^2
GLUCOSE SERPL-MCNC: 95 MG/DL (ref 70–110)
HCT VFR BLD AUTO: 26.8 % (ref 37–48.5)
HGB BLD-MCNC: 8.8 G/DL (ref 12–16)
MCH RBC QN AUTO: 31.3 PG (ref 27–31)
MCHC RBC AUTO-ENTMCNC: 32.8 G/DL (ref 32–36)
MCV RBC AUTO: 95 FL (ref 82–98)
PLATELET # BLD AUTO: 106 K/UL (ref 150–450)
PMV BLD AUTO: 10.7 FL (ref 9.2–12.9)
POTASSIUM SERPL-SCNC: 4.7 MMOL/L (ref 3.5–5.1)
PROCALCITONIN SERPL IA-MCNC: 0.17 NG/ML (ref 0–0.5)
PROT SERPL-MCNC: 5.2 G/DL (ref 6–8.4)
RBC # BLD AUTO: 2.81 M/UL (ref 4–5.4)
SODIUM SERPL-SCNC: 135 MMOL/L (ref 136–145)
WBC # BLD AUTO: 5.19 K/UL (ref 3.9–12.7)

## 2023-12-18 PROCEDURE — 99900031 HC PATIENT EDUCATION (STAT)

## 2023-12-18 PROCEDURE — 97535 SELF CARE MNGMENT TRAINING: CPT

## 2023-12-18 PROCEDURE — 85027 COMPLETE CBC AUTOMATED: CPT | Performed by: INTERNAL MEDICINE

## 2023-12-18 PROCEDURE — 94761 N-INVAS EAR/PLS OXIMETRY MLT: CPT

## 2023-12-18 PROCEDURE — 84145 PROCALCITONIN (PCT): CPT | Performed by: INTERNAL MEDICINE

## 2023-12-18 PROCEDURE — 80053 COMPREHEN METABOLIC PANEL: CPT | Performed by: INTERNAL MEDICINE

## 2023-12-18 PROCEDURE — 25000003 PHARM REV CODE 250: Performed by: ORTHOPAEDIC SURGERY

## 2023-12-18 PROCEDURE — 92526 ORAL FUNCTION THERAPY: CPT

## 2023-12-18 PROCEDURE — 25000003 PHARM REV CODE 250: Performed by: INTERNAL MEDICINE

## 2023-12-18 PROCEDURE — 97530 THERAPEUTIC ACTIVITIES: CPT | Mod: CQ

## 2023-12-18 PROCEDURE — 99900035 HC TECH TIME PER 15 MIN (STAT)

## 2023-12-18 PROCEDURE — 12000002 HC ACUTE/MED SURGE SEMI-PRIVATE ROOM

## 2023-12-18 PROCEDURE — 36415 COLL VENOUS BLD VENIPUNCTURE: CPT | Performed by: INTERNAL MEDICINE

## 2023-12-18 PROCEDURE — 97129 THER IVNTJ 1ST 15 MIN: CPT

## 2023-12-18 RX ORDER — SODIUM CHLORIDE 9 MG/ML
INJECTION, SOLUTION INTRAVENOUS CONTINUOUS
Status: DISCONTINUED | OUTPATIENT
Start: 2023-12-18 | End: 2023-12-18

## 2023-12-18 RX ORDER — MIDODRINE HYDROCHLORIDE 10 MG/1
10 TABLET ORAL EVERY MORNING
Status: DISCONTINUED | OUTPATIENT
Start: 2023-12-18 | End: 2023-12-18

## 2023-12-18 RX ORDER — SODIUM CHLORIDE 9 MG/ML
INJECTION, SOLUTION INTRAVENOUS CONTINUOUS
Status: ACTIVE | OUTPATIENT
Start: 2023-12-18 | End: 2023-12-18

## 2023-12-18 RX ORDER — MIDODRINE HYDROCHLORIDE 10 MG/1
10 TABLET ORAL
Status: DISCONTINUED | OUTPATIENT
Start: 2023-12-18 | End: 2023-12-21 | Stop reason: HOSPADM

## 2023-12-18 RX ADMIN — DOCUSATE SODIUM 100 MG: 100 CAPSULE, LIQUID FILLED ORAL at 08:12

## 2023-12-18 RX ADMIN — MUPIROCIN 1 G: 20 OINTMENT TOPICAL at 08:12

## 2023-12-18 RX ADMIN — MUPIROCIN 1 G: 20 OINTMENT TOPICAL at 10:12

## 2023-12-18 RX ADMIN — LEVOFLOXACIN 250 MG: 250 TABLET, FILM COATED ORAL at 06:12

## 2023-12-18 RX ADMIN — APIXABAN 2.5 MG: 2.5 TABLET, FILM COATED ORAL at 10:12

## 2023-12-18 RX ADMIN — DOCUSATE SODIUM 100 MG: 100 CAPSULE, LIQUID FILLED ORAL at 10:12

## 2023-12-18 RX ADMIN — Medication 400 MG: at 10:12

## 2023-12-18 RX ADMIN — APIXABAN 2.5 MG: 2.5 TABLET, FILM COATED ORAL at 08:12

## 2023-12-18 RX ADMIN — MIDODRINE HYDROCHLORIDE 10 MG: 10 TABLET ORAL at 05:12

## 2023-12-18 RX ADMIN — FAMOTIDINE 20 MG: 20 TABLET ORAL at 10:12

## 2023-12-18 RX ADMIN — SODIUM CHLORIDE: 0.9 INJECTION, SOLUTION INTRAVENOUS at 11:12

## 2023-12-18 RX ADMIN — ACETAMINOPHEN 650 MG: 325 TABLET ORAL at 08:12

## 2023-12-18 RX ADMIN — ACETAMINOPHEN 650 MG: 325 TABLET ORAL at 10:12

## 2023-12-18 RX ADMIN — MIDODRINE HYDROCHLORIDE 10 MG: 10 TABLET ORAL at 01:12

## 2023-12-18 RX ADMIN — SODIUM CHLORIDE 1000 ML: 0.9 INJECTION, SOLUTION INTRAVENOUS at 10:12

## 2023-12-18 NOTE — PT/OT/SLP PROGRESS
Occupational Therapy   Treatment    Name: Irene العراقي  MRN: [id number]  Admitting Diagnosis:  Closed fracture of right ankle  5 Days Post-Op    Recommendations:     Discharge Recommendations: Moderate Intensity Therapy  Discharge Equipment Recommendations:  none  Barriers to discharge:  Decreased caregiver support    Assessment:     Irene العراقي is a 83 y.o. female with a medical diagnosis of Closed fracture of right ankle. Performance deficits affecting function are weakness, impaired endurance, impaired sensation, impaired self care skills, impaired functional mobility, gait instability, pain, impaired cardiopulmonary response to activity, decreased lower extremity function, orthopedic precautions. Patient reports she felt like she would pass out while sitting in chair this morning. Nurse requests no out of bed activities.     Rehab Prognosis:  Fair; patient would benefit from acute skilled OT services to address these deficits and reach maximum level of function.       Plan:     Patient to be seen 5 x/week to address the above listed problems via self-care/home management, therapeutic activities, therapeutic exercises  Plan of Care Expires: 01/12/24  Plan of Care Reviewed with: patient    Subjective     Chief Complaint: very tired  Patient/Family Comments/goals: SNF placement and then return home   Pain/Comfort:  Pain Rating 1: 0/10  Pain Rating Post-Intervention 1: 0/10    Objective:     Communicated with: nurse prior to session.  Patient found HOB elevated with araya catheter, telemetry upon OT entry to room.    General Precautions: Standard, fall    Orthopedic Precautions:RLE non weight bearing  Braces:  (RLE splint)  Respiratory Status: Nasal cannula, flow 2 L/min     Occupational Performance:     Bed Mobility:    Patient completed Rolling/Turning to Left with  moderate assistance for repositioning   Patient completed Rolling/Turning to Right with moderate assistance     Activities of Daily  Living:  Grooming: stand by assistance/setup for oral care and washing face with head of bed elevated.     Treatment & Education:  Patient was educated on safety during functional transfers, ADLs and reviewed equipment needs and discharge planning. Reviewed use of call bell for assistance.     Patient left HOB elevated with all lines intact, call button in reach, and bed alarm on    GOALS:   Multidisciplinary Problems       Occupational Therapy Goals          Problem: Occupational Therapy    Goal Priority Disciplines Outcome Interventions   Occupational Therapy Goal     OT, PT/OT Ongoing, Progressing    Description: Goals to be met by: 1/12/2024     Patient will increase functional independence with ADLs by performing:    LE Dressing with Minimal Assistance and Assistive Devices as needed.  Grooming while seated with Supervision.  Toileting from bedside commode with Minimal Assistance for hygiene and clothing management.   Supine to sit with Contact Guard Assistance.  Toilet transfer to bedside commode with Minimal Assistance and maintaining weight-bearing precaution(s).                         Time Tracking:     OT Date of Treatment: 12/18/23  OT Start Time: 1125  OT Stop Time: 1133  OT Total Time (min): 8 min    Billable Minutes:Self Care/Home Management 8    OT/HANNAH: OT          12/18/2023

## 2023-12-18 NOTE — PROGRESS NOTES
[facility]  Orthopedics  Progress Note    Patient Name: Irene العراقي  MRN: [id number]  Admission Date: 12/11/2023  Hospital Length of Stay: 6 days  Attending Provider: Gera Szymanski MD  Primary Care Provider: Wanda Kuhn FNP-C  Follow-up For: Procedure(s) (LRB):  ORIF, ANKLE (Right)    Post-Operative Day: 5 Days Post-Op  Subjective:     Principal Problem:Closed fracture of right ankle    Principal Orthopedic Problem:  Status post right ankle ORIF    Interval History:  None    Review of patient's allergies indicates:   Allergen Reactions    Cefuroxime     Hydrocodone     Meperidine     Meperidine hcl Nausea And Vomiting    Persantine [dipyridamole]     Nitrofurantoin macrocrystal      Headache , went into Afib     Vicodin [hydrocodone-acetaminophen] Nausea And Vomiting       Current Facility-Administered Medications   Medication    acetaminophen tablet 650 mg    ALPRAZolam tablet 0.5 mg    apixaban tablet 2.5 mg    dextrose 50% injection 12.5 g    dextrose 50% injection 25 g    docusate sodium capsule 100 mg    famotidine tablet 20 mg    glucagon (human recombinant) injection 1 mg    glucose chewable tablet 16 g    glucose chewable tablet 24 g    levoFLOXacin tablet 250 mg    magnesium oxide tablet 400 mg    magnesium oxide tablet 800 mg    magnesium oxide tablet 800 mg    meclizine tablet 50 mg    mupirocin 2 % ointment    naloxone 0.4 mg/mL injection 0.02 mg    ondansetron injection 4 mg    oxyCODONE immediate release tablet 5 mg    polyethylene glycol packet 17 g    potassium bicarbonate disintegrating tablet 35 mEq    potassium bicarbonate disintegrating tablet 50 mEq    potassium bicarbonate disintegrating tablet 60 mEq    potassium, sodium phosphates 280-160-250 mg packet 2 packet    potassium, sodium phosphates 280-160-250 mg packet 2 packet    potassium, sodium phosphates 280-160-250 mg packet 2 packet    sodium chloride 0.9% flush 10 mL     Objective:     Vital Signs (Most  "Recent):  Temp: 98.4 °F (36.9 °C) (12/17/23 2351)  Pulse: 64 (12/17/23 2351)  Resp: 18 (12/17/23 2351)  BP: 132/61 (12/17/23 2351)  SpO2: 97 % (12/17/23 2351) Vital Signs (24h Range):  Temp:  [97.2 °F (36.2 °C)-98.4 °F (36.9 °C)] 98.4 °F (36.9 °C)  Pulse:  [64-86] 64  Resp:  [17-20] 18  SpO2:  [95 %-99 %] 97 %  BP: ()/(37-63) 132/61     Weight: 69.6 kg (153 lb 7 oz)  Height: 5' 6" (167.6 cm)  Body mass index is 24.77 kg/m².      Intake/Output Summary (Last 24 hours) at 12/18/2023 0824  Last data filed at 12/18/2023 0654  Gross per 24 hour   Intake 353.75 ml   Output 1425 ml   Net -1071.25 ml        General    Nursing note and vitals reviewed.  Constitutional: She is oriented to person, place, and time. She appears well-developed and well-nourished.   Pulmonary/Chest: Effort normal.   Neurological: She is alert and oriented to person, place, and time.   Psychiatric: She has a normal mood and affect. Her behavior is normal.         Right Ankle/Foot Exam     Comments:  Postop splint and dressing removed.  There are Steri-Strips over the lateral malleolus incision with no drainage.  No other incisions noted.  Right foot is distal neurovascular intact.           Significant Labs: CBC:   Recent Labs   Lab 12/17/23  1329 12/18/23  0358   WBC 5.19 5.19   HGB 8.6* 8.8*   HCT 26.1* 26.8*   * 106*     CMP:   Recent Labs   Lab 12/17/23  1329 12/18/23  0357   * 135*   K 4.5 4.7    104   CO2 28 24   * 95   BUN 32* 30*   CREATININE 1.7* 1.5*   CALCIUM 8.8 9.0   PROT 5.4* 5.2*   ALBUMIN 3.2* 3.1*   BILITOT 0.7 0.7   ALKPHOS 116 170*   AST 31 44*   ALT 21 30   ANIONGAP 4* 7*     All pertinent labs within the past 24 hours have been reviewed.    Significant Imaging: None  Assessment/Plan:     * Closed fracture of right ankle  POD#5    NWB RLE  PT and OT for mobility  DC planning  Needs WC with elevating leg rest    I redressed the right ankle with an Ace wrap only.  I attempted to put her right ankle " in the Cam walker boot but, the boot felt too small.  I will contact physical therapy to see if they can adjust this or we may have to get her a larger size.    Remains stable from a postoperative orthopedic perspective and may be discharged or transferred when the hospitalist feels it is appropriate.          MARIE AZAR  Orthopedics  Former[facility]

## 2023-12-18 NOTE — PLAN OF CARE
SW met with Pt at bedside to discuss discharge plan and possible SNF placement. Pt asked me to called Roger Diane (Relative) [phone] to discuss SNF options. SW spoke with Roger via phone he is okay with SNF placement and would like [facility] as the first and only option for now.       12/18/23 0952   Post-Acute Status   Post-Acute Authorization Placement   Post-Acute Placement Status Referrals Sent   Discharge Delays None known at this time   Discharge Plan   Discharge Plan A Skilled Nursing Facility   Discharge Plan B Skilled Nursing Facility

## 2023-12-18 NOTE — PROGRESS NOTES
"[facility] Medicine  Progress Note    Patient Name: Irene العراقي  MRN: [id number]  Patient Class: IP- Inpatient   Admission Date: 12/11/2023  Length of Stay: 6 days  Attending Physician: Gera Szymanski MD  Primary Care Provider: Wanda Kuhn, DEBORAHP-C        Subjective:     Principal Problem:Closed fracture of right ankle        HPI:  Ms. العراقي is an 83 yr old female with Pmhx of  atrial fibrillation, CKD, HTN, 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 presenting today for lightheadedness, weakness and presyncope. Pt reports yesterday she was experiencing fatigue, lightheadedness and generalized weakness and she states symptoms were mild. This morning when she woke up her symptoms persisted and became severe. Pt states she was in the kitchen making breakfast and felt severe lightheadedness and felt like she was going to pass out so she eased her self down on the ground and crawled to the phone to call her daughter. Pt denies cp, sob, n/v or diaphoresis. Pt reports when EMS arrived her BP was in the 180's systolic. Pt experienced trauma to her right ankle with the fall. Xray confirmed displaced lateral malleolar fracture and splint was applied in ED. Pt will be admitted to hospital med services for further workup and management.       Overview/Hospital Course:   12/12: Notes reviewed, no acute events overnight. Eval by ortho this AM who rec transfer to [facility] for surgical repair of right ankle tomorrow. Ortho aware pt will need cardiac clearance prior to surgery. Pt c/o pain to her right ankle today and received oxy 5mg. Pt feels like the pain med is too strong and she is reporting feeling "woozy" now after med admin. She states her ankle pain has improved. She reports feeling lightheaded and weak this morning and states symptoms are similar to what she was feeling when she arrived to ED. She denies cp or sob. Will await echo and cards consult and recs. I spoke with hosp " med team at [facility] and pt accepted by Dr. Jones. Pt aggreeable for transfer.      12/13: Patient was admitted for syncope over at [facility] was transferred to [facility] on request of orthopedic surgeon surgical repair of right ankle fracture. Patient was evaluated by Cardiology and underwent echocardiogram and pacemaker interrogation at [facility] prior to discharge and patient's Eliquis was held prior to surgery.  Patient is currently being prepped for surgery today and has no acute overnight events.      12/14  Overall feels better  Pt thinks she need SNF placement  No new issues     12/15  Pt worked with PT/OT  She had panic attack today AM     12/16  Today was sitting on chair and was unresponsive  Suddenly pt became responsive after sternal rub  Medically stable and awaiting SNF placement       12/17  Pt evaluated by ST/SLP team  Recommended GI consult for dysphagia      12/18  Pt again had significant drop in BP when she was seated in a chair from bed  Midodrine started     Interval History:     Review of Systems   Constitutional:  Negative for activity change and appetite change.   HENT:  Negative for congestion and dental problem.    Eyes:  Negative for discharge and itching.   Respiratory:  Negative for shortness of breath.    Cardiovascular:  Negative for chest pain.   Gastrointestinal:  Negative for abdominal distention and abdominal pain.   Endocrine: Negative for cold intolerance.   Genitourinary:  Negative for difficulty urinating and dysuria.   Musculoskeletal:  Negative for arthralgias and back pain.   Skin:  Negative for color change.   Neurological:  Negative for dizziness and facial asymmetry.   Hematological:  Negative for adenopathy.   Psychiatric/Behavioral:  Negative for agitation and behavioral problems.      Objective:     Vital Signs (Most Recent):  Temp: 98.3 °F (36.8 °C) (12/18/23 0904)  Pulse: 65 (12/18/23 0955)  Resp: 19 (12/18/23 0904)  BP: (!) 152/65 (12/18/23 0955)  SpO2: 100 %  (12/18/23 0924) Vital Signs (24h Range):  Temp:  [97.2 °F (36.2 °C)-98.4 °F (36.9 °C)] 98.3 °F (36.8 °C)  Pulse:  [60-86] 65  Resp:  [18-20] 19  SpO2:  [95 %-100 %] 100 %  BP: ()/(38-65) 152/65     Weight: 69.6 kg (153 lb 7 oz)  Body mass index is 24.77 kg/m².    Intake/Output Summary (Last 24 hours) at 12/18/2023 1128  Last data filed at 12/18/2023 0905  Gross per 24 hour   Intake 353.75 ml   Output 1525 ml   Net -1171.25 ml         Physical Exam  Vitals and nursing note reviewed.   Constitutional:       General: She is not in acute distress.  HENT:      Head: Atraumatic.      Right Ear: External ear normal.      Left Ear: External ear normal.      Nose: Nose normal.      Mouth/Throat:      Mouth: Mucous membranes are moist.   Eyes:      Extraocular Movements: Extraocular movements intact.   Cardiovascular:      Rate and Rhythm: Normal rate.   Pulmonary:      Effort: Pulmonary effort is normal.   Musculoskeletal:         General: Normal range of motion.      Cervical back: Normal range of motion.   Skin:     General: Skin is warm.   Neurological:      Mental Status: She is alert and oriented to person, place, and time.   Psychiatric:         Behavior: Behavior normal.             Significant Labs: All pertinent labs within the past 24 hours have been reviewed.  CBC:   Recent Labs   Lab 12/17/23  1329 12/18/23  0358   WBC 5.19 5.19   HGB 8.6* 8.8*   HCT 26.1* 26.8*   * 106*     CMP:   Recent Labs   Lab 12/17/23  1329 12/18/23  0357   * 135*   K 4.5 4.7    104   CO2 28 24   * 95   BUN 32* 30*   CREATININE 1.7* 1.5*   CALCIUM 8.8 9.0   PROT 5.4* 5.2*   ALBUMIN 3.2* 3.1*   BILITOT 0.7 0.7   ALKPHOS 116 170*   AST 31 44*   ALT 21 30   ANIONGAP 4* 7*       Significant Imaging: I have reviewed all pertinent imaging results/findings within the past 24 hours.    Assessment/Plan:      * Closed fracture of right ankle  Open reduction internal fixation of right ankle trimalleolar fracture    PT/OT  Need SNF Placement  Medically stable       Postural hypotension  Significant postural drop in BP on several occasions  Started on Midodrine       Dysphagia  C/o dysphagia  ST/SLP team recommended GI MD consult       UTI (urinary tract infection)  Maintain PO abx      Postural dizziness with presyncope  Pt found to have significant drop in BP while sitting/standing  Started on high dose midodrine  Compression stockings     S/P TAVR (transcatheter aortic valve replacement)  Recent TAVR on 11/1 by Dr. Moncada  Pt developed complete heart block post procedure and underwent pacemaker placement on 11/2      Stage 4 chronic kidney disease  Creatine stable for now. BMP reviewed- noted Estimated Creatinine Clearance: 24.9 mL/min (A) (based on SCr of 1.6 mg/dL (H)). according to latest data. 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  Monitor UOP and serial BMP and adjust therapy as needed. Renally dose meds. Avoid nephrotoxic medications and procedures.    Hypertension  Chronic, controlled. Latest blood pressure and vitals reviewed-     Temp:  [98 °F (36.7 °C)-98.6 °F (37 °C)]   Pulse:  [60-65]   Resp:  [16-18]   BP: (105-139)/(54-63)   SpO2:  [95 %-98 %] .   Home meds for hypertension were reviewed and noted below.   Hypertension Medications               furosemide (LASIX) 20 MG tablet Take 1 tablet (20 mg total) by mouth daily as needed. Take for swelling or wt gain > 2 lbs / 24 hr    propranoloL (INDERAL) 10 MG tablet Take 1 tablet (10 mg total) by mouth 2 (two) times daily.            While in the hospital, will manage blood pressure as follows; Continue home antihypertensive regimen    Will utilize p.r.n. blood pressure medication only if patient's blood pressure greater than 180/110 and she develops symptoms such as worsening chest pain or shortness of breath.    PAF (paroxysmal atrial fibrillation)  Patient with Long standing persistent (>12 months) atrial fibrillation which is controlled currently  with Amiodarone. Patient is currently in sinus rhythm paced rhythm.[id number] Score: 3. Anticoagulation indicated. Anticoagulation done with eliquis which is eliquis restarted       VTE Risk Mitigation (From admission, onward)           Ordered     Place KRISTINA hose  Until discontinued         12/18/23 1044     apixaban tablet 2.5 mg  2 times daily         12/16/23 1839     IP VTE HIGH RISK PATIENT  Once         12/14/23 0640     Place KRISTINA hose  Until discontinued         12/14/23 0640     Place sequential compression device  Until discontinued         12/14/23 0640     Place sequential compression device  Until discontinued         12/11/23 1524     Reason for No Pharmacological VTE Prophylaxis  Once        Question:  Reasons:  Answer:  Already adequately anticoagulated on oral Anticoagulants    12/11/23 1524                    Discharge Planning   YANET: 12/19/2023     Code Status: Full Code   Is the patient medically ready for discharge?:     Reason for patient still in hospital (select all that apply): Pending disposition  Discharge Plan A: Skilled Nursing Facility   Discharge Delays: None known at this time              Gera Szymanski MD  Department of Hospital Medicine   [facility]

## 2023-12-18 NOTE — ASSESSMENT & PLAN NOTE
POD#5    NWB RLE  PT and OT for mobility  DC planning  Needs WC with elevating leg rest    I redressed the right ankle with an Ace wrap only.  I attempted to put her right ankle in the Cam walker boot but, the boot felt too small.  I will contact physical therapy to see if they can adjust this or we may have to get her a larger size.    Remains stable from a postoperative orthopedic perspective and may be discharged or transferred when the hospitalist feels it is appropriate.

## 2023-12-18 NOTE — SUBJECTIVE & OBJECTIVE
Principal Problem:Closed fracture of right ankle    Principal Orthopedic Problem:  Status post right ankle ORIF    Interval History:  None    Review of patient's allergies indicates:   Allergen Reactions    Cefuroxime     Hydrocodone     Meperidine     Meperidine hcl Nausea And Vomiting    Persantine [dipyridamole]     Nitrofurantoin macrocrystal      Headache , went into Afib     Vicodin [hydrocodone-acetaminophen] Nausea And Vomiting       Current Facility-Administered Medications   Medication    acetaminophen tablet 650 mg    ALPRAZolam tablet 0.5 mg    apixaban tablet 2.5 mg    dextrose 50% injection 12.5 g    dextrose 50% injection 25 g    docusate sodium capsule 100 mg    famotidine tablet 20 mg    glucagon (human recombinant) injection 1 mg    glucose chewable tablet 16 g    glucose chewable tablet 24 g    levoFLOXacin tablet 250 mg    magnesium oxide tablet 400 mg    magnesium oxide tablet 800 mg    magnesium oxide tablet 800 mg    meclizine tablet 50 mg    mupirocin 2 % ointment    naloxone 0.4 mg/mL injection 0.02 mg    ondansetron injection 4 mg    oxyCODONE immediate release tablet 5 mg    polyethylene glycol packet 17 g    potassium bicarbonate disintegrating tablet 35 mEq    potassium bicarbonate disintegrating tablet 50 mEq    potassium bicarbonate disintegrating tablet 60 mEq    potassium, sodium phosphates 280-160-250 mg packet 2 packet    potassium, sodium phosphates 280-160-250 mg packet 2 packet    potassium, sodium phosphates 280-160-250 mg packet 2 packet    sodium chloride 0.9% flush 10 mL     Objective:     Vital Signs (Most Recent):  Temp: 98.4 °F (36.9 °C) (12/17/23 2351)  Pulse: 64 (12/17/23 2351)  Resp: 18 (12/17/23 2351)  BP: 132/61 (12/17/23 2351)  SpO2: 97 % (12/17/23 2351) Vital Signs (24h Range):  Temp:  [97.2 °F (36.2 °C)-98.4 °F (36.9 °C)] 98.4 °F (36.9 °C)  Pulse:  [64-86] 64  Resp:  [17-20] 18  SpO2:  [95 %-99 %] 97 %  BP: ()/(37-63) 132/61     Weight: 69.6 kg (153 lb 7  "oz)  Height: 5' 6" (167.6 cm)  Body mass index is 24.77 kg/m².      Intake/Output Summary (Last 24 hours) at 12/18/2023 0824  Last data filed at 12/18/2023 0654  Gross per 24 hour   Intake 353.75 ml   Output 1425 ml   Net -1071.25 ml        General    Nursing note and vitals reviewed.  Constitutional: She is oriented to person, place, and time. She appears well-developed and well-nourished.   Pulmonary/Chest: Effort normal.   Neurological: She is alert and oriented to person, place, and time.   Psychiatric: She has a normal mood and affect. Her behavior is normal.         Right Ankle/Foot Exam     Comments:  Postop splint and dressing removed.  There are Steri-Strips over the lateral malleolus incision with no drainage.  No other incisions noted.  Right foot is distal neurovascular intact.           Significant Labs: CBC:   Recent Labs   Lab 12/17/23  1329 12/18/23  0358   WBC 5.19 5.19   HGB 8.6* 8.8*   HCT 26.1* 26.8*   * 106*     CMP:   Recent Labs   Lab 12/17/23  1329 12/18/23  0357   * 135*   K 4.5 4.7    104   CO2 28 24   * 95   BUN 32* 30*   CREATININE 1.7* 1.5*   CALCIUM 8.8 9.0   PROT 5.4* 5.2*   ALBUMIN 3.2* 3.1*   BILITOT 0.7 0.7   ALKPHOS 116 170*   AST 31 44*   ALT 21 30   ANIONGAP 4* 7*     All pertinent labs within the past 24 hours have been reviewed.    Significant Imaging: None  "

## 2023-12-18 NOTE — PLAN OF CARE
Problem: SLP  Goal: SLP Goal  Description: 1. Pt will use learned memory strategies to recall 70% information to after a 3 minute delay given mod cues for use of strategies  2. Pt will complete sustained attention tasks w/ 70% accuracy given no more than 2 repetitions of directives  3. Pt will complete complex naming task w/ 70% accuracy given mod cues  Outcome: Ongoing, Progressing     On-going evaluation completed. Goals established. Rec moderate intensity ST at d'c.

## 2023-12-18 NOTE — RESPIRATORY THERAPY
12/17/23 2046   Patient Assessment/Suction   Level of Consciousness (AVPU) alert   Respiratory Effort Normal;Unlabored   Expansion/Accessory Muscles/Retractions no use of accessory muscles;no retractions;expansion symmetric   All Lung Fields Breath Sounds Anterior:;diminished   Rhythm/Pattern, Respiratory unlabored;pattern regular;depth regular   Cough Frequency no cough   Cough Type no productive sputum   Skin Integrity   $ Wound Care Tech Time 15 min   Area Observed Left;Right;Nares;Behind ear   Skin Appearance without discoloration   PRE-TX-O2   Device (Oxygen Therapy) nasal cannula   $ Is the patient on Low Flow Oxygen? Yes   Flow (L/min) 2   SpO2 95 %   Pulse Oximetry Type Intermittent   $ Pulse Oximetry - Multiple Charge Pulse Oximetry - Multiple   Pulse 86   Resp 18   Positioning HOB elevated 30 degrees   Education   $ Education 15 min;Other (see comment)  (o2 check)   Respiratory Evaluation   $ Care Plan Tech Time 15 min   $ Eval/Re-eval Charges Re-evaluation

## 2023-12-18 NOTE — PT/OT/SLP PROGRESS
"Speech Language Pathology Treatment    Patient Name:  Irene العراقي   MRN:  [id number]  Admitting Diagnosis: Closed fracture of right ankle    Recommendations:                 General Recommendations:  GI evaluation and Cognitive-linguistic therapy  Diet recommendations:  Regular Diet - IDDSI Level 7, Liquid Diet Level: Thin liquids - IDDSI Level 0   Aspiration Precautions:  Reflux precautions, HOB to 90 degrees, and Remain upright 30 minutes post meal   General Precautions: Standard, other (see comments) (Sitting 90 degrees to assist in swallowing)  Communication strategies:  none    Assessment:     Irene العراقي is a 83 y.o. female with an SLP diagnosis of Cognitive-Linguistic Impairment.  She presents with deficits in organization, planning, attention, memory, and language. She reports recent decline in cognitive-linguistic status. She also reports swallowing improvement from yesterday's evaluation; she reports decrease in difficulty and globus sensation w/ improvement of posture during PO intake.    Subjective     Pt awake reclining in bed upon entering. She was agreeable to on-going cognitive evaluation.  Patient goals: "I'm going back to [place] in February"     Pain/Comfort:       Respiratory Status: Room air    Objective:     Has the patient been evaluated by SLP for swallowing?      Keep patient NPO?     Current Respiratory Status:        MoCA (Version 8.1) administered with pt achieving a score of 14/30 suggesting moderate cognitive-linguistic impairment. Pt earned 1 of 5 points on visuospatial/executive functions, 3 of 3 points on naming, 1 of 6 points for attention, 0 of 3 points for language, 0 of 2 points for abstraction, 3 of 5 points for delayed recall and 5 of 6 points for orientation. 1 additional point added to overall score for education level. Impaired organization, planning, attention, language, and memory.    Writing mildly impaired w/ occasional spelling errors and mild legibility " impairments. Reading intact. Fanny Thichuck picture was presented to pt. Pt noting normal components of picture; however, she did not note abnormal characteristics of picture. Attention to detail impaired; no observed neglect or field cuts.    Pt reported swallowing improvement today. She reports that positioning to upright, center alignment, and bent at hips relived globus sensation and painful swallowing. She reports that she is tolerating current diet and that she would like to hold off on GI evaluation until she is back in [place].    Goals:   Multidisciplinary Problems       SLP Goals          Problem: SLP    Goal Priority Disciplines Outcome   SLP Goal     SLP Ongoing, Progressing   Description: 1. Pt will use learned memory strategies to recall 70% information to after a 3 minute delay given mod cues for use of strategies  2. Pt will complete sustained attention tasks w/ 70% accuracy given no more than 2 repetitions of directives  3. Pt will complete complex naming task w/ 70% accuracy given mod cues                       Plan:     Patient to be seen:  4 x/week   Plan of Care expires:     Plan of Care reviewed with:  patient   SLP Follow-Up:  Yes       Discharge recommendations:  Moderate Intensity Therapy   Barriers to Discharge:  None    Time Tracking:     SLP Treatment Date:   12/18/23  Speech Start Time:  1050  Speech Stop Time:  1119     Speech Total Time (min):  29 min    Billable Minutes: Speech Therapy Individual 8 min and Treatment Swallowing Dysfunction 21 min    12/18/2023

## 2023-12-18 NOTE — CONSULTS
"GASTROENTEROLOGY INPATIENT CONSULT NOTE  Patient Name: Irene العراقي  Patient MRN: [id number]  Patient : 1940    Admit Date: 2023  Service date: 2023    Reason for Consult: dysphagia    PCP: Wanda Kuhn, DEBORAHP-C    Chief Complaint   Patient presents with    Fall    Ankle Injury     Rt. Ankle swelling    Dizziness     Blurred vision x 3 days        HPI: Patient is a 83 y.o. female with PMHx  AVR , CKD, pacemaker 23 admitted 23 with dizziness and presyncope.  Found to have malleolar fracture post ORIF.   Has continued to have issues with orthostasis.       Per chart review"    Today was sitting on chair and was unresponsive  Suddenly pt became responsive after sternal rub  Medically stable and awaiting SNF placement           Pt evaluated by ST/SLP team  Recommended GI consult for dysphagia"    Evaluated by speech this admission with last evaluation 23  "Irene العراقي is a 83 y.o. female with an SLP diagnosis of Cognitive-Linguistic Impairment.  She presents with deficits in organization, planning, attention, memory, and language. She reports recent decline in cognitive-linguistic status. She also reports swallowing improvement from yesterday's evaluation; she reports decrease in difficulty and globus sensation w/ improvement of posture during PO intake. "    "Pt reported swallowing improvement today. She reports that positioning to upright, center alignment, and bent at hips relived globus sensation and painful swallowing. She reports that she is tolerating current diet and that she would like to hold off on GI evaluation until she is back in [place]. "       Past Medical History:  Past Medical History:   Diagnosis Date    Anemia, unspecified     Atrial fibrillation 2021    CKD (chronic kidney disease)     Hypertension         Past Surgical History:  Past Surgical History:   Procedure Laterality Date    A-V CARDIAC PACEMAKER INSERTION  2023    " Procedure: Dual Chamber PPM RM 2617 ([employer]);  Surgeon: Andreas James III, MD;  Location: [facility] CATH;  Service: Cardiology;;    ANGIOGRAM, CORONARY, WITH LEFT HEART CATHETERIZATION Left 2023    Procedure: Angiogram, Coronary, with Left Heart Cath;  Surgeon: Kevin Redmond MD;  Location: [facility] CATH/EP LAB;  Service: Cardiology;  Laterality: Left;    BREAST SURGERY      COLONOSCOPY N/A 2023    Procedure: COLONOSCOPY;  Surgeon: Kvng Mensah MD;  Location: [facility] ENDO;  Service: Endoscopy;  Laterality: N/A;    COLONOSCOPY W/ POLYPECTOMY  2023    OPEN REDUCTION AND INTERNAL FIXATION (ORIF) OF INJURY OF ANKLE Right 2023    Procedure: ORIF, ANKLE;  Surgeon: Chaitanya Garrison MD;  Location: [facility] OR;  Service: Orthopedics;  Laterality: Right;  Synthes    TRANSCATHETER AORTIC VALVE REPLACEMENT (TAVR)  2023    Procedure: (TAVR);  Surgeon: Juliano Moncada MD;  Location: [facility] CATH;  Service: Cardiology;;    TRANSCATHETER AORTIC VALVE REPLACEMENT (TAVR)  2023    Procedure: (TAVR)- Surgeon;  Surgeon: Adebayo Guzman MD;  Location: [facility] CATH;  Service: Peripheral Vascular;;        Home Medications:  Medications Prior to Admission   Medication Sig Dispense Refill Last Dose    amiodarone (PACERONE) 200 MG Tab Take 1 tablet (200 mg total) by mouth once daily. 90 tablet 3 12/10/2023    [] apixaban (ELIQUIS) 2.5 mg Tab Take 1 tablet (2.5 mg total) by mouth 2 (two) times daily. 60 tablet 2 12/10/2023    docusate sodium (COLACE) 100 MG capsule Take 1 capsule (100 mg total) by mouth 2 (two) times daily. 60 capsule 5 Past Week    furosemide (LASIX) 20 MG tablet Take 1 tablet (20 mg total) by mouth daily as needed. Take for swelling or wt gain > 2 lbs / 24 hr 15 tablet 11 Past Month    iron ag,[id number] (NIFEREX, SUMALATE-QUATREFOLIC,) 150 mg iron- 60 mg-1 mg Tab Take 1 tablet by mouth once daily. 90 tablet 3 12/10/2023    magnesium oxide (MAG-OX) 400 mg (241.3 mg magnesium) tablet  TAKE 1 TABLET BY MOUTH ONCE DAILY (Patient taking differently: Take 400 mg by mouth once daily.) 90 tablet 3 12/10/2023    MIRALAX 17 gram PwPk Take 17 g by mouth 2 (two) times daily.   Past Week    potassium chloride (KLOR-CON) 8 MEQ TbSR Take 1 tablet (8 mEq total) by mouth daily as needed. Take along with Lasix 15 tablet 11 Past Month    propranoloL (INDERAL) 10 MG tablet Take 1 tablet (10 mg total) by mouth 2 (two) times daily. 60 tablet 11 12/10/2023    iron-vitamin C 100-250 mg, ICAR-C, (ICAR-C) 100-250 mg Tab Take 1 tablet by mouth once daily. (Patient not taking: Reported on 12/11/2023) 30 tablet 2 Not Taking       Inpatient Medications:   apixaban  2.5 mg Oral BID    docusate sodium  100 mg Oral BID    famotidine  20 mg Oral Daily    levoFLOXacin  250 mg Oral Daily    magnesium oxide  400 mg Oral Daily    midodrine  10 mg Oral TID WM    mupirocin   Nasal BID    polyethylene glycol  17 g Oral Daily     acetaminophen, ALPRAZolam, dextrose 50%, dextrose 50%, glucagon (human recombinant), glucose, glucose, magnesium oxide, magnesium oxide, meclizine, naloxone, ondansetron, oxyCODONE, potassium bicarbonate, potassium bicarbonate, potassium bicarbonate, potassium, sodium phosphates, potassium, sodium phosphates, potassium, sodium phosphates, sodium chloride 0.9%    Review of patient's allergies indicates:   Allergen Reactions    Cefuroxime     Hydrocodone     Meperidine     Meperidine hcl Nausea And Vomiting    Persantine [dipyridamole]     Nitrofurantoin macrocrystal      Headache , went into [facility]     Vicodin [hydrocodone-acetaminophen] Nausea And Vomiting       Social History:   Social History     Occupational History    Not on file   Tobacco Use    Smoking status: Former     Types: Cigarettes     Passive exposure: Past    Smokeless tobacco: Never   Substance and Sexual Activity    Alcohol use: Not Currently    Drug use: Not Currently    Sexual activity: Not on file       Family History:   Family History  "  Problem Relation Age of Onset    Cancer Mother     Cancer Father        Review of Systems:  A 10 point review of systems was performed and was normal, except as mentioned in the HPI, including constitutional, HEENT, heme, lymph, cardiovascular, respiratory, gastrointestinal, genitourinary, neurologic, endocrine, psychiatric and musculoskeletal.      OBJECTIVE:    Physical Exam:  24 Hour Vital Sign Ranges: Temp:  [98 °F (36.7 °C)-98.4 °F (36.9 °C)] 98 °F (36.7 °C)  Pulse:  [60-86] 65  Resp:  [18-20] 20  SpO2:  [95 %-100 %] 98 %  BP: ()/(38-65) 149/59  Most recent vitals: BP (!) 149/59 (BP Location: Right arm, Patient Position: Sitting)   Pulse 65   Temp 98 °F (36.7 °C)   Resp 20   Ht 5' 6" (1.676 m)   Wt 69.6 kg (153 lb 7 oz)   SpO2 98%   Breastfeeding No   BMI 24.77 kg/m²    GEN: well-developed, well-nourished, awake and alert, non-toxic appearing adult  HEENT: PERRL, sclera anicteric, oral mucosa pink and moist without lesion  NECK: trachea midline; Good ROM  CV: regular rate and rhythm, no murmurs or gallops  RESP: clear to auscultation bilaterally, no wheezes, rhonci or rales  ABD: soft, non-tender, non-distended, normal bowel sounds  EXT: no swelling or edema, 2+ pulses distally  SKIN: no rashes or jaundice  PSYCH: normal affect    Labs:   Recent Labs     12/16/23  0557 12/17/23  1329 12/18/23  0358   WBC 5.04 5.19 5.19   MCV 94 95 95   * 109* 106*     Recent Labs     12/16/23  0557 12/17/23  1329 12/18/23  0357   * 132* 135*   K 4.8 4.5 4.7    100 104   CO2 28 28 24   BUN 28* 32* 30*   GLU 88 120* 95     No results for input(s): "ALB" in the last 72 hours.    Invalid input(s): "ALKP", "SGOT", "SGPT", "TBIL", "DBIL", "TPRO"  No results for input(s): "PT", "INR", "PTT" in the last 72 hours.      Radiology Review:  FL Flouro Usage   Final Result      CT Head Without Contrast   Final Result      1. There is volume loss with moderate to marked nonspecific white matter change but " there is no hemorrhage, mass or obvious acute edema or ischemia.         Electronically signed by: Brain Neville MD   Date:    12/12/2023   Time:    14:13      CT Ankle (Including Hindfoot) Without Contrast Right   Final Result      Trimalleolar ankle fracture.         Electronically signed by: Giovany Huerta   Date:    12/12/2023   Time:    11:31      X-Ray Humerus 2 View Right   Final Result      There are degenerative changes of the right shoulder without evidence acute bony injury of the right humerus.         Electronically signed by: Lorie Mujica MD   Date:    12/11/2023   Time:    10:53      X-Ray Tibia Fibula 2 View Right   Final Result   Abnormal      There is a displaced lateral malleolar fracture with adjacent soft tissue swelling.  In addition there is a suspected nondisplaced medial malleolar fracture.  CT should be obtained for confirmation.  This report was flagged in Epic as abnormal.         Electronically signed by: Lorie Mujica MD   Date:    12/11/2023   Time:    10:26      X-Ray Ankle Complete Right   Final Result      There is a transverse, displaced lateral malleolar fracture.  There is adjacent soft tissue swelling.         Electronically signed by: Lorie Mujica MD   Date:    12/11/2023   Time:    09:30      X-Ray Chest AP Portable   Final Result      Overall pulmonary aeration has improved from 11/05/2023.         Electronically signed by: Lorie Mujica MD   Date:    12/11/2023   Time:    09:29            IMPRESSION / RECOMMENDATIONS:  Odynophagia  -case discussed with patient and chart reviewed.  At this point given multiple comorbid conditions including recent AVR/pacer insertion, orthostasis and improving symptoms per patient and chart along with patient reluctance to pursue endoscopy at this point  will hold off on inpatient endoscopy unless patient is not clinically improving  -recommend diet per speech and patient reports will pursue endoscopy in [place] but would  like to hold for now.  -please reconsult if symptoms regress     Thank you for this consult.    Domingo Rivera  12/18/2023  5:16 PM

## 2023-12-18 NOTE — RESPIRATORY THERAPY
12/17/23 2046   Patient Assessment/Suction   Level of Consciousness (AVPU) alert   Respiratory Effort Normal;Unlabored   Expansion/Accessory Muscles/Retractions no use of accessory muscles;no retractions;expansion symmetric   All Lung Fields Breath Sounds Anterior:;diminished   Rhythm/Pattern, Respiratory unlabored;pattern regular;depth regular   Cough Frequency no cough   Cough Type no productive sputum   PRE-TX-O2   Device (Oxygen Therapy) nasal cannula   $ Is the patient on Low Flow Oxygen? Yes   Flow (L/min) 2   SpO2 95 %   Pulse Oximetry Type Intermittent   $ Pulse Oximetry - Multiple Charge Pulse Oximetry - Multiple   Pulse 86   Resp 18   Positioning HOB elevated 30 degrees   Education   $ Education 15 min;Other (see comment)  (o2 check)   Respiratory Evaluation   $ Care Plan Tech Time 15 min   $ Eval/Re-eval Charges Re-evaluation

## 2023-12-18 NOTE — NURSING
Nurse called to the room as patient stated she was feeling like she was going to pass out.    Upon entering room patient in chair. She is awake and sitting up.     BP 60/40s.    Dr Szymanski called and ordered 1L bolus of NS.   Bolus started.

## 2023-12-18 NOTE — ASSESSMENT & PLAN NOTE
Pt found to have significant drop in BP while sitting/standing  Started on high dose midodrine  Compression stockings

## 2023-12-18 NOTE — PT/OT/SLP PROGRESS
"Physical Therapy Treatment    Patient Name:  Irene العراقي   MRN:  [id number]    Recommendations:     Discharge Recommendations: Moderate Intensity Therapy  Discharge Equipment Recommendations: none  Barriers to discharge:  weakness, orthopedic precautions, decreased lower extremity function, decreased safety awareness, impaired self care skills, impaired functional mobility.     Assessment:     Irene العراقي is a 83 y.o. female admitted with a medical diagnosis of Closed fracture of right ankle.  She presents with the following impairments/functional limitations: weakness, impaired endurance, impaired functional mobility, impaired balance, decreased lower extremity function, decreased upper extremity function, decreased safety awareness, pain, impaired cardiopulmonary response to activity, orthopedic precautions.    Pt found resting with HOB elevated, agreeable to skilled physical therapy session today. Upon entry, she states "Im feeling better than I have been and I'm ready to work with yall." BP monitored prior to transferring sitting measuring 113/49, CGA given to perform supine to sitting transfer and BP was measured at 111/50. Pt sat EOB with supervision in preparation to perform standing pivot transfer. Education and demonstration rendered on how to properly perform transfer while maintain NWB status on RLE. modA x2 required to perform sit to stand from EOB and to perform standing pivot transfer with max verbal cues on technique and performance. Once sitting in bed side chair, pt began to cry stating "my foot hurts in this boot." She reports that she maintain WB precautions throughout transfer. Pt left in bed side chair, chair alarm on, call light within reach, RN notified, all needs met.     Rehab Prognosis: Fair; patient would benefit from acute skilled PT services to address these deficits and reach maximum level of function.    Recent Surgery: Procedure(s) (LRB):  ORIF, ANKLE (Right) 5 Days " Post-Op    Plan:     During this hospitalization, patient to be seen daily to address the identified rehab impairments via therapeutic exercises, therapeutic activities and progress toward the following goals:    Plan of Care Expires:  01/15/24    Subjective     Chief Complaint: lightheadedness, and RLE pain  Patient/Family Comments/goals: to get better and go home  Pain/Comfort:         Objective:     Communicated with RN prior to session.  Patient found HOB elevated with araya catheter, telemetry upon PT entry to room.     General Precautions: Standard, fall  Orthopedic Precautions: RLE non weight bearing  Braces:  (posterior splint)  Respiratory Status: Nasal cannula, flow 2 L/min     Functional Mobility:  Bed Mobility:     Supine to Sit: contact guard assistance  Transfers:     Sit to Stand:  moderate assistance and of 2 persons with rolling walker  Bed to Chair: moderate assistance and of 2 persons with  rolling walker  using  Stand Pivot      AM-PAC 6 CLICK MOBILITY          Treatment & Education:  Pt educated on importance of time OOB, importance of intermittent mobility, safe techniques for transfers/ambulation, discharge recommendations/options, and use of call light for assistance and fall prevention.      Patient left up in chair with all lines intact, call button in reach, chair alarm on, and RN notified..    GOALS:   Multidisciplinary Problems       Physical Therapy Goals          Problem: Physical Therapy    Goal Priority Disciplines Outcome Goal Variances Interventions   Physical Therapy Goal     PT, PT/OT Ongoing, Progressing     Description: Goals to be met by: 1/15/2024     Patient will increase functional independence with mobility by performin. Supine to sit with Contact Guard Assistance  2. Sit to stand transfer with Contact Guard Assistance  3. Bed to chair transfer with Minimal Assistance using Rolling Walker                         Time Tracking:     PT Received On: 23  PT Start  Time: 0908     PT Stop Time: 0931  PT Total Time (min): 23 min     Billable Minutes: Therapeutic Activity 23    Treatment Type: Treatment  PT/PTA: PTA     Number of PTA visits since last PT visit: 1 12/18/2023

## 2023-12-19 LAB
ALBUMIN SERPL BCP-MCNC: 2.9 G/DL (ref 3.5–5.2)
ALP SERPL-CCNC: 153 U/L (ref 55–135)
ALT SERPL W/O P-5'-P-CCNC: 35 U/L (ref 10–44)
ANION GAP SERPL CALC-SCNC: 4 MMOL/L (ref 8–16)
AST SERPL-CCNC: 46 U/L (ref 10–40)
BILIRUB SERPL-MCNC: 0.6 MG/DL (ref 0.1–1)
BNP SERPL-MCNC: 266 PG/ML (ref 0–99)
BUN SERPL-MCNC: 27 MG/DL (ref 8–23)
CALCIUM SERPL-MCNC: 8.6 MG/DL (ref 8.7–10.5)
CHLORIDE SERPL-SCNC: 107 MMOL/L (ref 95–110)
CO2 SERPL-SCNC: 25 MMOL/L (ref 23–29)
CORTIS SERPL-MCNC: 10.8 UG/DL
CREAT SERPL-MCNC: 1.4 MG/DL (ref 0.5–1.4)
ERYTHROCYTE [DISTWIDTH] IN BLOOD BY AUTOMATED COUNT: 12.8 % (ref 11.5–14.5)
EST. GFR  (NO RACE VARIABLE): 37.3 ML/MIN/1.73 M^2
GLUCOSE SERPL-MCNC: 89 MG/DL (ref 70–110)
HCT VFR BLD AUTO: 24.6 % (ref 37–48.5)
HGB BLD-MCNC: 7.9 G/DL (ref 12–16)
MCH RBC QN AUTO: 30 PG (ref 27–31)
MCHC RBC AUTO-ENTMCNC: 32.1 G/DL (ref 32–36)
MCV RBC AUTO: 94 FL (ref 82–98)
PLATELET # BLD AUTO: 113 K/UL (ref 150–450)
PMV BLD AUTO: 10.4 FL (ref 9.2–12.9)
POTASSIUM SERPL-SCNC: 4.7 MMOL/L (ref 3.5–5.1)
PROT SERPL-MCNC: 4.8 G/DL (ref 6–8.4)
RBC # BLD AUTO: 2.63 M/UL (ref 4–5.4)
SODIUM SERPL-SCNC: 136 MMOL/L (ref 136–145)
WBC # BLD AUTO: 5.25 K/UL (ref 3.9–12.7)

## 2023-12-19 PROCEDURE — 25000003 PHARM REV CODE 250: Performed by: INTERNAL MEDICINE

## 2023-12-19 PROCEDURE — 99900035 HC TECH TIME PER 15 MIN (STAT)

## 2023-12-19 PROCEDURE — 85027 COMPLETE CBC AUTOMATED: CPT | Performed by: INTERNAL MEDICINE

## 2023-12-19 PROCEDURE — 80053 COMPREHEN METABOLIC PANEL: CPT | Performed by: INTERNAL MEDICINE

## 2023-12-19 PROCEDURE — 63600175 PHARM REV CODE 636 W HCPCS: Performed by: ORTHOPAEDIC SURGERY

## 2023-12-19 PROCEDURE — 97110 THERAPEUTIC EXERCISES: CPT

## 2023-12-19 PROCEDURE — 36415 COLL VENOUS BLD VENIPUNCTURE: CPT | Performed by: INTERNAL MEDICINE

## 2023-12-19 PROCEDURE — 99900031 HC PATIENT EDUCATION (STAT)

## 2023-12-19 PROCEDURE — 82533 TOTAL CORTISOL: CPT | Performed by: INTERNAL MEDICINE

## 2023-12-19 PROCEDURE — 12000002 HC ACUTE/MED SURGE SEMI-PRIVATE ROOM

## 2023-12-19 PROCEDURE — 94761 N-INVAS EAR/PLS OXIMETRY MLT: CPT

## 2023-12-19 PROCEDURE — 86580 TB INTRADERMAL TEST: CPT | Performed by: INTERNAL MEDICINE

## 2023-12-19 PROCEDURE — 30200315 PPD INTRADERMAL TEST REV CODE 302: Performed by: INTERNAL MEDICINE

## 2023-12-19 PROCEDURE — 97110 THERAPEUTIC EXERCISES: CPT | Mod: CQ

## 2023-12-19 PROCEDURE — 97530 THERAPEUTIC ACTIVITIES: CPT | Mod: CQ

## 2023-12-19 PROCEDURE — 25000003 PHARM REV CODE 250: Performed by: ORTHOPAEDIC SURGERY

## 2023-12-19 PROCEDURE — 83880 ASSAY OF NATRIURETIC PEPTIDE: CPT | Performed by: INTERNAL MEDICINE

## 2023-12-19 RX ADMIN — MUPIROCIN 1 G: 20 OINTMENT TOPICAL at 09:12

## 2023-12-19 RX ADMIN — DOCUSATE SODIUM 100 MG: 100 CAPSULE, LIQUID FILLED ORAL at 09:12

## 2023-12-19 RX ADMIN — Medication 400 MG: at 09:12

## 2023-12-19 RX ADMIN — LEVOFLOXACIN 250 MG: 250 TABLET, FILM COATED ORAL at 05:12

## 2023-12-19 RX ADMIN — POLYETHYLENE GLYCOL 3350 17 G: 17 POWDER, FOR SOLUTION ORAL at 09:12

## 2023-12-19 RX ADMIN — ONDANSETRON 4 MG: 2 INJECTION INTRAMUSCULAR; INTRAVENOUS at 11:12

## 2023-12-19 RX ADMIN — MIDODRINE HYDROCHLORIDE 10 MG: 10 TABLET ORAL at 04:12

## 2023-12-19 RX ADMIN — MIDODRINE HYDROCHLORIDE 10 MG: 10 TABLET ORAL at 01:12

## 2023-12-19 RX ADMIN — TUBERCULIN PURIFIED PROTEIN DERIVATIVE 5 UNITS: 5 INJECTION INTRADERMAL at 04:12

## 2023-12-19 RX ADMIN — MIDODRINE HYDROCHLORIDE 10 MG: 10 TABLET ORAL at 09:12

## 2023-12-19 RX ADMIN — APIXABAN 2.5 MG: 2.5 TABLET, FILM COATED ORAL at 09:12

## 2023-12-19 RX ADMIN — FAMOTIDINE 20 MG: 20 TABLET ORAL at 09:12

## 2023-12-19 NOTE — RESPIRATORY THERAPY
12/18/23 2039   Patient Assessment/Suction   Level of Consciousness (AVPU) alert   Respiratory Effort Unlabored;Normal   Expansion/Accessory Muscles/Retractions no use of accessory muscles;no retractions   All Lung Fields Breath Sounds Anterior:;clear;diminished   Rhythm/Pattern, Respiratory unlabored;pattern regular;depth regular   Cough Frequency no cough   Cough Type no productive sputum   PRE-TX-O2   Device (Oxygen Therapy) room air   SpO2 99 %   Pulse Oximetry Type Intermittent   $ Pulse Oximetry - Multiple Charge Pulse Oximetry - Multiple   Pulse 61   Resp 18   Positioning HOB elevated 30 degrees   Education   $ Education 15 min;Other (see comment)  (O2 CHECK)   Respiratory Evaluation   $ Care Plan Tech Time 15 min

## 2023-12-19 NOTE — PLAN OF CARE
12/19/23 0821   Patient Assessment/Suction   Level of Consciousness (AVPU) alert   Respiratory Effort Unlabored   PRE-TX-O2   Device (Oxygen Therapy) room air   Pulse 69   Resp 20   Respiratory Evaluation   $ Care Plan Tech Time 15 min

## 2023-12-19 NOTE — PLAN OF CARE
Spoke to Ned with [facility] [phone] regarding pt's CAM Walker Boot being the incorrect size. Ned states they are sending a fitter to the hospital this afternoon to refit the pt and deliver a new boot.      12/19/23 1503   Post-Acute Status   Post-Acute Authorization E   E Status (!) Pending Delivery

## 2023-12-19 NOTE — PT/OT/SLP PROGRESS
Occupational Therapy   Treatment    Name: Irene العراقي  MRN: [id number]  Admitting Diagnosis:  Closed fracture of right ankle  6 Days Post-Op    Recommendations:     Discharge Recommendations: Moderate Intensity Therapy  Discharge Equipment Recommendations:  none  Barriers to discharge:  Decreased caregiver support    Assessment:     Irene العراقي is a 83 y.o. female with a medical diagnosis of Closed fracture of right ankle. Performance deficits affecting function are weakness, impaired endurance, impaired sensation, impaired self care skills, impaired functional mobility, gait instability, orthopedic precautions, impaired cardiopulmonary response to activity. Patient reports she would work on upper body exercises only secondary to increased dizziness when getting up.     Rehab Prognosis:  Fair; patient would benefit from acute skilled OT services to address these deficits and reach maximum level of function.       Plan:     Patient to be seen 5 x/week to address the above listed problems via self-care/home management, therapeutic activities, therapeutic exercises  Plan of Care Expires: 01/12/24  Plan of Care Reviewed with: patient    Subjective     Chief Complaint: dizziness and nausea  Patient/Family Comments/goals: return home at some point   Pain/Comfort:  Pain Rating 1: 0/10    Objective:     Communicated with: nurse prior to session.  Patient found HOB elevated with araya catheter, telemetry upon OT entry to room.    General Precautions: Standard, fall    Orthopedic Precautions:RLE non weight bearing  Braces:  (RLE splint)  Respiratory Status: Room air     Occupational Performance:     Bed Mobility:    Patient completed Rolling/Turning to Left with  minimum assistance  Patient completed Rolling/Turning to Right with minimum assistance     Therapeutic Exercises:  Patient performed upper body exercises utilizing red theraband. Ten repetitions each for shoulder flexion, horizontal abduction and elbow flexion  with HOB elevated. Patient reports difficulty sitting up secondary to increased dizziness.     Treatment & Education:  Patient was educated on the importance of getting up each day during hospitalization, equipment needs and discharge goals, and reviewed use of call bell for assistance.     Patient left HOB elevated with all lines intact, call button in reach, and bed alarm on    GOALS:   Multidisciplinary Problems       Occupational Therapy Goals          Problem: Occupational Therapy    Goal Priority Disciplines Outcome Interventions   Occupational Therapy Goal     OT, PT/OT Ongoing, Progressing    Description: Goals to be met by: 1/12/2024     Patient will increase functional independence with ADLs by performing:    LE Dressing with Minimal Assistance and Assistive Devices as needed.  Grooming while seated with Supervision.  Toileting from bedside commode with Minimal Assistance for hygiene and clothing management.   Supine to sit with Contact Guard Assistance.  Toilet transfer to bedside commode with Minimal Assistance and maintaining weight-bearing precaution(s).                         Time Tracking:     OT Date of Treatment: 12/19/23  OT Start Time: 1030  OT Stop Time: 1040  OT Total Time (min): 10 min    Billable Minutes:Therapeutic Exercise 10    OT/HANNAH: OT          12/19/2023

## 2023-12-19 NOTE — PLAN OF CARE
9:54am- KARRIE spoke with Sally Bee. Per Sally she is not sure if auth has been submitted yet as Teresa handles auth and is out of office today. Sally to follow-up on auth and give  KARRIE a call back. KARRIE will continue to follow       12/19/23 1123   Post-Acute Status   Post-Acute Authorization Placement   Post-Acute Placement Status Pending payor review/awaiting authorization (if required)   Discharge Plan   Discharge Plan A Skilled Nursing Facility   Discharge Plan B Skilled Nursing Facility

## 2023-12-19 NOTE — PT/OT/SLP PROGRESS
Physical Therapy Treatment    Patient Name:  Irene العراقي   MRN:  [id number]    Recommendations:     Discharge Recommendations: Moderate Intensity Therapy  Discharge Equipment Recommendations: none  Barriers to discharge:  weakness, impaired self care skills, impaired functional mobility, orthopefic precautions.     Assessment:     Irene العراقي is a 83 y.o. female admitted with a medical diagnosis of Closed fracture of right ankle.  She presents with the following impairments/functional limitations: weakness, impaired endurance, impaired sensation, impaired functional mobility, gait instability, orthopedic precautions, impaired cardiopulmonary response to activity.    Pt resting with HOB elevated upon entry, agreeable to skilled physical therapy session. SBA given to performed all bed mobility and resting sitting EOB. She performed therapeutic exercises including marches, hip AB/ADD, heel/toe rasies on LLE, LAQ's, and glute sets. She reports feeling nauseated and requested to lay back down. Reported N/V to nurse and that pt was requesting medication. Once returned with emesis back, pt had vomited and was actively vomiting. Pt given emesis back and hygiene was performed to patient comfort. Pt left with HOB elevated, bed alarm on, call light within reach, all needs met, RN notified.     Rehab Prognosis: Fair; patient would benefit from acute skilled PT services to address these deficits and reach maximum level of function.    Recent Surgery: Procedure(s) (LRB):  ORIF, ANKLE (Right) 6 Days Post-Op    Plan:     During this hospitalization, patient to be seen daily to address the identified rehab impairments via therapeutic activities, therapeutic exercises and progress toward the following goals:    Plan of Care Expires:  01/15/24    Subjective     Chief Complaint: N/V  Patient/Family Comments/goals: to get better  Pain/Comfort:         Objective:     Communicated with RN prior to session.  Patient found HOB elevated  with araya catheter, telemetry upon PT entry to room.     General Precautions: Standard, fall  Orthopedic Precautions: RLE non weight bearing  Braces:  (posterior splint)  Respiratory Status: Nasal cannula, flow 2 L/min     Functional Mobility:  Bed Mobility:     Supine to Sit: stand by assistance  Sit to Supine: stand by assistance      AM-PAC 6 CLICK MOBILITY          Treatment & Education:  Pt educated on importance of time OOB, importance of intermittent mobility, safe techniques for transfers/ambulation, discharge recommendations/options, and use of call light for assistance and fall prevention.      Patient left HOB elevated with all lines intact, call button in reach, bed alarm on, and RN notified..    GOALS:   Multidisciplinary Problems       Physical Therapy Goals          Problem: Physical Therapy    Goal Priority Disciplines Outcome Goal Variances Interventions   Physical Therapy Goal     PT, PT/OT Ongoing, Progressing     Description: Goals to be met by: 1/15/2024     Patient will increase functional independence with mobility by performin. Supine to sit with Contact Guard Assistance  2. Sit to stand transfer with Contact Guard Assistance  3. Bed to chair transfer with Minimal Assistance using Rolling Walker                         Time Tracking:     PT Received On: 23  PT Start Time: 1052     PT Stop Time: 1121  PT Total Time (min): 29 min     Billable Minutes: Therapeutic Activity 15 and Therapeutic Exercise 14    Treatment Type: Treatment  PT/PTA: PTA     Number of PTA visits since last PT visit: 2     2023

## 2023-12-19 NOTE — PLAN OF CARE
Problem: Physical Therapy  Goal: Physical Therapy Goal  Description: Goals to be met by: 1/15/2024     Patient will increase functional independence with mobility by performin. Supine to sit with Contact Guard Assistance  2. Sit to stand transfer with Contact Guard Assistance  3. Bed to chair transfer with Minimal Assistance using Rolling Walker    Outcome: Ongoing, Progressing

## 2023-12-19 NOTE — PLAN OF CARE
Problem: Adult Inpatient Plan of Care  Goal: Plan of Care Review  Outcome: Ongoing, Not Progressing  Goal: Patient-Specific Goal (Individualized)  Outcome: Ongoing, Not Progressing  Goal: Absence of Hospital-Acquired Illness or Injury  Outcome: Ongoing, Not Progressing  Goal: Optimal Comfort and Wellbeing  Outcome: Ongoing, Not Progressing  Goal: Readiness for Transition of Care  Outcome: Ongoing, Not Progressing     Problem: Fall Injury Risk  Goal: Absence of Fall and Fall-Related Injury  Outcome: Ongoing, Not Progressing     Problem: Skin Injury Risk Increased  Goal: Skin Health and Integrity  Outcome: Ongoing, Not Progressing     Problem: Adjustment to Illness (Chronic Kidney Disease)  Goal: Optimal Coping with Chronic Illness  Outcome: Ongoing, Not Progressing     Problem: Infection  Goal: Absence of Infection Signs and Symptoms  Outcome: Ongoing, Not Progressing     Problem: Mobility Impairment  Goal: Optimal Mobility  Outcome: Ongoing, Not Progressing

## 2023-12-19 NOTE — PROGRESS NOTES
"[facility]  Adult Nutrition   Progress Note (Initial Assessment)     SUMMARY     Recommendations  Recommendation/Intervention: 1. Continue cardiac diet with texture per SLP. 2.  to obtain daily menu choices.  Goals: 1. Patient intake of meals to be >/= 75% EEN / EPN. 2. Lab values trend to target range.  Nutrition Goal Status: new    Nutrition Diagnosis PES Statement: No nutrtition diagnosis at this time.    Dietitian Rounds Brief  LOS screen. 83 yr old female admit for lightheadedness, weakness and presyncope s/p fall. Patient had right ankle ORIF 12/13/23. Pt followed by SLP for cognitive/linquistic  impairment. Pt to have endoscopy when back in [place]. Plan to dc to SNF. Intake good per nursing, patient asleep at visit.    Malnutrition Assessment       No overt s/s of malnutrition per visual.          Diet order:   Current Diet Order: Cardiac diet         Evaluation of Received Nutrient/Fluid Intake  Energy Calories Required: meeting needs  Protein Required: meeting needs  Fluid Required: not meeting needs  Tolerance: tolerating     % Intake of Estimated Energy Needs: 50 - 75 %  % Meal Intake: 50 - 75 %      Intake/Output Summary (Last 24 hours) at 12/19/2023 1522  Last data filed at 12/19/2023 1150  Gross per 24 hour   Intake 360 ml   Output 1450 ml   Net -1090 ml        Anthropometrics  Temp: 97.3 °F (36.3 °C)  Height Method: Stated  Height: 5' 6" (167.6 cm)  Height (inches): 66 in  Weight Method: Bed Scale  Weight: 69.6 kg (153 lb 7 oz)  Weight (lb): 153.44 lb  Ideal Body Weight (IBW), Female: 130 lb  % Ideal Body Weight, Female (lb): 118.71 %  BMI (Calculated): 24.8  BMI Grade: 18.5-24.9 - normal       Estimated/Assessed Needs  Weight Used For Calorie Calculations: 69.6 kg (153 lb 7 oz)  Energy Calorie Requirements (kcal): 3280-0298 / day (25-30 kcal/kg)  Energy Need Method: Kcal/kg  Protein Requirements: 69-84 gm/day (1.0-1.2 gm/kg)  Weight Used For Protein Calculations: 69.6 kg (153 " lb 7 oz)     Estimated Fluid Requirement Method: RDA Method  RDA Method (mL): 1740       Reason for Assessment  Reason For Assessment: length of stay  Relevant Medical History: atrial fibrillation, CKD, HTN, 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  Interdisciplinary Rounds: did not attend    Nutrition/Diet History  Spiritual, Cultural Beliefs, Mandaen Practices, Values that Affect Care: no  Food Allergies: NKFA  Factors Affecting Nutritional Intake: None identified at this time    Nutrition Risk Screen  Nutrition Risk Screen: no indicators present     MST Score: 0  Have you recently lost weight without trying?: No  Weight loss score: 0  Have you been eating poorly because of a decreased appetite?: No  Appetite score: 0       Weight History:  Wt Readings from Last 5 Encounters:   12/15/23 69.6 kg (153 lb 7 oz)   12/01/23 72.1 kg (159 lb)   12/01/23 72.1 kg (159 lb)   11/16/23 72.1 kg (159 lb)   11/15/23 73.8 kg (162 lb 12.8 oz)        Lab/Procedures/Meds: Pertinent Labs/Meds Reviewed    Medications:Pertinent Medications Reviewed  Scheduled Meds:   apixaban  2.5 mg Oral BID    docusate sodium  100 mg Oral BID    famotidine  20 mg Oral Daily    levoFLOXacin  250 mg Oral Daily    magnesium oxide  400 mg Oral Daily    midodrine  10 mg Oral TID WM    mupirocin   Nasal BID    polyethylene glycol  17 g Oral Daily    tuberculin  5 Units Intradermal Once     Continuous Infusions:  PRN Meds:.acetaminophen, ALPRAZolam, dextrose 50%, dextrose 50%, glucagon (human recombinant), glucose, glucose, magnesium oxide, magnesium oxide, meclizine, naloxone, ondansetron, oxyCODONE, potassium bicarbonate, potassium bicarbonate, potassium bicarbonate, potassium, sodium phosphates, potassium, sodium phosphates, potassium, sodium phosphates, sodium chloride 0.9%    Labs: Pertinent Labs Reviewed  Clinical Chemistry:  Recent Labs   Lab 12/16/23  0557 12/17/23  1329 12/18/23  0357 12/19/23  0552   * 132* 135* 136   K 4.8  "4.5 4.7 4.7    100 104 107   CO2 28 28 24 25   GLU 88 120* 95 89   BUN 28* 32* 30* 27*   CREATININE 1.6* 1.7* 1.5* 1.4   CALCIUM 9.0 8.8 9.0 8.6*   PROT 5.3* 5.4* 5.2* 4.8*   ALBUMIN 3.1* 3.2* 3.1* 2.9*   BILITOT 0.7 0.7 0.7 0.6   ALKPHOS 83 116 170* 153*   AST 27 31 44* 46*   ALT 19 21 30 35   ANIONGAP 3* 4* 7* 4*   MG 2.0  --   --   --      CBC:   Recent Labs   Lab 12/19/23  0552   WBC 5.25   RBC 2.63*   HGB 7.9*   HCT 24.6*   *   MCV 94   MCH 30.0   MCHC 32.1     Lipid Panel:  No results for input(s): "CHOL", "HDL", "LDLCALC", "TRIG", "CHOLHDL" in the last 168 hours.  Cardiac Profile:  Recent Labs   Lab 12/19/23  0552   *     Inflammatory Labs:  No results for input(s): "CRP" in the last 168 hours.  Diabetes:  No results for input(s): "HGBA1C", "POCTGLUCOSE" in the last 168 hours.  Thyroid & Parathyroid:  No results for input(s): "TSH", "FREET4", "V4ZUUGY", "T4EQIMX", "THYROIDAB" in the last 168 hours.    Monitor and Evaluation  Food and Nutrient Intake: energy intake, food and beverage intake  Food and Nutrient Adminstration: diet order  Knowledge/Beliefs/Attitudes: food and nutrition knowledge/skill  Physical Activity and Function: nutrition-related ADLs and IADLs  Anthropometric Measurements: weight, weight change, body mass index  Biochemical Data, Medical Tests and Procedures: electrolyte and renal panel, gastrointestinal profile, glucose/endocrine profile, inflammatory profile, lipid profile  Nutrition-Focused Physical Findings: overall appearance     Nutrition Risk  Level of Risk/Frequency of Follow-up: moderate     Nutrition Follow-Up  RD Follow-up?: Yes      Mary Young RD, LDN 12/19/2023 3:22 PM     "

## 2023-12-20 LAB
ALBUMIN SERPL BCP-MCNC: 2.9 G/DL (ref 3.5–5.2)
ALP SERPL-CCNC: 151 U/L (ref 55–135)
ALT SERPL W/O P-5'-P-CCNC: 35 U/L (ref 10–44)
ANION GAP SERPL CALC-SCNC: 5 MMOL/L (ref 8–16)
AST SERPL-CCNC: 39 U/L (ref 10–40)
BILIRUB SERPL-MCNC: 0.6 MG/DL (ref 0.1–1)
BUN SERPL-MCNC: 25 MG/DL (ref 8–23)
CALCIUM SERPL-MCNC: 8.7 MG/DL (ref 8.7–10.5)
CHLORIDE SERPL-SCNC: 105 MMOL/L (ref 95–110)
CO2 SERPL-SCNC: 25 MMOL/L (ref 23–29)
CREAT SERPL-MCNC: 1.5 MG/DL (ref 0.5–1.4)
ERYTHROCYTE [DISTWIDTH] IN BLOOD BY AUTOMATED COUNT: 13.1 % (ref 11.5–14.5)
EST. GFR  (NO RACE VARIABLE): 34.4 ML/MIN/1.73 M^2
GLUCOSE SERPL-MCNC: 81 MG/DL (ref 70–110)
HCT VFR BLD AUTO: 24.7 % (ref 37–48.5)
HGB BLD-MCNC: 8.1 G/DL (ref 12–16)
MCH RBC QN AUTO: 31.2 PG (ref 27–31)
MCHC RBC AUTO-ENTMCNC: 32.8 G/DL (ref 32–36)
MCV RBC AUTO: 95 FL (ref 82–98)
PLATELET # BLD AUTO: 121 K/UL (ref 150–450)
PMV BLD AUTO: 10.4 FL (ref 9.2–12.9)
POTASSIUM SERPL-SCNC: 4.5 MMOL/L (ref 3.5–5.1)
PROT SERPL-MCNC: 4.9 G/DL (ref 6–8.4)
RBC # BLD AUTO: 2.6 M/UL (ref 4–5.4)
SODIUM SERPL-SCNC: 135 MMOL/L (ref 136–145)
WBC # BLD AUTO: 5.65 K/UL (ref 3.9–12.7)

## 2023-12-20 PROCEDURE — 27000221 HC OXYGEN, UP TO 24 HOURS

## 2023-12-20 PROCEDURE — 97530 THERAPEUTIC ACTIVITIES: CPT

## 2023-12-20 PROCEDURE — 80053 COMPREHEN METABOLIC PANEL: CPT | Performed by: INTERNAL MEDICINE

## 2023-12-20 PROCEDURE — 12000002 HC ACUTE/MED SURGE SEMI-PRIVATE ROOM

## 2023-12-20 PROCEDURE — 85027 COMPLETE CBC AUTOMATED: CPT | Performed by: INTERNAL MEDICINE

## 2023-12-20 PROCEDURE — 97130 THER IVNTJ EA ADDL 15 MIN: CPT

## 2023-12-20 PROCEDURE — 25000003 PHARM REV CODE 250: Performed by: INTERNAL MEDICINE

## 2023-12-20 PROCEDURE — 36415 COLL VENOUS BLD VENIPUNCTURE: CPT | Performed by: INTERNAL MEDICINE

## 2023-12-20 PROCEDURE — 97110 THERAPEUTIC EXERCISES: CPT

## 2023-12-20 PROCEDURE — 97129 THER IVNTJ 1ST 15 MIN: CPT

## 2023-12-20 PROCEDURE — 63600175 PHARM REV CODE 636 W HCPCS: Performed by: ORTHOPAEDIC SURGERY

## 2023-12-20 PROCEDURE — 25000003 PHARM REV CODE 250: Performed by: ORTHOPAEDIC SURGERY

## 2023-12-20 PROCEDURE — 94761 N-INVAS EAR/PLS OXIMETRY MLT: CPT

## 2023-12-20 PROCEDURE — 99900031 HC PATIENT EDUCATION (STAT)

## 2023-12-20 RX ADMIN — MIDODRINE HYDROCHLORIDE 10 MG: 10 TABLET ORAL at 05:12

## 2023-12-20 RX ADMIN — POLYETHYLENE GLYCOL 3350 17 G: 17 POWDER, FOR SOLUTION ORAL at 10:12

## 2023-12-20 RX ADMIN — APIXABAN 2.5 MG: 2.5 TABLET, FILM COATED ORAL at 08:12

## 2023-12-20 RX ADMIN — MIDODRINE HYDROCHLORIDE 10 MG: 10 TABLET ORAL at 10:12

## 2023-12-20 RX ADMIN — ONDANSETRON 4 MG: 2 INJECTION INTRAMUSCULAR; INTRAVENOUS at 10:12

## 2023-12-20 RX ADMIN — LEVOFLOXACIN 250 MG: 250 TABLET, FILM COATED ORAL at 05:12

## 2023-12-20 RX ADMIN — DOCUSATE SODIUM 100 MG: 100 CAPSULE, LIQUID FILLED ORAL at 10:12

## 2023-12-20 RX ADMIN — APIXABAN 2.5 MG: 2.5 TABLET, FILM COATED ORAL at 10:12

## 2023-12-20 RX ADMIN — MIDODRINE HYDROCHLORIDE 10 MG: 10 TABLET ORAL at 01:12

## 2023-12-20 RX ADMIN — DOCUSATE SODIUM 100 MG: 100 CAPSULE, LIQUID FILLED ORAL at 08:12

## 2023-12-20 RX ADMIN — Medication 400 MG: at 10:12

## 2023-12-20 RX ADMIN — FAMOTIDINE 20 MG: 20 TABLET ORAL at 10:12

## 2023-12-20 NOTE — SUBJECTIVE & OBJECTIVE
Interval History:     Review of Systems   Constitutional:  Negative for activity change and appetite change.   HENT:  Negative for congestion and dental problem.    Eyes:  Negative for discharge and itching.   Respiratory:  Negative for shortness of breath.    Cardiovascular:  Negative for chest pain.   Gastrointestinal:  Negative for abdominal distention and abdominal pain.   Endocrine: Negative for cold intolerance.   Genitourinary:  Negative for difficulty urinating and dysuria.   Musculoskeletal:  Negative for arthralgias and back pain.   Skin:  Negative for color change.   Neurological:  Positive for dizziness. Negative for facial asymmetry.   Hematological:  Negative for adenopathy.   Psychiatric/Behavioral:  Negative for agitation and behavioral problems.      Objective:     Vital Signs (Most Recent):  Temp: 98.5 °F (36.9 °C) (12/20/23 1217)  Pulse: 73 (12/20/23 1217)  Resp: 20 (12/20/23 1217)  BP: (!) 135/58 (12/20/23 1217)  SpO2: 99 % (12/20/23 1217) Vital Signs (24h Range):  Temp:  [98.2 °F (36.8 °C)-98.8 °F (37.1 °C)] 98.5 °F (36.9 °C)  Pulse:  [60-75] 73  Resp:  [18-20] 20  SpO2:  [93 %-99 %] 99 %  BP: (115-148)/(54-66) 135/58     Weight: 69.6 kg (153 lb 7 oz)  Body mass index is 24.77 kg/m².    Intake/Output Summary (Last 24 hours) at 12/20/2023 1315  Last data filed at 12/20/2023 0815  Gross per 24 hour   Intake 240 ml   Output 600 ml   Net -360 ml         Physical Exam  Vitals and nursing note reviewed.   Constitutional:       General: She is not in acute distress.  HENT:      Head: Atraumatic.      Right Ear: External ear normal.      Left Ear: External ear normal.      Nose: Nose normal.      Mouth/Throat:      Mouth: Mucous membranes are dry.   Eyes:      Extraocular Movements: Extraocular movements intact.   Cardiovascular:      Rate and Rhythm: Normal rate.   Pulmonary:      Effort: Pulmonary effort is normal.   Musculoskeletal:         General: Normal range of motion.      Cervical back: Normal  range of motion.   Skin:     General: Skin is warm.   Neurological:      Mental Status: She is alert and oriented to person, place, and time.   Psychiatric:         Behavior: Behavior normal.             Significant Labs: All pertinent labs within the past 24 hours have been reviewed.  CBC:   Recent Labs   Lab 12/19/23  0552 12/20/23  0548   WBC 5.25 5.65   HGB 7.9* 8.1*   HCT 24.6* 24.7*   * 121*     CMP:   Recent Labs   Lab 12/19/23 0552 12/20/23  0548    135*   K 4.7 4.5    105   CO2 25 25   GLU 89 81   BUN 27* 25*   CREATININE 1.4 1.5*   CALCIUM 8.6* 8.7   PROT 4.8* 4.9*   ALBUMIN 2.9* 2.9*   BILITOT 0.6 0.6   ALKPHOS 153* 151*   AST 46* 39   ALT 35 35   ANIONGAP 4* 5*       Significant Imaging: I have reviewed all pertinent imaging results/findings within the past 24 hours.

## 2023-12-20 NOTE — ASSESSMENT & PLAN NOTE
C/o dysphagia and odynophagia  ST/SLP team recommended GI MD consult  Pt was evaluated by GI MD and advised conservative management  According to pt her condition resolved

## 2023-12-20 NOTE — CARE UPDATE
12/20/23 0754   PRE-TX-O2   Device (Oxygen Therapy) room air   $ Is the patient on Low Flow Oxygen? Yes   SpO2 95 %   Pulse Oximetry Type Intermittent   $ Pulse Oximetry - Multiple Charge Pulse Oximetry - Multiple   Pulse 75   Resp 19   Education   $ Education Bronchodilator;15 min

## 2023-12-20 NOTE — RESPIRATORY THERAPY
12/19/23 2103   Patient Assessment/Suction   Level of Consciousness (AVPU) alert   Respiratory Effort Unlabored   Expansion/Accessory Muscles/Retractions no use of accessory muscles;no retractions   All Lung Fields Breath Sounds Anterior:;diminished   Rhythm/Pattern, Respiratory unlabored   Cough Frequency no cough   Cough Type no productive sputum   PRE-TX-O2   Device (Oxygen Therapy) room air   SpO2 97 %   Pulse Oximetry Type Intermittent   $ Pulse Oximetry - Multiple Charge Pulse Oximetry - Multiple   Pulse 60   Resp 18   Positioning HOB elevated 30 degrees   Education   $ Education 15 min;Other (see comment)  (O2 CHECK)   Respiratory Evaluation   $ Care Plan Tech Time 15 min

## 2023-12-20 NOTE — ASSESSMENT & PLAN NOTE
Patient's anemia is currently controlled. Has not received any PRBCs to date.  Current CBC reviewed-   Lab Results   Component Value Date    HGB 7.9 (L) 12/19/2023    HCT 24.6 (L) 12/19/2023     Monitor serial CBC and transfuse if patient becomes hemodynamically unstable, symptomatic or H/H drops below 7/21.

## 2023-12-20 NOTE — PT/OT/SLP PROGRESS
"Speech Language Pathology Treatment    Patient Name:  Irene العراقي   MRN:  [id number]  Admitting Diagnosis: Closed fracture of right ankle    Recommendations:                 General Recommendations:  Cognitive-linguistic therapy  Diet recommendations:  Regular Diet - IDDSI Level 7, Liquid Diet Level: Thin liquids - IDDSI Level 0   Aspiration Precautions:  Reflux precautions, HOB to 90 degrees, and Remain upright 30 minutes post meal   General Precautions: Standard, other (see comments) (Sitting 90 degrees to assist in swallowing)  Communication strategies:  none    Assessment:     Irene العراقي is a 83 y.o. female with an SLP diagnosis of Cognitive-Linguistic Impairment.  She presents with excellent effort today. Continue current diet and POC.    Subjective   "I remember 3 of them (words from evaluation day). Face, Bahai and velvet."  "I see a change in me and that's happened since Evelia (twin sister) ."  "I can see it but can't get the words out."  Reports poor appetite 2° nausea.    Respiratory Status: Room air    Objective:   Pt agreed to therapy. She is alert and very pleasant. Reviewed word retrieval strategies. Pt named less common pics with 60% accuracy. Convergent naming tasks WFL.     Introduced memory strategies. Pt able to recall 3 of 5 words from ST initial evaluation. She is also able to recall activities/events of prior therapy session. Pt recalled 5/5 items from "to do " list following 3 and 5 minute delays, independently.      Has the patient been evaluated by SLP for swallowing?   Yes  Keep patient NPO? No   Current Respiratory Status:          Goals:   Multidisciplinary Problems       SLP Goals          Problem: SLP    Goal Priority Disciplines Outcome   SLP Goal     SLP Ongoing, Progressing   Description: 1. Pt will use learned memory strategies to recall 70% information to after a 3 minute delay given mod cues for use of strategies  2. Pt will complete sustained attention tasks w/ 70% " accuracy given no more than 2 repetitions of directives  3. Pt will complete complex naming task w/ 70% accuracy given mod cues                       Plan:     Patient to be seen:  4 x/week   Plan of Care expires:     Plan of Care reviewed with:  patient   SLP Follow-Up:  Yes       Discharge recommendations:  Moderate Intensity Therapy   Barriers to Discharge:  Level of Skilled Assistance Needed . and Safety Awareness .    Time Tracking:     SLP Treatment Date:   12/20/23  Speech Start Time:  1051  Speech Stop Time:  1122     Speech Total Time (min):  31 min    Billable Minutes: Speech Therapy Individual (cog) 31 and Total Time 31    12/20/2023

## 2023-12-20 NOTE — ASSESSMENT & PLAN NOTE
Patient's anemia is currently controlled. Has not received any PRBCs to date.  Current CBC reviewed-   Lab Results   Component Value Date    HGB 8.1 (L) 12/20/2023    HCT 24.7 (L) 12/20/2023     Monitor serial CBC and transfuse if patient becomes hemodynamically unstable, symptomatic or H/H drops below 7/21.

## 2023-12-20 NOTE — ASSESSMENT & PLAN NOTE
Patient with Long standing persistent (>12 months) atrial fibrillation which is controlled currently  Patient is currently in sinus rhythm paced rhythm.[id number] Score: 3. Anticoagulation indicated. Anticoagulation done with eliquis which is eliquis restarted

## 2023-12-20 NOTE — PT/OT/SLP PROGRESS
"Physical Therapy Treatment    Patient Name:  Irene العراقي   MRN:  [id number]    Recommendations:     Discharge Recommendations: Moderate Intensity Therapy  Discharge Equipment Recommendations:  (TBD)  Barriers to discharge: Decreased caregiver support and pt currently requiring assist of 2 persons to maintain NWB on R LE for standing/transfers    Assessment:     Irene العراقي is a 83 y.o. female admitted with a medical diagnosis of Closed fracture of right ankle.  She presents with the following impairments/functional limitations: weakness, impaired endurance, impaired self care skills, impaired functional mobility, gait instability, impaired balance, decreased lower extremity function, pain, decreased ROM, edema, impaired cardiopulmonary response to activity.    Pt found HOB elevated with c/o nausea, but agreeable to allowing PT to keila new boot/check fit. Once boot applied pt was agreeable to sup <> sit <> std transfers, as well. Pt tolerated session fairly due to pain and nausea and became tearful a few times due to difficulty with mobility because of the weight of the boot and having to remain NWB on R LE. Pt required minimal a for bed mobility and maximal A of 2 persons for sit <> stand to RW for 30 seconds.    Rehab Prognosis: Fair; patient would benefit from acute skilled PT services to address these deficits and reach maximum level of function.    Recent Surgery: Procedure(s) (LRB):  ORIF, ANKLE (Right) 7 Days Post-Op    Plan:     During this hospitalization, patient to be seen 6 x/week to address the identified rehab impairments via therapeutic activities, therapeutic exercises, wheelchair management/training and progress toward the following goals:    Plan of Care Expires:  01/15/24    Subjective     Chief Complaint: nausea and pain R ankle. Pt also not happy that the boot is "so much taller than the other one."  Patient/Family Comments/goals: return to prior level of Indep function  Pain/Comfort:  Pain " Rating 1:  (not rated)  Location - Side 1: Right  Location 1: ankle  Pain Addressed 1: Reposition, Distraction, Cessation of Activity      Objective:     Communicated with RN Alisia prior to session.  Patient found HOB elevated with bed alarm, PureWick, peripheral IV, telemetry upon PT entry to room.     General Precautions: Standard, fall  Orthopedic Precautions: RLE non weight bearing  Braces:  (CAM boot in room)  Respiratory Status: Room air     Functional Mobility:  Bed Mobility:   PT donned new CAM boot found in room to check fit. After treatment as noted, PT doffed boot once pt back in bed.  Scooting: minimum assistance  Supine to Sit: minimum assistance  Sit to Supine: minimum assistance  Transfers:     Sit to Stand:  maximal assistance and of 2  persons with rolling walker and NWB R LE with boot in place and R foot resting on the floor x 30 seconds as pt unable to hold her foot up because of the weight of the boot.      AM-PAC 6 CLICK MOBILITY          Treatment & Education:  Pt was educated on the following: call light use, importance of OOB activity and functional mobility to negate the negative effects of prolonged bed rest during this hospitalization, safe transfers/ambulation and discharge planning recommendations/options.    Patient left HOB elevated with all lines intact, call button in reach, bed alarm on, and RN notified..    GOALS:   Multidisciplinary Problems       Physical Therapy Goals          Problem: Physical Therapy    Goal Priority Disciplines Outcome Goal Variances Interventions   Physical Therapy Goal     PT, PT/OT Ongoing, Progressing     Description: Goals to be met by: 1/15/2024     Patient will increase functional independence with mobility by performin. Supine to sit with Contact Guard Assistance  2. Sit to stand transfer with Contact Guard Assistance  3. Bed to chair transfer with Minimal Assistance using Rolling Walker                         Time Tracking:     PT Received  On: 12/20/23  PT Start Time: 1129     PT Stop Time: 1152  PT Total Time (min): 23 min     Billable Minutes: Therapeutic Activity 23    Treatment Type: Treatment  PT/PTA: PT     Number of PTA visits since last PT visit: 0     12/20/2023

## 2023-12-20 NOTE — PROGRESS NOTES
"[facility] Medicine  Progress Note    Patient Name: Irene العراقي  MRN: [id number]  Patient Class: IP- Inpatient   Admission Date: 12/11/2023  Length of Stay: 8 days  Attending Physician: Gera Szymanski MD  Primary Care Provider: Wanda Kuhn, DEBORAHP-C        Subjective:     Principal Problem:Closed fracture of right ankle        HPI:  Ms. العراقي is an 83 yr old female with Pmhx of  atrial fibrillation, CKD, HTN, 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 presenting today for lightheadedness, weakness and presyncope. Pt reports yesterday she was experiencing fatigue, lightheadedness and generalized weakness and she states symptoms were mild. This morning when she woke up her symptoms persisted and became severe. Pt states she was in the kitchen making breakfast and felt severe lightheadedness and felt like she was going to pass out so she eased her self down on the ground and crawled to the phone to call her daughter. Pt denies cp, sob, n/v or diaphoresis. Pt reports when EMS arrived her BP was in the 180's systolic. Pt experienced trauma to her right ankle with the fall. Xray confirmed displaced lateral malleolar fracture and splint was applied in ED. Pt will be admitted to hospital med services for further workup and management.       Overview/Hospital Course:   12/12: Notes reviewed, no acute events overnight. Eval by ortho this AM who rec transfer to [facility] for surgical repair of right ankle tomorrow. Ortho aware pt will need cardiac clearance prior to surgery. Pt c/o pain to her right ankle today and received oxy 5mg. Pt feels like the pain med is too strong and she is reporting feeling "woozy" now after med admin. She states her ankle pain has improved. She reports feeling lightheaded and weak this morning and states symptoms are similar to what she was feeling when she arrived to ED. She denies cp or sob. Will await echo and cards consult and recs. I spoke with hosp " med team at [facility] and pt accepted by Dr. Jones. Pt aggreeable for transfer.      12/13: Patient was admitted for syncope over at [facility] was transferred to [facility] on request of orthopedic surgeon surgical repair of right ankle fracture. Patient was evaluated by Cardiology and underwent echocardiogram and pacemaker interrogation at [facility] prior to discharge and patient's Eliquis was held prior to surgery.  Patient is currently being prepped for surgery today and has no acute overnight events.      12/14  Overall feels better  Pt thinks she need SNF placement  No new issues     12/15  Pt worked with PT/OT  She had panic attack today AM     12/16  Today was sitting on chair and was unresponsive  Suddenly pt became responsive after sternal rub  Medically stable and awaiting SNF placement       12/17  Pt evaluated by ST/SLP team  Recommended GI consult for dysphagia      12/18  Pt again had significant drop in BP when she was seated in a chair from bed  Midodrine started     12/19  Medically stable   awaiting SNF placement    12/20  Medically stable   awaiting SNF placement      Interval History:     Review of Systems   Constitutional:  Negative for activity change and appetite change.   HENT:  Negative for congestion and dental problem.    Eyes:  Negative for discharge and itching.   Respiratory:  Negative for shortness of breath.    Cardiovascular:  Negative for chest pain.   Gastrointestinal:  Negative for abdominal distention and abdominal pain.   Endocrine: Negative for cold intolerance.   Genitourinary:  Negative for difficulty urinating and dysuria.   Musculoskeletal:  Negative for arthralgias and back pain.   Skin:  Negative for color change.   Neurological:  Positive for dizziness. Negative for facial asymmetry.   Hematological:  Negative for adenopathy.   Psychiatric/Behavioral:  Negative for agitation and behavioral problems.      Objective:     Vital Signs (Most Recent):  Temp: 98.5 °F (36.9 °C)  (12/20/23 1217)  Pulse: 73 (12/20/23 1217)  Resp: 20 (12/20/23 1217)  BP: (!) 135/58 (12/20/23 1217)  SpO2: 99 % (12/20/23 1217) Vital Signs (24h Range):  Temp:  [98.2 °F (36.8 °C)-98.8 °F (37.1 °C)] 98.5 °F (36.9 °C)  Pulse:  [60-75] 73  Resp:  [18-20] 20  SpO2:  [93 %-99 %] 99 %  BP: (115-148)/(54-66) 135/58     Weight: 69.6 kg (153 lb 7 oz)  Body mass index is 24.77 kg/m².    Intake/Output Summary (Last 24 hours) at 12/20/2023 1315  Last data filed at 12/20/2023 0815  Gross per 24 hour   Intake 240 ml   Output 600 ml   Net -360 ml         Physical Exam  Vitals and nursing note reviewed.   Constitutional:       General: She is not in acute distress.  HENT:      Head: Atraumatic.      Right Ear: External ear normal.      Left Ear: External ear normal.      Nose: Nose normal.      Mouth/Throat:      Mouth: Mucous membranes are dry.   Eyes:      Extraocular Movements: Extraocular movements intact.   Cardiovascular:      Rate and Rhythm: Normal rate.   Pulmonary:      Effort: Pulmonary effort is normal.   Musculoskeletal:         General: Normal range of motion.      Cervical back: Normal range of motion.   Skin:     General: Skin is warm.   Neurological:      Mental Status: She is alert and oriented to person, place, and time.   Psychiatric:         Behavior: Behavior normal.             Significant Labs: All pertinent labs within the past 24 hours have been reviewed.  CBC:   Recent Labs   Lab 12/19/23  0552 12/20/23  0548   WBC 5.25 5.65   HGB 7.9* 8.1*   HCT 24.6* 24.7*   * 121*     CMP:   Recent Labs   Lab 12/19/23  0552 12/20/23  0548    135*   K 4.7 4.5    105   CO2 25 25   GLU 89 81   BUN 27* 25*   CREATININE 1.4 1.5*   CALCIUM 8.6* 8.7   PROT 4.8* 4.9*   ALBUMIN 2.9* 2.9*   BILITOT 0.6 0.6   ALKPHOS 153* 151*   AST 46* 39   ALT 35 35   ANIONGAP 4* 5*       Significant Imaging: I have reviewed all pertinent imaging results/findings within the past 24 hours.    Assessment/Plan:      * Closed  fracture of right ankle  Open reduction internal fixation of right ankle trimalleolar fracture   PT/OT  Need SNF Placement  Medically stable       Postural hypotension  Significant postural drop in BP on several occasions  Started on Midodrine       Dysphagia  C/o dysphagia and odynophagia  ST/SLP team recommended GI MD consult  Pt was evaluated by GI MD and advised conservative management  According to pt her condition resolved        UTI (urinary tract infection)  Maintain PO abx  Can discontinue abx before pt goes to rehab facility      Postural dizziness with presyncope  Pt found to have significant drop in BP while sitting/standing  Started on high dose midodrine  Compression stockings     S/P TAVR (transcatheter aortic valve replacement)  Recent TAVR on 11/1 by Dr. Moncada  Pt developed complete heart block post procedure and underwent pacemaker placement on 11/2      Stage 4 chronic kidney disease  Creatine stable for now. BMP reviewed- noted Estimated Creatinine Clearance: 26.6 mL/min (A) (based on SCr of 1.5 mg/dL (H)). according to latest data. 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  Monitor UOP and serial BMP and adjust therapy as needed. Renally dose meds. Avoid nephrotoxic medications and procedures.    Hypertension  Chronic, controlled.  BP meds on hold in view with severe hypotension ;postural  Propranolol and amiodarone discontinued in view severe postural hypotension     Anemia  Patient's anemia is currently controlled. Has not received any PRBCs to date.  Current CBC reviewed-   Lab Results   Component Value Date    HGB 8.1 (L) 12/20/2023    HCT 24.7 (L) 12/20/2023     Monitor serial CBC and transfuse if patient becomes hemodynamically unstable, symptomatic or H/H drops below 7/21.    PAF (paroxysmal atrial fibrillation)  Patient with Long standing persistent (>12 months) atrial fibrillation which is controlled currently  Patient is currently in sinus rhythm paced rhythm.[id number]  Score: 3. Anticoagulation indicated. Anticoagulation done with eliquis which is eliquis restarted       VTE Risk Mitigation (From admission, onward)           Ordered     Place KRISTINA hose  Until discontinued         12/18/23 1044     apixaban tablet 2.5 mg  2 times daily         12/16/23 1839     IP VTE HIGH RISK PATIENT  Once         12/14/23 0640     Place KRISTINA hose  Until discontinued         12/14/23 0640     Place sequential compression device  Until discontinued         12/14/23 0640     Place sequential compression device  Until discontinued         12/11/23 1524     Reason for No Pharmacological VTE Prophylaxis  Once        Question:  Reasons:  Answer:  Already adequately anticoagulated on oral Anticoagulants    12/11/23 1524                    Discharge Planning   YANET: 12/20/2023     Code Status: Full Code   Is the patient medically ready for discharge?:     Reason for patient still in hospital (select all that apply): Pending disposition  Discharge Plan A: Skilled Nursing Facility   Discharge Delays: None known at this time              Gera Szymanski MD  Department of Hospital Medicine   [facility]

## 2023-12-20 NOTE — PLAN OF CARE
Spoke to Ned with [facility] [phone] regarding CAM Walker Boot, she stated boot was delivered yesterday, pt fitted for correct size, and they do not need any other orders.      12/20/23 1042   Post-Acute Status   Post-Acute Authorization E   E Status Set-up Complete/Auth obtained

## 2023-12-20 NOTE — PLAN OF CARE
8:47am- KARRIE spoke with Sally with [facility]. Per Sally they need a picture of Pt insurance card. KARRIE attached insurance card in [facility] to continue to follow.    12/20/23 0911   Post-Acute Status   Post-Acute Authorization Placement   Post-Acute Placement Status Pending payor review/awaiting authorization (if required)   Discharge Plan   Discharge Plan A Skilled Nursing Facility   Discharge Plan B Skilled Nursing Facility

## 2023-12-20 NOTE — SUBJECTIVE & OBJECTIVE
Interval History:       Review of Systems   Constitutional:  Negative for activity change and appetite change.   HENT:  Negative for congestion and dental problem.    Eyes:  Negative for discharge and itching.   Respiratory:  Negative for shortness of breath.    Cardiovascular:  Negative for chest pain.   Gastrointestinal:  Negative for abdominal distention and abdominal pain.   Endocrine: Negative for cold intolerance.   Genitourinary:  Negative for difficulty urinating and dysuria.   Musculoskeletal:  Negative for arthralgias and back pain.   Skin:  Negative for color change.   Neurological:  Positive for dizziness. Negative for facial asymmetry.   Hematological:  Negative for adenopathy.   Psychiatric/Behavioral:  Negative for agitation and behavioral problems.      Objective:     Vital Signs (Most Recent):  Temp: 98.2 °F (36.8 °C) (12/19/23 1650)  Pulse: 63 (12/19/23 1650)  Resp: 20 (12/19/23 1650)  BP: (!) 148/54 (12/19/23 1650)  SpO2: (!) 93 % (12/19/23 1650) Vital Signs (24h Range):  Temp:  [97.3 °F (36.3 °C)-98.8 °F (37.1 °C)] 98.2 °F (36.8 °C)  Pulse:  [61-86] 63  Resp:  [18-20] 20  SpO2:  [93 %-99 %] 93 %  BP: (116-154)/(46-69) 148/54     Weight: 69.6 kg (153 lb 7 oz)  Body mass index is 24.77 kg/m².    Intake/Output Summary (Last 24 hours) at 12/19/2023 1834  Last data filed at 12/19/2023 1652  Gross per 24 hour   Intake 360 ml   Output 1350 ml   Net -990 ml         Physical Exam  Vitals and nursing note reviewed.   Constitutional:       General: She is not in acute distress.  HENT:      Head: Atraumatic.      Right Ear: External ear normal.      Left Ear: External ear normal.      Nose: Nose normal.      Mouth/Throat:      Mouth: Mucous membranes are moist.   Eyes:      Extraocular Movements: Extraocular movements intact.   Cardiovascular:      Rate and Rhythm: Normal rate.   Pulmonary:      Effort: Pulmonary effort is normal.   Musculoskeletal:         General: Normal range of motion.      Cervical  back: Normal range of motion.   Skin:     General: Skin is warm.   Neurological:      Mental Status: She is alert and oriented to person, place, and time.   Psychiatric:         Behavior: Behavior normal.             Significant Labs: All pertinent labs within the past 24 hours have been reviewed.  CBC:   Recent Labs   Lab 12/18/23 0358 12/19/23  0552   WBC 5.19 5.25   HGB 8.8* 7.9*   HCT 26.8* 24.6*   * 113*     CMP:   Recent Labs   Lab 12/18/23 0357 12/19/23  0552   * 136   K 4.7 4.7    107   CO2 24 25   GLU 95 89   BUN 30* 27*   CREATININE 1.5* 1.4   CALCIUM 9.0 8.6*   PROT 5.2* 4.8*   ALBUMIN 3.1* 2.9*   BILITOT 0.7 0.6   ALKPHOS 170* 153*   AST 44* 46*   ALT 30 35   ANIONGAP 7* 4*       Significant Imaging: I have reviewed all pertinent imaging results/findings within the past 24 hours.

## 2023-12-20 NOTE — ASSESSMENT & PLAN NOTE
Chronic, controlled.  BP meds on hold in view with severe hypotension ;postural  Propranolol and amiodarone discontinued in view severe postural hypotension

## 2023-12-20 NOTE — PROGRESS NOTES
"[facility] Medicine  Progress Note    Patient Name: Irene العراقي  MRN: [id number]  Patient Class: IP- Inpatient   Admission Date: 12/11/2023  Length of Stay: 7 days  Attending Physician: Gera Szymanski MD  Primary Care Provider: Wanda Kuhn, DEBORAHP-C        Subjective:     Principal Problem:Closed fracture of right ankle        HPI:  Ms. العراقي is an 83 yr old female with Pmhx of  atrial fibrillation, CKD, HTN, 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 presenting today for lightheadedness, weakness and presyncope. Pt reports yesterday she was experiencing fatigue, lightheadedness and generalized weakness and she states symptoms were mild. This morning when she woke up her symptoms persisted and became severe. Pt states she was in the kitchen making breakfast and felt severe lightheadedness and felt like she was going to pass out so she eased her self down on the ground and crawled to the phone to call her daughter. Pt denies cp, sob, n/v or diaphoresis. Pt reports when EMS arrived her BP was in the 180's systolic. Pt experienced trauma to her right ankle with the fall. Xray confirmed displaced lateral malleolar fracture and splint was applied in ED. Pt will be admitted to hospital med services for further workup and management.       Overview/Hospital Course:   12/12: Notes reviewed, no acute events overnight. Eval by ortho this AM who rec transfer to [facility] for surgical repair of right ankle tomorrow. Ortho aware pt will need cardiac clearance prior to surgery. Pt c/o pain to her right ankle today and received oxy 5mg. Pt feels like the pain med is too strong and she is reporting feeling "woozy" now after med admin. She states her ankle pain has improved. She reports feeling lightheaded and weak this morning and states symptoms are similar to what she was feeling when she arrived to ED. She denies cp or sob. Will await echo and cards consult and recs. I spoke with hosp " med team at [facility] and pt accepted by Dr. Jones. Pt aggreeable for transfer.      12/13: Patient was admitted for syncope over at [facility] was transferred to [facility] on request of orthopedic surgeon surgical repair of right ankle fracture. Patient was evaluated by Cardiology and underwent echocardiogram and pacemaker interrogation at [facility] prior to discharge and patient's Eliquis was held prior to surgery.  Patient is currently being prepped for surgery today and has no acute overnight events.      12/14  Overall feels better  Pt thinks she need SNF placement  No new issues     12/15  Pt worked with PT/OT  She had panic attack today AM     12/16  Today was sitting on chair and was unresponsive  Suddenly pt became responsive after sternal rub  Medically stable and awaiting SNF placement       12/17  Pt evaluated by ST/SLP team  Recommended GI consult for dysphagia      12/18  Pt again had significant drop in BP when she was seated in a chair from bed  Midodrine started     12/19  Medically stable   awaiting SNF placement    Interval History:       Review of Systems   Constitutional:  Negative for activity change and appetite change.   HENT:  Negative for congestion and dental problem.    Eyes:  Negative for discharge and itching.   Respiratory:  Negative for shortness of breath.    Cardiovascular:  Negative for chest pain.   Gastrointestinal:  Negative for abdominal distention and abdominal pain.   Endocrine: Negative for cold intolerance.   Genitourinary:  Negative for difficulty urinating and dysuria.   Musculoskeletal:  Negative for arthralgias and back pain.   Skin:  Negative for color change.   Neurological:  Positive for dizziness. Negative for facial asymmetry.   Hematological:  Negative for adenopathy.   Psychiatric/Behavioral:  Negative for agitation and behavioral problems.      Objective:     Vital Signs (Most Recent):  Temp: 98.2 °F (36.8 °C) (12/19/23 1650)  Pulse: 63 (12/19/23 1650)  Resp: 20  (12/19/23 1650)  BP: (!) 148/54 (12/19/23 1650)  SpO2: (!) 93 % (12/19/23 1650) Vital Signs (24h Range):  Temp:  [97.3 °F (36.3 °C)-98.8 °F (37.1 °C)] 98.2 °F (36.8 °C)  Pulse:  [61-86] 63  Resp:  [18-20] 20  SpO2:  [93 %-99 %] 93 %  BP: (116-154)/(46-69) 148/54     Weight: 69.6 kg (153 lb 7 oz)  Body mass index is 24.77 kg/m².    Intake/Output Summary (Last 24 hours) at 12/19/2023 1834  Last data filed at 12/19/2023 1652  Gross per 24 hour   Intake 360 ml   Output 1350 ml   Net -990 ml         Physical Exam  Vitals and nursing note reviewed.   Constitutional:       General: She is not in acute distress.  HENT:      Head: Atraumatic.      Right Ear: External ear normal.      Left Ear: External ear normal.      Nose: Nose normal.      Mouth/Throat:      Mouth: Mucous membranes are moist.   Eyes:      Extraocular Movements: Extraocular movements intact.   Cardiovascular:      Rate and Rhythm: Normal rate.   Pulmonary:      Effort: Pulmonary effort is normal.   Musculoskeletal:         General: Normal range of motion.      Cervical back: Normal range of motion.   Skin:     General: Skin is warm.   Neurological:      Mental Status: She is alert and oriented to person, place, and time.   Psychiatric:         Behavior: Behavior normal.             Significant Labs: All pertinent labs within the past 24 hours have been reviewed.  CBC:   Recent Labs   Lab 12/18/23  0358 12/19/23  0552   WBC 5.19 5.25   HGB 8.8* 7.9*   HCT 26.8* 24.6*   * 113*     CMP:   Recent Labs   Lab 12/18/23  0357 12/19/23  0552   * 136   K 4.7 4.7    107   CO2 24 25   GLU 95 89   BUN 30* 27*   CREATININE 1.5* 1.4   CALCIUM 9.0 8.6*   PROT 5.2* 4.8*   ALBUMIN 3.1* 2.9*   BILITOT 0.7 0.6   ALKPHOS 170* 153*   AST 44* 46*   ALT 30 35   ANIONGAP 7* 4*       Significant Imaging: I have reviewed all pertinent imaging results/findings within the past 24 hours.    Assessment/Plan:      * Closed fracture of right ankle  Open reduction  internal fixation of right ankle trimalleolar fracture   PT/OT  Need SNF Placement  Medically stable       Postural hypotension  Significant postural drop in BP on several occasions  Started on Midodrine       Dysphagia  C/o dysphagia and odynophagia  ST/SLP team recommended GI MD consult  Pt was evaluated by GI MD and advised conservative management  According to pt her condition resolved        UTI (urinary tract infection)  Maintain PO abx      Postural dizziness with presyncope  Pt found to have significant drop in BP while sitting/standing  Started on high dose midodrine  Compression stockings     S/P TAVR (transcatheter aortic valve replacement)  Recent TAVR on 11/1 by Dr. Moncada  Pt developed complete heart block post procedure and underwent pacemaker placement on 11/2      Stage 4 chronic kidney disease  Creatine stable for now. BMP reviewed- noted Estimated Creatinine Clearance: 24.9 mL/min (A) (based on SCr of 1.6 mg/dL (H)). according to latest data. 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  Monitor UOP and serial BMP and adjust therapy as needed. Renally dose meds. Avoid nephrotoxic medications and procedures.    Hypertension  Chronic, controlled.  BP meds on hold in view with severe hypotension ;postural    Anemia  Patient's anemia is currently controlled. Has not received any PRBCs to date.  Current CBC reviewed-   Lab Results   Component Value Date    HGB 7.9 (L) 12/19/2023    HCT 24.6 (L) 12/19/2023     Monitor serial CBC and transfuse if patient becomes hemodynamically unstable, symptomatic or H/H drops below 7/21.    PAF (paroxysmal atrial fibrillation)  Patient with Long standing persistent (>12 months) atrial fibrillation which is controlled currently with Amiodarone. Patient is currently in sinus rhythm paced rhythm.[id number] Score: 3. Anticoagulation indicated. Anticoagulation done with eliquis which is eliquis restarted       VTE Risk Mitigation (From admission, onward)            Ordered     Place KRISTINA hose  Until discontinued         12/18/23 1044     apixaban tablet 2.5 mg  2 times daily         12/16/23 1839     IP VTE HIGH RISK PATIENT  Once         12/14/23 0640     Place KRISTINA hose  Until discontinued         12/14/23 0640     Place sequential compression device  Until discontinued         12/14/23 0640     Place sequential compression device  Until discontinued         12/11/23 1524     Reason for No Pharmacological VTE Prophylaxis  Once        Question:  Reasons:  Answer:  Already adequately anticoagulated on oral Anticoagulants    12/11/23 1524                    Discharge Planning   YANET: 12/20/2023     Code Status: Full Code   Is the patient medically ready for discharge?:     Reason for patient still in hospital (select all that apply): Pending disposition  Discharge Plan A: Skilled Nursing Facility   Discharge Delays: None known at this time              Gera Szymanski MD  Department of Hospital Medicine   [facility]

## 2023-12-20 NOTE — PT/OT/SLP PROGRESS
Occupational Therapy   Treatment    Name: Irene العراقي  MRN: [id number]  Admitting Diagnosis:  Closed fracture of right ankle  7 Days Post-Op    Recommendations:     Discharge Recommendations: Moderate Intensity Therapy  Discharge Equipment Recommendations:  none  Barriers to discharge:  Decreased caregiver support; requires increased assistance for self care    Assessment:     Irene العراقي is a 83 y.o. female with a medical diagnosis of Closed fracture of right ankle. Performance deficits affecting function are weakness, impaired endurance, impaired self care skills, impaired functional mobility, gait instability, impaired cardiopulmonary response to activity. New CAM walker boot in room. Patient reports increased nausea today; has received medication.     Rehab Prognosis:  Fair; patient would benefit from acute skilled OT services to address these deficits and reach maximum level of function.       Plan:     Patient to be seen 5 x/week to address the above listed problems via self-care/home management, therapeutic activities, therapeutic exercises  Plan of Care Expires: 01/12/24  Plan of Care Reviewed with: patient    Subjective     Chief Complaint: still dizzy with movement   Patient/Family Comments/goals: did not indicate   Pain/Comfort:  Pain Rating 1: 0/10  Pain Rating Post-Intervention 1: 0/10    Objective:     Communicated with: nurse prior to session.  Patient found HOB elevated with telemetry, PureWick upon OT entry to room.    General Precautions: Standard, fall    Orthopedic Precautions:RLE non weight bearing  Braces:  (CAM walker boot)  Respiratory Status: Room air     Occupational Performance:     Bed Mobility:    Patient completed Rolling/Turning to Left with  minimum assistance for repositioning   Patient completed Rolling/Turning to Right with minimum assistance     Therapeutic Exercises:  Patient worked on upper body strengthening exercises with head of bed elevated; tolerated well.     Treatment  & Education:  Patient was educated on safety during transfers and ADLs adhering to precautions, equipment needs and reviewed use of call bell for assistance.     Patient left HOB elevated with all lines intact, call button in reach, and bed alarm on    GOALS:   Multidisciplinary Problems       Occupational Therapy Goals          Problem: Occupational Therapy    Goal Priority Disciplines Outcome Interventions   Occupational Therapy Goal     OT, PT/OT Ongoing, Progressing    Description: Goals to be met by: 1/12/2024     Patient will increase functional independence with ADLs by performing:    LE Dressing with Minimal Assistance and Assistive Devices as needed.  Grooming while seated with Supervision.  Toileting from bedside commode with Minimal Assistance for hygiene and clothing management.   Supine to sit with Contact Guard Assistance.  Toilet transfer to bedside commode with Minimal Assistance and maintaining weight-bearing precaution(s).                         Time Tracking:     OT Date of Treatment: 12/20/23  OT Start Time: 1040  OT Stop Time: 1051  OT Total Time (min): 11 min    Billable Minutes:Therapeutic Exercise 11    OT/HANNAH: OT          12/20/2023

## 2023-12-20 NOTE — ASSESSMENT & PLAN NOTE
Creatine stable for now. BMP reviewed- noted Estimated Creatinine Clearance: 26.6 mL/min (A) (based on SCr of 1.5 mg/dL (H)). according to latest data. 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  Monitor UOP and serial BMP and adjust therapy as needed. Renally dose meds. Avoid nephrotoxic medications and procedures.

## 2023-12-21 VITALS
SYSTOLIC BLOOD PRESSURE: 131 MMHG | HEIGHT: 66 IN | DIASTOLIC BLOOD PRESSURE: 61 MMHG | TEMPERATURE: 98 F | HEART RATE: 69 BPM | BODY MASS INDEX: 24.66 KG/M2 | RESPIRATION RATE: 18 BRPM | OXYGEN SATURATION: 95 % | WEIGHT: 153.44 LBS

## 2023-12-21 LAB
ALBUMIN SERPL BCP-MCNC: 2.9 G/DL (ref 3.5–5.2)
ALP SERPL-CCNC: 147 U/L (ref 55–135)
ALT SERPL W/O P-5'-P-CCNC: 31 U/L (ref 10–44)
ANION GAP SERPL CALC-SCNC: 6 MMOL/L (ref 8–16)
AST SERPL-CCNC: 33 U/L (ref 10–40)
BILIRUB SERPL-MCNC: 0.6 MG/DL (ref 0.1–1)
BUN SERPL-MCNC: 24 MG/DL (ref 8–23)
CALCIUM SERPL-MCNC: 8.9 MG/DL (ref 8.7–10.5)
CHLORIDE SERPL-SCNC: 106 MMOL/L (ref 95–110)
CO2 SERPL-SCNC: 25 MMOL/L (ref 23–29)
CREAT SERPL-MCNC: 1.5 MG/DL (ref 0.5–1.4)
ERYTHROCYTE [DISTWIDTH] IN BLOOD BY AUTOMATED COUNT: 13 % (ref 11.5–14.5)
EST. GFR  (NO RACE VARIABLE): 34.4 ML/MIN/1.73 M^2
GLUCOSE SERPL-MCNC: 83 MG/DL (ref 70–110)
HCT VFR BLD AUTO: 25 % (ref 37–48.5)
HGB BLD-MCNC: 8.1 G/DL (ref 12–16)
MCH RBC QN AUTO: 30.7 PG (ref 27–31)
MCHC RBC AUTO-ENTMCNC: 32.4 G/DL (ref 32–36)
MCV RBC AUTO: 95 FL (ref 82–98)
PLATELET # BLD AUTO: 125 K/UL (ref 150–450)
PMV BLD AUTO: 10.4 FL (ref 9.2–12.9)
POTASSIUM SERPL-SCNC: 4.8 MMOL/L (ref 3.5–5.1)
PROT SERPL-MCNC: 4.9 G/DL (ref 6–8.4)
RBC # BLD AUTO: 2.64 M/UL (ref 4–5.4)
SODIUM SERPL-SCNC: 137 MMOL/L (ref 136–145)
WBC # BLD AUTO: 5.75 K/UL (ref 3.9–12.7)

## 2023-12-21 PROCEDURE — 25000003 PHARM REV CODE 250: Performed by: INTERNAL MEDICINE

## 2023-12-21 PROCEDURE — 36415 COLL VENOUS BLD VENIPUNCTURE: CPT | Performed by: INTERNAL MEDICINE

## 2023-12-21 PROCEDURE — 80053 COMPREHEN METABOLIC PANEL: CPT | Performed by: INTERNAL MEDICINE

## 2023-12-21 PROCEDURE — 63600175 PHARM REV CODE 636 W HCPCS: Performed by: ORTHOPAEDIC SURGERY

## 2023-12-21 PROCEDURE — 85027 COMPLETE CBC AUTOMATED: CPT | Performed by: INTERNAL MEDICINE

## 2023-12-21 PROCEDURE — 25000003 PHARM REV CODE 250: Performed by: ORTHOPAEDIC SURGERY

## 2023-12-21 PROCEDURE — 63600175 PHARM REV CODE 636 W HCPCS: Performed by: STUDENT IN AN ORGANIZED HEALTH CARE EDUCATION/TRAINING PROGRAM

## 2023-12-21 PROCEDURE — 97110 THERAPEUTIC EXERCISES: CPT | Mod: CQ

## 2023-12-21 PROCEDURE — 97530 THERAPEUTIC ACTIVITIES: CPT | Mod: CQ

## 2023-12-21 RX ORDER — PROCHLORPERAZINE EDISYLATE 5 MG/ML
2.5 INJECTION INTRAMUSCULAR; INTRAVENOUS EVERY 6 HOURS PRN
Status: DISCONTINUED | OUTPATIENT
Start: 2023-12-21 | End: 2023-12-21 | Stop reason: HOSPADM

## 2023-12-21 RX ORDER — MECLIZINE HYDROCHLORIDE 50 MG/1
50 TABLET ORAL 3 TIMES DAILY PRN
Qty: 30 TABLET | Refills: 0 | Status: ON HOLD | OUTPATIENT
Start: 2023-12-21 | End: 2024-02-19 | Stop reason: HOSPADM

## 2023-12-21 RX ORDER — FAMOTIDINE 20 MG/1
20 TABLET, FILM COATED ORAL DAILY
Qty: 30 TABLET | Refills: 11 | Status: SHIPPED | OUTPATIENT
Start: 2023-12-22 | End: 2024-12-21

## 2023-12-21 RX ORDER — ALPRAZOLAM 0.5 MG/1
0.5 TABLET ORAL 3 TIMES DAILY PRN
Qty: 3 TABLET | Refills: 0 | Status: ON HOLD | OUTPATIENT
Start: 2023-12-21 | End: 2024-01-26 | Stop reason: HOSPADM

## 2023-12-21 RX ORDER — ONDANSETRON 4 MG/1
8 TABLET, ORALLY DISINTEGRATING ORAL EVERY 8 HOURS PRN
Qty: 2 TABLET | Refills: 0 | Status: SHIPPED | OUTPATIENT
Start: 2023-12-21 | End: 2024-02-06

## 2023-12-21 RX ORDER — PROMETHAZINE HYDROCHLORIDE 25 MG/1
25 TABLET ORAL EVERY 6 HOURS PRN
Qty: 30 TABLET | Refills: 0 | Status: ON HOLD | OUTPATIENT
Start: 2023-12-21 | End: 2024-01-26 | Stop reason: HOSPADM

## 2023-12-21 RX ORDER — MIDODRINE HYDROCHLORIDE 10 MG/1
10 TABLET ORAL
Qty: 90 TABLET | Refills: 0 | Status: ON HOLD | OUTPATIENT
Start: 2023-12-21 | End: 2024-01-26

## 2023-12-21 RX ORDER — OXYCODONE HYDROCHLORIDE 5 MG/1
5 TABLET ORAL EVERY 4 HOURS PRN
Qty: 20 TABLET | Refills: 0 | Status: SHIPPED | OUTPATIENT
Start: 2023-12-21 | End: 2023-12-26

## 2023-12-21 RX ADMIN — DOCUSATE SODIUM 100 MG: 100 CAPSULE, LIQUID FILLED ORAL at 09:12

## 2023-12-21 RX ADMIN — MIDODRINE HYDROCHLORIDE 10 MG: 10 TABLET ORAL at 11:12

## 2023-12-21 RX ADMIN — Medication 400 MG: at 09:12

## 2023-12-21 RX ADMIN — ONDANSETRON 4 MG: 2 INJECTION INTRAMUSCULAR; INTRAVENOUS at 09:12

## 2023-12-21 RX ADMIN — APIXABAN 2.5 MG: 2.5 TABLET, FILM COATED ORAL at 09:12

## 2023-12-21 RX ADMIN — MIDODRINE HYDROCHLORIDE 10 MG: 10 TABLET ORAL at 09:12

## 2023-12-21 RX ADMIN — FAMOTIDINE 20 MG: 20 TABLET ORAL at 09:12

## 2023-12-21 RX ADMIN — LEVOFLOXACIN 250 MG: 250 TABLET, FILM COATED ORAL at 05:12

## 2023-12-21 RX ADMIN — PROCHLORPERAZINE EDISYLATE 2.5 MG: 5 INJECTION INTRAMUSCULAR; INTRAVENOUS at 01:12

## 2023-12-21 NOTE — PLAN OF CARE
Facility has auth awaiting dc orders; family to complete paperwork today      12/21/23 0957   Post-Acute Status   Post-Acute Authorization Placement   Post-Acute Placement Status Set-up Complete/Auth obtained   Discharge Delays None known at this time   Discharge Plan   Discharge Plan A Skilled Nursing Facility   Discharge Plan B Skilled Nursing Facility

## 2023-12-21 NOTE — PLAN OF CARE
SW sent AVS CXR  PASRR via [facility]port awaiting report    12/21/23 1341   Post-Acute Status   Post-Acute Authorization Placement   Post-Acute Placement Status Set-up Complete/Auth obtained   Discharge Delays None known at this time   Discharge Plan   Discharge Plan A Skilled Nursing Facility   Discharge Plan B Skilled Nursing Facility

## 2023-12-21 NOTE — ASSESSMENT & PLAN NOTE
POD#8    NWB RLE  PT and OT for mobility  SNF DC pending  Needs WC with elevating leg rest  Daily dressing change

## 2023-12-21 NOTE — PROGRESS NOTES
[facility]  Orthopedics  Progress Note    Patient Name: Irene العراقي  MRN: [id number]  Admission Date: 12/11/2023  Hospital Length of Stay: 9 days  Attending Provider: Jorje Jones MD  Primary Care Provider: Wanda Kuhn FNP-C  Follow-up For: Procedure(s) (LRB):  ORIF, ANKLE (Right)    Post-Operative Day: 8 Days Post-Op  Subjective:     Principal Problem:Closed fracture of right ankle    Principal Orthopedic Problem: S/P R ankle ORIF    Interval History: SNF DC pending    Review of patient's allergies indicates:   Allergen Reactions    Cefuroxime     Hydrocodone     Meperidine     Meperidine hcl Nausea And Vomiting    Persantine [dipyridamole]     Nitrofurantoin macrocrystal      Headache , went into Af[facility]     Vicodin [hydrocodone-acetaminophen] Nausea And Vomiting       Current Facility-Administered Medications   Medication    acetaminophen tablet 650 mg    ALPRAZolam tablet 0.5 mg    apixaban tablet 2.5 mg    dextrose 50% injection 12.5 g    dextrose 50% injection 25 g    docusate sodium capsule 100 mg    famotidine tablet 20 mg    glucagon (human recombinant) injection 1 mg    glucose chewable tablet 16 g    glucose chewable tablet 24 g    magnesium oxide tablet 400 mg    magnesium oxide tablet 800 mg    magnesium oxide tablet 800 mg    meclizine tablet 50 mg    midodrine tablet 10 mg    naloxone 0.4 mg/mL injection 0.02 mg    ondansetron injection 4 mg    oxyCODONE immediate release tablet 5 mg    polyethylene glycol packet 17 g    potassium bicarbonate disintegrating tablet 35 mEq    potassium bicarbonate disintegrating tablet 50 mEq    potassium bicarbonate disintegrating tablet 60 mEq    potassium, sodium phosphates 280-160-250 mg packet 2 packet    potassium, sodium phosphates 280-160-250 mg packet 2 packet    potassium, sodium phosphates 280-160-250 mg packet 2 packet    sodium chloride 0.9% flush 10 mL     Objective:     Vital Signs (Most Recent):  Temp: 98.5 °F (36.9 °C) (12/21/23  "0508)  Pulse: 61 (12/21/23 0508)  Resp: 16 (12/21/23 0508)  BP: (!) 118/58 (12/21/23 0508)  SpO2: 97 % (12/21/23 0508) Vital Signs (24h Range):  Temp:  [36.8 °F (2.7 °C)-98.8 °F (37.1 °C)] 98.5 °F (36.9 °C)  Pulse:  [61-75] 61  Resp:  [16-20] 16  SpO2:  [93 %-99 %] 97 %  BP: (118-135)/(56-63) 118/58     Weight: 69.6 kg (153 lb 7 oz)  Height: 5' 6" (167.6 cm)  Body mass index is 24.77 kg/m².      Intake/Output Summary (Last 24 hours) at 12/21/2023 0734  Last data filed at 12/21/2023 0508  Gross per 24 hour   Intake --   Output 800 ml   Net -800 ml        General    Nursing note and vitals reviewed.  Constitutional: She is oriented to person, place, and time. She appears well-developed and well-nourished.   Pulmonary/Chest: Effort normal.   Neurological: She is alert and oriented to person, place, and time.   Psychiatric: She has a normal mood and affect. Her behavior is normal.         Right Ankle/Foot Exam     Comments:  Pt in bed. RLE DNVI. Boot in place. Wound is C/D/I           Significant Labs: CBC:   Recent Labs   Lab 12/20/23  0548 12/21/23  0447   WBC 5.65 5.75   HGB 8.1* 8.1*   HCT 24.7* 25.0*   * 125*     CMP:   Recent Labs   Lab 12/20/23  0548 12/21/23  0447   * 137   K 4.5 4.8    106   CO2 25 25   GLU 81 83   BUN 25* 24*   CREATININE 1.5* 1.5*   CALCIUM 8.7 8.9   PROT 4.9* 4.9*   ALBUMIN 2.9* 2.9*   BILITOT 0.6 0.6   ALKPHOS 151* 147*   AST 39 33   ALT 35 31   ANIONGAP 5* 6*     All pertinent labs within the past 24 hours have been reviewed.    Significant Imaging: None  Assessment/Plan:     * Closed fracture of right ankle  POD#8    NWB RLE  PT and OT for mobility  SNF DC pending  Needs WC with elevating leg rest  Daily dressing change          MARIE AZAR  Orthopedics  [facility]    "

## 2023-12-21 NOTE — PLAN OF CARE
Problem: Adult Inpatient Plan of Care  Goal: Plan of Care Review  Outcome: Met  Goal: Patient-Specific Goal (Individualized)  Outcome: Met  Goal: Absence of Hospital-Acquired Illness or Injury  Outcome: Met  Goal: Optimal Comfort and Wellbeing  Outcome: Met  Goal: Readiness for Transition of Care  Outcome: Met     Problem: Fall Injury Risk  Goal: Absence of Fall and Fall-Related Injury  Outcome: Met     Problem: Skin Injury Risk Increased  Goal: Skin Health and Integrity  Outcome: Met     Problem: Adjustment to Illness (Chronic Kidney Disease)  Goal: Optimal Coping with Chronic Illness  Outcome: Met     Problem: Infection  Goal: Absence of Infection Signs and Symptoms  Outcome: Met     Problem: Mobility Impairment  Goal: Optimal Mobility  Outcome: Met

## 2023-12-21 NOTE — PLAN OF CARE
Problem: Adult Inpatient Plan of Care  Goal: Plan of Care Review  Outcome: Ongoing, Progressing  Goal: Patient-Specific Goal (Individualized)  Outcome: Ongoing, Progressing  Goal: Absence of Hospital-Acquired Illness or Injury  Outcome: Ongoing, Progressing  Goal: Optimal Comfort and Wellbeing  Outcome: Ongoing, Progressing  Goal: Readiness for Transition of Care  Outcome: Ongoing, Progressing     Problem: Fall Injury Risk  Goal: Absence of Fall and Fall-Related Injury  Outcome: Ongoing, Progressing     Problem: Adjustment to Illness (Chronic Kidney Disease)  Goal: Optimal Coping with Chronic Illness  Outcome: Ongoing, Progressing     Problem: Infection  Goal: Absence of Infection Signs and Symptoms  Outcome: Ongoing, Progressing     Problem: Mobility Impairment  Goal: Optimal Mobility  Outcome: Ongoing, Progressing

## 2023-12-21 NOTE — PT/OT/SLP PROGRESS
Physical Therapy Treatment    Patient Name:  Irene العراقي   MRN:  [id number]    Recommendations:     Discharge Recommendations: Moderate Intensity Therapy  Discharge Equipment Recommendations:  (TBD)  Barriers to discharge:  weakness, impaired self care skills, impaired functional mobility, decreased activity tolerance.     Assessment:     Irene العراقي is a 83 y.o. female admitted with a medical diagnosis of Closed fracture of right ankle.  She presents with the following impairments/functional limitations: weakness, impaired endurance, impaired self care skills, impaired functional mobility, gait instability, impaired balance, decreased ROM, edema, impaired cardiopulmonary response to activity.    Pt found resting with HOB elevated, agreeable to skilled physical therapy session today. Pt given SBA to transfer from supine to sitting and rested sitting EOB. BP assessed upon initial sit at 110/40 with pt c/o mild dizziness. Pt performed therapeutic exercises EOB including marches, hip AB/ADD, ankle pumps (LLE), LAQ's, and glute sets x15 reps each. Pt reported increased lightheadedness after therex and BP was assessed again measuring 85/31. Jessica given to transfer pt sitting to supine where BP was assessed again at 116/46. Pt was left in care of nurse with HOB elevated, bed alarm on, call light within reach, and all needs met.     Rehab Prognosis: Fair; patient would benefit from acute skilled PT services to address these deficits and reach maximum level of function.    Recent Surgery: Procedure(s) (LRB):  ORIF, ANKLE (Right) 8 Days Post-Op    Plan:     During this hospitalization, patient to be seen 6 x/week to address the identified rehab impairments via gait training, therapeutic activities, wheelchair management/training and progress toward the following goals:    Plan of Care Expires:  01/15/24    Subjective     Chief Complaint: lightheaded  Patient/Family Comments/goals: to get better  Pain/Comfort:  Pain Rating  1: 0/10      Objective:     Communicated with RN prior to session.  Patient found HOB elevated with bed alarm, PureWick, peripheral IV, telemetry upon PT entry to room.     General Precautions: Standard, fall  Orthopedic Precautions: RLE non weight bearing  Braces:  (CAM boot in room)  Respiratory Status: Room air     Functional Mobility:  Bed Mobility:     Supine to Sit: stand by assistance  Sit to Supine: minimum assistance      AM-PAC 6 CLICK MOBILITY          Treatment & Education:  Pt educated on importance of time OOB, importance of intermittent mobility, safe techniques for transfers/ambulation, discharge recommendations/options, and use of call light for assistance and fall prevention.      Patient left HOB elevated with all lines intact, call button in reach, bed alarm on, and RN present..    GOALS:   Multidisciplinary Problems       Physical Therapy Goals          Problem: Physical Therapy    Goal Priority Disciplines Outcome Goal Variances Interventions   Physical Therapy Goal     PT, PT/OT Ongoing, Progressing     Description: Goals to be met by: 1/15/2024     Patient will increase functional independence with mobility by performin. Supine to sit with Contact Guard Assistance  2. Sit to stand transfer with Contact Guard Assistance  3. Bed to chair transfer with Minimal Assistance using Rolling Walker                         Time Tracking:     PT Received On: 23  PT Start Time: 908     PT Stop Time: 932  PT Total Time (min): 24 min     Billable Minutes: Therapeutic Activity 14 and Therapeutic Exercise 10    Treatment Type: Treatment  PT/PTA: PTA     Number of PTA visits since last PT visit: 2023

## 2023-12-21 NOTE — NURSING
Discharge instructions provided with transportation.  IV removed without difficulty, catheter intact.  Pt transferred to wheelchair safely and left unit in stable condition via wheelchair van.

## 2023-12-21 NOTE — SUBJECTIVE & OBJECTIVE
"Principal Problem:Closed fracture of right ankle    Principal Orthopedic Problem: S/P R ankle ORIF    Interval History: SNF DC pending    Review of patient's allergies indicates:   Allergen Reactions    Cefuroxime     Hydrocodone     Meperidine     Meperidine hcl Nausea And Vomiting    Persantine [dipyridamole]     Nitrofurantoin macrocrystal      Headache , went into Afib     Vicodin [hydrocodone-acetaminophen] Nausea And Vomiting       Current Facility-Administered Medications   Medication    acetaminophen tablet 650 mg    ALPRAZolam tablet 0.5 mg    apixaban tablet 2.5 mg    dextrose 50% injection 12.5 g    dextrose 50% injection 25 g    docusate sodium capsule 100 mg    famotidine tablet 20 mg    glucagon (human recombinant) injection 1 mg    glucose chewable tablet 16 g    glucose chewable tablet 24 g    magnesium oxide tablet 400 mg    magnesium oxide tablet 800 mg    magnesium oxide tablet 800 mg    meclizine tablet 50 mg    midodrine tablet 10 mg    naloxone 0.4 mg/mL injection 0.02 mg    ondansetron injection 4 mg    oxyCODONE immediate release tablet 5 mg    polyethylene glycol packet 17 g    potassium bicarbonate disintegrating tablet 35 mEq    potassium bicarbonate disintegrating tablet 50 mEq    potassium bicarbonate disintegrating tablet 60 mEq    potassium, sodium phosphates 280-160-250 mg packet 2 packet    potassium, sodium phosphates 280-160-250 mg packet 2 packet    potassium, sodium phosphates 280-160-250 mg packet 2 packet    sodium chloride 0.9% flush 10 mL     Objective:     Vital Signs (Most Recent):  Temp: 98.5 °F (36.9 °C) (12/21/23 0508)  Pulse: 61 (12/21/23 0508)  Resp: 16 (12/21/23 0508)  BP: (!) 118/58 (12/21/23 0508)  SpO2: 97 % (12/21/23 0508) Vital Signs (24h Range):  Temp:  [36.8 °F (2.7 °C)-98.8 °F (37.1 °C)] 98.5 °F (36.9 °C)  Pulse:  [61-75] 61  Resp:  [16-20] 16  SpO2:  [93 %-99 %] 97 %  BP: (118-135)/(56-63) 118/58     Weight: 69.6 kg (153 lb 7 oz)  Height: 5' 6" (167.6 " cm)  Body mass index is 24.77 kg/m².      Intake/Output Summary (Last 24 hours) at 12/21/2023 0734  Last data filed at 12/21/2023 0508  Gross per 24 hour   Intake --   Output 800 ml   Net -800 ml        General    Nursing note and vitals reviewed.  Constitutional: She is oriented to person, place, and time. She appears well-developed and well-nourished.   Pulmonary/Chest: Effort normal.   Neurological: She is alert and oriented to person, place, and time.   Psychiatric: She has a normal mood and affect. Her behavior is normal.         Right Ankle/Foot Exam     Comments:  Pt in bed. RLE DNVI. Boot in place. Wound is C/D/I           Significant Labs: CBC:   Recent Labs   Lab 12/20/23  0548 12/21/23  0447   WBC 5.65 5.75   HGB 8.1* 8.1*   HCT 24.7* 25.0*   * 125*     CMP:   Recent Labs   Lab 12/20/23  0548 12/21/23  0447   * 137   K 4.5 4.8    106   CO2 25 25   GLU 81 83   BUN 25* 24*   CREATININE 1.5* 1.5*   CALCIUM 8.7 8.9   PROT 4.9* 4.9*   ALBUMIN 2.9* 2.9*   BILITOT 0.6 0.6   ALKPHOS 151* 147*   AST 39 33   ALT 35 31   ANIONGAP 5* 6*     All pertinent labs within the past 24 hours have been reviewed.    Significant Imaging: None

## 2023-12-21 NOTE — PLAN OF CARE
Charts and orders reviewed. [facility] .  informed JESSICA Crump that report can be called to [facility] at [phone]. [facility] to schedule transportation.  Pt has no other needs to be addressed by case management. [facility]  from case management standpoint.     12/21/23 1438   Final Note   Assessment Type Final Discharge Note   Anticipated Discharge Disposition SNF   What phone number can be called within the next 1-3 days to see how you are doing after discharge? [phone]   Hospital Resources/Appts/Education Provided Provided patient/caregiver with written discharge plan information   Post-Acute Status   Post-Acute Placement Status Set-up Complete/Auth obtained   Discharge Delays None known at this time

## 2023-12-22 NOTE — PT/OT/SLP PROGRESS
Occupational Therapy      Patient Name:  Irene العراقي   MRN:  [id number]    Patient not seen today secondary to Other (Comment) (pt d/c before seeing.).   12/21/2023

## 2023-12-22 NOTE — DISCHARGE SUMMARY
[facility] Medicine  Discharge Summary      Patient Name: Irene العراقي  MRN: [id number]  [facility]: [id number]  Patient Class: IP- Inpatient  Admission Date: 12/11/2023  Hospital Length of Stay: 9 days  Discharge Date and Time:  12/21/2023 8:14 PM  Attending Physician: No att. providers found   Discharging Provider: Jorje Jones MD  Primary Care Provider: Wanda Kuhn, BRYANT    Primary Care Team: Networked reference to record PCT     HPI:   Ms. العراقي is an 83 yr old female with Pmhx of  atrial fibrillation, CKD, HTN, 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 presenting today for lightheadedness, weakness and presyncope. Pt reports yesterday she was experiencing fatigue, lightheadedness and generalized weakness and she states symptoms were mild. This morning when she woke up her symptoms persisted and became severe. Pt states she was in the kitchen making breakfast and felt severe lightheadedness and felt like she was going to pass out so she eased her self down on the ground and crawled to the phone to call her daughter. Pt denies cp, sob, n/v or diaphoresis. Pt reports when EMS arrived her BP was in the 180's systolic. Pt experienced trauma to her right ankle with the fall. Xray confirmed displaced lateral malleolar fracture and splint was applied in ED. Pt will be admitted to hospital med services for further workup and management.       Procedure(s) (LRB):  ORIF, ANKLE (Right)      Hospital Course:   Ms. Tariq is a 84 yo w/pmhx of  atrial fibrillation, CKD, HTN, 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 who presented w/syncope and R ankle fracture. She was initially admitted for [facility] and then transferred to [facility] per ortho request. Patient was evaluated by Cardiology and underwent echocardiogram and pacemaker interrogation at [facility] prior to discharge. She underwent R ORIF on 12/12 w/ortho. Hospital course complicated by orthostatic hypotension and patient  "was started on midodrine. GI consulted for dysphagia and recommended conservative management 2/2 co-morbidities. She was discharged to SNF.          Goals of Care Treatment Preferences:  Code Status: Full Code      Consults:   Consults (From admission, onward)          Status Ordering Provider     Inpatient consult to Gastroenterology  Once        Provider:  Vel Thompson III, MD    Completed YOLI FARMER     Inpatient consult to Social Work/Case Management  Once        Provider:  (Not yet assigned)    Completed RAVINDRA YOUNG     Inpatient consult to Social Work/Case Management  Once        Provider:  (Not yet assigned)    Completed YOLI FARMER            No new Assessment & Plan notes have been filed under this hospital service since the last note was generated.  Service: Hospital Medicine    Final Active Diagnoses:    Diagnosis Date Noted POA    PRINCIPAL PROBLEM:  Closed fracture of right ankle [S82.891A] 12/11/2023 Yes    Postural hypotension [I95.1] 12/18/2023 Yes    Dysphagia [R13.10] 12/16/2023 No    UTI (urinary tract infection) [N39.0] 12/12/2023 Yes    Postural dizziness with presyncope [R42, R55] 12/11/2023 Yes    S/P TAVR (transcatheter aortic valve replacement) [Z95.2] 11/01/2023 Not Applicable    Stage 4 chronic kidney disease [N18.4] 09/28/2023 Yes    Hypertension [I10] 02/21/2022 Yes    PAF (paroxysmal atrial fibrillation) [I48.0] 01/28/2021 Yes    Anemia [D64.9] 01/28/2021 Yes      Problems Resolved During this Admission:       Discharged Condition: stable    Disposition: Skilled Nursing Facility    Follow Up:    Patient Instructions:      ORTHOTIC DEVICE (DME)     Order Specific Question Answer Comments   Type of Orthotic to be filled? Cam walker    Height: 5' 6" (1.676 m)    Weight: 69.6 kg (153 lb 7 oz)    Does patient have medical equipment at home? rollator    Does patient have medical equipment at home? wheelchair        Significant Diagnostic Studies: Labs: BMP:   Recent Labs   Lab " 12/20/23  0548 12/21/23  0447   GLU 81 83   * 137   K 4.5 4.8    106   CO2 25 25   BUN 25* 24*   CREATININE 1.5* 1.5*   CALCIUM 8.7 8.9    and CBC   Recent Labs   Lab 12/20/23  0548 12/21/23  0447   WBC 5.65 5.75   HGB 8.1* 8.1*   HCT 24.7* 25.0*   * 125*       Pending Diagnostic Studies:       None           Medications:  Reconciled Home Medications:      Medication List        START taking these medications      ALPRAZolam 0.5 MG tablet  Commonly known as: XANAX  Take 1 tablet (0.5 mg total) by mouth 3 (three) times daily as needed for Anxiety.     famotidine 20 MG tablet  Commonly known as: PEPCID  Take 1 tablet (20 mg total) by mouth once daily.  Start taking on: December 22, 2023     meclizine 50 MG tablet  Commonly known as: ANTIVERT  Take 1 tablet (50 mg total) by mouth 3 (three) times daily as needed for Dizziness.     midodrine 10 MG tablet  Commonly known as: PROAMATINE  Take 1 tablet (10 mg total) by mouth 3 (three) times daily with meals.     ondansetron 4 MG Tbdl  Commonly known as: ZOFRAN-ODT  Take 2 tablets (8 mg total) by mouth every 8 (eight) hours as needed.     oxyCODONE 5 MG immediate release tablet  Commonly known as: ROXICODONE  Take 1 tablet (5 mg total) by mouth every 4 (four) hours as needed.     promethazine 25 MG tablet  Commonly known as: PHENERGAN  Take 1 tablet (25 mg total) by mouth every 6 (six) hours as needed for Nausea.            CHANGE how you take these medications      magnesium oxide 400 mg (241.3 mg magnesium) tablet  Commonly known as: MAG-OX  TAKE 1 TABLET BY MOUTH ONCE DAILY  What changed: when to take this            CONTINUE taking these medications      apixaban 2.5 mg Tab  Commonly known as: ELIQUIS  Take 1 tablet (2.5 mg total) by mouth 2 (two) times daily.     docusate sodium 100 MG capsule  Commonly known as: COLACE  Take 1 capsule (100 mg total) by mouth 2 (two) times daily.     MIRALAX 17 gram Pwpk  Generic drug: polyethylene glycol  Take 17 g  by mouth 2 (two) times daily.     NIFEREX (SUMALATE-QUATREFOLIC) 150 mg iron- 60 mg-1 mg Tab  Generic drug: iron ag,cn-W-EZ4-B12-Zn-sa-sto  Take 1 tablet by mouth once daily.            STOP taking these medications      amiodarone 200 MG Tab  Commonly known as: PACERONE     furosemide 20 MG tablet  Commonly known as: LASIX     iron-vitamin C 100-250 mg (ICAR-C) 100-250 mg Tab  Commonly known as: ICAR-C     potassium chloride 8 MEQ Tbsr  Commonly known as: KLOR-CON     propranoloL 10 MG tablet  Commonly known as: INDERAL              Indwelling Lines/Drains at time of discharge:   Lines/Drains/Airways       Drain  Duration             Female External Urinary Catheter 12/17/23 1415 4 days                    Physical Exam:  Gen: frail, elderly   MSK: R ankle wrapped in gauze   Neuro: follows commands, having appropriate conversation    Time spent on the discharge of patient: 40 minutes         Jorje Jones MD  Department of Hospital Medicine  [facility]

## 2024-01-02 ENCOUNTER — OFFICE VISIT (OUTPATIENT)
Dept: ORTHOPEDICS | Facility: CLINIC | Age: 84
End: 2024-01-02
Payer: COMMERCIAL

## 2024-01-02 VITALS — WEIGHT: 151 LBS | BODY MASS INDEX: 24.27 KG/M2 | HEIGHT: 66 IN

## 2024-01-02 DIAGNOSIS — Z98.890 S/P ORIF (OPEN REDUCTION INTERNAL FIXATION) FRACTURE: Primary | ICD-10-CM

## 2024-01-02 DIAGNOSIS — Z87.81 S/P ORIF (OPEN REDUCTION INTERNAL FIXATION) FRACTURE: Primary | ICD-10-CM

## 2024-01-02 PROCEDURE — 1125F AMNT PAIN NOTED PAIN PRSNT: CPT | Mod: CPTII,S$GLB,, | Performed by: PHYSICIAN ASSISTANT

## 2024-01-02 PROCEDURE — 1159F MED LIST DOCD IN RCRD: CPT | Mod: CPTII,S$GLB,, | Performed by: PHYSICIAN ASSISTANT

## 2024-01-02 PROCEDURE — 1100F PTFALLS ASSESS-DOCD GE2>/YR: CPT | Mod: CPTII,S$GLB,, | Performed by: PHYSICIAN ASSISTANT

## 2024-01-02 PROCEDURE — 99024 POSTOP FOLLOW-UP VISIT: CPT | Mod: S$GLB,,, | Performed by: PHYSICIAN ASSISTANT

## 2024-01-02 PROCEDURE — 1160F RVW MEDS BY RX/DR IN RCRD: CPT | Mod: CPTII,S$GLB,, | Performed by: PHYSICIAN ASSISTANT

## 2024-01-02 PROCEDURE — 3288F FALL RISK ASSESSMENT DOCD: CPT | Mod: CPTII,S$GLB,, | Performed by: PHYSICIAN ASSISTANT

## 2024-01-02 NOTE — PROGRESS NOTES
[facility] ORTHOPEDICS  [address]  [place] [address]  Phone: [phone]   [phone]    Patient's PCP:Wanda Kuhn FNP-C  Referring Provider: No ref. provider found    POST-OP Note:    Subjective:        Chief Complaint:   Chief Complaint   Patient presents with    Right Ankle - Pain, Post-op Evaluation     Patient is here for a PO f/up Right Ankle ORIF 12.13.23, states her pain is better than her last visit.        Past Medical History:   Diagnosis Date    Anemia, unspecified     Atrial fibrillation 01/25/2021    CKD (chronic kidney disease)     Hypertension        Past Surgical History:   Procedure Laterality Date    A-V CARDIAC PACEMAKER INSERTION  11/2/2023    Procedure: Dual Chamber PPM RM 2617 ([employer]);  Surgeon: Andreas James III, MD;  Location: [facility] CATH;  Service: Cardiology;;    ANGIOGRAM, CORONARY, WITH LEFT HEART CATHETERIZATION Left 4/19/2023    Procedure: Angiogram, Coronary, with Left Heart Cath;  Surgeon: Kevin Redmond MD;  Location: [facility] CATH/EP LAB;  Service: Cardiology;  Laterality: Left;    BREAST SURGERY      COLONOSCOPY N/A 4/16/2023    Procedure: COLONOSCOPY;  Surgeon: Kvng Mensah MD;  Location: [facility] ENDO;  Service: Endoscopy;  Laterality: N/A;    COLONOSCOPY W/ POLYPECTOMY  04/16/2023    OPEN REDUCTION AND INTERNAL FIXATION (ORIF) OF INJURY OF ANKLE Right 12/13/2023    Procedure: ORIF, ANKLE;  Surgeon: Chaitanya Garrison MD;  Location: [facility] OR;  Service: Orthopedics;  Laterality: Right;  Synthes    TRANSCATHETER AORTIC VALVE REPLACEMENT (TAVR)  11/1/2023    Procedure: (TAVR);  Surgeon: Juliano Moncada MD;  Location: [facility] CATH;  Service: Cardiology;;    TRANSCATHETER AORTIC VALVE REPLACEMENT (TAVR)  11/1/2023    Procedure: (TAVR)- Surgeon;  Surgeon: Adebayo Guzman MD;  Location: [facility] CATH;  Service: Peripheral Vascular;;       Current Outpatient Medications   Medication Sig    ALPRAZolam (XANAX) 0.5 MG tablet Take 1 tablet (0.5 mg total) by  mouth 3 (three) times daily as needed for Anxiety.    apixaban (ELIQUIS) 2.5 mg Tab Take 1 tablet (2.5 mg total) by mouth 2 (two) times daily.    docusate sodium (COLACE) 100 MG capsule Take 1 capsule (100 mg total) by mouth 2 (two) times daily.    famotidine (PEPCID) 20 MG tablet Take 1 tablet (20 mg total) by mouth once daily.    iron ag,ut-B-WS7-B12-Zn-sa-sto (NIFEREX, SUMALATE-QUATREFOLIC,) 150 mg iron- 60 mg-1 mg Tab Take 1 tablet by mouth once daily.    magnesium oxide (MAG-OX) 400 mg (241.3 mg magnesium) tablet TAKE 1 TABLET BY MOUTH ONCE DAILY (Patient taking differently: Take 400 mg by mouth once daily.)    meclizine (ANTIVERT) 50 MG tablet Take 1 tablet (50 mg total) by mouth 3 (three) times daily as needed for Dizziness.    midodrine (PROAMATINE) 10 MG tablet Take 1 tablet (10 mg total) by mouth 3 (three) times daily with meals.    MIRALAX 17 gram PwPk Take 17 g by mouth 2 (two) times daily.    ondansetron (ZOFRAN-ODT) 4 MG TbDL Take 2 tablets (8 mg total) by mouth every 8 (eight) hours as needed.    promethazine (PHENERGAN) 25 MG tablet Take 1 tablet (25 mg total) by mouth every 6 (six) hours as needed for Nausea.     No current facility-administered medications for this visit.       Review of patient's allergies indicates:   Allergen Reactions    Cefuroxime     Hydrocodone     Meperidine     Meperidine hcl Nausea And Vomiting    Persantine [dipyridamole]     Nitrofurantoin macrocrystal      Headache , went into Afib     Vicodin [hydrocodone-acetaminophen] Nausea And Vomiting       Family History   Problem Relation Age of Onset    Cancer Mother     Cancer Father        Social History     Socioeconomic History    Marital status: Single   Tobacco Use    Smoking status: Former     Types: Cigarettes     Passive exposure: Past    Smokeless tobacco: Never   Substance and Sexual Activity    Alcohol use: Not Currently    Drug use: Not Currently     Social Determinants of Health     Financial Resource Strain: Low  Risk  (11/30/2023)    Overall Financial Resource Strain (CARDIA)     Difficulty of Paying Living Expenses: Not very hard   Food Insecurity: No Food Insecurity (11/30/2023)    Hunger Vital Sign     Worried About Running Out of Food in the Last Year: Never true     Ran Out of Food in the Last Year: Never true   Transportation Needs: No Transportation Needs (11/30/2023)    PRAPARE - Transportation     Lack of Transportation (Medical): No     Lack of Transportation (Non-Medical): No   Physical Activity: Unknown (11/30/2023)    Exercise Vital Sign     Days of Exercise per Week: Patient declined     Minutes of Exercise per Session: 0 min   Stress: Patient Declined (11/30/2023)    [employer] - Occupational Stress Questionnaire     Feeling of Stress : Patient declined   Social Connections: Socially Isolated (11/30/2023)    Social Connection and Isolation Panel [NHANES]     Frequency of Communication with Friends and Family: More than three times a week     Frequency of Social Gatherings with Friends and Family: Three times a week     Attends Latter-day Services: Never     Active Member of Clubs or Organizations: No     Attends Club or Organization Meetings: Patient declined     Marital Status: Never    Housing Stability: Low Risk  (11/30/2023)    Housing Stability Vital Sign     Unable to Pay for Housing in the Last Year: No     Number of Places Lived in the Last Year: 1     Unstable Housing in the Last Year: No       History of present illness:  83-year-old female, returns to clinic today for her 1st postop visit.  She is status post open reduction internal fixation of a right distal fibular fracture.  She is in rehab.  She has been nonweightbearing in wearing a boot orthosis.      Review of Systems:    Musculoskeletal:  See HPI       Objective:        Physical Examination:    Vital Signs: There were no vitals filed for this visit.    Body mass index is 24.37 kg/m².    This a  well-developed, well nourished patient in no acute distress.  They are alert and oriented and cooperative to examination.        Examination of the right ankle, lateral surgical incision, skin is dry intact, no erythema.  There is diffuse ecchymosis which is in various stages of resolution.  No evidence of wound failure dehiscence.  Subcuticular sutures and sterile tape dressings were noted.  These were removed today without complication.  She is distally neurovascularly intact she can move all the toes.  No significant pain with plantar dorsiflexion.  Mild pain with inversion.    Pertinent New Results:     XRAY Report / Interpretation:  AP lateral oblique views of the right ankle taken today in the office demonstrate a lateral plate and screws without evidence of hardware failure or loosening.       Assessment:       1. S/P ORIF (open reduction internal fixation) fracture      Plan:     S/P ORIF (open reduction internal fixation) fracture  Comments:  Right Ankle  Orders:  -     X-Ray Ankle Complete Right        Follow up in about 3 weeks (around 1/23/2024) for //XR// 3 week f/u, Right ankle ORIF 12/13/23.    Stable status post open reduction internal fixation of a right ankle fracture.  The patient will remain nonweightbearing she has a boot orthosis.  She should wear this at all times as well.  She can come out of it once a day for hygiene.  Gentle scrubbing mild soap water pat dry.  We will check her back in 3 weeks which will give us 6 weeks from her surgical date with expectations to begin weight-bearing and physical therapy.    [unfilled]  GAVIN Pate PA-C    This note was created using [employer] voice recognition software that occasionally misinterprets words or phrases.

## 2024-01-19 ENCOUNTER — HOSPITAL ENCOUNTER (INPATIENT)
Facility: HOSPITAL | Age: 84
LOS: 5 days | Discharge: NURSING, REHAB OR LONG-TERM CARE | DRG: 871 | End: 2024-01-26
Attending: EMERGENCY MEDICINE | Admitting: HOSPITALIST
Payer: COMMERCIAL

## 2024-01-19 DIAGNOSIS — R41.82 AMS (ALTERED MENTAL STATUS): ICD-10-CM

## 2024-01-19 DIAGNOSIS — N39.0 URINARY TRACT INFECTION WITHOUT HEMATURIA, SITE UNSPECIFIED: Primary | ICD-10-CM

## 2024-01-19 DIAGNOSIS — I48.91 AFIB: ICD-10-CM

## 2024-01-19 DIAGNOSIS — I48.91 A-FIB: ICD-10-CM

## 2024-01-19 DIAGNOSIS — G93.41 ENCEPHALOPATHY, METABOLIC: ICD-10-CM

## 2024-01-19 PROBLEM — N30.01 ACUTE CYSTITIS WITH HEMATURIA: Status: ACTIVE | Noted: 2023-09-09

## 2024-01-19 LAB
ALBUMIN SERPL BCP-MCNC: 3.1 G/DL (ref 3.5–5.2)
ALP SERPL-CCNC: 119 U/L (ref 55–135)
ALT SERPL W/O P-5'-P-CCNC: 19 U/L (ref 10–44)
ANION GAP SERPL CALC-SCNC: 10 MMOL/L (ref 8–16)
AST SERPL-CCNC: 29 U/L (ref 10–40)
BASOPHILS # BLD AUTO: 0.06 K/UL (ref 0–0.2)
BASOPHILS NFR BLD: 0.9 % (ref 0–1.9)
BILIRUB SERPL-MCNC: 0.5 MG/DL (ref 0.1–1)
BILIRUB UR QL STRIP: NEGATIVE
BNP SERPL-MCNC: 819 PG/ML (ref 0–99)
BUN SERPL-MCNC: 26 MG/DL (ref 8–23)
CALCIUM SERPL-MCNC: 9.1 MG/DL (ref 8.7–10.5)
CHLORIDE SERPL-SCNC: 106 MMOL/L (ref 95–110)
CLARITY UR: CLEAR
CO2 SERPL-SCNC: 25 MMOL/L (ref 23–29)
COLOR UR: YELLOW
CREAT SERPL-MCNC: 1.6 MG/DL (ref 0.5–1.4)
DIFFERENTIAL METHOD BLD: ABNORMAL
EOSINOPHIL # BLD AUTO: 0.1 K/UL (ref 0–0.5)
EOSINOPHIL NFR BLD: 1.2 % (ref 0–8)
ERYTHROCYTE [DISTWIDTH] IN BLOOD BY AUTOMATED COUNT: 13.3 % (ref 11.5–14.5)
EST. GFR  (NO RACE VARIABLE): 32 ML/MIN/1.73 M^2
GLUCOSE SERPL-MCNC: 83 MG/DL (ref 70–110)
GLUCOSE UR QL STRIP: NEGATIVE
HCT VFR BLD AUTO: 30 % (ref 37–48.5)
HGB BLD-MCNC: 9.8 G/DL (ref 12–16)
HGB UR QL STRIP: ABNORMAL
HYALINE CASTS #/AREA URNS LPF: 1 /LPF
IMM GRANULOCYTES # BLD AUTO: 0.05 K/UL (ref 0–0.04)
IMM GRANULOCYTES NFR BLD AUTO: 0.7 % (ref 0–0.5)
KETONES UR QL STRIP: NEGATIVE
LEUKOCYTE ESTERASE UR QL STRIP: ABNORMAL
LYMPHOCYTES # BLD AUTO: 1.1 K/UL (ref 1–4.8)
LYMPHOCYTES NFR BLD: 16.7 % (ref 18–48)
MCH RBC QN AUTO: 30.7 PG (ref 27–31)
MCHC RBC AUTO-ENTMCNC: 32.7 G/DL (ref 32–36)
MCV RBC AUTO: 94 FL (ref 82–98)
MICROSCOPIC COMMENT: ABNORMAL
MONOCYTES # BLD AUTO: 0.6 K/UL (ref 0.3–1)
MONOCYTES NFR BLD: 9.1 % (ref 4–15)
NEUTROPHILS # BLD AUTO: 4.9 K/UL (ref 1.8–7.7)
NEUTROPHILS NFR BLD: 71.4 % (ref 38–73)
NITRITE UR QL STRIP: NEGATIVE
NRBC BLD-RTO: 0 /100 WBC
PH UR STRIP: 6 [PH] (ref 5–8)
PLATELET # BLD AUTO: 129 K/UL (ref 150–450)
PMV BLD AUTO: 10.2 FL (ref 9.2–12.9)
POCT GLUCOSE: 76 MG/DL (ref 70–110)
POCT GLUCOSE: 80 MG/DL (ref 70–110)
POTASSIUM SERPL-SCNC: 3.8 MMOL/L (ref 3.5–5.1)
PROT SERPL-MCNC: 5.7 G/DL (ref 6–8.4)
PROT UR QL STRIP: ABNORMAL
RBC # BLD AUTO: 3.19 M/UL (ref 4–5.4)
RBC #/AREA URNS HPF: 11 /HPF (ref 0–4)
SODIUM SERPL-SCNC: 141 MMOL/L (ref 136–145)
SP GR UR STRIP: 1.02 (ref 1–1.03)
SQUAMOUS #/AREA URNS HPF: 3 /HPF
TROPONIN I SERPL DL<=0.01 NG/ML-MCNC: 0.26 NG/ML (ref 0–0.03)
TROPONIN I SERPL DL<=0.01 NG/ML-MCNC: 0.31 NG/ML (ref 0–0.03)
URN SPEC COLLECT METH UR: ABNORMAL
UROBILINOGEN UR STRIP-ACNC: NEGATIVE EU/DL
WBC # BLD AUTO: 6.81 K/UL (ref 3.9–12.7)
WBC #/AREA URNS HPF: 6 /HPF (ref 0–5)
YEAST URNS QL MICRO: ABNORMAL

## 2024-01-19 PROCEDURE — G0378 HOSPITAL OBSERVATION PER HR: HCPCS

## 2024-01-19 PROCEDURE — 87186 SC STD MICRODIL/AGAR DIL: CPT | Performed by: NURSE PRACTITIONER

## 2024-01-19 PROCEDURE — 87077 CULTURE AEROBIC IDENTIFY: CPT | Performed by: NURSE PRACTITIONER

## 2024-01-19 PROCEDURE — 93010 ELECTROCARDIOGRAM REPORT: CPT | Mod: ,,, | Performed by: INTERNAL MEDICINE

## 2024-01-19 PROCEDURE — 96365 THER/PROPH/DIAG IV INF INIT: CPT

## 2024-01-19 PROCEDURE — 93005 ELECTROCARDIOGRAM TRACING: CPT

## 2024-01-19 PROCEDURE — 36415 COLL VENOUS BLD VENIPUNCTURE: CPT | Performed by: NURSE PRACTITIONER

## 2024-01-19 PROCEDURE — 25000003 PHARM REV CODE 250: Performed by: NURSE PRACTITIONER

## 2024-01-19 PROCEDURE — 99900035 HC TECH TIME PER 15 MIN (STAT)

## 2024-01-19 PROCEDURE — 84484 ASSAY OF TROPONIN QUANT: CPT | Performed by: NURSE PRACTITIONER

## 2024-01-19 PROCEDURE — 87086 URINE CULTURE/COLONY COUNT: CPT | Performed by: NURSE PRACTITIONER

## 2024-01-19 PROCEDURE — 63600175 PHARM REV CODE 636 W HCPCS: Performed by: NURSE PRACTITIONER

## 2024-01-19 PROCEDURE — 82962 GLUCOSE BLOOD TEST: CPT

## 2024-01-19 PROCEDURE — 99285 EMERGENCY DEPT VISIT HI MDM: CPT | Mod: 25

## 2024-01-19 PROCEDURE — 84484 ASSAY OF TROPONIN QUANT: CPT | Mod: 91 | Performed by: NURSE PRACTITIONER

## 2024-01-19 PROCEDURE — 83880 ASSAY OF NATRIURETIC PEPTIDE: CPT | Performed by: NURSE PRACTITIONER

## 2024-01-19 PROCEDURE — 80053 COMPREHEN METABOLIC PANEL: CPT | Performed by: NURSE PRACTITIONER

## 2024-01-19 PROCEDURE — 85025 COMPLETE CBC W/AUTO DIFF WBC: CPT | Performed by: NURSE PRACTITIONER

## 2024-01-19 PROCEDURE — 81000 URINALYSIS NONAUTO W/SCOPE: CPT | Performed by: NURSE PRACTITIONER

## 2024-01-19 PROCEDURE — 94760 N-INVAS EAR/PLS OXIMETRY 1: CPT

## 2024-01-19 RX ORDER — ACETAMINOPHEN 325 MG/1
650 TABLET ORAL EVERY 4 HOURS PRN
Status: DISCONTINUED | OUTPATIENT
Start: 2024-01-19 | End: 2024-01-26 | Stop reason: HOSPADM

## 2024-01-19 RX ORDER — SIMETHICONE 80 MG
1 TABLET,CHEWABLE ORAL 4 TIMES DAILY PRN
Status: DISCONTINUED | OUTPATIENT
Start: 2024-01-19 | End: 2024-01-26 | Stop reason: HOSPADM

## 2024-01-19 RX ORDER — SODIUM CHLORIDE 9 MG/ML
1000 INJECTION, SOLUTION INTRAVENOUS
Status: DISCONTINUED | OUTPATIENT
Start: 2024-01-19 | End: 2024-01-19

## 2024-01-19 RX ORDER — FAMOTIDINE 20 MG/1
20 TABLET, FILM COATED ORAL DAILY
Status: DISCONTINUED | OUTPATIENT
Start: 2024-01-19 | End: 2024-01-26 | Stop reason: HOSPADM

## 2024-01-19 RX ORDER — GLUCAGON 1 MG
1 KIT INJECTION
Status: DISCONTINUED | OUTPATIENT
Start: 2024-01-19 | End: 2024-01-26 | Stop reason: HOSPADM

## 2024-01-19 RX ORDER — IBUPROFEN 200 MG
16 TABLET ORAL
Status: DISCONTINUED | OUTPATIENT
Start: 2024-01-19 | End: 2024-01-26 | Stop reason: HOSPADM

## 2024-01-19 RX ORDER — ACETAMINOPHEN 325 MG/1
650 TABLET ORAL EVERY 6 HOURS PRN
Status: DISCONTINUED | OUTPATIENT
Start: 2024-01-19 | End: 2024-01-26 | Stop reason: HOSPADM

## 2024-01-19 RX ORDER — SODIUM CHLORIDE 9 MG/ML
INJECTION, SOLUTION INTRAVENOUS ONCE
Status: COMPLETED | OUTPATIENT
Start: 2024-01-19 | End: 2024-01-19

## 2024-01-19 RX ORDER — TALC
9 POWDER (GRAM) TOPICAL NIGHTLY PRN
Status: DISCONTINUED | OUTPATIENT
Start: 2024-01-19 | End: 2024-01-26 | Stop reason: HOSPADM

## 2024-01-19 RX ORDER — IBUPROFEN 200 MG
24 TABLET ORAL
Status: DISCONTINUED | OUTPATIENT
Start: 2024-01-19 | End: 2024-01-26 | Stop reason: HOSPADM

## 2024-01-19 RX ORDER — DOCUSATE SODIUM 100 MG/1
100 CAPSULE, LIQUID FILLED ORAL 2 TIMES DAILY
Status: DISCONTINUED | OUTPATIENT
Start: 2024-01-19 | End: 2024-01-26 | Stop reason: HOSPADM

## 2024-01-19 RX ORDER — POLYETHYLENE GLYCOL 3350 17 G/17G
17 POWDER, FOR SOLUTION ORAL 2 TIMES DAILY
Status: DISCONTINUED | OUTPATIENT
Start: 2024-01-19 | End: 2024-01-26 | Stop reason: HOSPADM

## 2024-01-19 RX ORDER — ALUMINUM HYDROXIDE, MAGNESIUM HYDROXIDE, AND SIMETHICONE 1200; 120; 1200 MG/30ML; MG/30ML; MG/30ML
30 SUSPENSION ORAL 4 TIMES DAILY PRN
Status: DISCONTINUED | OUTPATIENT
Start: 2024-01-19 | End: 2024-01-26 | Stop reason: HOSPADM

## 2024-01-19 RX ORDER — IPRATROPIUM BROMIDE AND ALBUTEROL SULFATE 2.5; .5 MG/3ML; MG/3ML
3 SOLUTION RESPIRATORY (INHALATION) EVERY 4 HOURS PRN
Status: DISCONTINUED | OUTPATIENT
Start: 2024-01-19 | End: 2024-01-26 | Stop reason: HOSPADM

## 2024-01-19 RX ORDER — SODIUM CHLORIDE 0.9 % (FLUSH) 0.9 %
10 SYRINGE (ML) INJECTION EVERY 8 HOURS PRN
Status: DISCONTINUED | OUTPATIENT
Start: 2024-01-19 | End: 2024-01-26 | Stop reason: HOSPADM

## 2024-01-19 RX ORDER — NALOXONE HCL 0.4 MG/ML
0.02 VIAL (ML) INJECTION
Status: DISCONTINUED | OUTPATIENT
Start: 2024-01-19 | End: 2024-01-26 | Stop reason: HOSPADM

## 2024-01-19 RX ORDER — MIDODRINE HYDROCHLORIDE 5 MG/1
10 TABLET ORAL
Status: DISCONTINUED | OUTPATIENT
Start: 2024-01-19 | End: 2024-01-22

## 2024-01-19 RX ORDER — ONDANSETRON HYDROCHLORIDE 2 MG/ML
4 INJECTION, SOLUTION INTRAVENOUS EVERY 8 HOURS PRN
Status: DISCONTINUED | OUTPATIENT
Start: 2024-01-19 | End: 2024-01-26 | Stop reason: HOSPADM

## 2024-01-19 RX ADMIN — APIXABAN 2.5 MG: 2.5 TABLET, FILM COATED ORAL at 09:01

## 2024-01-19 RX ADMIN — MIDODRINE HYDROCHLORIDE 10 MG: 5 TABLET ORAL at 05:01

## 2024-01-19 RX ADMIN — DOCUSATE SODIUM 100 MG: 100 CAPSULE, LIQUID FILLED ORAL at 09:01

## 2024-01-19 RX ADMIN — CEFTRIAXONE SODIUM 1 G: 1 INJECTION, POWDER, FOR SOLUTION INTRAMUSCULAR; INTRAVENOUS at 02:01

## 2024-01-19 RX ADMIN — SODIUM CHLORIDE: 9 INJECTION, SOLUTION INTRAVENOUS at 05:01

## 2024-01-19 RX ADMIN — FAMOTIDINE 20 MG: 20 TABLET, FILM COATED ORAL at 05:01

## 2024-01-19 NOTE — ASSESSMENT & PLAN NOTE
Patient's anemia is currently controlled. Has not received any PRBCs to date. Etiology likely d/t chronic disease due to Chronic Kidney Disease/ESRD  Current CBC reviewed-   Lab Results   Component Value Date    HGB 9.8 (L) 01/19/2024    HCT 30.0 (L) 01/19/2024     Monitor serial CBC and transfuse if patient becomes hemodynamically unstable, symptomatic or H/H drops below 7/21.

## 2024-01-19 NOTE — ASSESSMENT & PLAN NOTE
Acute problem  Patient has acute metabolic encephalopathy that is secondary to Sepsis/Infective. Patient's current mental status is Confused. Patient's baseline mental status is. awake and alert; oriented to person, place, and time Evaluation and for underlying cause(s) is underway and inclusive of Blood Chemistries and Toxic metabolite eval . Will monitor neuro checks carefully, avoid narcotics and benzos that will exacerbate agitation, and use PRN medications for controls of behavior for self harm.

## 2024-01-19 NOTE — H&P
[facility] Medicine  History & Physical    Patient Name: Irene العراقي  MRN: [id number]  Patient Class: OP- Observation  Admission Date: 1/19/2024  Attending Physician: Madisyn Green MD   Primary Care Provider: Wanda Kuhn FNP-C         Patient information was obtained from patient, relative(s), past medical records, and ER records.     Subjective:     Principal Problem:Encephalopathy, metabolic    Chief Complaint:   Chief Complaint   Patient presents with    Altered Mental Status     Altered mental status- increased confusion over past few days.   Sent from [facility];   hx esbl uti, just finished IM invanz on 1/13;         HPI: Irene العراقي is an 83-year-old female who presents emergency room for evaluation of altered mental status, confusion, and foul-smelling urine.  Patient's daughters at bedside.  She is her primary caregiver.  She denies any fever or chills.  No known sick contacts or travel.  No aggravating or alleviating factors.  Previous medical history includes paroxysmal AFib, anemia, anxiety, status post TAVR, CKD 4.  ER workup:  CBC with mild anemia and thrombocytopenia of 129.  CMP with BUN 26 and creatinine of 1.6, BNP elevated at 819.  Troponin mildly elevated 0.263.  Chest x-ray was unremarkable.  CT of the head was negative.  Cardiology was consulted for elevated troponin and recommended trending troponin.  Urinalysis with white blood cells, red blood cells, rare bacteria, and nitrite positive.  Urine cultures pending.  Patient was started on Rocephin.  Patient admitted to Hospital Medicine for treatment and management.    Past Medical History:   Diagnosis Date    Anemia, unspecified     Atrial fibrillation 01/25/2021    CKD (chronic kidney disease)     Hypertension        Past Surgical History:   Procedure Laterality Date    A-V CARDIAC PACEMAKER INSERTION  11/2/2023    Procedure: Dual Chamber PPM RM 2617 ([employer]);  Surgeon: Jacob  Andreas LEA III, MD;  Location: [facility] CATH;  Service: Cardiology;;    ANGIOGRAM, CORONARY, WITH LEFT HEART CATHETERIZATION Left 4/19/2023    Procedure: Angiogram, Coronary, with Left Heart Cath;  Surgeon: Kevin Redmond MD;  Location: [facility] CATH/EP LAB;  Service: Cardiology;  Laterality: Left;    BREAST SURGERY      COLONOSCOPY N/A 4/16/2023    Procedure: COLONOSCOPY;  Surgeon: Kvng Mensah MD;  Location: [facility] ENDO;  Service: Endoscopy;  Laterality: N/A;    COLONOSCOPY W/ POLYPECTOMY  04/16/2023    OPEN REDUCTION AND INTERNAL FIXATION (ORIF) OF INJURY OF ANKLE Right 12/13/2023    Procedure: ORIF, ANKLE;  Surgeon: Chaitanya Garrison MD;  Location: [facility] OR;  Service: Orthopedics;  Laterality: Right;  Synthes    TRANSCATHETER AORTIC VALVE REPLACEMENT (TAVR)  11/1/2023    Procedure: (TAVR);  Surgeon: Juliano Moncada MD;  Location: [facility] CATH;  Service: Cardiology;;    TRANSCATHETER AORTIC VALVE REPLACEMENT (TAVR)  11/1/2023    Procedure: (TAVR)- Surgeon;  Surgeon: Adebayo Guzman MD;  Location: [facility] CATH;  Service: Peripheral Vascular;;       Review of patient's allergies indicates:   Allergen Reactions    Cefuroxime     Hydrocodone     Meperidine     Meperidine hcl Nausea And Vomiting    Persantine [dipyridamole]     Nitrofurantoin macrocrystal      Headache , went into Afib     Vicodin [hydrocodone-acetaminophen] Nausea And Vomiting       No current facility-administered medications on file prior to encounter.     Current Outpatient Medications on File Prior to Encounter   Medication Sig    ALPRAZolam (XANAX) 0.5 MG tablet Take 1 tablet (0.5 mg total) by mouth 3 (three) times daily as needed for Anxiety.    apixaban (ELIQUIS) 2.5 mg Tab Take 1 tablet (2.5 mg total) by mouth 2 (two) times daily.    docusate sodium (COLACE) 100 MG capsule Take 1 capsule (100 mg total) by mouth 2 (two) times daily.    famotidine (PEPCID) 20 MG tablet Take 1 tablet (20 mg total) by mouth once daily.    iron ag,nf-T-[id number]-Zn-sa-sto  (NIFEREX, SUMALATE-QUATREFOLIC,) 150 mg iron- 60 mg-1 mg Tab Take 1 tablet by mouth once daily.    magnesium oxide (MAG-OX) 400 mg (241.3 mg magnesium) tablet TAKE 1 TABLET BY MOUTH ONCE DAILY (Patient taking differently: Take 400 mg by mouth once daily.)    meclizine (ANTIVERT) 50 MG tablet Take 1 tablet (50 mg total) by mouth 3 (three) times daily as needed for Dizziness.    midodrine (PROAMATINE) 10 MG tablet Take 1 tablet (10 mg total) by mouth 3 (three) times daily with meals.    MIRALAX 17 gram PwPk Take 17 g by mouth 2 (two) times daily.    ondansetron (ZOFRAN-ODT) 4 MG TbDL Take 2 tablets (8 mg total) by mouth every 8 (eight) hours as needed.    promethazine (PHENERGAN) 25 MG tablet Take 1 tablet (25 mg total) by mouth every 6 (six) hours as needed for Nausea.     Family History       Problem Relation (Age of Onset)    Cancer Mother, Father          Tobacco Use    Smoking status: Former     Types: Cigarettes     Passive exposure: Past    Smokeless tobacco: Never   Substance and Sexual Activity    Alcohol use: Not Currently    Drug use: Not Currently    Sexual activity: Not on file     Review of Systems   Constitutional:  Positive for activity change and appetite change. Negative for chills, diaphoresis and fever.   HENT:  Negative for congestion, nosebleeds and tinnitus.    Eyes:  Negative for photophobia and visual disturbance.   Respiratory:  Negative for cough, chest tightness, shortness of breath and wheezing.    Cardiovascular:  Negative for chest pain, palpitations and leg swelling.   Gastrointestinal:  Negative for abdominal distention, abdominal pain, constipation, diarrhea, nausea and vomiting.   Endocrine: Negative for cold intolerance and heat intolerance.   Genitourinary:  Positive for difficulty urinating and dysuria. Negative for frequency, hematuria and urgency.   Musculoskeletal:  Negative for arthralgias, back pain and myalgias.   Skin:  Negative for pallor, rash and wound.    Allergic/Immunologic: Negative for immunocompromised state.   Neurological:  Negative for dizziness, tremors, facial asymmetry, speech difficulty and weakness.   Hematological:  Negative for adenopathy. Does not bruise/bleed easily.   Psychiatric/Behavioral:  Positive for confusion. Negative for sleep disturbance. The patient is not nervous/anxious.      Objective:     Vital Signs (Most Recent):  Temp: 98.1 °F (36.7 °C) (01/19/24 1547)  Pulse: 68 (01/19/24 1547)  Resp: 16 (01/19/24 1547)  BP: (!) 175/77 (01/19/24 1547)  SpO2: 95 % (01/19/24 1547) Vital Signs (24h Range):  Temp:  [98.1 °F (36.7 °C)-98.9 °F (37.2 °C)] 98.1 °F (36.7 °C)  Pulse:  [63-86] 68  Resp:  [16-18] 16  SpO2:  [94 %-98 %] 95 %  BP: (128-182)/(68-77) 175/77     Weight: 81.6 kg (180 lb)  Body mass index is 29.05 kg/m².     Physical Exam  Vitals and nursing note reviewed.   Constitutional:       General: She is not in acute distress.     Appearance: She is well-developed. She is ill-appearing. She is not diaphoretic.   HENT:      Head: Normocephalic.      Mouth/Throat:      Mouth: Mucous membranes are moist.      Pharynx: Oropharynx is clear.   Eyes:      General: No scleral icterus.     Conjunctiva/sclera: Conjunctivae normal.      Pupils: Pupils are equal, round, and reactive to light.   Neck:      Vascular: No JVD.   Cardiovascular:      Rate and Rhythm: Normal rate and regular rhythm.      Heart sounds: Murmur heard.      No friction rub. No gallop.   Pulmonary:      Effort: Pulmonary effort is normal. No respiratory distress.      Breath sounds: Normal breath sounds. No wheezing or rales.   Abdominal:      General: Bowel sounds are normal. There is no distension.      Palpations: Abdomen is soft.      Tenderness: There is no abdominal tenderness. There is no guarding or rebound.   Musculoskeletal:         General: No tenderness. Normal range of motion.      Cervical back: Normal range of motion and neck supple.   Lymphadenopathy:       "Cervical: No cervical adenopathy.   Skin:     General: Skin is warm and dry.      Capillary Refill: Capillary refill takes less than 2 seconds.      Coloration: Skin is not pale.      Findings: No erythema or rash.   Neurological:      Mental Status: She is alert. She is disoriented.      Cranial Nerves: No cranial nerve deficit.      Sensory: No sensory deficit.      Coordination: Coordination normal.      Deep Tendon Reflexes: Reflexes normal.   Psychiatric:         Behavior: Behavior normal.         Thought Content: Thought content normal.         Judgment: Judgment normal.              CRANIAL NERVES     CN III, IV, VI   Pupils are equal, round, and reactive to light.       Significant Labs: All pertinent labs within the past 24 hours have been reviewed.  CBC:   Recent Labs   Lab 01/19/24  1029   WBC 6.81   HGB 9.8*   HCT 30.0*   *     CMP:   Recent Labs   Lab 01/19/24  1029      K 3.8      CO2 25   GLU 83   BUN 26*   CREATININE 1.6*   CALCIUM 9.1   PROT 5.7*   ALBUMIN 3.1*   BILITOT 0.5   ALKPHOS 119   AST 29   ALT 19   ANIONGAP 10     Cardiac Markers:   Recent Labs   Lab 01/19/24  1029   *     Urine Culture: No results for input(s): "LABURIN" in the last 48 hours.  Urine Studies:   Recent Labs   Lab 01/19/24  1258   COLORU Yellow   APPEARANCEUA Clear   PHUR 6.0   SPECGRAV 1.020   PROTEINUA Trace*   GLUCUA Negative   KETONESU Negative   BILIRUBINUA Negative   OCCULTUA 3+*   NITRITE Negative   UROBILINOGEN Negative   LEUKOCYTESUR 2+*   RBCUA 11*   WBCUA 6*   SQUAMEPITHEL 3   HYALINECASTS 1       Significant Imaging: I have reviewed all pertinent imaging results/findings within the past 24 hours.    CT       FINDINGS:  Brain: There is no evidence of a mass, edema, midline shift, or intracranial hemorrhage. No extra-axial fluid collection.  Confluent low-density throughout the cerebral hemispheric white matter is grossly unchanged consistent with at least moderate chronic small vessel " ischemic changes.  No CT evidence of an acute major vascular territorial infarct.     Ventricles: The ventricles, sulci, and cisterns are within normal limits.     Skull: The osseous structures are unremarkable in appearance.     Extracranial soft tissues: Limited imaging is within normal limits.     Other: The visualized portions of the sinuses, orbits, and mastoid air cells are within normal limits.     Impression:     1. No acute intracranial CT findings.      CXR    FINDINGS:  A pacemaker is noted in place in the left chest with 2 leads to the right heart.  The mediastinal and cardiac size and contours normal.  No intrapulmonary mass or infiltrate is seen.  There is no pneumothorax or pleural effusion.     Impression:     Pacemaker in place otherwise negative chest x-ray.     Assessment/Plan:     * Encephalopathy, metabolic  Acute problem  Patient has acute metabolic encephalopathy that is secondary to Sepsis/Infective. Patient's current mental status is Confused. Patient's baseline mental status is. awake and alert; oriented to person, place, and time Evaluation and for underlying cause(s) is underway and inclusive of Blood Chemistries and Toxic metabolite eval . Will monitor neuro checks carefully, avoid narcotics and benzos that will exacerbate agitation, and use PRN medications for controls of behavior for self harm.       S/P TAVR (transcatheter aortic valve replacement)  Chronic problem  Continuous telemetry monitoring      Acute cystitis with hematuria  Acute Problem  Urine culture pending   Rocephin 1 g IV piggyback q.12 hours         Anemia  Patient's anemia is currently controlled. Has not received any PRBCs to date. Etiology likely d/t chronic disease due to Chronic Kidney Disease/ESRD  Current CBC reviewed-   Lab Results   Component Value Date    HGB 9.8 (L) 01/19/2024    HCT 30.0 (L) 01/19/2024     Monitor serial CBC and transfuse if patient becomes hemodynamically unstable, symptomatic or H/H drops  below 7/21.      VTE Risk Mitigation (From admission, onward)           Ordered     apixaban tablet 2.5 mg  2 times daily         01/19/24 1435     IP VTE HIGH RISK PATIENT  Once         01/19/24 1435     Place sequential compression device  Until discontinued         01/19/24 1435     Place KRISTINA hose  Until discontinued         01/19/24 1435                              Yoel Loving NP  Department of Hospital Medicine  [facility]/Surg

## 2024-01-19 NOTE — ED PROVIDER NOTES
Encounter Date: 2024       History     Chief Complaint   Patient presents with    Altered Mental Status     Altered mental status- increased confusion over past few days.   Sent from [facility];   hx esbl uti, just finished IM invanz on ;      Patient is a 83 y.o. female who presents to the ED 2024 with a chief complaint of Altered mental status progressive since Monday of this week according to nurse.  It was acutely worse this morning as patient was hallucinating seeing her twin sister that  several months ago and talking to her.  He states he was also making frequent attempts to get out of the bed.  He states she has been suffering from syncopal episodes during her hospitalization since late December.  He states she can only tolerate 2 hours of PT today due to these episodes.  Per EMS patient had an episode of AFib initially but converted when moved to the bed.  Patient has a known history of this and is currently on Eliquis.  She did have a ORIF of her left ankle 2023.  Patient denies any pain.  She does report seeing her dead sister this morning and she knows that she is dead.  She states she does get confused at times.  Nurse at nursing [facility] also reports patient recently completed her Invanz therapy for a UTI.  She denies any shortness of breath.  She has a history of AFib, heart disease, hypertension, anemia.  Past surgical history noted below.             Review of patient's allergies indicates:   Allergen Reactions    Cefuroxime     Hydrocodone     Meperidine     Meperidine hcl Nausea And Vomiting    Persantine [dipyridamole]     Nitrofurantoin macrocrystal      Headache , went into Afib     Vicodin [hydrocodone-acetaminophen] Nausea And Vomiting     Past Medical History:   Diagnosis Date    Anemia, unspecified     Atrial fibrillation 2021    CKD (chronic kidney disease)     Hypertension      Past Surgical History:   Procedure Laterality Date    A-V CARDIAC PACEMAKER  INSERTION  11/2/2023    Procedure: Dual Chamber PPM RM 2617 ([employer]);  Surgeon: Andreas James III, MD;  Location: [facility] CATH;  Service: Cardiology;;    ANGIOGRAM, CORONARY, WITH LEFT HEART CATHETERIZATION Left 4/19/2023    Procedure: Angiogram, Coronary, with Left Heart Cath;  Surgeon: Kevin Redmond MD;  Location: [facility] CATH/EP LAB;  Service: Cardiology;  Laterality: Left;    BREAST SURGERY      COLONOSCOPY N/A 4/16/2023    Procedure: COLONOSCOPY;  Surgeon: Kvng Mensah MD;  Location: [facility] ENDO;  Service: Endoscopy;  Laterality: N/A;    COLONOSCOPY W/ POLYPECTOMY  04/16/2023    OPEN REDUCTION AND INTERNAL FIXATION (ORIF) OF INJURY OF ANKLE Right 12/13/2023    Procedure: ORIF, ANKLE;  Surgeon: Chaitanya Garrison MD;  Location: [facility] OR;  Service: Orthopedics;  Laterality: Right;  Synthes    TRANSCATHETER AORTIC VALVE REPLACEMENT (TAVR)  11/1/2023    Procedure: (TAVR);  Surgeon: Juliano Moncada MD;  Location: [facility] CATH;  Service: Cardiology;;    TRANSCATHETER AORTIC VALVE REPLACEMENT (TAVR)  11/1/2023    Procedure: (TAVR)- Surgeon;  Surgeon: Adebayo Guzman MD;  Location: [facility] CATH;  Service: Peripheral Vascular;;     Family History   Problem Relation Age of Onset    Cancer Mother     Cancer Father      Social History     Tobacco Use    Smoking status: Former     Types: Cigarettes     Passive exposure: Past    Smokeless tobacco: Never   Substance Use Topics    Alcohol use: Not Currently    Drug use: Not Currently     Review of Systems   Constitutional:  Negative for chills and fever.   HENT:  Negative for sore throat.    Respiratory:  Negative for chest tightness and shortness of breath.    Cardiovascular:  Negative for chest pain.   Gastrointestinal:  Negative for abdominal pain.   Genitourinary:  Negative for dysuria.   Musculoskeletal:  Negative for arthralgias and myalgias.   Skin:  Negative for rash and wound.   Neurological:  Negative for syncope.   Hematological:  Does not bruise/bleed easily.    Psychiatric/Behavioral:  Positive for confusion and hallucinations.        Physical Exam     Initial Vitals [01/19/24 0916]   BP Pulse Resp Temp SpO2   128/68 86 18 98.9 °F (37.2 °C) 98 %      MAP       --         Physical Exam    Nursing note and vitals reviewed.  Constitutional: Vital signs are normal.   HENT:   Head: Normocephalic and atraumatic.   Eyes: Pupils are equal, round, and reactive to light.   Neck: Neck supple.   Cardiovascular:  Normal rate, regular rhythm, normal heart sounds and intact distal pulses.     Exam reveals no gallop and no friction rub.       No murmur heard.  Pulmonary/Chest: Breath sounds normal. She has no wheezes. She has no rhonchi. She has no rales.   Abdominal: Abdomen is soft. Bowel sounds are normal. There is no abdominal tenderness.   Musculoskeletal:      Cervical back: Neck supple.     Neurological: She is alert and oriented to person, place, and time. She has normal strength.   AAOX3. MAEW. No drift. Speech normal. No focal neurological deficits. Gait deferred.    Skin: Skin is warm, dry and intact.   Psychiatric: She has a normal mood and affect. Her speech is normal and behavior is normal.         ED Course   Procedures  Labs Reviewed   CBC W/ AUTO DIFFERENTIAL - Abnormal; Notable for the following components:       Result Value    RBC 3.19 (*)     Hemoglobin 9.8 (*)     Hematocrit 30.0 (*)     Platelets 129 (*)     Immature Granulocytes 0.7 (*)     Immature Grans (Abs) 0.05 (*)     Lymph % 16.7 (*)     All other components within normal limits   COMPREHENSIVE METABOLIC PANEL - Abnormal; Notable for the following components:    BUN 26 (*)     Creatinine 1.6 (*)     Total Protein 5.7 (*)     Albumin 3.1 (*)     eGFR 32 (*)     All other components within normal limits   B-TYPE NATRIURETIC PEPTIDE - Abnormal; Notable for the following components:     (*)     All other components within normal limits   TROPONIN I - Abnormal; Notable for the following components:     Troponin I 0.263 (*)     All other components within normal limits    Narrative:     trop  critical result(s) called and verbal readback obtained from   dago betancourt rn by KFA1 01/19/2024 11:55   URINALYSIS - Abnormal; Notable for the following components:    Protein, UA Trace (*)     Occult Blood UA 3+ (*)     Leukocytes, UA 2+ (*)     All other components within normal limits   URINALYSIS MICROSCOPIC - Abnormal; Notable for the following components:    RBC, UA 11 (*)     WBC, UA 6 (*)     Yeast, UA Rare (*)     All other components within normal limits   CULTURE, URINE   POCT GLUCOSE   POCT GLUCOSE   POCT GLUCOSE MONITORING CONTINUOUS     EKG Readings: (Independently Interpreted)   Initial Reading: No STEMI. Rhythm: Paced Rhythm. Heart Rate: 62. Ectopy: No Ectopy. Conduction: Normal. ST Segments: Normal ST Segments. T Waves: Normal. Clinical Impression: Normal Sinus Rhythm       Imaging Results              CT Head Without Contrast (Final result)  Result time 01/19/24 10:07:21      Final result by Brian Jones MD (01/19/24 10:07:21)                   Impression:      1. No acute intracranial CT findings.      Electronically signed by: Brian Jones  Date:    01/19/2024  Time:    10:07               Narrative:    EXAMINATION:  CT HEAD WITHOUT CONTRAST    CLINICAL HISTORY:  Mental status change, unknown cause;    TECHNIQUE:  Low dose axial CT images obtained throughout the head without intravenous contrast. Sagittal and coronal reconstructions were performed.    COMPARISON:  Head CT 12/12/2023.    FINDINGS:  Brain: There is no evidence of a mass, edema, midline shift, or intracranial hemorrhage. No extra-axial fluid collection.  Confluent low-density throughout the cerebral hemispheric white matter is grossly unchanged consistent with at least moderate chronic small vessel ischemic changes.  No CT evidence of an acute major vascular territorial infarct.    Ventricles: The ventricles, sulci, and cisterns are within  normal limits.    Skull: The osseous structures are unremarkable in appearance.    Extracranial soft tissues: Limited imaging is within normal limits.    Other: The visualized portions of the sinuses, orbits, and mastoid air cells are within normal limits.                                       X-Ray Chest AP Portable (Final result)  Result time 01/19/24 09:50:58      Final result by Roger Hairston Jr., MD (01/19/24 09:50:58)                   Impression:      Pacemaker in place otherwise negative chest x-ray.      Electronically signed by: Roger Hairston MD  Date:    01/19/2024  Time:    09:50               Narrative:    EXAMINATION:  XR CHEST AP PORTABLE    CLINICAL HISTORY:  Altered mental status, unspecified    TECHNIQUE:  Single frontal view of the chest was performed.    COMPARISON:  Chest of December 11, 2023    FINDINGS:  A pacemaker is noted in place in the left chest with 2 leads to the right heart.  The mediastinal and cardiac size and contours normal.  No intrapulmonary mass or infiltrate is seen.  There is no pneumothorax or pleural effusion.                                       Medications   cefTRIAXone (Rocephin) 1 g in dextrose 5 % in water (D5W) 100 mL IVPB (MB+) (1 g Intravenous New Bag 1/19/24 1424)     Medical Decision Making  Amount and/or Complexity of Data Reviewed  Labs: ordered.  Radiology: ordered. Decision-making details documented in ED Course.         APC / Resident Notes:   Patient is a 83 y.o. female who presents to the ED 01/19/2024 who underwent emergent evaluation for altered mental status gradually worsening over the last week. On exam pt is alert and oriented x 3. She does recall visual hallucinations today. Pt appears to have a UTI. She denies CP and no evidence of ischemia on her EKG but does have markedly elevated troponin and BNP compared to previous. Recent records reviewed. No evidence of CHF on CXR. CKD appears stable. She is on eliquis and is taking this. Discussed  with Dr. Estrada who agrees with admission and trending troponin's at this time. Pt also appears to have  a UTI. She does not appear septic. BP stable. She is given IV antibiotics and recent cultures are reviewed. CT without acute findings and I do not think any acute intracranial process such as ICH or SDH. I do not think CVA. Will admit for further evaluation and tx of new altered mental status, UTI, and elevated cardiac enzymes. Case discussed with Dr. Reynoso who is agreeable to plan of care. Case discussed with Hospital medicine team Yoel Loving NP who is accepting of admission. Plan of care discussed with pt who aleksandra agreeable.         Dictation #1  MRN:[id number]  CSN:[id number]      ED Course as of 01/19/24 1428   Fri Jan 19, 2024   1010 CT Head Without Contrast [JK]   1032 X-Ray Chest AP Portable [EF]   1117 Paced rhythm 62 beats per minute normal axis dual paced no ST elevation or depression or T-wave inversion independently interpreted [EF]   1211 Left voicemail and sent message to cardiologist on call Dr. Flynn regarding elevated troponin. [JK]   1240 Left voicemail for Dr. Flynn and a voicemail  [JK]   1245 Left voicemail for Dr. Redmond. Secure chat sent to both Dr. Flynn and Dr. Redmond as well. [JK]   1249 I notified house supervisor we have been unable to reach the on-call cardiologist regarding this patient.  He will attempt to get in touch with Cardiology. [EF]   1302 Spoke with cardiologist on-call Dr. Estrada who recommends trending troponin's for now. No further recs at this time. [JK]      ED Course User Index  [EF] Kev Reynoso MD  [JK] Nara Sylvester NP               Medical Decision Making:   Differential Diagnosis:   UTI  Adverse medication reaction  Arrhythmia              Clinical Impression:  Final diagnoses:  [I48.91] Afib  [R41.82] AMS (altered mental status)  [N39.0] Urinary tract infection without hematuria, site unspecified (Primary)          ED Disposition Condition    Observation  Nara Sarabia, ARNOLDO  01/19/24 1425

## 2024-01-19 NOTE — NURSING
Nurses Note -- 4 Eyes      1/19/2024   3:43 PM      Skin assessed during: Admit      [] No Altered Skin Integrity Present    []Prevention Measures Documented      [x] Yes- Altered Skin Integrity Present or Discovered   [x] LDA Added if Not in Epic (Describe Wound)   [x] New Altered Skin Integrity was Present on Admit and Documented in LDA   [x] Wound Image Taken    Wound Care Consulted? No (spoke with wound care nurse)    Attending Nurse:  Neno Morales RN/Staff Member:  WILDER Silva

## 2024-01-19 NOTE — HPI
Irene العراقي is an 83-year-old female who presents emergency room for evaluation of altered mental status, confusion, and foul-smelling urine.    She denies any fever or chills.  No known sick contacts or travel.  No aggravating or alleviating factors.  Previous medical history includes paroxysmal AFib, anemia, anxiety, status post TAVR, CKD 4.  ER workup:  CBC with mild anemia and thrombocytopenia of 129.  CMP with BUN 26 and creatinine of 1.6, BNP elevated at 819.  Troponin mildly elevated 0.263.  Chest x-ray was unremarkable.  CT of the head was negative.  Cardiology was consulted for elevated troponin and recommended trending troponin.  Urinalysis with white blood cells, red blood cells, rare bacteria, and nitrite positive.  Urine cultures pending.  Patient was started on Rocephin.  Patient admitted to Hospital Medicine for treatment and management.

## 2024-01-19 NOTE — SUBJECTIVE & OBJECTIVE
Past Medical History:   Diagnosis Date    Anemia, unspecified     Atrial fibrillation 01/25/2021    CKD (chronic kidney disease)     Hypertension        Past Surgical History:   Procedure Laterality Date    A-V CARDIAC PACEMAKER INSERTION  11/2/2023    Procedure: Dual Chamber PPM RM 2617 ([employer]);  Surgeon: Andreas James III, MD;  Location: [facility] CATH;  Service: Cardiology;;    ANGIOGRAM, CORONARY, WITH LEFT HEART CATHETERIZATION Left 4/19/2023    Procedure: Angiogram, Coronary, with Left Heart Cath;  Surgeon: Kevin Redmond MD;  Location: [facility] CATH/EP LAB;  Service: Cardiology;  Laterality: Left;    BREAST SURGERY      COLONOSCOPY N/A 4/16/2023    Procedure: COLONOSCOPY;  Surgeon: Kvng Mensah MD;  Location: [facility] ENDO;  Service: Endoscopy;  Laterality: N/A;    COLONOSCOPY W/ POLYPECTOMY  04/16/2023    OPEN REDUCTION AND INTERNAL FIXATION (ORIF) OF INJURY OF ANKLE Right 12/13/2023    Procedure: ORIF, ANKLE;  Surgeon: Chaitanya Garrison MD;  Location: [facility] OR;  Service: Orthopedics;  Laterality: Right;  Synthes    TRANSCATHETER AORTIC VALVE REPLACEMENT (TAVR)  11/1/2023    Procedure: (TAVR);  Surgeon: Juliano Moncada MD;  Location: [facility] CATH;  Service: Cardiology;;    TRANSCATHETER AORTIC VALVE REPLACEMENT (TAVR)  11/1/2023    Procedure: (TAVR)- Surgeon;  Surgeon: Adebayo Guzman MD;  Location: [facility] CATH;  Service: Peripheral Vascular;;       Review of patient's allergies indicates:   Allergen Reactions    Cefuroxime     Hydrocodone     Meperidine     Meperidine hcl Nausea And Vomiting    Persantine [dipyridamole]     Nitrofurantoin macrocrystal      Headache , went into Afib     Vicodin [hydrocodone-acetaminophen] Nausea And Vomiting       No current facility-administered medications on file prior to encounter.     Current Outpatient Medications on File Prior to Encounter   Medication Sig    ALPRAZolam (XANAX) 0.5 MG tablet Take 1 tablet (0.5 mg total) by mouth 3 (three) times daily as needed for Anxiety.     apixaban (ELIQUIS) 2.5 mg Tab Take 1 tablet (2.5 mg total) by mouth 2 (two) times daily.    docusate sodium (COLACE) 100 MG capsule Take 1 capsule (100 mg total) by mouth 2 (two) times daily.    famotidine (PEPCID) 20 MG tablet Take 1 tablet (20 mg total) by mouth once daily.    iron ag,jj-D-PM2-B12-Zn-sa-sto (NIFEREX, SUMALATE-QUATREFOLIC,) 150 mg iron- 60 mg-1 mg Tab Take 1 tablet by mouth once daily.    magnesium oxide (MAG-OX) 400 mg (241.3 mg magnesium) tablet TAKE 1 TABLET BY MOUTH ONCE DAILY (Patient taking differently: Take 400 mg by mouth once daily.)    meclizine (ANTIVERT) 50 MG tablet Take 1 tablet (50 mg total) by mouth 3 (three) times daily as needed for Dizziness.    midodrine (PROAMATINE) 10 MG tablet Take 1 tablet (10 mg total) by mouth 3 (three) times daily with meals.    MIRALAX 17 gram PwPk Take 17 g by mouth 2 (two) times daily.    ondansetron (ZOFRAN-ODT) 4 MG TbDL Take 2 tablets (8 mg total) by mouth every 8 (eight) hours as needed.    promethazine (PHENERGAN) 25 MG tablet Take 1 tablet (25 mg total) by mouth every 6 (six) hours as needed for Nausea.     Family History       Problem Relation (Age of Onset)    Cancer Mother, Father          Tobacco Use    Smoking status: Former     Types: Cigarettes     Passive exposure: Past    Smokeless tobacco: Never   Substance and Sexual Activity    Alcohol use: Not Currently    Drug use: Not Currently    Sexual activity: Not on file     Review of Systems   Constitutional:  Positive for activity change and appetite change. Negative for chills, diaphoresis and fever.   HENT:  Negative for congestion, nosebleeds and tinnitus.    Eyes:  Negative for photophobia and visual disturbance.   Respiratory:  Negative for cough, chest tightness, shortness of breath and wheezing.    Cardiovascular:  Negative for chest pain, palpitations and leg swelling.   Gastrointestinal:  Negative for abdominal distention, abdominal pain, constipation, diarrhea, nausea and  vomiting.   Endocrine: Negative for cold intolerance and heat intolerance.   Genitourinary:  Positive for difficulty urinating and dysuria. Negative for frequency, hematuria and urgency.   Musculoskeletal:  Negative for arthralgias, back pain and myalgias.   Skin:  Negative for pallor, rash and wound.   Allergic/Immunologic: Negative for immunocompromised state.   Neurological:  Negative for dizziness, tremors, facial asymmetry, speech difficulty and weakness.   Hematological:  Negative for adenopathy. Does not bruise/bleed easily.   Psychiatric/Behavioral:  Positive for confusion. Negative for sleep disturbance. The patient is not nervous/anxious.      Objective:     Vital Signs (Most Recent):  Temp: 98.1 °F (36.7 °C) (01/19/24 1547)  Pulse: 68 (01/19/24 1547)  Resp: 16 (01/19/24 1547)  BP: (!) 175/77 (01/19/24 1547)  SpO2: 95 % (01/19/24 1547) Vital Signs (24h Range):  Temp:  [98.1 °F (36.7 °C)-98.9 °F (37.2 °C)] 98.1 °F (36.7 °C)  Pulse:  [63-86] 68  Resp:  [16-18] 16  SpO2:  [94 %-98 %] 95 %  BP: (128-182)/(68-77) 175/77     Weight: 81.6 kg (180 lb)  Body mass index is 29.05 kg/m².     Physical Exam  Vitals and nursing note reviewed.   Constitutional:       General: She is not in acute distress.     Appearance: She is well-developed. She is ill-appearing. She is not diaphoretic.   HENT:      Head: Normocephalic.      Mouth/Throat:      Mouth: Mucous membranes are moist.      Pharynx: Oropharynx is clear.   Eyes:      General: No scleral icterus.     Conjunctiva/sclera: Conjunctivae normal.      Pupils: Pupils are equal, round, and reactive to light.   Neck:      Vascular: No JVD.   Cardiovascular:      Rate and Rhythm: Normal rate and regular rhythm.      Heart sounds: Murmur heard.      No friction rub. No gallop.   Pulmonary:      Effort: Pulmonary effort is normal. No respiratory distress.      Breath sounds: Normal breath sounds. No wheezing or rales.   Abdominal:      General: Bowel sounds are normal. There  "is no distension.      Palpations: Abdomen is soft.      Tenderness: There is no abdominal tenderness. There is no guarding or rebound.   Musculoskeletal:         General: No tenderness. Normal range of motion.      Cervical back: Normal range of motion and neck supple.   Lymphadenopathy:      Cervical: No cervical adenopathy.   Skin:     General: Skin is warm and dry.      Capillary Refill: Capillary refill takes less than 2 seconds.      Coloration: Skin is not pale.      Findings: No erythema or rash.   Neurological:      Mental Status: She is alert. She is disoriented.      Cranial Nerves: No cranial nerve deficit.      Sensory: No sensory deficit.      Coordination: Coordination normal.      Deep Tendon Reflexes: Reflexes normal.   Psychiatric:         Behavior: Behavior normal.         Thought Content: Thought content normal.         Judgment: Judgment normal.              CRANIAL NERVES     CN III, IV, VI   Pupils are equal, round, and reactive to light.       Significant Labs: All pertinent labs within the past 24 hours have been reviewed.  CBC:   Recent Labs   Lab 01/19/24  1029   WBC 6.81   HGB 9.8*   HCT 30.0*   *     CMP:   Recent Labs   Lab 01/19/24  1029      K 3.8      CO2 25   GLU 83   BUN 26*   CREATININE 1.6*   CALCIUM 9.1   PROT 5.7*   ALBUMIN 3.1*   BILITOT 0.5   ALKPHOS 119   AST 29   ALT 19   ANIONGAP 10     Cardiac Markers:   Recent Labs   Lab 01/19/24  1029   *     Urine Culture: No results for input(s): "LABURIN" in the last 48 hours.  Urine Studies:   Recent Labs   Lab 01/19/24  1258   COLORU Yellow   APPEARANCEUA Clear   PHUR 6.0   SPECGRAV 1.020   PROTEINUA Trace*   GLUCUA Negative   KETONESU Negative   BILIRUBINUA Negative   OCCULTUA 3+*   NITRITE Negative   UROBILINOGEN Negative   LEUKOCYTESUR 2+*   RBCUA 11*   WBCUA 6*   SQUAMEPITHEL 3   HYALINECASTS 1       Significant Imaging: I have reviewed all pertinent imaging results/findings within the past 24 " hours.    CT       FINDINGS:  Brain: There is no evidence of a mass, edema, midline shift, or intracranial hemorrhage. No extra-axial fluid collection.  Confluent low-density throughout the cerebral hemispheric white matter is grossly unchanged consistent with at least moderate chronic small vessel ischemic changes.  No CT evidence of an acute major vascular territorial infarct.     Ventricles: The ventricles, sulci, and cisterns are within normal limits.     Skull: The osseous structures are unremarkable in appearance.     Extracranial soft tissues: Limited imaging is within normal limits.     Other: The visualized portions of the sinuses, orbits, and mastoid air cells are within normal limits.     Impression:     1. No acute intracranial CT findings.      CXR    FINDINGS:  A pacemaker is noted in place in the left chest with 2 leads to the right heart.  The mediastinal and cardiac size and contours normal.  No intrapulmonary mass or infiltrate is seen.  There is no pneumothorax or pleural effusion.     Impression:     Pacemaker in place otherwise negative chest x-ray.

## 2024-01-19 NOTE — NURSING
New referral called in to answering service. Spoke to [facility]. Dr. Estrada is on call and will be given the message. Updated primary nurse Neno.

## 2024-01-20 LAB
ALBUMIN SERPL BCP-MCNC: 3 G/DL (ref 3.5–5.2)
ALP SERPL-CCNC: 121 U/L (ref 55–135)
ALT SERPL W/O P-5'-P-CCNC: 20 U/L (ref 10–44)
ANION GAP SERPL CALC-SCNC: 9 MMOL/L (ref 8–16)
AST SERPL-CCNC: 32 U/L (ref 10–40)
BASOPHILS # BLD AUTO: 0.03 K/UL (ref 0–0.2)
BASOPHILS NFR BLD: 0.5 % (ref 0–1.9)
BILIRUB SERPL-MCNC: 0.5 MG/DL (ref 0.1–1)
BUN SERPL-MCNC: 27 MG/DL (ref 8–23)
CALCIUM SERPL-MCNC: 9.1 MG/DL (ref 8.7–10.5)
CHLORIDE SERPL-SCNC: 106 MMOL/L (ref 95–110)
CO2 SERPL-SCNC: 24 MMOL/L (ref 23–29)
CREAT SERPL-MCNC: 1.3 MG/DL (ref 0.5–1.4)
DIFFERENTIAL METHOD BLD: ABNORMAL
EOSINOPHIL # BLD AUTO: 0.2 K/UL (ref 0–0.5)
EOSINOPHIL NFR BLD: 2.3 % (ref 0–8)
ERYTHROCYTE [DISTWIDTH] IN BLOOD BY AUTOMATED COUNT: 13.2 % (ref 11.5–14.5)
EST. GFR  (NO RACE VARIABLE): 41 ML/MIN/1.73 M^2
GLUCOSE SERPL-MCNC: 90 MG/DL (ref 70–110)
HCT VFR BLD AUTO: 28.6 % (ref 37–48.5)
HGB BLD-MCNC: 9.4 G/DL (ref 12–16)
IMM GRANULOCYTES # BLD AUTO: 0.04 K/UL (ref 0–0.04)
IMM GRANULOCYTES NFR BLD AUTO: 0.6 % (ref 0–0.5)
LYMPHOCYTES # BLD AUTO: 1.1 K/UL (ref 1–4.8)
LYMPHOCYTES NFR BLD: 17 % (ref 18–48)
MAGNESIUM SERPL-MCNC: 2 MG/DL (ref 1.6–2.6)
MCH RBC QN AUTO: 30.4 PG (ref 27–31)
MCHC RBC AUTO-ENTMCNC: 32.9 G/DL (ref 32–36)
MCV RBC AUTO: 93 FL (ref 82–98)
MONOCYTES # BLD AUTO: 0.7 K/UL (ref 0.3–1)
MONOCYTES NFR BLD: 11.1 % (ref 4–15)
NEUTROPHILS # BLD AUTO: 4.5 K/UL (ref 1.8–7.7)
NEUTROPHILS NFR BLD: 68.5 % (ref 38–73)
NRBC BLD-RTO: 0 /100 WBC
PHOSPHATE SERPL-MCNC: 3 MG/DL (ref 2.7–4.5)
PLATELET # BLD AUTO: 119 K/UL (ref 150–450)
PMV BLD AUTO: 10.7 FL (ref 9.2–12.9)
POTASSIUM SERPL-SCNC: 3.8 MMOL/L (ref 3.5–5.1)
PROT SERPL-MCNC: 5.5 G/DL (ref 6–8.4)
RBC # BLD AUTO: 3.09 M/UL (ref 4–5.4)
SODIUM SERPL-SCNC: 139 MMOL/L (ref 136–145)
WBC # BLD AUTO: 6.6 K/UL (ref 3.9–12.7)

## 2024-01-20 PROCEDURE — 63600175 PHARM REV CODE 636 W HCPCS: Performed by: NURSE PRACTITIONER

## 2024-01-20 PROCEDURE — 25000003 PHARM REV CODE 250: Performed by: INTERNAL MEDICINE

## 2024-01-20 PROCEDURE — 96375 TX/PRO/DX INJ NEW DRUG ADDON: CPT

## 2024-01-20 PROCEDURE — 51798 US URINE CAPACITY MEASURE: CPT

## 2024-01-20 PROCEDURE — 93010 ELECTROCARDIOGRAM REPORT: CPT | Mod: ,,, | Performed by: INTERNAL MEDICINE

## 2024-01-20 PROCEDURE — 99900035 HC TECH TIME PER 15 MIN (STAT)

## 2024-01-20 PROCEDURE — 80053 COMPREHEN METABOLIC PANEL: CPT | Performed by: NURSE PRACTITIONER

## 2024-01-20 PROCEDURE — 96366 THER/PROPH/DIAG IV INF ADDON: CPT

## 2024-01-20 PROCEDURE — 85025 COMPLETE CBC W/AUTO DIFF WBC: CPT | Performed by: NURSE PRACTITIONER

## 2024-01-20 PROCEDURE — G0378 HOSPITAL OBSERVATION PER HR: HCPCS

## 2024-01-20 PROCEDURE — 99223 1ST HOSP IP/OBS HIGH 75: CPT | Mod: 25,,, | Performed by: INTERNAL MEDICINE

## 2024-01-20 PROCEDURE — 94761 N-INVAS EAR/PLS OXIMETRY MLT: CPT

## 2024-01-20 PROCEDURE — 63600175 PHARM REV CODE 636 W HCPCS: Performed by: INTERNAL MEDICINE

## 2024-01-20 PROCEDURE — 96367 TX/PROPH/DG ADDL SEQ IV INF: CPT

## 2024-01-20 PROCEDURE — 36415 COLL VENOUS BLD VENIPUNCTURE: CPT | Performed by: NURSE PRACTITIONER

## 2024-01-20 PROCEDURE — 96365 THER/PROPH/DIAG IV INF INIT: CPT | Mod: 59

## 2024-01-20 PROCEDURE — 83735 ASSAY OF MAGNESIUM: CPT | Performed by: NURSE PRACTITIONER

## 2024-01-20 PROCEDURE — 25000003 PHARM REV CODE 250: Performed by: HOSPITALIST

## 2024-01-20 PROCEDURE — 93005 ELECTROCARDIOGRAM TRACING: CPT

## 2024-01-20 PROCEDURE — 25000003 PHARM REV CODE 250: Performed by: NURSE PRACTITIONER

## 2024-01-20 PROCEDURE — 84100 ASSAY OF PHOSPHORUS: CPT | Performed by: NURSE PRACTITIONER

## 2024-01-20 PROCEDURE — 27000221 HC OXYGEN, UP TO 24 HOURS

## 2024-01-20 PROCEDURE — 51701 INSERT BLADDER CATHETER: CPT

## 2024-01-20 RX ORDER — HALOPERIDOL 1 MG/1
1 TABLET ORAL EVERY 8 HOURS PRN
Status: DISCONTINUED | OUTPATIENT
Start: 2024-01-20 | End: 2024-01-20

## 2024-01-20 RX ORDER — METOPROLOL TARTRATE 1 MG/ML
5 INJECTION, SOLUTION INTRAVENOUS ONCE
Status: COMPLETED | OUTPATIENT
Start: 2024-01-20 | End: 2024-01-20

## 2024-01-20 RX ORDER — METOPROLOL TARTRATE 25 MG/1
12.5 TABLET ORAL 2 TIMES DAILY
Status: DISCONTINUED | OUTPATIENT
Start: 2024-01-20 | End: 2024-01-23

## 2024-01-20 RX ORDER — POTASSIUM CHLORIDE 20 MEQ/1
20 TABLET, EXTENDED RELEASE ORAL ONCE
Status: COMPLETED | OUTPATIENT
Start: 2024-01-20 | End: 2024-01-20

## 2024-01-20 RX ORDER — HALOPERIDOL 1 MG/1
2 TABLET ORAL EVERY 6 HOURS PRN
Status: DISCONTINUED | OUTPATIENT
Start: 2024-01-20 | End: 2024-01-26 | Stop reason: HOSPADM

## 2024-01-20 RX ADMIN — METOPROLOL TARTRATE 12.5 MG: 25 TABLET, FILM COATED ORAL at 03:01

## 2024-01-20 RX ADMIN — APIXABAN 2.5 MG: 2.5 TABLET, FILM COATED ORAL at 09:01

## 2024-01-20 RX ADMIN — HALOPERIDOL 1 MG: 1 TABLET ORAL at 04:01

## 2024-01-20 RX ADMIN — CEFTRIAXONE SODIUM 1 G: 1 INJECTION, POWDER, FOR SOLUTION INTRAMUSCULAR; INTRAVENOUS at 02:01

## 2024-01-20 RX ADMIN — DOCUSATE SODIUM 100 MG: 100 CAPSULE, LIQUID FILLED ORAL at 09:01

## 2024-01-20 RX ADMIN — POLYETHYLENE GLYCOL (3350) 17 G: 17 POWDER, FOR SOLUTION ORAL at 09:01

## 2024-01-20 RX ADMIN — AMIODARONE HYDROCHLORIDE 150 MG: 1.5 INJECTION, SOLUTION INTRAVENOUS at 01:01

## 2024-01-20 RX ADMIN — MIDODRINE HYDROCHLORIDE 10 MG: 5 TABLET ORAL at 12:01

## 2024-01-20 RX ADMIN — MIDODRINE HYDROCHLORIDE 10 MG: 5 TABLET ORAL at 09:01

## 2024-01-20 RX ADMIN — CEFTRIAXONE SODIUM 1 G: 1 INJECTION, POWDER, FOR SOLUTION INTRAMUSCULAR; INTRAVENOUS at 04:01

## 2024-01-20 RX ADMIN — AMIODARONE HYDROCHLORIDE 1 MG/MIN: 1.8 INJECTION, SOLUTION INTRAVENOUS at 01:01

## 2024-01-20 RX ADMIN — AMIODARONE HYDROCHLORIDE 0.5 MG/MIN: 1.8 INJECTION, SOLUTION INTRAVENOUS at 07:01

## 2024-01-20 RX ADMIN — ONDANSETRON 4 MG: 2 INJECTION INTRAMUSCULAR; INTRAVENOUS at 10:01

## 2024-01-20 RX ADMIN — POTASSIUM CHLORIDE 20 MEQ: 1500 TABLET, EXTENDED RELEASE ORAL at 03:01

## 2024-01-20 RX ADMIN — FAMOTIDINE 20 MG: 20 TABLET, FILM COATED ORAL at 09:01

## 2024-01-20 RX ADMIN — HALOPERIDOL 2 MG: 1 TABLET ORAL at 07:01

## 2024-01-20 RX ADMIN — METOROPROLOL TARTRATE 5 MG: 5 INJECTION, SOLUTION INTRAVENOUS at 01:01

## 2024-01-20 RX ADMIN — METOPROLOL TARTRATE 12.5 MG: 25 TABLET, FILM COATED ORAL at 09:01

## 2024-01-20 NOTE — PROGRESS NOTES
[facility] Medicine  Progress Note    Patient Name: Irene العراقي  MRN: [id number]  Patient Class: OP- Observation   Admission Date: 1/19/2024  Length of Stay: 0 days  Attending Physician: Madisyn Green MD  Primary Care Provider: Wanda Kuhn FNP-C        Subjective:     Principal Problem:Encephalopathy, metabolic        HPI:  Irene العراقي is an 83-year-old female who presents emergency room for evaluation of altered mental status, confusion, and foul-smelling urine.    She denies any fever or chills.  No known sick contacts or travel.  No aggravating or alleviating factors.  Previous medical history includes paroxysmal AFib, anemia, anxiety, status post TAVR, CKD 4.  ER workup:  CBC with mild anemia and thrombocytopenia of 129.  CMP with BUN 26 and creatinine of 1.6, BNP elevated at 819.  Troponin mildly elevated 0.263.  Chest x-ray was unremarkable.  CT of the head was negative.  Cardiology was consulted for elevated troponin and recommended trending troponin.  Urinalysis with white blood cells, red blood cells, rare bacteria, and nitrite positive.  Urine cultures pending.  Patient was started on Rocephin.  Patient admitted to Hospital Medicine for treatment and management.    Overview/Hospital Course:  No notes on file    Interval History:  Patient seen and examined.  Pleasantly confused.  Creatinine 1.3 today.  Troponin trending down, Cardiology following.  Patient is on IV antibiotics for suspected UTI, urine culture pending.    Review of Systems   Unable to perform ROS: Dementia     Objective:     Vital Signs (Most Recent):  Temp: 98.1 °F (36.7 °C) (01/20/24 0341)  Pulse: 72 (01/20/24 0341)  Resp: 18 (01/20/24 0341)  BP: (!) 179/75 (01/20/24 0341)  SpO2: (!) 91 % (01/20/24 0341) Vital Signs (24h Range):  Temp:  [98 °F (36.7 °C)-98.4 °F (36.9 °C)] 98.1 °F (36.7 °C)  Pulse:  [63-72] 72  Resp:  [16-18] 18  SpO2:  [91 %-98 %] 91 %  BP: (137-182)/(65-77) 179/75      Weight: 81.6 kg (180 lb)  Body mass index is 29.05 kg/m².    Intake/Output Summary (Last 24 hours) at 1/20/2024 1241  Last data filed at 1/20/2024 0658  Gross per 24 hour   Intake 1180 ml   Output 675 ml   Net 505 ml         Physical Exam  Constitutional:       General: She is not in acute distress.     Appearance: She is well-developed.   HENT:      Head: Normocephalic and atraumatic.   Eyes:      Pupils: Pupils are equal, round, and reactive to light.   Cardiovascular:      Rate and Rhythm: Normal rate and regular rhythm.      Heart sounds: No murmur heard.  Pulmonary:      Effort: Pulmonary effort is normal. No respiratory distress.      Breath sounds: Normal breath sounds. No wheezing or rales.   Abdominal:      General: Bowel sounds are normal. There is no distension.      Palpations: Abdomen is soft.      Tenderness: There is no abdominal tenderness.   Musculoskeletal:         General: Normal range of motion.   Skin:     General: Skin is warm and dry.      Findings: No rash.   Neurological:      Mental Status: She is alert. She is disoriented.      Cranial Nerves: No cranial nerve deficit.      Comments: Pleasantly confused.  Knows she is in the hospital   Psychiatric:         Behavior: Behavior normal.             Significant Labs: All pertinent labs within the past 24 hours have been reviewed.    Significant Imaging: I have reviewed all pertinent imaging results/findings within the past 24 hours.    Assessment/Plan:      * Encephalopathy, metabolic  Acute problem  Patient has acute metabolic encephalopathy that is secondary to Sepsis/Infective. Patient's current mental status is Confused. Patient's baseline mental status is. awake and alert; oriented to person, place, and time Evaluation and for underlying cause(s) is underway and inclusive of Blood Chemistries and Toxic metabolite eval . Will monitor neuro checks carefully, avoid narcotics and benzos that will exacerbate agitation, and use PRN  medications for controls of behavior for self harm.       S/P TAVR (transcatheter aortic valve replacement)  Chronic problem  Continuous telemetry monitoring      Acute cystitis with hematuria  Acute Problem  Urine culture pending   Rocephin 1 g IV piggyback q.12 hours         Anemia  Patient's anemia is currently controlled. Has not received any PRBCs to date. Etiology likely d/t chronic disease due to Chronic Kidney Disease/ESRD  Current CBC reviewed-   Lab Results   Component Value Date    HGB 9.4 (L) 01/20/2024    HCT 28.6 (L) 01/20/2024     Monitor serial CBC and transfuse if patient becomes hemodynamically unstable, symptomatic or H/H drops below 7/21.      VTE Risk Mitigation (From admission, onward)           Ordered     apixaban tablet 2.5 mg  2 times daily         01/19/24 1435     IP VTE HIGH RISK PATIENT  Once         01/19/24 1435     Place sequential compression device  Until discontinued         01/19/24 1435     Place KRISTINA hose  Until discontinued         01/19/24 1435                    Discharge Planning   YANET: 1/22/2024     Code Status: Full Code   Is the patient medically ready for discharge?:     Reason for patient still in hospital (select all that apply): Patient trending condition and Treatment                     Madisyn Green MD  Department of Hospital Medicine   [facility]/Surg

## 2024-01-20 NOTE — PLAN OF CARE
Elevated heart rate called to cardiology Metoprolol 5 mg iv given EKG done Amiodorone Bolus given transferred to to step down unit 223 Report given to elton Green notified of cardiologist orders remain s with confusion ,hallucinations, appetite poor pure wick in place tele monitored.

## 2024-01-20 NOTE — NURSING
Assumed care of pt transferred to step down. Amiodarone infusing as ordered. VSS, no further needs at this time.

## 2024-01-20 NOTE — PLAN OF CARE
[facility] - Med/Surg  Initial Discharge Assessment       Primary Care Provider: Wanda Kuhn FNP-CARYN    Admission Diagnosis: Urinary tract infection without hematuria, site unspecified [N39.0]    Admission Date: 1/19/2024  Expected Discharge Date: 1/22/2024    Transition of Care Barriers: None    Payor: BLUE [place] BLUE [facility] / Plan: BCBS ALL OUT OF STATE / Product Type: PPO /     Extended Emergency Contact Information  Primary Emergency Contact: Roger Diane  Address: [address] [place] [address]           [place] [id number] [facility]  Home Phone: [phone]  Mobile Phone: [phone]  Relation: Relative  Preferred language: English   needed? No  Secondary Emergency Contact: Patricio Licona  Home Phone: [phone]  Mobile Phone: [phone]  Relation: Relative  Preferred language: English   needed? No    Discharge Plan A: Skilled Nursing Facility  Discharge Plan B: Skilled Nursing Facility      [facility] [address] [facility] [address] [facility] [address]  [facility] [address]  Phone: [phone] Fax: [phone]    [id number] - [place] - [address]  [address] [facility]  [place] [address]  Phone: [phone] Fax: [phone]    Completed DC assessment over the phone with pt's nephRoger stuart [phone]. Pt is currently at [facility] for SNF, Roger would like her to return there @ DC if possible.     Initial Assessment (most recent)       Adult Discharge Assessment - 01/20/24 1435          Discharge Assessment    Assessment Type Discharge Planning Assessment     Confirmed/corrected address, phone number and insurance Yes     Confirmed Demographics Correct on Facesheet     Source of Information family     If unable to respond/provide information was family/caregiver contacted? Yes     Contact Name/Number nephRoger stuart [phone]     Does patient/caregiver understand observation status Yes     Communicated YANET with  patient/caregiver Yes     People in Home facility resident     Facility Arrived From: [facility]     Do you expect to return to your current living situation? Yes     Do you have help at home or someone to help you manage your care at home? Yes     Prior to hospitilization cognitive status: Unable to Assess     Current cognitive status: Not Oriented to Place;Not Oriented to Time     Equipment Currently Used at Home wheelchair;rollator     Readmission within 30 days? Yes     Patient currently being followed by outpatient case management? No     Do you currently have service(s) that help you manage your care at home? No     Do you take prescription medications? Yes     Do you have prescription coverage? Yes     Do you have any problems affording any of your prescribed medications? No     Is the patient taking medications as prescribed? yes     Who is going to help you get home at discharge? [facility]     How do you get to doctors appointments? family or friend will provide     Are you on dialysis? No     Do you take coumadin? No     Discharge Plan A Skilled Nursing Facility     Discharge Plan B Skilled Nursing Facility     DME Needed Upon Discharge  none     Discharge Plan discussed with: Adult children     Transition of Care Barriers None

## 2024-01-20 NOTE — NURSING
Pt having low urine output during night. Pt only voided 200 mL. Bladder scan performed and showed max 473 mL. In and out catheterization performed. 475 mL urine drained from bladder. Post cath Bladder scan performed and showed 0 mL. Madisyn Green MD notified.

## 2024-01-20 NOTE — NURSING
Transferred pt to Stepdown unit room 223, pt alert but confused. BP WNL, HR: 115-130's bpm. Amiodarone 1 mg/min infusing. Pt belong brought in room 223 including pt , eyeglasses, dentures and blue plastic bag. Updated pt family ( alisa ) with pt care, verbalized understanding.

## 2024-01-20 NOTE — SUBJECTIVE & OBJECTIVE
Interval History:  Patient seen and examined.  Pleasantly confused.  Creatinine 1.3 today.  Troponin trending down, Cardiology following.  Patient is on IV antibiotics for suspected UTI, urine culture pending.    Review of Systems   Unable to perform ROS: Dementia     Objective:     Vital Signs (Most Recent):  Temp: 98.1 °F (36.7 °C) (01/20/24 0341)  Pulse: 72 (01/20/24 0341)  Resp: 18 (01/20/24 0341)  BP: (!) 179/75 (01/20/24 0341)  SpO2: (!) 91 % (01/20/24 0341) Vital Signs (24h Range):  Temp:  [98 °F (36.7 °C)-98.4 °F (36.9 °C)] 98.1 °F (36.7 °C)  Pulse:  [63-72] 72  Resp:  [16-18] 18  SpO2:  [91 %-98 %] 91 %  BP: (137-182)/(65-77) 179/75     Weight: 81.6 kg (180 lb)  Body mass index is 29.05 kg/m².    Intake/Output Summary (Last 24 hours) at 1/20/2024 1241  Last data filed at 1/20/2024 0658  Gross per 24 hour   Intake 1180 ml   Output 675 ml   Net 505 ml         Physical Exam  Constitutional:       General: She is not in acute distress.     Appearance: She is well-developed.   HENT:      Head: Normocephalic and atraumatic.   Eyes:      Pupils: Pupils are equal, round, and reactive to light.   Cardiovascular:      Rate and Rhythm: Normal rate and regular rhythm.      Heart sounds: No murmur heard.  Pulmonary:      Effort: Pulmonary effort is normal. No respiratory distress.      Breath sounds: Normal breath sounds. No wheezing or rales.   Abdominal:      General: Bowel sounds are normal. There is no distension.      Palpations: Abdomen is soft.      Tenderness: There is no abdominal tenderness.   Musculoskeletal:         General: Normal range of motion.   Skin:     General: Skin is warm and dry.      Findings: No rash.   Neurological:      Mental Status: She is alert. She is disoriented.      Cranial Nerves: No cranial nerve deficit.      Comments: Pleasantly confused.  Knows she is in the hospital   Psychiatric:         Behavior: Behavior normal.             Significant Labs: All pertinent labs within the past  24 hours have been reviewed.    Significant Imaging: I have reviewed all pertinent imaging results/findings within the past 24 hours.

## 2024-01-20 NOTE — PLAN OF CARE
Problem: Adult Inpatient Plan of Care  Goal: Plan of Care Review  Outcome: Ongoing, Progressing     Problem: Adult Inpatient Plan of Care  Goal: Patient-Specific Goal (Individualized)  Outcome: Ongoing, Progressing     Problem: Adult Inpatient Plan of Care  Goal: Absence of Hospital-Acquired Illness or Injury  Outcome: Ongoing, Progressing     Problem: Adult Inpatient Plan of Care  Goal: Optimal Comfort and Wellbeing  Outcome: Ongoing, Progressing     Problem: Adult Inpatient Plan of Care  Goal: Readiness for Transition of Care  Outcome: Ongoing, Progressing     Problem: Infection  Goal: Absence of Infection Signs and Symptoms  Outcome: Ongoing, Progressing     Problem: Impaired Wound Healing  Goal: Optimal Wound Healing  Outcome: Ongoing, Progressing     Problem: Skin Injury Risk Increased  Goal: Skin Health and Integrity  Outcome: Ongoing, Progressing

## 2024-01-20 NOTE — CONSULTS
[facility]  Department of Cardiology  Consult Note      PATIENT NAME: Irene العراقي    MRN: [id number]  TODAY'S DATE: 01/20/2024  ADMIT DATE: 1/19/2024                          CONSULT REQUESTED BY: Madisyn Green MD    SUBJECTIVE     PRINCIPAL PROBLEM: Encephalopathy, metabolic  Irene العراقي is an 83-year-old female who presents emergency room for evaluation of altered mental status, confusion, and foul-smelling urine.  Patient's daughters at bedside.  She is her primary caregiver.  She denies any fever or chills.  No known sick contacts or travel.  No aggravating or alleviating factors.  Previous medical history includes paroxysmal AFib, anemia, anxiety, status post TAVR, CKD 4.  ER workup:  CBC with mild anemia and thrombocytopenia of 129.  CMP with BUN 26 and creatinine of 1.6, BNP elevated at 819.  Troponin mildly elevated 0.263.  Chest x-ray was unremarkable.  CT of the head was negative.  Cardiology was consulted for elevated troponin and recommended trending troponin.  Urinalysis with white blood cells, red blood cells, rare bacteria, and nitrite positive.  Urine cultures pending.  Patient was started on Rocephin.  Patient admitted to Hospital Medicine for treatment and management.     REASON FOR CONSULT:  Atrial fibrillation      HPI:  Patient is 83-year-old lady with known history of severe aortic stenosis underwent TAVR and has paroxysmal atrial fibrillation and patient was in sinus rhythm this morning and converted spontaneously into paroxysmal atrial fibrillation with rapid ventricular rate., she also has history of CKD stage 4 and mild anemia.  Patient was admitted with change in mental status and hallucination and was found to have urinary tract infection.  At the present time patient denies any chest pain or tightness or heaviness her breathing has been stable and patient was in sinus rhythm when I have seen her.  She looks comfortable but patient is pleasantly confused and  hallucinating about her sister passed away recently.        Review of patient's allergies indicates:   Allergen Reactions    Cefuroxime     Hydrocodone     Meperidine     Meperidine hcl Nausea And Vomiting    Persantine [dipyridamole]     Nitrofurantoin macrocrystal      Headache , went into Afib     Vicodin [hydrocodone-acetaminophen] Nausea And Vomiting       Past Medical History:   Diagnosis Date    Anemia, unspecified     Atrial fibrillation 01/25/2021    CKD (chronic kidney disease)     Hypertension      Past Surgical History:   Procedure Laterality Date    A-V CARDIAC PACEMAKER INSERTION  11/2/2023    Procedure: Dual Chamber PPM RM 2617 (Medtronic);  Surgeon: Andreas James III, MD;  Location: [facility] CATH;  Service: Cardiology;;    ANGIOGRAM, CORONARY, WITH LEFT HEART CATHETERIZATION Left 4/19/2023    Procedure: Angiogram, Coronary, with Left Heart Cath;  Surgeon: Kevin Redmond MD;  Location: [facility] CATH/EP LAB;  Service: Cardiology;  Laterality: Left;    BREAST SURGERY      COLONOSCOPY N/A 4/16/2023    Procedure: COLONOSCOPY;  Surgeon: Kvng Mensah MD;  Location: [facility] ENDO;  Service: Endoscopy;  Laterality: N/A;    COLONOSCOPY W/ POLYPECTOMY  04/16/2023    OPEN REDUCTION AND INTERNAL FIXATION (ORIF) OF INJURY OF ANKLE Right 12/13/2023    Procedure: ORIF, ANKLE;  Surgeon: Chaitanya Garrison MD;  Location: [facility] OR;  Service: Orthopedics;  Laterality: Right;  Synthes    TRANSCATHETER AORTIC VALVE REPLACEMENT (TAVR)  11/1/2023    Procedure: (TAVR);  Surgeon: Juliano Moncada MD;  Location: [facility] CATH;  Service: Cardiology;;    TRANSCATHETER AORTIC VALVE REPLACEMENT (TAVR)  11/1/2023    Procedure: (TAVR)- Surgeon;  Surgeon: Adebayo Guzman MD;  Location: [facility] CATH;  Service: Peripheral Vascular;;     Social History     Tobacco Use    Smoking status: Former     Types: Cigarettes     Passive exposure: Past    Smokeless tobacco: Never   Substance Use Topics    Alcohol use: Not Currently    Drug use: Not Currently         REVIEW OF SYSTEMS  CONSTITUTIONAL: Negative for chills, fatigue and fever.   EYES: No double vision, No blurred vision  NEURO: No headaches, No dizziness  RESPIRATORY: Negative for cough, shortness of breath and wheezing.    CARDIOVASCULAR: Negative for chest pain. Negative for palpitations and leg swelling.   GI: Negative for abdominal pain, No melena, diarrhea, nausea and vomiting.   :  Recent urinary tract infection  SKIN: Negative for bruising, Negative for edema or discoloration noted.   ENDOCRINE: Negative for polyphagia, Negative for heat intolerance, Negative for cold intolerance  PSYCHIATRIC:  Pleasantly confused  MUSCULOSKELETAL: Negative for neck pain, Negative for muscle weakness, Negative for back pain     OBJECTIVE     VITAL SIGNS (Most Recent)  Temp: 98.1 °F (36.7 °C) (01/20/24 0341)  Pulse: 72 (01/20/24 0341)  Resp: 18 (01/20/24 0341)  BP: (!) 179/75 (01/20/24 0341)  SpO2: (!) 91 % (01/20/24 0341)    VENTILATION STATUS  Resp: 18 (01/20/24 0341)  SpO2: (!) 91 % (01/20/24 0341)           I & O (Last 24H):  Intake/Output Summary (Last 24 hours) at 1/20/2024 1102  Last data filed at 1/20/2024 0658  Gross per 24 hour   Intake 1180 ml   Output 675 ml   Net 505 ml       WEIGHTS  Wt Readings from Last 1 Encounters:   01/19/24 1547 81.6 kg (180 lb)   01/19/24 0916 81.6 kg (180 lb)       PHYSICAL EXAM  GENERAL:  Awake alert not in any acute distress   HEENT: Normocephalic. Pupils normal and conjunctivae normal.  ..   NECK: No JVD. No bruit..   THYROID: Thyroid not evaluated..   CARDIAC: Regular rate and rhythm. S1 is normal.S2 is normal.  Systolic murmur audible CHEST ANATOMY: normal.   LUNGS: Clear to auscultation. No wheezing or rhonchi..   ABDOMEN: Soft no masses or organomegaly.  .   URINARY: No araya catheter   EXTREMITIES: No cyanosis, clubbing noted at this time., no calf tenderness bilaterally.  Trace ankle edema   PERIPHERAL VASCULAR SYSTEM: Good palpable distal pulses.   CENTRAL NERVOUS  SYSTEM: No gross focal motor or sensory deficits noted.   SKIN: Skin without lesions, moist, well perfused.   MUSCLE STRENGTH & TONE: No Gross noteable weakness, atrophy or abnormal movement.     HOME MEDICATIONS:  No current facility-administered medications on file prior to encounter.     Current Outpatient Medications on File Prior to Encounter   Medication Sig Dispense Refill    ALPRAZolam (XANAX) 0.5 MG tablet Take 1 tablet (0.5 mg total) by mouth 3 (three) times daily as needed for Anxiety. 3 tablet 0    apixaban (ELIQUIS) 2.5 mg Tab Take 1 tablet (2.5 mg total) by mouth 2 (two) times daily. 60 tablet 2    docusate sodium (COLACE) 100 MG capsule Take 1 capsule (100 mg total) by mouth 2 (two) times daily. 60 capsule 5    famotidine (PEPCID) 20 MG tablet Take 1 tablet (20 mg total) by mouth once daily. 30 tablet 11    iron ag,[id number]-Zn-sa-sto (NIFEREX, SUMALATE-QUATREFOLIC,) 150 mg iron- 60 mg-1 mg Tab Take 1 tablet by mouth once daily. 90 tablet 3    magnesium oxide (MAG-OX) 400 mg (241.3 mg magnesium) tablet TAKE 1 TABLET BY MOUTH ONCE DAILY (Patient taking differently: Take 400 mg by mouth once daily.) 90 tablet 3    meclizine (ANTIVERT) 50 MG tablet Take 1 tablet (50 mg total) by mouth 3 (three) times daily as needed for Dizziness. 30 tablet 0    midodrine (PROAMATINE) 10 MG tablet Take 1 tablet (10 mg total) by mouth 3 (three) times daily with meals. 90 tablet 0    MIRALAX 17 gram PwPk Take 17 g by mouth 2 (two) times daily.      ondansetron (ZOFRAN-ODT) 4 MG TbDL Take 2 tablets (8 mg total) by mouth every 8 (eight) hours as needed. 2 tablet 0    promethazine (PHENERGAN) 25 MG tablet Take 1 tablet (25 mg total) by mouth every 6 (six) hours as needed for Nausea. 30 tablet 0       SCHEDULED MEDS:   apixaban  2.5 mg Oral BID    cefTRIAXone (Rocephin) IV (PEDS and ADULTS)  1 g Intravenous Q12H    docusate sodium  100 mg Oral BID    famotidine  20 mg Oral Daily    midodrine  10 mg Oral TID WM     "polyethylene glycol  17 g Oral BID       CONTINUOUS INFUSIONS:    PRN MEDS:acetaminophen, acetaminophen, albuterol-ipratropium, aluminum-magnesium hydroxide-simethicone, dextrose 10%, dextrose 10%, glucagon (human recombinant), glucose, glucose, melatonin, naloxone, ondansetron, simethicone, sodium chloride 0.9%    LABS AND DIAGNOSTICS     CBC LAST 3 DAYS  Recent Labs   Lab 01/19/24  1029 01/20/24  0313   WBC 6.81 6.60   RBC 3.19* 3.09*   HGB 9.8* 9.4*   HCT 30.0* 28.6*   MCV 94 93   MCH 30.7 30.4   MCHC 32.7 32.9   RDW 13.3 13.2   * 119*   MPV 10.2 10.7   GRAN 71.4  4.9 68.5  4.5   LYMPH 16.7*  1.1 17.0*  1.1   MONO 9.1  0.6 11.1  0.7   BASO 0.06 0.03   NRBC 0 0       COAGULATION LAST 3 DAYS  No results for input(s): "LABPT", "INR", "APTT" in the last 168 hours.    CHEMISTRY LAST 3 DAYS  Recent Labs   Lab 01/19/24  1029 01/20/24  0313    139   K 3.8 3.8    106   CO2 25 24   ANIONGAP 10 9   BUN 26* 27*   CREATININE 1.6* 1.3   GLU 83 90   CALCIUM 9.1 9.1   MG  --  2.0   ALBUMIN 3.1* 3.0*   PROT 5.7* 5.5*   ALKPHOS 119 121   ALT 19 20   AST 29 32   BILITOT 0.5 0.5       CARDIAC PROFILE LAST 3 DAYS  Recent Labs   Lab 01/19/24  1029 01/19/24  1442   *  --    TROPONINI 0.263* 0.312*       ENDOCRINE LAST 3 DAYS  No results for input(s): "TSH", "PROCAL" in the last 168 hours.    LAST ARTERIAL BLOOD GAS  ABG  No results for input(s): "PH", "PO2", "PCO2", "HCO3", "BE" in the last 168 hours.    LAST 7 DAYS MICROBIOLOGY   Microbiology Results (last 7 days)       Procedure Component Value Units Date/Time    Urine culture [[id number]] Collected: 01/19/24 1328    Order Status: Sent Specimen: Urine, Catheterized Updated: 01/19/24 1328            MOST RECENT IMAGING  CT Head Without Contrast  Narrative: EXAMINATION:  CT HEAD WITHOUT CONTRAST    CLINICAL HISTORY:  Mental status change, unknown cause;    TECHNIQUE:  Low dose axial CT images obtained throughout the head without intravenous contrast. " Sagittal and coronal reconstructions were performed.    COMPARISON:  Head CT 12/12/2023.    FINDINGS:  Brain: There is no evidence of a mass, edema, midline shift, or intracranial hemorrhage. No extra-axial fluid collection.  Confluent low-density throughout the cerebral hemispheric white matter is grossly unchanged consistent with at least moderate chronic small vessel ischemic changes.  No CT evidence of an acute major vascular territorial infarct.    Ventricles: The ventricles, sulci, and cisterns are within normal limits.    Skull: The osseous structures are unremarkable in appearance.    Extracranial soft tissues: Limited imaging is within normal limits.    Other: The visualized portions of the sinuses, orbits, and mastoid air cells are within normal limits.  Impression: 1. No acute intracranial CT findings.    Electronically signed by: Brian Jones  Date:    01/19/2024  Time:    10:07  X-Ray Chest AP Portable  Narrative: EXAMINATION:  XR CHEST AP PORTABLE    CLINICAL HISTORY:  Altered mental status, unspecified    TECHNIQUE:  Single frontal view of the chest was performed.    COMPARISON:  Chest of December 11, 2023    FINDINGS:  A pacemaker is noted in place in the left chest with 2 leads to the right heart.  The mediastinal and cardiac size and contours normal.  No intrapulmonary mass or infiltrate is seen.  There is no pneumothorax or pleural effusion.  Impression: Pacemaker in place otherwise negative chest x-ray.    Electronically signed by: Roger Hairston MD  Date:    01/19/2024  Time:    09:50      ECHOCARDIOGRAM RESULTS (last 5)  Results for orders placed during the hospital encounter of 12/11/23    Echo    Interpretation Summary    Left Ventricle: The left ventricle is normal in size. Normal wall thickness. Septal motion is consistent with pacing. There is low normal systolic function with a visually estimated ejection fraction of 50 - 55%. Grade II diastolic dysfunction.    Right Ventricle: Normal  right ventricular cavity size. Systolic function is normal.    Left Atrium: Left atrium is mildly dilated.    Right Atrium: Right atrium is mildly dilated.    Aortic Valve: There is a well-seated, normally functioning bioprosthetic valve in the aortic position. It is reported to be a 29 mm Medtronic valve. Mild paravalvular regurgitation.    Mitral Valve: Moderately calcified leaflets. There is moderate mitral annular calcification present. There is mild stenosis. The mean pressure gradient across the mitral valve is 3 mmHg at a heart rate of 67 bpm. There is mild regurgitation.    Tricuspid Valve: There is mild to moderate regurgitation.    The estimated pulmonary artery systolic pressure is 42 mmHg.      Results for orders placed during the hospital encounter of 12/01/23    Echo    Interpretation Summary    Left Ventricle: The left ventricle is normal in size. Normal wall thickness. There is normal systolic function with a visually estimated ejection fraction of 55 - 60%. Grade I diastolic dysfunction.    Right Ventricle: Normal right ventricular cavity size. Wall thickness is normal. Right ventricle wall motion  is normal. Systolic function is normal.    Left Atrium: Left atrium is mildly dilated.    Right Atrium: Right atrium is mildly dilated.    Aortic Valve: There is a transcatheter valve replacement in the aortic position that is appropriately positioned. It is reported to be a Medtronic valve. There is mild stenosis. Aortic valve area by VTI is 2.97 cm². Aortic valve peak velocity is 1.45 m/s. Mean gradient is 4 mmHg. The dimensionless index is 0.95. There is mild to moderate aortic regurgitation (appears perivalvular).    Mitral Valve: Moderately calcified leaflets. There is moderate mitral annular calcification present. There is mild stenosis. The mean pressure gradient across the mitral valve is 3 mmHg at a heart rate of  bpm. There is mild regurgitation.    Tricuspid Valve: There is mild  regurgitation.    IVC/SVC: Normal venous pressure at 3 mmHg.      Results for orders placed during the hospital encounter of 11/01/23    Echo Saline Bubble? No    Interpretation Summary    Left Ventricle: The left ventricle is normal in size. Normal wall thickness. Normal wall motion. There is normal systolic function with a visually estimated ejection fraction of 55 - 60%. There is normal diastolic function.    Right Ventricle: Normal right ventricular cavity size. Wall thickness is normal. Right ventricle wall motion  is normal. Systolic function is normal.    Left Atrium: Left atrium is mildly dilated.    Right Atrium: Right atrium is mildly dilated.    Aortic Valve: There is a transcatheter valve replacement in the aortic position. It is reported to be a 29 mm Medtronic valve.    Mitral Valve: There is mild regurgitation.    Tricuspid Valve: There is mild regurgitation.    Pulmonic Valve: There is mild regurgitation.    IVC/SVC: Normal venous pressure at 3 mmHg.      Echo Saline Bubble? No    Interpretation Summary    Limited 2D echocardiogram done intraoperatively doing TAVR procedure.    Normal biventricular function.    Well-positioned self expanding aortic valve with no perivalvular or central valvular regurgitation    No pericardial effusion.      Results for orders placed during the hospital encounter of 08/09/23    Echo    Interpretation Summary    Limited study    Left Ventricle: The left ventricle is normal in size. Mildly increased wall thickness. There is concentric remodeling. Normal wall motion. There is normal systolic function with a visually estimated ejection fraction of 65 - 70%. Diastolic function cannot be reliably determined in the presence of mitral annular calcification.    Aortic Valve: Moderate annular calcification. Moderately restricted motion. There is moderate to severe stenosis. Aortic valve area by VTI is 0.77 cm². Aortic valve peak velocity is 3.66 m/s. Mean gradient is 34 mmHg.  The dimensionless index is 0.25. There is moderate aortic regurgitation.    Mitral Valve: There is bileaflet sclerosis. Mild posterior mitral annular calcification. There is moderate to severe regurgitation. decreased excursion.    Left Atrium: Left atrium is severely dilated.    Right Ventricle: Normal right ventricular cavity size.    Tricuspid Valve: There is mild regurgitation.    Pulmonic Valve: There is mild regurgitation.    IVC/SVC: Intermediate venous pressure at 8 mmHg.      CURRENT/PREVIOUS VISIT EKG  Results for orders placed or performed during the hospital encounter of 12/11/23   EKG 12-lead    Collection Time: 12/11/23  9:29 AM    Narrative    Test Reason : R55,    Vent. Rate : 060 BPM     Atrial Rate : 060 BPM     P-R Int : 174 ms          QRS Dur : 150 ms      QT Int : 480 ms       P-R-T Axes : 019 -42 065 degrees     QTc Int : 480 ms    AV dual-paced rhythm  Abnormal ECG  When compared with ECG of 01-DEC-2023 13:55,  Vent. rate has decreased BY  10 BPM  Confirmed by En ARRIAGA, Jose D DANIEL ([id number]) on 12/14/2023 9:17:15 PM    Referred By: [email or web address]   SELF           Confirmed By:Jose D Gatica MD     Procedures performed:  Bilateral common femoral artery access using ultrasound and radiographic guidance  Right internal jugular vein access  Temporary transvenous pacemaker implantation  Simultaneous Left heart and aortic pressure hemodynamics  Aortic valve balloon valvuloplasty  TAVR using Medtronic FX  29 mm valve    Summary         The estimated blood loss was <50 mL.    The pre-procedure left ventricular end diastolic pressure was 20.    Non obstructive coronaries with mild luminal irregularities       ASSESSMENT/PLAN:     Active Hospital Problems    Diagnosis    *Encephalopathy, metabolic    S/P TAVR (transcatheter aortic valve replacement)    Acute cystitis with hematuria    Anemia       ASSESSMENT & PLAN:   1. Paroxysmal atrial fibrillation  2. Status post permanent pacemaker implantation   3.  Nonsustained ventricular tachycardia   4. Severe aortic stenosis status post TAVR   5. Acute cystitis with hematuria/urosepsis  6. Change in mental status/hallucinations  7. Chronic kidney disease   8. Mild anemia        RECOMMENDATIONS:  1. Patient was in sinus rhythm when I had examined her.  And she converted to atrial fibrillation with rapid ventricular rate and she had short run of nonsustained ventricular tachycardia.  Will give her metoprolol IV 5 mg and start her on metoprolol 12.5 mg p.o. b.i.d.  Amiodarone bolus and drip.    2. 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  3. She has acute urosepsis and mental status change and hallucination secondary due to urosepsis.    Continue antibiotics.  4. Her potassium is 3.8 would aim to keep it at 4.2 will give her 20 mEq of potassium.  5. Chronic kidney disease she is stable at the present time with a BUN of 27 and creatinine of 1.3.  6. Continue Eliquis 2.5 mg p.o. b.i.d.  7. Further recommendations to follow thank you for the consultation.          Pj Ramirez MD  Date of Service: 01/20/2024  11:02 AM

## 2024-01-20 NOTE — CONSULTS
[facility]/Surg  Adult Nutrition  Consult Note    SUMMARY     Recommendations  Recommendation/Intervention:   1.) Continue with Cardiac diet 2.) Boost plus BID  Goals: 1.) pt will meet >50% of EEN during admit  Nutrition Goal Status: new  Communication of RD Recs: other (comment)    1. Urinary tract infection without hematuria, site unspecified    2. Afib    3. AMS (altered mental status)    4. A-fib      Past Medical History:   Diagnosis Date    Anemia, unspecified     Atrial fibrillation 01/25/2021    CKD (chronic kidney disease)     Hypertension      Past Surgical History:   Procedure Laterality Date    A-V CARDIAC PACEMAKER INSERTION  11/2/2023    Procedure: Dual Chamber PPM RM 2617 ([employer]);  Surgeon: Andreas James III, MD;  Location: [facility] CATH;  Service: Cardiology;;    ANGIOGRAM, CORONARY, WITH LEFT HEART CATHETERIZATION Left 4/19/2023    Procedure: Angiogram, Coronary, with Left Heart Cath;  Surgeon: Kevin Redmond MD;  Location: [facility] CATH/EP LAB;  Service: Cardiology;  Laterality: Left;    BREAST SURGERY      COLONOSCOPY N/A 4/16/2023    Procedure: COLONOSCOPY;  Surgeon: Kvng Mensah MD;  Location: [facility] ENDO;  Service: Endoscopy;  Laterality: N/A;    COLONOSCOPY W/ POLYPECTOMY  04/16/2023    OPEN REDUCTION AND INTERNAL FIXATION (ORIF) OF INJURY OF ANKLE Right 12/13/2023    Procedure: ORIF, ANKLE;  Surgeon: Chaitanya Garrison MD;  Location: [facility] OR;  Service: Orthopedics;  Laterality: Right;  Synthes    TRANSCATHETER AORTIC VALVE REPLACEMENT (TAVR)  11/1/2023    Procedure: (TAVR);  Surgeon: Juliano Moncada MD;  Location: [facility] CATH;  Service: Cardiology;;    TRANSCATHETER AORTIC VALVE REPLACEMENT (TAVR)  11/1/2023    Procedure: (TAVR)- Surgeon;  Surgeon: Adebayo Guzman MD;  Location: [facility] CATH;  Service: Peripheral Vascular;;         Assessment and Plan  Nutrition Problem  Inadequate energy intake    Related to (etiology):   UTI    Signs and Symptoms (as evidenced by):   PO  "intake 25%     Interventions/Recommendations (treatment strategy):  Continue with diet, boost plus BID     Nutrition Diagnosis Status:   New       Malnutrition Assessment  Buzz score 16  Buttocks partial thickness tissue loss     Reason for Assessment  Reason For Assessment: consult  General Information Comments: 84 y/o female admits with metabolic encephalopathy. PMH altered mental status, confusion, and foul-smelling urine. Per EMR, oriented to self/place. Pt resting in bed, lunch tray untouched. No family at bedside.  Nutrition Discharge Planning: TBD    Nutrition Risk Screen  Nutrition Risk Screen: no indicators present    Nutrition/Diet History  Food Allergies: NKFA  Factors Affecting Nutritional Intake: decreased appetite    Anthropometrics  Temp: 98.1 °F (36.7 °C)  Height Method: Stated  Height: 5' 6" (167.6 cm)  Height (inches): 66 in  Weight Method: Bed Scale  Weight: 81.6 kg (179 lb 14.3 oz)  Weight (lb): 179.9 lb  Ideal Body Weight (IBW), Female: 130 lb  % Ideal Body Weight, Female (lb): 138.38 %  BMI (Calculated): 29  BMI Grade: 25 - 29.9 - overweight    Wt Readings from Last 25 Encounters:   01/20/24 81.6 kg (179 lb 14.3 oz)   01/02/24 68.5 kg (151 lb)   12/15/23 69.6 kg (153 lb 7 oz)   12/01/23 72.1 kg (159 lb)   12/01/23 72.1 kg (159 lb)   11/16/23 72.1 kg (159 lb)   11/15/23 73.8 kg (162 lb 12.8 oz)   11/06/23 74.4 kg (164 lb 0.4 oz)   10/31/23 73.5 kg (162 lb)   10/31/23 73.5 kg (162 lb 2.4 oz)   10/18/23 78 kg (172 lb)   10/06/23 71.5 kg (157 lb 11.2 oz)   09/28/23 74.7 kg (164 lb 10.9 oz)   09/21/23 75.6 kg (166 lb 11.2 oz)   09/15/23 74.8 kg (165 lb)   09/10/23 73 kg (160 lb 15 oz)   08/18/23 75.3 kg (166 lb)   08/09/23 76.1 kg (167 lb 12.3 oz)   08/10/23 76.1 kg (167 lb 12.3 oz)   07/27/23 77.1 kg (169 lb 14.4 oz)   05/15/23 80.8 kg (178 lb 1.6 oz)   05/08/23 81.6 kg (180 lb)   05/02/23 81.6 kg (180 lb)   04/25/23 81.7 kg (180 lb 3.2 oz)   04/20/23 86.4 kg (190 lb 7.6 oz) " "  ]    Lab/Procedures/Meds  Pertinent Labs Reviewed: reviewed  BMP  Lab Results   Component Value Date     01/20/2024    K 3.8 01/20/2024     01/20/2024    CO2 24 01/20/2024    BUN 27 (H) 01/20/2024    CREATININE 1.3 01/20/2024    CALCIUM 9.1 01/20/2024    ANIONGAP 9 01/20/2024    EGFRNORACEVR 41 (A) 01/20/2024     Lab Results   Component Value Date    WBC 6.60 01/20/2024    HGB 9.4 (L) 01/20/2024    HCT 28.6 (L) 01/20/2024    MCV 93 01/20/2024     (L) 01/20/2024       Lab Results   Component Value Date    ALBUMIN 3.0 (L) 01/20/2024     Lab Results   Component Value Date    CALCIUM 9.1 01/20/2024    PHOS 3.0 01/20/2024     No results for input(s): "POCTGLUCOSE" in the last 24 hours.  Lab Results   Component Value Date    CHOL 173 02/10/2020     Lab Results   Component Value Date    HDL 78 (H) 02/10/2020     Lab Results   Component Value Date    LDLCALC 87.0 02/10/2020     Lab Results   Component Value Date    TRIG 40 02/10/2020       Lab Results   Component Value Date    CHOLHDL 45.1 02/10/2020       Pertinent Medications Reviewed: reviewed  Scheduled Meds:   apixaban  2.5 mg Oral BID    cefTRIAXone (Rocephin) IV (PEDS and ADULTS)  1 g Intravenous Q12H    docusate sodium  100 mg Oral BID    famotidine  20 mg Oral Daily    metoprolol tartrate  12.5 mg Oral BID    midodrine  10 mg Oral TID WM    polyethylene glycol  17 g Oral BID     Continuous Infusions:   amiodarone in dextrose 5% 1 mg/min (01/20/24 1349)    amiodarone in dextrose 5%             Estimated/Assessed Needs  Weight Used For Calorie Calculations: 81.6 kg (179 lb 14.3 oz)  Energy Calorie Requirements (kcal): 1700  Energy Need Method: [place] (x1.3af)  Protein Requirements: 65g (0.8 g/kg)  Weight Used For Protein Calculations: 81.6 kg (179 lb 14.3 oz)  Estimated Fluid Requirement Method: RDA Method  RDA Method (mL): 1700        Nutrition Prescription Ordered  Current Diet Order: Cardiac diet    Evaluation of Received " Nutrient/Fluid Intake  Tolerance: other (see comments) (severo)  % Intake of Estimated Energy Needs: 0 - 25 %  % Meal Intake: 0 - 25 %    Nutrition Risk  Level of Risk/Frequency of Follow-up:  (x2 weekly)       Monitor and Evaluation  Food and Nutrient Intake: energy intake, food and beverage intake  Food and Nutrient Adminstration: diet order  Anthropometric Measurements: weight, weight change, body mass index  Biochemical Data, Medical Tests and Procedures: electrolyte and renal panel, glucose/endocrine profile, lipid profile  Nutrition-Focused Physical Findings: overall appearance       Nutrition Follow-Up  RD Follow-up?: Yes

## 2024-01-20 NOTE — ASSESSMENT & PLAN NOTE
Patient's anemia is currently controlled. Has not received any PRBCs to date. Etiology likely d/t chronic disease due to Chronic Kidney Disease/ESRD  Current CBC reviewed-   Lab Results   Component Value Date    HGB 9.4 (L) 01/20/2024    HCT 28.6 (L) 01/20/2024     Monitor serial CBC and transfuse if patient becomes hemodynamically unstable, symptomatic or H/H drops below 7/21.

## 2024-01-20 NOTE — PLAN OF CARE
POC reviewed with pt, verbalized understanding. Patient is alert and oriented to self and place intermittently. Continuous cardiac monitoring in place as ordered. A-febrile. ABX administered as scheduled. Meds given per MAR. IV intact with IVF infusing as ordered. Repositions self independently. No complaints of pain or discomfort. PW in place, no s/s of infection to insertion site. Purposeful hourly/q2hr rounding done during shift to promote patient safety. NAD noted. Safety maintained with side rails up x3, bed wheels locked, bed in lowest position, bed alarm set, slip resistant socks maintained, call light in reach, and AVASYS in use. Patient educated to call for assistance when needed, verbalized understanding. Pt remains free of falls. No further needs expressed at this time. Will continue to monitor.    Problem: Adult Inpatient Plan of Care  Goal: Plan of Care Review  Outcome: Ongoing, Progressing  Goal: Patient-Specific Goal (Individualized)  Outcome: Ongoing, Progressing  Goal: Absence of Hospital-Acquired Illness or Injury  Outcome: Ongoing, Progressing  Goal: Optimal Comfort and Wellbeing  Outcome: Ongoing, Progressing  Goal: Readiness for Transition of Care  Outcome: Ongoing, Progressing     Problem: Infection  Goal: Absence of Infection Signs and Symptoms  Outcome: Ongoing, Progressing     Problem: Impaired Wound Healing  Goal: Optimal Wound Healing  Outcome: Ongoing, Progressing     Problem: Skin Injury Risk Increased  Goal: Skin Health and Integrity  Outcome: Ongoing, Progressing

## 2024-01-20 NOTE — PLAN OF CARE
Recommendations  Recommendation/Intervention:   1.) Continue with Cardiac diet 2.) Boost plus BID  Goals: 1.) pt will meet >50% of EEN during admit  Nutrition Goal Status: new  Communication of RD Recs: other (comment)

## 2024-01-20 NOTE — NURSING
Pt fidgety, HR still 130's. Pt accidentally bite her tongue noted blood in pt mouth. Mouth care provided, bleeding controlled. Tried to reorient pt but no sign of understanding. Pt still hallucinating. Repositioned pt.

## 2024-01-20 NOTE — PLAN OF CARE
01/20/24 1438   ZIMMERMAN Message   Medicare Outpatient and Observation Notification regarding financial responsibility Given to patient/caregiver;Explained to patient/caregiver;Signed/date by patient/caregiver   Date ZIMMERMAN was signed 01/20/24   Time ZIMMERMAN was signed [id number]

## 2024-01-20 NOTE — NURSING
Pt pulling at lines and trying to get out of bed. Continues to hallucinate and talk to herself. Dr. Green notified. New orders placed.

## 2024-01-21 LAB
ALBUMIN SERPL BCP-MCNC: 3.2 G/DL (ref 3.5–5.2)
ALP SERPL-CCNC: 122 U/L (ref 55–135)
ALT SERPL W/O P-5'-P-CCNC: 23 U/L (ref 10–44)
ANION GAP SERPL CALC-SCNC: 11 MMOL/L (ref 8–16)
AST SERPL-CCNC: 35 U/L (ref 10–40)
BASOPHILS # BLD AUTO: 0.04 K/UL (ref 0–0.2)
BASOPHILS NFR BLD: 0.5 % (ref 0–1.9)
BILIRUB SERPL-MCNC: 0.4 MG/DL (ref 0.1–1)
BUN SERPL-MCNC: 22 MG/DL (ref 8–23)
CALCIUM SERPL-MCNC: 9.4 MG/DL (ref 8.7–10.5)
CHLORIDE SERPL-SCNC: 108 MMOL/L (ref 95–110)
CO2 SERPL-SCNC: 24 MMOL/L (ref 23–29)
CREAT SERPL-MCNC: 1.4 MG/DL (ref 0.5–1.4)
DIFFERENTIAL METHOD BLD: ABNORMAL
EOSINOPHIL # BLD AUTO: 0.1 K/UL (ref 0–0.5)
EOSINOPHIL NFR BLD: 1.3 % (ref 0–8)
ERYTHROCYTE [DISTWIDTH] IN BLOOD BY AUTOMATED COUNT: 13.4 % (ref 11.5–14.5)
EST. GFR  (NO RACE VARIABLE): 37 ML/MIN/1.73 M^2
GLUCOSE SERPL-MCNC: 92 MG/DL (ref 70–110)
HCT VFR BLD AUTO: 29 % (ref 37–48.5)
HGB BLD-MCNC: 9.7 G/DL (ref 12–16)
IMM GRANULOCYTES # BLD AUTO: 0.03 K/UL (ref 0–0.04)
IMM GRANULOCYTES NFR BLD AUTO: 0.4 % (ref 0–0.5)
LYMPHOCYTES # BLD AUTO: 0.9 K/UL (ref 1–4.8)
LYMPHOCYTES NFR BLD: 10.9 % (ref 18–48)
MAGNESIUM SERPL-MCNC: 1.9 MG/DL (ref 1.6–2.6)
MCH RBC QN AUTO: 30.8 PG (ref 27–31)
MCHC RBC AUTO-ENTMCNC: 33.4 G/DL (ref 32–36)
MCV RBC AUTO: 92 FL (ref 82–98)
MONOCYTES # BLD AUTO: 0.7 K/UL (ref 0.3–1)
MONOCYTES NFR BLD: 8.3 % (ref 4–15)
NEUTROPHILS # BLD AUTO: 6.7 K/UL (ref 1.8–7.7)
NEUTROPHILS NFR BLD: 78.6 % (ref 38–73)
NRBC BLD-RTO: 0 /100 WBC
PHOSPHATE SERPL-MCNC: 2.9 MG/DL (ref 2.7–4.5)
PLATELET # BLD AUTO: 118 K/UL (ref 150–450)
PMV BLD AUTO: 10.3 FL (ref 9.2–12.9)
POTASSIUM SERPL-SCNC: 3.8 MMOL/L (ref 3.5–5.1)
PROT SERPL-MCNC: 5.9 G/DL (ref 6–8.4)
RBC # BLD AUTO: 3.15 M/UL (ref 4–5.4)
SODIUM SERPL-SCNC: 143 MMOL/L (ref 136–145)
WBC # BLD AUTO: 8.45 K/UL (ref 3.9–12.7)

## 2024-01-21 PROCEDURE — 96366 THER/PROPH/DIAG IV INF ADDON: CPT

## 2024-01-21 PROCEDURE — 80053 COMPREHEN METABOLIC PANEL: CPT | Performed by: NURSE PRACTITIONER

## 2024-01-21 PROCEDURE — 51798 US URINE CAPACITY MEASURE: CPT

## 2024-01-21 PROCEDURE — 99233 SBSQ HOSP IP/OBS HIGH 50: CPT | Mod: ,,, | Performed by: INTERNAL MEDICINE

## 2024-01-21 PROCEDURE — 25000003 PHARM REV CODE 250: Performed by: INTERNAL MEDICINE

## 2024-01-21 PROCEDURE — 63600175 PHARM REV CODE 636 W HCPCS: Performed by: INTERNAL MEDICINE

## 2024-01-21 PROCEDURE — 36415 COLL VENOUS BLD VENIPUNCTURE: CPT | Performed by: NURSE PRACTITIONER

## 2024-01-21 PROCEDURE — 51701 INSERT BLADDER CATHETER: CPT

## 2024-01-21 PROCEDURE — 25000003 PHARM REV CODE 250: Performed by: NURSE PRACTITIONER

## 2024-01-21 PROCEDURE — 63600175 PHARM REV CODE 636 W HCPCS: Performed by: NURSE PRACTITIONER

## 2024-01-21 PROCEDURE — 20600001 HC STEP DOWN PRIVATE ROOM

## 2024-01-21 PROCEDURE — 27000221 HC OXYGEN, UP TO 24 HOURS

## 2024-01-21 PROCEDURE — 94761 N-INVAS EAR/PLS OXIMETRY MLT: CPT

## 2024-01-21 PROCEDURE — 99900035 HC TECH TIME PER 15 MIN (STAT)

## 2024-01-21 PROCEDURE — 85025 COMPLETE CBC W/AUTO DIFF WBC: CPT | Performed by: NURSE PRACTITIONER

## 2024-01-21 PROCEDURE — 84100 ASSAY OF PHOSPHORUS: CPT | Performed by: NURSE PRACTITIONER

## 2024-01-21 PROCEDURE — 83735 ASSAY OF MAGNESIUM: CPT | Performed by: NURSE PRACTITIONER

## 2024-01-21 PROCEDURE — 25000003 PHARM REV CODE 250: Performed by: HOSPITALIST

## 2024-01-21 RX ORDER — AMIODARONE HYDROCHLORIDE 200 MG/1
200 TABLET ORAL 2 TIMES DAILY
Status: DISCONTINUED | OUTPATIENT
Start: 2024-01-21 | End: 2024-01-26 | Stop reason: HOSPADM

## 2024-01-21 RX ORDER — DIPHENHYDRAMINE HYDROCHLORIDE 50 MG/ML
12.5 INJECTION INTRAMUSCULAR; INTRAVENOUS ONCE
Status: COMPLETED | OUTPATIENT
Start: 2024-01-21 | End: 2024-01-21

## 2024-01-21 RX ADMIN — METOPROLOL TARTRATE 12.5 MG: 25 TABLET, FILM COATED ORAL at 10:01

## 2024-01-21 RX ADMIN — DOCUSATE SODIUM 100 MG: 100 CAPSULE, LIQUID FILLED ORAL at 10:01

## 2024-01-21 RX ADMIN — AMIODARONE HYDROCHLORIDE 200 MG: 200 TABLET ORAL at 10:01

## 2024-01-21 RX ADMIN — APIXABAN 2.5 MG: 2.5 TABLET, FILM COATED ORAL at 10:01

## 2024-01-21 RX ADMIN — HALOPERIDOL 2 MG: 1 TABLET ORAL at 06:01

## 2024-01-21 RX ADMIN — CEFTRIAXONE SODIUM 1 G: 1 INJECTION, POWDER, FOR SOLUTION INTRAMUSCULAR; INTRAVENOUS at 02:01

## 2024-01-21 RX ADMIN — FAMOTIDINE 20 MG: 20 TABLET, FILM COATED ORAL at 10:01

## 2024-01-21 RX ADMIN — AMIODARONE HYDROCHLORIDE 200 MG: 200 TABLET ORAL at 02:01

## 2024-01-21 RX ADMIN — POLYETHYLENE GLYCOL (3350) 17 G: 17 POWDER, FOR SOLUTION ORAL at 10:01

## 2024-01-21 RX ADMIN — AMIODARONE HYDROCHLORIDE 0.5 MG/MIN: 1.8 INJECTION, SOLUTION INTRAVENOUS at 08:01

## 2024-01-21 RX ADMIN — DIPHENHYDRAMINE HYDROCHLORIDE 12.5 MG: 50 INJECTION INTRAMUSCULAR; INTRAVENOUS at 08:01

## 2024-01-21 RX ADMIN — HALOPERIDOL 2 MG: 1 TABLET ORAL at 02:01

## 2024-01-21 NOTE — EICU
Intervention Initiated From:  [facility] / EICU    Jonh intervened regarding:  Rounding (Video assessment)  Comments: Video rounds done.  Resting in bed.  Family members at bedside, no acute distress noted.

## 2024-01-21 NOTE — PROGRESS NOTES
[facility]  Progress Note    Patient Name: Irene العراقي  MRN: [id number]  Patient Class: IP- Inpatient   Admission Date: 1/19/2024  Length of Stay: 0 days  Attending Physician: Madisyn Green MD  Primary Care Provider: Wanda Kuhn FNP-C        Subjective:     Principal Problem:Encephalopathy, metabolic        HPI:  Irene العراقي is an 83-year-old female who presents emergency room for evaluation of altered mental status, confusion, and foul-smelling urine.    She denies any fever or chills.  No known sick contacts or travel.  No aggravating or alleviating factors.  Previous medical history includes paroxysmal AFib, anemia, anxiety, status post TAVR, CKD 4.  ER workup:  CBC with mild anemia and thrombocytopenia of 129.  CMP with BUN 26 and creatinine of 1.6, BNP elevated at 819.  Troponin mildly elevated 0.263.  Chest x-ray was unremarkable.  CT of the head was negative.  Cardiology was consulted for elevated troponin and recommended trending troponin.  Urinalysis with white blood cells, red blood cells, rare bacteria, and nitrite positive.  Urine cultures pending.  Patient was started on Rocephin.  Patient admitted to Hospital Medicine for treatment and management.    Overview/Hospital Course:  No notes on file    Interval History:  Patient seen and examined. Confused, calm. Started on amiodarone infusion by cardiology yesterday. Updated family over the phone.     Review of Systems   Unable to perform ROS: Dementia     Objective:     Vital Signs (Most Recent):  Temp: 98.1 °F (36.7 °C) (01/21/24 0000)  Pulse: 82 (01/21/24 0840)  Resp: (!) 51 (01/21/24 0840)  BP: (!) 176/74 (01/21/24 0516)  SpO2: 95 % (01/21/24 0840) Vital Signs (24h Range):  Temp:  [97.9 °F (36.6 °C)-98.1 °F (36.7 °C)] 98.1 °F (36.7 °C)  Pulse:  [] 82  Resp:  [17-51] 51  SpO2:  [92 %-100 %] 95 %  BP: (100-176)/() 176/74     Weight: 84.4 kg (186 lb 1.1 oz)  Body mass index is 30.03  kg/m².    Intake/Output Summary (Last 24 hours) at 1/21/2024 1305  Last data filed at 1/21/2024 0516  Gross per 24 hour   Intake 220 ml   Output 1000 ml   Net -780 ml           Physical Exam  Constitutional:       General: She is not in acute distress.     Appearance: She is well-developed.   HENT:      Head: Normocephalic and atraumatic.   Eyes:      Pupils: Pupils are equal, round, and reactive to light.   Cardiovascular:      Rate and Rhythm: Normal rate and regular rhythm.      Heart sounds: No murmur heard.  Pulmonary:      Effort: Pulmonary effort is normal. No respiratory distress.      Breath sounds: Normal breath sounds. No wheezing or rales.   Abdominal:      General: Bowel sounds are normal. There is no distension.      Palpations: Abdomen is soft.      Tenderness: There is no abdominal tenderness.   Musculoskeletal:         General: Normal range of motion.   Skin:     General: Skin is warm and dry.      Findings: No rash.   Neurological:      Mental Status: She is alert. She is disoriented.      Cranial Nerves: No cranial nerve deficit.      Comments: Pleasantly confused.  Knows she is in the hospital   Psychiatric:         Behavior: Behavior normal.             Significant Labs: All pertinent labs within the past 24 hours have been reviewed.    Significant Imaging: I have reviewed all pertinent imaging results/findings within the past 24 hours.    Assessment/Plan:      * Encephalopathy, metabolic  Acute problem  Patient has acute metabolic encephalopathy that is secondary to Sepsis/Infective. Patient's current mental status is Confused. Patient's baseline mental status is. awake and alert; oriented to person, place, and time Evaluation and for underlying cause(s) is underway and inclusive of Blood Chemistries and Toxic metabolite eval . Will monitor neuro checks carefully, avoid narcotics and benzos that will exacerbate agitation, and use PRN medications for controls of behavior for self harm.       S/P  TAVR (transcatheter aortic valve replacement)  Chronic problem  Continuous telemetry monitoring      Acute cystitis with hematuria  Acute Problem  Urine culture NGTD   Rocephin 1 g IV piggyback q.12 hours         Anemia  Patient's anemia is currently controlled. Has not received any PRBCs to date. Etiology likely d/t chronic disease due to Chronic Kidney Disease/ESRD  Current CBC reviewed-   Lab Results   Component Value Date    HGB 9.7 (L) 01/21/2024    HCT 29.0 (L) 01/21/2024     Monitor serial CBC and transfuse if patient becomes hemodynamically unstable, symptomatic or H/H drops below 7/21.      VTE Risk Mitigation (From admission, onward)           Ordered     apixaban tablet 2.5 mg  2 times daily         01/19/24 1435     IP VTE HIGH RISK PATIENT  Once         01/19/24 1435     Place sequential compression device  Until discontinued         01/19/24 1435     Place KRISTINA hose  Until discontinued         01/19/24 1435                    Discharge Planning   YANET: 1/22/2024     Code Status: Full Code   Is the patient medically ready for discharge?:     Reason for patient still in hospital (select all that apply): Patient trending condition and Treatment  Discharge Plan A: Skilled Nursing Facility                  Madisyn Green MD  Department of Hospital Medicine   [facility]/Surg

## 2024-01-21 NOTE — PROGRESS NOTES
[facility]  Department of Cardiology  Progress Note    PATIENT NAME: Irene العراقي  MRN: [id number]  TODAY'S DATE: 01/21/2024  ADMIT DATE: 1/19/2024    SUBJECTIVE     PRINCIPLE PROBLEM: Encephalopathy, metabolic  Patient is 83-year-old lady with known history of severe aortic stenosis underwent TAVR and has paroxysmal atrial fibrillation and patient was in sinus rhythm this morning and converted spontaneously into paroxysmal atrial fibrillation with rapid ventricular rate., she also has history of CKD stage 4 and mild anemia.  Patient was admitted with change in mental status and hallucination and was found to have urinary tract infection.  At the present time patient denies any chest pain or tightness or heaviness her breathing has been stable and patient was in sinus rhythm when I have seen her.  She looks comfortable but patient is pleasantly confused and hallucinating about her sister passed away recently.     INTERVAL HISTORY:    1/21/2024  Patient appears to be more confused today than she was yesterday  Currently on amiodarone drip at 5 milligrams/minute.  Will switch her over to oral amiodarone.  Currently her rhythm is paced.    Review of patient's allergies indicates:   Allergen Reactions    Cefuroxime     Hydrocodone     Meperidine     Meperidine hcl Nausea And Vomiting    Persantine [dipyridamole]     Nitrofurantoin macrocrystal      Headache , went into Afib     Vicodin [hydrocodone-acetaminophen] Nausea And Vomiting       REVIEW OF SYSTEMS  CARDIOVASCULAR: No recent chest pain, palpitations, arm, neck, or jaw pain  RESPIRATORY: No recent fever, cough chills, SOB or congestion  : No blood in the urine  GI: No Nausea, vomiting, constipation, diarrhea, blood, or reflux.  MUSCULOSKELETAL:  Hip myalgias  NEURO:  Pleasantly confused      OBJECTIVE     VITAL SIGNS (Most Recent)  Temp: 98.1 °F (36.7 °C) (01/21/24 0000)  Pulse: 82 (01/21/24 0840)  Resp: (!) 51 (01/21/24 0840)  BP: (!)  176/74 (01/21/24 0516)  SpO2: 95 % (01/21/24 0840)    VENTILATION STATUS  Resp: (!) 51 (01/21/24 0840)  SpO2: 95 % (01/21/24 0840)           I & O (Last 24H):  Intake/Output Summary (Last 24 hours) at 1/21/2024 1003  Last data filed at 1/21/2024 0516  Gross per 24 hour   Intake 220 ml   Output 1000 ml   Net -780 ml       WEIGHTS  Wt Readings from Last 1 Encounters:   01/21/24 0516 84.4 kg (186 lb 1.1 oz)   01/20/24 1451 81.6 kg (179 lb 14.3 oz)   01/19/24 1547 81.6 kg (180 lb)   01/19/24 0916 81.6 kg (180 lb)       PHYSICAL EXAM  CONSTITUTIONAL:  Adequately nourished in no apparent distress  NECK: no carotid bruit, no JVD  LUNGS:  Bilateral decreased breath sounds at the bases  CHEST WALL: no tenderness  HEART: regular rate and rhythm, S1, S2 normal, Systolic murmur.  ABDOMEN: soft,  EXTREMITIES: Extremities normal, trace ankle edema  NEURO:  Confused with intermittent hallucination    SCHEDULED MEDS:   apixaban  2.5 mg Oral BID    cefTRIAXone (Rocephin) IV (PEDS and ADULTS)  1 g Intravenous Q12H    docusate sodium  100 mg Oral BID    famotidine  20 mg Oral Daily    metoprolol tartrate  12.5 mg Oral BID    midodrine  10 mg Oral TID WM    polyethylene glycol  17 g Oral BID       CONTINUOUS INFUSIONS:   amiodarone in dextrose 5% 0.5 mg/min (01/21/24 0842)       PRN MEDS:acetaminophen, acetaminophen, albuterol-ipratropium, aluminum-magnesium hydroxide-simethicone, dextrose 10%, dextrose 10%, glucagon (human recombinant), glucose, glucose, haloperidoL, melatonin, naloxone, ondansetron, simethicone, sodium chloride 0.9%    LABS AND DIAGNOSTICS     CBC LAST 3 DAYS  Recent Labs   Lab 01/19/24  1029 01/20/24  0313 01/21/24  0545   WBC 6.81 6.60 8.45   RBC 3.19* 3.09* 3.15*   HGB 9.8* 9.4* 9.7*   HCT 30.0* 28.6* 29.0*   MCV 94 93 92   MCH 30.7 30.4 30.8   MCHC 32.7 32.9 33.4   RDW 13.3 13.2 13.4   * 119* 118*   MPV 10.2 10.7 10.3   GRAN 71.4  4.9 68.5  4.5 78.6*  6.7   LYMPH 16.7*  1.1 17.0*  1.1 10.9*  0.9*  "  MONO 9.1  0.6 11.1  0.7 8.3  0.7   BASO 0.06 0.03 0.04   NRBC 0 0 0       COAGULATION LAST 3 DAYS  No results for input(s): "LABPT", "INR", "APTT" in the last 168 hours.    CHEMISTRY LAST 3 DAYS  Recent Labs   Lab 01/19/24  1029 01/20/24  0313 01/21/24  0545    139 143   K 3.8 3.8 3.8    106 108   CO2 25 24 24   ANIONGAP 10 9 11   BUN 26* 27* 22   CREATININE 1.6* 1.3 1.4   GLU 83 90 92   CALCIUM 9.1 9.1 9.4   PHOS  --  3.0 2.9   MG  --  2.0 1.9   ALBUMIN 3.1* 3.0* 3.2*   BILITOT 0.5 0.5 0.4   PROT 5.7* 5.5* 5.9*   ALKPHOS 119 121 122   ALT 19 20 23   AST 29 32 35       CARDIAC PROFILE LAST 3 DAYS  Recent Labs   Lab 01/19/24  1029 01/19/24  1442   *  --    TROPONINI 0.263* 0.312*       ENDOCRINE LAST 3 DAYS  No results for input(s): "TSH", "PROCAL" in the last 168 hours.    LAST ARTERIAL BLOOD GAS  ABG  No results for input(s): "PH", "PO2", "PCO2", "HCO3", "BE" in the last 168 hours.    LAST 7 DAYS MICROBIOLOGY   Microbiology Results (last 7 days)       Procedure Component Value Units Date/Time    Urine culture [[id number]] Collected: 01/19/24 1328    Order Status: Completed Specimen: Urine, Catheterized Updated: 01/21/24 0652     Urine Culture, Routine No growth to date    Narrative:      Indicated criteria for high risk culture:->Other  Other (specify):->.            MOST RECENT IMAGING  CT Head Without Contrast  Narrative: EXAMINATION:  CT HEAD WITHOUT CONTRAST    CLINICAL HISTORY:  Mental status change, unknown cause;    TECHNIQUE:  Low dose axial CT images obtained throughout the head without intravenous contrast. Sagittal and coronal reconstructions were performed.    COMPARISON:  Head CT 12/12/2023.    FINDINGS:  Brain: There is no evidence of a mass, edema, midline shift, or intracranial hemorrhage. No extra-axial fluid collection.  Confluent low-density throughout the cerebral hemispheric white matter is grossly unchanged consistent with at least moderate chronic small vessel ischemic " changes.  No CT evidence of an acute major vascular territorial infarct.    Ventricles: The ventricles, sulci, and cisterns are within normal limits.    Skull: The osseous structures are unremarkable in appearance.    Extracranial soft tissues: Limited imaging is within normal limits.    Other: The visualized portions of the sinuses, orbits, and mastoid air cells are within normal limits.  Impression: 1. No acute intracranial CT findings.    Electronically signed by: Brian Jones  Date:    01/19/2024  Time:    10:07  X-Ray Chest AP Portable  Narrative: EXAMINATION:  XR CHEST AP PORTABLE    CLINICAL HISTORY:  Altered mental status, unspecified    TECHNIQUE:  Single frontal view of the chest was performed.    COMPARISON:  Chest of December 11, 2023    FINDINGS:  A pacemaker is noted in place in the left chest with 2 leads to the right heart.  The mediastinal and cardiac size and contours normal.  No intrapulmonary mass or infiltrate is seen.  There is no pneumothorax or pleural effusion.  Impression: Pacemaker in place otherwise negative chest x-ray.    Electronically signed by: Roger Hairston MD  Date:    01/19/2024  Time:    09:50      [facility]  Results for orders placed during the hospital encounter of 12/11/23    Echo    Interpretation Summary    Left Ventricle: The left ventricle is normal in size. Normal wall thickness. Septal motion is consistent with pacing. There is low normal systolic function with a visually estimated ejection fraction of 50 - 55%. Grade II diastolic dysfunction.    Right Ventricle: Normal right ventricular cavity size. Systolic function is normal.    Left Atrium: Left atrium is mildly dilated.    Right Atrium: Right atrium is mildly dilated.    Aortic Valve: There is a well-seated, normally functioning bioprosthetic valve in the aortic position. It is reported to be a 29 mm [employer] valve. Mild paravalvular regurgitation.    Mitral Valve: Moderately calcified leaflets. There is moderate  mitral annular calcification present. There is mild stenosis. The mean pressure gradient across the mitral valve is 3 mmHg at a heart rate of 67 bpm. There is mild regurgitation.    Tricuspid Valve: There is mild to moderate regurgitation.    The estimated pulmonary artery systolic pressure is 42 mmHg.      CURRENT/PREVIOUS VISIT EKG  Results for orders placed or performed during the hospital encounter of 12/11/23   EKG 12-lead    Collection Time: 12/11/23  9:29 AM    Narrative    Test Reason : R55,    Vent. Rate : 060 BPM     Atrial Rate : 060 BPM     P-R Int : 174 ms          QRS Dur : 150 ms      QT Int : 480 ms       P-R-T Axes : 019 -42 065 degrees     QTc Int : 480 ms    AV dual-paced rhythm  Abnormal ECG  When compared with ECG of 01-DEC-2023 13:55,  Vent. rate has decreased BY  10 BPM  Confirmed by En ARRIAGA, Jose D DANIEL ([id number]) on 12/14/2023 9:17:15 PM    Referred By: [email or web address]   SELF           Confirmed By:Jose D Gatica MD       ASSESSMENT/PLAN:     Active Hospital Problems    Diagnosis    *Encephalopathy, metabolic    S/P TAVR (transcatheter aortic valve replacement)    Acute cystitis with hematuria    Anemia       ASSESSMENT & PLAN:   1. Paroxysmal atrial fibrillation  2. Status post permanent pacemaker implantation   3. Nonsustained ventricular tachycardia   4. Severe aortic stenosis status post TAVR   5. Acute cystitis with hematuria/urosepsis  6. Change in mental status/hallucinations  7. Chronic kidney disease   8. Mild anemia        RECOMMENDATIONS:  1. Patient has converted and she is currently in paced rhythm  2. Change IV amiodarone to p.o. amiodarone  Amiodarone 200 mg p.o. b.i.d.  3. No further arrhythmias.  4. A change in mental status and hallucination seems to be persistent.    5. Rocephin has been stopped  Patient has E coli in the urine and appears to be sensitive to multiple antibiotics.    1. Patient was in sinus rhythm when I had examined her.  And she converted to atrial fibrillation  with rapid ventricular rate and she had short run of nonsustained ventricular tachycardia.  Will give her metoprolol IV 5 mg and start her on metoprolol 12.5 mg p.o. b.i.d.  Amiodarone bolus and drip.    2. 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  3. She has acute urosepsis and mental status change and hallucination secondary due to urosepsis.    Continue antibiotics.  4. Her potassium is 3.8 would aim to keep it at 4.2 will give her 20 mEq of potassium.  5. Chronic kidney disease she is stable at the present time with a BUN of 27 and creatinine of 1.3.  6. Continue Eliquis 2.5 mg p.o. b.i.d.  7. Further recommendations to follow thank you for the consultation.      Pj Ramirez MD  Date of Service: 01/21/2024  10:03 AM

## 2024-01-21 NOTE — ASSESSMENT & PLAN NOTE
Patient's anemia is currently controlled. Has not received any PRBCs to date. Etiology likely d/t chronic disease due to Chronic Kidney Disease/ESRD  Current CBC reviewed-   Lab Results   Component Value Date    HGB 9.7 (L) 01/21/2024    HCT 29.0 (L) 01/21/2024     Monitor serial CBC and transfuse if patient becomes hemodynamically unstable, symptomatic or H/H drops below 7/21.

## 2024-01-21 NOTE — NURSING
Dr. Ramirez at the bedside, updated with pt condition. Per Dr. Ramirez plan will stop amiodarone and switch with PO, awaiting order.

## 2024-01-21 NOTE — EICU
Intervention Initiated From:  COR / CORNELIUS Borja intervened regarding:  Rounding (Video assessment)    Nurse Notified:  No    Doctor Notified:  No    Comments:

## 2024-01-21 NOTE — SUBJECTIVE & OBJECTIVE
Interval History:  Patient seen and examined. Confused, calm. Started on amiodarone infusion by cardiology yesterday. Updated family over the phone.     Review of Systems   Unable to perform ROS: Dementia     Objective:     Vital Signs (Most Recent):  Temp: 98.1 °F (36.7 °C) (01/21/24 0000)  Pulse: 82 (01/21/24 0840)  Resp: (!) 51 (01/21/24 0840)  BP: (!) 176/74 (01/21/24 0516)  SpO2: 95 % (01/21/24 0840) Vital Signs (24h Range):  Temp:  [97.9 °F (36.6 °C)-98.1 °F (36.7 °C)] 98.1 °F (36.7 °C)  Pulse:  [] 82  Resp:  [17-51] 51  SpO2:  [92 %-100 %] 95 %  BP: (100-176)/() 176/74     Weight: 84.4 kg (186 lb 1.1 oz)  Body mass index is 30.03 kg/m².    Intake/Output Summary (Last 24 hours) at 1/21/2024 1305  Last data filed at 1/21/2024 0516  Gross per 24 hour   Intake 220 ml   Output 1000 ml   Net -780 ml           Physical Exam  Constitutional:       General: She is not in acute distress.     Appearance: She is well-developed.   HENT:      Head: Normocephalic and atraumatic.   Eyes:      Pupils: Pupils are equal, round, and reactive to light.   Cardiovascular:      Rate and Rhythm: Normal rate and regular rhythm.      Heart sounds: No murmur heard.  Pulmonary:      Effort: Pulmonary effort is normal. No respiratory distress.      Breath sounds: Normal breath sounds. No wheezing or rales.   Abdominal:      General: Bowel sounds are normal. There is no distension.      Palpations: Abdomen is soft.      Tenderness: There is no abdominal tenderness.   Musculoskeletal:         General: Normal range of motion.   Skin:     General: Skin is warm and dry.      Findings: No rash.   Neurological:      Mental Status: She is alert. She is disoriented.      Cranial Nerves: No cranial nerve deficit.      Comments: Pleasantly confused.  Knows she is in the hospital   Psychiatric:         Behavior: Behavior normal.             Significant Labs: All pertinent labs within the past 24 hours have been reviewed.    Significant  Imaging: I have reviewed all pertinent imaging results/findings within the past 24 hours.

## 2024-01-22 LAB
ALBUMIN SERPL BCP-MCNC: 3 G/DL (ref 3.5–5.2)
ALP SERPL-CCNC: 115 U/L (ref 55–135)
ALT SERPL W/O P-5'-P-CCNC: 21 U/L (ref 10–44)
ANION GAP SERPL CALC-SCNC: 11 MMOL/L (ref 8–16)
AST SERPL-CCNC: 32 U/L (ref 10–40)
BASOPHILS # BLD AUTO: 0.05 K/UL (ref 0–0.2)
BASOPHILS NFR BLD: 0.5 % (ref 0–1.9)
BILIRUB SERPL-MCNC: 0.5 MG/DL (ref 0.1–1)
BUN SERPL-MCNC: 19 MG/DL (ref 8–23)
CALCIUM SERPL-MCNC: 9.1 MG/DL (ref 8.7–10.5)
CHLORIDE SERPL-SCNC: 109 MMOL/L (ref 95–110)
CO2 SERPL-SCNC: 22 MMOL/L (ref 23–29)
CREAT SERPL-MCNC: 1.2 MG/DL (ref 0.5–1.4)
DIFFERENTIAL METHOD BLD: ABNORMAL
EOSINOPHIL # BLD AUTO: 0.2 K/UL (ref 0–0.5)
EOSINOPHIL NFR BLD: 1.6 % (ref 0–8)
ERYTHROCYTE [DISTWIDTH] IN BLOOD BY AUTOMATED COUNT: 13.4 % (ref 11.5–14.5)
EST. GFR  (NO RACE VARIABLE): 45 ML/MIN/1.73 M^2
GLUCOSE SERPL-MCNC: 87 MG/DL (ref 70–110)
HCT VFR BLD AUTO: 30.2 % (ref 37–48.5)
HGB BLD-MCNC: 9.9 G/DL (ref 12–16)
IMM GRANULOCYTES # BLD AUTO: 0.03 K/UL (ref 0–0.04)
IMM GRANULOCYTES NFR BLD AUTO: 0.3 % (ref 0–0.5)
LYMPHOCYTES # BLD AUTO: 0.8 K/UL (ref 1–4.8)
LYMPHOCYTES NFR BLD: 8.4 % (ref 18–48)
MAGNESIUM SERPL-MCNC: 1.8 MG/DL (ref 1.6–2.6)
MCH RBC QN AUTO: 30.7 PG (ref 27–31)
MCHC RBC AUTO-ENTMCNC: 32.8 G/DL (ref 32–36)
MCV RBC AUTO: 94 FL (ref 82–98)
MONOCYTES # BLD AUTO: 0.8 K/UL (ref 0.3–1)
MONOCYTES NFR BLD: 8.8 % (ref 4–15)
NEUTROPHILS # BLD AUTO: 7.7 K/UL (ref 1.8–7.7)
NEUTROPHILS NFR BLD: 80.4 % (ref 38–73)
NRBC BLD-RTO: 0 /100 WBC
PHOSPHATE SERPL-MCNC: 3.3 MG/DL (ref 2.7–4.5)
PLATELET # BLD AUTO: 105 K/UL (ref 150–450)
PMV BLD AUTO: 10.5 FL (ref 9.2–12.9)
POTASSIUM SERPL-SCNC: 3.7 MMOL/L (ref 3.5–5.1)
PROT SERPL-MCNC: 5.7 G/DL (ref 6–8.4)
RBC # BLD AUTO: 3.22 M/UL (ref 4–5.4)
SODIUM SERPL-SCNC: 142 MMOL/L (ref 136–145)
WBC # BLD AUTO: 9.53 K/UL (ref 3.9–12.7)

## 2024-01-22 PROCEDURE — 25000003 PHARM REV CODE 250: Performed by: INTERNAL MEDICINE

## 2024-01-22 PROCEDURE — 84100 ASSAY OF PHOSPHORUS: CPT | Performed by: NURSE PRACTITIONER

## 2024-01-22 PROCEDURE — 83735 ASSAY OF MAGNESIUM: CPT | Performed by: NURSE PRACTITIONER

## 2024-01-22 PROCEDURE — 97110 THERAPEUTIC EXERCISES: CPT

## 2024-01-22 PROCEDURE — 80053 COMPREHEN METABOLIC PANEL: CPT | Performed by: NURSE PRACTITIONER

## 2024-01-22 PROCEDURE — 51798 US URINE CAPACITY MEASURE: CPT

## 2024-01-22 PROCEDURE — 25000003 PHARM REV CODE 250: Performed by: NURSE PRACTITIONER

## 2024-01-22 PROCEDURE — 63600175 PHARM REV CODE 636 W HCPCS

## 2024-01-22 PROCEDURE — 97162 PT EVAL MOD COMPLEX 30 MIN: CPT

## 2024-01-22 PROCEDURE — 36415 COLL VENOUS BLD VENIPUNCTURE: CPT | Performed by: NURSE PRACTITIONER

## 2024-01-22 PROCEDURE — 63600175 PHARM REV CODE 636 W HCPCS: Performed by: HOSPITALIST

## 2024-01-22 PROCEDURE — 63600175 PHARM REV CODE 636 W HCPCS: Performed by: NURSE PRACTITIONER

## 2024-01-22 PROCEDURE — 27000221 HC OXYGEN, UP TO 24 HOURS

## 2024-01-22 PROCEDURE — 94761 N-INVAS EAR/PLS OXIMETRY MLT: CPT

## 2024-01-22 PROCEDURE — 51702 INSERT TEMP BLADDER CATH: CPT

## 2024-01-22 PROCEDURE — 85025 COMPLETE CBC W/AUTO DIFF WBC: CPT | Performed by: NURSE PRACTITIONER

## 2024-01-22 PROCEDURE — 99233 SBSQ HOSP IP/OBS HIGH 50: CPT | Mod: ,,, | Performed by: INTERNAL MEDICINE

## 2024-01-22 PROCEDURE — 25000003 PHARM REV CODE 250: Performed by: HOSPITALIST

## 2024-01-22 PROCEDURE — 20600001 HC STEP DOWN PRIVATE ROOM

## 2024-01-22 PROCEDURE — 51701 INSERT BLADDER CATHETER: CPT

## 2024-01-22 PROCEDURE — 99900035 HC TECH TIME PER 15 MIN (STAT)

## 2024-01-22 RX ORDER — HYDRALAZINE HYDROCHLORIDE 20 MG/ML
10 INJECTION INTRAMUSCULAR; INTRAVENOUS EVERY 6 HOURS PRN
Status: DISCONTINUED | OUTPATIENT
Start: 2024-01-22 | End: 2024-01-26 | Stop reason: HOSPADM

## 2024-01-22 RX ADMIN — POLYETHYLENE GLYCOL (3350) 17 G: 17 POWDER, FOR SOLUTION ORAL at 10:01

## 2024-01-22 RX ADMIN — DOCUSATE SODIUM 100 MG: 100 CAPSULE, LIQUID FILLED ORAL at 10:01

## 2024-01-22 RX ADMIN — HYDRALAZINE HYDROCHLORIDE 10 MG: 20 INJECTION INTRAMUSCULAR; INTRAVENOUS at 01:01

## 2024-01-22 RX ADMIN — CEFTRIAXONE SODIUM 1 G: 1 INJECTION, POWDER, FOR SOLUTION INTRAMUSCULAR; INTRAVENOUS at 02:01

## 2024-01-22 RX ADMIN — METOPROLOL TARTRATE 12.5 MG: 25 TABLET, FILM COATED ORAL at 09:01

## 2024-01-22 RX ADMIN — APIXABAN 2.5 MG: 2.5 TABLET, FILM COATED ORAL at 09:01

## 2024-01-22 RX ADMIN — AMIODARONE HYDROCHLORIDE 200 MG: 200 TABLET ORAL at 10:01

## 2024-01-22 RX ADMIN — AMPICILLIN SODIUM 2 G: 2 INJECTION, POWDER, FOR SOLUTION INTRAMUSCULAR; INTRAVENOUS at 02:01

## 2024-01-22 RX ADMIN — FAMOTIDINE 20 MG: 20 TABLET, FILM COATED ORAL at 10:01

## 2024-01-22 RX ADMIN — METOPROLOL TARTRATE 12.5 MG: 25 TABLET, FILM COATED ORAL at 10:01

## 2024-01-22 RX ADMIN — APIXABAN 2.5 MG: 2.5 TABLET, FILM COATED ORAL at 10:01

## 2024-01-22 NOTE — CONSULTS
[facility]/Surg  Cardiology  Consult Note    Patient Name: Irene العراقي  MRN: [id number]  Admission Date: 1/19/2024  Hospital Length of Stay: 1 days  Code Status: Full Code   Attending Provider: Madisyn Green MD   Consulting Provider: Kevin Redmond MD  Primary Care Physician: Wanda Kuhn FNP-C  Principal Problem:Encephalopathy, metabolic    Patient information was obtained from patient and ER records.     Inpatient consult to Cardiology  Consult performed by: Kevin Redmond MD  Consult ordered by: Yoel Loving NP      This is a progress note.  The original consult was done on January 20 by Dr. RONNI Ramirez  Subjective:     No acute cardiac events reported overnight.  Continues to be altered from a mental status point.    Past Medical History:   Diagnosis Date    Anemia, unspecified     Atrial fibrillation 01/25/2021    CKD (chronic kidney disease)     Hypertension        Past Surgical History:   Procedure Laterality Date    A-V CARDIAC PACEMAKER INSERTION  11/2/2023    Procedure: Dual Chamber PPM RM 2617 (Medtronic);  Surgeon: Andreas James III, MD;  Location: [facility] CATH;  Service: Cardiology;;    ANGIOGRAM, CORONARY, WITH LEFT HEART CATHETERIZATION Left 4/19/2023    Procedure: Angiogram, Coronary, with Left Heart Cath;  Surgeon: Kevin Redmond MD;  Location: [facility] CATH/EP LAB;  Service: Cardiology;  Laterality: Left;    BREAST SURGERY      COLONOSCOPY N/A 4/16/2023    Procedure: COLONOSCOPY;  Surgeon: Kvng Mensah MD;  Location: [facility] ENDO;  Service: Endoscopy;  Laterality: N/A;    COLONOSCOPY W/ POLYPECTOMY  04/16/2023    OPEN REDUCTION AND INTERNAL FIXATION (ORIF) OF INJURY OF ANKLE Right 12/13/2023    Procedure: ORIF, ANKLE;  Surgeon: Chaitanya Garrison MD;  Location: [facility] OR;  Service: Orthopedics;  Laterality: Right;  Synthes    TRANSCATHETER AORTIC VALVE REPLACEMENT (TAVR)  11/1/2023    Procedure: (TAVR);  Surgeon: Juliano Moncada MD;  Location: [facility] CATH;  Service:  Cardiology;;    TRANSCATHETER AORTIC VALVE REPLACEMENT (TAVR)  11/1/2023    Procedure: (TAVR)- Surgeon;  Surgeon: Adebayo Guzman MD;  Location: [facility];  Service: Peripheral Vascular;;       Review of patient's allergies indicates:   Allergen Reactions    Cefuroxime     Hydrocodone     Meperidine     Meperidine hcl Nausea And Vomiting    Persantine [dipyridamole]     Nitrofurantoin macrocrystal      Headache , went into Afib     Vicodin [hydrocodone-acetaminophen] Nausea And Vomiting       No current facility-administered medications on file prior to encounter.     Current Outpatient Medications on File Prior to Encounter   Medication Sig    ALPRAZolam (XANAX) 0.5 MG tablet Take 1 tablet (0.5 mg total) by mouth 3 (three) times daily as needed for Anxiety.    apixaban (ELIQUIS) 2.5 mg Tab Take 1 tablet (2.5 mg total) by mouth 2 (two) times daily.    docusate sodium (COLACE) 100 MG capsule Take 1 capsule (100 mg total) by mouth 2 (two) times daily.    famotidine (PEPCID) 20 MG tablet Take 1 tablet (20 mg total) by mouth once daily.    iron ag,rq-V-BS1-B12-Zn-sa-sto (NIFEREX, SUMALATE-QUATREFOLIC,) 150 mg iron- 60 mg-1 mg Tab Take 1 tablet by mouth once daily.    magnesium oxide (MAG-OX) 400 mg (241.3 mg magnesium) tablet TAKE 1 TABLET BY MOUTH ONCE DAILY (Patient taking differently: Take 400 mg by mouth once daily.)    meclizine (ANTIVERT) 50 MG tablet Take 1 tablet (50 mg total) by mouth 3 (three) times daily as needed for Dizziness.    midodrine (PROAMATINE) 10 MG tablet Take 1 tablet (10 mg total) by mouth 3 (three) times daily with meals.    MIRALAX 17 gram PwPk Take 17 g by mouth 2 (two) times daily.    ondansetron (ZOFRAN-ODT) 4 MG TbDL Take 2 tablets (8 mg total) by mouth every 8 (eight) hours as needed.     Family History       Problem Relation (Age of Onset)    Cancer Mother, Father          Tobacco Use    Smoking status: Former     Types: Cigarettes     Passive exposure: Past    Smokeless tobacco:  "Never   Substance and Sexual Activity    Alcohol use: Not Currently    Drug use: Not Currently    Sexual activity: Not on file     Review of Systems   Unable to perform ROS: Acuity of condition     Objective:     Vital Signs (Most Recent):  Temp: 98.1 °F (36.7 °C) (01/22/24 1150)  Pulse: 63 (01/22/24 1100)  Resp: 20 (01/22/24 1100)  BP: 138/64 (01/22/24 1100)  SpO2: 97 % (01/22/24 1100) Vital Signs (24h Range):  Temp:  [97.8 °F (36.6 °C)-99 °F (37.2 °C)] 98.1 °F (36.7 °C)  Pulse:  [61-88] 63  Resp:  [14-41] 20  SpO2:  [91 %-100 %] 97 %  BP: (134-192)/() 138/64     Weight: 84.4 kg (186 lb 1.1 oz)  Body mass index is 30.03 kg/m².    SpO2: 97 %         Intake/Output Summary (Last 24 hours) at 1/22/2024 1154  Last data filed at 1/22/2024 0430  Gross per 24 hour   Intake --   Output 415 ml   Net -415 ml       Lines/Drains/Airways       Drain  Duration             Female External Urinary Catheter w/ Suction 01/19/24 1900 2 days              Peripheral Intravenous Line  Duration                  Peripheral IV - Single Lumen 01/21/24 0050 20 G Left;Posterior Hand 1 day                    Physical Exam  Constitutional:       Comments: Drowsy, comfortable   Cardiovascular:      Rate and Rhythm: Normal rate and regular rhythm.      Heart sounds: No murmur heard.  Pulmonary:      Effort: Pulmonary effort is normal.      Breath sounds: Normal breath sounds.   Musculoskeletal:      Right lower leg: No edema.      Left lower leg: No edema.   Skin:     General: Skin is warm.         Significant Labs: BMP:   Recent Labs   Lab 01/21/24  0545 01/22/24  0457   GLU 92 87    142   K 3.8 3.7    109   CO2 24 22*   BUN 22 19   CREATININE 1.4 1.2   CALCIUM 9.4 9.1   MG 1.9 1.8   , CBC   Recent Labs   Lab 01/21/24  0545 01/22/24  0457   WBC 8.45 9.53   HGB 9.7* 9.9*   HCT 29.0* 30.2*   * 105*   , and Troponin No results for input(s): "TROPONINI" in the last 48 hours.    Significant Imaging: Echocardiogram: 2D echo " with color flow doppler: No results found for this or any previous visit. and Transthoracic echo (TTE) complete (Cupid Only):   Results for orders placed or performed during the hospital encounter of 12/11/23   Echo   Result Value Ref Range    Coyle's Biplane MOD Ejection Fraction 42 %    LVOT stroke volume 110.18 cm3    LVIDd 4.99 3.5 - 6.0 cm    LV Systolic Volume 70.95 mL    LV Systolic Volume Index 39.2 mL/m2    LVIDs 4.02 (A) 2.1 - 4.0 cm    LV Diastolic Volume 117.47 mL    LV Diastolic Volume Index 64.90 mL/m2    IVS 1.03 0.6 - 1.1 cm    LVOT diameter 1.98 cm    LVOT area 3.1 cm2    FS 19 (A) 28 - 44 %    Left Ventricle Relative Wall Thickness 0.40 cm    Posterior Wall 1.01 0.6 - 1.1 cm    LV mass 186.28 g    LV Mass Index 103 g/m2    MV Peak E Javad 0.94 m/s    MV Peak A Javad 1.36 m/s    TR Max Javad 2.9 m/s    E/A ratio 0.69     IVRT 190.29 msec    E wave deceleration time 417.37 msec    LVOT peak javad 1.91 m/s    Left Ventricular Outflow Tract Mean Velocity 1.17 cm/s    Left Ventricular Outflow Tract Mean Gradient 6.84 mmHg    RVDD 3.41 cm    Right ventricular length in diastole (apical 4-chamber view) 5.48 cm    Left Atrium Major Axis 5.09 cm    LA volume (mod) 68.92 cm3    LA Volume Index (Mod) 38.1 mL/m2    RA Major [place] 4.55 cm    AV regurgitation pressure 1/2 time 650.231346339071505 ms    AR Max Javad 3.93 m/s    AV mean gradient 10 mmHg    AV peak gradient 17 mmHg    Ao peak javad 2.05 m/s    Ao VTI 41.10 cm    LVOT peak VTI 35.80 cm    AV valve area 2.68 cm²    AV Velocity Ratio 0.93     AV index (prosthetic) 0.87     PAUL by Velocity Ratio 2.87 cm²    Mr max javad 4.99 m/s    MV mean gradient 3 mmHg    MV peak gradient 10 mmHg    MV stenosis pressure 1/2 time 122.04 ms    MV valve area p 1/2 method 1.80 cm2    MV valve area by continuity eq 2.35 cm2    MV VTI 46.9 cm    Triscuspid Valve Regurgitation Peak Gradient 34 mmHg    PV PEAK VELOCITY 1.46 m/s    PV peak gradient 9 mmHg    Pulmonary Valve Mean Velocity  1.16 m/s    Ao root annulus 2.04 cm    STJ 2.26 cm    Ascending aorta 2.40 cm    ZLVIDS 2.07     ZLVIDD -0.04     AORTIC VALVE CUSP SEPERATION 1.17 cm    BSA 1.83 m2    TV resting pulmonary artery pressure 42 mmHg    RV TB RVSP 11 mmHg    Est. RA pres 8 mmHg    Narrative      Left Ventricle: The left ventricle is normal in size. Normal wall   thickness. Septal motion is consistent with pacing. There is low normal   systolic function with a visually estimated ejection fraction of 50 - 55%.   Grade II diastolic dysfunction.    Right Ventricle: Normal right ventricular cavity size. Systolic   function is normal.    Left Atrium: Left atrium is mildly dilated.    Right Atrium: Right atrium is mildly dilated.    Aortic Valve: There is a well-seated, normally functioning   bioprosthetic valve in the aortic position. It is reported to be a 29 mm   Medtronic valve. Mild paravalvular regurgitation.    Mitral Valve: Moderately calcified leaflets. There is moderate mitral   annular calcification present. There is mild stenosis. The mean pressure   gradient across the mitral valve is 3 mmHg at a heart rate of 67 bpm.   There is mild regurgitation.    Tricuspid Valve: There is mild to moderate regurgitation.    The estimated pulmonary artery systolic pressure is 42 mmHg.       Assessment and Plan:     Altered mental status, likely due to UTI or other etiologies  Paroxysmal atrial fibrillation, currently in sinus rhythm  History of TAVR  History of dual chamber pacemaker  History of nonobstructive coronary artery disease  Possible mild mitral stenosis  Elevated BNP without brigid heart failure    Recommendations:  - appears to be stable from a cardiovascular standpoint.  Continue with current medications including Eliquis and amiodarone.  Rest as per primary team.  Cardiology to sign off, follow-up in clinic.    Active Diagnoses:    Diagnosis Date Noted POA    PRINCIPAL PROBLEM:  Encephalopathy, metabolic [G93.41] 01/19/2024 Yes     S/P TAVR (transcatheter aortic valve replacement) [Z95.2] 11/01/2023 Not Applicable    Acute cystitis with hematuria [N30.01] 09/09/2023 Yes    Anemia [D64.9] 01/28/2021 Yes      Problems Resolved During this Admission:       VTE Risk Mitigation (From admission, onward)           Ordered     apixaban tablet 2.5 mg  2 times daily         01/19/24 1435     IP VTE HIGH RISK PATIENT  Once         01/19/24 1435     Place sequential compression device  Until discontinued         01/19/24 1435     Place KRISTINA hose  Until discontinued         01/19/24 1435                    Thank you for your consult. I will follow-up with patient. Please contact us if you have any additional questions.    Kevin Redmond MD  Cardiology   [facility]/Surg

## 2024-01-22 NOTE — SUBJECTIVE & OBJECTIVE
Interval History:  Patient seen and examined. Confused, calm. Lower lip swelling noted, appears to be from biting/trauma. No swelling to cheeks or throat.    Review of Systems   Unable to perform ROS: Dementia     Objective:     Vital Signs (Most Recent):  Temp: 98.1 °F (36.7 °C) (01/22/24 1150)  Pulse: 61 (01/22/24 1200)  Resp: 19 (01/22/24 1200)  BP: (!) 157/67 (01/22/24 1200)  SpO2: 100 % (01/22/24 1200) Vital Signs (24h Range):  Temp:  [97.8 °F (36.6 °C)-99 °F (37.2 °C)] 98.1 °F (36.7 °C)  Pulse:  [61-88] 61  Resp:  [14-41] 19  SpO2:  [91 %-100 %] 100 %  BP: (134-187)/() 157/67     Weight: 84.4 kg (186 lb 1.1 oz)  Body mass index is 30.03 kg/m².    Intake/Output Summary (Last 24 hours) at 1/22/2024 1217  Last data filed at 1/22/2024 0430  Gross per 24 hour   Intake --   Output 415 ml   Net -415 ml           Physical Exam  Constitutional:       General: She is not in acute distress.     Appearance: She is well-developed.   HENT:      Head: Normocephalic.      Mouth/Throat:      Comments: Lower lip swollen, tongue with dried blood. No swelling to cheeks, throat, or other surrounding areas.  Eyes:      Pupils: Pupils are equal, round, and reactive to light.   Cardiovascular:      Rate and Rhythm: Normal rate and regular rhythm.      Heart sounds: No murmur heard.  Pulmonary:      Effort: Pulmonary effort is normal. No respiratory distress.      Breath sounds: Normal breath sounds. No wheezing or rales.   Abdominal:      General: Bowel sounds are normal. There is no distension.      Palpations: Abdomen is soft.      Tenderness: There is no abdominal tenderness.   Musculoskeletal:         General: Normal range of motion.   Skin:     General: Skin is warm and dry.      Findings: No rash.   Neurological:      Mental Status: She is alert. She is disoriented.      Cranial Nerves: No cranial nerve deficit.      Comments: Pleasantly confused.  Knows she is in the hospital   Psychiatric:         Behavior: Behavior  normal.             Significant Labs: All pertinent labs within the past 24 hours have been reviewed.    Significant Imaging: I have reviewed all pertinent imaging results/findings within the past 24 hours.

## 2024-01-22 NOTE — EICU
Intervention Initiated From:  [facility] / EICU    Jonh intervened regarding:  Rounding (Video assessment)  Comments: Video rounds done.  Bedside team in room assisting patient.  Pt appears somewhat restless.  HR 80's paced rhythm,  no acute distress noted.

## 2024-01-22 NOTE — PT/OT/SLP PROGRESS
Occupational Therapy      Patient Name:  Irene العراقي   MRN:  [id number]    Attempted OT evaluation. Patient very somnolent and unable to keep eyes open. Patient not appropriate at this time. Will follow up when appropriate.     1/22/2024

## 2024-01-22 NOTE — PLAN OF CARE
Dc plan is to return to [facility] once medically clear. Clinical packet sent requesting they request auth for pt's return       01/22/24 1210   Discharge Reassessment   Assessment Type Discharge Planning Reassessment   Did the patient's condition or plan change since previous assessment? Yes   Discharge Plan A Skilled Nursing Facility   Discharge Plan B Skilled Nursing Facility   Why the patient remains in the hospital Requires continued medical care   Post-Acute Status   Post-Acute Authorization Placement   Post-Acute Placement Status Referrals Sent

## 2024-01-22 NOTE — RESPIRATORY THERAPY
01/22/24 0721   Patient Assessment/Suction   Level of Consciousness (AVPU) alert   Respiratory Effort Normal;Unlabored   Expansion/Accessory Muscles/Retractions expansion symmetric;no retractions;no use of accessory muscles   All Lung Fields Breath Sounds Anterior:;Lateral:;diminished;clear   Rhythm/Pattern, Respiratory depth regular;pattern regular;unlabored   Cough Frequency no cough   Skin Integrity   $ Wound Care Tech Time 15 min   Area Observed Left;Right;Behind ear;Cheek;Nares   Skin Appearance without discoloration   PRE-TX-O2   Device (Oxygen Therapy) nasal cannula   $ Is the patient on Low Flow Oxygen? Yes   Flow (L/min) 2   SpO2 100 %   Pulse Oximetry Type Continuous   $ Pulse Oximetry - Multiple Charge Pulse Oximetry - Multiple   Pulse 62   Resp 20   Positioning HOB elevated 30 degrees   Aerosol Therapy   $ Aerosol Therapy Charges PRN treatment not required   Respiratory Treatment Status (SVN) PRN treatment not required

## 2024-01-22 NOTE — PLAN OF CARE
Problem: Physical Therapy  Goal: Physical Therapy Goal  Description: Goals to be met by: 2024     Patient will increase functional independence with mobility by performin. Supine to sit with MInimal Assistance  2. Sit to stand transfer with Moderate Assistance  3. Bed to chair transfer with Moderate Assistance using Rolling Walker  4. Gait  x 10 feet with Moderate Assistance using Rolling Walker.   5. Lower extremity exercise program x20 reps   Outcome: Ongoing, Progressing   PT eval and treat. Pt generally weak. Max assist to sit EOB with poor balance and falling back. Took a sip of Boost. Mouth/tongue with old blood- pt bit tongue during confusional state. Mod complexity

## 2024-01-22 NOTE — PROGRESS NOTES
[facility] Medicine  Progress Note    Patient Name: Irene العراقي  MRN: [id number]  Patient Class: IP- Inpatient   Admission Date: 1/19/2024  Length of Stay: 1 days  Attending Physician: Madisyn Green MD  Primary Care Provider: Wanda Kuhn FNP-C        Subjective:     Principal Problem:Encephalopathy, metabolic          HPI:  Irene العراقي is an 83-year-old female who presents emergency room for evaluation of altered mental status, confusion, and foul-smelling urine.    She denies any fever or chills.  No known sick contacts or travel.  No aggravating or alleviating factors.  Previous medical history includes paroxysmal AFib, anemia, anxiety, status post TAVR, CKD 4.  ER workup:  CBC with mild anemia and thrombocytopenia of 129.  CMP with BUN 26 and creatinine of 1.6, BNP elevated at 819.  Troponin mildly elevated 0.263.  Chest x-ray was unremarkable.  CT of the head was negative.  Cardiology was consulted for elevated troponin and recommended trending troponin.  Urinalysis with white blood cells, red blood cells, rare bacteria, and nitrite positive.  Urine cultures pending.  Patient was started on Rocephin.  Patient admitted to Hospital Medicine for treatment and management.    Overview/Hospital Course:  No notes on file    Interval History:  Patient seen and examined. Confused, calm. Lower lip swelling noted, appears to be from biting/trauma. No swelling to cheeks or throat.    Review of Systems   Unable to perform ROS: Dementia     Objective:     Vital Signs (Most Recent):  Temp: 98.1 °F (36.7 °C) (01/22/24 1150)  Pulse: 61 (01/22/24 1200)  Resp: 19 (01/22/24 1200)  BP: (!) 157/67 (01/22/24 1200)  SpO2: 100 % (01/22/24 1200) Vital Signs (24h Range):  Temp:  [97.8 °F (36.6 °C)-99 °F (37.2 °C)] 98.1 °F (36.7 °C)  Pulse:  [61-88] 61  Resp:  [14-41] 19  SpO2:  [91 %-100 %] 100 %  BP: (134-187)/() 157/67     Weight: 84.4 kg (186 lb 1.1 oz)  Body mass index is 30.03  kg/m².    Intake/Output Summary (Last 24 hours) at 1/22/2024 1217  Last data filed at 1/22/2024 0430  Gross per 24 hour   Intake --   Output 415 ml   Net -415 ml           Physical Exam  Constitutional:       General: She is not in acute distress.     Appearance: She is well-developed.   HENT:      Head: Normocephalic.      Mouth/Throat:      Comments: Lower lip swollen, tongue with dried blood. No swelling to cheeks, throat, or other surrounding areas.  Eyes:      Pupils: Pupils are equal, round, and reactive to light.   Cardiovascular:      Rate and Rhythm: Normal rate and regular rhythm.      Heart sounds: No murmur heard.  Pulmonary:      Effort: Pulmonary effort is normal. No respiratory distress.      Breath sounds: Normal breath sounds. No wheezing or rales.   Abdominal:      General: Bowel sounds are normal. There is no distension.      Palpations: Abdomen is soft.      Tenderness: There is no abdominal tenderness.   Musculoskeletal:         General: Normal range of motion.   Skin:     General: Skin is warm and dry.      Findings: No rash.   Neurological:      Mental Status: She is alert. She is disoriented.      Cranial Nerves: No cranial nerve deficit.      Comments: Pleasantly confused.  Knows she is in the hospital   Psychiatric:         Behavior: Behavior normal.             Significant Labs: All pertinent labs within the past 24 hours have been reviewed.    Significant Imaging: I have reviewed all pertinent imaging results/findings within the past 24 hours.    Assessment/Plan:      * Encephalopathy, metabolic  Acute problem  Patient has acute metabolic encephalopathy that is secondary to Sepsis/Infective. Patient's current mental status is Confused. Patient's baseline mental status is. awake and alert; oriented to person, place, and time Evaluation and for underlying cause(s) is underway and inclusive of Blood Chemistries and Toxic metabolite eval . Will monitor neuro checks carefully, avoid narcotics  and benzos that will exacerbate agitation, and use PRN medications for controls of behavior for self harm.       S/P TAVR (transcatheter aortic valve replacement)  Chronic problem  Continuous telemetry monitoring      Atrial fibrillation with RVR  Continue amiodarone and Eliquis  Appreciate cardiology recs. .    Acute cystitis with hematuria  Acute Problem  Urine culture NGTD   Rocephin 1 g IV piggyback q.12 hours         Anemia  Patient's anemia is currently controlled. Has not received any PRBCs to date. Etiology likely d/t chronic disease due to Chronic Kidney Disease/ESRD  Current CBC reviewed-   Lab Results   Component Value Date    HGB 9.9 (L) 01/22/2024    HCT 30.2 (L) 01/22/2024     Monitor serial CBC and transfuse if patient becomes hemodynamically unstable, symptomatic or H/H drops below 7/21.      VTE Risk Mitigation (From admission, onward)           Ordered     apixaban tablet 2.5 mg  2 times daily         01/19/24 1435     IP VTE HIGH RISK PATIENT  Once         01/19/24 1435     Place sequential compression device  Until discontinued         01/19/24 1435     Place KRISTINA hose  Until discontinued         01/19/24 1435                    Discharge Planning   YANET: 1/24/2024     Code Status: Full Code   Is the patient medically ready for discharge?:     Reason for patient still in hospital (select all that apply): Patient trending condition and Treatment  Discharge Plan A: Skilled Nursing Facility                  Madisyn Green MD  Department of Hospital Medicine   [facility]/Surg

## 2024-01-22 NOTE — ASSESSMENT & PLAN NOTE
Patient's anemia is currently controlled. Has not received any PRBCs to date. Etiology likely d/t chronic disease due to Chronic Kidney Disease/ESRD  Current CBC reviewed-   Lab Results   Component Value Date    HGB 9.9 (L) 01/22/2024    HCT 30.2 (L) 01/22/2024     Monitor serial CBC and transfuse if patient becomes hemodynamically unstable, symptomatic or H/H drops below 7/21.

## 2024-01-22 NOTE — PT/OT/SLP EVAL
Physical Therapy Evaluation    Patient Name:  Irene العراقي   MRN:  [id number]    Recommendations:     Discharge Recommendations: Moderate Intensity Therapy   Discharge Equipment Recommendations: none   Barriers to discharge: None    Assessment:     Irene العراقي is a 83 y.o. female admitted with a medical diagnosis of Encephalopathy, metabolic.  She presents with the following impairments/functional limitations: weakness, impaired endurance, impaired functional mobility, impaired balance, impaired cognition, decreased lower extremity function, decreased safety awareness, decreased ROM, impaired cardiopulmonary response to activity, orthopedic precautions .    ;pt seen supine in bed, asleep but easily awakened. Pt with crusty mouth/tongue due to old blood from tongue biting. Pt seen for thera ex in supine with LE's stiffness. EOB sitting max assist with poor midline orientation and posterior leaning. Pt offered Boost while seated with difficulty sucking.   Pt returned back supine post PT.  Pt to benefigt from continued therapies    Rehab Prognosis: Fair; patient would benefit from acute skilled PT services to address these deficits and reach maximum level of function.    Recent Surgery: * No surgery found *      Plan:     During this hospitalization, patient to be seen 6 x/week to address the identified rehab impairments via gait training, therapeutic activities, therapeutic exercises, neuromuscular re-education and progress toward the following goals:    Plan of Care Expires:  01/30/24    Subjective     Chief Complaint: mumbling  Patient/Family Comments/goals: none stated  Pain/Comfort:  Pain Rating 1: 0/10    Patients cultural, spiritual, Jew conflicts given the current situation:      Living Environment:  Admit from [facility]  Prior to admission, patients level of function was assist for all mobility, pt with functional decline physically and mentally since death of twin sister.  Equipment used at home:  Pt s/p removal of 3 parathyroid glands. Most recent ca wnls and is asymptomatic. Monitor.    wheelchair, walker, rolling.  DME owned (not currently used): none.  Upon discharge, patient will have assistance from family.    Objective:     Communicated with nurse Eder prior to session.  Patient found HOB elevated with peripheral IV, bed alarm, telemetry, blood pressure cuff, pulse ox (continuous) ([employer])  upon PT entry to room.    General Precautions: Standard, fall, anti-coagulation medicine, pacemaker  Orthopedic Precautions: (recent  L ankle sx 2023)   Braces: N/A  Respiratory Status: Nasal cannula, flow 2 L/min    Exams:  Postural Exam:  Patient presented with the following abnormalities:    -       Rounded shoulders  -       Forward head  -       BMI 30.03  RLE ROM: WFL except stiffness  RLE Strength: Deficits: 3-/5  LLE ROM: Deficits: generally stiff  LLE Strength: Deficits: 3-/5    Functional Mobility:  Bed Mobility:     Rolling Right: maximal assistance  Scooting: maximal assistance  Supine to Sit: maximal assistance  Sit to Supine: maximal assistance      AM-PAC 6 CLICK MOBILITY  Total Score:8       Treatment & Education:  Patient was educated on the importance of OOB activity and functional mobility to negate negative effects of prolonged bed rest during hospitalization, safe transfers and ambulation, and D/C planning   Thera ex in supine  EOB sitting max assist with posterior lean and poor midline awareness    Patient left HOB elevated with all lines intact, call button in reach, and bed alarm on.    GOALS:   Multidisciplinary Problems       Physical Therapy Goals          Problem: Physical Therapy    Goal Priority Disciplines Outcome Goal Variances Interventions   Physical Therapy Goal     PT, PT/OT Ongoing, Progressing     Description: Goals to be met by: 2024     Patient will increase functional independence with mobility by performin. Supine to sit with MInimal Assistance  2. Sit to stand transfer with Moderate Assistance  3. Bed to chair transfer with Moderate Assistance  using Rolling Walker  4. Gait  x 10 feet with Moderate Assistance using Rolling Walker.   5. Lower extremity exercise program x20 reps                        History:     Past Medical History:   Diagnosis Date    Anemia, unspecified     Atrial fibrillation 01/25/2021    CKD (chronic kidney disease)     Hypertension        Past Surgical History:   Procedure Laterality Date    A-V CARDIAC PACEMAKER INSERTION  11/2/2023    Procedure: Dual Chamber PPM RM 2617 ([employer]);  Surgeon: Andreas James III, MD;  Location: [facility] CATH;  Service: Cardiology;;    ANGIOGRAM, CORONARY, WITH LEFT HEART CATHETERIZATION Left 4/19/2023    Procedure: Angiogram, Coronary, with Left Heart Cath;  Surgeon: Kevin Redmond MD;  Location: [facility] CATH/EP LAB;  Service: Cardiology;  Laterality: Left;    BREAST SURGERY      COLONOSCOPY N/A 4/16/2023    Procedure: COLONOSCOPY;  Surgeon: Kvng Mensah MD;  Location: [facility] ENDO;  Service: Endoscopy;  Laterality: N/A;    COLONOSCOPY W/ POLYPECTOMY  04/16/2023    OPEN REDUCTION AND INTERNAL FIXATION (ORIF) OF INJURY OF ANKLE Right 12/13/2023    Procedure: ORIF, ANKLE;  Surgeon: Chaitanya Garrison MD;  Location: [facility] OR;  Service: Orthopedics;  Laterality: Right;  Synthes    TRANSCATHETER AORTIC VALVE REPLACEMENT (TAVR)  11/1/2023    Procedure: (TAVR);  Surgeon: Juliano Moncada MD;  Location: [facility] CATH;  Service: Cardiology;;    TRANSCATHETER AORTIC VALVE REPLACEMENT (TAVR)  11/1/2023    Procedure: (TAVR)- Surgeon;  Surgeon: Adebayo Guzman MD;  Location: [facility] CATH;  Service: Peripheral Vascular;;       Time Tracking:     PT Received On: 01/22/24  PT Start Time: 0926     PT Stop Time: 0943  PT Total Time (min): 17 min     Billable Minutes: Evaluation 8 and Therapeutic Exercise 9      01/22/2024

## 2024-01-22 NOTE — NURSING
Patient noted to have swollen tongue and swollen lower lip. This is a new finding on this assessment. DC Loving NP notified, assessed patient at bedside and ordered benadryl.

## 2024-01-22 NOTE — PLAN OF CARE
Pt was accepted to return to [facility] pending bcbs auth       01/22/24 1531   Post-Acute Status   Post-Acute Authorization Placement   Post-Acute Placement Status Pending payor review/awaiting authorization (if required)

## 2024-01-23 PROBLEM — Z87.81 STATUS POST OPEN REDUCTION WITH INTERNAL FIXATION (ORIF) OF FRACTURE OF ANKLE: Status: ACTIVE | Noted: 2024-01-23

## 2024-01-23 PROBLEM — Z98.890 STATUS POST OPEN REDUCTION WITH INTERNAL FIXATION (ORIF) OF FRACTURE OF ANKLE: Status: ACTIVE | Noted: 2024-01-23

## 2024-01-23 LAB
ALBUMIN SERPL BCP-MCNC: 2.7 G/DL (ref 3.5–5.2)
ALP SERPL-CCNC: 103 U/L (ref 55–135)
ALT SERPL W/O P-5'-P-CCNC: 20 U/L (ref 10–44)
ANION GAP SERPL CALC-SCNC: 8 MMOL/L (ref 8–16)
AST SERPL-CCNC: 22 U/L (ref 10–40)
BACTERIA UR CULT: ABNORMAL
BASOPHILS # BLD AUTO: 0.03 K/UL (ref 0–0.2)
BASOPHILS NFR BLD: 0.4 % (ref 0–1.9)
BILIRUB SERPL-MCNC: 0.6 MG/DL (ref 0.1–1)
BUN SERPL-MCNC: 21 MG/DL (ref 8–23)
CALCIUM SERPL-MCNC: 8.6 MG/DL (ref 8.7–10.5)
CHLORIDE SERPL-SCNC: 108 MMOL/L (ref 95–110)
CO2 SERPL-SCNC: 26 MMOL/L (ref 23–29)
CREAT SERPL-MCNC: 1.1 MG/DL (ref 0.5–1.4)
DIFFERENTIAL METHOD BLD: ABNORMAL
EOSINOPHIL # BLD AUTO: 0.3 K/UL (ref 0–0.5)
EOSINOPHIL NFR BLD: 4 % (ref 0–8)
ERYTHROCYTE [DISTWIDTH] IN BLOOD BY AUTOMATED COUNT: 13.7 % (ref 11.5–14.5)
EST. GFR  (NO RACE VARIABLE): 50 ML/MIN/1.73 M^2
GLUCOSE SERPL-MCNC: 88 MG/DL (ref 70–110)
HCT VFR BLD AUTO: 27.3 % (ref 37–48.5)
HGB BLD-MCNC: 8.9 G/DL (ref 12–16)
IMM GRANULOCYTES # BLD AUTO: 0.03 K/UL (ref 0–0.04)
IMM GRANULOCYTES NFR BLD AUTO: 0.4 % (ref 0–0.5)
LYMPHOCYTES # BLD AUTO: 0.8 K/UL (ref 1–4.8)
LYMPHOCYTES NFR BLD: 10.6 % (ref 18–48)
MAGNESIUM SERPL-MCNC: 1.9 MG/DL (ref 1.6–2.6)
MCH RBC QN AUTO: 30.8 PG (ref 27–31)
MCHC RBC AUTO-ENTMCNC: 32.6 G/DL (ref 32–36)
MCV RBC AUTO: 95 FL (ref 82–98)
MONOCYTES # BLD AUTO: 0.8 K/UL (ref 0.3–1)
MONOCYTES NFR BLD: 11.3 % (ref 4–15)
NEUTROPHILS # BLD AUTO: 5.3 K/UL (ref 1.8–7.7)
NEUTROPHILS NFR BLD: 73.3 % (ref 38–73)
NRBC BLD-RTO: 0 /100 WBC
PHOSPHATE SERPL-MCNC: 2.7 MG/DL (ref 2.7–4.5)
PLATELET # BLD AUTO: 105 K/UL (ref 150–450)
PMV BLD AUTO: 10.3 FL (ref 9.2–12.9)
POTASSIUM SERPL-SCNC: 3.5 MMOL/L (ref 3.5–5.1)
PROT SERPL-MCNC: 5.1 G/DL (ref 6–8.4)
RBC # BLD AUTO: 2.89 M/UL (ref 4–5.4)
SODIUM SERPL-SCNC: 142 MMOL/L (ref 136–145)
WBC # BLD AUTO: 7.26 K/UL (ref 3.9–12.7)

## 2024-01-23 PROCEDURE — 36415 COLL VENOUS BLD VENIPUNCTURE: CPT | Performed by: NURSE PRACTITIONER

## 2024-01-23 PROCEDURE — 85025 COMPLETE CBC W/AUTO DIFF WBC: CPT | Performed by: NURSE PRACTITIONER

## 2024-01-23 PROCEDURE — 83735 ASSAY OF MAGNESIUM: CPT | Performed by: NURSE PRACTITIONER

## 2024-01-23 PROCEDURE — C1751 CATH, INF, PER/CENT/MIDLINE: HCPCS

## 2024-01-23 PROCEDURE — 36410 VNPNXR 3YR/> PHY/QHP DX/THER: CPT

## 2024-01-23 PROCEDURE — 25000003 PHARM REV CODE 250: Performed by: HOSPITALIST

## 2024-01-23 PROCEDURE — 84100 ASSAY OF PHOSPHORUS: CPT | Performed by: NURSE PRACTITIONER

## 2024-01-23 PROCEDURE — 25000003 PHARM REV CODE 250: Performed by: INTERNAL MEDICINE

## 2024-01-23 PROCEDURE — 76937 US GUIDE VASCULAR ACCESS: CPT

## 2024-01-23 PROCEDURE — 94761 N-INVAS EAR/PLS OXIMETRY MLT: CPT

## 2024-01-23 PROCEDURE — 63600175 PHARM REV CODE 636 W HCPCS: Performed by: NURSE PRACTITIONER

## 2024-01-23 PROCEDURE — 80053 COMPREHEN METABOLIC PANEL: CPT | Performed by: NURSE PRACTITIONER

## 2024-01-23 PROCEDURE — 25000003 PHARM REV CODE 250: Performed by: NURSE PRACTITIONER

## 2024-01-23 PROCEDURE — 63600175 PHARM REV CODE 636 W HCPCS: Performed by: HOSPITALIST

## 2024-01-23 PROCEDURE — 51798 US URINE CAPACITY MEASURE: CPT

## 2024-01-23 PROCEDURE — 27000221 HC OXYGEN, UP TO 24 HOURS

## 2024-01-23 PROCEDURE — 20600001 HC STEP DOWN PRIVATE ROOM

## 2024-01-23 PROCEDURE — 99900035 HC TECH TIME PER 15 MIN (STAT)

## 2024-01-23 PROCEDURE — 97530 THERAPEUTIC ACTIVITIES: CPT | Mod: CQ

## 2024-01-23 RX ORDER — LINEZOLID 2 MG/ML
600 INJECTION, SOLUTION INTRAVENOUS
Status: DISCONTINUED | OUTPATIENT
Start: 2024-01-23 | End: 2024-01-26 | Stop reason: HOSPADM

## 2024-01-23 RX ORDER — MUPIROCIN 20 MG/G
OINTMENT TOPICAL 2 TIMES DAILY
Status: DISCONTINUED | OUTPATIENT
Start: 2024-01-23 | End: 2024-01-26 | Stop reason: HOSPADM

## 2024-01-23 RX ADMIN — DOCUSATE SODIUM 100 MG: 100 CAPSULE, LIQUID FILLED ORAL at 08:01

## 2024-01-23 RX ADMIN — ONDANSETRON 4 MG: 2 INJECTION INTRAMUSCULAR; INTRAVENOUS at 02:01

## 2024-01-23 RX ADMIN — POLYETHYLENE GLYCOL (3350) 17 G: 17 POWDER, FOR SOLUTION ORAL at 08:01

## 2024-01-23 RX ADMIN — SODIUM CHLORIDE 500 ML: 9 INJECTION, SOLUTION INTRAVENOUS at 02:01

## 2024-01-23 RX ADMIN — APIXABAN 2.5 MG: 2.5 TABLET, FILM COATED ORAL at 09:01

## 2024-01-23 RX ADMIN — APIXABAN 2.5 MG: 2.5 TABLET, FILM COATED ORAL at 08:01

## 2024-01-23 RX ADMIN — MUPIROCIN: 20 OINTMENT TOPICAL at 09:01

## 2024-01-23 RX ADMIN — LINEZOLID 600 MG: 600 INJECTION, SOLUTION INTRAVENOUS at 10:01

## 2024-01-23 RX ADMIN — METOPROLOL TARTRATE 12.5 MG: 25 TABLET, FILM COATED ORAL at 08:01

## 2024-01-23 RX ADMIN — AMIODARONE HYDROCHLORIDE 200 MG: 200 TABLET ORAL at 08:01

## 2024-01-23 RX ADMIN — AMPICILLIN SODIUM 2 G: 2 INJECTION, POWDER, FOR SOLUTION INTRAMUSCULAR; INTRAVENOUS at 03:01

## 2024-01-23 RX ADMIN — AMIODARONE HYDROCHLORIDE 200 MG: 200 TABLET ORAL at 09:01

## 2024-01-23 RX ADMIN — SODIUM CHLORIDE 500 ML: 9 INJECTION, SOLUTION INTRAVENOUS at 01:01

## 2024-01-23 RX ADMIN — ACETAMINOPHEN 650 MG: 325 TABLET ORAL at 10:01

## 2024-01-23 RX ADMIN — FAMOTIDINE 20 MG: 20 TABLET, FILM COATED ORAL at 08:01

## 2024-01-23 NOTE — PT/OT/SLP PROGRESS
Occupational Therapy      Patient Name:  Irene العراقي   MRN:  [id number]    Patient not seen today secondary to Nurse/ JHONNY hold. Will follow-up when appropriate.    1/23/2024

## 2024-01-23 NOTE — SUBJECTIVE & OBJECTIVE
Interval History:  Patient seen and examined.  She is much more awake and alert today.  Interactive and talkative.  On morning rounds she told me she was feeling well.  I rounded again later today after being told by nurse that her blood pressure had decreased.  We are bolusing fluids.  I discussed with Cardiology Dr. Estrada who states he does not think that amiodarone is contributing to hypotension.  Recommended stopping beta-blocker.      Review of Systems   Unable to perform ROS: Dementia     Objective:     Vital Signs (Most Recent):  Temp: 97.8 °F (36.6 °C) (01/23/24 0800)  Pulse: 60 (01/23/24 1420)  Resp: 17 (01/23/24 1420)  BP: (!) 81/43 (01/23/24 1420)  SpO2: 100 % (01/23/24 1420) Vital Signs (24h Range):  Temp:  [97.2 °F (36.2 °C)-98 °F (36.7 °C)] 97.8 °F (36.6 °C)  Pulse:  [60-89] 60  Resp:  [10-32] 17  SpO2:  [89 %-100 %] 100 %  BP: ()/(39-95) 81/43     Weight: 66.4 kg (146 lb 6.2 oz)  Body mass index is 23.63 kg/m².    Intake/Output Summary (Last 24 hours) at 1/23/2024 1426  Last data filed at 1/23/2024 1307  Gross per 24 hour   Intake 1080 ml   Output 835 ml   Net 245 ml           Physical Exam  Constitutional:       General: She is not in acute distress.     Appearance: She is well-developed.   HENT:      Head: Normocephalic.      Mouth/Throat:      Comments: Lower lip much less swollen today.  Eyes:      Pupils: Pupils are equal, round, and reactive to light.   Cardiovascular:      Rate and Rhythm: Normal rate and regular rhythm.      Heart sounds: No murmur heard.  Pulmonary:      Effort: Pulmonary effort is normal. No respiratory distress.      Breath sounds: Normal breath sounds. No wheezing or rales.   Abdominal:      General: Bowel sounds are normal. There is no distension.      Palpations: Abdomen is soft.      Tenderness: There is no abdominal tenderness.   Musculoskeletal:         General: Normal range of motion.   Skin:     General: Skin is warm and dry.      Findings: No rash.    Neurological:      Mental Status: She is alert. She is disoriented.      Cranial Nerves: No cranial nerve deficit.      Comments: Pleasantly confused.  Knows she is in the hospital   Psychiatric:         Behavior: Behavior normal.             Significant Labs: All pertinent labs within the past 24 hours have been reviewed.    Significant Imaging: I have reviewed all pertinent imaging results/findings within the past 24 hours.

## 2024-01-23 NOTE — SUBJECTIVE & OBJECTIVE
"Principal Problem:Encephalopathy, metabolic    Principal Orthopedic Problem: S/P R ankle lat malleolus ORIF    Interval History: none    Review of patient's allergies indicates:   Allergen Reactions    Cefuroxime     Hydrocodone     Meperidine     Meperidine hcl Nausea And Vomiting    Persantine [dipyridamole]     Nitrofurantoin macrocrystal      Headache , went into Afib     Vicodin [hydrocodone-acetaminophen] Nausea And Vomiting       Current Facility-Administered Medications   Medication    acetaminophen tablet 650 mg    acetaminophen tablet 650 mg    albuterol-ipratropium 2.5 mg-0.5 mg/3 mL nebulizer solution 3 mL    aluminum-magnesium hydroxide-simethicone 200-200-20 mg/5 mL suspension 30 mL    amiodarone tablet 200 mg    apixaban tablet 2.5 mg    dextrose 10% bolus 125 mL 125 mL    dextrose 10% bolus 250 mL 250 mL    docusate sodium capsule 100 mg    famotidine tablet 20 mg    glucagon (human recombinant) injection 1 mg    glucose chewable tablet 16 g    glucose chewable tablet 24 g    haloperidoL tablet 2 mg    hydrALAZINE injection 10 mg    linezolid 600 mg/300 mL IVPB 600 mg    melatonin tablet 9 mg    mupirocin 2 % ointment    naloxone 0.4 mg/mL injection 0.02 mg    ondansetron injection 4 mg    polyethylene glycol packet 17 g    simethicone chewable tablet 80 mg    sodium chloride 0.9% flush 10 mL     Objective:     Vital Signs (Most Recent):  Temp: 98.1 °F (36.7 °C) (01/23/24 1600)  Pulse: 65 (01/23/24 1700)  Resp: 20 (01/23/24 1700)  BP: (!) 118/58 (01/23/24 1700)  SpO2: 100 % (01/23/24 1700) Vital Signs (24h Range):  Temp:  [97.2 °F (36.2 °C)-98.1 °F (36.7 °C)] 98.1 °F (36.7 °C)  Pulse:  [60-89] 65  Resp:  [11-33] 20  SpO2:  [89 %-100 %] 100 %  BP: ()/(39-95) 118/58     Weight: 66.4 kg (146 lb 6.2 oz)  Height: 5' 6" (167.6 cm)  Body mass index is 23.63 kg/m².      Intake/Output Summary (Last 24 hours) at 1/23/2024 0630  Last data filed at 1/23/2024 1430  Gross per 24 hour   Intake 740 ml   Output " 735 ml   Net 5 ml        General    Nursing note and vitals reviewed.  Constitutional: She is oriented to person, place, and time. She appears well-developed and well-nourished.   Pulmonary/Chest: Effort normal.   Neurological: She is alert and oriented to person, place, and time.   Psychiatric: She has a normal mood and affect. Her behavior is normal.         Right Ankle/Foot Exam     Comments:  Boot not in place. Dressing removed over lateral ankle. Incision is well healed. Minimal swelling.    Left Ankle/Foot Exam     Comments:  Boot on 2/2 pressure ulcer         Significant Labs: CBC:   Recent Labs   Lab 01/22/24  0457 01/23/24  0512   WBC 9.53 7.26   HGB 9.9* 8.9*   HCT 30.2* 27.3*   * 105*     CMP:   Recent Labs   Lab 01/22/24  0457 01/23/24  0512    142   K 3.7 3.5    108   CO2 22* 26   GLU 87 88   BUN 19 21   CREATININE 1.2 1.1   CALCIUM 9.1 8.6*   PROT 5.7* 5.1*   ALBUMIN 3.0* 2.7*   BILITOT 0.5 0.6   ALKPHOS 115 103   AST 32 22   ALT 21 20   ANIONGAP 11 8     All pertinent labs within the past 24 hours have been reviewed.    Significant Imaging: None

## 2024-01-23 NOTE — ASSESSMENT & PLAN NOTE
Patient's anemia is currently controlled. Has not received any PRBCs to date. Etiology likely d/t chronic disease due to Chronic Kidney Disease/ESRD  Current CBC reviewed-   Lab Results   Component Value Date    HGB 8.9 (L) 01/23/2024    HCT 27.3 (L) 01/23/2024     Monitor serial CBC and transfuse if patient becomes hemodynamically unstable, symptomatic or H/H drops below 7/21.

## 2024-01-23 NOTE — CARE UPDATE
01/22/24 1854   Patient Assessment/Suction   Level of Consciousness (AVPU) alert   Respiratory Effort Unlabored   Expansion/Accessory Muscles/Retractions no retractions;no use of accessory muscles   All Lung Fields Breath Sounds equal bilaterally   Rhythm/Pattern, Respiratory unlabored   PRE-TX-O2   Device (Oxygen Therapy) room air   SpO2 96 %   Pulse Oximetry Type Continuous   Pulse 62   Resp 15   Aerosol Therapy   $ Aerosol Therapy Charges PRN treatment not required   Respiratory Treatment Status (SVN) PRN treatment not required

## 2024-01-23 NOTE — ASSESSMENT & PLAN NOTE
Continue amiodarone and Eliquis  Appreciate cardiology recs.   Stop metoprolol secondary to hypotension

## 2024-01-23 NOTE — PLAN OF CARE
Problem: Adult Inpatient Plan of Care  Goal: Plan of Care Review  Outcome: Ongoing, Progressing  Goal: Optimal Comfort and Wellbeing  Outcome: Ongoing, Progressing     Problem: Impaired Wound Healing  Goal: Optimal Wound Healing  Outcome: Ongoing, Progressing     Problem: Skin Injury Risk Increased  Goal: Skin Health and Integrity  Outcome: Ongoing, Progressing     Problem: Fall Injury Risk  Goal: Absence of Fall and Fall-Related Injury  Outcome: Ongoing, Progressing     Problem: Dysrhythmia  Goal: Normalized Cardiac Rhythm  Outcome: Ongoing, Progressing

## 2024-01-23 NOTE — PROGRESS NOTES
[facility] Medicine  Progress Note    Patient Name: Irene العراقي  MRN: [id number]  Patient Class: IP- Inpatient   Admission Date: 1/19/2024  Length of Stay: 2 days  Attending Physician: Madisyn Green MD  Primary Care Provider: Wanda Kuhn FNP-C        Subjective:     Principal Problem:Encephalopathy, metabolic          HPI:  Irene العراقي is an 83-year-old female who presents emergency room for evaluation of altered mental status, confusion, and foul-smelling urine.    She denies any fever or chills.  No known sick contacts or travel.  No aggravating or alleviating factors.  Previous medical history includes paroxysmal AFib, anemia, anxiety, status post TAVR, CKD 4.  ER workup:  CBC with mild anemia and thrombocytopenia of 129.  CMP with BUN 26 and creatinine of 1.6, BNP elevated at 819.  Troponin mildly elevated 0.263.  Chest x-ray was unremarkable.  CT of the head was negative.  Cardiology was consulted for elevated troponin and recommended trending troponin.  Urinalysis with white blood cells, red blood cells, rare bacteria, and nitrite positive.  Urine cultures pending.  Patient was started on Rocephin.  Patient admitted to Hospital Medicine for treatment and management.    Overview/Hospital Course:  No notes on file    Interval History:  Patient seen and examined.  She is much more awake and alert today.  Interactive and talkative.  On morning rounds she told me she was feeling well.  I rounded again later today after being told by nurse that her blood pressure had decreased.  We are bolusing fluids.  I discussed with Cardiology Dr. Estrada who states he does not think that amiodarone is contributing to hypotension.  Recommended stopping beta-blocker.      Review of Systems   Unable to perform ROS: Dementia     Objective:     Vital Signs (Most Recent):  Temp: 97.8 °F (36.6 °C) (01/23/24 0800)  Pulse: 60 (01/23/24 1420)  Resp: 17 (01/23/24 1420)  BP: (!) 81/43  (01/23/24 1420)  SpO2: 100 % (01/23/24 1420) Vital Signs (24h Range):  Temp:  [97.2 °F (36.2 °C)-98 °F (36.7 °C)] 97.8 °F (36.6 °C)  Pulse:  [60-89] 60  Resp:  [10-32] 17  SpO2:  [89 %-100 %] 100 %  BP: ()/(39-95) 81/43     Weight: 66.4 kg (146 lb 6.2 oz)  Body mass index is 23.63 kg/m².    Intake/Output Summary (Last 24 hours) at 1/23/2024 1426  Last data filed at 1/23/2024 1307  Gross per 24 hour   Intake 1080 ml   Output 835 ml   Net 245 ml           Physical Exam  Constitutional:       General: She is not in acute distress.     Appearance: She is well-developed.   HENT:      Head: Normocephalic.      Mouth/Throat:      Comments: Lower lip much less swollen today.  Eyes:      Pupils: Pupils are equal, round, and reactive to light.   Cardiovascular:      Rate and Rhythm: Normal rate and regular rhythm.      Heart sounds: No murmur heard.  Pulmonary:      Effort: Pulmonary effort is normal. No respiratory distress.      Breath sounds: Normal breath sounds. No wheezing or rales.   Abdominal:      General: Bowel sounds are normal. There is no distension.      Palpations: Abdomen is soft.      Tenderness: There is no abdominal tenderness.   Musculoskeletal:         General: Normal range of motion.   Skin:     General: Skin is warm and dry.      Findings: No rash.   Neurological:      Mental Status: She is alert. She is disoriented.      Cranial Nerves: No cranial nerve deficit.      Comments: Pleasantly confused.  Knows she is in the hospital   Psychiatric:         Behavior: Behavior normal.             Significant Labs: All pertinent labs within the past 24 hours have been reviewed.    Significant Imaging: I have reviewed all pertinent imaging results/findings within the past 24 hours.    Assessment/Plan:      * Encephalopathy, metabolic  Improving  Patient has acute metabolic encephalopathy that is secondary to Sepsis/Infective. Patient's current mental status is Confused. Patient's baseline mental status is.  awake and alert; oriented to person, place, and time Evaluation and for underlying cause(s) is underway and inclusive of Blood Chemistries and Toxic metabolite eval . Will monitor neuro checks carefully, avoid narcotics and benzos that will exacerbate agitation, and use PRN medications for controls of behavior for self harm.       S/P TAVR (transcatheter aortic valve replacement)  Chronic problem  Continuous telemetry monitoring      Atrial fibrillation with RVR  Continue amiodarone and Eliquis  Appreciate cardiology recs.   Stop metoprolol secondary to hypotension    Acute cystitis with hematuria  Acute Problem  Urine culture with Enterococcus faecium VRE.  Antibiotics changed to linezolid        Anemia  Patient's anemia is currently controlled. Has not received any PRBCs to date. Etiology likely d/t chronic disease due to Chronic Kidney Disease/ESRD  Current CBC reviewed-   Lab Results   Component Value Date    HGB 8.9 (L) 01/23/2024    HCT 27.3 (L) 01/23/2024     Monitor serial CBC and transfuse if patient becomes hemodynamically unstable, symptomatic or H/H drops below 7/21.      VTE Risk Mitigation (From admission, onward)           Ordered     apixaban tablet 2.5 mg  2 times daily         01/19/24 1435     IP VTE HIGH RISK PATIENT  Once         01/19/24 1435     Place sequential compression device  Until discontinued         01/19/24 1435     Place KRISTINA hose  Until discontinued         01/19/24 1435                    Discharge Planning   YANET: 1/26/2024     Code Status: Full Code   Is the patient medically ready for discharge?:     Reason for patient still in hospital (select all that apply): Patient trending condition and Treatment  Discharge Plan A: Skilled Nursing Facility                  Madisyn Green MD  Department of Hospital Medicine   [facility]/Surg

## 2024-01-23 NOTE — PLAN OF CARE
Problem: Adult Inpatient Plan of Care  Goal: Plan of Care Review  Outcome: Ongoing, Progressing  Goal: Patient-Specific Goal (Individualized)  Outcome: Ongoing, Progressing  Goal: Absence of Hospital-Acquired Illness or Injury  Outcome: Ongoing, Progressing  Goal: Optimal Comfort and Wellbeing  Outcome: Ongoing, Progressing  Goal: Readiness for Transition of Care  Outcome: Ongoing, Progressing     Problem: Infection  Goal: Absence of Infection Signs and Symptoms  Outcome: Ongoing, Progressing     Problem: Impaired Wound Healing  Goal: Optimal Wound Healing  Outcome: Ongoing, Progressing     Problem: Skin Injury Risk Increased  Goal: Skin Health and Integrity  Outcome: Ongoing, Progressing     Problem: Fall Injury Risk  Goal: Absence of Fall and Fall-Related Injury  Outcome: Ongoing, Progressing     Problem: Dysrhythmia  Goal: Normalized Cardiac Rhythm  Outcome: Ongoing, Progressing

## 2024-01-23 NOTE — CONSULTS
18 Gx 10cm PowerGlide Midline placed to pts LEFT basilic vein with the use of ultrasound guidance.    Ultrasound guidance: yes  Vessel Caliber: large and patent, compressibility normal  Needle advanced into vessel with real time Ultrasound guidance.  Guidewire confirmed in vessel.  Image recorded and saved.  Sterile sheath used.  Sterile dressing applied  Arm circumference- 30 cm  Dressing dated   Education provided to patient re: proper maintenance of line- pt verbalized understanding  Limb alert applied.

## 2024-01-23 NOTE — TREATMENT PLAN
Neeru LEA NP notified of lab value and need for replacement orders: Potassium 3.5, phos 2.7, mag 1.9. No new orders due to labs being within normal limits. Ordered to refer to day shift    0645 Peter ARRIAGA notified of pt's labs and replacement orders. Awaiting orders.

## 2024-01-23 NOTE — EICU
Intervention Initiated From:  COR / CORNELIUS Borja intervened regarding:  Rounding (Video assessment)    Comments: pt is resting in bed with eyes closed. NAD observed. VSS. No drips are infusing.

## 2024-01-23 NOTE — ASSESSMENT & PLAN NOTE
Improving  Patient has acute metabolic encephalopathy that is secondary to Sepsis/Infective. Patient's current mental status is Confused. Patient's baseline mental status is. awake and alert; oriented to person, place, and time Evaluation and for underlying cause(s) is underway and inclusive of Blood Chemistries and Toxic metabolite eval . Will monitor neuro checks carefully, avoid narcotics and benzos that will exacerbate agitation, and use PRN medications for controls of behavior for self harm.

## 2024-01-23 NOTE — PLAN OF CARE
Problem: Physical Therapy  Goal: Physical Therapy Goal  Description: Goals to be met by: 2024     Patient will increase functional independence with mobility by performin. Supine to sit with MInimal Assistance  2. Sit to stand transfer with Moderate Assistance  3. Bed to chair transfer with Moderate Assistance using Rolling Walker  4. Gait  x 10 feet with Moderate Assistance using Rolling Walker.   5. Lower extremity exercise program x20 reps   Outcome: Ongoing, Progressing   Therapeutic activity : bed mobility, transfers, gait with rw and assistance for safety, LE exercises.

## 2024-01-23 NOTE — NURSING
Wound care follow up. Patient with unstageable left posterior heel wound, right posterior calf shearing to stage 2 wound and a scabbed area to the right buttocks not over a bony prominence area. Clean right posterior leg and applied a cut piece of Urgotul and placed this over the wound then covered with a large butterfly foam mepilex dressing. Left heel cleaned and applied Triad and left open to air as this area is stable at this time and added an offloading quilted heel protector. Continue with Triad to buttocks every shift. Wound care will continue to follow up as needed throughout hospital stay.      Left heel unstageable    Right posterior calf shearing to a stage 2     Right buttock scabbed area not over a bony prominence

## 2024-01-23 NOTE — PT/OT/SLP PROGRESS
Physical Therapy Treatment    Patient Name:  Irene العراقي   MRN:  [id number]    Recommendations:     Discharge Recommendations: Moderate Intensity Therapy  Discharge Equipment Recommendations: none  Barriers to discharge: None    Assessment:     Irene العراقي is a 83 y.o. female admitted with a medical diagnosis of Encephalopathy, metabolic.  She presents with the following impairments/functional limitations: weakness, impaired endurance, impaired self care skills, impaired functional mobility, impaired balance, decreased lower extremity function, decreased ROM . Awake, alert, supine in bed.  Agreed to participate in therapy. Teary eyed regarding situation and concerned if family is aware.  Assured patient of her safety, nurse confirmed family is aware. Transferred Sup > Sit with Max A.  Sat EOB with CGA for safety. Max A to scoot forward to feet flat.  Reported feeling dizzy, / 57.  Sat extended period woth complaint of feeling weak and likely to pass out.  Returned supine with Max A x 2 . Nurse informed.     Rehab Prognosis: Fair; patient would benefit from acute skilled PT services to address these deficits and reach maximum level of function.    Recent Surgery: * No surgery found *      Plan:     During this hospitalization, patient to be seen 6 x/week to address the identified rehab impairments via gait training, therapeutic activities, therapeutic exercises, neuromuscular re-education and progress toward the following goals:    Plan of Care Expires:  01/30/24    Subjective     Chief Complaint: feeling weak  Patient/Family Comments/goals: none stated  Pain/Comfort:  Pain Rating 1: 0/10      Objective:     Communicated with nurse Bains prior to session.  Patient found supine with bed alarm, blood pressure cuff, telemetry, pulse ox (continuous), pressure relief boots ([employer] at bedside.) upon PT entry to room.     General Precautions: Standard, fall  Orthopedic Precautions:  (recent  L ankle sx  2023)  Braces: N/A  Respiratory Status: Room air     Functional Mobility:  Bed Mobility:     Supine to Sit: maximal assistance  Sit to Supine: maximal assistance and of 2 persons      AM-PAC 6 CLICK MOBILITY          Treatment & Education:  Sup > sit with Max A.  Scoot forward > feet flat with Max A. Assistance required to weight shift.   Sit > supine with A x 2 , weak / fatigued.     Patient left supine with all lines intact, call button in reach, bed alarm on, nurse Nara notified, and [employer] Ybarra present..    GOALS:   Multidisciplinary Problems       Physical Therapy Goals          Problem: Physical Therapy    Goal Priority Disciplines Outcome Goal Variances Interventions   Physical Therapy Goal     PT, PT/OT Ongoing, Progressing     Description: Goals to be met by: 2024     Patient will increase functional independence with mobility by performin. Supine to sit with MInimal Assistance  2. Sit to stand transfer with Moderate Assistance  3. Bed to chair transfer with Moderate Assistance using Rolling Walker  4. Gait  x 10 feet with Moderate Assistance using Rolling Walker.   5. Lower extremity exercise program x20 reps                        Time Tracking:     PT Received On: 24  PT Start Time: 845     PT Stop Time: 0908  PT Total Time (min): 23 min     Billable Minutes: Therapeutic Activity 23min    Treatment Type: Treatment  PT/PTA: PTA     Number of PTA visits since last PT visit: 2024

## 2024-01-24 LAB
ALBUMIN SERPL BCP-MCNC: 2.4 G/DL (ref 3.5–5.2)
ALP SERPL-CCNC: 104 U/L (ref 55–135)
ALT SERPL W/O P-5'-P-CCNC: 19 U/L (ref 10–44)
ANION GAP SERPL CALC-SCNC: 7 MMOL/L (ref 8–16)
AST SERPL-CCNC: 19 U/L (ref 10–40)
BASOPHILS # BLD AUTO: 0.01 K/UL (ref 0–0.2)
BASOPHILS NFR BLD: 0.2 % (ref 0–1.9)
BILIRUB SERPL-MCNC: 0.6 MG/DL (ref 0.1–1)
BUN SERPL-MCNC: 24 MG/DL (ref 8–23)
CALCIUM SERPL-MCNC: 8.3 MG/DL (ref 8.7–10.5)
CHLORIDE SERPL-SCNC: 107 MMOL/L (ref 95–110)
CO2 SERPL-SCNC: 23 MMOL/L (ref 23–29)
CREAT SERPL-MCNC: 1.2 MG/DL (ref 0.5–1.4)
DIFFERENTIAL METHOD BLD: ABNORMAL
EOSINOPHIL # BLD AUTO: 0.2 K/UL (ref 0–0.5)
EOSINOPHIL NFR BLD: 4.1 % (ref 0–8)
ERYTHROCYTE [DISTWIDTH] IN BLOOD BY AUTOMATED COUNT: 13.6 % (ref 11.5–14.5)
EST. GFR  (NO RACE VARIABLE): 45 ML/MIN/1.73 M^2
GLUCOSE SERPL-MCNC: 88 MG/DL (ref 70–110)
HCT VFR BLD AUTO: 24.8 % (ref 37–48.5)
HGB BLD-MCNC: 8.2 G/DL (ref 12–16)
IMM GRANULOCYTES # BLD AUTO: 0.02 K/UL (ref 0–0.04)
IMM GRANULOCYTES NFR BLD AUTO: 0.3 % (ref 0–0.5)
LYMPHOCYTES # BLD AUTO: 0.7 K/UL (ref 1–4.8)
LYMPHOCYTES NFR BLD: 12.2 % (ref 18–48)
MAGNESIUM SERPL-MCNC: 1.8 MG/DL (ref 1.6–2.6)
MCH RBC QN AUTO: 30.8 PG (ref 27–31)
MCHC RBC AUTO-ENTMCNC: 33.1 G/DL (ref 32–36)
MCV RBC AUTO: 93 FL (ref 82–98)
MONOCYTES # BLD AUTO: 0.8 K/UL (ref 0.3–1)
MONOCYTES NFR BLD: 12.9 % (ref 4–15)
NEUTROPHILS # BLD AUTO: 4.1 K/UL (ref 1.8–7.7)
NEUTROPHILS NFR BLD: 70.3 % (ref 38–73)
NRBC BLD-RTO: 0 /100 WBC
PHOSPHATE SERPL-MCNC: 2.6 MG/DL (ref 2.7–4.5)
PLATELET # BLD AUTO: 92 K/UL (ref 150–450)
PMV BLD AUTO: 10.5 FL (ref 9.2–12.9)
POTASSIUM SERPL-SCNC: 3.5 MMOL/L (ref 3.5–5.1)
PROT SERPL-MCNC: 4.6 G/DL (ref 6–8.4)
RBC # BLD AUTO: 2.66 M/UL (ref 4–5.4)
SODIUM SERPL-SCNC: 137 MMOL/L (ref 136–145)
WBC # BLD AUTO: 5.89 K/UL (ref 3.9–12.7)

## 2024-01-24 PROCEDURE — 84100 ASSAY OF PHOSPHORUS: CPT | Performed by: NURSE PRACTITIONER

## 2024-01-24 PROCEDURE — 94761 N-INVAS EAR/PLS OXIMETRY MLT: CPT

## 2024-01-24 PROCEDURE — 80053 COMPREHEN METABOLIC PANEL: CPT | Performed by: NURSE PRACTITIONER

## 2024-01-24 PROCEDURE — 36415 COLL VENOUS BLD VENIPUNCTURE: CPT | Performed by: NURSE PRACTITIONER

## 2024-01-24 PROCEDURE — 20600001 HC STEP DOWN PRIVATE ROOM

## 2024-01-24 PROCEDURE — 51798 US URINE CAPACITY MEASURE: CPT

## 2024-01-24 PROCEDURE — 25000003 PHARM REV CODE 250: Performed by: HOSPITALIST

## 2024-01-24 PROCEDURE — 87040 BLOOD CULTURE FOR BACTERIA: CPT | Performed by: HOSPITALIST

## 2024-01-24 PROCEDURE — 63600175 PHARM REV CODE 636 W HCPCS: Performed by: NURSE PRACTITIONER

## 2024-01-24 PROCEDURE — 27000221 HC OXYGEN, UP TO 24 HOURS

## 2024-01-24 PROCEDURE — 83735 ASSAY OF MAGNESIUM: CPT | Performed by: NURSE PRACTITIONER

## 2024-01-24 PROCEDURE — 85025 COMPLETE CBC W/AUTO DIFF WBC: CPT | Performed by: NURSE PRACTITIONER

## 2024-01-24 PROCEDURE — 97530 THERAPEUTIC ACTIVITIES: CPT | Mod: CQ

## 2024-01-24 PROCEDURE — 97165 OT EVAL LOW COMPLEX 30 MIN: CPT

## 2024-01-24 PROCEDURE — 99900035 HC TECH TIME PER 15 MIN (STAT)

## 2024-01-24 PROCEDURE — 63600175 PHARM REV CODE 636 W HCPCS: Performed by: HOSPITALIST

## 2024-01-24 PROCEDURE — 97535 SELF CARE MNGMENT TRAINING: CPT

## 2024-01-24 PROCEDURE — 25000003 PHARM REV CODE 250: Performed by: NURSE PRACTITIONER

## 2024-01-24 RX ADMIN — MUPIROCIN: 20 OINTMENT TOPICAL at 08:01

## 2024-01-24 RX ADMIN — MUPIROCIN: 20 OINTMENT TOPICAL at 09:01

## 2024-01-24 RX ADMIN — APIXABAN 2.5 MG: 2.5 TABLET, FILM COATED ORAL at 08:01

## 2024-01-24 RX ADMIN — DOCUSATE SODIUM 100 MG: 100 CAPSULE, LIQUID FILLED ORAL at 08:01

## 2024-01-24 RX ADMIN — LINEZOLID 600 MG: 600 INJECTION, SOLUTION INTRAVENOUS at 08:01

## 2024-01-24 RX ADMIN — FAMOTIDINE 20 MG: 20 TABLET, FILM COATED ORAL at 08:01

## 2024-01-24 RX ADMIN — AMIODARONE HYDROCHLORIDE 200 MG: 200 TABLET ORAL at 08:01

## 2024-01-24 RX ADMIN — POLYETHYLENE GLYCOL (3350) 17 G: 17 POWDER, FOR SOLUTION ORAL at 08:01

## 2024-01-24 RX ADMIN — ONDANSETRON 4 MG: 2 INJECTION INTRAMUSCULAR; INTRAVENOUS at 10:01

## 2024-01-24 RX ADMIN — LINEZOLID 600 MG: 600 INJECTION, SOLUTION INTRAVENOUS at 09:01

## 2024-01-24 RX ADMIN — ACETAMINOPHEN 325MG 650 MG: 325 TABLET ORAL at 08:01

## 2024-01-24 NOTE — PLAN OF CARE
Problem: Adult Inpatient Plan of Care  Goal: Plan of Care Review  Outcome: Ongoing, Progressing  Goal: Readiness for Transition of Care  Outcome: Ongoing, Progressing     Problem: Infection  Goal: Absence of Infection Signs and Symptoms  Outcome: Ongoing, Progressing     Problem: Skin Injury Risk Increased  Goal: Skin Health and Integrity  Outcome: Ongoing, Progressing     Problem: Fall Injury Risk  Goal: Absence of Fall and Fall-Related Injury  Outcome: Ongoing, Progressing

## 2024-01-24 NOTE — SUBJECTIVE & OBJECTIVE
"Principal Problem:Encephalopathy, metabolic    Principal Orthopedic Problem:  About 6 weeks status post right lateral malleolus ORIF    Interval History:  Admitted for UTI    Review of patient's allergies indicates:   Allergen Reactions    Cefuroxime     Hydrocodone     Meperidine     Meperidine hcl Nausea And Vomiting    Persantine [dipyridamole]     Nitrofurantoin macrocrystal      Headache , went into Afib     Vicodin [hydrocodone-acetaminophen] Nausea And Vomiting       Current Facility-Administered Medications   Medication    acetaminophen tablet 650 mg    acetaminophen tablet 650 mg    albuterol-ipratropium 2.5 mg-0.5 mg/3 mL nebulizer solution 3 mL    aluminum-magnesium hydroxide-simethicone 200-200-20 mg/5 mL suspension 30 mL    amiodarone tablet 200 mg    apixaban tablet 2.5 mg    dextrose 10% bolus 125 mL 125 mL    dextrose 10% bolus 250 mL 250 mL    docusate sodium capsule 100 mg    famotidine tablet 20 mg    glucagon (human recombinant) injection 1 mg    glucose chewable tablet 16 g    glucose chewable tablet 24 g    haloperidoL tablet 2 mg    hydrALAZINE injection 10 mg    linezolid 600 mg/300 mL IVPB 600 mg    melatonin tablet 9 mg    mupirocin 2 % ointment    naloxone 0.4 mg/mL injection 0.02 mg    ondansetron injection 4 mg    polyethylene glycol packet 17 g    simethicone chewable tablet 80 mg    sodium chloride 0.9% flush 10 mL     Objective:     Vital Signs (Most Recent):  Temp: 97.9 °F (36.6 °C) (01/24/24 0300)  Pulse: 60 (01/24/24 0500)  Resp: (!) 27 (01/24/24 0500)  BP: (!) 142/61 (01/24/24 0400)  SpO2: 100 % (01/24/24 0500) Vital Signs (24h Range):  Temp:  [97.8 °F (36.6 °C)-98.1 °F (36.7 °C)] 97.9 °F (36.6 °C)  Pulse:  [60-80] 60  Resp:  [11-35] 27  SpO2:  [98 %-100 %] 100 %  BP: ()/(39-95) 142/61     Weight: 68.3 kg (150 lb 9.2 oz)  Height: 5' 6" (167.6 cm)  Body mass index is 24.3 kg/m².      Intake/Output Summary (Last 24 hours) at 1/24/2024 0736  Last data filed at 1/24/2024 " "0439  Gross per 24 hour   Intake 618 ml   Output 1085 ml   Net -467 ml        Ortho/SPM Exam     Significant Labs: CBC:   Recent Labs   Lab 01/23/24  0512 01/24/24  0512   WBC 7.26 5.89   HGB 8.9* 8.2*   HCT 27.3* 24.8*   * 92*     CMP:   Recent Labs   Lab 01/23/24  0512 01/24/24  0512    137   K 3.5 3.5    107   CO2 26 23   GLU 88 88   BUN 21 24*   CREATININE 1.1 1.2   CALCIUM 8.6* 8.3*   PROT 5.1* 4.6*   ALBUMIN 2.7* 2.4*   BILITOT 0.6 0.6   ALKPHOS 103 104   AST 22 19   ALT 20 19   ANIONGAP 8 7*     Urine Culture: No results for input(s): "LABURIN" in the last 48 hours.  Urine Studies: No results for input(s): "COLORU", "APPEARANCEUA", "PHUR", "SPECGRAV", "PROTEINUA", "GLUCUA", "KETONESU", "BILIRUBINUA", "OCCULTUA", "NITRITE", "UROBILINOGEN", "LEUKOCYTESUR", "RBCUA", "WBCUA", "BACTERIA", "SQUAMEPITHEL", "HYALINECASTS" in the last 48 hours.    Invalid input(s): "WRIGHTSUR"  All pertinent labs within the past 24 hours have been reviewed.    Significant Imaging: X-Ray: I have reviewed all pertinent results/findings and my personal findings are:  Right ankle x-rays were personally reviewed and demonstrate that the lateral malleolus fracture and the medial malleolus fracture are healing exceptionally well despite diffuse osteopenia.  "

## 2024-01-24 NOTE — PLAN OF CARE
Spoke to Sally with [facility]. Anticipate dc tomorrow if pt is medically clear. New auth is still pending       01/24/24 1127   Post-Acute Status   Post-Acute Authorization Placement   Post-Acute Placement Status Pending payor review/awaiting authorization (if required)

## 2024-01-24 NOTE — PROGRESS NOTES
[facility]  Progress Note    Patient Name: Irene العراقي  MRN: [id number]  Patient Class: IP- Inpatient   Admission Date: 1/19/2024  Length of Stay: 3 days  Attending Physician: Madisyn Green MD  Primary Care Provider: Wanda Kuhn FNP-C        Subjective:     Principal Problem:Encephalopathy, metabolic          HPI:  Irene العراقي is an 83-year-old female who presents emergency room for evaluation of altered mental status, confusion, and foul-smelling urine.    She denies any fever or chills.  No known sick contacts or travel.  No aggravating or alleviating factors.  Previous medical history includes paroxysmal AFib, anemia, anxiety, status post TAVR, CKD 4.  ER workup:  CBC with mild anemia and thrombocytopenia of 129.  CMP with BUN 26 and creatinine of 1.6, BNP elevated at 819.  Troponin mildly elevated 0.263.  Chest x-ray was unremarkable.  CT of the head was negative.  Cardiology was consulted for elevated troponin and recommended trending troponin.  Urinalysis with white blood cells, red blood cells, rare bacteria, and nitrite positive.  Urine cultures pending.  Patient was started on Rocephin.  Patient admitted to Hospital Medicine for treatment and management.    Overview/Hospital Course:  No notes on file    Interval History:  Patient seen and examined.  Awake and alert.  Blood pressure improved from yesterday.  Has been seen by ortho who advanced her to partial weight-bearing.  Anticipate discharge to skilled nursing facility tomorrow.      Review of Systems   Unable to perform ROS: Dementia     Objective:     Vital Signs (Most Recent):  Temp: 98 °F (36.7 °C) (01/24/24 0900)  Pulse: 67 (01/24/24 1200)  Resp: 17 (01/24/24 1200)  BP: 123/60 (01/24/24 1115)  SpO2: 100 % (01/24/24 1200) Vital Signs (24h Range):  Temp:  [97.8 °F (36.6 °C)-98.1 °F (36.7 °C)] 98 °F (36.7 °C)  Pulse:  [60-80] 67  Resp:  [12-35] 17  SpO2:  [98 %-100 %] 100 %  BP: ()/(39-66)  123/60     Weight: 68.3 kg (150 lb 9.2 oz)  Body mass index is 24.3 kg/m².    Intake/Output Summary (Last 24 hours) at 1/24/2024 1211  Last data filed at 1/24/2024 0439  Gross per 24 hour   Intake 368 ml   Output 1085 ml   Net -717 ml           Physical Exam  Constitutional:       General: She is not in acute distress.     Appearance: She is well-developed.   HENT:      Head: Normocephalic.      Mouth/Throat:      Comments: Lower lip with mild swelling, much improved  Eyes:      Pupils: Pupils are equal, round, and reactive to light.   Cardiovascular:      Rate and Rhythm: Normal rate and regular rhythm.      Heart sounds: No murmur heard.  Pulmonary:      Effort: Pulmonary effort is normal. No respiratory distress.      Breath sounds: Normal breath sounds. No wheezing or rales.   Abdominal:      General: Bowel sounds are normal. There is no distension.      Palpations: Abdomen is soft.      Tenderness: There is no abdominal tenderness.   Musculoskeletal:         General: Normal range of motion.   Skin:     General: Skin is warm and dry.      Findings: No rash.   Neurological:      Mental Status: She is alert. She is disoriented.      Cranial Nerves: No cranial nerve deficit.      Comments: Pleasantly confused.  Knows she is in the hospital   Psychiatric:         Behavior: Behavior normal.             Significant Labs: All pertinent labs within the past 24 hours have been reviewed.    Significant Imaging: I have reviewed all pertinent imaging results/findings within the past 24 hours.    Assessment/Plan:      * Encephalopathy, metabolic  Improving  Patient has acute metabolic encephalopathy that is secondary to Sepsis/Infective. Patient's current mental status is Confused. Patient's baseline mental status is. awake and alert; oriented to person, place, and time Evaluation and for underlying cause(s) is underway and inclusive of Blood Chemistries and Toxic metabolite eval . Will monitor neuro checks carefully, avoid  narcotics and benzos that will exacerbate agitation, and use PRN medications for controls of behavior for self harm.       Status post open reduction with internal fixation (ORIF) of fracture of ankle        S/P TAVR (transcatheter aortic valve replacement)  Chronic problem  Continuous telemetry monitoring      Atrial fibrillation with RVR  Continue amiodarone and Eliquis  Appreciate cardiology recs.   Stop metoprolol secondary to hypotension    Acute cystitis with hematuria  Acute Problem  Urine culture with Enterococcus faecium VRE.  Continue linezolid        Anemia  Patient's anemia is currently controlled. Has not received any PRBCs to date. Etiology likely d/t chronic disease due to Chronic Kidney Disease/ESRD  Current CBC reviewed-   Lab Results   Component Value Date    HGB 8.2 (L) 01/24/2024    HCT 24.8 (L) 01/24/2024     Monitor serial CBC and transfuse if patient becomes hemodynamically unstable, symptomatic or H/H drops below 7/21.      VTE Risk Mitigation (From admission, onward)           Ordered     apixaban tablet 2.5 mg  2 times daily         01/19/24 1435     IP VTE HIGH RISK PATIENT  Once         01/19/24 1435     Place sequential compression device  Until discontinued         01/19/24 1435     Place KRISTINA hose  Until discontinued         01/19/24 1435                    Discharge Planning   YANET: 1/25/2024     Code Status: Full Code   Is the patient medically ready for discharge?:     Reason for patient still in hospital (select all that apply): Patient trending condition and Treatment  Discharge Plan A: Skilled Nursing Facility                  Madisyn Green MD  Department of Hospital Medicine   [facility]/Surg

## 2024-01-24 NOTE — PROGRESS NOTES
[facility]/Surg  Orthopedics  Progress Note    Patient Name: Irene العراقي  MRN: [id number]  Admission Date: 1/19/2024  Hospital Length of Stay: 3 days  Attending Provider: Madisyn Green MD  Primary Care Provider: Wanda Kuhn FNP-C    Subjective:     Principal Problem:Encephalopathy, metabolic    Principal Orthopedic Problem:  About 6 weeks status post right lateral malleolus ORIF    Interval History:  Admitted for UTI    Review of patient's allergies indicates:   Allergen Reactions    Cefuroxime     Hydrocodone     Meperidine     Meperidine hcl Nausea And Vomiting    Persantine [dipyridamole]     Nitrofurantoin macrocrystal      Headache , went into Afib     Vicodin [hydrocodone-acetaminophen] Nausea And Vomiting       Current Facility-Administered Medications   Medication    acetaminophen tablet 650 mg    acetaminophen tablet 650 mg    albuterol-ipratropium 2.5 mg-0.5 mg/3 mL nebulizer solution 3 mL    aluminum-magnesium hydroxide-simethicone 200-200-20 mg/5 mL suspension 30 mL    amiodarone tablet 200 mg    apixaban tablet 2.5 mg    dextrose 10% bolus 125 mL 125 mL    dextrose 10% bolus 250 mL 250 mL    docusate sodium capsule 100 mg    famotidine tablet 20 mg    glucagon (human recombinant) injection 1 mg    glucose chewable tablet 16 g    glucose chewable tablet 24 g    haloperidoL tablet 2 mg    hydrALAZINE injection 10 mg    linezolid 600 mg/300 mL IVPB 600 mg    melatonin tablet 9 mg    mupirocin 2 % ointment    naloxone 0.4 mg/mL injection 0.02 mg    ondansetron injection 4 mg    polyethylene glycol packet 17 g    simethicone chewable tablet 80 mg    sodium chloride 0.9% flush 10 mL     Objective:     Vital Signs (Most Recent):  Temp: 97.9 °F (36.6 °C) (01/24/24 0300)  Pulse: 60 (01/24/24 0500)  Resp: (!) 27 (01/24/24 0500)  BP: (!) 142/61 (01/24/24 0400)  SpO2: 100 % (01/24/24 0500) Vital Signs (24h Range):  Temp:  [97.8 °F (36.6 °C)-98.1 °F (36.7 °C)] 97.9 °F (36.6  "°C)  Pulse:  [60-80] 60  Resp:  [11-35] 27  SpO2:  [98 %-100 %] 100 %  BP: ()/(39-95) 142/61     Weight: 68.3 kg (150 lb 9.2 oz)  Height: 5' 6" (167.6 cm)  Body mass index is 24.3 kg/m².      Intake/Output Summary (Last 24 hours) at 1/24/2024 0736  Last data filed at 1/24/2024 0439  Gross per 24 hour   Intake 618 ml   Output 1085 ml   Net -467 ml        Ortho/SPM Exam     Significant Labs: CBC:   Recent Labs   Lab 01/23/24  0512 01/24/24  0512   WBC 7.26 5.89   HGB 8.9* 8.2*   HCT 27.3* 24.8*   * 92*     CMP:   Recent Labs   Lab 01/23/24  0512 01/24/24  0512    137   K 3.5 3.5    107   CO2 26 23   GLU 88 88   BUN 21 24*   CREATININE 1.1 1.2   CALCIUM 8.6* 8.3*   PROT 5.1* 4.6*   ALBUMIN 2.7* 2.4*   BILITOT 0.6 0.6   ALKPHOS 103 104   AST 22 19   ALT 20 19   ANIONGAP 8 7*     Urine Culture: No results for input(s): "LABURIN" in the last 48 hours.  Urine Studies: No results for input(s): "COLORU", "APPEARANCEUA", "PHUR", "SPECGRAV", "PROTEINUA", "GLUCUA", "KETONESU", "BILIRUBINUA", "OCCULTUA", "NITRITE", "UROBILINOGEN", "LEUKOCYTESUR", "RBCUA", "WBCUA", "BACTERIA", "SQUAMEPITHEL", "HYALINECASTS" in the last 48 hours.    Invalid input(s): "WRIGHTSUR"  All pertinent labs within the past 24 hours have been reviewed.    Significant Imaging: X-Ray: I have reviewed all pertinent results/findings and my personal findings are:  Right ankle x-rays were personally reviewed and demonstrate that the lateral malleolus fracture and the medial malleolus fracture are healing exceptionally well despite diffuse osteopenia.  Assessment/Plan:     Status post open reduction with internal fixation (ORIF) of fracture of ankle  Advance her to PWB (50%)  Right ankle range of motion at as tolerated  May ambulate without boot; recommend normal supportive sole shoe          MARIE AZAR  Orthopedics  [facility]/Surg    "

## 2024-01-24 NOTE — PROGRESS NOTES
[facility]/Surg  Orthopedics  Progress Note    Patient Name: Irene العراقي  MRN: [id number]  Admission Date: 1/19/2024  Hospital Length of Stay: 2 days  Attending Provider: Madisyn Green MD  Primary Care Provider: Wanda Kuhn FNP-C    Subjective:     Principal Problem:Encephalopathy, metabolic    Principal Orthopedic Problem: S/P R ankle lat malleolus ORIF    Interval History: none    Review of patient's allergies indicates:   Allergen Reactions    Cefuroxime     Hydrocodone     Meperidine     Meperidine hcl Nausea And Vomiting    Persantine [dipyridamole]     Nitrofurantoin macrocrystal      Headache , went into Afib     Vicodin [hydrocodone-acetaminophen] Nausea And Vomiting       Current Facility-Administered Medications   Medication    acetaminophen tablet 650 mg    acetaminophen tablet 650 mg    albuterol-ipratropium 2.5 mg-0.5 mg/3 mL nebulizer solution 3 mL    aluminum-magnesium hydroxide-simethicone 200-200-20 mg/5 mL suspension 30 mL    amiodarone tablet 200 mg    apixaban tablet 2.5 mg    dextrose 10% bolus 125 mL 125 mL    dextrose 10% bolus 250 mL 250 mL    docusate sodium capsule 100 mg    famotidine tablet 20 mg    glucagon (human recombinant) injection 1 mg    glucose chewable tablet 16 g    glucose chewable tablet 24 g    haloperidoL tablet 2 mg    hydrALAZINE injection 10 mg    linezolid 600 mg/300 mL IVPB 600 mg    melatonin tablet 9 mg    mupirocin 2 % ointment    naloxone 0.4 mg/mL injection 0.02 mg    ondansetron injection 4 mg    polyethylene glycol packet 17 g    simethicone chewable tablet 80 mg    sodium chloride 0.9% flush 10 mL     Objective:     Vital Signs (Most Recent):  Temp: 98.1 °F (36.7 °C) (01/23/24 1600)  Pulse: 65 (01/23/24 1700)  Resp: 20 (01/23/24 1700)  BP: (!) 118/58 (01/23/24 1700)  SpO2: 100 % (01/23/24 1700) Vital Signs (24h Range):  Temp:  [97.2 °F (36.2 °C)-98.1 °F (36.7 °C)] 98.1 °F (36.7 °C)  Pulse:  [60-89] 65  Resp:  [11-33] 20  SpO2:   "[89 %-100 %] 100 %  BP: ()/(39-95) 118/58     Weight: 66.4 kg (146 lb 6.2 oz)  Height: 5' 6" (167.6 cm)  Body mass index is 23.63 kg/m².      Intake/Output Summary (Last 24 hours) at 1/23/2024 1757  Last data filed at 1/23/2024 1430  Gross per 24 hour   Intake 740 ml   Output 735 ml   Net 5 ml        General    Nursing note and vitals reviewed.  Constitutional: She is oriented to person, place, and time. She appears well-developed and well-nourished.   Pulmonary/Chest: Effort normal.   Neurological: She is alert and oriented to person, place, and time.   Psychiatric: She has a normal mood and affect. Her behavior is normal.         Right Ankle/Foot Exam     Comments:  Boot not in place. Dressing removed over lateral ankle. Incision is well healed. Minimal swelling.    Left Ankle/Foot Exam     Comments:  Boot on 2/2 pressure ulcer         Significant Labs: CBC:   Recent Labs   Lab 01/22/24  0457 01/23/24  0512   WBC 9.53 7.26   HGB 9.9* 8.9*   HCT 30.2* 27.3*   * 105*     CMP:   Recent Labs   Lab 01/22/24  0457 01/23/24  0512    142   K 3.7 3.5    108   CO2 22* 26   GLU 87 88   BUN 19 21   CREATININE 1.2 1.1   CALCIUM 9.1 8.6*   PROT 5.7* 5.1*   ALBUMIN 3.0* 2.7*   BILITOT 0.5 0.6   ALKPHOS 115 103   AST 32 22   ALT 21 20   ANIONGAP 11 8     All pertinent labs within the past 24 hours have been reviewed.    Significant Imaging: None  Assessment/Plan:     Status post open reduction with internal fixation (ORIF) of fracture of ankle  R ankle X-rays  If X-rays indicate good bony callus formation we will advance her to PWB (50%)          MARIE AZAR  Orthopedics  [facility]/Surg    "

## 2024-01-24 NOTE — EICU
Intervention Initiated From:  COR / CORNELIUS Borja intervened regarding:  Rounding (Video assessment)    Comments: Pt is sitting up in bed in NAD. Pt speaking on his phone.   VSS. No drips  are infusing.

## 2024-01-24 NOTE — PROGRESS NOTES
[facility]/Surg  Adult Nutrition  Progress Note    SUMMARY       Recommendations    1. Continue on cardiac diet   2. Continue with commercial oral beverages   3. Recommend pasquale BID to promote healing   4. Collaboration with medical providers    Goals:   1.) pt will meet >50% of EEN during admit    Nutrition Goal Status: progressing towards goal  Communication of RD Recs: other (comment)    Assessment and Plan    Nutrition Problem  Inadequate energy intake     Related to (etiology):   UTI     Signs and Symptoms (as evidenced by):   PO intake 25%      Interventions/Recommendations (treatment strategy):  Continue with diet, boost plus BID      Nutrition Diagnosis Status:   New       Malnutrition Assessment           Altered skin integrity   Buzz 16                            Reason for Assessment    Reason For Assessment: RD follow-up    Diagnosis:  (ankle fracture and metabolic encephalopathy)  Patient Active Problem List   Diagnosis    Peripheral vertigo of both ears    Thrombocytopenia    Abnormal CXR--post COVID-19    PAF (paroxysmal atrial fibrillation)    Anemia    Anxiety    History of COVID-19    Severe aortic valve stenosis    Current use of long term anticoagulation    Postviral fatigue syndrome    Hypertension    Constipation    Tremors of nervous system    Acute cystitis with hematuria    Atrial fibrillation with RVR    Acquired hammer toe of right foot    Primary osteoarthritis of right knee    Osteoporosis    Family history of rectal cancer    Diverticulosis of large intestine without perforation or abscess without bleeding    Depression    Colon polyps    Chronic idiopathic constipation    Stage 4 chronic kidney disease    ABDULLAHI (dyspnea on exertion)    Bradycardia    NYHA class 3 heart failure with preserved ejection fraction    S/P TAVR (transcatheter aortic valve replacement)    CHB (complete heart block)    On amiodarone therapy    Postural dizziness with presyncope    Closed  "fracture of right ankle    UTI (urinary tract infection)    Dysphagia    Postural hypotension    Encephalopathy, metabolic    Status post open reduction with internal fixation (ORIF) of fracture of ankle       Relevant Medical History:   Past Medical History:   Diagnosis Date    Anemia, unspecified     Atrial fibrillation 01/25/2021    CKD (chronic kidney disease)     Hypertension        Interdisciplinary Rounds: did not attend (RD remote)    General Information Comments:   1/24/2024: Patient continues on a cardiac diet with fair intake noted with meal consumptions between 50-75%.  Wounds noted (full thickness wounds).  Labs reviewed.  NKFA.  LBM: 1/24/24.    Nutrition Discharge Planning: Patient to continue on cardiac diet post discharge.    Nutrition Risk Screen    Nutrition Risk Screen: large or nonhealing wound, burn or pressure injury    Nutrition/Diet History    Spiritual, Cultural Beliefs, Baptism Practices, Values that Affect Care: no  Food Allergies: NKFA  Factors Affecting Nutritional Intake: decreased appetite    Anthropometrics    Temp: 98 °F (36.7 °C)  Height Method: Stated  Height: 5' 6" (167.6 cm)  Height (inches): 66 in  Weight Method: Bed Scale  Weight: 68.3 kg (150 lb 9.2 oz)  Weight (lb): 150.58 lb  Ideal Body Weight (IBW), Female: 130 lb  % Ideal Body Weight, Female (lb): 138.38 %  BMI (Calculated): 24.3  BMI Grade: 25 - 29.9 - overweight       Lab/Procedures/Meds    Pertinent Labs Reviewed: reviewed  BMP  Lab Results   Component Value Date     01/24/2024    K 3.5 01/24/2024     01/24/2024    CO2 23 01/24/2024    BUN 24 (H) 01/24/2024    CREATININE 1.2 01/24/2024    CALCIUM 8.3 (L) 01/24/2024    ANIONGAP 7 (L) 01/24/2024    EGFRNORACEVR 45 (A) 01/24/2024     Lab Results   Component Value Date    HGBA1C 5.1 04/11/2023     Lab Results   Component Value Date    CALCIUM 8.3 (L) 01/24/2024    PHOS 2.6 (L) 01/24/2024       Pertinent Medications Reviewed: reviewed  Scheduled Meds:   " amiodarone  200 mg Oral BID    apixaban  2.5 mg Oral BID    docusate sodium  100 mg Oral BID    famotidine  20 mg Oral Daily    linezolid  600 mg Intravenous Q12H    mupirocin   Nasal BID    polyethylene glycol  17 g Oral BID     Continuous Infusions:  PRN Meds:.acetaminophen, acetaminophen, albuterol-ipratropium, aluminum-magnesium hydroxide-simethicone, dextrose 10%, dextrose 10%, glucagon (human recombinant), glucose, glucose, haloperidoL, hydrALAZINE, melatonin, naloxone, ondansetron, simethicone, sodium chloride 0.9%    Physical Findings/Assessment         Estimated/Assessed Needs    Weight Used For Calorie Calculations: 81.6 kg (179 lb 14.3 oz)  Energy Calorie Requirements (kcal): 1700  Energy Need Method: [place]-St Jeor (x1.3af)  Protein Requirements: 65g (0.8 g/kg)  Weight Used For Protein Calculations: 81.6 kg (179 lb 14.3 oz)     Estimated Fluid Requirement Method: RDA Method  RDA Method (mL): 1700         Nutrition Prescription Ordered    Current Diet Order: Cardiac diet  Oral Nutrition Supplement: CALEB bid    Evaluation of Received Nutrient/Fluid Intake    % Kcal Needs: 50-75%  % Protein Needs: 50-75%  I/O: -827mls since admit  Energy Calories Required: meeting needs  Protein Required: not meeting needs  Fluid Required: meeting needs  Comments: LBM: 1/24/2024  Tolerance: tolerating  % Intake of Estimated Energy Needs: 50 - 75 %  % Meal Intake: 50 - 75 %    Nutrition Risk    Level of Risk/Frequency of Follow-up: low - moderate (follow up: 1-2 x per week)     Monitor and Evaluation    Food and Nutrient Intake: energy intake, food and beverage intake  Food and Nutrient Adminstration: diet order  Knowledge/Beliefs/Attitudes: beliefs and attitudes  Physical Activity and Function: factors affecting access to physical activity  Anthropometric Measurements: height/length, weight, weight change, body mass index  Biochemical Data, Medical Tests and Procedures: electrolyte and renal panel, gastrointestinal  profile, glucose/endocrine profile, inflammatory profile, lipid profile  Nutrition-Focused Physical Findings: overall appearance     Nutrition Follow-Up    RD Follow-up?: Yes  Nara Morrison, MS, RDN, LDN

## 2024-01-24 NOTE — SUBJECTIVE & OBJECTIVE
Interval History:  Patient seen and examined.  Awake and alert.  Blood pressure improved from yesterday.  Has been seen by ortho who advanced her to partial weight-bearing.  Anticipate discharge to skilled nursing facility tomorrow.      Review of Systems   Unable to perform ROS: Dementia     Objective:     Vital Signs (Most Recent):  Temp: 98 °F (36.7 °C) (01/24/24 0900)  Pulse: 67 (01/24/24 1200)  Resp: 17 (01/24/24 1200)  BP: 123/60 (01/24/24 1115)  SpO2: 100 % (01/24/24 1200) Vital Signs (24h Range):  Temp:  [97.8 °F (36.6 °C)-98.1 °F (36.7 °C)] 98 °F (36.7 °C)  Pulse:  [60-80] 67  Resp:  [12-35] 17  SpO2:  [98 %-100 %] 100 %  BP: ()/(39-66) 123/60     Weight: 68.3 kg (150 lb 9.2 oz)  Body mass index is 24.3 kg/m².    Intake/Output Summary (Last 24 hours) at 1/24/2024 1211  Last data filed at 1/24/2024 0439  Gross per 24 hour   Intake 368 ml   Output 1085 ml   Net -717 ml           Physical Exam  Constitutional:       General: She is not in acute distress.     Appearance: She is well-developed.   HENT:      Head: Normocephalic.      Mouth/Throat:      Comments: Lower lip with mild swelling, much improved  Eyes:      Pupils: Pupils are equal, round, and reactive to light.   Cardiovascular:      Rate and Rhythm: Normal rate and regular rhythm.      Heart sounds: No murmur heard.  Pulmonary:      Effort: Pulmonary effort is normal. No respiratory distress.      Breath sounds: Normal breath sounds. No wheezing or rales.   Abdominal:      General: Bowel sounds are normal. There is no distension.      Palpations: Abdomen is soft.      Tenderness: There is no abdominal tenderness.   Musculoskeletal:         General: Normal range of motion.   Skin:     General: Skin is warm and dry.      Findings: No rash.   Neurological:      Mental Status: She is alert. She is disoriented.      Cranial Nerves: No cranial nerve deficit.      Comments: Pleasantly confused.  Knows she is in the hospital   Psychiatric:          Behavior: Behavior normal.             Significant Labs: All pertinent labs within the past 24 hours have been reviewed.    Significant Imaging: I have reviewed all pertinent imaging results/findings within the past 24 hours.

## 2024-01-24 NOTE — PLAN OF CARE
Goals to be met by: 2/21/24     Patient will increase functional independence with ADLs by performing:    UE Dressing with Modified [place].  LE Dressing with Minimal Assistance.  Grooming while seated with Modified [place].  Toileting from bedside commode with Minimal Assistance for hygiene and clothing management.   Bathing from  shower chair/bench with Moderate Assistance.  Toilet transfer to bedside commode with Moderate Assistance.  Increased strength and functional activity tolerance for ADL's/IADL's

## 2024-01-24 NOTE — ASSESSMENT & PLAN NOTE
Advance her to PWB (50%)  Right ankle range of motion at as tolerated  May ambulate without boot; recommend normal supportive sole shoe

## 2024-01-24 NOTE — ASSESSMENT & PLAN NOTE
Patient's anemia is currently controlled. Has not received any PRBCs to date. Etiology likely d/t chronic disease due to Chronic Kidney Disease/ESRD  Current CBC reviewed-   Lab Results   Component Value Date    HGB 8.2 (L) 01/24/2024    HCT 24.8 (L) 01/24/2024     Monitor serial CBC and transfuse if patient becomes hemodynamically unstable, symptomatic or H/H drops below 7/21.

## 2024-01-24 NOTE — PLAN OF CARE
Problem: Physical Therapy  Goal: Physical Therapy Goal  Description: Goals to be met by: 2024     Patient will increase functional independence with mobility by performin. Supine to sit with MInimal Assistance  2. Sit to stand transfer with Moderate Assistance  3. Bed to chair transfer with Moderate Assistance using Rolling Walker  4. Gait  x 10 feet with Moderate Assistance using Rolling Walker.   5. Lower extremity exercise program x20 reps   Outcome: Ongoing, Progressing   Therapeutic activity to improve functional mobility : bed mobility, transfers, gait with rw and assistance for safety.

## 2024-01-24 NOTE — PT/OT/SLP PROGRESS
Physical Therapy Treatment    Patient Name:  Irene العراقي   MRN:  [id number]    Recommendations:     Discharge Recommendations: Moderate Intensity Therapy  Discharge Equipment Recommendations: none  Barriers to discharge: None    Assessment:     Irene العراقي is a 83 y.o. female admitted with a medical diagnosis of Encephalopathy, metabolic.  She presents with the following impairments/functional limitations: weakness, impaired endurance, impaired self care skills, impaired functional mobility, gait instability, impaired balance, decreased lower extremity function, pain, impaired cardiopulmonary response to activity . Awake, alert, supine in bed.  Agreed to mobilize.  Reports feeling dizzy on occasion but not at the start of session.  Sup > sit with  Max A.  Sat ~ 5 min with report of feeling dizzy. Unable to obtain BP.  Mod A to scoot forward to feet flat .  Attempted sit > stand but unsuccessful.  Reported feeling nauseous and dizzy.  Sit > supine with A x 2. Total A to scoot HOB.  Nurse informed.     Rehab Prognosis: Fair; patient would benefit from acute skilled PT services to address these deficits and reach maximum level of function.    Recent Surgery: * No surgery found *      Plan:     During this hospitalization, patient to be seen 6 x/week to address the identified rehab impairments via gait training, therapeutic activities, therapeutic exercises, neuromuscular re-education and progress toward the following goals:    Plan of Care Expires:  01/30/24    Subjective     Chief Complaint: dizziness with activity  Patient/Family Comments/goals: none stated  Pain/Comfort:  Pain Rating 1: other (see comments) (did not rate)  Location - Side 1: Right  Location - Orientation 1: lower  Location 1: leg  Pain Addressed 1: Reposition, Nurse notified      Objective:     Communicated with nurse Wick prior to session.  Patient found supine with bed alarm, blood pressure cuff, oxygen, PureWick, peripheral IV, pulse ox  (continuous), telemetry, pressure relief boots upon PT entry to room.     General Precautions: Standard, contact, fall, respiratory  Orthopedic Precautions: RLE partial weight bearing (noted today, 50% PWB RLE.)  Braces: N/A  Respiratory Status: Nasal cannula, flow 3 L/min     Functional Mobility:  Bed Mobility:     Supine to Sit: maximal assistance  Sit to Supine: maximal assistance and of 2 persons      AM-PAC 6 CLICK MOBILITY          Treatment & Education:  Sup > sit with Max A.  Unable to stand with rw and A x 2 .   Sit > supine with A x 2.     Patient left supine with all lines intact, call button in reach, bed alarm on, and nurse Shamika notified..    GOALS:   Multidisciplinary Problems       Physical Therapy Goals          Problem: Physical Therapy    Goal Priority Disciplines Outcome Goal Variances Interventions   Physical Therapy Goal     PT, PT/OT Ongoing, Progressing     Description: Goals to be met by: 2024     Patient will increase functional independence with mobility by performin. Supine to sit with MInimal Assistance  2. Sit to stand transfer with Moderate Assistance  3. Bed to chair transfer with Moderate Assistance using Rolling Walker  4. Gait  x 10 feet with Moderate Assistance using Rolling Walker.   5. Lower extremity exercise program x20 reps                        Time Tracking:     PT Received On: 24  PT Start Time: 930     PT Stop Time: 953  PT Total Time (min): 23 min     Billable Minutes: Therapeutic Activity 23min    Treatment Type: Treatment  PT/PTA: PTA     Number of PTA visits since last PT visit: 2     2024

## 2024-01-24 NOTE — PLAN OF CARE
Nutrition Plan of Care:    Recommendations     1. Continue on cardiac diet   2. Continue with commercial oral beverages   3. Recommend pasquale BID to promote healing   4. Collaboration with medical providers     Goals:   1.) pt will meet >50% of EEN during admit     Nutrition Goal Status: progressing towards goal  Communication of RD Recs: other (comment)     Assessment and Plan     Nutrition Problem  Inadequate energy intake     Related to (etiology):   UTI     Signs and Symptoms (as evidenced by):   PO intake 25%      Interventions/Recommendations (treatment strategy):  Continue with diet, boost plus BID      Nutrition Diagnosis Status:   New        Malnutrition Assessment        Altered skin integrity   Buzz Morrison, MS, RDN, LDN

## 2024-01-24 NOTE — EICU
Intervention Initiated From:  COR / CORNELIUS Borja intervened regarding:  Rounding (Video assessment)    Comments:   Video assessment complete. Pt asleep at this time, appears calm and comfortable. VSS and NAD noted.

## 2024-01-24 NOTE — PT/OT/SLP EVAL
Occupational Therapy   Evaluation    Name: Irene العراقي  MRN: [id number]  Admitting Diagnosis: Encephalopathy, metabolic  Recent Surgery: * No surgery found *      Recommendations:     Discharge Recommendations: Moderate Intensity Therapy  Discharge Equipment Recommendations:  other (see comments) (TBD)  Barriers to discharge:       Assessment:     Irene العراقي is a 83 y.o. female with a medical diagnosis of Encephalopathy, metabolic.  She presents with the following performance deficits affecting function: weakness, impaired endurance, impaired self care skills, impaired functional mobility, gait instability, impaired balance, decreased upper extremity function, decreased lower extremity function, pain, decreased ROM, impaired cardiopulmonary response to activity, orthopedic precautions.      Rehab Prognosis: Good; patient would benefit from acute skilled OT services to address these deficits and reach maximum level of function.       Plan:     Patient to be seen 5 x/week to address the above listed problems via self-care/home management, therapeutic activities, therapeutic exercises  Plan of Care Expires: 02/21/24  Plan of Care Reviewed with: patient    Subjective     Chief Complaint: Pain  Patient/Family Comments/goals: to get better and go home after Rehab    Occupational Profile:  Living Environment: Pt was living with her twin sister who passed away last October, but plans to return home to her home in [place] once Rehab is complete. Pt has a 1 story home with a ramp. Pt has a tub/shower and raised toilet.   Previous level of function: Independent   Roles and Routines: Aunt  Equipment Used at Home: raised toilet, other (see comments) (Has a walker from her sister who passed away last October)  Assistance upon Discharge: TBD    Pain/Comfort:  Pain Rating 1: 4/10  Location - Side 1: Right  Location - Orientation 1: lower  Location 1: leg  Pain Rating Post-Intervention 1: 4/10    Patients cultural, spiritual,  Hindu conflicts given the current situation:      Objective:     Communicated with: Nurse Wick prior to session.  Patient found HOB elevated with bed alarm, blood pressure cuff, peripheral IV, pulse ox (continuous), telemetry, oxygen upon OT entry to room.    General Precautions: Standard, fall, contact  Orthopedic Precautions: RLE partial weight bearing (50%;May ambulate without boot; recommend normal supportive sole shoe per Ortho PA note 1/24/24)  Braces: N/A  Respiratory Status: Nasal cannula, flow 3 L/min    Occupational Performance:      Activities of Daily Living:  Feeding:  stand by assistance set up  Grooming: moderate assistance for combing hair; Set up with washing face and oral hygiene.  Bathing: maximal assistance    Upper Body Dressing: maximal assistance    Lower Body Dressing: maximal assistance    Toileting: Purewick      Cognitive/Visual Perceptual:  Pt alert and oriented     Physical Exam:  Upper Extremity Strength:    -       Right Upper Extremity: Pt R hand dominant. Pt reports R shoulder rotator cuff tear and demonstrates approximately 45 degrees AROM R shoulder flexion. Pt demonstrates distal RUE AROM/strength WFL's.   -       Left Upper Extremity: WFL    AMPAC 6 Click ADL:  AMPAC Total Score: 15    Treatment & Education:  OT provided education in role of OT. Pt verbalized understanding and was agreeable to OT.  OT provided instruction in home safety and RLE 50% PWB precautions with ADL's/IADL's including review of home set up and DME/AE. Pt verbalized understanding. Further training indicated.  OT provided education in calling for assist. Pt verbalized understanding.    Patient left HOB elevated with all lines intact, call button in reach, and bed alarm on    GOALS:   Multidisciplinary Problems       Occupational Therapy Goals          Problem: Occupational Therapy    Goal Priority Disciplines Outcome Interventions   Occupational Therapy Goal     OT, PT/OT     Description: Goals to be  met by: 2/21/24     Patient will increase functional independence with ADLs by performing:    UE Dressing with Modified [place].  LE Dressing with Minimal Assistance.  Grooming while seated with Modified [place].  Toileting from bedside commode with Minimal Assistance for hygiene and clothing management.   Bathing from  shower chair/bench with Moderate Assistance.  Toilet transfer to bedside commode with Moderate Assistance.  Increased strength and functional activity tolerance for ADL's/IADL's                         History:     Past Medical History:   Diagnosis Date    Anemia, unspecified     Atrial fibrillation 01/25/2021    CKD (chronic kidney disease)     Hypertension          Past Surgical History:   Procedure Laterality Date    A-V CARDIAC PACEMAKER INSERTION  11/2/2023    Procedure: Dual Chamber PPM RM 2617 ([employer]);  Surgeon: Andreas James III, MD;  Location: [facility] CATH;  Service: Cardiology;;    ANGIOGRAM, CORONARY, WITH LEFT HEART CATHETERIZATION Left 4/19/2023    Procedure: Angiogram, Coronary, with Left Heart Cath;  Surgeon: Kevin Redmond MD;  Location: [facility] CATH/EP LAB;  Service: Cardiology;  Laterality: Left;    BREAST SURGERY      COLONOSCOPY N/A 4/16/2023    Procedure: COLONOSCOPY;  Surgeon: Kvng Mensah MD;  Location: [facility] ENDO;  Service: Endoscopy;  Laterality: N/A;    COLONOSCOPY W/ POLYPECTOMY  04/16/2023    OPEN REDUCTION AND INTERNAL FIXATION (ORIF) OF INJURY OF ANKLE Right 12/13/2023    Procedure: ORIF, ANKLE;  Surgeon: Chaitanya Garrison MD;  Location: [facility] OR;  Service: Orthopedics;  Laterality: Right;  Synthes    TRANSCATHETER AORTIC VALVE REPLACEMENT (TAVR)  11/1/2023    Procedure: (TAVR);  Surgeon: Juliano Moncada MD;  Location: [facility] CATH;  Service: Cardiology;;    TRANSCATHETER AORTIC VALVE REPLACEMENT (TAVR)  11/1/2023    Procedure: (TAVR)- Surgeon;  Surgeon: Adebayo Guzman MD;  Location: [facility] CATH;  Service: Peripheral Vascular;;       Time Tracking:     OT Date  of Treatment: 01/24/24  OT Start Time: 1007  OT Stop Time: 1038  OT Total Time (min): 31 min    Billable Minutes:Evaluation 8  Self Care/Home Management 23 1/24/2024

## 2024-01-24 NOTE — PROGRESS NOTES
01/23/24 2320 01/23/24 2327 01/23/24 2330   Vital Signs   BP (!) 85/48 (!) 91/46 (!) 91/47   MAP (mmHg) 64 66 67     Neeru NP and EARL Loving NP notified of pt's being hypotensive. Pt is asymptomatic, no complaints of lightheadedness or dizziness. No new orders.

## 2024-01-25 LAB
ALBUMIN SERPL BCP-MCNC: 2.4 G/DL (ref 3.5–5.2)
ALP SERPL-CCNC: 107 U/L (ref 55–135)
ALT SERPL W/O P-5'-P-CCNC: 19 U/L (ref 10–44)
ANION GAP SERPL CALC-SCNC: 8 MMOL/L (ref 8–16)
AST SERPL-CCNC: 19 U/L (ref 10–40)
BASOPHILS # BLD AUTO: 0.02 K/UL (ref 0–0.2)
BASOPHILS NFR BLD: 0.4 % (ref 0–1.9)
BILIRUB SERPL-MCNC: 0.5 MG/DL (ref 0.1–1)
BUN SERPL-MCNC: 20 MG/DL (ref 8–23)
CALCIUM SERPL-MCNC: 8.3 MG/DL (ref 8.7–10.5)
CHLORIDE SERPL-SCNC: 107 MMOL/L (ref 95–110)
CO2 SERPL-SCNC: 24 MMOL/L (ref 23–29)
CREAT SERPL-MCNC: 1.3 MG/DL (ref 0.5–1.4)
DIFFERENTIAL METHOD BLD: ABNORMAL
EOSINOPHIL # BLD AUTO: 0.3 K/UL (ref 0–0.5)
EOSINOPHIL NFR BLD: 5.7 % (ref 0–8)
ERYTHROCYTE [DISTWIDTH] IN BLOOD BY AUTOMATED COUNT: 13.5 % (ref 11.5–14.5)
EST. GFR  (NO RACE VARIABLE): 41 ML/MIN/1.73 M^2
GLUCOSE SERPL-MCNC: 83 MG/DL (ref 70–110)
HCT VFR BLD AUTO: 25 % (ref 37–48.5)
HGB BLD-MCNC: 8.3 G/DL (ref 12–16)
IMM GRANULOCYTES # BLD AUTO: 0.02 K/UL (ref 0–0.04)
IMM GRANULOCYTES NFR BLD AUTO: 0.4 % (ref 0–0.5)
LYMPHOCYTES # BLD AUTO: 0.9 K/UL (ref 1–4.8)
LYMPHOCYTES NFR BLD: 18.5 % (ref 18–48)
MAGNESIUM SERPL-MCNC: 1.8 MG/DL (ref 1.6–2.6)
MCH RBC QN AUTO: 30.7 PG (ref 27–31)
MCHC RBC AUTO-ENTMCNC: 33.2 G/DL (ref 32–36)
MCV RBC AUTO: 93 FL (ref 82–98)
MONOCYTES # BLD AUTO: 0.6 K/UL (ref 0.3–1)
MONOCYTES NFR BLD: 12.4 % (ref 4–15)
NEUTROPHILS # BLD AUTO: 3 K/UL (ref 1.8–7.7)
NEUTROPHILS NFR BLD: 62.6 % (ref 38–73)
NRBC BLD-RTO: 0 /100 WBC
PHOSPHATE SERPL-MCNC: 2.8 MG/DL (ref 2.7–4.5)
PLATELET # BLD AUTO: 97 K/UL (ref 150–450)
PMV BLD AUTO: 10.7 FL (ref 9.2–12.9)
POTASSIUM SERPL-SCNC: 3.8 MMOL/L (ref 3.5–5.1)
PROT SERPL-MCNC: 4.5 G/DL (ref 6–8.4)
RBC # BLD AUTO: 2.7 M/UL (ref 4–5.4)
SODIUM SERPL-SCNC: 139 MMOL/L (ref 136–145)
WBC # BLD AUTO: 4.76 K/UL (ref 3.9–12.7)

## 2024-01-25 PROCEDURE — 63600175 PHARM REV CODE 636 W HCPCS: Performed by: NURSE PRACTITIONER

## 2024-01-25 PROCEDURE — 25000003 PHARM REV CODE 250: Performed by: NURSE PRACTITIONER

## 2024-01-25 PROCEDURE — 84100 ASSAY OF PHOSPHORUS: CPT | Performed by: NURSE PRACTITIONER

## 2024-01-25 PROCEDURE — 94761 N-INVAS EAR/PLS OXIMETRY MLT: CPT

## 2024-01-25 PROCEDURE — 99900035 HC TECH TIME PER 15 MIN (STAT)

## 2024-01-25 PROCEDURE — 63600175 PHARM REV CODE 636 W HCPCS: Performed by: HOSPITALIST

## 2024-01-25 PROCEDURE — 83735 ASSAY OF MAGNESIUM: CPT | Performed by: NURSE PRACTITIONER

## 2024-01-25 PROCEDURE — 97530 THERAPEUTIC ACTIVITIES: CPT | Mod: CQ

## 2024-01-25 PROCEDURE — 85025 COMPLETE CBC W/AUTO DIFF WBC: CPT | Performed by: NURSE PRACTITIONER

## 2024-01-25 PROCEDURE — 20600001 HC STEP DOWN PRIVATE ROOM

## 2024-01-25 PROCEDURE — 36415 COLL VENOUS BLD VENIPUNCTURE: CPT | Performed by: NURSE PRACTITIONER

## 2024-01-25 PROCEDURE — 25000003 PHARM REV CODE 250: Performed by: HOSPITALIST

## 2024-01-25 PROCEDURE — 27000221 HC OXYGEN, UP TO 24 HOURS

## 2024-01-25 PROCEDURE — 80053 COMPREHEN METABOLIC PANEL: CPT | Performed by: NURSE PRACTITIONER

## 2024-01-25 RX ORDER — MECLIZINE HCL 12.5 MG 12.5 MG/1
12.5 TABLET ORAL 3 TIMES DAILY PRN
Status: DISCONTINUED | OUTPATIENT
Start: 2024-01-25 | End: 2024-01-26 | Stop reason: HOSPADM

## 2024-01-25 RX ORDER — MIDODRINE HYDROCHLORIDE 2.5 MG/1
2.5 TABLET ORAL 3 TIMES DAILY
Status: DISCONTINUED | OUTPATIENT
Start: 2024-01-25 | End: 2024-01-26 | Stop reason: HOSPADM

## 2024-01-25 RX ADMIN — FAMOTIDINE 20 MG: 20 TABLET, FILM COATED ORAL at 08:01

## 2024-01-25 RX ADMIN — APIXABAN 2.5 MG: 2.5 TABLET, FILM COATED ORAL at 08:01

## 2024-01-25 RX ADMIN — AMIODARONE HYDROCHLORIDE 200 MG: 200 TABLET ORAL at 08:01

## 2024-01-25 RX ADMIN — MIDODRINE HYDROCHLORIDE 2.5 MG: 2.5 TABLET ORAL at 08:01

## 2024-01-25 RX ADMIN — MIDODRINE HYDROCHLORIDE 2.5 MG: 2.5 TABLET ORAL at 09:01

## 2024-01-25 RX ADMIN — ONDANSETRON 4 MG: 2 INJECTION INTRAMUSCULAR; INTRAVENOUS at 10:01

## 2024-01-25 RX ADMIN — MIDODRINE HYDROCHLORIDE 2.5 MG: 2.5 TABLET ORAL at 02:01

## 2024-01-25 RX ADMIN — LINEZOLID 600 MG: 600 INJECTION, SOLUTION INTRAVENOUS at 08:01

## 2024-01-25 RX ADMIN — ACETAMINOPHEN 325MG 650 MG: 325 TABLET ORAL at 08:01

## 2024-01-25 RX ADMIN — DOCUSATE SODIUM 100 MG: 100 CAPSULE, LIQUID FILLED ORAL at 08:01

## 2024-01-25 RX ADMIN — MUPIROCIN: 20 OINTMENT TOPICAL at 08:01

## 2024-01-25 RX ADMIN — POLYETHYLENE GLYCOL (3350) 17 G: 17 POWDER, FOR SOLUTION ORAL at 08:01

## 2024-01-25 RX ADMIN — LINEZOLID 600 MG: 600 INJECTION, SOLUTION INTRAVENOUS at 09:01

## 2024-01-25 NOTE — ASSESSMENT & PLAN NOTE
Continue amiodarone and Eliquis  Appreciate cardiology recs.   Stop metoprolol secondary to hypotension  Added midodrine for hypotension

## 2024-01-25 NOTE — PLAN OF CARE
Plan to early dc tomorrow to [facility] if medically clear.  [employer] approved admit for tomorrow    Updated pt's nephew/POA Roger Diane by phone and he is agreeable to the plan       01/25/24 1310   Post-Acute Status   Post-Acute Authorization Placement   Post-Acute Placement Status Pending medical clearance/testing

## 2024-01-25 NOTE — PROGRESS NOTES
[facility] Medicine  Progress Note    Patient Name: Irene العراقي  MRN: [id number]  Patient Class: IP- Inpatient   Admission Date: 1/19/2024  Length of Stay: 4 days  Attending Physician: Madisyn Green MD  Primary Care Provider: Wanda Kuhn FNP-C        Subjective:     Principal Problem:Encephalopathy, metabolic        HPI:  Irene العراقي is an 83-year-old female who presents emergency room for evaluation of altered mental status, confusion, and foul-smelling urine.    She denies any fever or chills.  No known sick contacts or travel.  No aggravating or alleviating factors.  Previous medical history includes paroxysmal AFib, anemia, anxiety, status post TAVR, CKD 4.  ER workup:  CBC with mild anemia and thrombocytopenia of 129.  CMP with BUN 26 and creatinine of 1.6, BNP elevated at 819.  Troponin mildly elevated 0.263.  Chest x-ray was unremarkable.  CT of the head was negative.  Cardiology was consulted for elevated troponin and recommended trending troponin.  Urinalysis with white blood cells, red blood cells, rare bacteria, and nitrite positive.  Urine cultures pending.  Patient was started on Rocephin.  Patient admitted to Hospital Medicine for treatment and management.    Overview/Hospital Course:  No notes on file    Interval History:  Patient seen and examined.  Awake and alert.  Blood pressure dropped to the 70s with ambulation.  Low-dose midodrine started.      Review of Systems   Unable to perform ROS: Dementia     Objective:     Vital Signs (Most Recent):  Temp: 97.6 °F (36.4 °C) (01/25/24 1100)  Pulse: 60 (01/25/24 1300)  Resp: 13 (01/25/24 1300)  BP: (!) 114/55 (01/25/24 1300)  SpO2: 95 % (01/25/24 1300) Vital Signs (24h Range):  Temp:  [97.5 °F (36.4 °C)-98.3 °F (36.8 °C)] 97.6 °F (36.4 °C)  Pulse:  [60-71] 60  Resp:  [11-25] 13  SpO2:  [92 %-100 %] 95 %  BP: ()/(43-68) 114/55     Weight: 68.3 kg (150 lb 9.2 oz)  Body mass index is 24.3  kg/m².    Intake/Output Summary (Last 24 hours) at 1/25/2024 1441  Last data filed at 1/25/2024 0608  Gross per 24 hour   Intake 840 ml   Output 800 ml   Net 40 ml           Physical Exam  Constitutional:       General: She is not in acute distress.     Appearance: She is well-developed.   HENT:      Head: Normocephalic.      Mouth/Throat:      Comments: Lower lip with mild swelling, much improved  Eyes:      Pupils: Pupils are equal, round, and reactive to light.   Cardiovascular:      Rate and Rhythm: Normal rate and regular rhythm.      Heart sounds: No murmur heard.  Pulmonary:      Effort: Pulmonary effort is normal. No respiratory distress.      Breath sounds: Normal breath sounds. No wheezing or rales.   Abdominal:      General: Bowel sounds are normal. There is no distension.      Palpations: Abdomen is soft.      Tenderness: There is no abdominal tenderness.   Musculoskeletal:         General: Normal range of motion.   Skin:     General: Skin is warm and dry.      Findings: No rash.   Neurological:      Mental Status: She is alert. She is disoriented.      Cranial Nerves: No cranial nerve deficit.      Comments: Pleasantly confused.  Knows she is in the hospital   Psychiatric:         Behavior: Behavior normal.             Significant Labs: All pertinent labs within the past 24 hours have been reviewed.    Significant Imaging: I have reviewed all pertinent imaging results/findings within the past 24 hours.    Assessment/Plan:      * Encephalopathy, metabolic  Improving  Patient has acute metabolic encephalopathy that is secondary to Sepsis/Infective. Patient's current mental status is Confused. Patient's baseline mental status is. awake and alert; oriented to person, place, and time Evaluation and for underlying cause(s) is underway and inclusive of Blood Chemistries and Toxic metabolite eval . Will monitor neuro checks carefully, avoid narcotics and benzos that will exacerbate agitation, and use PRN  medications for controls of behavior for self harm.       Status post open reduction with internal fixation (ORIF) of fracture of ankle        S/P TAVR (transcatheter aortic valve replacement)  Chronic problem  Continuous telemetry monitoring      Atrial fibrillation with RVR  Continue amiodarone and Eliquis  Appreciate cardiology recs.   Stop metoprolol secondary to hypotension  Added midodrine for hypotension    Acute cystitis with hematuria  Acute Problem  Urine culture with Enterococcus faecium VRE.  Continue linezolid  Plan for 2 week course of linezolid      Anemia  Patient's anemia is currently controlled. Has not received any PRBCs to date. Etiology likely d/t chronic disease due to Chronic Kidney Disease/ESRD  Current CBC reviewed-   Lab Results   Component Value Date    HGB 8.3 (L) 01/25/2024    HCT 25.0 (L) 01/25/2024     Monitor serial CBC and transfuse if patient becomes hemodynamically unstable, symptomatic or H/H drops below 7/21.      VTE Risk Mitigation (From admission, onward)           Ordered     apixaban tablet 2.5 mg  2 times daily         01/19/24 1435     IP VTE HIGH RISK PATIENT  Once         01/19/24 1435     Place sequential compression device  Until discontinued         01/19/24 1435     Place KRISTINA hose  Until discontinued         01/19/24 1435                    Discharge Planning   YANET: 1/26/2024     Code Status: Full Code   Is the patient medically ready for discharge?:     Reason for patient still in hospital (select all that apply): Patient trending condition and Treatment  Discharge Plan A: Skilled Nursing Facility                  Madisyn Green MD  Department of Hospital Medicine   [facility]/Surg

## 2024-01-25 NOTE — PT/OT/SLP PROGRESS
Physical Therapy Treatment    Patient Name:  Irene العراقي   MRN:  [id number]    Recommendations:     Discharge Recommendations: Moderate Intensity Therapy  Discharge Equipment Recommendations: none  Barriers to discharge: None    Assessment:     Irene العراقي is a 83 y.o. female admitted with a medical diagnosis of Encephalopathy, metabolic.  She presents with the following impairments/functional limitations: weakness, impaired endurance, impaired self care skills, impaired functional mobility, gait instability, decreased lower extremity function . Awake, alert, supine in bed.  Agreed to participate in therapy.  Eager to progress mobility .  Sup > sit EOB with Max A.  Sat briefly with no complaint of dizziness.. Scoot forward with Mod A to feet L foot flat on floor.  Sit >stand with rw and Max A, PWB RLE. SPT bed > chair with Max A x 2 . Reported increased dizziness with drop in BP. Nurse arrived to assess, medicine indicated. BP gradually increased with time and patient reported feeling better.     Rehab Prognosis: Fair; patient would benefit from acute skilled PT services to address these deficits and reach maximum level of function.    Recent Surgery: * No surgery found *      Plan:     During this hospitalization, patient to be seen 6 x/week to address the identified rehab impairments via gait training, therapeutic activities, therapeutic exercises, neuromuscular re-education and progress toward the following goals:    Plan of Care Expires:  01/30/24    Subjective     Chief Complaint: weakness, dizziness with mobility  Patient/Family Comments/goals: none stated  Pain/Comfort:  Pain Rating 1: 0/10      Objective:     Communicated with nurse Wick prior to session.  Patient found supine with bed alarm, blood pressure cuff, pressure relief boots, PureWick, pulse ox (continuous), telemetry upon PT entry to room.  [facility] at bedside also.    General Precautions: Standard, contact, fall  Orthopedic Precautions: RLE,  PWB 50%.  Braces: N/A  Respiratory Status: Room air     Functional Mobility:  Bed Mobility:     Supine to Sit: maximal assistance  Transfers:     Sit to Stand:  maximal assistance with rolling walker  Bed to Chair: maximal assistance and of 2 persons with  rolling walker  using  Stand Pivot      AM-PAC 6 CLICK MOBILITY          Treatment & Education:  Sup > sit woth Max A.  Sit > stand with rw and Max A, PWB RLE.  SPT bed > chair with rw and A x 2, PWB RLE.   Stand to with Max A.     Patient left up in chair with all lines intact, call button in reach, chair alarm on, and nurse Shamika and resource nurse present..    GOALS:   Multidisciplinary Problems       Physical Therapy Goals          Problem: Physical Therapy    Goal Priority Disciplines Outcome Goal Variances Interventions   Physical Therapy Goal     PT, PT/OT Ongoing, Progressing     Description: Goals to be met by: 2024     Patient will increase functional independence with mobility by performin. Supine to sit with MInimal Assistance  2. Sit to stand transfer with Moderate Assistance  3. Bed to chair transfer with Moderate Assistance using Rolling Walker  4. Gait  x 10 feet with Moderate Assistance using Rolling Walker.   5. Lower extremity exercise program x20 reps                        Time Tracking:     PT Received On: 24  PT Start Time: 0840     PT Stop Time: 0904  PT Total Time (min): 24 min     Billable Minutes: Therapeutic Activity 24min    Treatment Type: Treatment  PT/PTA: PTA     Number of PTA visits since last PT visit: 3     2024

## 2024-01-25 NOTE — CARE UPDATE
01/25/24 0842   Patient Assessment/Suction   Level of Consciousness (AVPU) alert   Respiratory Effort Unlabored   All Lung Fields Breath Sounds diminished;clear   PRE-TX-O2   Device (Oxygen Therapy) nasal cannula   $ Is the patient on Low Flow Oxygen? Yes   Flow (L/min) 3   Oxygen Concentration (%) 32   SpO2 97 %   Pulse Oximetry Type Continuous   $ Pulse Oximetry - Multiple Charge Pulse Oximetry - Multiple   Pulse 66   Resp 16   BP (!) 102/54   Aerosol Therapy   $ Aerosol Therapy Charges PRN treatment not required   Ready to Wean/Extubation Screen   FIO2<=50 (chart decimal) 0.32

## 2024-01-25 NOTE — PLAN OF CARE
Problem: Physical Therapy  Goal: Physical Therapy Goal  Description: Goals to be met by: 2024     Patient will increase functional independence with mobility by performin. Supine to sit with MInimal Assistance  2. Sit to stand transfer with Moderate Assistance  3. Bed to chair transfer with Moderate Assistance using Rolling Walker  4. Gait  x 10 feet with Moderate Assistance using Rolling Walker.   5. Lower extremity exercise program x20 reps   Outcome: Ongoing, Progressing   Therapeutic activity to improve functional mobility : bed mobility , transfers, gait with rw and assistance for safety, LE exercises.

## 2024-01-25 NOTE — EICU
Intervention Initiated From:  [facility] / EICU    Jonh intervened regarding:  Rounding (Video assessment)    Nurse Notified:  No    Doctor Notified:  No    Comments: Rounding done. Side rails up x4. B/P 115/56, HR 60, resp 28, sat 100. On no IV fluid. On 3 liter nasal cannula

## 2024-01-25 NOTE — SUBJECTIVE & OBJECTIVE
Interval History:  Patient seen and examined.  Awake and alert.  Blood pressure dropped to the 70s with ambulation.  Low-dose midodrine started.      Review of Systems   Unable to perform ROS: Dementia     Objective:     Vital Signs (Most Recent):  Temp: 97.6 °F (36.4 °C) (01/25/24 1100)  Pulse: 60 (01/25/24 1300)  Resp: 13 (01/25/24 1300)  BP: (!) 114/55 (01/25/24 1300)  SpO2: 95 % (01/25/24 1300) Vital Signs (24h Range):  Temp:  [97.5 °F (36.4 °C)-98.3 °F (36.8 °C)] 97.6 °F (36.4 °C)  Pulse:  [60-71] 60  Resp:  [11-25] 13  SpO2:  [92 %-100 %] 95 %  BP: ()/(43-68) 114/55     Weight: 68.3 kg (150 lb 9.2 oz)  Body mass index is 24.3 kg/m².    Intake/Output Summary (Last 24 hours) at 1/25/2024 1441  Last data filed at 1/25/2024 0608  Gross per 24 hour   Intake 840 ml   Output 800 ml   Net 40 ml           Physical Exam  Constitutional:       General: She is not in acute distress.     Appearance: She is well-developed.   HENT:      Head: Normocephalic.      Mouth/Throat:      Comments: Lower lip with mild swelling, much improved  Eyes:      Pupils: Pupils are equal, round, and reactive to light.   Cardiovascular:      Rate and Rhythm: Normal rate and regular rhythm.      Heart sounds: No murmur heard.  Pulmonary:      Effort: Pulmonary effort is normal. No respiratory distress.      Breath sounds: Normal breath sounds. No wheezing or rales.   Abdominal:      General: Bowel sounds are normal. There is no distension.      Palpations: Abdomen is soft.      Tenderness: There is no abdominal tenderness.   Musculoskeletal:         General: Normal range of motion.   Skin:     General: Skin is warm and dry.      Findings: No rash.   Neurological:      Mental Status: She is alert. She is disoriented.      Cranial Nerves: No cranial nerve deficit.      Comments: Pleasantly confused.  Knows she is in the hospital   Psychiatric:         Behavior: Behavior normal.             Significant Labs: All pertinent labs within the  past 24 hours have been reviewed.    Significant Imaging: I have reviewed all pertinent imaging results/findings within the past 24 hours.

## 2024-01-25 NOTE — PT/OT/SLP PROGRESS
Occupational Therapy      Patient Name:  Irene العراقي   MRN:  [id number]    Patient not seen today secondary to Other (Comment) (Hold per Nurse Wick.). Will follow-up.    1/25/2024

## 2024-01-25 NOTE — PLAN OF CARE
Niki requested updated notes. Sent via [employer].       01/25/24 1305   Post-Acute Status   Post-Acute Authorization Placement   Post-Acute Placement Status Pending post-acute provider review/more information requested

## 2024-01-25 NOTE — ASSESSMENT & PLAN NOTE
Patient's anemia is currently controlled. Has not received any PRBCs to date. Etiology likely d/t chronic disease due to Chronic Kidney Disease/ESRD  Current CBC reviewed-   Lab Results   Component Value Date    HGB 8.3 (L) 01/25/2024    HCT 25.0 (L) 01/25/2024     Monitor serial CBC and transfuse if patient becomes hemodynamically unstable, symptomatic or H/H drops below 7/21.

## 2024-01-26 VITALS
HEART RATE: 60 BPM | SYSTOLIC BLOOD PRESSURE: 116 MMHG | BODY MASS INDEX: 24.2 KG/M2 | OXYGEN SATURATION: 100 % | HEIGHT: 66 IN | RESPIRATION RATE: 21 BRPM | WEIGHT: 150.56 LBS | TEMPERATURE: 97 F | DIASTOLIC BLOOD PRESSURE: 59 MMHG

## 2024-01-26 LAB
ALBUMIN SERPL BCP-MCNC: 2.4 G/DL (ref 3.5–5.2)
ALP SERPL-CCNC: 102 U/L (ref 55–135)
ALT SERPL W/O P-5'-P-CCNC: 20 U/L (ref 10–44)
ANION GAP SERPL CALC-SCNC: 6 MMOL/L (ref 8–16)
AST SERPL-CCNC: 19 U/L (ref 10–40)
BASOPHILS # BLD AUTO: 0.03 K/UL (ref 0–0.2)
BASOPHILS NFR BLD: 0.6 % (ref 0–1.9)
BILIRUB SERPL-MCNC: 0.4 MG/DL (ref 0.1–1)
BUN SERPL-MCNC: 17 MG/DL (ref 8–23)
CALCIUM SERPL-MCNC: 8.7 MG/DL (ref 8.7–10.5)
CHLORIDE SERPL-SCNC: 104 MMOL/L (ref 95–110)
CO2 SERPL-SCNC: 25 MMOL/L (ref 23–29)
CREAT SERPL-MCNC: 1.2 MG/DL (ref 0.5–1.4)
DIFFERENTIAL METHOD BLD: ABNORMAL
EOSINOPHIL # BLD AUTO: 0.2 K/UL (ref 0–0.5)
EOSINOPHIL NFR BLD: 4.3 % (ref 0–8)
ERYTHROCYTE [DISTWIDTH] IN BLOOD BY AUTOMATED COUNT: 13.6 % (ref 11.5–14.5)
EST. GFR  (NO RACE VARIABLE): 45 ML/MIN/1.73 M^2
GLUCOSE SERPL-MCNC: 76 MG/DL (ref 70–110)
HCT VFR BLD AUTO: 26.5 % (ref 37–48.5)
HGB BLD-MCNC: 8.7 G/DL (ref 12–16)
IMM GRANULOCYTES # BLD AUTO: 0.02 K/UL (ref 0–0.04)
IMM GRANULOCYTES NFR BLD AUTO: 0.4 % (ref 0–0.5)
LYMPHOCYTES # BLD AUTO: 0.9 K/UL (ref 1–4.8)
LYMPHOCYTES NFR BLD: 18.5 % (ref 18–48)
MAGNESIUM SERPL-MCNC: 1.8 MG/DL (ref 1.6–2.6)
MCH RBC QN AUTO: 30.5 PG (ref 27–31)
MCHC RBC AUTO-ENTMCNC: 32.8 G/DL (ref 32–36)
MCV RBC AUTO: 93 FL (ref 82–98)
MONOCYTES # BLD AUTO: 0.6 K/UL (ref 0.3–1)
MONOCYTES NFR BLD: 13.4 % (ref 4–15)
NEUTROPHILS # BLD AUTO: 2.9 K/UL (ref 1.8–7.7)
NEUTROPHILS NFR BLD: 62.8 % (ref 38–73)
NRBC BLD-RTO: 0 /100 WBC
PHOSPHATE SERPL-MCNC: 3 MG/DL (ref 2.7–4.5)
PLATELET # BLD AUTO: 105 K/UL (ref 150–450)
PMV BLD AUTO: 11.1 FL (ref 9.2–12.9)
POTASSIUM SERPL-SCNC: 3.7 MMOL/L (ref 3.5–5.1)
PROT SERPL-MCNC: 4.6 G/DL (ref 6–8.4)
RBC # BLD AUTO: 2.85 M/UL (ref 4–5.4)
SODIUM SERPL-SCNC: 135 MMOL/L (ref 136–145)
WBC # BLD AUTO: 4.64 K/UL (ref 3.9–12.7)

## 2024-01-26 PROCEDURE — 25000003 PHARM REV CODE 250: Performed by: NURSE PRACTITIONER

## 2024-01-26 PROCEDURE — 84100 ASSAY OF PHOSPHORUS: CPT | Performed by: NURSE PRACTITIONER

## 2024-01-26 PROCEDURE — 36415 COLL VENOUS BLD VENIPUNCTURE: CPT | Performed by: NURSE PRACTITIONER

## 2024-01-26 PROCEDURE — 99900035 HC TECH TIME PER 15 MIN (STAT)

## 2024-01-26 PROCEDURE — 25000003 PHARM REV CODE 250: Performed by: HOSPITALIST

## 2024-01-26 PROCEDURE — 94761 N-INVAS EAR/PLS OXIMETRY MLT: CPT

## 2024-01-26 PROCEDURE — 97530 THERAPEUTIC ACTIVITIES: CPT | Mod: CQ

## 2024-01-26 PROCEDURE — 83735 ASSAY OF MAGNESIUM: CPT | Performed by: NURSE PRACTITIONER

## 2024-01-26 PROCEDURE — 85025 COMPLETE CBC W/AUTO DIFF WBC: CPT | Performed by: NURSE PRACTITIONER

## 2024-01-26 PROCEDURE — 80053 COMPREHEN METABOLIC PANEL: CPT | Performed by: NURSE PRACTITIONER

## 2024-01-26 PROCEDURE — 63600175 PHARM REV CODE 636 W HCPCS: Performed by: HOSPITALIST

## 2024-01-26 RX ORDER — AMIODARONE HYDROCHLORIDE 200 MG/1
200 TABLET ORAL 2 TIMES DAILY
Qty: 60 TABLET | Refills: 1 | Status: SHIPPED | OUTPATIENT
Start: 2024-01-26 | End: 2025-01-25

## 2024-01-26 RX ORDER — MIDODRINE HYDROCHLORIDE 10 MG/1
5 TABLET ORAL
Qty: 45 TABLET | Refills: 0 | Status: SHIPPED | OUTPATIENT
Start: 2024-01-26 | End: 2024-02-06

## 2024-01-26 RX ORDER — LINEZOLID 2 MG/ML
600 INJECTION, SOLUTION INTRAVENOUS EVERY 12 HOURS
Qty: 8400 ML | Refills: 0 | Status: ON HOLD
Start: 2024-01-26 | End: 2024-02-19 | Stop reason: HOSPADM

## 2024-01-26 RX ADMIN — APIXABAN 2.5 MG: 2.5 TABLET, FILM COATED ORAL at 08:01

## 2024-01-26 RX ADMIN — AMIODARONE HYDROCHLORIDE 200 MG: 200 TABLET ORAL at 08:01

## 2024-01-26 RX ADMIN — MIDODRINE HYDROCHLORIDE 2.5 MG: 2.5 TABLET ORAL at 08:01

## 2024-01-26 RX ADMIN — POLYETHYLENE GLYCOL (3350) 17 G: 17 POWDER, FOR SOLUTION ORAL at 08:01

## 2024-01-26 RX ADMIN — FAMOTIDINE 20 MG: 20 TABLET, FILM COATED ORAL at 08:01

## 2024-01-26 RX ADMIN — LINEZOLID 600 MG: 600 INJECTION, SOLUTION INTRAVENOUS at 09:01

## 2024-01-26 RX ADMIN — MUPIROCIN: 20 OINTMENT TOPICAL at 08:01

## 2024-01-26 RX ADMIN — DOCUSATE SODIUM 100 MG: 100 CAPSULE, LIQUID FILLED ORAL at 08:01

## 2024-01-26 NOTE — PLAN OF CARE
AVS sent to [facility] for review       01/26/24 1003   Post-Acute Status   Post-Acute Authorization Placement   Post-Acute Placement Status Pending post-acute provider review/more information requested

## 2024-01-26 NOTE — CARE UPDATE
01/26/24 0735   Patient Assessment/Suction   Level of Consciousness (AVPU) alert   Respiratory Effort Unlabored;Normal   Expansion/Accessory Muscles/Retractions no retractions;no use of accessory muscles;expansion symmetric   All Lung Fields Breath Sounds Anterior:;Lateral:;clear;diminished   Rhythm/Pattern, Respiratory unlabored;pattern regular;no shortness of breath reported;depth regular   PRE-TX-O2   Device (Oxygen Therapy) room air   SpO2 100 %   Pulse Oximetry Type Continuous   $ Pulse Oximetry - Multiple Charge Pulse Oximetry - Multiple   Pulse 64   Resp 14   Aerosol Therapy   $ Aerosol Therapy Charges PRN treatment not required   Respiratory Treatment Status (SVN) PRN treatment not required   Respiratory Evaluation   $ Care Plan Tech Time 15 min   Evaluation For Re-Eval 5+ day   Admitting Diagnosis metabolic encephalopathy   Cardiac Diagnosis a fib, hypertension   Home Oxygen   Has Home Oxygen? No   Home Aerosol, MDI, DPI, and Other Treatments/Therapies   Home Respiratory Therapy Per Patient/Review of Chart No   Oxygen Care Plan   Oxygen Care Plan Per Protocol   SPO2 Goal (%) MD order   Rationale SpO2 is <MD Goal   Bronchodilator Care Plan   Rationale No Rationale found   Atelectasis Care Plan   Rationale No Rational Found   Airway Clearance Care Plan   Rationale No rationale found

## 2024-01-26 NOTE — PT/OT/SLP PROGRESS
Physical Therapy Treatment    Patient Name:  Irene العراقي   MRN:  [id number]    Recommendations:     Discharge Recommendations: Moderate Intensity Therapy  Discharge Equipment Recommendations: none  Barriers to discharge: None    Assessment:     Irene العراقي is a 83 y.o. female admitted with a medical diagnosis of Encephalopathy, metabolic.  She presents with the following impairments/functional limitations: weakness, impaired endurance, impaired self care skills, gait instability, decreased lower extremity function . Awake, alert, supine in bed eager to progress mobility.  BP supine 139/63.  Sup > sit Max A. / 53, dizziness reported BP 90 / 51 seated. Returned supine due to feeling very dizzy ( became teary ).  Nurse informed .     Rehab Prognosis: Fair; patient would benefit from acute skilled PT services to address these deficits and reach maximum level of function.    Recent Surgery: * No surgery found *      Plan:     During this hospitalization, patient to be seen 6 x/week to address the identified rehab impairments via gait training, therapeutic activities, therapeutic exercises, neuromuscular re-education and progress toward the following goals:    Plan of Care Expires:  01/30/24    Subjective     Chief Complaint: dizziness with activity  Patient/Family Comments/goals: none stated  Pain/Comfort:  Pain Rating 1: 0/10      Objective:     Communicated with nurse Wick prior to session.  Patient found supine with bed alarm, blood pressure cuff, PureWick, telemetry, pulse ox (continuous) ([facility] at bedside) upon PT entry to room.     General Precautions: Standard, contact, fall  Orthopedic Precautions: RLE partial weight bearing  Braces: N/A  Respiratory Status: Room air     Functional Mobility:  Bed Mobility:     Rolling Left:  moderate assistance  Rolling Right: moderate assistance  Supine to Sit: maximal assistance  Sit to Supine: maximal assistance      AM-PAC 6 CLICK MOBILITY          Treatment &  Education:  Rolled R<>L with assistance for diaper change.  Sup > sit with Max A. Sat extended period , dizziness noted, orthostatic BP .  Sit > supine with Max A.  Scoot HOB with total A.      Patient left supine with all lines intact, call button in reach, bed alarm on, nurse Shamika notified, and [facility] Ybarra present..    GOALS:   Multidisciplinary Problems       Physical Therapy Goals          Problem: Physical Therapy    Goal Priority Disciplines Outcome Goal Variances Interventions   Physical Therapy Goal     PT, PT/OT Ongoing, Progressing     Description: Goals to be met by: 2024     Patient will increase functional independence with mobility by performin. Supine to sit with MInimal Assistance  2. Sit to stand transfer with Moderate Assistance  3. Bed to chair transfer with Moderate Assistance using Rolling Walker  4. Gait  x 10 feet with Moderate Assistance using Rolling Walker.   5. Lower extremity exercise program x20 reps                        Time Tracking:     PT Received On: 24  PT Start Time: 0845     PT Stop Time: 0908  PT Total Time (min): 23 min     Billable Minutes: Therapeutic Activity 23min    Treatment Type: Treatment  PT/PTA: PTA     Number of PTA visits since last PT visit: 4     2024

## 2024-01-26 NOTE — PLAN OF CARE
Spoke with Linda at the business office at [facility], sent her additional documentation supporting pt's level of function prior to her 12/11/23 via [employer]. She is going to forward to [facility] and will call also.

## 2024-01-26 NOTE — DISCHARGE INSTRUCTIONS
[facility]/Surg  Facility Transfer Orders        Admit to: [facility]    Diagnoses:   Active Hospital Problems    Diagnosis  POA    *Encephalopathy, metabolic [G93.41]  Yes    Status post open reduction with internal fixation (ORIF) of fracture of ankle [Z98.890, Z87.81]  Not Applicable    S/P TAVR (transcatheter aortic valve replacement) [Z95.2]  Not Applicable    Atrial fibrillation with RVR [I48.91]  Yes    Acute cystitis with hematuria [N30.01]  Yes    Anemia [D64.9]  Yes      Resolved Hospital Problems   No resolved problems to display.     Allergies:   Review of patient's allergies indicates:   Allergen Reactions    Cefuroxime     Hydrocodone     Meperidine     Meperidine hcl Nausea And Vomiting    Persantine [dipyridamole]     Nitrofurantoin macrocrystal      Headache , went into Afib     Vicodin [hydrocodone-acetaminophen] Nausea And Vomiting       Code Status: Full    Vitals: Routine       Diet: cardiac diet, boost plus with meals    Activity: Activity as tolerated and Weight bearing status: partial weight bearing: right leg    Nursing Precautions: Aspiration , Fall, Seizure, and Pressure ulcer prevention    Bed/Surface: Low Air Loss    Consults: PT to evaluate and treat- 5 times a week, OT to evaluate and treat- 5 times a week, and Nutrition to evaluate and recommend diet    Oxygen: room air    Dialysis: Patient is not on dialysis.     Labs:CBC, CMP twice a week x 2 weeks  Pending Diagnostic Studies:       None          Imaging: none    IV: Midline     Medications: Discontinue all previous medication orders, if any. See new list below.  Current Discharge Medication List        START taking these medications    Details   amiodarone (PACERONE) 200 MG Tab Take 1 tablet (200 mg total) by mouth 2 (two) times daily.  Qty: 60 tablet, Refills: 1      linezolid (ZYVOX) 600 mg/300 mL PgBk Inject 300 mLs (600 mg total) into the vein every 12 (twelve) hours. for 14 days  Qty: 8400 mL, Refills: 0            CONTINUE these medications which have CHANGED    Details   midodrine (PROAMATINE) 10 MG tablet Take 0.5 tablets (5 mg total) by mouth 3 (three) times daily with meals.  Qty: 45 tablet, Refills: 0           CONTINUE these medications which have NOT CHANGED    Details   apixaban (ELIQUIS) 2.5 mg Tab Take 1 tablet (2.5 mg total) by mouth 2 (two) times daily.  Qty: 60 tablet, Refills: 2      docusate sodium (COLACE) 100 MG capsule Take 1 capsule (100 mg total) by mouth 2 (two) times daily.  Qty: 60 capsule, Refills: 5      famotidine (PEPCID) 20 MG tablet Take 1 tablet (20 mg total) by mouth once daily.  Qty: 30 tablet, Refills: 11      iron ag,hk-T-AE0-B12-Zn-sa-sto (NIFEREX, SUMALATE-QUATREFOLIC,) 150 mg iron- 60 mg-1 mg Tab Take 1 tablet by mouth once daily.  Qty: 90 tablet, Refills: 3      magnesium oxide (MAG-OX) 400 mg (241.3 mg magnesium) tablet TAKE 1 TABLET BY MOUTH ONCE DAILY  Qty: 90 tablet, Refills: 3    Associated Diagnoses: Paroxysmal atrial fibrillation      meclizine (ANTIVERT) 50 MG tablet Take 1 tablet (50 mg total) by mouth 3 (three) times daily as needed for Dizziness.  Qty: 30 tablet, Refills: 0      MIRALAX 17 gram PwPk Take 17 g by mouth 2 (two) times daily.      ondansetron (ZOFRAN-ODT) 4 MG TbDL Take 2 tablets (8 mg total) by mouth every 8 (eight) hours as needed.  Qty: 2 tablet, Refills: 0           STOP taking these medications       ALPRAZolam (XANAX) 0.5 MG tablet Comments:   Reason for Stopping:         promethazine (PHENERGAN) 25 MG tablet Comments:   Reason for Stopping:             Follow up:    Follow-up Information       Wanda Kuhn, DEBORAHP-C Follow up.    Specialty: Family Medicine  Contact information:  [address]  [address]  [place] [address]  [phone]               Justin Ramirez MD Follow up.    Specialties: Interventional Cardiology, Cardiology  Contact information:  [address]  [address]  [facility]  [place] [address]  [phone]                                Immunizations Administered as of 1/26/2024       Name Date Dose VIS Date Route Exp Date    COVID-19, MRNA, LN-S, PF (Pfizer) (Purple Cap) 8/20/2021 0.3 mL 5/10/2021 Intramuscular --    Site: Left deltoid     : [employer]     Lot: [id number]     Comment: Adminis     COVID-19, MRNA, LN-S, PF (Pfizer) (Purple Cap) 7/31/2021 0.3 mL 5/10/2021 Intramuscular --    Site: Left deltoid     : [employer]     Lot: [id number]     Comment: Adminis                    Madisyn Green MD

## 2024-01-26 NOTE — CARE UPDATE
01/25/24 2007   Patient Assessment/Suction   Level of Consciousness (AVPU) alert   Respiratory Effort Normal;Unlabored   Expansion/Accessory Muscles/Retractions no use of accessory muscles   All Lung Fields Breath Sounds Anterior:;clear;diminished   Rhythm/Pattern, Respiratory pattern regular;unlabored   Cough Frequency infrequent   PRE-TX-O2   Device (Oxygen Therapy) room air   SpO2 97 %   Pulse Oximetry Type Continuous   $ Pulse Oximetry - Multiple Charge Pulse Oximetry - Multiple   Pulse 60   Resp 14   Temp 98 °F (36.7 °C)   Aerosol Therapy   $ Aerosol Therapy Charges PRN treatment not required   Respiratory Treatment Status (SVN) PRN treatment not required

## 2024-01-26 NOTE — DISCHARGE SUMMARY
Counts include 234 beds at the [facility] Medicine  Discharge Summary      Patient Name: Irene العراقي  MRN: [id number]  [facility]: [id number]  Patient Class: IP- Inpatient  Admission Date: 1/19/2024  Hospital Length of Stay: 5 days  Discharge Date and Time: 1/26/2024  3:00 PM  Attending Physician: No att. providers found   Discharging Provider: Madisyn Green MD  Primary Care Provider: Wanda Kuhn, JOY-CARYN    Primary Care Team: Networked reference to record PCT     HPI:   Irene العراقي is an 83-year-old female who presents emergency room for evaluation of altered mental status, confusion, and foul-smelling urine.    She denies any fever or chills.  No known sick contacts or travel.  No aggravating or alleviating factors.  Previous medical history includes paroxysmal AFib, anemia, anxiety, status post TAVR, CKD 4.  ER workup:  CBC with mild anemia and thrombocytopenia of 129.  CMP with BUN 26 and creatinine of 1.6, BNP elevated at 819.  Troponin mildly elevated 0.263.  Chest x-ray was unremarkable.  CT of the head was negative.  Cardiology was consulted for elevated troponin and recommended trending troponin.  Urinalysis with white blood cells, red blood cells, rare bacteria, and nitrite positive.  Urine cultures pending.  Patient was started on Rocephin.  Patient admitted to Hospital Medicine for treatment and management.    * No surgery found *      Hospital Course:   Patient sent over from skilled nursing facility for altered mental status and foul-smelling urine.  Urine cultures were taken and she was started on Rocephin.  She was spontaneously converted from normal sinus rhythm to atrial fibrillation with RVR and Cardiology was consulted.  She was started on metoprolol b.i.d..  She was started on amiodarone bolus and drip.  She converted back to paced rhythm.  She was transitioned to p.o. amiodarone.  Beta-blocker was discontinued secondary to hypotension and midodrine was started.  Her urine grew Enterococcus  faecium VRE and antibiotic was changed to linezolid.  Mental status improved to her baseline.  She was discharged back to skilled nursing facility for further care.  Orthopedics saw her while she was in the hospital and increased her weight-bearing status to partial weight-bearing on the right lower extremity after reviewing imaging.  Patient to complete a 2 week course of linezolid.     Goals of Care Treatment Preferences:  Code Status: Full Code      Consults:   Consults (From admission, onward)          Status Ordering Provider     Inpatient consult to Midline team  Once        Provider:  (Not yet assigned)    Completed CELESTINE ONEILL     Inpatient consult to Social Work/Case Management  Once        Provider:  (Not yet assigned)    Completed CELESTINE ONEILL     Inpatient consult to Midline team  Once        Provider:  (Not yet assigned)    Completed VEGA DIAZ     IP consult to dietary  Once        Provider:  (Not yet assigned)    Completed ELSA CHAN     Inpatient consult to Cardiology  Once        Provider:  Arielle Estrada MD    Completed ELSA CHAN            No new Assessment & Plan notes have been filed under this hospital service since the last note was generated.  Service: Hospital Medicine    Final Active Diagnoses:    Diagnosis Date Noted POA    PRINCIPAL PROBLEM:  Encephalopathy, metabolic [G93.41] 01/19/2024 Yes    Status post open reduction with internal fixation (ORIF) of fracture of ankle [Z98.890, Z87.81] 01/23/2024 Not Applicable    S/P TAVR (transcatheter aortic valve replacement) [Z95.2] 11/01/2023 Not Applicable    Atrial fibrillation with RVR [I48.91] 09/27/2023 Yes    Acute cystitis with hematuria [N30.01] 09/09/2023 Yes    Anemia [D64.9] 01/28/2021 Yes      Problems Resolved During this Admission:       Discharged Condition: good    Disposition: Skilled Nursing Facility    Follow Up:   Follow-up Information       Wanda Kuhn, JOY-CARYN Follow up.     Specialty: Family Medicine  Why: after dc from SNF  Contact information:  [address] [facility]  [address]  [place] [address]  [phone]               Justin Ramirez MD. Schedule an appointment as soon as possible for a visit in 2 week(s).    Specialties: Interventional Cardiology, Cardiology  Contact information:  [address] [facility]  [address]  [facility]  [place] [address]  [phone]                           Patient Instructions:      Notify your health care provider if you experience any of the following:  temperature >100.4     Notify your health care provider if you experience any of the following:  persistent nausea and vomiting or diarrhea     Notify your health care provider if you experience any of the following:  severe uncontrolled pain     Notify your health care provider if you experience any of the following:  difficulty breathing or increased cough     Notify your health care provider if you experience any of the following:  severe persistent headache     Notify your health care provider if you experience any of the following:  persistent dizziness, light-headedness, or visual disturbances     Notify your health care provider if you experience any of the following:  increased confusion or weakness     Activity as tolerated       Significant Diagnostic Studies: Labs: BMP:   Recent Labs   Lab 01/25/24  0511 01/26/24  0504   GLU 83 76    135*   K 3.8 3.7    104   CO2 24 25   BUN 20 17   CREATININE 1.3 1.2   CALCIUM 8.3* 8.7   MG 1.8 1.8    and CBC   Recent Labs   Lab 01/25/24  0511 01/26/24  0504   WBC 4.76 4.64   HGB 8.3* 8.7*   HCT 25.0* 26.5*   PLT 97* 105*   Microbiology Results (Last 365 Days)    Procedure Component Value Units Date/Time   Blood culture [[id number]] Collected: 01/24/24 1050   Order Status: Completed Specimen: Blood from Antecubital, Right Arm Updated: 01/26/24 1632    Blood Culture, Routine No Growth to date     No Growth to date     No Growth to date   Blood  culture [[id number]] Collected: 01/24/24 1050   Order Status: Completed Specimen: Blood Updated: 01/26/24 1632    Blood Culture, Routine No Growth to date     No Growth to date     No Growth to date   Urine culture [[id number]] (Abnormal)  Collected: 01/19/24 1328   Order Status: Completed Specimen: Urine, Catheterized Updated: 01/23/24 0718    Urine Culture, Routine ENTEROCOCCUS FAECIUM VRE  >100,000 cfu/ml   Abnormal    Narrative:     Indicated criteria for high risk culture:->Other  Other (specify):->.   Susceptibility     Enterococcus faecium VRE     CULTURE, URINE     Ampicillin >8 mcg/mL Resistant     Daptomycin 4 mcg/mL Sensitive     Linezolid 2 mcg/mL Sensitive     Nitrofurantoin 64 mcg/mL Intermediate     Tetracycline >8 mcg/mL Resistant     Vancomycin >16 mcg/mL Resistant               Linear View             Pending Diagnostic Studies:       None           Medications:  Reconciled Home Medications:      Medication List        START taking these medications      amiodarone 200 MG Tab  Commonly known as: PACERONE  Take 1 tablet (200 mg total) by mouth 2 (two) times daily.     linezolid 600 mg/300 mL Pgbk  Commonly known as: ZYVOX  Inject 300 mLs (600 mg total) into the vein every 12 (twelve) hours. for 14 days            CHANGE how you take these medications      magnesium oxide 400 mg (241.3 mg magnesium) tablet  Commonly known as: MAG-OX  TAKE 1 TABLET BY MOUTH ONCE DAILY  What changed: when to take this     midodrine 10 MG tablet  Commonly known as: PROAMATINE  Take 0.5 tablets (5 mg total) by mouth 3 (three) times daily with meals.  What changed: how much to take            CONTINUE taking these medications      apixaban 2.5 mg Tab  Commonly known as: ELIQUIS  Take 1 tablet (2.5 mg total) by mouth 2 (two) times daily.     docusate sodium 100 MG capsule  Commonly known as: COLACE  Take 1 capsule (100 mg total) by mouth 2 (two) times daily.     famotidine 20 MG tablet  Commonly known as: PEPCID  Take 1  tablet (20 mg total) by mouth once daily.     meclizine 50 MG tablet  Commonly known as: ANTIVERT  Take 1 tablet (50 mg total) by mouth 3 (three) times daily as needed for Dizziness.     MIRALAX 17 gram Pwpk  Generic drug: polyethylene glycol  Take 17 g by mouth 2 (two) times daily.     NIFEREX (SUMALATE-QUATREFOLIC) 150 mg iron- 60 mg-1 mg Tab  Generic drug: iron ag,ii-M-VX1-B12-Zn-sa-sto  Take 1 tablet by mouth once daily.     ondansetron 4 MG Tbdl  Commonly known as: ZOFRAN-ODT  Take 2 tablets (8 mg total) by mouth every 8 (eight) hours as needed.            STOP taking these medications      ALPRAZolam 0.5 MG tablet  Commonly known as: XANAX     promethazine 25 MG tablet  Commonly known as: PHENERGAN              Indwelling Lines/Drains at time of discharge:   Lines/Drains/Airways       None                   Time spent on the discharge of patient: 35 minutes         Madisyn Green MD  Department of Hospital Medicine  [facility]/Surg

## 2024-01-26 NOTE — EICU
Intervention Initiated From:  COR / EICU    Jonh intervened regarding:  Rounding (Video assessment)    Nurse Notified:  No    Doctor Notified:  No    Comments: Rounding done. Alert. On no IV fluid. On 3 liter nasal cannula. B/P 99/51, HR 76, resp 20, sat 99.Side rails up x3

## 2024-01-26 NOTE — PLAN OF CARE
Report can be called to Puneet. [phone]  Transportation will be arranged.     Pt is clear for dc from CM standpoint. Discharging to [facility]       01/26/24 1319   Final Note   Assessment Type Final Discharge Note   Anticipated Discharge Disposition SNF   Post-Acute Status   Post-Acute Authorization Placement   Post-Acute Placement Status Set-up Complete/Auth obtained

## 2024-01-26 NOTE — HOSPITAL COURSE
Patient sent over from skilled nursing facility for altered mental status and foul-smelling urine.  Urine cultures were taken and she was started on Rocephin.  She was spontaneously converted from normal sinus rhythm to atrial fibrillation with RVR and Cardiology was consulted.  She was started on metoprolol b.i.d..  She was started on amiodarone bolus and drip.  She converted back to paced rhythm.  She was transitioned to p.o. amiodarone.  Beta-blocker was discontinued secondary to hypotension and midodrine was started.  Her urine grew Enterococcus faecium VRE and antibiotic was changed to linezolid.  Mental status improved to her baseline.  She was discharged back to skilled nursing facility for further care.  Orthopedics saw her while she was in the hospital and increased her weight-bearing status to partial weight-bearing on the right lower extremity after reviewing imaging.  Patient to complete a 2 week course of linezolid.

## 2024-01-26 NOTE — PLAN OF CARE
Awaiting report info for dc to SNF         01/26/24 7934   Post-Acute Status   Post-Acute Authorization Placement   Post-Acute Placement Status Pending post-acute provider review/more information requested

## 2024-01-29 ENCOUNTER — PATIENT OUTREACH (OUTPATIENT)
Dept: FAMILY MEDICINE | Facility: CLINIC | Age: 84
End: 2024-01-29
Payer: COMMERCIAL

## 2024-01-29 LAB
BACTERIA BLD CULT: NORMAL
BACTERIA BLD CULT: NORMAL

## 2024-01-29 NOTE — TELEPHONE ENCOUNTER
Discharge Information     Discharge Date:   01/26/2024    Primary Discharge Diagnosis:  UTI/Afib      Discharge Summary:  Reviewed      Medication & Order Review     Were medication changes made or new medications added?   Yes    If so, has the patient filled the prescriptions?  Yes     Was Home Health ordered? No    If so, has Home Health contacted patient and/or initiated services?  No    Name of Home Health Agency? N/A    Durable Medical Equipment ordered?  No     If so, has the DME provider contacted patient and delivered equipment?  N/A    Follow Up               Any problems since discharge? No    How is the patient feeling since returning home?    Pt states she is feeling well since being discharged from hospital. Pt states she is located at [facility] and IV antibiotic adm at facility and tolerated well.   Have you set up recommended follow up appointments?  (cardiology, surgery, etc.)  Pt adv she is scheduled with Cardio/DR. Ramirez on 02/21/2024 and will advise Roger of scheduled appt.     Schedule Hospital Follow-up appointment within 7-14 days (preferably 7). Pt scheduled for 02/07/2024 for HFU and instructed to contact Roger regard scheduled appt along with clinic reminder. Pt aware of information given.      Notes: 84 yo female adm on 01/19/2024 and dx with UTI and afib. Pt discharged on 01/26/2024 and transferred back to [facility] therapy. Pt states she is tolerating IV antibiotic for UTI tx. Pt seemed a little confused bu aware of information given. I adv pt I will contact Roger, relative listed to adv of scheduled HFU and answer any concerning questions.            Tyra Graves

## 2024-02-06 ENCOUNTER — OFFICE VISIT (OUTPATIENT)
Dept: ORTHOPEDICS | Facility: CLINIC | Age: 84
End: 2024-02-06
Payer: COMMERCIAL

## 2024-02-06 VITALS — WEIGHT: 150 LBS | HEIGHT: 66 IN | BODY MASS INDEX: 24.11 KG/M2

## 2024-02-06 DIAGNOSIS — Z98.890 S/P ORIF (OPEN REDUCTION INTERNAL FIXATION) FRACTURE: Primary | ICD-10-CM

## 2024-02-06 DIAGNOSIS — Z87.81 S/P ORIF (OPEN REDUCTION INTERNAL FIXATION) FRACTURE: Primary | ICD-10-CM

## 2024-02-06 PROCEDURE — 1159F MED LIST DOCD IN RCRD: CPT | Mod: CPTII,S$GLB,, | Performed by: PHYSICIAN ASSISTANT

## 2024-02-06 PROCEDURE — 1125F AMNT PAIN NOTED PAIN PRSNT: CPT | Mod: CPTII,S$GLB,, | Performed by: PHYSICIAN ASSISTANT

## 2024-02-06 PROCEDURE — 1100F PTFALLS ASSESS-DOCD GE2>/YR: CPT | Mod: CPTII,S$GLB,, | Performed by: PHYSICIAN ASSISTANT

## 2024-02-06 PROCEDURE — 99024 POSTOP FOLLOW-UP VISIT: CPT | Mod: S$GLB,,, | Performed by: PHYSICIAN ASSISTANT

## 2024-02-06 PROCEDURE — 1160F RVW MEDS BY RX/DR IN RCRD: CPT | Mod: CPTII,S$GLB,, | Performed by: PHYSICIAN ASSISTANT

## 2024-02-06 PROCEDURE — 3288F FALL RISK ASSESSMENT DOCD: CPT | Mod: CPTII,S$GLB,, | Performed by: PHYSICIAN ASSISTANT

## 2024-02-06 RX ORDER — ONDANSETRON 8 MG/1
8 TABLET, ORALLY DISINTEGRATING ORAL 3 TIMES DAILY
Status: ON HOLD | COMMUNITY
Start: 2024-02-02 | End: 2024-02-19 | Stop reason: HOSPADM

## 2024-02-06 RX ORDER — ERTAPENEM 1 G/1
INJECTION, POWDER, LYOPHILIZED, FOR SOLUTION INTRAMUSCULAR; INTRAVENOUS
Status: ON HOLD | COMMUNITY
Start: 2024-01-07 | End: 2024-02-19 | Stop reason: HOSPADM

## 2024-02-06 RX ORDER — MIDODRINE HYDROCHLORIDE 5 MG/1
5 TABLET ORAL 3 TIMES DAILY
Status: ON HOLD | COMMUNITY
Start: 2024-01-26 | End: 2024-02-19 | Stop reason: HOSPADM

## 2024-02-06 RX ORDER — HYDROCORTISONE 25 MG/G
CREAM TOPICAL
Status: ON HOLD | COMMUNITY
Start: 2024-01-05 | End: 2024-02-19 | Stop reason: HOSPADM

## 2024-02-06 NOTE — PROGRESS NOTES
[facility] ORTHOPEDICS  [address]  [place] [address]  Phone: [phone]   [phone]    Patient's PCP: Wanda Kuhn FNP-C  Referring Provider: No ref. provider found    Subjective:      Chief Complaint:   Chief Complaint   Patient presents with    Right Ankle - Post-op Evaluation     F/u PO Right ankle ORIF 12/13/23 1/10 pain scale non weightbearing in wheelchair       Past Medical History:   Diagnosis Date    Anemia, unspecified     Atrial fibrillation 01/25/2021    CKD (chronic kidney disease)     Hypertension        Past Surgical History:   Procedure Laterality Date    A-V CARDIAC PACEMAKER INSERTION  11/2/2023    Procedure: Dual Chamber PPM RM 2617 ([employer]);  Surgeon: Andreas James III, MD;  Location: [facility] CATH;  Service: Cardiology;;    ANGIOGRAM, CORONARY, WITH LEFT HEART CATHETERIZATION Left 4/19/2023    Procedure: Angiogram, Coronary, with Left Heart Cath;  Surgeon: Kevin Remdond MD;  Location: [facility] CATH/EP LAB;  Service: Cardiology;  Laterality: Left;    BREAST SURGERY      COLONOSCOPY N/A 4/16/2023    Procedure: COLONOSCOPY;  Surgeon: Kvng Mensah MD;  Location: [facility] ENDO;  Service: Endoscopy;  Laterality: N/A;    COLONOSCOPY W/ POLYPECTOMY  04/16/2023    OPEN REDUCTION AND INTERNAL FIXATION (ORIF) OF INJURY OF ANKLE Right 12/13/2023    Procedure: ORIF, ANKLE;  Surgeon: Chaitanya Garrison MD;  Location: [facility] OR;  Service: Orthopedics;  Laterality: Right;  Synthes    TRANSCATHETER AORTIC VALVE REPLACEMENT (TAVR)  11/1/2023    Procedure: (TAVR);  Surgeon: Juliano Moncada MD;  Location: [facility] CATH;  Service: Cardiology;;    TRANSCATHETER AORTIC VALVE REPLACEMENT (TAVR)  11/1/2023    Procedure: (TAVR)- Surgeon;  Surgeon: Adebayo Guzman MD;  Location: [facility] CATH;  Service: Peripheral Vascular;;       Current Outpatient Medications   Medication Sig    ertapenem (INVANZ) 1 gram injection     hydrocortisone (ANUSOL-HC) 2.5 % rectal cream SMARTSIG:Topical    midodrine  (PROAMATINE) 5 MG Tab Take 5 mg by mouth 3 (three) times daily.    ondansetron (ZOFRAN-ODT) 8 MG TbDL Take 8 mg by mouth 3 (three) times daily.    amiodarone (PACERONE) 200 MG Tab Take 1 tablet (200 mg total) by mouth 2 (two) times daily.    apixaban (ELIQUIS) 2.5 mg Tab Take 1 tablet (2.5 mg total) by mouth 2 (two) times daily.    docusate sodium (COLACE) 100 MG capsule Take 1 capsule (100 mg total) by mouth 2 (two) times daily.    famotidine (PEPCID) 20 MG tablet Take 1 tablet (20 mg total) by mouth once daily.    iron ag,hj-I-JM1-B12-Zn-sa-sto (NIFEREX, SUMALATE-QUATREFOLIC,) 150 mg iron- 60 mg-1 mg Tab Take 1 tablet by mouth once daily.    linezolid (ZYVOX) 600 mg/300 mL PgBk Inject 300 mLs (600 mg total) into the vein every 12 (twelve) hours. for 14 days    magnesium oxide (MAG-OX) 400 mg (241.3 mg magnesium) tablet TAKE 1 TABLET BY MOUTH ONCE DAILY (Patient taking differently: Take 400 mg by mouth once daily.)    meclizine (ANTIVERT) 50 MG tablet Take 1 tablet (50 mg total) by mouth 3 (three) times daily as needed for Dizziness.    MIRALAX 17 gram PwPk Take 17 g by mouth 2 (two) times daily.     No current facility-administered medications for this visit.       Review of patient's allergies indicates:   Allergen Reactions    Cefuroxime     Hydrocodone     Meperidine     Meperidine hcl Nausea And Vomiting    Persantine [dipyridamole]     Nitrofurantoin macrocrystal      Headache , went into Afib     Vicodin [hydrocodone-acetaminophen] Nausea And Vomiting       Family History   Problem Relation Age of Onset    Cancer Mother     Cancer Father        Social History     Socioeconomic History    Marital status: Single   Tobacco Use    Smoking status: Former     Types: Cigarettes     Passive exposure: Past    Smokeless tobacco: Never   Substance and Sexual Activity    Alcohol use: Not Currently    Drug use: Not Currently     Social Determinants of Health     Financial Resource Strain: Low Risk  (1/21/2024)    Overall  Financial Resource Strain (CARDIA)     Difficulty of Paying Living Expenses: Not very hard   Food Insecurity: No Food Insecurity (1/21/2024)    Hunger Vital Sign     Worried About Running Out of Food in the Last Year: Never true     Ran Out of Food in the Last Year: Never true   Transportation Needs: No Transportation Needs (1/21/2024)    PRAPARE - Transportation     Lack of Transportation (Medical): No     Lack of Transportation (Non-Medical): No   Physical Activity: Unknown (11/30/2023)    Exercise Vital Sign     Days of Exercise per Week: Patient declined     Minutes of Exercise per Session: 0 min   Stress: Patient Declined (1/21/2024)    [employer] - Occupational Stress Questionnaire     Feeling of Stress : Patient declined   Social Connections: Socially Isolated (11/30/2023)    Social Connection and Isolation Panel [NHANES]     Frequency of Communication with Friends and Family: More than three times a week     Frequency of Social Gatherings with Friends and Family: Three times a week     Attends Jehovah's witness Services: Never     Active Member of Clubs or Organizations: No     Attends Club or Organization Meetings: Patient declined     Marital Status: Never    Housing Stability: Low Risk  (1/21/2024)    Housing Stability Vital Sign     Unable to Pay for Housing in the Last Year: No     Number of Places Lived in the Last Year: 1     Unstable Housing in the Last Year: No       History of present illness:  Ms. Bonilla comes in today about 7 weeks out right distal fibula open reduction internal fixation.  Comes in today for routine postoperative follow-up and plain film radiographs.  She is doing well.  She reports she has no pain.  She has been nonweightbearing on the right lower extremity.    Review of Systems:    Constitutional: Negative for chills, fever and weight loss.   HENT: Negative for congestion.    Eyes: Negative for discharge and redness.   Respiratory: Negative for cough  and shortness of breath.    Cardiovascular: Negative for chest pain.   Gastrointestinal: Negative for nausea and vomiting.   Musculoskeletal: See HPI.   Skin: Negative for rash.   Neurological: Negative for headaches.   Endo/Heme/Allergies: Does not bruise/bleed easily.   Psychiatric/Behavioral: The patient is not nervous/anxious.    All other systems reviewed and are negative.       Objective:      Physical Examination:    Vital Signs:  There were no vitals filed for this visit.    Body mass index is 24.21 kg/m².    This a well-developed, well nourished patient in no acute distress.  They are alert and oriented and cooperative to examination.     Right foot and ankle exam:  Skin to right foot and ankle is clean dry and intact.  No erythema or ecchymosis.  No signs or symptoms of infection.  Right lateral ankle incision well healed without wound dehiscence or drainage.  Capillary refill is brisk to all digits in the right foot.  Right calf is soft and nontender.  She can wiggle all toes to the right foot.  Right EHL is intact.  She can dorsiflex and plantar flex at the right ankle without pain.  Some limitations in range of motion with dorsiflexion but she can get to just beyond 90°.    Pertinent New Results:        XRAY Report / Interpretation:   Three views taken of the right ankle today:  AP, lateral, and oblique views.  They reveal a healed right distal fibula fracture.  There is a distal fibular plate in place with cross fixed screws without evidence of hardware failure.  No dislocations appreciated.      Assessment:       1. S/P ORIF (open reduction internal fixation) fracture      Plan:     S/P ORIF (open reduction internal fixation) fracture  -     X-Ray Ankle Complete Right        Follow up for 6 wk f/u, //XR// s/p Right ankle ORIF 12/13/23.    Her right distal fibula fracture is healed.  She can begin weight-bearing as tolerated initially in the AFO boot.  I would like her to do that for the next week or 2  and slowly wean herself out of it into regular shoe wear.  She is okay to participate in formal physical therapy/rehabilitation at her facility she is living in.  We will set her follow-up appointment in 6 weeks if she has having any issues or concerns.  If she is doing quite well, she can simply cancel follow-up with us on an as-needed basis.        Jose D Camp, RACQUELS, PA-C    This note was created using [employer] voice recognition software that occasionally misinterprets words or phrases.

## 2024-02-08 ENCOUNTER — HOSPITAL ENCOUNTER (INPATIENT)
Facility: HOSPITAL | Age: 84
LOS: 8 days | Discharge: NURSING, REHAB OR LONG-TERM CARE | DRG: 698 | End: 2024-02-19
Attending: STUDENT IN AN ORGANIZED HEALTH CARE EDUCATION/TRAINING PROGRAM | Admitting: INTERNAL MEDICINE
Payer: COMMERCIAL

## 2024-02-08 DIAGNOSIS — R31.9 URINARY TRACT INFECTION WITH HEMATURIA, SITE UNSPECIFIED: ICD-10-CM

## 2024-02-08 DIAGNOSIS — N39.0 RECURRENT UTI: ICD-10-CM

## 2024-02-08 DIAGNOSIS — N39.0 URINARY TRACT INFECTION WITH HEMATURIA, SITE UNSPECIFIED: ICD-10-CM

## 2024-02-08 DIAGNOSIS — R07.9 CHEST PAIN: ICD-10-CM

## 2024-02-08 DIAGNOSIS — K29.70 GASTRITIS, PRESENCE OF BLEEDING UNSPECIFIED, UNSPECIFIED CHRONICITY, UNSPECIFIED GASTRITIS TYPE: ICD-10-CM

## 2024-02-08 DIAGNOSIS — D64.9 ANEMIA, UNSPECIFIED TYPE: Primary | ICD-10-CM

## 2024-02-08 DIAGNOSIS — Z16.12 URINARY TRACT INFECTION DUE TO EXTENDED-SPECTRUM BETA LACTAMASE (ESBL) PRODUCING ESCHERICHIA COLI: ICD-10-CM

## 2024-02-08 DIAGNOSIS — G93.41 ENCEPHALOPATHY, METABOLIC: ICD-10-CM

## 2024-02-08 DIAGNOSIS — R19.5 OCCULT BLOOD IN STOOLS: ICD-10-CM

## 2024-02-08 DIAGNOSIS — D64.9 ANEMIA: ICD-10-CM

## 2024-02-08 DIAGNOSIS — S82.891G CLOSED FRACTURE OF RIGHT ANKLE WITH DELAYED HEALING, SUBSEQUENT ENCOUNTER: ICD-10-CM

## 2024-02-08 DIAGNOSIS — R09.02 HYPOXEMIA: ICD-10-CM

## 2024-02-08 DIAGNOSIS — N39.0 URINARY TRACT INFECTION DUE TO EXTENDED-SPECTRUM BETA LACTAMASE (ESBL) PRODUCING ESCHERICHIA COLI: ICD-10-CM

## 2024-02-08 DIAGNOSIS — D69.6 THROMBOCYTOPENIA: ICD-10-CM

## 2024-02-08 DIAGNOSIS — N18.32 STAGE 3B CHRONIC KIDNEY DISEASE: ICD-10-CM

## 2024-02-08 DIAGNOSIS — R33.9 URINARY RETENTION: ICD-10-CM

## 2024-02-08 DIAGNOSIS — Z79.01 ANTICOAGULATED: ICD-10-CM

## 2024-02-08 DIAGNOSIS — B96.29 URINARY TRACT INFECTION DUE TO EXTENDED-SPECTRUM BETA LACTAMASE (ESBL) PRODUCING ESCHERICHIA COLI: ICD-10-CM

## 2024-02-08 LAB
ABO + RH BLD: NORMAL
ALBUMIN SERPL BCP-MCNC: 3.1 G/DL (ref 3.5–5.2)
ALP SERPL-CCNC: 128 U/L (ref 55–135)
ALT SERPL W/O P-5'-P-CCNC: 20 U/L (ref 10–44)
ANION GAP SERPL CALC-SCNC: 8 MMOL/L (ref 8–16)
AST SERPL-CCNC: 19 U/L (ref 10–40)
BACTERIA #/AREA URNS HPF: ABNORMAL /HPF
BASOPHILS # BLD AUTO: 0.01 K/UL (ref 0–0.2)
BASOPHILS NFR BLD: 0.2 % (ref 0–1.9)
BILIRUB SERPL-MCNC: 0.5 MG/DL (ref 0.1–1)
BILIRUB UR QL STRIP: NEGATIVE
BLD GP AB SCN CELLS X3 SERPL QL: NORMAL
BLD PROD TYP BPU: NORMAL
BLOOD UNIT EXPIRATION DATE: NORMAL
BLOOD UNIT TYPE CODE: 5100
BLOOD UNIT TYPE: NORMAL
BNP SERPL-MCNC: 327 PG/ML (ref 0–99)
BUN SERPL-MCNC: 15 MG/DL (ref 8–23)
CALCIUM SERPL-MCNC: 9 MG/DL (ref 8.7–10.5)
CHLORIDE SERPL-SCNC: 104 MMOL/L (ref 95–110)
CLARITY UR: ABNORMAL
CO2 SERPL-SCNC: 25 MMOL/L (ref 23–29)
CODING SYSTEM: NORMAL
COLOR UR: ABNORMAL
CREAT SERPL-MCNC: 1.4 MG/DL (ref 0.5–1.4)
CROSSMATCH INTERPRETATION: NORMAL
DIFFERENTIAL METHOD BLD: ABNORMAL
DISPENSE STATUS: NORMAL
EOSINOPHIL # BLD AUTO: 0.1 K/UL (ref 0–0.5)
EOSINOPHIL NFR BLD: 2.3 % (ref 0–8)
ERYTHROCYTE [DISTWIDTH] IN BLOOD BY AUTOMATED COUNT: 13.2 % (ref 11.5–14.5)
EST. GFR  (NO RACE VARIABLE): 37 ML/MIN/1.73 M^2
FERRITIN SERPL-MCNC: 339 NG/ML (ref 20–300)
GLUCOSE SERPL-MCNC: 123 MG/DL (ref 70–110)
GLUCOSE UR QL STRIP: NEGATIVE
HCT VFR BLD AUTO: 22.7 % (ref 37–48.5)
HGB BLD-MCNC: 7.6 G/DL (ref 12–16)
HGB UR QL STRIP: ABNORMAL
HYALINE CASTS #/AREA URNS LPF: 0 /LPF
IMM GRANULOCYTES # BLD AUTO: 0.02 K/UL (ref 0–0.04)
IMM GRANULOCYTES NFR BLD AUTO: 0.4 % (ref 0–0.5)
IRON SERPL-MCNC: 157 UG/DL (ref 30–160)
KETONES UR QL STRIP: NEGATIVE
LACTATE SERPL-SCNC: 1.3 MMOL/L (ref 0.5–2.2)
LEUKOCYTE ESTERASE UR QL STRIP: ABNORMAL
LYMPHOCYTES # BLD AUTO: 0.8 K/UL (ref 1–4.8)
LYMPHOCYTES NFR BLD: 15.2 % (ref 18–48)
MCH RBC QN AUTO: 30.5 PG (ref 27–31)
MCHC RBC AUTO-ENTMCNC: 33.5 G/DL (ref 32–36)
MCV RBC AUTO: 91 FL (ref 82–98)
MICROSCOPIC COMMENT: ABNORMAL
MONOCYTES # BLD AUTO: 0.5 K/UL (ref 0.3–1)
MONOCYTES NFR BLD: 9.4 % (ref 4–15)
NEUTROPHILS # BLD AUTO: 3.7 K/UL (ref 1.8–7.7)
NEUTROPHILS NFR BLD: 72.5 % (ref 38–73)
NITRITE UR QL STRIP: POSITIVE
NON-SQ EPI CELLS #/AREA URNS HPF: 3 /HPF
NRBC BLD-RTO: 0 /100 WBC
NUM UNITS TRANS PACKED RBC: NORMAL
PH UR STRIP: 6 [PH] (ref 5–8)
PLATELET # BLD AUTO: 45 K/UL (ref 150–450)
PMV BLD AUTO: 10.1 FL (ref 9.2–12.9)
POTASSIUM SERPL-SCNC: 3.7 MMOL/L (ref 3.5–5.1)
PROT SERPL-MCNC: 5.7 G/DL (ref 6–8.4)
PROT UR QL STRIP: ABNORMAL
RBC # BLD AUTO: 2.49 M/UL (ref 4–5.4)
RBC #/AREA URNS HPF: 10 /HPF (ref 0–4)
SATURATED IRON: 67 % (ref 20–50)
SODIUM SERPL-SCNC: 137 MMOL/L (ref 136–145)
SP GR UR STRIP: 1.02 (ref 1–1.03)
SPECIMEN OUTDATE: NORMAL
SQUAMOUS #/AREA URNS HPF: 3 /HPF
TOTAL IRON BINDING CAPACITY: 235 UG/DL (ref 250–450)
TRANSFERRIN SERPL-MCNC: 168 MG/DL (ref 200–375)
TROPONIN I SERPL DL<=0.01 NG/ML-MCNC: 0.03 NG/ML (ref 0–0.03)
UNIDENT CRYS URNS QL MICRO: 8
URN SPEC COLLECT METH UR: ABNORMAL
UROBILINOGEN UR STRIP-ACNC: NEGATIVE EU/DL
WBC # BLD AUTO: 5.12 K/UL (ref 3.9–12.7)
WBC #/AREA URNS HPF: >100 /HPF (ref 0–5)
WBC CLUMPS URNS QL MICRO: ABNORMAL
YEAST URNS QL MICRO: ABNORMAL

## 2024-02-08 PROCEDURE — 84484 ASSAY OF TROPONIN QUANT: CPT | Performed by: STUDENT IN AN ORGANIZED HEALTH CARE EDUCATION/TRAINING PROGRAM

## 2024-02-08 PROCEDURE — 87040 BLOOD CULTURE FOR BACTERIA: CPT | Mod: 59 | Performed by: STUDENT IN AN ORGANIZED HEALTH CARE EDUCATION/TRAINING PROGRAM

## 2024-02-08 PROCEDURE — 82728 ASSAY OF FERRITIN: CPT | Performed by: STUDENT IN AN ORGANIZED HEALTH CARE EDUCATION/TRAINING PROGRAM

## 2024-02-08 PROCEDURE — 96374 THER/PROPH/DIAG INJ IV PUSH: CPT | Mod: 59

## 2024-02-08 PROCEDURE — 27000221 HC OXYGEN, UP TO 24 HOURS

## 2024-02-08 PROCEDURE — 80053 COMPREHEN METABOLIC PANEL: CPT | Performed by: STUDENT IN AN ORGANIZED HEALTH CARE EDUCATION/TRAINING PROGRAM

## 2024-02-08 PROCEDURE — 63600175 PHARM REV CODE 636 W HCPCS: Performed by: STUDENT IN AN ORGANIZED HEALTH CARE EDUCATION/TRAINING PROGRAM

## 2024-02-08 PROCEDURE — 63600175 PHARM REV CODE 636 W HCPCS: Performed by: NURSE PRACTITIONER

## 2024-02-08 PROCEDURE — 93005 ELECTROCARDIOGRAM TRACING: CPT

## 2024-02-08 PROCEDURE — 99291 CRITICAL CARE FIRST HOUR: CPT | Mod: 25

## 2024-02-08 PROCEDURE — 86850 RBC ANTIBODY SCREEN: CPT | Performed by: STUDENT IN AN ORGANIZED HEALTH CARE EDUCATION/TRAINING PROGRAM

## 2024-02-08 PROCEDURE — 87186 SC STD MICRODIL/AGAR DIL: CPT | Performed by: STUDENT IN AN ORGANIZED HEALTH CARE EDUCATION/TRAINING PROGRAM

## 2024-02-08 PROCEDURE — 86920 COMPATIBILITY TEST SPIN: CPT | Performed by: STUDENT IN AN ORGANIZED HEALTH CARE EDUCATION/TRAINING PROGRAM

## 2024-02-08 PROCEDURE — P9016 RBC LEUKOCYTES REDUCED: HCPCS | Performed by: STUDENT IN AN ORGANIZED HEALTH CARE EDUCATION/TRAINING PROGRAM

## 2024-02-08 PROCEDURE — 87077 CULTURE AEROBIC IDENTIFY: CPT | Performed by: STUDENT IN AN ORGANIZED HEALTH CARE EDUCATION/TRAINING PROGRAM

## 2024-02-08 PROCEDURE — G0378 HOSPITAL OBSERVATION PER HR: HCPCS

## 2024-02-08 PROCEDURE — 81000 URINALYSIS NONAUTO W/SCOPE: CPT | Performed by: STUDENT IN AN ORGANIZED HEALTH CARE EDUCATION/TRAINING PROGRAM

## 2024-02-08 PROCEDURE — 25000003 PHARM REV CODE 250: Performed by: NURSE PRACTITIONER

## 2024-02-08 PROCEDURE — 83880 ASSAY OF NATRIURETIC PEPTIDE: CPT | Performed by: STUDENT IN AN ORGANIZED HEALTH CARE EDUCATION/TRAINING PROGRAM

## 2024-02-08 PROCEDURE — 87086 URINE CULTURE/COLONY COUNT: CPT | Performed by: STUDENT IN AN ORGANIZED HEALTH CARE EDUCATION/TRAINING PROGRAM

## 2024-02-08 PROCEDURE — 83605 ASSAY OF LACTIC ACID: CPT | Performed by: STUDENT IN AN ORGANIZED HEALTH CARE EDUCATION/TRAINING PROGRAM

## 2024-02-08 PROCEDURE — 36430 TRANSFUSION BLD/BLD COMPNT: CPT

## 2024-02-08 PROCEDURE — 94761 N-INVAS EAR/PLS OXIMETRY MLT: CPT

## 2024-02-08 PROCEDURE — 99900035 HC TECH TIME PER 15 MIN (STAT)

## 2024-02-08 PROCEDURE — 93010 ELECTROCARDIOGRAM REPORT: CPT | Mod: ,,, | Performed by: INTERNAL MEDICINE

## 2024-02-08 PROCEDURE — 83540 ASSAY OF IRON: CPT | Performed by: STUDENT IN AN ORGANIZED HEALTH CARE EDUCATION/TRAINING PROGRAM

## 2024-02-08 PROCEDURE — C9113 INJ PANTOPRAZOLE SODIUM, VIA: HCPCS | Performed by: STUDENT IN AN ORGANIZED HEALTH CARE EDUCATION/TRAINING PROGRAM

## 2024-02-08 PROCEDURE — 36415 COLL VENOUS BLD VENIPUNCTURE: CPT | Performed by: STUDENT IN AN ORGANIZED HEALTH CARE EDUCATION/TRAINING PROGRAM

## 2024-02-08 PROCEDURE — 85025 COMPLETE CBC W/AUTO DIFF WBC: CPT | Performed by: STUDENT IN AN ORGANIZED HEALTH CARE EDUCATION/TRAINING PROGRAM

## 2024-02-08 RX ORDER — IBUPROFEN 200 MG
16 TABLET ORAL
Status: DISCONTINUED | OUTPATIENT
Start: 2024-02-08 | End: 2024-02-10

## 2024-02-08 RX ORDER — TALC
6 POWDER (GRAM) TOPICAL NIGHTLY PRN
Status: DISCONTINUED | OUTPATIENT
Start: 2024-02-08 | End: 2024-02-19 | Stop reason: HOSPADM

## 2024-02-08 RX ORDER — GLUCAGON 1 MG
1 KIT INJECTION
Status: DISCONTINUED | OUTPATIENT
Start: 2024-02-08 | End: 2024-02-10

## 2024-02-08 RX ORDER — SODIUM CHLORIDE 0.9 % (FLUSH) 0.9 %
10 SYRINGE (ML) INJECTION EVERY 12 HOURS PRN
Status: DISCONTINUED | OUTPATIENT
Start: 2024-02-08 | End: 2024-02-19 | Stop reason: HOSPADM

## 2024-02-08 RX ORDER — SODIUM,POTASSIUM PHOSPHATES 280-250MG
2 POWDER IN PACKET (EA) ORAL
Status: DISCONTINUED | OUTPATIENT
Start: 2024-02-08 | End: 2024-02-11

## 2024-02-08 RX ORDER — ACETAMINOPHEN 325 MG/1
650 TABLET ORAL EVERY 8 HOURS PRN
Status: DISCONTINUED | OUTPATIENT
Start: 2024-02-08 | End: 2024-02-19 | Stop reason: HOSPADM

## 2024-02-08 RX ORDER — DOCUSATE SODIUM 100 MG/1
100 CAPSULE, LIQUID FILLED ORAL 2 TIMES DAILY
Status: DISCONTINUED | OUTPATIENT
Start: 2024-02-08 | End: 2024-02-11

## 2024-02-08 RX ORDER — AMIODARONE HYDROCHLORIDE 100 MG/1
200 TABLET ORAL 2 TIMES DAILY
Status: DISCONTINUED | OUTPATIENT
Start: 2024-02-08 | End: 2024-02-19 | Stop reason: HOSPADM

## 2024-02-08 RX ORDER — IBUPROFEN 200 MG
24 TABLET ORAL
Status: DISCONTINUED | OUTPATIENT
Start: 2024-02-08 | End: 2024-02-10

## 2024-02-08 RX ORDER — LANOLIN ALCOHOL/MO/W.PET/CERES
800 CREAM (GRAM) TOPICAL
Status: DISCONTINUED | OUTPATIENT
Start: 2024-02-08 | End: 2024-02-19 | Stop reason: HOSPADM

## 2024-02-08 RX ORDER — FUROSEMIDE 10 MG/ML
20 INJECTION INTRAMUSCULAR; INTRAVENOUS ONCE
Status: COMPLETED | OUTPATIENT
Start: 2024-02-08 | End: 2024-02-08

## 2024-02-08 RX ORDER — HYDROCODONE BITARTRATE AND ACETAMINOPHEN 500; 5 MG/1; MG/1
TABLET ORAL
Status: DISCONTINUED | OUTPATIENT
Start: 2024-02-08 | End: 2024-02-10

## 2024-02-08 RX ORDER — SODIUM CHLORIDE 9 MG/ML
INJECTION, SOLUTION INTRAVENOUS CONTINUOUS
Status: DISCONTINUED | OUTPATIENT
Start: 2024-02-08 | End: 2024-02-10

## 2024-02-08 RX ORDER — ONDANSETRON HYDROCHLORIDE 2 MG/ML
4 INJECTION, SOLUTION INTRAVENOUS EVERY 8 HOURS PRN
Status: DISCONTINUED | OUTPATIENT
Start: 2024-02-08 | End: 2024-02-19 | Stop reason: HOSPADM

## 2024-02-08 RX ORDER — HYDRALAZINE HYDROCHLORIDE 20 MG/ML
10 INJECTION INTRAMUSCULAR; INTRAVENOUS EVERY 6 HOURS PRN
Status: DISCONTINUED | OUTPATIENT
Start: 2024-02-08 | End: 2024-02-19 | Stop reason: HOSPADM

## 2024-02-08 RX ORDER — NALOXONE HCL 0.4 MG/ML
0.02 VIAL (ML) INJECTION
Status: DISCONTINUED | OUTPATIENT
Start: 2024-02-08 | End: 2024-02-19 | Stop reason: HOSPADM

## 2024-02-08 RX ORDER — SIMETHICONE 80 MG
1 TABLET,CHEWABLE ORAL 4 TIMES DAILY PRN
Status: DISCONTINUED | OUTPATIENT
Start: 2024-02-08 | End: 2024-02-19 | Stop reason: HOSPADM

## 2024-02-08 RX ORDER — PANTOPRAZOLE SODIUM 40 MG/10ML
40 INJECTION, POWDER, LYOPHILIZED, FOR SOLUTION INTRAVENOUS
Status: COMPLETED | OUTPATIENT
Start: 2024-02-08 | End: 2024-02-08

## 2024-02-08 RX ORDER — LINEZOLID 2 MG/ML
600 INJECTION, SOLUTION INTRAVENOUS
Status: DISCONTINUED | OUTPATIENT
Start: 2024-02-09 | End: 2024-02-10

## 2024-02-08 RX ORDER — PANTOPRAZOLE SODIUM 40 MG/10ML
40 INJECTION, POWDER, LYOPHILIZED, FOR SOLUTION INTRAVENOUS 2 TIMES DAILY
Status: DISCONTINUED | OUTPATIENT
Start: 2024-02-09 | End: 2024-02-10

## 2024-02-08 RX ORDER — ALUMINUM HYDROXIDE, MAGNESIUM HYDROXIDE, AND SIMETHICONE 1200; 120; 1200 MG/30ML; MG/30ML; MG/30ML
30 SUSPENSION ORAL 4 TIMES DAILY PRN
Status: DISCONTINUED | OUTPATIENT
Start: 2024-02-08 | End: 2024-02-19 | Stop reason: HOSPADM

## 2024-02-08 RX ORDER — MIDODRINE HYDROCHLORIDE 2.5 MG/1
5 TABLET ORAL 3 TIMES DAILY
Status: DISCONTINUED | OUTPATIENT
Start: 2024-02-08 | End: 2024-02-08

## 2024-02-08 RX ORDER — IPRATROPIUM BROMIDE AND ALBUTEROL SULFATE 2.5; .5 MG/3ML; MG/3ML
3 SOLUTION RESPIRATORY (INHALATION) EVERY 4 HOURS PRN
Status: DISCONTINUED | OUTPATIENT
Start: 2024-02-08 | End: 2024-02-19 | Stop reason: HOSPADM

## 2024-02-08 RX ORDER — LINEZOLID 2 MG/ML
600 INJECTION, SOLUTION INTRAVENOUS
Status: COMPLETED | OUTPATIENT
Start: 2024-02-08 | End: 2024-02-08

## 2024-02-08 RX ADMIN — AMIODARONE HYDROCHLORIDE 200 MG: 100 TABLET ORAL at 09:02

## 2024-02-08 RX ADMIN — PANTOPRAZOLE SODIUM 40 MG: 40 INJECTION, POWDER, FOR SOLUTION INTRAVENOUS at 04:02

## 2024-02-08 RX ADMIN — DOCUSATE SODIUM 100 MG: 100 CAPSULE, LIQUID FILLED ORAL at 09:02

## 2024-02-08 RX ADMIN — FUROSEMIDE 20 MG: 10 INJECTION, SOLUTION INTRAMUSCULAR; INTRAVENOUS at 07:02

## 2024-02-08 RX ADMIN — LINEZOLID 600 MG: 600 INJECTION, SOLUTION INTRAVENOUS at 07:02

## 2024-02-08 RX ADMIN — HYDRALAZINE HYDROCHLORIDE 10 MG: 20 INJECTION INTRAMUSCULAR; INTRAVENOUS at 07:02

## 2024-02-08 RX ADMIN — SODIUM CHLORIDE: 9 INJECTION, SOLUTION INTRAVENOUS at 07:02

## 2024-02-08 NOTE — CONSULTS
Consulted for skin check. Skin assessment completed. Buttocks areas are pink with no skin integrity problems at this assessment. Right posterior leg wound from the past is healed at this assessment. The patient is in an ankle boot immobilizer from recent ankle fracture with surgery. Removed the boot to assess the skin. Due to this area of skin newly healing placed a silver urgotul dressing over the new scar tissue and covered with a foam mepilex dressing which may remain in place for 7 days just to keep skin protected. The right great toe is noted with a small scab 0.2cm x 0.2cm. Left posterior heel is noted with DTI that measures 1.5cm x 2cm. Cleaned bilateral feet and between toes. There was much dead skin between each toe on both feet which was cleaned. There was an odor to the toes prior to cleaning but once cleaned odor was no longer present. Due to moisture between the toes added an Aquacel Ag Advantage ribbon and weaved this between the toes and applied safety socks to both feet at this time. Re-applied the right ankle boot immobilizer at this time. This patient should have the air pump added to her mattress once she is admitted for moisture control and maximum pressure relief due to left posterior ankle DTI. Wound care will follow up throughout hospital stay.       Left posterior heel small DTI noted    Bilateral plantar feet once cleaned    Right posterior healed leg wound with scar tissue    Right heel is pink

## 2024-02-08 NOTE — ED NOTES
Pt was sent via EMS for evaluation of abnormal labs. She has a araya and placed on monitor. She has a midline to LUE. Blood return and flushes well. Labs obtained via line.

## 2024-02-08 NOTE — ED PROVIDER NOTES
Encounter Date: 2/8/2024       History     Chief Complaint   Patient presents with    ABNORMAL LABS     Pt brought to ED via EMS from [facility] with a complaint of low platelet count for the past two weeks.       [age] Year old female sent to ED for abnormal labs.  Outside lab work reviewed demonstrated anemia thrombocytopenia, hemoglobin 7.5 down trending from just under 9.  Also 47,000 platelets.  Patient reports some abdominal distention, no trauma fever or chills, no chest pain shortness for breath or syncopal symptoms history of recent right lower extremity surgery    The history is provided by the patient.     Review of patient's allergies indicates:   Allergen Reactions    Cefuroxime     Hydrocodone     Meperidine     Meperidine hcl Nausea And Vomiting    Persantine [dipyridamole]     Nitrofurantoin macrocrystal      Headache , went into Afib     Vicodin [hydrocodone-acetaminophen] Nausea And Vomiting     Past Medical History:   Diagnosis Date    Anemia, unspecified     Atrial fibrillation 01/25/2021    CKD (chronic kidney disease)     Hypertension      Past Surgical History:   Procedure Laterality Date    A-V CARDIAC PACEMAKER INSERTION  11/2/2023    Procedure: Dual Chamber PPM RM 2617 ([employer]);  Surgeon: Andreas James III, MD;  Location: [facility] CATH;  Service: Cardiology;;    ANGIOGRAM, CORONARY, WITH LEFT HEART CATHETERIZATION Left 4/19/2023    Procedure: Angiogram, Coronary, with Left Heart Cath;  Surgeon: Kvein Redmond MD;  Location: [facility] CATH/EP LAB;  Service: Cardiology;  Laterality: Left;    BREAST SURGERY      COLONOSCOPY N/A 4/16/2023    Procedure: COLONOSCOPY;  Surgeon: Kvng Mensah MD;  Location: [facility] ENDO;  Service: Endoscopy;  Laterality: N/A;    COLONOSCOPY W/ POLYPECTOMY  04/16/2023    OPEN REDUCTION AND INTERNAL FIXATION (ORIF) OF INJURY OF ANKLE Right 12/13/2023    Procedure: ORIF, ANKLE;  Surgeon: Chaitanya Garrison MD;  Location: [facility] OR;  Service: Orthopedics;   Laterality: Right;  Synthes    TRANSCATHETER AORTIC VALVE REPLACEMENT (TAVR)  11/1/2023    Procedure: (TAVR);  Surgeon: Juliano Moncada MD;  Location: [facility] CATH;  Service: Cardiology;;    TRANSCATHETER AORTIC VALVE REPLACEMENT (TAVR)  11/1/2023    Procedure: (TAVR)- Surgeon;  Surgeon: Adebayo Guzman MD;  Location: [facility] CATH;  Service: Peripheral Vascular;;     Family History   Problem Relation Age of Onset    Cancer Mother     Cancer Father      Social History     Tobacco Use    Smoking status: Former     Types: Cigarettes     Passive exposure: Past    Smokeless tobacco: Never   Substance Use Topics    Alcohol use: Not Currently    Drug use: Not Currently     Review of Systems   All other systems reviewed and are negative.      Physical Exam     Initial Vitals [02/08/24 1321]   BP Pulse Resp Temp SpO2   (!) 165/70 84 17 98.4 °F (36.9 °C) 95 %      MAP       --         Physical Exam    Nursing note and vitals reviewed.  Constitutional: No distress.   HENT:   Head: Normocephalic.   Eyes: No scleral icterus.   Cardiovascular:  Normal rate and regular rhythm.           Pulmonary/Chest: Effort normal. No stridor. No respiratory distress.   Abdominal:   Minimal suprapubic tenderness palpation There is no guarding.   Genitourinary:    Genitourinary Comments: Suprapubic tenderness palpation, External hemorrhoids present, dark stool, FOBT positive     Musculoskeletal:      Comments: Boot on right foot, no extremity deformity otherwise     Neurological: She is alert.   Skin: No rash noted. No erythema.         ED Course   Critical Care    Date/Time: 2/8/2024 1:14 PM    Performed by: Home Buck Jr., DO  Authorized by: Home Buck Jr., DO  Direct patient critical care time: 30 minutes  Additional history critical care time: 5 minutes  Ordering / reviewing critical care time: 7 minutes  Consulting other physicians critical care time: 5 minutes  Total critical care time (exclusive of procedural time) : 47  minutes        Labs Reviewed   CBC W/ AUTO DIFFERENTIAL - Abnormal; Notable for the following components:       Result Value    RBC 2.49 (*)     Hemoglobin 7.6 (*)     Hematocrit 22.7 (*)     Platelets 45 (*)     Lymph # 0.8 (*)     Lymph % 15.2 (*)     All other components within normal limits    Narrative:     Platelets critical result(s) called and verbal readback obtained from   Nayan Anguiano. by KARINA 02/08/2024 14:33   COMPREHENSIVE METABOLIC PANEL - Abnormal; Notable for the following components:    Glucose 123 (*)     Total Protein 5.7 (*)     Albumin 3.1 (*)     eGFR 37 (*)     All other components within normal limits   URINALYSIS, REFLEX TO URINE CULTURE - Abnormal; Notable for the following components:    Color, UA Orange (*)     Appearance, UA Cloudy (*)     Protein, UA 1+ (*)     Occult Blood UA 3+ (*)     Nitrite, UA Positive (*)     Leukocytes, UA 3+ (*)     All other components within normal limits    Narrative:     Specimen Source->Urine   FERRITIN - Abnormal; Notable for the following components:    Ferritin 339 (*)     All other components within normal limits   URINALYSIS MICROSCOPIC - Abnormal; Notable for the following components:    RBC, UA 10 (*)     WBC, UA >100 (*)     Bacteria Moderate (*)     Non-Squam Epith 3 (*)     All other components within normal limits    Narrative:     Specimen Source->Urine   B-TYPE NATRIURETIC PEPTIDE - Abnormal; Notable for the following components:     (*)     All other components within normal limits   CULTURE, URINE   CULTURE, BLOOD   CULTURE, BLOOD   TROPONIN I   IRON AND TIBC   LACTIC ACID, PLASMA   TYPE & SCREEN   PREPARE RBC SOFT          Imaging Results              CT Abdomen Pelvis  Without Contrast (Final result)  Result time 02/08/24 16:23:03   Procedure changed from CT Abdomen Pelvis With IV Contrast NO Oral Contrast     Final result by Giovany Huerta MD (02/08/24 16:23:03)                   Impression:      1. Gallbladder stones and  sludge with nonspecific dilation.  2. Gastric wall thickening could relate to inadequate distension.  Gastritis or peptic ulcer disease also possible in the appropriate clinical setting.  3. Scattered colonic diverticula with possible constipation.  4. Urinary bladder wall thickening is presumably from inadequate distension.      Electronically signed by: Giovany Huerta  Date:    02/08/2024  Time:    16:23               Narrative:    EXAMINATION:  CT ABDOMEN PELVIS WITHOUT CONTRAST    CLINICAL HISTORY:  GI bleed;periumbilical pain;    TECHNIQUE:  Low dose axial images, sagittal and coronal reformations were obtained from the lung bases to the pubic symphysis.  Oral contrast was not administered.    COMPARISON:  05/09/2023    FINDINGS:  There is dependent atelectasis in both lower lobes.  There is bilateral pleural fluid.  Normal size heart with calcification mitral annulus and partially imaged AVR and transvenous pacing wires.  Gallbladder mildly distended with multiple stones near the neck and layering high density fluid level.    Pancreas atrophy.  Diffuse increased density in the liver relative to the spleen.  Remaining solid abdominal organs are grossly unremarkable on this noncontrast exam.    There is no enteric contrast which limits bowel assessment.  There is wall thickening diffusely in the stomach.  No dilated bowel loops.  Moderate degree of stool throughout the colon with a few scattered diverticula.    Atherosclerosis.  Retroaortic left renal vein.  Rodriguez catheter in the urinary bladder which is only partially distended with intraluminal gas and wall thickening.  Hysterectomy.    Osteopenia.  Grade 1 anterolisthesis L4 on L5 and L5 on S1.  Multilevel spinal degenerative changes.                                       X-Ray Chest 1 View (Final result)  Result time 02/08/24 15:37:39      Final result by Vivi Edwards MD (02/08/24 15:37:39)                   Impression:      Mildly prominent lung  markings in lower lung fields more so today than on the prior exam questioning very mild CHF      Electronically signed by: Vivi Edwards MD  Date:    02/08/2024  Time:    15:37               Narrative:    EXAMINATION:  XR CHEST 1 VIEW    CLINICAL HISTORY:  Hypoxemia    TECHNIQUE:  Single frontal view of the chest was performed.    COMPARISON:  01/19/2024    FINDINGS:  The cardiomediastinal silhouette is stable.  Cardiac pacemaker with multiple leads remains in place.  Mild prominence of the interstitial markings particularly in the lower lung fields appearing slightly more so today than on the prior exam questioning very mild pulmonary vascular distention or CHF.  No confluent infiltrate.  No pleural effusion.                                       Medications   0.9%  NaCl infusion (for blood administration) (has no administration in time range)   linezolid 600 mg/300 mL IVPB 600 mg (has no administration in time range)   pantoprazole injection 40 mg (has no administration in time range)     Medical Decision Making  83-year-old female presents with lab abnormality, anemia and thrombocytopenia, currently anticoagulated.  Associated FOBT and minimally dark stools on exam.  Some abdominal discomfort, CT imaging demonstrated gastritis, patient administered IV pantoprazole.  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  Patient with oxygen requirement, placed on 3 L nasal cannula.  Spoke with hematologist on-call recommended blood transfusion.  Spoke with hospitalist team who will admit for further management evaluation.    Amount and/or Complexity of Data Reviewed  Labs: ordered. Decision-making details documented in ED Course.  Radiology: ordered and independent interpretation performed.     Details: Right lower lung markings  ECG/medicine tests: ordered and independent interpretation performed.     Details: Rate 65, , , No Stemi  Discussion of  management or test interpretation with external provider(s): Spoke with Dr. Khoobehi- recommend blood transfusion    Spoke with Amparo Chandra NP with hospitalist team-will admit for further management evaluation    Risk  Prescription drug management.  Decision regarding hospitalization.               ED Course as of 02/08/24 1644   Thu Feb 08, 2024   1435 Hemoglobin(!): 7.6 [KB]   1435 Hematocrit(!): 22.7 [KB]   1435 Platelet Count(!!): 45 [KB]   1440 Sodium: 137 [KB]   1440 Potassium: 3.7 [KB]   1440 Chloride: 104 [KB]   1440 Glucose(!): 123 [KB]   1440 BUN: 15 [KB]   1440 Creatinine: 1.4 [KB]   1440 Albumin(!): 3.1 [KB]   1440 eGFR(!): 37 [KB]   1440 Anion Gap: 8 [KB]   1457 Group & Rh: O POS [KB]   1513 NITRITE UA(!): Positive [KB]   1513 Leukocyte Esterase, UA(!): 3+ [KB]   1513 RBC, UA(!): 10 [KB]   1513 WBC, UA(!): >100 [KB]   1542 Troponin I: 0.026 [KB]      ED Course User Index  [KB] Home Buck Jr., DO                           Clinical Impression:  Final diagnoses:  [R19.5] Occult blood in stools  [D64.9] Anemia, unspecified type (Primary)  [D69.6] Thrombocytopenia  [Z79.01] Anticoagulated  [R09.02] Hypoxemia  [D64.9] Anemia  [N39.0, R31.9] Urinary tract infection with hematuria, site unspecified  [K29.70] Gastritis, presence of bleeding unspecified, unspecified chronicity, unspecified gastritis type          ED Disposition Condition    Admit Stable                Home Buck Jr., DO  02/08/24 8414

## 2024-02-08 NOTE — ED NOTES
Blood infusing and will have to give IVPB and IV meds post transfusion since she has a midline single lumen and no IV access.

## 2024-02-09 LAB
ALBUMIN SERPL BCP-MCNC: 2.9 G/DL (ref 3.5–5.2)
ALP SERPL-CCNC: 117 U/L (ref 55–135)
ALT SERPL W/O P-5'-P-CCNC: 16 U/L (ref 10–44)
ANION GAP SERPL CALC-SCNC: 8 MMOL/L (ref 8–16)
AST SERPL-CCNC: 17 U/L (ref 10–40)
BASOPHILS # BLD AUTO: 0.01 K/UL (ref 0–0.2)
BASOPHILS # BLD AUTO: 0.01 K/UL (ref 0–0.2)
BASOPHILS NFR BLD: 0.2 % (ref 0–1.9)
BASOPHILS NFR BLD: 0.2 % (ref 0–1.9)
BILIRUB SERPL-MCNC: 0.7 MG/DL (ref 0.1–1)
BUN SERPL-MCNC: 14 MG/DL (ref 8–23)
CALCIUM SERPL-MCNC: 8.8 MG/DL (ref 8.7–10.5)
CHLORIDE SERPL-SCNC: 105 MMOL/L (ref 95–110)
CO2 SERPL-SCNC: 24 MMOL/L (ref 23–29)
CREAT SERPL-MCNC: 1.2 MG/DL (ref 0.5–1.4)
DAT IGG-SP REAG RBC-IMP: NORMAL
DIFFERENTIAL METHOD BLD: ABNORMAL
DIFFERENTIAL METHOD BLD: ABNORMAL
EOSINOPHIL # BLD AUTO: 0.2 K/UL (ref 0–0.5)
EOSINOPHIL # BLD AUTO: 0.2 K/UL (ref 0–0.5)
EOSINOPHIL NFR BLD: 3.8 % (ref 0–8)
EOSINOPHIL NFR BLD: 4.2 % (ref 0–8)
ERYTHROCYTE [DISTWIDTH] IN BLOOD BY AUTOMATED COUNT: 13.2 % (ref 11.5–14.5)
ERYTHROCYTE [DISTWIDTH] IN BLOOD BY AUTOMATED COUNT: 13.3 % (ref 11.5–14.5)
EST. GFR  (NO RACE VARIABLE): 45 ML/MIN/1.73 M^2
FERRITIN SERPL-MCNC: 356 NG/ML (ref 20–300)
FOLATE SERPL-MCNC: 8.6 NG/ML (ref 4–24)
GLUCOSE SERPL-MCNC: 78 MG/DL (ref 70–110)
HCT VFR BLD AUTO: 23.7 % (ref 37–48.5)
HCT VFR BLD AUTO: 23.9 % (ref 37–48.5)
HGB BLD-MCNC: 8 G/DL (ref 12–16)
HGB BLD-MCNC: 8.1 G/DL (ref 12–16)
IMM GRANULOCYTES # BLD AUTO: 0.03 K/UL (ref 0–0.04)
IMM GRANULOCYTES # BLD AUTO: 0.04 K/UL (ref 0–0.04)
IMM GRANULOCYTES NFR BLD AUTO: 0.6 % (ref 0–0.5)
IMM GRANULOCYTES NFR BLD AUTO: 0.8 % (ref 0–0.5)
IRON SERPL-MCNC: 86 UG/DL (ref 30–160)
LYMPHOCYTES # BLD AUTO: 0.8 K/UL (ref 1–4.8)
LYMPHOCYTES # BLD AUTO: 1.1 K/UL (ref 1–4.8)
LYMPHOCYTES NFR BLD: 16.9 % (ref 18–48)
LYMPHOCYTES NFR BLD: 23.8 % (ref 18–48)
MAGNESIUM SERPL-MCNC: 1.8 MG/DL (ref 1.6–2.6)
MCH RBC QN AUTO: 30.2 PG (ref 27–31)
MCH RBC QN AUTO: 30.3 PG (ref 27–31)
MCHC RBC AUTO-ENTMCNC: 33.8 G/DL (ref 32–36)
MCHC RBC AUTO-ENTMCNC: 33.9 G/DL (ref 32–36)
MCV RBC AUTO: 89 FL (ref 82–98)
MCV RBC AUTO: 90 FL (ref 82–98)
MONOCYTES # BLD AUTO: 0.7 K/UL (ref 0.3–1)
MONOCYTES # BLD AUTO: 0.7 K/UL (ref 0.3–1)
MONOCYTES NFR BLD: 14.6 % (ref 4–15)
MONOCYTES NFR BLD: 15.2 % (ref 4–15)
NEUTROPHILS # BLD AUTO: 2.7 K/UL (ref 1.8–7.7)
NEUTROPHILS # BLD AUTO: 3 K/UL (ref 1.8–7.7)
NEUTROPHILS NFR BLD: 56.4 % (ref 38–73)
NEUTROPHILS NFR BLD: 63.3 % (ref 38–73)
NRBC BLD-RTO: 0 /100 WBC
NRBC BLD-RTO: 0 /100 WBC
OHS QRS DURATION: 128 MS
OHS QTC CALCULATION: 492 MS
PHOSPHATE SERPL-MCNC: 3 MG/DL (ref 2.7–4.5)
PLATELET # BLD AUTO: 38 K/UL (ref 150–450)
PLATELET # BLD AUTO: 40 K/UL (ref 150–450)
PLATELET BLD QL SMEAR: ABNORMAL
PMV BLD AUTO: 10.7 FL (ref 9.2–12.9)
PMV BLD AUTO: 11.1 FL (ref 9.2–12.9)
POTASSIUM SERPL-SCNC: 3.6 MMOL/L (ref 3.5–5.1)
PROT SERPL-MCNC: 5 G/DL (ref 6–8.4)
RBC # BLD AUTO: 2.65 M/UL (ref 4–5.4)
RBC # BLD AUTO: 2.67 M/UL (ref 4–5.4)
RETICS/RBC NFR AUTO: 0.3 % (ref 0.5–2.5)
SATURATED IRON: 38 % (ref 20–50)
SODIUM SERPL-SCNC: 137 MMOL/L (ref 136–145)
TOTAL IRON BINDING CAPACITY: 225 UG/DL (ref 250–450)
TRANSFERRIN SERPL-MCNC: 161 MG/DL (ref 200–375)
VIT B12 SERPL-MCNC: 538 PG/ML (ref 210–950)
WBC # BLD AUTO: 4.79 K/UL (ref 3.9–12.7)
WBC # BLD AUTO: 4.79 K/UL (ref 3.9–12.7)

## 2024-02-09 PROCEDURE — 84165 PROTEIN E-PHORESIS SERUM: CPT | Performed by: INTERNAL MEDICINE

## 2024-02-09 PROCEDURE — 82746 ASSAY OF FOLIC ACID SERUM: CPT | Performed by: INTERNAL MEDICINE

## 2024-02-09 PROCEDURE — 85045 AUTOMATED RETICULOCYTE COUNT: CPT | Performed by: INTERNAL MEDICINE

## 2024-02-09 PROCEDURE — 97535 SELF CARE MNGMENT TRAINING: CPT

## 2024-02-09 PROCEDURE — 36415 COLL VENOUS BLD VENIPUNCTURE: CPT | Performed by: HOSPITALIST

## 2024-02-09 PROCEDURE — 85025 COMPLETE CBC W/AUTO DIFF WBC: CPT | Mod: 91 | Performed by: HOSPITALIST

## 2024-02-09 PROCEDURE — 82607 VITAMIN B-12: CPT | Performed by: INTERNAL MEDICINE

## 2024-02-09 PROCEDURE — 97110 THERAPEUTIC EXERCISES: CPT

## 2024-02-09 PROCEDURE — 85025 COMPLETE CBC W/AUTO DIFF WBC: CPT | Performed by: NURSE PRACTITIONER

## 2024-02-09 PROCEDURE — 25000003 PHARM REV CODE 250: Performed by: NURSE PRACTITIONER

## 2024-02-09 PROCEDURE — 83521 IG LIGHT CHAINS FREE EACH: CPT | Performed by: INTERNAL MEDICINE

## 2024-02-09 PROCEDURE — 87522 HEPATITIS C REVRS TRNSCRPJ: CPT | Performed by: INTERNAL MEDICINE

## 2024-02-09 PROCEDURE — 80053 COMPREHEN METABOLIC PANEL: CPT | Performed by: NURSE PRACTITIONER

## 2024-02-09 PROCEDURE — 83010 ASSAY OF HAPTOGLOBIN QUANT: CPT | Performed by: INTERNAL MEDICINE

## 2024-02-09 PROCEDURE — 97165 OT EVAL LOW COMPLEX 30 MIN: CPT

## 2024-02-09 PROCEDURE — 36415 COLL VENOUS BLD VENIPUNCTURE: CPT | Performed by: INTERNAL MEDICINE

## 2024-02-09 PROCEDURE — G0378 HOSPITAL OBSERVATION PER HR: HCPCS

## 2024-02-09 PROCEDURE — 83540 ASSAY OF IRON: CPT | Performed by: INTERNAL MEDICINE

## 2024-02-09 PROCEDURE — 83735 ASSAY OF MAGNESIUM: CPT | Performed by: NURSE PRACTITIONER

## 2024-02-09 PROCEDURE — 99900035 HC TECH TIME PER 15 MIN (STAT)

## 2024-02-09 PROCEDURE — C9113 INJ PANTOPRAZOLE SODIUM, VIA: HCPCS | Performed by: NURSE PRACTITIONER

## 2024-02-09 PROCEDURE — 82728 ASSAY OF FERRITIN: CPT | Performed by: INTERNAL MEDICINE

## 2024-02-09 PROCEDURE — 27000221 HC OXYGEN, UP TO 24 HOURS

## 2024-02-09 PROCEDURE — 99204 OFFICE O/P NEW MOD 45 MIN: CPT | Mod: ,,, | Performed by: INTERNAL MEDICINE

## 2024-02-09 PROCEDURE — 86880 COOMBS TEST DIRECT: CPT | Performed by: HOSPITALIST

## 2024-02-09 PROCEDURE — 94761 N-INVAS EAR/PLS OXIMETRY MLT: CPT

## 2024-02-09 PROCEDURE — 63600175 PHARM REV CODE 636 W HCPCS: Performed by: NURSE PRACTITIONER

## 2024-02-09 PROCEDURE — 36415 COLL VENOUS BLD VENIPUNCTURE: CPT | Performed by: NURSE PRACTITIONER

## 2024-02-09 PROCEDURE — 97161 PT EVAL LOW COMPLEX 20 MIN: CPT

## 2024-02-09 PROCEDURE — 84100 ASSAY OF PHOSPHORUS: CPT | Performed by: NURSE PRACTITIONER

## 2024-02-09 RX ADMIN — Medication 800 MG: at 11:02

## 2024-02-09 RX ADMIN — ONDANSETRON 4 MG: 2 INJECTION INTRAMUSCULAR; INTRAVENOUS at 08:02

## 2024-02-09 RX ADMIN — DOCUSATE SODIUM 100 MG: 100 CAPSULE, LIQUID FILLED ORAL at 08:02

## 2024-02-09 RX ADMIN — ACETAMINOPHEN 650 MG: 325 TABLET ORAL at 01:02

## 2024-02-09 RX ADMIN — ONDANSETRON 4 MG: 2 INJECTION INTRAMUSCULAR; INTRAVENOUS at 04:02

## 2024-02-09 RX ADMIN — AMIODARONE HYDROCHLORIDE 200 MG: 100 TABLET ORAL at 08:02

## 2024-02-09 RX ADMIN — LINEZOLID 600 MG: 600 INJECTION, SOLUTION INTRAVENOUS at 06:02

## 2024-02-09 RX ADMIN — PANTOPRAZOLE SODIUM 40 MG: 40 INJECTION, POWDER, LYOPHILIZED, FOR SOLUTION INTRAVENOUS at 08:02

## 2024-02-09 RX ADMIN — SODIUM CHLORIDE: 9 INJECTION, SOLUTION INTRAVENOUS at 08:02

## 2024-02-09 NOTE — CONSULTS
[facility]/Surg  Adult Nutrition  Consult Note    SUMMARY     Recommendations    1. Encourage oral intake   2. Recommend pasquale BID to promote wound healing   3. Recommend commercial oral beverages if po intake remains decreased   4. Monitor labs and weights   5. Collaboration with medical providers    Goals: Patient to consume >50% of estimated energy needs prior to RD follow up  Nutrition Goal Status: new  Communication of RD Recs: other (comment) (consult, poc)    Assessment and Plan    Nutrition Problem  Inadequate nutrition    Related to (etiology):   Decreased appetite  GI pain    Signs and Symptoms (as evidenced by):   Decreased po intake     Interventions(treatment strategy):  Commercial beverages  Collaboration with medical providers      Nutrition Diagnosis Status:   New      Malnutrition Assessment     Skin (Micronutrient): wounds unhealed                                 Reason for Assessment    Reason For Assessment: consult    Diagnosis:  (anemia)  Patient Active Problem List   Diagnosis    Peripheral vertigo of both ears    Thrombocytopenia    Abnormal CXR--post COVID-19    PAF (paroxysmal atrial fibrillation)    Anemia    Anxiety    History of COVID-19    Severe aortic valve stenosis    Current use of long term anticoagulation    Postviral fatigue syndrome    Hypertension    Constipation    Tremors of nervous system    Acute cystitis with hematuria    Atrial fibrillation with RVR    Acquired hammer toe of right foot    Primary osteoarthritis of right knee    Osteoporosis    Family history of rectal cancer    Diverticulosis of large intestine without perforation or abscess without bleeding    Depression    Colon polyps    Chronic idiopathic constipation    Stage 3b chronic kidney disease    ABDULLAHI (dyspnea on exertion)    Bradycardia    NYHA class 3 heart failure with preserved ejection fraction    S/P TAVR (transcatheter aortic valve replacement)    CHB (complete heart block)    On  "amiodarone therapy    Postural dizziness with presyncope    Closed fracture of right ankle    UTI (urinary tract infection)    Dysphagia    Postural hypotension    Encephalopathy, metabolic    Status post open reduction with internal fixation (ORIF) of fracture of ankle    Urinary retention       Relevant Medical History:   Past Medical History:   Diagnosis Date    Anemia, unspecified     Atrial fibrillation 01/25/2021    CKD (chronic kidney disease)     Hypertension        Interdisciplinary Rounds: did not attend (RD remote)    General Information Comments:   2/9/2024: Patient is on regular diet with decreased appetite due to abdominal pain possibly with gastritis diagnosis.  Labs reviewed.  NKFA.  WOund noted to the left heel.  RD to recommend supplements to promote wound healing.    Nutrition Discharge Planning: Patient to continue on healthy diet post discharge    Nutrition Risk Screen    Nutrition Risk Screen: large or nonhealing wound, burn or pressure injury    Nutrition/Diet History    Spiritual, Cultural Beliefs, Nondenominational Practices, Values that Affect Care: no  Food Allergies: NKFA  Factors Affecting Nutritional Intake: decreased appetite, abdominal pain    Anthropometrics    Temp: 97.8 °F (36.6 °C)  Height Method: Stated  Height: 5' 6" (167.6 cm)  Height (inches): 66 in  Weight Method: Bed Scale  Weight: 71.2 kg (156 lb 15.5 oz)  Weight (lb): 156.97 lb  Ideal Body Weight (IBW), Female: 130 lb  % Ideal Body Weight, Female (lb): 120.75 %  BMI (Calculated): 25.3  BMI Grade: 25 - 29.9 - overweight       Lab/Procedures/Meds    Pertinent Labs Reviewed: reviewed  BMP  Lab Results   Component Value Date     02/09/2024    K 3.6 02/09/2024     02/09/2024    CO2 24 02/09/2024    BUN 14 02/09/2024    CREATININE 1.2 02/09/2024    CALCIUM 8.8 02/09/2024    ANIONGAP 8 02/09/2024    EGFRNORACEVR 45 (A) 02/09/2024     Lab Results   Component Value Date    HGBA1C 5.1 04/11/2023     Lab Results   Component Value " Date    CALCIUM 8.8 02/09/2024    PHOS 3.0 02/09/2024       Pertinent Medications Reviewed: reviewed  Scheduled Meds:   amiodarone  200 mg Oral BID    docusate sodium  100 mg Oral BID    linezolid  600 mg Intravenous Q12H    pantoprazole  40 mg Intravenous BID     Continuous Infusions:   sodium chloride 0.9% 75 mL/hr at 02/09/24 0837     PRN Meds:.0.9%  NaCl infusion (for blood administration), acetaminophen, albuterol-ipratropium, aluminum-magnesium hydroxide-simethicone, dextrose 10%, dextrose 10%, glucagon (human recombinant), glucose, glucose, hydrALAZINE, magnesium oxide, magnesium oxide, melatonin, naloxone, ondansetron, potassium bicarbonate, potassium bicarbonate, potassium bicarbonate, potassium, sodium phosphates, potassium, sodium phosphates, potassium, sodium phosphates, simethicone, sodium chloride 0.9%    Physical Findings/Assessment         Estimated/Assessed Needs    Weight Used For Calorie Calculations: 71.2 kg (156 lb 15.5 oz)  Energy Calorie Requirements (kcal): 25-30kcals/kg (1780-2136kcals/day)  Energy Need Method: Kcal/kg  Protein Requirements: 0.8g/kg (CKD) (57g/day)  Weight Used For Protein Calculations: 71.2 kg (156 lb 15.5 oz)  Fluid Requirements (mL): 1000+output  Estimated Fluid Requirement Method: other (see comments) (ckd)  RDA Method (mL): 25  CHO Requirement: 250      Nutrition Prescription Ordered    Current Diet Order: regular  Oral Nutrition Supplement: pasquale bid    Evaluation of Received Nutrient/Fluid Intake    % Kcal Needs: <50%  % Protein Needs: <50%  I/O: -978mls since admit  Energy Calories Required: not meeting needs  Protein Required: not meeting needs  Fluid Required: not meeting needs  Comments: LBM: 2/6/24  Tolerance: not tolerating  % Intake of Estimated Energy Needs: 25 - 50 %  % Meal Intake: 25 - 50 %    Nutrition Risk    Level of Risk/Frequency of Follow-up: moderate (follow up: 2x per week)       Monitor and Evaluation    Food and Nutrient Intake: energy intake,  food and beverage intake  Food and Nutrient Adminstration: diet order  Knowledge/Beliefs/Attitudes: beliefs and attitudes  Physical Activity and Function: nutrition-related ADLs and IADLs, factors affecting access to physical activity  Anthropometric Measurements: height/length, weight, weight change, body mass index  Biochemical Data, Medical Tests and Procedures: gastrointestinal profile, electrolyte and renal panel, glucose/endocrine profile, inflammatory profile, lipid profile  Nutrition-Focused Physical Findings: skin       Nutrition Follow-Up    RD Follow-up?: Yes  Nara Morrison MS, RDN, LDN

## 2024-02-09 NOTE — PLAN OF CARE
Nutrition Plan of Care:    Recommendations     1. Encourage oral intake   2. Recommend pasquale BID to promote wound healing   3. Recommend commercial oral beverages if po intake remains decreased   4. Monitor labs and weights   5. Collaboration with medical providers     Goals: Patient to consume >50% of estimated energy needs prior to RD follow up  Nutrition Goal Status: new  Communication of RD Recs: other (comment) (consult, poc)     Assessment and Plan     Nutrition Problem  Inadequate nutrition     Related to (etiology):   Decreased appetite  GI pain     Signs and Symptoms (as evidenced by):   Decreased po intake      Interventions(treatment strategy):  Commercial beverages  Collaboration with medical providers        Nutrition Diagnosis Status:   New        Malnutrition Assessment  Skin (Micronutrient): wounds unhealed

## 2024-02-09 NOTE — NURSING
Wound care follow up. Added offloading quilted heel protectors and air pump already added to mattress and is working for maximum pressure relief and moisture control.  SCD sleeves to bilateral lower legs in place and when removed these sleeves to assess her skin noted skin under the sleeves to be very moist. Concern for skin breakdown removed sleeves and will allow the legs and sleeves to dry. If needed can apply the SCD sleeves to the posterior legs inside the quilted heel boots and avoid a complete wrap around the leg to allow air to reach the skin or to remove every couple hours to dry out and re-apply. Discussed with patients nurse Ricardo. Continue with pressure prevention on this patient due to high risk for skin integrity problems  as well as an DTI to her left posterior heel.

## 2024-02-09 NOTE — H&P
[facility] Medicine  History & Physical    Patient Name: Irene العراقي  MRN: [id number]  Patient Class: OP- Observation  Admission Date: 2/8/2024  Attending Physician: Madisyn Green MD   Primary Care Provider: Wanda Kuhn FNP-C         Patient information was obtained from patient, past medical records, and ER records.     Subjective:     Principal Problem:Anemia    Chief Complaint:   Chief Complaint   Patient presents with    ABNORMAL LABS     Pt brought to ED via EMS from [facility] with a complaint of low platelet count for the past two weeks.          HPI: Irene العراقي is an 82 y/o F with past medical history significant for anemia, afib, CKD and HTN who presented to the ED from [facility] for further treatment of abnormal labs.  Per report, her platelets have been taking a downward trend and are 47K today.  She also has a hemoglobin of 7.5 with no obvious source of blood loss.  Pt denies recent fevers, chest pain, dizziness, melena or hematochezia.  Denies N/V but is having some mild generalized abdominal tenderness.  CT abdomen and pelvis obtained shows nonspecific gastric wall thickening which could be consistent with gastritis or peptic ulcer disease.  The ED physician discussed the lab findings with the hematologist on call who recommends admission and transfusion of PRBCs.  Pt presented from [facility] with araya catheter in place.  She reports urinary retention and states the araya has been in place for about a week.  Araya catheter was exchanged in the ED.  Pt is currently being treated for UTI with culture positive for enterococcus faecium VRE susceptible to linezolid.  Urinalysis today remains positive. She is placed in observation under the service of hospital medicine for continued monitoring and treatment.       Past Medical History:   Diagnosis Date    Anemia, unspecified     Atrial fibrillation 01/25/2021    CKD (chronic kidney disease)      Hypertension        Past Surgical History:   Procedure Laterality Date    A-V CARDIAC PACEMAKER INSERTION  11/2/2023    Procedure: Dual Chamber PPM RM 2617 ([employer]);  Surgeon: Andreas James III, MD;  Location: [facility] CATH;  Service: Cardiology;;    ANGIOGRAM, CORONARY, WITH LEFT HEART CATHETERIZATION Left 4/19/2023    Procedure: Angiogram, Coronary, with Left Heart Cath;  Surgeon: Kevin Redmond MD;  Location: [facility] CATH/EP LAB;  Service: Cardiology;  Laterality: Left;    BREAST SURGERY      COLONOSCOPY N/A 4/16/2023    Procedure: COLONOSCOPY;  Surgeon: Kvng Mensah MD;  Location: [facility] ENDO;  Service: Endoscopy;  Laterality: N/A;    COLONOSCOPY W/ POLYPECTOMY  04/16/2023    OPEN REDUCTION AND INTERNAL FIXATION (ORIF) OF INJURY OF ANKLE Right 12/13/2023    Procedure: ORIF, ANKLE;  Surgeon: Chaitanya Garrison MD;  Location: [facility] OR;  Service: Orthopedics;  Laterality: Right;  Synthes    TRANSCATHETER AORTIC VALVE REPLACEMENT (TAVR)  11/1/2023    Procedure: (TAVR);  Surgeon: Juliano Moncada MD;  Location: [facility] CATH;  Service: Cardiology;;    TRANSCATHETER AORTIC VALVE REPLACEMENT (TAVR)  11/1/2023    Procedure: (TAVR)- Surgeon;  Surgeon: Adebayo Gumzan MD;  Location: [facility] CATH;  Service: Peripheral Vascular;;       Review of patient's allergies indicates:   Allergen Reactions    Cefuroxime     Hydrocodone     Meperidine     Meperidine hcl Nausea And Vomiting    Persantine [dipyridamole]     Nitrofurantoin macrocrystal      Headache , went into Afib     Vicodin [hydrocodone-acetaminophen] Nausea And Vomiting       No current facility-administered medications on file prior to encounter.     Current Outpatient Medications on File Prior to Encounter   Medication Sig    amiodarone (PACERONE) 200 MG Tab Take 1 tablet (200 mg total) by mouth 2 (two) times daily.    apixaban (ELIQUIS) 2.5 mg Tab Take 1 tablet (2.5 mg total) by mouth 2 (two) times daily.    docusate sodium (COLACE) 100 MG capsule Take 1 capsule (100 mg  total) by mouth 2 (two) times daily.    ertapenem (INVANZ) 1 gram injection     famotidine (PEPCID) 20 MG tablet Take 1 tablet (20 mg total) by mouth once daily.    hydrocortisone (ANUSOL-HC) 2.5 % rectal cream SMARTSIG:Topical    iron ag,xv-Y-[id number]-B12-Zn-sa-sto (NIFEREX, SUMALATE-QUATREFOLIC,) 150 mg iron- 60 mg-1 mg Tab Take 1 tablet by mouth once daily.    linezolid (ZYVOX) 600 mg/300 mL PgBk Inject 300 mLs (600 mg total) into the vein every 12 (twelve) hours. for 14 days    magnesium oxide (MAG-OX) 400 mg (241.3 mg magnesium) tablet TAKE 1 TABLET BY MOUTH ONCE DAILY (Patient taking differently: Take 400 mg by mouth once daily.)    meclizine (ANTIVERT) 50 MG tablet Take 1 tablet (50 mg total) by mouth 3 (three) times daily as needed for Dizziness.    midodrine (PROAMATINE) 5 MG Tab Take 5 mg by mouth 3 (three) times daily.    MIRALAX 17 gram PwPk Take 17 g by mouth 2 (two) times daily.    ondansetron (ZOFRAN-ODT) 8 MG TbDL Take 8 mg by mouth 3 (three) times daily.     Family History       Problem Relation (Age of Onset)    Cancer Mother, Father          Tobacco Use    Smoking status: Former     Types: Cigarettes     Passive exposure: Past    Smokeless tobacco: Never   Substance and Sexual Activity    Alcohol use: Not Currently    Drug use: Not Currently    Sexual activity: Not on file     Review of Systems   Constitutional:  Positive for fatigue. Negative for chills and fever.   Gastrointestinal:  Positive for abdominal pain. Negative for nausea and vomiting.   Genitourinary:  Positive for difficulty urinating.        Has araya catheter at present   Musculoskeletal:  Positive for gait problem.   Neurological:  Positive for weakness (generalized).     Objective:     Vital Signs (Most Recent):  Temp: 98.4 °F (36.9 °C) (02/08/24 1830)  Pulse: 72 (02/08/24 1933)  Resp: 18 (02/08/24 1830)  BP: (!) 203/81 (02/08/24 1933)  SpO2: 99 % (02/08/24 1933) Vital Signs (24h Range):  Temp:  [98 °F (36.7 °C)-98.4 °F (36.9 °C)]  98.4 °F (36.9 °C)  Pulse:  [61-84] 72  Resp:  [17-20] 18  SpO2:  [90 %-100 %] 99 %  BP: (143-207)/(61-83) 203/81     Weight: 68 kg (150 lb)  Body mass index is 24.21 kg/m².     Physical Exam  Constitutional:       General: She is not in acute distress.     Appearance: She is well-developed. She is not toxic-appearing.   HENT:      Head: Normocephalic and atraumatic.   Eyes:      Conjunctiva/sclera: Conjunctivae normal.      Pupils: Pupils are equal, round, and reactive to light.   Cardiovascular:      Rate and Rhythm: Normal rate and regular rhythm.      Heart sounds: Murmur heard.   Pulmonary:      Effort: Pulmonary effort is normal. No respiratory distress.      Breath sounds: Normal breath sounds.   Chest:      Comments: Cardiac device in place  Abdominal:      General: Bowel sounds are normal. There is no distension.      Palpations: Abdomen is soft.      Tenderness: There is abdominal tenderness (generalized, mild).   Genitourinary:     Comments: Rodriguez catheter in place draining yellow urine  Musculoskeletal:         General: Normal range of motion.      Cervical back: Normal range of motion and neck supple.      Right lower leg: Edema (trace) present.      Comments: Midline LUE   Skin:     General: Skin is warm and dry.      Comments: Poor skin turgor; soles of feet dry, cracking   Neurological:      General: No focal deficit present.      Mental Status: She is alert. Mental status is at baseline.      Comments: Oriented to person, place and situation   Psychiatric:         Mood and Affect: Mood normal.         Behavior: Behavior normal.         Thought Content: Thought content normal.         Judgment: Judgment normal.              CRANIAL NERVES     CN III, IV, VI   Pupils are equal, round, and reactive to light.       Significant Labs: All pertinent labs within the past 24 hours have been reviewed.  CBC:   Recent Labs   Lab 02/08/24  1401   WBC 5.12   HGB 7.6*   HCT 22.7*   PLT 45*     CMP:   Recent Labs    Lab 02/08/24  1401      K 3.7      CO2 25   *   BUN 15   CREATININE 1.4   CALCIUM 9.0   PROT 5.7*   ALBUMIN 3.1*   BILITOT 0.5   ALKPHOS 128   AST 19   ALT 20   ANIONGAP 8     Urine Studies:   Recent Labs   Lab 02/08/24  1437   COLORU Orange*   APPEARANCEUA Cloudy*   PHUR 6.0   SPECGRAV 1.020   PROTEINUA 1+*   GLUCUA Negative   KETONESU Negative   BILIRUBINUA Negative   OCCULTUA 3+*   NITRITE Positive*   UROBILINOGEN Negative   LEUKOCYTESUR 3+*   RBCUA 10*   WBCUA >100*   BACTERIA Moderate*   SQUAMEPITHEL 3   HYALINECASTS 0       Significant Imaging: I have reviewed all pertinent imaging results/findings within the past 24 hours.  CXR:  Mildly prominent lung markings in lower lung fields more so today than on the prior exam questioning very mild CHF   Assessment/Plan:     * Anemia  Patient's anemia is currently uncontrolled. Has not received any PRBCs to date.   Current CBC reviewed-   Lab Results   Component Value Date    HGB 7.6 (L) 02/08/2024    HCT 22.7 (L) 02/08/2024     Monitor serial CBC and transfuse if patient becomes hemodynamically unstable, symptomatic or H/H drops below 7/21.  Transfuse one unit PRBCs tonight    Urinary retention  Per patient report.  Continue araya catheter for now.  Attempt voiding trials.      UTI (urinary tract infection)  IV linezolid  Follow current culture      Stage 3b chronic kidney disease  Creatine stable for now. BMP reviewed- noted Estimated Creatinine Clearance: 30.8 mL/min (based on SCr of 1.4 mg/dL). according to latest data. Based on current GFR, CKD stage is stage 3 - GFR 30-59.  Monitor UOP and serial BMP and adjust therapy as needed. Renally dose meds. Avoid nephrotoxic medications and procedures.    Hypertension  Chronic, uncontrolled. Latest blood pressure and vitals reviewed-     Temp:  [96 °F (35.6 °C)-98.4 °F (36.9 °C)]   Pulse:  [61-84]   Resp:  [17-20]   BP: (124-207)/(56-83)   SpO2:  [90 %-100 %] .   Home meds for hypertension were  reviewed and noted below.       While in the hospital, will manage blood pressure as follows; Adjust home antihypertensive regimen as follows- will give one dose IV lasix post transfusion and IV hydralazine prn    Will utilize p.r.n. blood pressure medication only if patient's blood pressure greater than 180/110 and she develops symptoms such as worsening chest pain or shortness of breath.    PAF (paroxysmal atrial fibrillation)  Patient with Paroxysmal (<7 days) atrial fibrillation which is controlled currently with Amiodarone. Patient is currently in sinus rhythm.[id number] Score: 3. Anticoagulation indicated. Anticoagulation done with apixaban, currently held for now due to anemia/thrombocytopenia .    Thrombocytopenia  Etiology unclear  Consult hematology  Monitor for s/s bleeding  Hold chronic apixaban for now and resume when clinically appropriate.          VTE Risk Mitigation (From admission, onward)           Ordered     IP VTE HIGH RISK PATIENT  Once         02/08/24 1916     Place sequential compression device  Until discontinued         02/08/24 1916     Reason for No Pharmacological VTE Prophylaxis  Once        Question Answer Comment   Reasons: Already adequately anticoagulated on oral Anticoagulants    Reasons: Active Bleeding        02/08/24 1916                         On 02/08/2024, patient should be placed in hospital observation services under my care in collaboration with Dr. Madisyn Green.           Anya Chandra, JOY  Department of Hospital Medicine  [facility]/Surg

## 2024-02-09 NOTE — ED NOTES
Report at bedside with WILDER Flower. Provided juice and sandwich for patient. Line was flushed per protocol post blood transfusion and luer cap changed.

## 2024-02-09 NOTE — PT/OT/SLP EVAL
Occupational Therapy Evaluation and Treatment    Name: Irene العراقي  MRN: [id number]  Admitting Diagnosis: Anemia  Recent Surgery: * No surgery found *      Recommendations:     Discharge Recommendations: Moderate Intensity Therapy  Level of Assistance Recommended: 24 hours significant assistance and 24 hours supervision  Discharge Equipment Recommendations: none  Barriers to discharge: None    Assessment:     Irene العراقي is a 83 y.o. female with a medical diagnosis of Anemia. She presents with performance deficits affecting function including weakness, impaired endurance, impaired self care skills, impaired functional mobility, gait instability, impaired balance, decreased upper extremity function, decreased lower extremity function, decreased safety awareness, pain, decreased ROM, impaired skin, impaired cardiopulmonary response to activity, orthopedic precautions. R LE WBAT with camwalker boot. Patient motivated; experiencing nausea - increases with mobilization.  Supine<>sit with Min (A), sit<>stand with Mod/Min (A) with RW, - posterior loss of balance upon initial stand requiring Mod (A) to regain midline, Min (A) side stepping with RW    Rehab Prognosis: Good; patient would benefit from acute OT services to address these deficits and reach maximum level of function.    Plan:     Patient to be seen 6 x/week to address the above listed problems via self-care/home management, therapeutic activities, therapeutic exercises  Plan of Care Expires: 03/01/24  Plan of Care Reviewed with: patient    Subjective     Chief Complaint: Nausea  Patient Comments/Goals: Wants to get better  Pain/Comfort:  Pain Rating 1: other (see comments) (nausea)    Patients cultural, spiritual, Voodoo conflicts given the current situation: no    Social History:  Living Environment: Patient  admitted from facility ; lives with family  Prior Level of Function: Prior to admission, patient  requires (A) to complete ADL/associated  mobility; prior to recent facility stay, patient Modified Independent   Roles and Routines: Patient was not driving prior to admission.  Equipment Used at Home: walker, rolling, shower chair  DME owned (not currently used): none  Assistance Upon Discharge: facility staff    Objective:     Communicated with Nurse, Ricardo, prior to session. Patient found HOB elevated with bed alarm, telemetry, peripheral IV, araya catheter, pressure relief boots upon OT entry to room.    General Precautions: Standard, fall, pacemaker, contact   Orthopedic Precautions: RLE partial weight bearing (WBAT with walking boot)   Braces:  (R camwalker boot)    Respiratory Status: Nasal cannula, flow 2 L/min    Occupational Performance    Gait belt applied - Yes    Bed Mobility:   Supine to sit from right side of bed with minimum assistance  Sit to Supine with minimum assistance on right side of bed    Functional Mobility/Transfers: R camboot donned prior to all OOB mobility  Sit <> Stand Transfer with minimum assistance and moderate assistance with rolling walker and upon initial stand EOB, patient with posterior loss of balance requiring Mod (A) to regain midline  Functional Mobility: Min (A) with RW to perform 4 right side steps    Activities of Daily Living:  Feeding: set up assistance  Grooming: Set-up assistance with bed in chair position  Upper Body Dressing: minimum assistance  Lower Body Dressing: maximal assistance and total assistance from bed level  Toileting: maximal assistance and total assistance with araya catheter and from bed level; will benefit from mobilizing to [facility] with staff assistance    Physical Exam:  Upper Extremity Range of Motion:     -       Right Upper Extremity: WFL  -       Left Upper Extremity: WFL  Upper Extremity Strength:    -       Right Upper Extremity: 3+/5  -       Left Upper Extremity: 3+/5    AMPAC 6 Click ADL:  AMPAC Total Score: 15    Treatment & Education:  Therapist provided facilitation and  instruction of proper body mechanics, energy conservation, and fall prevention strategies during tasks listed above  Patient educated on role of OT, POC, and goals for therapy  Patient educated on importance of OOB activities with staff member assistance and sitting OOB majority of the day  OTR initiating discharge planning     Patient left HOB elevated with all lines intact, call button in reach, RN notified, and bed alarm on.    GOALS:   Multidisciplinary Problems       Occupational Therapy Goals          Problem: Occupational Therapy    Goal Priority Disciplines Outcome Interventions   Occupational Therapy Goal     OT, PT/OT     Description: Goals to be met by: 3/1/2024     Patient will increase functional independence with ADLs by performing:    UE Dressing with Set-up Assistance.  LE Dressing with Set-up Assistance.  Grooming while standing with Stand-by Assistance.  Toileting from toilet with Stand-by Assistance for hygiene and clothing management.   Toilet transfer to toilet with Stand-by Assistance with AD as needed.                         History:     Past Medical History:   Diagnosis Date    Anemia, unspecified     Atrial fibrillation 01/25/2021    CKD (chronic kidney disease)     Hypertension          Past Surgical History:   Procedure Laterality Date    A-V CARDIAC PACEMAKER INSERTION  11/2/2023    Procedure: Dual Chamber PPM RM 2617 ([employer]);  Surgeon: Andreas James III, MD;  Location: [facility] CATH;  Service: Cardiology;;    ANGIOGRAM, CORONARY, WITH LEFT HEART CATHETERIZATION Left 4/19/2023    Procedure: Angiogram, Coronary, with Left Heart Cath;  Surgeon: Kevin Redmond MD;  Location: [facility] CATH/EP LAB;  Service: Cardiology;  Laterality: Left;    BREAST SURGERY      COLONOSCOPY N/A 4/16/2023    Procedure: COLONOSCOPY;  Surgeon: Kvng Mensah MD;  Location: [facility] ENDO;  Service: Endoscopy;  Laterality: N/A;    COLONOSCOPY W/ POLYPECTOMY  04/16/2023    OPEN REDUCTION AND INTERNAL FIXATION (ORIF) OF  INJURY OF ANKLE Right 12/13/2023    Procedure: ORIF, ANKLE;  Surgeon: Chaitanya Garrison MD;  Location: [facility] OR;  Service: Orthopedics;  Laterality: Right;  Synthes    TRANSCATHETER AORTIC VALVE REPLACEMENT (TAVR)  11/1/2023    Procedure: (TAVR);  Surgeon: Juliano Moncada MD;  Location: [facility];  Service: Cardiology;;    TRANSCATHETER AORTIC VALVE REPLACEMENT (TAVR)  11/1/2023    Procedure: (TAVR)- Surgeon;  Surgeon: Adebayo Guzman MD;  Location: [facility];  Service: Peripheral Vascular;;       Time Tracking:     OT Date of Treatment: 02/09/24  OT Start Time: 1206  OT Stop Time: 1224  OT Total Time (min): 18 min    Billable Minutes: Evaluation 8 and Self Care/Home Management 8    2/9/2024

## 2024-02-09 NOTE — PLAN OF CARE
Goals to be met by: 3/1/2024     Patient will increase functional independence with ADLs by performing:    UE Dressing with Set-up Assistance.  LE Dressing with Set-up Assistance.  Grooming while standing with Stand-by Assistance.  Toileting from toilet with Stand-by Assistance for hygiene and clothing management.   Toilet transfer to toilet with Stand-by Assistance with AD as needed.    OT POC initiated and established.

## 2024-02-09 NOTE — CARE UPDATE
02/09/24 0734   Patient Assessment/Suction   Level of Consciousness (AVPU) alert   Respiratory Effort Unlabored;Normal   Expansion/Accessory Muscles/Retractions no use of accessory muscles;no retractions;expansion symmetric   Rhythm/Pattern, Respiratory unlabored;pattern regular;depth regular;no shortness of breath reported   PRE-TX-O2   Device (Oxygen Therapy) nasal cannula   $ Is the patient on Low Flow Oxygen? Yes   Flow (L/min) 2   SpO2 95 %   Pulse Oximetry Type Intermittent   $ Pulse Oximetry - Multiple Charge Pulse Oximetry - Multiple   Pulse 67   Resp 18   Aerosol Therapy   $ Aerosol Therapy Charges PRN treatment not required   Respiratory Treatment Status (SVN) PRN treatment not required

## 2024-02-09 NOTE — ASSESSMENT & PLAN NOTE
Patient with Paroxysmal (<7 days) atrial fibrillation which is controlled currently with Amiodarone. Patient is currently in sinus rhythm.[id number] Score: 3. Anticoagulation indicated. Anticoagulation done with apixaban, currently held for now due to anemia/thrombocytopenia .

## 2024-02-09 NOTE — ASSESSMENT & PLAN NOTE
Patient's anemia is currently uncontrolled.  Current CBC reviewed-   Lab Results   Component Value Date    HGB 8.0 (L) 02/09/2024    HCT 23.7 (L) 02/09/2024     Monitor serial CBC and transfuse if patient becomes hemodynamically unstable, symptomatic or H/H drops below 7/21.  Transfused 1 unit PRBCs on admission

## 2024-02-09 NOTE — ASSESSMENT & PLAN NOTE
Etiology unclear  Consult hematology - appreciate recommendations.  Follow-up abdominal ultrasound, hepatitis panel, hemolysis workup  Monitor for s/s bleeding  Hold chronic apixaban for now and resume when clinically appropriate.

## 2024-02-09 NOTE — PT/OT/SLP EVAL
Physical Therapy Evaluation    Patient Name:  Irene العراقي   MRN:  [id number]    Recommendations:     Discharge Recommendations: moderate Intensity Therapy   Discharge Equipment Recommendations: none   Barriers to discharge: None    Assessment:     Irene العراقي is a 83 y.o. female admitted with a medical diagnosis of Anemia.  She presents with the following impairments/functional limitations: weakness, impaired endurance, impaired functional mobility, gait instability, impaired balance, decreased lower extremity function, impaired cardiopulmonary response to activity, orthopedic precautions .    Pt seen supine in bed with CNA for hygiene. Pt alert, interactive and following directions well. pt c/o nausea but agreeable to PT. Pt stated that she fx her R ankle 9 weeks ago and that she has a boot that she uses and starting to walk at the SNF. Pt sat EOB/ camwalker boot applied  and stood with RW. Pt with forward trunk posture but corrects with cueing. Pt took 4 zunilda steps with RW and returned back to bed due to nausea. Back to bed post PT.  Pt to benefit from continued therapies at SNF    Rehab Prognosis: Fair; patient would benefit from acute skilled PT services to address these deficits and reach maximum level of function.    Recent Surgery: * No surgery found *      Plan:     During this hospitalization, patient to be seen 6 x/week to address the identified rehab impairments via gait training, therapeutic activities, therapeutic exercises and progress toward the following goals:    Plan of Care Expires:  24    Subjective   Stated she wanted to get better and go back to live in [place] with nephews  Stated that she missed her twin sister-  recently  Chief Complaint: nauseated/weak  Patient/Family Comments/goals: get well  Pain/Comfort:  Pain Rating 1: 0/10    Patients cultural, spiritual, Protestant conflicts given the current situation:      Living Environment:  Admit from [facility]  Prior to  admission, patients level of function was assist for all mobility.  Equipment used at home: walker, rolling.  DME owned (not currently used): none.  Upon discharge, patient will have assistance from SNF.    Objective:     Communicated with nurse Silva prior to session.  Patient found HOB elevated with oxygen, telemetry, peripheral IV  upon PT entry to room.    General Precautions: Standard, fall, pacemaker, contact  Orthopedic Precautions:RLE partial weight bearing   Braces:  (camwalker boot R)  Respiratory Status: Nasal cannula, flow 2 L/min    Exams:  Postural Exam:  Patient presented with the following abnormalities:    -       Rounded shoulders  -       Forward head  -       BMI 25.34  RLE ROM: WFL except ankle decreased 50%  RLE Strength: Deficits: 3/5  LLE ROM: WFL  LLE Strength: Deficits: 3+/5    Functional Mobility:  Bed Mobility:     Rolling Left:  minimum assistance  Rolling Right: minimum assistance  Scooting: minimum assistance and moderate assistance  Supine to Sit: minimum assistance and moderate assistance  Sit to Supine: moderate assistance  Transfers:     Sit to Stand:  moderate assistance with rolling walker 4 side steps with RW with camwalker boot      AM-PAC 6 CLICK MOBILITY  Total Score:11       Treatment & Education:  Patient was educated on the importance of OOB activity and functional mobility to negate negative effects of prolonged bed rest during hospitalization, safe transfers and ambulation, and D/C planning   Thera ex while EOB  Stood briefly with camwalker boot and RW for linen change  Back to bed with boot removed    Patient left HOB elevated with all lines intact, call button in reach, bed alarm on, nurse Ricardo notified, and CNA present.    GOALS:   Multidisciplinary Problems       Physical Therapy Goals          Problem: Physical Therapy    Goal Priority Disciplines Outcome Goal Variances Interventions   Physical Therapy Goal     PT, PT/OT Ongoing, Progressing     Description: Goals  to be met by: 2024     Patient will increase functional independence with mobility by performin. Supine to sit with MInimal Assistance  2. Sit to stand transfer with Minimal Assistance  3. Bed to chair transfer with Minimal Assistance using Rolling Walker  4. Gait  x 50 feet with Minimal Assistance using Rolling Walker.   5. Lower extremity exercise program x20 reps     Hx of R ankle fx 2023- giovaniker boot                         History:     Past Medical History:   Diagnosis Date    Anemia, unspecified     Atrial fibrillation 2021    CKD (chronic kidney disease)     Hypertension        Past Surgical History:   Procedure Laterality Date    A-V CARDIAC PACEMAKER INSERTION  2023    Procedure: Dual Chamber PPM RM 2617 (Medtronic);  Surgeon: Andreas James III, MD;  Location: [facility] CATH;  Service: Cardiology;;    ANGIOGRAM, CORONARY, WITH LEFT HEART CATHETERIZATION Left 2023    Procedure: Angiogram, Coronary, with Left Heart Cath;  Surgeon: Kevin Redmond MD;  Location: [facility] CATH/EP LAB;  Service: Cardiology;  Laterality: Left;    BREAST SURGERY      COLONOSCOPY N/A 2023    Procedure: COLONOSCOPY;  Surgeon: Kvng Mensah MD;  Location: [facility] ENDO;  Service: Endoscopy;  Laterality: N/A;    COLONOSCOPY W/ POLYPECTOMY  2023    OPEN REDUCTION AND INTERNAL FIXATION (ORIF) OF INJURY OF ANKLE Right 2023    Procedure: ORIF, ANKLE;  Surgeon: Chaitanya Garrison MD;  Location: [facility] OR;  Service: Orthopedics;  Laterality: Right;  Synthes    TRANSCATHETER AORTIC VALVE REPLACEMENT (TAVR)  2023    Procedure: (TAVR);  Surgeon: Juliano Moncada MD;  Location: [facility] CATH;  Service: Cardiology;;    TRANSCATHETER AORTIC VALVE REPLACEMENT (TAVR)  2023    Procedure: (TAVR)- Surgeon;  Surgeon: Adebayo Guzman MD;  Location: [facility] CATH;  Service: Peripheral Vascular;;       Time Tracking:     PT Received On: 24  PT Start Time: 53     PT Stop Time: 1013  PT Total Time  (min): 20 min     Billable Minutes: Evaluation 10 and Therapeutic Exercise 10      02/09/2024

## 2024-02-09 NOTE — CONSULTS
[facility]/Surg  Hematology/Oncology  Consult Note    Patient Name: Irene العراقي  MRN: [id number]  Admission Date: 2/8/2024  Hospital Length of Stay: 0 days  Code Status: Full Code   Attending Provider: Madisyn Green MD  Consulting Provider: Callie Capone MD  Primary Care Physician: Wanda Kuhn FNP-C  Principal Problem:Anemia    Inpatient consult to Hematology/Oncology  Consult performed by: Callie Capone MD  Consult ordered by: Anya Chandra FNP        Subjective:     HPI:  8-year-old female transferred from [facility] to ED after routine labs showed low platelets at 47 K. she is history of atrial fibrillation Eliquis is on hold due to thrombocytopenia currently history of anemia CKD and hypertension      Medications:  Continuous Infusions:   sodium chloride 0.9% 75 mL/hr at 02/09/24 0837     Scheduled Meds:   amiodarone  200 mg Oral BID    docusate sodium  100 mg Oral BID    linezolid  600 mg Intravenous Q12H    pantoprazole  40 mg Intravenous BID     PRN Meds:0.9%  NaCl infusion (for blood administration), acetaminophen, albuterol-ipratropium, aluminum-magnesium hydroxide-simethicone, dextrose 10%, dextrose 10%, glucagon (human recombinant), glucose, glucose, hydrALAZINE, magnesium oxide, magnesium oxide, melatonin, naloxone, ondansetron, potassium bicarbonate, potassium bicarbonate, potassium bicarbonate, potassium, sodium phosphates, potassium, sodium phosphates, potassium, sodium phosphates, simethicone, sodium chloride 0.9%     Review of patient's allergies indicates:   Allergen Reactions    Cefuroxime     Hydrocodone     Meperidine     Meperidine hcl Nausea And Vomiting    Persantine [dipyridamole]     Nitrofurantoin macrocrystal      Headache , went into Afib     Vicodin [hydrocodone-acetaminophen] Nausea And Vomiting        Past Medical History:   Diagnosis Date    Anemia, unspecified     Atrial fibrillation 01/25/2021    CKD (chronic kidney disease)      Hypertension      Past Surgical History:   Procedure Laterality Date    A-V CARDIAC PACEMAKER INSERTION  11/2/2023    Procedure: Dual Chamber PPM RM 2617 ([employer]tronic);  Surgeon: Andreas James III, MD;  Location: [facility] CATH;  Service: Cardiology;;    ANGIOGRAM, CORONARY, WITH LEFT HEART CATHETERIZATION Left 4/19/2023    Procedure: Angiogram, Coronary, with Left Heart Cath;  Surgeon: Kevin Redmond MD;  Location: [facility] CATH/EP LAB;  Service: Cardiology;  Laterality: Left;    BREAST SURGERY      COLONOSCOPY N/A 4/16/2023    Procedure: COLONOSCOPY;  Surgeon: Kvng Mensah MD;  Location: [facility] ENDO;  Service: Endoscopy;  Laterality: N/A;    COLONOSCOPY W/ POLYPECTOMY  04/16/2023    OPEN REDUCTION AND INTERNAL FIXATION (ORIF) OF INJURY OF ANKLE Right 12/13/2023    Procedure: ORIF, ANKLE;  Surgeon: Chaitanya Garriosn MD;  Location: [facility] OR;  Service: Orthopedics;  Laterality: Right;  Synthes    TRANSCATHETER AORTIC VALVE REPLACEMENT (TAVR)  11/1/2023    Procedure: (TAVR);  Surgeon: Juliano Moncada MD;  Location: [facility] CATH;  Service: Cardiology;;    TRANSCATHETER AORTIC VALVE REPLACEMENT (TAVR)  11/1/2023    Procedure: (TAVR)- Surgeon;  Surgeon: Adebayo Guzman MD;  Location: [facility] CATH;  Service: Peripheral Vascular;;     Family History       Problem Relation (Age of Onset)    Cancer Mother, Father          Tobacco Use    Smoking status: Former     Types: Cigarettes     Passive exposure: Past    Smokeless tobacco: Never   Substance and Sexual Activity    Alcohol use: Not Currently    Drug use: Not Currently    Sexual activity: Not on file       Review of Systems    Patient reveal reports abdominal pain and difficulty urinating   but no other issues   she is very fatigued and frail   denies nausea vomiting or loose stools denies chills and fever   Patient reports significant weakness generalized   and difficulty with ambulation  Objective:     Vital Signs (Most Recent):  Temp: 97.5 °F (36.4 °C) (02/09/24 1131)  Pulse: 65  (02/09/24 1131)  Resp: 14 (02/09/24 1131)  BP: (!) 178/75 (02/09/24 1131)  SpO2: 98 % (02/09/24 1131) Vital Signs (24h Range):  Temp:  [96 °F (35.6 °C)-98.4 °F (36.9 °C)] 97.5 °F (36.4 °C)  Pulse:  [61-83] 65  Resp:  [14-20] 14  SpO2:  [90 %-100 %] 98 %  BP: (124-207)/(56-83) 178/75     Weight: 71.2 kg (156 lb 15.5 oz)  Body mass index is 25.34 kg/m².  Body surface area is 1.82 meters squared.      Intake/Output Summary (Last 24 hours) at 2/9/2024 1339  Last data filed at 2/9/2024 0536  Gross per 24 hour   Intake 322 ml   Output 1300 ml   Net -978 ml       Physical Exam  HENT:      Head: Normocephalic and atraumatic.   Eyes:      Conjunctiva/sclera: Conjunctivae normal.      Pupils: Pupils are equal, round, and reactive to light.   Cardiovascular:      Rate and Rhythm: Normal rate and regular rhythm.      Heart sounds: Murmur heard.   Pulmonary:      Effort: Pulmonary effort is normal. No respiratory distress.      Breath sounds: Normal breath sounds.   Chest:      Comments: Cardiac device in place  Abdominal:      General: Bowel sounds are normal. There is no distension.      Palpations: Abdomen is soft.      Tenderness: There is abdominal tenderness (generalized, mild).   Genitourinary:     Comments: Rodriguez catheter in place draining yellow urine  Musculoskeletal:         General: Normal range of motion.      Cervical back: Normal range of motion and neck supple.      Right lower leg: Edema (trace) present.      Comments: Midline LUE   Skin:     General: Skin is warm and dry.      Comments: Poor skin turgor; soles of feet dry, cracking   Neurological:      General: No focal deficit present.      Mental Status: She is alert. Mental status is at baseline.      Comments: Oriented to person, place and situation   Psychiatric:         Mood and Affect: Mood normal.         Behavior: Behavior normal.         Thought Content: Thought content normal.         Judgment: Judgment normal.   Significant Labs:     Lab Results    Component Value Date    WBC 4.79 02/09/2024    HGB 8.0 (L) 02/09/2024    HCT 23.7 (L) 02/09/2024    MCV 89 02/09/2024    PLT 40 (LL) 02/09/2024      Chest x-ray showing mild lung markings were prominent      Assessment/Plan:     Active Diagnoses:    Diagnosis Date Noted POA    PRINCIPAL PROBLEM:  Anemia [D64.9] 01/28/2021 Yes    Urinary retention [R33.9] 02/08/2024 Yes    UTI (urinary tract infection) [N39.0] 12/12/2023 Yes    Stage 3b chronic kidney disease [N18.32] 09/28/2023 Yes    Hypertension [I10] 02/21/2022 Yes    PAF (paroxysmal atrial fibrillation) [I48.0] 01/28/2021 Yes    Thrombocytopenia [D69.6] 01/09/2021 Yes      Problems Resolved During this Admission:     Anemia and thrombocytopenia date back to April 2023 but has been progressive  Patient has received transfusion hemoglobin is improved   Could be related to CKD  Workup anemia including iron studies, B12, folate, SPEP, IFG, free light chain assays haptoglobin and Carlos test    Thrombocytopenia concerning will recommend abdominal ultrasound hepatitis panel also get hemolysis workup with haptoglobin and Carlos test smear evaluation antibiotics and sepsis    Atrial fibrillation patient was on Eliquis this has been held due to low platelets patient is on amiodarone    Urinary tract infection currently on IV linezolid.  Patient has an indwelling Rodriguez  Thank you for your consult.     Callie Capone MD  Hematology/Oncology  [facility]/Surg

## 2024-02-09 NOTE — NURSING
Charge nurse called on call for Hematology . Spoke to Hemalatha and notified of new consult. Updated primary Nurse Ricardo

## 2024-02-09 NOTE — SUBJECTIVE & OBJECTIVE
Interval History:  Patient seen and examined.  She has received a unit PRBCs.  Platelet count 40 today.  Hemoglobin 8.  Hematology following.  Dr. Capone has ordered an abdominal ultrasound, hepatitis panel, hemolysis workup.  Urine culture is pending.  Araya was exchanged.  Patient reports feeling poorly.      Review of Systems   Constitutional:  Positive for fatigue. Negative for chills and fever.   Gastrointestinal:  Positive for abdominal pain. Negative for nausea and vomiting.   Genitourinary:         Has araya catheter at present   Neurological:  Positive for weakness (generalized).     Objective:     Vital Signs (Most Recent):  Temp: 97.5 °F (36.4 °C) (02/09/24 1131)  Pulse: 65 (02/09/24 1131)  Resp: 14 (02/09/24 1131)  BP: (!) 178/75 (02/09/24 1131)  SpO2: 98 % (02/09/24 1131) Vital Signs (24h Range):  Temp:  [96 °F (35.6 °C)-98.4 °F (36.9 °C)] 97.5 °F (36.4 °C)  Pulse:  [61-83] 65  Resp:  [14-20] 14  SpO2:  [94 %-100 %] 98 %  BP: (124-207)/(56-83) 178/75     Weight: 71.2 kg (156 lb 15.5 oz)  Body mass index is 25.34 kg/m².    Intake/Output Summary (Last 24 hours) at 2/9/2024 1444  Last data filed at 2/9/2024 0536  Gross per 24 hour   Intake 322 ml   Output 1300 ml   Net -978 ml         Physical Exam  Constitutional:       General: She is not in acute distress.     Appearance: She is well-developed. She is not toxic-appearing.   HENT:      Head: Normocephalic and atraumatic.   Eyes:      Conjunctiva/sclera: Conjunctivae normal.      Pupils: Pupils are equal, round, and reactive to light.   Cardiovascular:      Rate and Rhythm: Normal rate and regular rhythm.      Heart sounds: Murmur heard.   Pulmonary:      Effort: Pulmonary effort is normal. No respiratory distress.      Breath sounds: Normal breath sounds.   Chest:      Comments: Cardiac device in place  Abdominal:      General: Bowel sounds are normal. There is no distension.      Palpations: Abdomen is soft.      Tenderness: There is abdominal tenderness  (generalized, mild).   Genitourinary:     Comments: Rodriguez catheter in place draining yellow urine  Musculoskeletal:         General: Normal range of motion.      Cervical back: Normal range of motion and neck supple.      Right lower leg: Edema (trace) present.      Comments: Midline LUE   Skin:     General: Skin is warm and dry.      Comments: Poor skin turgor; soles of feet dry, cracking   Neurological:      General: No focal deficit present.      Mental Status: She is alert. Mental status is at baseline.      Comments: Oriented to person, place and situation   Psychiatric:         Mood and Affect: Mood normal.         Behavior: Behavior normal.         Thought Content: Thought content normal.         Judgment: Judgment normal.             Significant Labs: All pertinent labs within the past 24 hours have been reviewed.    Significant Imaging: I have reviewed all pertinent imaging results/findings within the past 24 hours.

## 2024-02-09 NOTE — NURSING
Araya replaced with 16 [place] Islander araya due to old one being in for over 3 weeks. Tolerated well.

## 2024-02-09 NOTE — PLAN OF CARE
[facility] - Med/Surg  Initial Discharge Assessment       Primary Care Provider: Wanda Kuhn FNP-CARYN    Admission Diagnosis: Anemia, unspecified type [D64.9]    Admission Date: 2/8/2024  Expected Discharge Date: 2/12/2024    Transition of Care Barriers: None    Payor: BLUE CROSS [facility] / Plan: BCBS ALL OUT OF STATE / Product Type: PPO /     Extended Emergency Contact Information  Primary Emergency Contact: BenedictoRoger  Address: [address] [place] [address] [place] [facility] [place] [facility]  Home Phone: [phone]  Mobile Phone: [phone]  Relation: Relative  Preferred language: English   needed? No  Secondary Emergency Contact: Patricio Licona  Home Phone: [phone]  Mobile Phone: [phone]  Relation: Relative  Preferred language: English   needed? No    Discharge Plan A: Skilled Nursing Facility  Discharge Plan B: Home Health      [facility] [address]  [address]  [facility] [address]  Phone: [phone] Fax: [phone]    [id number] - [place], [place] - [address]  [address]  [facility] [address]  Phone: [phone] Fax: [phone]    DC assessment completed with pt's nephew, Roger, over the phone. Confirmed information on facesheet as correct. Reports pt has address in [place] and [place]. Ultimate goal is to get back to [place] but unsure when that will happen. Pt has been at SNF since Dec 2023 and hopes pt can return upon DC. Prior to Dec pt was independent but has been basically bed bound since she broke her leg. Verified insurance on file. Recent admission at this facility for UTI. DC plan is return to SNF VS HH    Initial Assessment (most recent)       Adult Discharge Assessment - 02/09/24 1546          Discharge Assessment    Assessment Type Discharge Planning Assessment     Confirmed/corrected address, phone number and insurance Yes     Confirmed Demographics Correct on Facesheet      Source of Information family     Does patient/caregiver understand observation status Yes     Communicated YANET with patient/caregiver Yes     Reason For Admission anemia and UTI     People in Home alone     Facility Arrived From: [facility]     Do you expect to return to your current living situation? No     Do you have help at home or someone to help you manage your care at home? Yes     Who are your caregiver(s) and their phone number(s)? nephew     Prior to hospitilization cognitive status: Alert/Oriented     Current cognitive status: Alert/Oriented     Walking or Climbing Stairs Difficulty no     Dressing/Bathing Difficulty no     Equipment Currently Used at Home walker, rolling     Readmission within 30 days? No     Patient currently being followed by outpatient case management? No     Do you currently have service(s) that help you manage your care at home? No     Do you take prescription medications? Yes     Do you have prescription coverage? Yes     Do you have any problems affording any of your prescribed medications? No     Is the patient taking medications as prescribed? yes     Who is going to help you get home at discharge? nephew     How do you get to doctors appointments? family or friend will provide     Are you on dialysis? No     Do you take coumadin? No     Discharge Plan A Skilled Nursing Facility     Discharge Plan B Home Health     DME Needed Upon Discharge  --   TBD    Discharge Plan discussed with: Patient     Transition of Care Barriers None

## 2024-02-09 NOTE — CONSULTS
Wound care consult completed on 2/8/24 while patient was in the ED. Wound care will follow up with this patient as she is a high risk for skin injury due to immobility and recent right fractured ankle surgery. Air pump should be on the mattress set to air at 30 while patient is in the bed.

## 2024-02-09 NOTE — ASSESSMENT & PLAN NOTE
Patient with Paroxysmal (<7 days) atrial fibrillation which is controlled currently with Amiodarone. Patient is currently in sinus rhythm.[id number] Score: 3. Anticoagulation indicated. Anticoagulation done with apixaban, currently held for now due to anemia/thrombocytopenia .

## 2024-02-09 NOTE — ASSESSMENT & PLAN NOTE
Creatine stable for now. BMP reviewed- noted Estimated Creatinine Clearance: 35.9 mL/min (based on SCr of 1.2 mg/dL). according to latest data. Based on current GFR, CKD stage is stage 3 - GFR 30-59.  Monitor UOP and serial BMP and adjust therapy as needed. Renally dose meds. Avoid nephrotoxic medications and procedures.

## 2024-02-09 NOTE — PROGRESS NOTES
[facility] Medicine  Progress Note    Patient Name: Irene العراقي  MRN: [id number]  Patient Class: OP- Observation   Admission Date: 2/8/2024  Length of Stay: 0 days  Attending Physician: Madisyn Green MD  Primary Care Provider: Wanda Kuhn FNP-C        Subjective:     Principal Problem:Anemia        HPI:  Irene العراقي is an 84 y/o F with past medical history significant for anemia, afib, CKD and HTN who presented to the ED from [facility] for further treatment of abnormal labs.  Per report, her platelets have been taking a downward trend and are 47K today.  She also has a hemoglobin of 7.5 with no obvious source of blood loss.  Pt denies recent fevers, chest pain, dizziness, melena or hematochezia.  Denies N/V but is having some mild generalized abdominal tenderness.  CT abdomen and pelvis obtained shows nonspecific gastric wall thickening which could be consistent with gastritis or peptic ulcer disease.  The ED physician discussed the lab findings with the hematologist on call who recommends admission and transfusion of PRBCs.  Pt presented from [facility] with araya catheter in place.  She reports urinary retention and states the araya has been in place for about a week.  Araya catheter was exchanged in the ED.  Pt is currently being treated for UTI with culture positive for enterococcus faecium VRE susceptible to linezolid.  Urinalysis today remains positive. She is placed in observation under the service of hospital medicine for continued monitoring and treatment.       Overview/Hospital Course:  No notes on file    Interval History:  Patient seen and examined.  She has received a unit PRBCs.  Platelet count 40 today.  Hemoglobin 8.  Hematology following.  Dr. Capone has ordered an abdominal ultrasound, hepatitis panel, hemolysis workup.  Urine culture is pending.  Araya was exchanged.  Patient reports feeling poorly.      Review of Systems   Constitutional:  Positive  for fatigue. Negative for chills and fever.   Gastrointestinal:  Positive for abdominal pain. Negative for nausea and vomiting.   Genitourinary:         Has araya catheter at present   Neurological:  Positive for weakness (generalized).     Objective:     Vital Signs (Most Recent):  Temp: 97.5 °F (36.4 °C) (02/09/24 1131)  Pulse: 65 (02/09/24 1131)  Resp: 14 (02/09/24 1131)  BP: (!) 178/75 (02/09/24 1131)  SpO2: 98 % (02/09/24 1131) Vital Signs (24h Range):  Temp:  [96 °F (35.6 °C)-98.4 °F (36.9 °C)] 97.5 °F (36.4 °C)  Pulse:  [61-83] 65  Resp:  [14-20] 14  SpO2:  [94 %-100 %] 98 %  BP: (124-207)/(56-83) 178/75     Weight: 71.2 kg (156 lb 15.5 oz)  Body mass index is 25.34 kg/m².    Intake/Output Summary (Last 24 hours) at 2/9/2024 1444  Last data filed at 2/9/2024 0536  Gross per 24 hour   Intake 322 ml   Output 1300 ml   Net -978 ml         Physical Exam  Constitutional:       General: She is not in acute distress.     Appearance: She is well-developed. She is not toxic-appearing.   HENT:      Head: Normocephalic and atraumatic.   Eyes:      Conjunctiva/sclera: Conjunctivae normal.      Pupils: Pupils are equal, round, and reactive to light.   Cardiovascular:      Rate and Rhythm: Normal rate and regular rhythm.      Heart sounds: Murmur heard.   Pulmonary:      Effort: Pulmonary effort is normal. No respiratory distress.      Breath sounds: Normal breath sounds.   Chest:      Comments: Cardiac device in place  Abdominal:      General: Bowel sounds are normal. There is no distension.      Palpations: Abdomen is soft.      Tenderness: There is abdominal tenderness (generalized, mild).   Genitourinary:     Comments: Araya catheter in place draining yellow urine  Musculoskeletal:         General: Normal range of motion.      Cervical back: Normal range of motion and neck supple.      Right lower leg: Edema (trace) present.      Comments: Midline LUE   Skin:     General: Skin is warm and dry.      Comments: Poor skin  turgor; soles of feet dry, cracking   Neurological:      General: No focal deficit present.      Mental Status: She is alert. Mental status is at baseline.      Comments: Oriented to person, place and situation   Psychiatric:         Mood and Affect: Mood normal.         Behavior: Behavior normal.         Thought Content: Thought content normal.         Judgment: Judgment normal.             Significant Labs: All pertinent labs within the past 24 hours have been reviewed.    Significant Imaging: I have reviewed all pertinent imaging results/findings within the past 24 hours.    Assessment/Plan:      * Anemia  Patient's anemia is currently uncontrolled.  Current CBC reviewed-   Lab Results   Component Value Date    HGB 8.0 (L) 02/09/2024    HCT 23.7 (L) 02/09/2024     Monitor serial CBC and transfuse if patient becomes hemodynamically unstable, symptomatic or H/H drops below 7/21.  Transfused 1 unit PRBCs on admission    Urinary retention  Per patient report.  Continue araya catheter for now.  Attempt voiding trials.      UTI (urinary tract infection)  IV linezolid  Follow current culture      Stage 3b chronic kidney disease  Creatine stable for now. BMP reviewed- noted Estimated Creatinine Clearance: 35.9 mL/min (based on SCr of 1.2 mg/dL). according to latest data. Based on current GFR, CKD stage is stage 3 - GFR 30-59.  Monitor UOP and serial BMP and adjust therapy as needed. Renally dose meds. Avoid nephrotoxic medications and procedures.    Hypertension  Chronic, uncontrolled. Latest blood pressure and vitals reviewed-     Temp:  [96 °F (35.6 °C)-98.4 °F (36.9 °C)]   Pulse:  [61-83]   Resp:  [14-20]   BP: (124-207)/(56-83)   SpO2:  [94 %-100 %] .   Home meds for hypertension were reviewed and noted below.       While in the hospital, will manage blood pressure as follows; Adjust home antihypertensive regimen as follows- will give one dose IV lasix post transfusion and IV hydralazine prn    Will utilize p.r.n.  blood pressure medication only if patient's blood pressure greater than 180/110 and she develops symptoms such as worsening chest pain or shortness of breath.    PAF (paroxysmal atrial fibrillation)  Patient with Paroxysmal (<7 days) atrial fibrillation which is controlled currently with Amiodarone. Patient is currently in sinus rhythm.[id number] Score: 3. Anticoagulation indicated. Anticoagulation done with apixaban, currently held for now due to anemia/thrombocytopenia .    Thrombocytopenia  Etiology unclear  Consult hematology - appreciate recommendations.  Follow-up abdominal ultrasound, hepatitis panel, hemolysis workup  Monitor for s/s bleeding  Hold chronic apixaban for now and resume when clinically appropriate.          VTE Risk Mitigation (From admission, onward)           Ordered     IP VTE HIGH RISK PATIENT  Once         02/08/24 1916     Place sequential compression device  Until discontinued         02/08/24 1916     Reason for No Pharmacological VTE Prophylaxis  Once        Question Answer Comment   Reasons: Already adequately anticoagulated on oral Anticoagulants    Reasons: Active Bleeding        02/08/24 1916                    Discharge Planning   YANET: 2/12/2024     Code Status: Full Code   Is the patient medically ready for discharge?:     Reason for patient still in hospital (select all that apply): Patient trending condition, Laboratory test, Treatment, and Imaging                     Madisyn Green MD  Department of Hospital Medicine   [facility]/Surg

## 2024-02-09 NOTE — ASSESSMENT & PLAN NOTE
Etiology unclear  Consult hematology  Monitor for s/s bleeding  Hold chronic apixaban for now and resume when clinically appropriate.

## 2024-02-09 NOTE — PLAN OF CARE
Requested for Niki to submit for SNF auth. Likely clear for DC by Monday.    with Niki unsure if auth will get approved due being at SNF for so long but will try.     CM prepped pt's nephew that she may have to return home from hospital. States pt will have help from him and his wife. Pt will need HH and likely equipment.        02/09/24 1549   Post-Acute Status   Post-Acute Authorization Placement   Post-Acute Placement Status Pending payor review/awaiting authorization (if required)

## 2024-02-09 NOTE — HPI
Irene العراقي is an 82 y/o F with past medical history significant for anemia, afib, CKD and HTN who presented to the ED from [facility] for further treatment of abnormal labs.  Per report, her platelets have been taking a downward trend and are 47K today.  She also has a hemoglobin of 7.5 with no obvious source of blood loss.  Pt denies recent fevers, chest pain, dizziness, melena or hematochezia.  Denies N/V but is having some mild generalized abdominal tenderness.  CT abdomen and pelvis obtained shows nonspecific gastric wall thickening which could be consistent with gastritis or peptic ulcer disease.  The ED physician discussed the lab findings with the hematologist on call who recommends admission and transfusion of PRBCs.  Pt presented from [facility] with araya catheter in place.  She reports urinary retention and states the araya has been in place for about a week.  Araya catheter was exchanged in the ED.  Pt is currently being treated for UTI with culture positive for enterococcus faecium VRE susceptible to linezolid.  Urinalysis today remains positive. She is placed in observation under the service of hospital medicine for continued monitoring and treatment.

## 2024-02-09 NOTE — NURSING
Patient arrived to floor via stretcher. Transferred to bed and oriented to room and call system. Rodriguez in place and draining. O2 in place. Tele in place. Instructed to call for assistance. Will continue to monitor.       Nurses Note -- 4 Eyes      2/9/2024   2:08 AM      Skin assessed during: Admit      [] No Altered Skin Integrity Present    []Prevention Measures Documented      [x] Yes- Altered Skin Integrity Present or Discovered   [x] LDA Added if Not in Epic (Describe Wound)   [x] New Altered Skin Integrity was Present on Admit and Documented in LDA   [x] Wound Image Taken    Wound Care Consulted? Yes    Attending Nurse:  Tatianna Burnham LPN    Second RN/Staff Member:   Ce Hernandez RN          middle

## 2024-02-09 NOTE — SUBJECTIVE & OBJECTIVE
Past Medical History:   Diagnosis Date    Anemia, unspecified     Atrial fibrillation 01/25/2021    CKD (chronic kidney disease)     Hypertension        Past Surgical History:   Procedure Laterality Date    A-V CARDIAC PACEMAKER INSERTION  11/2/2023    Procedure: Dual Chamber PPM RM 2617 ([employer]);  Surgeon: Andreas James III, MD;  Location: [facility] CATH;  Service: Cardiology;;    ANGIOGRAM, CORONARY, WITH LEFT HEART CATHETERIZATION Left 4/19/2023    Procedure: Angiogram, Coronary, with Left Heart Cath;  Surgeon: Kevin Redmond MD;  Location: [facility] CATH/EP LAB;  Service: Cardiology;  Laterality: Left;    BREAST SURGERY      COLONOSCOPY N/A 4/16/2023    Procedure: COLONOSCOPY;  Surgeon: Kvng Mensah MD;  Location: [facility] ENDO;  Service: Endoscopy;  Laterality: N/A;    COLONOSCOPY W/ POLYPECTOMY  04/16/2023    OPEN REDUCTION AND INTERNAL FIXATION (ORIF) OF INJURY OF ANKLE Right 12/13/2023    Procedure: ORIF, ANKLE;  Surgeon: Chaitanya Garrison MD;  Location: [facility] OR;  Service: Orthopedics;  Laterality: Right;  Synthes    TRANSCATHETER AORTIC VALVE REPLACEMENT (TAVR)  11/1/2023    Procedure: (TAVR);  Surgeon: Juliano Moncada MD;  Location: [facility] CATH;  Service: Cardiology;;    TRANSCATHETER AORTIC VALVE REPLACEMENT (TAVR)  11/1/2023    Procedure: (TAVR)- Surgeon;  Surgeon: Adebayo Guzman MD;  Location: [facility] CATH;  Service: Peripheral Vascular;;       Review of patient's allergies indicates:   Allergen Reactions    Cefuroxime     Hydrocodone     Meperidine     Meperidine hcl Nausea And Vomiting    Persantine [dipyridamole]     Nitrofurantoin macrocrystal      Headache , went into Afib     Vicodin [hydrocodone-acetaminophen] Nausea And Vomiting       No current facility-administered medications on file prior to encounter.     Current Outpatient Medications on File Prior to Encounter   Medication Sig    amiodarone (PACERONE) 200 MG Tab Take 1 tablet (200 mg total) by mouth 2 (two) times daily.    apixaban (ELIQUIS)  2.5 mg Tab Take 1 tablet (2.5 mg total) by mouth 2 (two) times daily.    docusate sodium (COLACE) 100 MG capsule Take 1 capsule (100 mg total) by mouth 2 (two) times daily.    ertapenem (INVANZ) 1 gram injection     famotidine (PEPCID) 20 MG tablet Take 1 tablet (20 mg total) by mouth once daily.    hydrocortisone (ANUSOL-HC) 2.5 % rectal cream SMARTSIG:Topical    iron ag,[id number]-B-[id number]-Zn-sa-sto (NIFEREX, SUMALATE-QUATREFOLIC,) 150 mg iron- 60 mg-1 mg Tab Take 1 tablet by mouth once daily.    linezolid (ZYVOX) 600 mg/300 mL PgBk Inject 300 mLs (600 mg total) into the vein every 12 (twelve) hours. for 14 days    magnesium oxide (MAG-OX) 400 mg (241.3 mg magnesium) tablet TAKE 1 TABLET BY MOUTH ONCE DAILY (Patient taking differently: Take 400 mg by mouth once daily.)    meclizine (ANTIVERT) 50 MG tablet Take 1 tablet (50 mg total) by mouth 3 (three) times daily as needed for Dizziness.    midodrine (PROAMATINE) 5 MG Tab Take 5 mg by mouth 3 (three) times daily.    MIRALAX 17 gram PwPk Take 17 g by mouth 2 (two) times daily.    ondansetron (ZOFRAN-ODT) 8 MG TbDL Take 8 mg by mouth 3 (three) times daily.     Family History       Problem Relation (Age of Onset)    Cancer Mother, Father          Tobacco Use    Smoking status: Former     Types: Cigarettes     Passive exposure: Past    Smokeless tobacco: Never   Substance and Sexual Activity    Alcohol use: Not Currently    Drug use: Not Currently    Sexual activity: Not on file     Review of Systems   Constitutional:  Positive for fatigue. Negative for chills and fever.   Gastrointestinal:  Positive for abdominal pain. Negative for nausea and vomiting.   Genitourinary:  Positive for difficulty urinating.        Has araya catheter at present   Musculoskeletal:  Positive for gait problem.   Neurological:  Positive for weakness (generalized).     Objective:     Vital Signs (Most Recent):  Temp: 98.4 °F (36.9 °C) (02/08/24 1830)  Pulse: 72 (02/08/24 1933)  Resp: 18 (02/08/24  1830)  BP: (!) 203/81 (02/08/24 1933)  SpO2: 99 % (02/08/24 1933) Vital Signs (24h Range):  Temp:  [98 °F (36.7 °C)-98.4 °F (36.9 °C)] 98.4 °F (36.9 °C)  Pulse:  [61-84] 72  Resp:  [17-20] 18  SpO2:  [90 %-100 %] 99 %  BP: (143-207)/(61-83) 203/81     Weight: 68 kg (150 lb)  Body mass index is 24.21 kg/m².     Physical Exam  Constitutional:       General: She is not in acute distress.     Appearance: She is well-developed. She is not toxic-appearing.   HENT:      Head: Normocephalic and atraumatic.   Eyes:      Conjunctiva/sclera: Conjunctivae normal.      Pupils: Pupils are equal, round, and reactive to light.   Cardiovascular:      Rate and Rhythm: Normal rate and regular rhythm.      Heart sounds: Murmur heard.   Pulmonary:      Effort: Pulmonary effort is normal. No respiratory distress.      Breath sounds: Normal breath sounds.   Chest:      Comments: Cardiac device in place  Abdominal:      General: Bowel sounds are normal. There is no distension.      Palpations: Abdomen is soft.      Tenderness: There is abdominal tenderness (generalized, mild).   Genitourinary:     Comments: Rodriguez catheter in place draining yellow urine  Musculoskeletal:         General: Normal range of motion.      Cervical back: Normal range of motion and neck supple.      Right lower leg: Edema (trace) present.      Comments: Midline LUE   Skin:     General: Skin is warm and dry.      Comments: Poor skin turgor; soles of feet dry, cracking   Neurological:      General: No focal deficit present.      Mental Status: She is alert. Mental status is at baseline.      Comments: Oriented to person, place and situation   Psychiatric:         Mood and Affect: Mood normal.         Behavior: Behavior normal.         Thought Content: Thought content normal.         Judgment: Judgment normal.              CRANIAL NERVES     CN III, IV, VI   Pupils are equal, round, and reactive to light.       Significant Labs: All pertinent labs within the past 24  hours have been reviewed.  CBC:   Recent Labs   Lab 02/08/24  1401   WBC 5.12   HGB 7.6*   HCT 22.7*   PLT 45*     CMP:   Recent Labs   Lab 02/08/24  1401      K 3.7      CO2 25   *   BUN 15   CREATININE 1.4   CALCIUM 9.0   PROT 5.7*   ALBUMIN 3.1*   BILITOT 0.5   ALKPHOS 128   AST 19   ALT 20   ANIONGAP 8     Urine Studies:   Recent Labs   Lab 02/08/24  1437   COLORU Orange*   APPEARANCEUA Cloudy*   PHUR 6.0   SPECGRAV 1.020   PROTEINUA 1+*   GLUCUA Negative   KETONESU Negative   BILIRUBINUA Negative   OCCULTUA 3+*   NITRITE Positive*   UROBILINOGEN Negative   LEUKOCYTESUR 3+*   RBCUA 10*   WBCUA >100*   BACTERIA Moderate*   SQUAMEPITHEL 3   HYALINECASTS 0       Significant Imaging: I have reviewed all pertinent imaging results/findings within the past 24 hours.  CXR:  Mildly prominent lung markings in lower lung fields more so today than on the prior exam questioning very mild CHF

## 2024-02-09 NOTE — ASSESSMENT & PLAN NOTE
Creatine stable for now. BMP reviewed- noted Estimated Creatinine Clearance: 30.8 mL/min (based on SCr of 1.4 mg/dL). according to latest data. Based on current GFR, CKD stage is stage 3 - GFR 30-59.  Monitor UOP and serial BMP and adjust therapy as needed. Renally dose meds. Avoid nephrotoxic medications and procedures.

## 2024-02-09 NOTE — NURSING
Platelet count down from 45 2/8/24 to 40 2/9/24. Notified Liz Gillespie NP- no new orders. Awaiting Hem/Onc consult for further instructions for treatment.

## 2024-02-09 NOTE — ASSESSMENT & PLAN NOTE
Patient's anemia is currently uncontrolled. Has not received any PRBCs to date.   Current CBC reviewed-   Lab Results   Component Value Date    HGB 7.6 (L) 02/08/2024    HCT 22.7 (L) 02/08/2024     Monitor serial CBC and transfuse if patient becomes hemodynamically unstable, symptomatic or H/H drops below 7/21.  Transfuse one unit PRBCs tonight

## 2024-02-09 NOTE — PLAN OF CARE
Plan of care reviewed with patient. Verbalized understanding. Midline to left upper arm intact and patent with fluids infusing, O2 in place via nasal cannula. Tele in place and being monitored. No nausea noted. No complaints of pain or discomfort. Rodriguez in place and draining. Patient alert and able to make needs known. Safety maintained. Call light in reach and instructed to call for assistance. Will continue to monitor.

## 2024-02-09 NOTE — ASSESSMENT & PLAN NOTE
Chronic, uncontrolled. Latest blood pressure and vitals reviewed-     Temp:  [96 °F (35.6 °C)-98.4 °F (36.9 °C)]   Pulse:  [61-83]   Resp:  [14-20]   BP: (124-207)/(56-83)   SpO2:  [94 %-100 %] .   Home meds for hypertension were reviewed and noted below.       While in the hospital, will manage blood pressure as follows; Adjust home antihypertensive regimen as follows- will give one dose IV lasix post transfusion and IV hydralazine prn    Will utilize p.r.n. blood pressure medication only if patient's blood pressure greater than 180/110 and she develops symptoms such as worsening chest pain or shortness of breath.

## 2024-02-09 NOTE — HOSPITAL COURSE
Irene العراقي is an 83 year old female with a past medical history of Afib, CKD, HTN, anemia and thrombocytopenia who presented with worsening anemia and thrombocytopenia in the setting of linezolid use for Enterococcus UTI. There was no evidence of gross bleeding and she was transfused a unit of PRBCs. The Hgb is stable and her platelets improved as linezolid was discontinued. Hematology was consulted. Repeat urine culture showed ESBL E coli for which the patient was treated with meropenem through 2/17. ID has been consulted. A midline is in place. A Rodriguez is in place for urinary retention. Urology has been consulted; the patient has deferred further urologic workup at this time and may follow up with Dr. Zavala in the outpatient setting.  Patient was discharged to skilled nursing facility.  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  This was clarified with ID by pharmacist on day of discharge.  Patient will follow-up with ID, Urology, PCP.

## 2024-02-09 NOTE — PLAN OF CARE
Problem: Physical Therapy  Goal: Physical Therapy Goal  Description: Goals to be met by: 2024     Patient will increase functional independence with mobility by performin. Supine to sit with MInimal Assistance  2. Sit to stand transfer with Minimal Assistance  3. Bed to chair transfer with Minimal Assistance using Rolling Walker  4. Gait  x 50 feet with Minimal Assistance using Rolling Walker.   5. Lower extremity exercise program x20 reps     Hx of R ankle fx 2023- mando dewittot    Outcome: Ongoing, Progressing   PT eval and treat initiated. EOB sitting min/mod assist. Stood with RW mod assist and took 4 side steps with camwalker boot. C/o nausea

## 2024-02-09 NOTE — PLAN OF CARE
02/09/24 1345   ZIMMERMAN Message   Medicare Outpatient and Observation Notification regarding financial responsibility Given to patient/caregiver;Explained to patient/caregiver;Signed/date by patient/caregiver   Date ZIMMERMAN was signed 02/09/24   Time ZIMMERMAN was signed 134

## 2024-02-09 NOTE — ASSESSMENT & PLAN NOTE
Chronic, uncontrolled. Latest blood pressure and vitals reviewed-     Temp:  [96 °F (35.6 °C)-98.4 °F (36.9 °C)]   Pulse:  [61-84]   Resp:  [17-20]   BP: (124-207)/(56-83)   SpO2:  [90 %-100 %] .   Home meds for hypertension were reviewed and noted below.       While in the hospital, will manage blood pressure as follows; Adjust home antihypertensive regimen as follows- will give one dose IV lasix post transfusion and IV hydralazine prn    Will utilize p.r.n. blood pressure medication only if patient's blood pressure greater than 180/110 and she develops symptoms such as worsening chest pain or shortness of breath.

## 2024-02-10 PROBLEM — R00.1 BRADYCARDIA: Status: RESOLVED | Noted: 2023-10-31 | Resolved: 2024-02-10

## 2024-02-10 PROBLEM — N30.01 ACUTE CYSTITIS WITH HEMATURIA: Status: RESOLVED | Noted: 2023-09-09 | Resolved: 2024-02-10

## 2024-02-10 PROBLEM — G93.41 ENCEPHALOPATHY, METABOLIC: Status: RESOLVED | Noted: 2024-01-19 | Resolved: 2024-02-10

## 2024-02-10 PROBLEM — S82.891A CLOSED FRACTURE OF RIGHT ANKLE: Status: RESOLVED | Noted: 2023-12-11 | Resolved: 2024-02-10

## 2024-02-10 PROBLEM — I48.91 ATRIAL FIBRILLATION WITH RVR: Status: RESOLVED | Noted: 2023-09-27 | Resolved: 2024-02-10

## 2024-02-10 PROBLEM — K59.00 CONSTIPATION: Status: RESOLVED | Noted: 2023-04-10 | Resolved: 2024-02-10

## 2024-02-10 PROBLEM — R42 POSTURAL DIZZINESS WITH PRESYNCOPE: Status: RESOLVED | Noted: 2023-12-11 | Resolved: 2024-02-10

## 2024-02-10 PROBLEM — R06.09 DOE (DYSPNEA ON EXERTION): Status: RESOLVED | Noted: 2023-10-26 | Resolved: 2024-02-10

## 2024-02-10 PROBLEM — R55 POSTURAL DIZZINESS WITH PRESYNCOPE: Status: RESOLVED | Noted: 2023-12-11 | Resolved: 2024-02-10

## 2024-02-10 PROBLEM — R93.89 ABNORMAL CXR: Status: RESOLVED | Noted: 2021-01-27 | Resolved: 2024-02-10

## 2024-02-10 PROBLEM — R13.10 DYSPHAGIA: Status: RESOLVED | Noted: 2023-12-16 | Resolved: 2024-02-10

## 2024-02-10 LAB
ALBUMIN SERPL BCP-MCNC: 2.7 G/DL (ref 3.5–5.2)
ALP SERPL-CCNC: 98 U/L (ref 55–135)
ALT SERPL W/O P-5'-P-CCNC: 15 U/L (ref 10–44)
ANION GAP SERPL CALC-SCNC: 8 MMOL/L (ref 8–16)
AST SERPL-CCNC: 15 U/L (ref 10–40)
BASOPHILS # BLD AUTO: 0.02 K/UL (ref 0–0.2)
BASOPHILS NFR BLD: 0.4 % (ref 0–1.9)
BILIRUB SERPL-MCNC: 0.5 MG/DL (ref 0.1–1)
BUN SERPL-MCNC: 12 MG/DL (ref 8–23)
CALCIUM SERPL-MCNC: 8.4 MG/DL (ref 8.7–10.5)
CHLORIDE SERPL-SCNC: 106 MMOL/L (ref 95–110)
CO2 SERPL-SCNC: 23 MMOL/L (ref 23–29)
CREAT SERPL-MCNC: 1.2 MG/DL (ref 0.5–1.4)
DIFFERENTIAL METHOD BLD: ABNORMAL
EOSINOPHIL # BLD AUTO: 0.3 K/UL (ref 0–0.5)
EOSINOPHIL NFR BLD: 5.6 % (ref 0–8)
ERYTHROCYTE [DISTWIDTH] IN BLOOD BY AUTOMATED COUNT: 13.2 % (ref 11.5–14.5)
EST. GFR  (NO RACE VARIABLE): 45 ML/MIN/1.73 M^2
GLUCOSE SERPL-MCNC: 83 MG/DL (ref 70–110)
HCT VFR BLD AUTO: 23.3 % (ref 37–48.5)
HGB BLD-MCNC: 7.7 G/DL (ref 12–16)
IMM GRANULOCYTES # BLD AUTO: 0.02 K/UL (ref 0–0.04)
IMM GRANULOCYTES NFR BLD AUTO: 0.4 % (ref 0–0.5)
LYMPHOCYTES # BLD AUTO: 1 K/UL (ref 1–4.8)
LYMPHOCYTES NFR BLD: 20.8 % (ref 18–48)
MAGNESIUM SERPL-MCNC: 1.7 MG/DL (ref 1.6–2.6)
MCH RBC QN AUTO: 30.2 PG (ref 27–31)
MCHC RBC AUTO-ENTMCNC: 33 G/DL (ref 32–36)
MCV RBC AUTO: 91 FL (ref 82–98)
MONOCYTES # BLD AUTO: 0.7 K/UL (ref 0.3–1)
MONOCYTES NFR BLD: 15.4 % (ref 4–15)
NEUTROPHILS # BLD AUTO: 2.8 K/UL (ref 1.8–7.7)
NEUTROPHILS NFR BLD: 57.4 % (ref 38–73)
NRBC BLD-RTO: 0 /100 WBC
PHOSPHATE SERPL-MCNC: 2.5 MG/DL (ref 2.7–4.5)
PLATELET # BLD AUTO: 46 K/UL (ref 150–450)
PMV BLD AUTO: 12.8 FL (ref 9.2–12.9)
POTASSIUM SERPL-SCNC: 3.4 MMOL/L (ref 3.5–5.1)
PROT SERPL-MCNC: 4.8 G/DL (ref 6–8.4)
RBC # BLD AUTO: 2.55 M/UL (ref 4–5.4)
SODIUM SERPL-SCNC: 137 MMOL/L (ref 136–145)
WBC # BLD AUTO: 4.8 K/UL (ref 3.9–12.7)

## 2024-02-10 PROCEDURE — 99900035 HC TECH TIME PER 15 MIN (STAT)

## 2024-02-10 PROCEDURE — 85025 COMPLETE CBC W/AUTO DIFF WBC: CPT | Performed by: NURSE PRACTITIONER

## 2024-02-10 PROCEDURE — 63600175 PHARM REV CODE 636 W HCPCS: Performed by: STUDENT IN AN ORGANIZED HEALTH CARE EDUCATION/TRAINING PROGRAM

## 2024-02-10 PROCEDURE — 80053 COMPREHEN METABOLIC PANEL: CPT | Performed by: NURSE PRACTITIONER

## 2024-02-10 PROCEDURE — 25000003 PHARM REV CODE 250: Performed by: NURSE PRACTITIONER

## 2024-02-10 PROCEDURE — 63600175 PHARM REV CODE 636 W HCPCS: Performed by: NURSE PRACTITIONER

## 2024-02-10 PROCEDURE — 94761 N-INVAS EAR/PLS OXIMETRY MLT: CPT

## 2024-02-10 PROCEDURE — 99214 OFFICE O/P EST MOD 30 MIN: CPT | Mod: ,,, | Performed by: INTERNAL MEDICINE

## 2024-02-10 PROCEDURE — 27000221 HC OXYGEN, UP TO 24 HOURS

## 2024-02-10 PROCEDURE — C9113 INJ PANTOPRAZOLE SODIUM, VIA: HCPCS | Performed by: NURSE PRACTITIONER

## 2024-02-10 PROCEDURE — 25000003 PHARM REV CODE 250: Performed by: STUDENT IN AN ORGANIZED HEALTH CARE EDUCATION/TRAINING PROGRAM

## 2024-02-10 PROCEDURE — 84100 ASSAY OF PHOSPHORUS: CPT | Performed by: NURSE PRACTITIONER

## 2024-02-10 PROCEDURE — 83735 ASSAY OF MAGNESIUM: CPT | Performed by: NURSE PRACTITIONER

## 2024-02-10 PROCEDURE — G0378 HOSPITAL OBSERVATION PER HR: HCPCS

## 2024-02-10 PROCEDURE — 36415 COLL VENOUS BLD VENIPUNCTURE: CPT | Performed by: NURSE PRACTITIONER

## 2024-02-10 RX ORDER — FAMOTIDINE 20 MG/1
20 TABLET, FILM COATED ORAL DAILY
Status: DISCONTINUED | OUTPATIENT
Start: 2024-02-11 | End: 2024-02-19 | Stop reason: HOSPADM

## 2024-02-10 RX ORDER — BISACODYL 10 MG/1
10 SUPPOSITORY RECTAL ONCE
Status: COMPLETED | OUTPATIENT
Start: 2024-02-10 | End: 2024-02-10

## 2024-02-10 RX ORDER — PROCHLORPERAZINE EDISYLATE 5 MG/ML
5 INJECTION INTRAMUSCULAR; INTRAVENOUS EVERY 6 HOURS PRN
Status: DISCONTINUED | OUTPATIENT
Start: 2024-02-10 | End: 2024-02-19 | Stop reason: HOSPADM

## 2024-02-10 RX ADMIN — ONDANSETRON 4 MG: 2 INJECTION INTRAMUSCULAR; INTRAVENOUS at 08:02

## 2024-02-10 RX ADMIN — LINEZOLID 600 MG: 600 INJECTION, SOLUTION INTRAVENOUS at 07:02

## 2024-02-10 RX ADMIN — AMIODARONE HYDROCHLORIDE 200 MG: 100 TABLET ORAL at 08:02

## 2024-02-10 RX ADMIN — DOCUSATE SODIUM 100 MG: 100 CAPSULE, LIQUID FILLED ORAL at 08:02

## 2024-02-10 RX ADMIN — PANTOPRAZOLE SODIUM 40 MG: 40 INJECTION, POWDER, LYOPHILIZED, FOR SOLUTION INTRAVENOUS at 08:02

## 2024-02-10 RX ADMIN — ONDANSETRON 4 MG: 2 INJECTION INTRAMUSCULAR; INTRAVENOUS at 12:02

## 2024-02-10 RX ADMIN — CEFTRIAXONE SODIUM 1 G: 1 INJECTION, POWDER, FOR SOLUTION INTRAMUSCULAR; INTRAVENOUS at 11:02

## 2024-02-10 RX ADMIN — PROCHLORPERAZINE EDISYLATE 5 MG: 5 INJECTION INTRAMUSCULAR; INTRAVENOUS at 10:02

## 2024-02-10 RX ADMIN — HYDRALAZINE HYDROCHLORIDE 10 MG: 20 INJECTION INTRAMUSCULAR; INTRAVENOUS at 09:02

## 2024-02-10 RX ADMIN — BISACODYL 10 MG: 10 SUPPOSITORY RECTAL at 08:02

## 2024-02-10 NOTE — CARE UPDATE
02/10/24 0802   Patient Assessment/Suction   Level of Consciousness (AVPU) alert   Respiratory Effort Unlabored;Normal   Expansion/Accessory Muscles/Retractions no retractions;no use of accessory muscles;expansion symmetric   Rhythm/Pattern, Respiratory unlabored;pattern regular;depth regular;no shortness of breath reported   PRE-TX-O2   Device (Oxygen Therapy) nasal cannula   $ Is the patient on Low Flow Oxygen? Yes   Flow (L/min) 2   SpO2 96 %   Pulse Oximetry Type Intermittent   $ Pulse Oximetry - Multiple Charge Pulse Oximetry - Multiple   Pulse 76   Resp 18   Aerosol Therapy   $ Aerosol Therapy Charges PRN treatment not required   Respiratory Treatment Status (SVN) PRN treatment not required

## 2024-02-10 NOTE — PROGRESS NOTES
[facility]  Hematology/Oncology  Progress Note    Patient Name: Irene العراقي  Admission Date: 2/8/2024  Hospital Length of Stay: 0 days  Code Status: Full Code     Subjective:     HPI:  PT is an 83-year-old female transferred from [facility] to ED after routine labs showed low platelets at 47 K. she is history of atrial fibrillation Eliquis is on hold due to thrombocytopenia currently history of anemia CKD and hypertension     Interval History: PT states she feels unwell today.  Pt endorses diarrhea.  The patient denies CP, cough, SOB, abdominal pain, nausea, vomiting, constipation, fever.    Oncology Treatment Plan:   [No matching plan found]    Medications:  Continuous Infusions:  Scheduled Meds:   amiodarone  200 mg Oral BID    cefTRIAXone (Rocephin) IV (PEDS and ADULTS)  1 g Intravenous Q24H    docusate sodium  100 mg Oral BID    [START ON 2/11/2024] famotidine  20 mg Oral Daily     PRN Meds:acetaminophen, albuterol-ipratropium, aluminum-magnesium hydroxide-simethicone, hydrALAZINE, magnesium oxide, magnesium oxide, melatonin, naloxone, ondansetron, potassium bicarbonate, potassium bicarbonate, potassium bicarbonate, potassium, sodium phosphates, potassium, sodium phosphates, potassium, sodium phosphates, simethicone, sodium chloride 0.9%     Review of Systems   Constitutional:  Negative for chills and fever.   Respiratory:  Negative for cough and shortness of breath.    Cardiovascular:  Negative for chest pain and palpitations.   Gastrointestinal:  Positive for diarrhea. Negative for abdominal pain, constipation, nausea and vomiting.   Skin:  Negative for rash.   Neurological:  Negative for headaches.     Objective:     Vital Signs (Most Recent):  Temp: 97.8 °F (36.6 °C) (02/10/24 1708)  Pulse: 73 (02/10/24 1708)  Resp: 18 (02/10/24 1708)  BP: (!) 173/77 (02/10/24 1708)  SpO2: (!) 93 % (02/10/24 1708) Vital Signs (24h Range):  Temp:  [97.8 °F (36.6 °C)-98.2 °F (36.8 °C)] 97.8 °F (36.6  °C)  Pulse:  [73-81] 73  Resp:  [17-18] 18  SpO2:  [93 %-96 %] 93 %  BP: (102-173)/(54-77) 173/77     Weight: 71.2 kg (156 lb 15.5 oz)  Body mass index is 25.34 kg/m².  Body surface area is 1.82 meters squared.      Intake/Output Summary (Last 24 hours) at 2/10/2024 1726  Last data filed at 2/10/2024 0631  Gross per 24 hour   Intake 1958.66 ml   Output 550 ml   Net 1408.66 ml        Physical Exam  Constitutional:       Appearance: She is well-developed.   Cardiovascular:      Rate and Rhythm: Normal rate and regular rhythm.      Heart sounds: Normal heart sounds. No murmur heard.     No friction rub. No gallop.   Pulmonary:      Effort: Pulmonary effort is normal. No respiratory distress.      Breath sounds: Normal breath sounds. No wheezing or rales.   Abdominal:      General: Bowel sounds are normal. There is no distension.      Palpations: Abdomen is soft.      Tenderness: There is no abdominal tenderness. There is no guarding or rebound.   Neurological:      Mental Status: She is alert and oriented to person, place, and time.          Significant Labs:   Recent Results (from the past 24 hour(s))   Comprehensive Metabolic Panel (CMP)    Collection Time: 02/10/24  5:15 AM   Result Value Ref Range    Sodium 137 136 - 145 mmol/L    Potassium 3.4 (L) 3.5 - 5.1 mmol/L    Chloride 106 95 - 110 mmol/L    CO2 23 23 - 29 mmol/L    Glucose 83 70 - 110 mg/dL    BUN 12 8 - 23 mg/dL    Creatinine 1.2 0.5 - 1.4 mg/dL    Calcium 8.4 (L) 8.7 - 10.5 mg/dL    Total Protein 4.8 (L) 6.0 - 8.4 g/dL    Albumin 2.7 (L) 3.5 - 5.2 g/dL    Total Bilirubin 0.5 0.1 - 1.0 mg/dL    Alkaline Phosphatase 98 55 - 135 U/L    AST 15 10 - 40 U/L    ALT 15 10 - 44 U/L    eGFR 45 (A) >60 mL/min/1.73 m^2    Anion Gap 8 8 - 16 mmol/L   Magnesium    Collection Time: 02/10/24  5:15 AM   Result Value Ref Range    Magnesium 1.7 1.6 - 2.6 mg/dL   Phosphorus    Collection Time: 02/10/24  5:15 AM   Result Value Ref Range    Phosphorus 2.5 (L) 2.7 - 4.5 mg/dL    CBC with Automated Differential    Collection Time: 02/10/24  5:15 AM   Result Value Ref Range    WBC 4.80 3.90 - 12.70 K/uL    RBC 2.55 (L) 4.00 - 5.40 M/uL    Hemoglobin 7.7 (L) 12.0 - 16.0 g/dL    Hematocrit 23.3 (L) 37.0 - 48.5 %    MCV 91 82 - 98 fL    MCH 30.2 27.0 - 31.0 pg    MCHC 33.0 32.0 - 36.0 g/dL    RDW 13.2 11.5 - 14.5 %    Platelets 46 (LL) 150 - 450 K/uL    MPV 12.8 9.2 - 12.9 fL    Immature Granulocytes 0.4 0.0 - 0.5 %    Gran # (ANC) 2.8 1.8 - 7.7 K/uL    Immature Grans (Abs) 0.02 0.00 - 0.04 K/uL    Lymph # 1.0 1.0 - 4.8 K/uL    Mono # 0.7 0.3 - 1.0 K/uL    Eos # 0.3 0.0 - 0.5 K/uL    Baso # 0.02 0.00 - 0.20 K/uL    nRBC 0 0 /100 WBC    Gran % 57.4 38.0 - 73.0 %    Lymph % 20.8 18.0 - 48.0 %    Mono % 15.4 (H) 4.0 - 15.0 %    Eosinophil % 5.6 0.0 - 8.0 %    Basophil % 0.4 0.0 - 1.9 %    Differential Method Automated      Microbiology Results (last 7 days)       Procedure Component Value Units Date/Time    Urine culture [[id number]]  (Abnormal) Collected: 02/08/24 1437    Order Status: Completed Specimen: Urine Updated: 02/10/24 0736     Urine Culture, Routine GRAM NEGATIVE MEREDITH, NON-LACTOSE   >100,000 cfu/ml  Identification and susceptibility pending      Narrative:      Specimen Source->Urine    Blood culture #1 **CANNOT BE ORDERED STAT** [[id number]] Collected: 02/08/24 1530    Order Status: Completed Specimen: Blood from Peripheral, Antecubital, Right Updated: 02/09/24 2032     Blood Culture, Routine No Growth to date      No Growth to date    Blood culture #2 **CANNOT BE ORDERED STAT** [[id number]] Collected: 02/08/24 1618    Order Status: Completed Specimen: Blood from Peripheral, Antecubital, Right Updated: 02/09/24 2032     Blood Culture, Routine No Growth to date      No Growth to date            Diagnostic Results:    US Abdomen 2/09/24    The pancreas is obscured.     Multiple gallstones are present measuring up to 5 mm.  Sonographic Lee sign is negative.  The common bile  duct is mildly dilated for patient age at 10 mm.     The IVC is normal caliber.     The liver is normal size at 14.6 cm.  There is no focal hepatic lesion.     The spleen is normal size at 10.5 cm.     There is a right pleural effusion.  Assessment/Plan:     * Anemia  -Pt with persistent anemia since 4/10/23  -Folate and B12 normal  -SEJAL negative making AIHA unlikely  -SPEP, RAY and FLC pending from 2/09/23  -Retic count low indicating decreased marrow response   -Iron studies concerning for chronic disease  -Hemoglobin 7.7g/dL today  -Transfuse for hemoglobin less than 7g/dL    Thrombocytopenia  -Platelets low since at least 2/09/20  -Platelets 46k 2/10/23  -Hepatitis C pending   -Would transfuse platelets for platelet count less than 10k, </=20k with petechiae or mucosal bleeding or </=50k with active bleeding or immediately prior to invasive procedures   -PT will likely need BM biopsy in future      UTI (urinary tract infection)  -Cultures showing GNR  -PT on Ceftriaxone   -Primary team managing      Hematology/Oncology service will follow-up with patient. Please contact us if you have any additional questions.       Major Gordon MD  Hematology/Oncology  [facility]/Surg

## 2024-02-10 NOTE — HPI
PT is an 83-year-old female transferred from [facility] to ED after routine labs showed low platelets at 47 K. she is history of atrial fibrillation Eliquis is on hold due to thrombocytopenia currently history of anemia CKD and hypertension

## 2024-02-10 NOTE — SUBJECTIVE & OBJECTIVE
Interval History: PT states she feels unwell today.  Pt endorses diarrhea.  The patient denies CP, cough, SOB, abdominal pain, nausea, vomiting, constipation, fever.    Oncology Treatment Plan:   [No matching plan found]    Medications:  Continuous Infusions:  Scheduled Meds:   amiodarone  200 mg Oral BID    cefTRIAXone (Rocephin) IV (PEDS and ADULTS)  1 g Intravenous Q24H    docusate sodium  100 mg Oral BID    [START ON 2/11/2024] famotidine  20 mg Oral Daily     PRN Meds:acetaminophen, albuterol-ipratropium, aluminum-magnesium hydroxide-simethicone, hydrALAZINE, magnesium oxide, magnesium oxide, melatonin, naloxone, ondansetron, potassium bicarbonate, potassium bicarbonate, potassium bicarbonate, potassium, sodium phosphates, potassium, sodium phosphates, potassium, sodium phosphates, simethicone, sodium chloride 0.9%     Review of Systems   Constitutional:  Negative for chills and fever.   Respiratory:  Negative for cough and shortness of breath.    Cardiovascular:  Negative for chest pain and palpitations.   Gastrointestinal:  Positive for diarrhea. Negative for abdominal pain, constipation, nausea and vomiting.   Skin:  Negative for rash.   Neurological:  Negative for headaches.     Objective:     Vital Signs (Most Recent):  Temp: 97.8 °F (36.6 °C) (02/10/24 1708)  Pulse: 73 (02/10/24 1708)  Resp: 18 (02/10/24 1708)  BP: (!) 173/77 (02/10/24 1708)  SpO2: (!) 93 % (02/10/24 1708) Vital Signs (24h Range):  Temp:  [97.8 °F (36.6 °C)-98.2 °F (36.8 °C)] 97.8 °F (36.6 °C)  Pulse:  [73-81] 73  Resp:  [17-18] 18  SpO2:  [93 %-96 %] 93 %  BP: (102-173)/(54-77) 173/77     Weight: 71.2 kg (156 lb 15.5 oz)  Body mass index is 25.34 kg/m².  Body surface area is 1.82 meters squared.      Intake/Output Summary (Last 24 hours) at 2/10/2024 1726  Last data filed at 2/10/2024 0631  Gross per 24 hour   Intake 1958.66 ml   Output 550 ml   Net 1408.66 ml        Physical Exam  Constitutional:       Appearance: She is well-developed.    Cardiovascular:      Rate and Rhythm: Normal rate and regular rhythm.      Heart sounds: Normal heart sounds. No murmur heard.     No friction rub. No gallop.   Pulmonary:      Effort: Pulmonary effort is normal. No respiratory distress.      Breath sounds: Normal breath sounds. No wheezing or rales.   Abdominal:      General: Bowel sounds are normal. There is no distension.      Palpations: Abdomen is soft.      Tenderness: There is no abdominal tenderness. There is no guarding or rebound.   Neurological:      Mental Status: She is alert and oriented to person, place, and time.          Significant Labs:   Recent Results (from the past 24 hour(s))   Comprehensive Metabolic Panel (CMP)    Collection Time: 02/10/24  5:15 AM   Result Value Ref Range    Sodium 137 136 - 145 mmol/L    Potassium 3.4 (L) 3.5 - 5.1 mmol/L    Chloride 106 95 - 110 mmol/L    CO2 23 23 - 29 mmol/L    Glucose 83 70 - 110 mg/dL    BUN 12 8 - 23 mg/dL    Creatinine 1.2 0.5 - 1.4 mg/dL    Calcium 8.4 (L) 8.7 - 10.5 mg/dL    Total Protein 4.8 (L) 6.0 - 8.4 g/dL    Albumin 2.7 (L) 3.5 - 5.2 g/dL    Total Bilirubin 0.5 0.1 - 1.0 mg/dL    Alkaline Phosphatase 98 55 - 135 U/L    AST 15 10 - 40 U/L    ALT 15 10 - 44 U/L    eGFR 45 (A) >60 mL/min/1.73 m^2    Anion Gap 8 8 - 16 mmol/L   Magnesium    Collection Time: 02/10/24  5:15 AM   Result Value Ref Range    Magnesium 1.7 1.6 - 2.6 mg/dL   Phosphorus    Collection Time: 02/10/24  5:15 AM   Result Value Ref Range    Phosphorus 2.5 (L) 2.7 - 4.5 mg/dL   CBC with Automated Differential    Collection Time: 02/10/24  5:15 AM   Result Value Ref Range    WBC 4.80 3.90 - 12.70 K/uL    RBC 2.55 (L) 4.00 - 5.40 M/uL    Hemoglobin 7.7 (L) 12.0 - 16.0 g/dL    Hematocrit 23.3 (L) 37.0 - 48.5 %    MCV 91 82 - 98 fL    MCH 30.2 27.0 - 31.0 pg    MCHC 33.0 32.0 - 36.0 g/dL    RDW 13.2 11.5 - 14.5 %    Platelets 46 (LL) 150 - 450 K/uL    MPV 12.8 9.2 - 12.9 fL    Immature Granulocytes 0.4 0.0 - 0.5 %    Gran # (ANC)  2.8 1.8 - 7.7 K/uL    Immature Grans (Abs) 0.02 0.00 - 0.04 K/uL    Lymph # 1.0 1.0 - 4.8 K/uL    Mono # 0.7 0.3 - 1.0 K/uL    Eos # 0.3 0.0 - 0.5 K/uL    Baso # 0.02 0.00 - 0.20 K/uL    nRBC 0 0 /100 WBC    Gran % 57.4 38.0 - 73.0 %    Lymph % 20.8 18.0 - 48.0 %    Mono % 15.4 (H) 4.0 - 15.0 %    Eosinophil % 5.6 0.0 - 8.0 %    Basophil % 0.4 0.0 - 1.9 %    Differential Method Automated      Microbiology Results (last 7 days)       Procedure Component Value Units Date/Time    Urine culture [[id number]]  (Abnormal) Collected: 02/08/24 1437    Order Status: Completed Specimen: Urine Updated: 02/10/24 0736     Urine Culture, Routine GRAM NEGATIVE MEREDITH, NON-LACTOSE   >100,000 cfu/ml  Identification and susceptibility pending      Narrative:      Specimen Source->Urine    Blood culture #1 **CANNOT BE ORDERED STAT** [[id number]] Collected: 02/08/24 1530    Order Status: Completed Specimen: Blood from Peripheral, Antecubital, Right Updated: 02/09/24 2032     Blood Culture, Routine No Growth to date      No Growth to date    Blood culture #2 **CANNOT BE ORDERED STAT** [[id number]] Collected: 02/08/24 1618    Order Status: Completed Specimen: Blood from Peripheral, Antecubital, Right Updated: 02/09/24 2032     Blood Culture, Routine No Growth to date      No Growth to date            Diagnostic Results:    US Abdomen 2/09/24    The pancreas is obscured.     Multiple gallstones are present measuring up to 5 mm.  Sonographic Lee sign is negative.  The common bile duct is mildly dilated for patient age at 10 mm.     The IVC is normal caliber.     The liver is normal size at 14.6 cm.  There is no focal hepatic lesion.     The spleen is normal size at 10.5 cm.     There is a right pleural effusion.

## 2024-02-10 NOTE — ASSESSMENT & PLAN NOTE
-Platelets low since at least 2/09/20  -Platelets 46k 2/10/23  -Hepatitis C pending   -Would transfuse platelets for platelet count less than 10k, </=20k with petechiae or mucosal bleeding or </=50k with active bleeding or immediately prior to invasive procedures   -PT will likely need BM biopsy in future

## 2024-02-10 NOTE — PT/OT/SLP PROGRESS
"Physical Therapy      Patient Name:  Irene العراقي   MRN:  [id number]    Patient not seen today secondary to Patient unwilling to participate (reports not feeling well , " I feel too sick" presents with nausea and vomiting.). Will follow-up 2/11/24.    "

## 2024-02-10 NOTE — ASSESSMENT & PLAN NOTE
-Pt with persistent anemia since 4/10/23  -Folate and B12 normal  -SEJAL negative making AIHA unlikely  -SPEP, RAY and FLC pending from 2/09/23  -Retic count low indicating decreased marrow response   -Iron studies concerning for chronic disease  -Hemoglobin 7.7g/dL today  -Transfuse for hemoglobin less than 7g/dL

## 2024-02-10 NOTE — PLAN OF CARE
02/09/24 2109   Patient Assessment/Suction   Level of Consciousness (AVPU) alert   Respiratory Effort Normal;Unlabored   Expansion/Accessory Muscles/Retractions expansion symmetric   All Lung Fields Breath Sounds diminished   Rhythm/Pattern, Respiratory pattern regular   Cough Frequency no cough   PRE-TX-O2   Device (Oxygen Therapy) nasal cannula   Flow (L/min) 2   SpO2 96 %   Pulse Oximetry Type Intermittent   Aerosol Therapy   $ Aerosol Therapy Charges PRN treatment not required

## 2024-02-11 PROBLEM — E83.39 HYPOPHOSPHATEMIA: Status: ACTIVE | Noted: 2024-02-11

## 2024-02-11 LAB
ALBUMIN SERPL BCP-MCNC: 2.8 G/DL (ref 3.5–5.2)
ALP SERPL-CCNC: 101 U/L (ref 55–135)
ALT SERPL W/O P-5'-P-CCNC: 15 U/L (ref 10–44)
ANION GAP SERPL CALC-SCNC: 7 MMOL/L (ref 8–16)
AST SERPL-CCNC: 13 U/L (ref 10–40)
BACTERIA UR CULT: ABNORMAL
BASOPHILS # BLD AUTO: 0.02 K/UL (ref 0–0.2)
BASOPHILS NFR BLD: 0.4 % (ref 0–1.9)
BILIRUB SERPL-MCNC: 0.5 MG/DL (ref 0.1–1)
BUN SERPL-MCNC: 9 MG/DL (ref 8–23)
CALCIUM SERPL-MCNC: 8.4 MG/DL (ref 8.7–10.5)
CHLORIDE SERPL-SCNC: 108 MMOL/L (ref 95–110)
CO2 SERPL-SCNC: 22 MMOL/L (ref 23–29)
CREAT SERPL-MCNC: 1.1 MG/DL (ref 0.5–1.4)
CRP SERPL-MCNC: 12.7 MG/L (ref 0–8.2)
DIFFERENTIAL METHOD BLD: ABNORMAL
EOSINOPHIL # BLD AUTO: 0.3 K/UL (ref 0–0.5)
EOSINOPHIL NFR BLD: 5.8 % (ref 0–8)
ERYTHROCYTE [DISTWIDTH] IN BLOOD BY AUTOMATED COUNT: 13.2 % (ref 11.5–14.5)
EST. GFR  (NO RACE VARIABLE): 50 ML/MIN/1.73 M^2
GLUCOSE SERPL-MCNC: 79 MG/DL (ref 70–110)
HCT VFR BLD AUTO: 24 % (ref 37–48.5)
HGB BLD-MCNC: 8 G/DL (ref 12–16)
IMM GRANULOCYTES # BLD AUTO: 0.04 K/UL (ref 0–0.04)
IMM GRANULOCYTES NFR BLD AUTO: 0.8 % (ref 0–0.5)
LYMPHOCYTES # BLD AUTO: 1.1 K/UL (ref 1–4.8)
LYMPHOCYTES NFR BLD: 21.6 % (ref 18–48)
MAGNESIUM SERPL-MCNC: 1.8 MG/DL (ref 1.6–2.6)
MCH RBC QN AUTO: 30.1 PG (ref 27–31)
MCHC RBC AUTO-ENTMCNC: 33.3 G/DL (ref 32–36)
MCV RBC AUTO: 90 FL (ref 82–98)
MONOCYTES # BLD AUTO: 1 K/UL (ref 0.3–1)
MONOCYTES NFR BLD: 18.7 % (ref 4–15)
NEUTROPHILS # BLD AUTO: 2.7 K/UL (ref 1.8–7.7)
NEUTROPHILS NFR BLD: 52.7 % (ref 38–73)
NRBC BLD-RTO: 0 /100 WBC
PHOSPHATE SERPL-MCNC: 2.1 MG/DL (ref 2.7–4.5)
PLATELET # BLD AUTO: 38 K/UL (ref 150–450)
PLATELET BLD QL SMEAR: ABNORMAL
PMV BLD AUTO: 11.3 FL (ref 9.2–12.9)
POTASSIUM SERPL-SCNC: 3.4 MMOL/L (ref 3.5–5.1)
PROCALCITONIN SERPL IA-MCNC: 0.07 NG/ML (ref 0–0.5)
PROT SERPL-MCNC: 5 G/DL (ref 6–8.4)
RBC # BLD AUTO: 2.66 M/UL (ref 4–5.4)
SODIUM SERPL-SCNC: 137 MMOL/L (ref 136–145)
WBC # BLD AUTO: 5.13 K/UL (ref 3.9–12.7)

## 2024-02-11 PROCEDURE — 80053 COMPREHEN METABOLIC PANEL: CPT | Performed by: NURSE PRACTITIONER

## 2024-02-11 PROCEDURE — 36415 COLL VENOUS BLD VENIPUNCTURE: CPT | Performed by: NURSE PRACTITIONER

## 2024-02-11 PROCEDURE — 25000003 PHARM REV CODE 250: Performed by: NURSE PRACTITIONER

## 2024-02-11 PROCEDURE — 11000001 HC ACUTE MED/SURG PRIVATE ROOM

## 2024-02-11 PROCEDURE — 25000003 PHARM REV CODE 250: Performed by: INTERNAL MEDICINE

## 2024-02-11 PROCEDURE — 99900035 HC TECH TIME PER 15 MIN (STAT)

## 2024-02-11 PROCEDURE — 85025 COMPLETE CBC W/AUTO DIFF WBC: CPT | Performed by: NURSE PRACTITIONER

## 2024-02-11 PROCEDURE — 87641 MR-STAPH DNA AMP PROBE: CPT | Performed by: STUDENT IN AN ORGANIZED HEALTH CARE EDUCATION/TRAINING PROGRAM

## 2024-02-11 PROCEDURE — 36415 COLL VENOUS BLD VENIPUNCTURE: CPT | Performed by: STUDENT IN AN ORGANIZED HEALTH CARE EDUCATION/TRAINING PROGRAM

## 2024-02-11 PROCEDURE — 63600175 PHARM REV CODE 636 W HCPCS: Performed by: NURSE PRACTITIONER

## 2024-02-11 PROCEDURE — 36415 COLL VENOUS BLD VENIPUNCTURE: CPT | Performed by: INTERNAL MEDICINE

## 2024-02-11 PROCEDURE — 82784 ASSAY IGA/IGD/IGG/IGM EACH: CPT | Mod: 91 | Performed by: INTERNAL MEDICINE

## 2024-02-11 PROCEDURE — 25000003 PHARM REV CODE 250: Performed by: STUDENT IN AN ORGANIZED HEALTH CARE EDUCATION/TRAINING PROGRAM

## 2024-02-11 PROCEDURE — 63600175 PHARM REV CODE 636 W HCPCS: Performed by: STUDENT IN AN ORGANIZED HEALTH CARE EDUCATION/TRAINING PROGRAM

## 2024-02-11 PROCEDURE — 97530 THERAPEUTIC ACTIVITIES: CPT | Mod: CQ

## 2024-02-11 PROCEDURE — 84145 PROCALCITONIN (PCT): CPT | Performed by: STUDENT IN AN ORGANIZED HEALTH CARE EDUCATION/TRAINING PROGRAM

## 2024-02-11 PROCEDURE — 97110 THERAPEUTIC EXERCISES: CPT | Mod: CQ

## 2024-02-11 PROCEDURE — 84100 ASSAY OF PHOSPHORUS: CPT | Performed by: NURSE PRACTITIONER

## 2024-02-11 PROCEDURE — 94761 N-INVAS EAR/PLS OXIMETRY MLT: CPT

## 2024-02-11 PROCEDURE — 86140 C-REACTIVE PROTEIN: CPT | Performed by: STUDENT IN AN ORGANIZED HEALTH CARE EDUCATION/TRAINING PROGRAM

## 2024-02-11 PROCEDURE — 27000221 HC OXYGEN, UP TO 24 HOURS

## 2024-02-11 PROCEDURE — 99223 1ST HOSP IP/OBS HIGH 75: CPT | Mod: ,,, | Performed by: STUDENT IN AN ORGANIZED HEALTH CARE EDUCATION/TRAINING PROGRAM

## 2024-02-11 PROCEDURE — 83735 ASSAY OF MAGNESIUM: CPT | Performed by: NURSE PRACTITIONER

## 2024-02-11 RX ORDER — MUPIROCIN 20 MG/G
OINTMENT TOPICAL 2 TIMES DAILY
Status: DISCONTINUED | OUTPATIENT
Start: 2024-02-11 | End: 2024-02-16

## 2024-02-11 RX ORDER — POTASSIUM CHLORIDE 20 MEQ/1
20 TABLET, EXTENDED RELEASE ORAL ONCE
Status: COMPLETED | OUTPATIENT
Start: 2024-02-11 | End: 2024-02-11

## 2024-02-11 RX ADMIN — Medication 800 MG: at 06:02

## 2024-02-11 RX ADMIN — POTASSIUM & SODIUM PHOSPHATES POWDER PACK 280-160-250 MG 2 PACKET: 280-160-250 PACK at 06:02

## 2024-02-11 RX ADMIN — AMIODARONE HYDROCHLORIDE 200 MG: 100 TABLET ORAL at 09:02

## 2024-02-11 RX ADMIN — POTASSIUM & SODIUM PHOSPHATES POWDER PACK 280-160-250 MG 2 PACKET: 280-160-250 PACK at 09:02

## 2024-02-11 RX ADMIN — POTASSIUM BICARBONATE 35 MEQ: 391 TABLET, EFFERVESCENT ORAL at 06:02

## 2024-02-11 RX ADMIN — POTASSIUM CHLORIDE 20 MEQ: 1500 TABLET, EXTENDED RELEASE ORAL at 09:02

## 2024-02-11 RX ADMIN — ONDANSETRON 4 MG: 2 INJECTION INTRAMUSCULAR; INTRAVENOUS at 09:02

## 2024-02-11 RX ADMIN — Medication 800 MG: at 09:02

## 2024-02-11 RX ADMIN — FAMOTIDINE 20 MG: 20 TABLET, FILM COATED ORAL at 09:02

## 2024-02-11 RX ADMIN — HYDRALAZINE HYDROCHLORIDE 10 MG: 20 INJECTION INTRAMUSCULAR; INTRAVENOUS at 11:02

## 2024-02-11 RX ADMIN — MUPIROCIN: 20 OINTMENT TOPICAL at 08:02

## 2024-02-11 RX ADMIN — MEROPENEM 1 G: 1 INJECTION, POWDER, FOR SOLUTION INTRAVENOUS at 02:02

## 2024-02-11 RX ADMIN — AMIODARONE HYDROCHLORIDE 200 MG: 100 TABLET ORAL at 08:02

## 2024-02-11 NOTE — CONSULTS
Thank you for your consult to [facility]. We have reviewed the patient chart. This patient does meet criteria for [facility] service at this time.  Will assume care on 02/11/24 at 7AM.    Ginny Bruner MD

## 2024-02-11 NOTE — CARE UPDATE
Blood counts form today reviewed.  PT with persistent anemia with hemoglobin 8g/dL and thrombocytopenia with platelets at 38k. Hepatitis C study, SPEP, RAY and FLC from 2/09/24 pending.  PT will likely need BM biopsy once recovered from UTI.  Hematology to continue to follow.    Major Gordon MD  [facility]  Hematology and Oncology  [facility]   [address]   [place] [address]   O: [phone]  F: [phone]

## 2024-02-11 NOTE — PT/OT/SLP PROGRESS
Physical Therapy Treatment    Patient Name:  Irene العراقي   MRN:  [id number]    Recommendations:     Discharge Recommendations: Moderate Intensity Therapy  Discharge Equipment Recommendations: none  Barriers to discharge: None    Assessment:     Irene العراقي is a 83 y.o. female admitted with a medical diagnosis of Anemia.  She presents with the following impairments/functional limitations: weakness, impaired endurance, impaired self care skills, impaired functional mobility, decreased lower extremity function, impaired balance, gait instability, impaired cardiopulmonary response to activity, orthopedic precautions . Supine in bed with report of not feeling well  due to occasional nausea.  Agreed to mobilize in bed. Declined offer to stand.   Performed LE exercises in supine. Sup <>sit EOB with Mod A and verbal cues for technique.  Sat briefly then returned supine with report of feeling light headed and weak.  Nurse informed.     Rehab Prognosis: Fair; patient would benefit from acute skilled PT services to address these deficits and reach maximum level of function.    Recent Surgery: * No surgery found *      Plan:     During this hospitalization, patient to be seen 6 x/week to address the identified rehab impairments via gait training, therapeutic activities, therapeutic exercises and progress toward the following goals:    Plan of Care Expires:  02/29/24    Subjective     Chief Complaint: weakness, nausea, light headedness.   Patient/Family Comments/goals: to return to SNF then home with family.  Pain/Comfort:  Pain Rating 1: 0/10      Objective:     Communicated with nurse Silva prior to session.  Patient found supine with bed alarm, oxygen, araya catheter, telemetry, pressure relief boots upon PT entry to room.     General Precautions: Standard, fall, pacemaker, contact  Orthopedic Precautions: RLE partial weight bearing  Braces:  (CAM walker RLE)  Respiratory Status: Nasal cannula, flow 2 L/min     Functional  Mobility:  Bed Mobility:     Rolling Left:  minimum assistance  Rolling Right: minimum assistance  Supine to Sit: moderate assistance  Sit to Supine: moderate assistance      AM-PAC 6 CLICK MOBILITY          Treatment & Education:  BLE exercises : SLR's, HS, Hip abd/add, AP's, QS.  Sup <> sit with Mod A.  Scoot HOB with Mod A.    Patient left supine with all lines intact, call button in reach, bed alarm on, and nurse Ricardo notified..    GOALS:   Multidisciplinary Problems       Physical Therapy Goals          Problem: Physical Therapy    Goal Priority Disciplines Outcome Goal Variances Interventions   Physical Therapy Goal     PT, PT/OT Ongoing, Progressing     Description: Goals to be met by: 2024     Patient will increase functional independence with mobility by performin. Supine to sit with MInimal Assistance  2. Sit to stand transfer with Minimal Assistance  3. Bed to chair transfer with Minimal Assistance using Rolling Walker  4. Gait  x 50 feet with Minimal Assistance using Rolling Walker.   5. Lower extremity exercise program x20 reps     Hx of R ankle fx 2023- [facility] boot                         Time Tracking:     PT Received On: 24  PT Start Time: 955     PT Stop Time: 1018  PT Total Time (min): 23 min     Billable Minutes: Therapeutic Activity 13min and Therapeutic Exercise 10min    Treatment Type: Treatment  PT/PTA: PTA     Number of PTA visits since last PT visit: 2024

## 2024-02-11 NOTE — ASSESSMENT & PLAN NOTE
-Discontinue linezolid  -Hematology follow up; may need BMBx in outpatient setting  -Trend platelets with CBC

## 2024-02-11 NOTE — CONSULTS
[facility]/Surg   Department of Infectious Disease  Consult Note        PATIENT NAME: Irene العراقي  MRN: [id number]  TODAY'S DATE: 02/11/2024  ADMIT DATE: 2/8/2024    CHIEF COMPLAINT: ABNORMAL LABS (Pt brought to ED via EMS from [facility] with a complaint of low platelet count for the past two weeks.  )    PRINCIPLE PROBLEM: Anemia    REASON FOR CONSULT:  ESBL E coli with anemia and thrombocytopenia several antibiotic courses in the past 2 weeks - chronic Rodriguez     ASSESSMENT and PLAN     Recurrent UTIs in the setting of urinary retention status post Rodriguez for the last 6 weeks  Urine culture 2/8 ESBL E coli  Prior urine cultures: 1/19 VRE Enterococcus faecium S to daptomycin and linezolid, 12/11/23 pan-sensitive Klebsiella pneumoniae, 9/21/23 Enterococcus fecalis, 9/9/23: Pantoea agglomerans, 5/9/23 pan-sensitive E coli, 4/25/23 Enterococcus faecalis     Thrombocytopenia, likely secondary to recent linezolid    Productive cough  Procal negative, 0.07    Cholelithiasis, mild common duct dilation  Normal LFTs    PMHx: AFib on amiodarone status post TAVR and pacemaker, chronic anemia, anxiety, chronic kidney disease not on dialysis, prior ankle fracture status post repair, and recurrent UTIs    Recommendations:    Stop Rocephin IV  Stop stool softeners, patient complaining of loose stools 3-4 episodes today   If diarrhea persists please send stool for C diff   Consider Urology consult given persistent UTIs, might need cystoscopy  Please collect respiratory culture  MRSA nasal swab  CXR ordered  Start meropenem 1 g IV q.12 for ESBL E coli, dose per creatinine clearance   Kidney ultrasound to rule out bladder reflux  Probiotics  Monitor plt count   PT/OT        Thank you for this consult. Please send [facility] secure chat with any questions.    HPI      Irene العراقي is a 83 y.o. female , very pleasant with past medical history of AFib on amiodarone status post TAVR and pacemaker, chronic  anemia, anxiety, chronic kidney disease not on dialysis, prior ankle fracture status post repair, and recurrent UTIs, previously treated at facility with linezolid for Enterococcus faecium VRE.  Clinical course complicated by thrombocytopenia, likely from linezolid which was continued this admission from 02/08 to 2/10.  Switched to Rocephin on 02/10 since new urine culture grew E coli, updated today on ESBL E coli.    In the ER, she was complaining of generalized abdominal pain, CT scan suggestive of gastritis, no acute pathology   Hypertensive 203/81, afebrile   Labs on admission with white count of 5.1, no left shift, H&H 7.6/22.7, thrombocytopenia 45  Stable electrolytes, creatinine 1.4, normal LFTs     Lactic acid negative  UA positive for UTI, culture grew ESBL E coli  Chest x-ray with mild prominent lung markings in lower lobe fields more compared to prior.  Very mild CHF.  Wife  Abdominal ultrasound with cholelithiasis.  Prominent common bile duct, nonspecific.  Right pleural effusion  CT abdomen/pelvis without contrast revealed gallbladder stones and sludge with nonspecific dilation.  Gastric wall thickening could relate to inadequate distention.  Gastric peptic ulcer disease in the appropriate clinical setting.  Scattered colonic diverticula with possible constipation.  Urinary bladder wall thickening from inadequate distention.    She received 1 UI PRBC.    Linezolid was continued on admission for 3 days, switched to Rocephin yesterday.  As per culture grew ESBL E coli, Infectious Disease consulted, switch to meropenem IV.    Patient seen examined at bedside, having supper.  She is having productive cough, she is swelling in the phlegm, advised not to do so and put in a specimen container.  She reports until today she was able to tolerate meals, she is slightly feeling better although is tearful and upset because antibiotics has been switched multiple times.  She is complaining of loose bowel  movements 3 episodes so far today, she is on stool softeners.  She denies fever or chills, no night sweats, no nausea or vomit, reports abdominal pain is resolved.  Rodriguez catheter in place, yellow urine noted.    Outdoor activities:  Lives at a assisted living due to recent right ankle fracture.  Nonsmoker, no alcohol.  Retired, used to work as a patient care technician.  She is originally from [place], has been living [place] since 2019, her sister and brother-in-law .  She is hoping to return to [place] at some point.  Travel:  None recent  Implants:  AICD/pacemaker, right ankle hardware  Antibiotic history:  See HPI    Past Medical History:   Diagnosis Date    Anemia, unspecified     Atrial fibrillation 2021    CKD (chronic kidney disease)     Hypertension        Past Surgical History:   Procedure Laterality Date    A-V CARDIAC PACEMAKER INSERTION  2023    Procedure: Dual Chamber PPM RM 2617 ([employer]);  Surgeon: Andreas James III, MD;  Location: [facility] CATH;  Service: Cardiology;;    ANGIOGRAM, CORONARY, WITH LEFT HEART CATHETERIZATION Left 2023    Procedure: Angiogram, Coronary, with Left Heart Cath;  Surgeon: Kevin Redmond MD;  Location: [facility] CATH/EP LAB;  Service: Cardiology;  Laterality: Left;    BREAST SURGERY      COLONOSCOPY N/A 2023    Procedure: COLONOSCOPY;  Surgeon: Kvng Mensah MD;  Location: [facility] ENDO;  Service: Endoscopy;  Laterality: N/A;    COLONOSCOPY W/ POLYPECTOMY  2023    OPEN REDUCTION AND INTERNAL FIXATION (ORIF) OF INJURY OF ANKLE Right 2023    Procedure: ORIF, ANKLE;  Surgeon: Chaitanya Garrison MD;  Location: [facility] OR;  Service: Orthopedics;  Laterality: Right;  Synthes    TRANSCATHETER AORTIC VALVE REPLACEMENT (TAVR)  2023    Procedure: (TAVR);  Surgeon: Juliano Moncada MD;  Location: [facility] CATH;  Service: Cardiology;;    TRANSCATHETER AORTIC VALVE REPLACEMENT (TAVR)  2023    Procedure: (TAVR)- Surgeon;  Surgeon: Adebayo Guzman,  MD;  Location: [facility];  Service: Peripheral Vascular;;       Family History   Problem Relation Age of Onset    Cancer Mother     Cancer Father        Social History     Socioeconomic History    Marital status: Single   Tobacco Use    Smoking status: Former     Types: Cigarettes     Passive exposure: Past    Smokeless tobacco: Never   Substance and Sexual Activity    Alcohol use: Not Currently    Drug use: Not Currently     Social Determinants of Health     Financial Resource Strain: Low Risk  (2/9/2024)    Overall Financial Resource Strain (CARDIA)     Difficulty of Paying Living Expenses: Not very hard   Food Insecurity: No Food Insecurity (2/9/2024)    Hunger Vital Sign     Worried About Running Out of Food in the Last Year: Never true     Ran Out of Food in the Last Year: Never true   Transportation Needs: No Transportation Needs (2/9/2024)    PRAPARE - Transportation     Lack of Transportation (Medical): No     Lack of Transportation (Non-Medical): No   Physical Activity: Unknown (11/30/2023)    Exercise Vital Sign     Days of Exercise per Week: Patient declined     Minutes of Exercise per Session: 0 min   Stress: Patient Declined (2/9/2024)    [place] [place] [employer] - Occupational Stress Questionnaire     Feeling of Stress : Patient declined   Social Connections: Socially Isolated (11/30/2023)    Social Connection and Isolation Panel [NHANES]     Frequency of Communication with Friends and Family: More than three times a week     Frequency of Social Gatherings with Friends and Family: Three times a week     Attends Yarsani Services: Never     Active Member of Clubs or Organizations: No     Attends Club or Organization Meetings: Patient declined     Marital Status: Never    Housing Stability: Low Risk  (2/9/2024)    Housing Stability Vital Sign     Unable to Pay for Housing in the Last Year: No     Number of Places Lived in the Last Year: 1     Unstable Housing in the Last Year: No        Review of patient's allergies indicates:   Allergen Reactions    Cefuroxime     Hydrocodone     Meperidine     Meperidine hcl Nausea And Vomiting    Persantine [dipyridamole]     Nitrofurantoin macrocrystal      Headache , went into Afib     Vicodin [hydrocodone-acetaminophen] Nausea And Vomiting       Current Outpatient Medications   Medication Instructions    amiodarone (PACERONE) 200 mg, Oral, 2 times daily    apixaban (ELIQUIS) 2.5 mg, Oral, 2 times daily    docusate sodium (COLACE) 100 mg, Oral, 2 times daily    ertapenem (INVANZ) 1 gram injection     famotidine (PEPCID) 20 mg, Oral, Daily    hydrocortisone (ANUSOL-HC) 2.5 % rectal cream SMARTSIG:Topical    iron ag,gw-Z-[id number]-B12-Zn-sa-sto (NIFEREX, SUMALATE-QUATREFOLIC,) 150 mg iron- 60 mg-1 mg Tab 1 tablet, Oral, Daily    magnesium oxide (MAG-OX) 400 mg (241.3 mg magnesium) tablet TAKE 1 TABLET BY MOUTH ONCE DAILY    meclizine (ANTIVERT) 50 mg, Oral, 3 times daily PRN    midodrine (PROAMATINE) 5 mg, Oral, 3 times daily    MIRALAX 17 g, Oral, 2 times daily    ondansetron (ZOFRAN-ODT) 8 mg, Oral, 3 times daily         Review of Systems    Constitutional:  Denies fevers, chills, night sweats, loss of appetite.  HEENT: Denies visual changes, ear pain, sinus congestion, mouth pain or trouble swallowing, sore throat or dental pain.  Neck: Denies neck pain or lumps.  Respiratory: Denies shortness of breath, +coughing, no soon wheezing or hemoptysis.  Cardiovascular:  Denies chest pain, palpitations or edema.  Gastrointestinal: Denies  nausea, vomiting, ++diarrhea.  Genitourinary:  Rodriguez catheter in place, exchanged in the ER 2/8 - she reports prior urinary retention, and urgency before Rodriguez was placed at facility   Musculoskeletal:  Except for right ankle, denies joint pain or swelling of other joints, +difficulty walking.    Skin:  Denies rash or itching.  VAD:  Left midline  Neurologic:  Denies motor or black sensory loss, headaches or dizziness.     Psychiatric:  She feels sad, wants to go back to [place]       OBJECTIVE     Temp:  [97.8 °F (36.6 °C)-98.7 °F (37.1 °C)] 98 °F (36.7 °C)  Pulse:  [73-90] 81  Resp:  [17-18] 17  SpO2:  [92 %-96 %] 96 %  BP: (129-188)/(58-77) 188/75         Physical Exam      General:   elderly female, sitting in bed, having supper, breathing comfortable on O2 by NC 2L with productive cough  Eyes: Eyes with no icterus or injection. Vision grossly normal  Ears: Hearing grossly normal.  Nose: Nares patent  Mouth: Moist mucous membranes, dentition is fair. No ulcerations, erythema or exudates.  Neck: Supple, no tenderness to palpation.  Cardiovascular: Regular rate and rhythm, systolic murmur - AICD in place, no redness noted, non tender to palpation   Respiratory:  Mostly clear to auscultation anteriorly, decreased breath sounds and scatter crackles R base, no tachypnea or increased work of breathing.  Gastrointestinal:  Soft with active bowel sounds, no tenderness to palpation, no distention.  Genitourinary:  No suprapubic tenderness.  Rodriguez catheter in place, yellow urine  Musculoskeletal:  Moves all extremities   Skin:  Warm and dry, no obvious rashes.    Neuro:   Oriented, conversant, follows commands.  Psych: Sad affect, able to cope during encounter     Wounds:   2/8:      VAD:  Left midline   ISOLATION:  Contact/ESBL    CBC LAST 7  Recent Labs   Lab 02/08/24  1401 02/09/24  0426 02/09/24  1631 02/10/24  0515 02/11/24  0438   WBC 5.12 4.79 4.79 4.80 5.13   RBC 2.49* 2.65* 2.67* 2.55* 2.66*   HGB 7.6* 8.0* 8.1* 7.7* 8.0*   HCT 22.7* 23.7* 23.9* 23.3* 24.0*   MCV 91 89 90 91 90   MCH 30.5 30.2 30.3 30.2 30.1   MCHC 33.5 33.8 33.9 33.0 33.3   RDW 13.2 13.2 13.3 13.2 13.2   PLT 45* 40* 38* 46* 38*   MPV 10.1 11.1 10.7 12.8 11.3   GRAN 72.5  3.7 56.4  2.7 63.3  3.0 57.4  2.8 52.7  2.7   LYMPH 15.2*  0.8* 23.8  1.1 16.9*  0.8* 20.8  1.0 21.6  1.1   MONO 9.4  0.5 14.6  0.7 15.2*  0.7 15.4*  0.7 18.7*  1.0  "  BASO 0.01 0.01 0.01 0.02 0.02   NRBC 0 0 0 0 0       CHEMISTRY LAST 7  Recent Labs   Lab 02/08/24  1401 02/09/24  0426 02/10/24  0515 02/11/24  0438    137 137 137   K 3.7 3.6 3.4* 3.4*    105 106 108   CO2 25 24 23 22*   ANIONGAP 8 8 8 7*   BUN 15 14 12 9   CREATININE 1.4 1.2 1.2 1.1   * 78 83 79   CALCIUM 9.0 8.8 8.4* 8.4*   MG  --  1.8 1.7 1.8   ALBUMIN 3.1* 2.9* 2.7* 2.8*   PROT 5.7* 5.0* 4.8* 5.0*   ALKPHOS 128 117 98 101   ALT 20 16 15 15   AST 19 17 15 13   BILITOT 0.5 0.7 0.5 0.5       Estimated Creatinine Clearance: 39.2 mL/min (based on SCr of 1.1 mg/dL).    INFLAMMATORY/PROCAL  LAST 7  No results for input(s): "PROCAL", "ESR", "CRP" in the last 168 hours.  No results found for: "ESR"  CRP   Date Value Ref Range Status   01/13/2021 1.39 (H) <0.76 mg/dL Final   01/11/2021 2.68 (H) <0.76 mg/dL Final   01/08/2021 2.05 (H) <0.76 mg/dL Final       PRIOR  MICROBIOLOGY:    Susceptibility data from last 90 days.  Collected Specimen Info Organism Amp/Sulbactam Ampicillin Cefazolin Cefepime Ceftriaxone Ciprofloxacin Daptomycin Ertapenem Gentamicin Levofloxacin Linezolid Meropenem Nitrofurantoin PIPERACILLIN/TAZO SUSCEPTIBILITY   02/08/24 Urine Escherichia coli ESBL  I  R  R  R  R  R   S  S  R   S  S  S   01/24/24 Blood from Antecubital, Right Arm No growth after 5 days.                 01/24/24 Blood No growth after 5 days.                 01/19/24 Urine, Catheterized Enterococcus faecium VRE   R      S     S   I    12/11/23 Urine Klebsiella pneumoniae  S   S  S  S  S   S  S  S   S  S  S     Collected Specimen Info Organism Tetracycline Tobramycin Trimeth/Sulfa Vancomycin   02/08/24 Urine Escherichia coli ESBL  R  S  R    01/24/24 Blood from Antecubital, Right Arm No growth after 5 days.       01/24/24 Blood No growth after 5 days.       01/19/24 Urine, Catheterized Enterococcus faecium VRE  R    R   12/11/23 Urine Klebsiella pneumoniae  S  S  S          LAST 7 DAYS MICROBIOLOGY   Microbiology " Results (last 7 days)       Procedure Component Value Units Date/Time    Urine culture [[id number]]  (Abnormal)  (Susceptibility) Collected: 02/08/24 1437    Order Status: Completed Specimen: Urine Updated: 02/11/24 0616     Urine Culture, Routine ESCHERICHIA COLI ESBL  >100,000 cfu/ml      Narrative:      Specimen Source->Urine    Blood culture #1 **CANNOT BE ORDERED STAT** [[id number]] Collected: 02/08/24 1530    Order Status: Completed Specimen: Blood from Peripheral, Antecubital, Right Updated: 02/10/24 2032     Blood Culture, Routine No Growth to date      No Growth to date      No Growth to date    Blood culture #2 **CANNOT BE ORDERED STAT** [[id number]] Collected: 02/08/24 1618    Order Status: Completed Specimen: Blood from Peripheral, Antecubital, Right Updated: 02/10/24 2032     Blood Culture, Routine No Growth to date      No Growth to date      No Growth to date              CURRENT/PREVIOUS VISIT EKG  Results for orders placed or performed during the hospital encounter of 02/08/24   EKG 12-lead    Collection Time: 02/08/24  3:39 PM   Result Value Ref Range    QRS Duration 128 ms    [facility]HS QTC Calculation 492 ms    Narrative    Test Reason : D64.9,    Vent. Rate : 065 BPM     Atrial Rate : 065 BPM     P-R Int : 160 ms          QRS Dur : 128 ms      QT Int : 474 ms       P-R-T Axes : 051 -17 039 degrees     QTc Int : 492 ms    Atrial-sensed ventricular-paced rhythm  Abnormal ECG  When compared with ECG of 20-JAN-2024 14:04,  Electronic ventricular pacemaker has replaced Atrial fibrillation  Vent. rate has decreased BY  59 BPM  Confirmed by Justin Ramirez MD ([id number]) on 2/9/2024 12:03:56 PM    Referred By: CYNTHIA   SELF           Confirmed By:Justin Ramirez MD       Significant Labs: All pertinent labs within the past 24 hours have been reviewed.     Significant Imaging: I have reviewed all relevant and available imaging results/findings within the past 24 hours.    CXR: Mildly prominent lung markings in  lower lung fields more so today than on the prior exam questioning very mild CHF       Abdominal US:  FINDINGS:  The pancreas is obscured.  Multiple gallstones are present measuring up to 5 mm.  Sonographic Lee sign is negative.  The common bile duct is mildly dilated for patient age at 10 mm.  The IVC is normal caliber.  The liver is normal size at 14.6 cm.  There is no focal hepatic lesion.  The spleen is normal size at 10.5 cm.  There is a right pleural effusion.    Impression:    Cholelithiasis.  Prominent common bile duct, nonspecific.  Right pleural effusion.      CT abdomen/pelvis:   FINDINGS:  There is dependent atelectasis in both lower lobes.  There is bilateral pleural fluid.  Normal size heart with calcification mitral annulus and partially imaged AVR and transvenous pacing wires.  Gallbladder mildly distended with multiple stones near the neck and layering high density fluid level.    Pancreas atrophy.  Diffuse increased density in the liver relative to the spleen.  Remaining solid abdominal organs are grossly unremarkable on this noncontrast exam.    There is no enteric contrast which limits bowel assessment.  There is wall thickening diffusely in the stomach.  No dilated bowel loops.  Moderate degree of stool throughout the colon with a few scattered diverticula.    Atherosclerosis.  Retroaortic left renal vein.  Rodriguez catheter in the urinary bladder which is only partially distended with intraluminal gas and wall thickening.  Hysterectomy.    Osteopenia.  Grade 1 anterolisthesis L4 on L5 and L5 on S1.  Multilevel spinal degenerative changes.    Impression:    1. Gallbladder stones and sludge with nonspecific dilation.  2. Gastric wall thickening could relate to inadequate distension.  Gastritis or peptic ulcer disease also possible in the appropriate clinical setting.  3. Scattered colonic diverticula with possible constipation.  4. Urinary bladder wall thickening is presumably from inadequate  distension.      Jennifer Lehman MD  Date of Service: 02/11/2024  4:09 PM

## 2024-02-11 NOTE — PROGRESS NOTES
[facility] Medicine  Progress Note    Patient Name: Irene العراقي  MRN: [id number]  Patient Class: OP- Observation   Admission Date: 2/8/2024  Length of Stay: 0 days  Attending Physician: Brian Marques MD  Primary Care Provider: Wanda Kuhn FNP-C        Subjective:     Principal Problem:Anemia  Acute Condition: stable        HPI:  Irene العراقي is an 82 y/o F with past medical history significant for anemia, afib, CKD and HTN who presented to the ED from [facility] for further treatment of abnormal labs.  Per report, her platelets have been taking a downward trend and are 47K today.  She also has a hemoglobin of 7.5 with no obvious source of blood loss.  Pt denies recent fevers, chest pain, dizziness, melena or hematochezia.  Denies N/V but is having some mild generalized abdominal tenderness.  CT abdomen and pelvis obtained shows nonspecific gastric wall thickening which could be consistent with gastritis or peptic ulcer disease.  The ED physician discussed the lab findings with the hematologist on call who recommends admission and transfusion of PRBCs.  Pt presented from  with araya catheter in place.  She reports urinary retention and states the araya has been in place for about a week.  Araya catheter was exchanged in the ED.  Pt is currently being treated for UTI with culture positive for enterococcus faecium VRE susceptible to linezolid.  Urinalysis today remains positive. She is placed in observation under the service of hospital medicine for continued monitoring and treatment.       Overview/Hospital Course:  Irene العراقي is an 83 year old female with a past medical history of Afib, CKD, HTN, anemia and thrombocytopenia who presented with worsening anemia and thrombocytopenia in the setting of linezolid use for Enterococcus UTI. There was no evidence of gross bleeding and she was transfused a unit of PRBCs. Hematology was consulted; workup is currently  "pending. Repeat urine culture shows GNR. Rocephin was started in place of linezolid.     Interval History: see "Hospital Course"    Review of Systems   Constitutional:  Positive for fatigue. Negative for fever.   Skin:  Positive for pallor.     Objective:     Vital Signs (Most Recent):  Temp: 97.8 °F (36.6 °C) (02/10/24 1708)  Pulse: 73 (02/10/24 1708)  Resp: 18 (02/10/24 1708)  BP: (!) 173/77 (02/10/24 1708)  SpO2: (!) 93 % (02/10/24 1708) Vital Signs (24h Range):  Temp:  [97.8 °F (36.6 °C)-98.2 °F (36.8 °C)] 97.8 °F (36.6 °C)  Pulse:  [73-81] 73  Resp:  [17-18] 18  SpO2:  [93 %-96 %] 93 %  BP: (102-173)/(54-77) 173/77     Weight: 71.2 kg (156 lb 15.5 oz)  Body mass index is 25.34 kg/m².    Intake/Output Summary (Last 24 hours) at 2/10/2024 1836  Last data filed at 2/10/2024 0631  Gross per 24 hour   Intake --   Output 400 ml   Net -400 ml         Physical Exam  Vitals and nursing note reviewed.   Constitutional:       General: She is not in acute distress.  HENT:      Head: Normocephalic and atraumatic.      Right Ear: External ear normal.      Left Ear: External ear normal.      Nose: Nose normal.      Mouth/Throat:      Pharynx: Oropharynx is clear.   Eyes:      Extraocular Movements: Extraocular movements intact.      Conjunctiva/sclera: Conjunctivae normal.   Cardiovascular:      Rate and Rhythm: Normal rate and regular rhythm.      Pulses: Normal pulses.      Heart sounds: Normal heart sounds.   Pulmonary:      Effort: Pulmonary effort is normal.      Breath sounds: Normal breath sounds.   Abdominal:      General: Bowel sounds are normal. There is no distension.      Palpations: Abdomen is soft.   Genitourinary:     Comments: Rodriguez.  Musculoskeletal:         General: Normal range of motion.      Cervical back: Normal range of motion and neck supple.      Right lower leg: No edema.      Left lower leg: No edema.   Skin:     General: Skin is warm and dry.      Coloration: Skin is pale.   Neurological:      " Mental Status: She is alert. Mental status is at baseline.   Psychiatric:         Mood and Affect: Mood normal.         Behavior: Behavior normal.             Significant Labs: All pertinent labs within the past 24 hours have been reviewed.    Significant Imaging: I have reviewed all pertinent imaging results/findings within the past 24 hours.    Assessment/Plan:      * Anemia  Stable. Appears to be secondary to chronic disease.  -S/p one unit PRBCs  -Trend Hgb with CBC  -Hematology following    UTI (urinary tract infection)  GNRs.  -Follow up culture  -Rocephin      Urinary retention  -Rodriguez    Stage 3b chronic kidney disease  Stable.  -Renally dose medications  -Avoid nephrotoxic agents    Hypertension  Stable.  -Continue to monitor    PAF (paroxysmal atrial fibrillation)  -Telemetry  -Hold anticoagulation  -Amiodarone    Thrombocytopenia  -Discontinue linezolid  -Hematology follow up; may need BMBx in outpatient setting  -Trend platelets with CBC          VTE Risk Mitigation (From admission, onward)           Ordered     IP VTE HIGH RISK PATIENT  Once         02/08/24 1916     Place sequential compression device  Until discontinued         02/08/24 1916     Reason for No Pharmacological VTE Prophylaxis  Once        Question Answer Comment   Reasons: Already adequately anticoagulated on oral Anticoagulants    Reasons: Active Bleeding        02/08/24 1916                    Discharge Planning   YANET: 2/12/2024     Code Status: Full Code   Is the patient medically ready for discharge?:     Reason for patient still in hospital (select all that apply): Patient trending condition, Laboratory test, Treatment, and Consult recommendations  Discharge Plan A: Skilled Nursing Facility                  Brian Marques MD  Department of Hospital Medicine   [facility]/Surg

## 2024-02-11 NOTE — ASSESSMENT & PLAN NOTE
Stable. Appears to be secondary to chronic disease.  -S/p one unit PRBCs  -Trend Hgb with CBC  -Hematology following

## 2024-02-11 NOTE — CARE UPDATE
02/11/24 0739   Patient Assessment/Suction   Level of Consciousness (AVPU) alert   Respiratory Effort Normal;Unlabored   Expansion/Accessory Muscles/Retractions no use of accessory muscles;no retractions;expansion symmetric   All Lung Fields Breath Sounds Anterior:;Lateral:;clear;diminished   Rhythm/Pattern, Respiratory unlabored;depth regular;pattern regular   PRE-TX-O2   Device (Oxygen Therapy) nasal cannula   $ Is the patient on Low Flow Oxygen? Yes   Flow (L/min) 2   SpO2 95 %   Pulse Oximetry Type Intermittent   $ Pulse Oximetry - Multiple Charge Pulse Oximetry - Multiple   Aerosol Therapy   $ Aerosol Therapy Charges PRN treatment not required   Respiratory Treatment Status (SVN) PRN treatment not required

## 2024-02-11 NOTE — SUBJECTIVE & OBJECTIVE
"Interval History: see "Hospital Course"    Review of Systems   Constitutional:  Positive for fatigue. Negative for fever.   Skin:  Positive for pallor.     Objective:     Vital Signs (Most Recent):  Temp: 97.8 °F (36.6 °C) (02/10/24 1708)  Pulse: 73 (02/10/24 1708)  Resp: 18 (02/10/24 1708)  BP: (!) 173/77 (02/10/24 1708)  SpO2: (!) 93 % (02/10/24 1708) Vital Signs (24h Range):  Temp:  [97.8 °F (36.6 °C)-98.2 °F (36.8 °C)] 97.8 °F (36.6 °C)  Pulse:  [73-81] 73  Resp:  [17-18] 18  SpO2:  [93 %-96 %] 93 %  BP: (102-173)/(54-77) 173/77     Weight: 71.2 kg (156 lb 15.5 oz)  Body mass index is 25.34 kg/m².    Intake/Output Summary (Last 24 hours) at 2/10/2024 1836  Last data filed at 2/10/2024 0631  Gross per 24 hour   Intake --   Output 400 ml   Net -400 ml         Physical Exam  Vitals and nursing note reviewed.   Constitutional:       General: She is not in acute distress.  HENT:      Head: Normocephalic and atraumatic.      Right Ear: External ear normal.      Left Ear: External ear normal.      Nose: Nose normal.      Mouth/Throat:      Pharynx: Oropharynx is clear.   Eyes:      Extraocular Movements: Extraocular movements intact.      Conjunctiva/sclera: Conjunctivae normal.   Cardiovascular:      Rate and Rhythm: Normal rate and regular rhythm.      Pulses: Normal pulses.      Heart sounds: Normal heart sounds.   Pulmonary:      Effort: Pulmonary effort is normal.      Breath sounds: Normal breath sounds.   Abdominal:      General: Bowel sounds are normal. There is no distension.      Palpations: Abdomen is soft.   Genitourinary:     Comments: Rodriguez.  Musculoskeletal:         General: Normal range of motion.      Cervical back: Normal range of motion and neck supple.      Right lower leg: No edema.      Left lower leg: No edema.   Skin:     General: Skin is warm and dry.      Coloration: Skin is pale.   Neurological:      Mental Status: She is alert. Mental status is at baseline.   Psychiatric:         Mood and " Affect: Mood normal.         Behavior: Behavior normal.             Significant Labs: All pertinent labs within the past 24 hours have been reviewed.    Significant Imaging: I have reviewed all pertinent imaging results/findings within the past 24 hours.

## 2024-02-11 NOTE — PLAN OF CARE
Plan of care reviewed with patient. ID consulted for ongoing infection. Afebrile throughout shift. Antibiotics administered as scheduled. Electrolytes replaced. Safety and isolation precautions maintained.

## 2024-02-11 NOTE — PLAN OF CARE
Problem: Physical Therapy  Goal: Physical Therapy Goal  Description: Goals to be met by: 2024     Patient will increase functional independence with mobility by performin. Supine to sit with MInimal Assistance  2. Sit to stand transfer with Minimal Assistance  3. Bed to chair transfer with Minimal Assistance using Rolling Walker  4. Gait  x 50 feet with Minimal Assistance using Rolling Walker.   5. Lower extremity exercise program x20 reps     Hx of R ankle jacqui 2023- camwalker boot    Outcome: Ongoing, Progressing   Therapeutic activity ; Bed mobility , transfers, gait with rw and assistance , LE exercises.

## 2024-02-12 LAB
ALBUMIN SERPL BCP-MCNC: 2.9 G/DL (ref 3.5–5.2)
ALP SERPL-CCNC: 101 U/L (ref 55–135)
ALT SERPL W/O P-5'-P-CCNC: 15 U/L (ref 10–44)
ANION GAP SERPL CALC-SCNC: 6 MMOL/L (ref 8–16)
AST SERPL-CCNC: 15 U/L (ref 10–40)
BASOPHILS # BLD AUTO: 0.03 K/UL (ref 0–0.2)
BASOPHILS NFR BLD: 0.5 % (ref 0–1.9)
BILIRUB SERPL-MCNC: 0.5 MG/DL (ref 0.1–1)
BNP SERPL-MCNC: 384 PG/ML (ref 0–99)
BUN SERPL-MCNC: 10 MG/DL (ref 8–23)
CALCIUM SERPL-MCNC: 8.4 MG/DL (ref 8.7–10.5)
CHLORIDE SERPL-SCNC: 108 MMOL/L (ref 95–110)
CO2 SERPL-SCNC: 23 MMOL/L (ref 23–29)
CREAT SERPL-MCNC: 1.3 MG/DL (ref 0.5–1.4)
DIFFERENTIAL METHOD BLD: ABNORMAL
EOSINOPHIL # BLD AUTO: 0.2 K/UL (ref 0–0.5)
EOSINOPHIL NFR BLD: 3.8 % (ref 0–8)
ERYTHROCYTE [DISTWIDTH] IN BLOOD BY AUTOMATED COUNT: 13.2 % (ref 11.5–14.5)
EST. GFR  (NO RACE VARIABLE): 41 ML/MIN/1.73 M^2
GLUCOSE SERPL-MCNC: 81 MG/DL (ref 70–110)
HCT VFR BLD AUTO: 22.6 % (ref 37–48.5)
HGB BLD-MCNC: 7.5 G/DL (ref 12–16)
IMM GRANULOCYTES # BLD AUTO: 0.06 K/UL (ref 0–0.04)
IMM GRANULOCYTES NFR BLD AUTO: 1 % (ref 0–0.5)
LYMPHOCYTES # BLD AUTO: 1.2 K/UL (ref 1–4.8)
LYMPHOCYTES NFR BLD: 19.2 % (ref 18–48)
MAGNESIUM SERPL-MCNC: 1.8 MG/DL (ref 1.6–2.6)
MCH RBC QN AUTO: 30.1 PG (ref 27–31)
MCHC RBC AUTO-ENTMCNC: 33.2 G/DL (ref 32–36)
MCV RBC AUTO: 91 FL (ref 82–98)
MONOCYTES # BLD AUTO: 1.1 K/UL (ref 0.3–1)
MONOCYTES NFR BLD: 17.9 % (ref 4–15)
MRSA SCREEN BY PCR: NEGATIVE
NEUTROPHILS # BLD AUTO: 3.5 K/UL (ref 1.8–7.7)
NEUTROPHILS NFR BLD: 57.6 % (ref 38–73)
NRBC BLD-RTO: 0 /100 WBC
PHOSPHATE SERPL-MCNC: 2.4 MG/DL (ref 2.7–4.5)
PLATELET # BLD AUTO: 58 K/UL (ref 150–450)
PMV BLD AUTO: 11.4 FL (ref 9.2–12.9)
POTASSIUM SERPL-SCNC: 4.1 MMOL/L (ref 3.5–5.1)
PROT SERPL-MCNC: 5.1 G/DL (ref 6–8.4)
RBC # BLD AUTO: 2.49 M/UL (ref 4–5.4)
SODIUM SERPL-SCNC: 137 MMOL/L (ref 136–145)
WBC # BLD AUTO: 6.03 K/UL (ref 3.9–12.7)

## 2024-02-12 PROCEDURE — 83735 ASSAY OF MAGNESIUM: CPT | Performed by: NURSE PRACTITIONER

## 2024-02-12 PROCEDURE — 97110 THERAPEUTIC EXERCISES: CPT

## 2024-02-12 PROCEDURE — 25000003 PHARM REV CODE 250: Performed by: STUDENT IN AN ORGANIZED HEALTH CARE EDUCATION/TRAINING PROGRAM

## 2024-02-12 PROCEDURE — 80053 COMPREHEN METABOLIC PANEL: CPT | Performed by: NURSE PRACTITIONER

## 2024-02-12 PROCEDURE — 25000003 PHARM REV CODE 250: Performed by: NURSE PRACTITIONER

## 2024-02-12 PROCEDURE — 27000221 HC OXYGEN, UP TO 24 HOURS

## 2024-02-12 PROCEDURE — 97530 THERAPEUTIC ACTIVITIES: CPT | Mod: CQ

## 2024-02-12 PROCEDURE — 97116 GAIT TRAINING THERAPY: CPT | Mod: CQ

## 2024-02-12 PROCEDURE — 25000003 PHARM REV CODE 250: Performed by: INTERNAL MEDICINE

## 2024-02-12 PROCEDURE — 99223 1ST HOSP IP/OBS HIGH 75: CPT | Mod: ,,, | Performed by: UROLOGY

## 2024-02-12 PROCEDURE — 84100 ASSAY OF PHOSPHORUS: CPT | Performed by: NURSE PRACTITIONER

## 2024-02-12 PROCEDURE — 83880 ASSAY OF NATRIURETIC PEPTIDE: CPT | Performed by: STUDENT IN AN ORGANIZED HEALTH CARE EDUCATION/TRAINING PROGRAM

## 2024-02-12 PROCEDURE — 94761 N-INVAS EAR/PLS OXIMETRY MLT: CPT

## 2024-02-12 PROCEDURE — 99233 SBSQ HOSP IP/OBS HIGH 50: CPT | Mod: FS,,, | Performed by: NURSE PRACTITIONER

## 2024-02-12 PROCEDURE — 85025 COMPLETE CBC W/AUTO DIFF WBC: CPT | Performed by: NURSE PRACTITIONER

## 2024-02-12 PROCEDURE — 11000001 HC ACUTE MED/SURG PRIVATE ROOM

## 2024-02-12 PROCEDURE — 63600175 PHARM REV CODE 636 W HCPCS: Performed by: STUDENT IN AN ORGANIZED HEALTH CARE EDUCATION/TRAINING PROGRAM

## 2024-02-12 PROCEDURE — 36415 COLL VENOUS BLD VENIPUNCTURE: CPT | Performed by: STUDENT IN AN ORGANIZED HEALTH CARE EDUCATION/TRAINING PROGRAM

## 2024-02-12 PROCEDURE — 99900035 HC TECH TIME PER 15 MIN (STAT)

## 2024-02-12 RX ORDER — CARVEDILOL 3.12 MG/1
3.12 TABLET ORAL 2 TIMES DAILY
Status: DISCONTINUED | OUTPATIENT
Start: 2024-02-12 | End: 2024-02-15

## 2024-02-12 RX ADMIN — CARVEDILOL 3.12 MG: 3.12 TABLET, FILM COATED ORAL at 10:02

## 2024-02-12 RX ADMIN — FAMOTIDINE 20 MG: 20 TABLET, FILM COATED ORAL at 10:02

## 2024-02-12 RX ADMIN — CARVEDILOL 3.12 MG: 3.12 TABLET, FILM COATED ORAL at 12:02

## 2024-02-12 RX ADMIN — LACTOBACILLUS TAB 4 TABLET: TAB at 05:02

## 2024-02-12 RX ADMIN — AMIODARONE HYDROCHLORIDE 200 MG: 100 TABLET ORAL at 10:02

## 2024-02-12 RX ADMIN — MUPIROCIN: 20 OINTMENT TOPICAL at 10:02

## 2024-02-12 RX ADMIN — MEROPENEM 1 G: 1 INJECTION, POWDER, FOR SOLUTION INTRAVENOUS at 01:02

## 2024-02-12 RX ADMIN — Medication 800 MG: at 06:02

## 2024-02-12 RX ADMIN — ACETAMINOPHEN 650 MG: 325 TABLET ORAL at 04:02

## 2024-02-12 RX ADMIN — LACTOBACILLUS TAB 4 TABLET: TAB at 10:02

## 2024-02-12 RX ADMIN — MEROPENEM 1 G: 1 INJECTION, POWDER, FOR SOLUTION INTRAVENOUS at 03:02

## 2024-02-12 NOTE — PLAN OF CARE
EMY called [facility] at [phone] and spoke to Alexa she stated that the issue is the pt has out of stated [employer] insurance and the last few times she has come to the hospital it has been an issue getting an authorization for her to return so they are willing to accept her back but they are not sure if the insurance is going to approve. She stated that she is not the one that handles that, her business manger does and she is not in yet. She will have her call me as soon as she gets in which should be shortly. CM following.    02/12/24 0815   Post-Acute Status   Post-Acute Authorization Placement   Post-Acute Placement Status Pending payor review/awaiting authorization (if required)

## 2024-02-12 NOTE — ASSESSMENT & PLAN NOTE
Estimated Creatinine Clearance: 33.2 mL/min (based on SCr of 1.3 mg/dL).  Stable.  -Renally dose medications  -Avoid nephrotoxic agents  BNP elevated - avoid IVF; low Na diet

## 2024-02-12 NOTE — ASSESSMENT & PLAN NOTE
Recurrent for past few months - developing resistant organisms  -urine culture with ESBL E. Coli - stop ceftriaxone and meropenem started  ID consulted due to recurrence and resistance with large number of allergies/intolerances, nausea  Rodriguez changed on 2/9

## 2024-02-12 NOTE — ASSESSMENT & PLAN NOTE
Estimated Creatinine Clearance: 39.2 mL/min (based on SCr of 1.1 mg/dL).  Stable.  -Renally dose medications  -Avoid nephrotoxic agents

## 2024-02-12 NOTE — PROGRESS NOTES
[facility]  Department of Infectious Disease  Progress Note        PATIENT NAME: Irene العراقي  MRN: [id number]  TODAY'S DATE: 02/12/2024  ADMIT DATE: 2/8/2024  LENGTH OF STAY: 1 DAYS    PRINCIPLE PROBLEM: Anemia    REASON FOR CONSULT:  ESBL E coli with anemia and thrombocytopenia several antibiotic courses in the past 2 weeks - chronic Rodriguez     INTERVAL HISTORY      2/12/2024@0846 (Denette):  Patient seen and examined alone in her room.  She was sitting up in bed eating breakfast.  She is in a much better mood today and is not tearful.  She said she is feeling better.  She continues to cough up very small amounts of sputum but has been unable to have enough for sputum culture.  Diarrhea is improved and she denies abdominal pain, she denies fevers or chills, chest pain or palpitations.  She is hoping to be able to sit up on side of bed with physical therapy.  She tells us that she had kidney stones around age 18 but has not had any issues in the recent past. Appetite is fair but she said that eating some food, such as sausage burns her tongue..  T-max 98.3° in the last 24 hours, WBC 6.03, platelets trending up at 58, H&H 7.5/22.6, no left shift or bands.  BUN/CR 10/1.3 with creatinine clearance 33.2, ALT/AST 15/15.  BNP this morning 384.  Procalcitonin 0.07 and CRP 12.7.  Urine culture with ESBL E coli sensitive to meropenem, blood cultures no growth to date.  Retroperitoneal ultrasound pending.    Antibiotics (From admission, onward)      Start     Stop Route Frequency Ordered    02/11/24 2100  mupirocin 2 % ointment         02/16/24 2059 Nasl 2 times daily 02/11/24 1315    02/11/24 1400  meropenem (MERREM) 1 g in sodium chloride 0.9 % 100 mL IVPB (MB+)         -- IV Every 12 hours (non-standard times) 02/11/24 1306              ASSESSMENT and PLAN     Recurrent UTIs in the setting of urinary retention status post Rodriguez for the last 6 weeks  Urine culture 2/8 ESBL E coli  Prior urine cultures:  1/19 VRE Enterococcus faecium S to daptomycin and linezolid, 12/11/23 pan-sensitive Klebsiella pneumoniae, 9/21/23 Enterococcus fecalis, 9/9/23: Pantoea agglomerans, 5/9/23 pan-sensitive E coli, 4/25/23 Enterococcus faecalis   MRSA nasal swab  CRP 12.7, procalcitonin 0.07  Initial WBC 5.12, today 6.03, no left shift, no bands  Remote history of kidney stone     Thrombocytopenia, likely secondary to recent linezolid    Platelets 45 on admission, 58 today    Productive cough  Procal negative, 0.07, chest x-ray suggest mild CHF     Cholelithiasis, mild common duct dilation  Normal LFTs     PMHx: AFib on amiodarone status post TAVR and pacemaker, chronic anemia, anxiety, chronic kidney disease not on dialysis, prior ankle fracture status post repair, and recurrent UTIs    RECOMMENDATIONS:   Continue meropenem 1 g IV q.12 for ESBL E coli, dose per creatinine clearance   If diarrhea persists please send stool for C diff   Consult Urology - recurrent urinary tract infections, history of stones  Obtain CT RSS  Please collect respiratory culture if able  Continue Probiotics  Monitor plt count   Increase activity PT/OT  Continue probiotic       D/W Dr Leigh    Thank you for this consult. Please send Realm secure chat with any questions.      SUBJECTIVE    Review of Systems  Negative except as stated above in Interval History     OBJECTIVE   Temp:  [97.8 °F (36.6 °C)-98.3 °F (36.8 °C)] 98 °F (36.7 °C)  Pulse:  [80-90] 80  Resp:  [16-18] 16  SpO2:  [92 %-95 %] 95 %  BP: (108-165)/(51-74) 163/73  Temp:  [97.8 °F (36.6 °C)-98.3 °F (36.8 °C)]   Temp: 98 °F (36.7 °C) (02/12/24 0802)  Pulse: 80 (02/12/24 0829)  Resp: 16 (02/12/24 0829)  BP: (!) 163/73 (02/12/24 0802)  SpO2: 95 % (02/12/24 0829)    Intake/Output Summary (Last 24 hours) at 2/12/2024 0935  Last data filed at 2/12/2024 0646  Gross per 24 hour   Intake 640.64 ml   Output 750 ml   Net -109.36 ml        Physical Exam  General:  Sitting up in bed awake and alert eating  breakfast, she has in no acute distress, pleasant and conversant.  Eyes: Eyes with no icterus or injection. Vision grossly normal  Ears: Hearing grossly normal.  Nose: Nares patent  Mouth: Moist mucous membranes, upper dentures, lower partial. No ulcerations, erythema or exudates.  Neck: Supple, no tenderness to palpation.  Cardiovascular: Regular rate and rhythm, systolic murmur - AICD in place, no redness noted, non tender to palpation   Respiratory:  Bilateral breath sounds clear to auscultation anteriorly, on nasal cannula O2, posterior with some crackles at bases.  Gastrointestinal:  Soft and obese with active bowel sounds, no tenderness to palpation, no distention.  Genitourinary:  No suprapubic tenderness.  Rodriguez catheter in place, yellow urine  Musculoskeletal:  Moves all extremities   Skin:  Warm and dry, no obvious rashes.  Ecchymosis to left dorsum of hand.  Heel protectors in place.  Neuro:   Oriented, conversant, follows commands.   Psych:  Good mood, normal affect.  VAD:  Peripheral IV  Isolation:  Contact for ESBL    WOUNDS  2/8              Significant Labs: All pertinent labs within the past 24 hours have been reviewed.    CBC LAST 7 DAYS  Recent Labs   Lab 02/08/24  1401 02/09/24  0426 02/09/24  1631 02/10/24  0515 02/11/24  0438 02/12/24  0433   WBC 5.12 4.79 4.79 4.80 5.13 6.03   RBC 2.49* 2.65* 2.67* 2.55* 2.66* 2.49*   HGB 7.6* 8.0* 8.1* 7.7* 8.0* 7.5*   HCT 22.7* 23.7* 23.9* 23.3* 24.0* 22.6*   MCV 91 89 90 91 90 91   MCH 30.5 30.2 30.3 30.2 30.1 30.1   MCHC 33.5 33.8 33.9 33.0 33.3 33.2   RDW 13.2 13.2 13.3 13.2 13.2 13.2   PLT 45* 40* 38* 46* 38* 58*   MPV 10.1 11.1 10.7 12.8 11.3 11.4   GRAN 72.5  3.7 56.4  2.7 63.3  3.0 57.4  2.8 52.7  2.7 57.6  3.5   LYMPH 15.2*  0.8* 23.8  1.1 16.9*  0.8* 20.8  1.0 21.6  1.1 19.2  1.2   MONO 9.4  0.5 14.6  0.7 15.2*  0.7 15.4*  0.7 18.7*  1.0 17.9*  1.1*   BASO 0.01 0.01 0.01 0.02 0.02 0.03   NRBC 0 0 0 0 0 0       CHEMISTRY LAST 7  "DAYS  Recent Labs   Lab 02/08/24  1401 02/09/24  0426 02/10/24  0515 02/11/24  0438 02/12/24  0433    137 137 137 137   K 3.7 3.6 3.4* 3.4* 4.1    105 106 108 108   CO2 25 24 23 22* 23   ANIONGAP 8 8 8 7* 6*   BUN 15 14 12 9 10   CREATININE 1.4 1.2 1.2 1.1 1.3   * 78 83 79 81   CALCIUM 9.0 8.8 8.4* 8.4* 8.4*   MG  --  1.8 1.7 1.8 1.8   ALBUMIN 3.1* 2.9* 2.7* 2.8* 2.9*   PROT 5.7* 5.0* 4.8* 5.0* 5.1*   ALKPHOS 128 117 98 101 101   ALT 20 16 15 15 15   AST 19 17 15 13 15   BILITOT 0.5 0.7 0.5 0.5 0.5       Estimated Creatinine Clearance: 33.2 mL/min (based on SCr of 1.3 mg/dL).    INFLAMMATORY/PROCAL  LAST 7 DAYS  Recent Labs   Lab 02/11/24  1638   PROCAL 0.07   CRP 12.7*     No results found for: "ESR"  CRP   Date Value Ref Range Status   02/11/2024 12.7 (H) 0.0 - 8.2 mg/L Final   01/13/2021 1.39 (H) <0.76 mg/dL Final   01/11/2021 2.68 (H) <0.76 mg/dL Final   01/08/2021 2.05 (H) <0.76 mg/dL Final       PRIOR  MICROBIOLOGY:    Susceptibility data from last 90 days.  Collected Specimen Info Organism Amp/Sulbactam Ampicillin Cefazolin Cefepime Ceftriaxone Ciprofloxacin Daptomycin Ertapenem Gentamicin Levofloxacin Linezolid Meropenem Nitrofurantoin PIPERACILLIN/TAZO SUSCEPTIBILITY   02/08/24 Urine Escherichia coli ESBL  I  R  R  R  R  R   S  S  R   S  S  S   01/24/24 Blood from Antecubital, Right Arm No growth after 5 days.                 01/24/24 Blood No growth after 5 days.                 01/19/24 Urine, Catheterized Enterococcus faecium VRE   R      S     S   I    12/11/23 Urine Klebsiella pneumoniae  S   S  S  S  S   S  S  S   S  S  S     Collected Specimen Info Organism Tetracycline Tobramycin Trimeth/Sulfa Vancomycin   02/08/24 Urine Escherichia coli ESBL  R  S  R    01/24/24 Blood from Antecubital, Right Arm No growth after 5 days.       01/24/24 Blood No growth after 5 days.       01/19/24 Urine, Catheterized Enterococcus faecium VRE  R    R   12/11/23 Urine Klebsiella pneumoniae  S  S  S  "         LAST 7 DAYS MICROBIOLOGY   Microbiology Results (last 7 days)       Procedure Component Value Units Date/Time    MRSA Screen by PCR [[id number]] Collected: 02/11/24 1952    Order Status: Sent Specimen: Nasopharyngeal Swab from Nasal Updated: 02/12/24 0855    Blood culture #1 **CANNOT BE ORDERED STAT** [[id number]] Collected: 02/08/24 1530    Order Status: Completed Specimen: Blood from Peripheral, Antecubital, Right Updated: 02/11/24 2032     Blood Culture, Routine No Growth to date      No Growth to date      No Growth to date      No Growth to date    Blood culture #2 **CANNOT BE ORDERED STAT** [[id number]] Collected: 02/08/24 1618    Order Status: Completed Specimen: Blood from Peripheral, Antecubital, Right Updated: 02/11/24 2032     Blood Culture, Routine No Growth to date      No Growth to date      No Growth to date      No Growth to date    Culture, Respiratory with Gram Stain [[id number]]     Order Status: No result Specimen: Respiratory from Sputum, Expectorated     Urine culture [[id number]]  (Abnormal)  (Susceptibility) Collected: 02/08/24 1437    Order Status: Completed Specimen: Urine Updated: 02/11/24 0616     Urine Culture, Routine ESCHERICHIA COLI ESBL  >100,000 cfu/ml      Narrative:      Specimen Source->Urine              CURRENT/PREVIOUS VISIT EKG  Results for orders placed or performed during the hospital encounter of 02/08/24   EKG 12-lead    Collection Time: 02/08/24  3:39 PM   Result Value Ref Range    QRS Duration 128 ms    [facility] QTC Calculation 492 ms    Narrative    Test Reason : D64.9,    Vent. Rate : 065 BPM     Atrial Rate : 065 BPM     P-R Int : 160 ms          QRS Dur : 128 ms      QT Int : 474 ms       P-R-T Axes : 051 -17 039 degrees     QTc Int : 492 ms    Atrial-sensed ventricular-paced rhythm  Abnormal ECG  When compared with ECG of 20-JAN-2024 14:04,  Electronic ventricular pacemaker has replaced Atrial fibrillation  Vent. rate has decreased BY  59 BPM  Confirmed by  Justin Ramirez MD ([id number]) on 2/9/2024 12:03:56 PM    Referred By: CYNTHIA   SELF           Confirmed By:Justin Ramirez MD         Significant Imaging: I have reviewed all relevant and available imaging results/findings within the past 24 hours.    X-Ray Chest 1 View 02/08/24 1537    The cardiomediastinal silhouette is stable.  Cardiac pacemaker with multiple leads remains in place.  Mild prominence of the interstitial markings particularly in the lower lung fields appearing slightly more so today than on the prior exam questioning very mild pulmonary vascular distention or CHF.  No confluent infiltrate.  No pleural effusion.   Impression:    Mildly prominent lung markings in lower lung fields more so today than on the prior exam questioning very mild CHF    CT Abdomen Pelvis Without Contrast 02/08/24 1623   1. Gallbladder stones and sludge with nonspecific dilation.   2. Gastric wall thickening could relate to inadequate distension.  Gastritis or peptic ulcer disease also possible in the appropriate clinical setting.   3. Scattered colonic diverticula with possible constipation.   4. Urinary bladder wall thickening is presumably from inadequate distension.    US Abdomen Limited 02/10/24 0732   FINDINGS:   The pancreas is obscured.   Multiple gallstones are present measuring up to 5 mm.  Sonographic Lee sign is negative.  The common bile duct is mildly dilated for patient age at 10 mm.   The IVC is normal caliber.   The liver is normal size at 14.6 cm.  There is no focal hepatic lesion.   The spleen is normal size at 10.5 cm.   There is a right pleural effusion.   Impression:    Cholelithiasis.  Prominent common bile duct, nonspecific.   Right pleural effusion.      Jennifer Casey NP  Date of Service: 02/12/2024

## 2024-02-12 NOTE — PT/OT/SLP PROGRESS
Occupational Therapy   Treatment    Name: Irene العراقي  MRN: [id number]  Admitting Diagnosis:  Anemia      Recommendations:     Discharge Recommendations: Moderate Intensity Therapy  Discharge Equipment Recommendations:  none  Barriers to discharge:      Assessment:     Irene العراقي is a 83 y.o. female with a medical diagnosis of Anemia.  She presents with performance deficits affecting function are weakness, impaired endurance, impaired self care skills, impaired functional mobility, gait instability, impaired balance, decreased upper extremity function, decreased lower extremity function, decreased safety awareness, pain, decreased ROM, impaired skin, impaired cardiopulmonary response to activity and orthopedic precautions.     Rehab Prognosis:  Good; patient would benefit from acute skilled OT services to address these deficits and reach maximum level of function.       Plan:     Patient to be seen 6 x/week to address the above listed problems via self-care/home management, therapeutic activities, therapeutic exercises  Plan of Care Expires: 03/01/24  Plan of Care Reviewed with: patient    Subjective     Chief Complaint: Fatigue  Patient/Family Comments/goals: Patient agreed to UE exercises.   Pain/Comfort:  Pain Rating 1: 0/10    Objective:     Communicated with: nurse prior to session.  Patient found up in chair with oxygen, araya catheter, telemetry and pressure relief boot upon OT entry to room.    General Precautions: Standard, fall    Orthopedic Precautions: R LE partial weight bearing (WBAT with walking boot)  Braces:  (R camwalker boot)  Respiratory Status: Nasal cannula, flow 2 L/min     Occupational Performance:     Functional Mobility/Transfers:  Patient declined.     Activities of Daily Living:  Patient declined.       Treatment & Education:  Pt performed B UE resistive exercises using 3# bar x 3 sets of 10 reps shoulder press, chest press and bicep curls with rest breaks taken between each set.  OT  issued yellow theraputty and educated patient on gross grasps and tip pinch to improve hand strength. Patient performed x20 B hands exercises requiring cues for proper form.      Patient left up in chair with all lines intact, call button in reach and chair alarm on.    GOALS:   Multidisciplinary Problems       Occupational Therapy Goals          Problem: Occupational Therapy    Goal Priority Disciplines Outcome Interventions   Occupational Therapy Goal     OT, PT/OT     Description: Goals to be met by: 3/1/2024     Patient will increase functional independence with ADLs by performing:    UE Dressing with Set-up Assistance.  LE Dressing with Set-up Assistance.  Grooming while standing with Stand-by Assistance.  Toileting from toilet with Stand-by Assistance for hygiene and clothing management.   Toilet transfer to toilet with Stand-by Assistance with AD as needed.                         Time Tracking:     OT Date of Treatment: 02/12/24  OT Start Time: 1104  OT Stop Time: 1127  OT Total Time (min): 23 min    Billable Minutes:Therapeutic Exercise 23          Number of HANNAH visits since last OT visit: 0    2/12/2024

## 2024-02-12 NOTE — ASSESSMENT & PLAN NOTE
-Discontinue linezolid  -Hematology follow up; likely BMBx once UTI treated  -Trend platelets with CBC

## 2024-02-12 NOTE — ASSESSMENT & PLAN NOTE
Patient has Abnormal Phosphorus: hypophosphatemia. Will continue to monitor electrolytes closely. Will replace the affected electrolytes and repeat labs to be done after interventions completed. The patient's phosphorus results have been reviewed and are listed below.  Recent Labs   Lab 02/12/24  0433   PHOS 2.4*     -improving - did not tolerate po supplement

## 2024-02-12 NOTE — PROGRESS NOTES
[facility] Medicine  Telemedicine Progress Note    Patient Name: Irene العراقي  MRN: [id number]  Patient Class: IP- Inpatient   Admission Date: 2/8/2024  Length of Stay: 0 days  Attending Physician: Ginny Bruner MD  Primary Care Provider: Wanda Kuhn FNP-C          Subjective:     Principal Problem:Anemia  Acute Condition: resistant UTI/ thrombocytopenia        HPI:  Irene العراقي is an 84 y/o F with past medical history significant for anemia, afib, CKD and HTN who presented to the ED from [facility] for further treatment of abnormal labs.  Per report, her platelets have been taking a downward trend and are 47K today.  She also has a hemoglobin of 7.5 with no obvious source of blood loss.  Pt denies recent fevers, chest pain, dizziness, melena or hematochezia.  Denies N/V but is having some mild generalized abdominal tenderness.  CT abdomen and pelvis obtained shows nonspecific gastric wall thickening which could be consistent with gastritis or peptic ulcer disease.  The ED physician discussed the lab findings with the hematologist on call who recommends admission and transfusion of PRBCs.  Pt presented from [facility] with araya catheter in place.  She reports urinary retention and states the araya has been in place for about a week.  Araya catheter was exchanged in the ED.  Pt is currently being treated for UTI with culture positive for enterococcus faecium VRE susceptible to linezolid.  Urinalysis today remains positive. She is placed in observation under the service of hospital medicine for continued monitoring and treatment.       Overview/Hospital Course:  Irene العراقي is an 83 year old female with a past medical history of Afib, CKD, HTN, anemia and thrombocytopenia who presented with worsening anemia and thrombocytopenia in the setting of linezolid use for Enterococcus UTI. There was no evidence of gross bleeding and she was transfused a unit of PRBCs.  Hematology was consulted; workup is currently pending. Repeat urine culture shows GNR. Rocephin was started in place of linezolid.       This follow-up encounter was provided through telemedicine to address  Anemia present on admission.  Patient was transferred to the telemedicine service on:  02/11/2024   The patient location is: 316/316 A admitted 2/8/2024  1:26 PM.      Interval History/Overnight Events:   Clinical record since admit reviewed.    Patient is able to provide adequate history.    Patient complains of nausea - no vomiting associated with zyvox initially and changed to rocephin - poor po intake - usually has nausea especially with oral antibiotics.  Araya in place. No signs of bleeding.     Review of Systems   Constitutional:  Positive for activity change and fatigue. Negative for fever.   Gastrointestinal:  Positive for nausea. Negative for abdominal pain and vomiting.          I have reviewed the following on 02/11/2024:    Data  Details     [x]   Lab results reviewed   Wbc 5; hgb 8; plt 38; Cr 1.1; K 3.4; CRP 12.7    [x]   Micro reports reviewed  ESBL e coli sensitive to meropenem    []   Pathology reports reviewed      []   Imaging reports reviewed      []   Cardiology Procedure reports reviewed      [x]   Non- records/CareEverywhere notes reviewed  Documentation of araya for urine retention intermittently since 12/2023    []  Tests/studies orders placed or verified       []  Independently viewed/assessed       []  02/11/2024 Discussion of:        Inpatient Medications reviewed and prescribed for management of current problems:  Scheduled Meds:   amiodarone  200 mg Oral BID    docusate sodium  100 mg Oral BID    famotidine  20 mg Oral Daily    meropenem (MERREM) IVPB  1 g Intravenous Q12H    mupirocin   Nasal BID     Continuous Infusions:  PRN Meds:.acetaminophen, albuterol-ipratropium, aluminum-magnesium hydroxide-simethicone, hydrALAZINE, magnesium oxide, magnesium oxide, melatonin, naloxone,  ondansetron, potassium bicarbonate, potassium bicarbonate, potassium bicarbonate, potassium, sodium phosphates, potassium, sodium phosphates, potassium, sodium phosphates, prochlorperazine, simethicone, sodium chloride 0.9%      Objective:     Temp:  [98 °F (36.7 °C)-98.7 °F (37.1 °C)] 98.3 °F (36.8 °C)  Pulse:  [73-90] 87  Resp:  [17-18] 18  SpO2:  [92 %-96 %] 92 %  BP: (108-188)/(51-77) 108/51      Intake/Output Summary (Last 24 hours) at 2/11/2024 1821  Last data filed at 2/11/2024 1743  Gross per 24 hour   Intake 647.73 ml   Output 1200 ml   Net -552.27 ml        Body mass index is 25.34 kg/m².    Physical Exam  Vitals and nursing note reviewed.   Constitutional:       General: She is not in acute distress.     Appearance: She is normal weight. She is ill-appearing.   HENT:      Head: Normocephalic and atraumatic.   Eyes:      Extraocular Movements: Extraocular movements intact.   Cardiovascular:      Rate and Rhythm: Normal rate.   Pulmonary:      Effort: Pulmonary effort is normal. No tachypnea or respiratory distress.   Neurological:      General: No focal deficit present.      Mental Status: She is alert.      Cranial Nerves: No cranial nerve deficit.      Motor: No weakness.   Psychiatric:         Attention and Perception: Attention normal.         Mood and Affect: Affect is flat.         Speech: Speech normal.         Behavior: Behavior is cooperative.         Cognition and Memory: Memory is impaired.          Labs: All labs within the last 24 hours were reviewed.   Recent Results (from the past 24 hour(s))   Comprehensive Metabolic Panel (CMP)    Collection Time: 02/11/24  4:38 AM   Result Value Ref Range    Sodium 137 136 - 145 mmol/L    Potassium 3.4 (L) 3.5 - 5.1 mmol/L    Chloride 108 95 - 110 mmol/L    CO2 22 (L) 23 - 29 mmol/L    Glucose 79 70 - 110 mg/dL    BUN 9 8 - 23 mg/dL    Creatinine 1.1 0.5 - 1.4 mg/dL    Calcium 8.4 (L) 8.7 - 10.5 mg/dL    Total Protein 5.0 (L) 6.0 - 8.4 g/dL    Albumin 2.8  (L) 3.5 - 5.2 g/dL    Total Bilirubin 0.5 0.1 - 1.0 mg/dL    Alkaline Phosphatase 101 55 - 135 U/L    AST 13 10 - 40 U/L    ALT 15 10 - 44 U/L    eGFR 50 (A) >60 mL/min/1.73 m^2    Anion Gap 7 (L) 8 - 16 mmol/L   Magnesium    Collection Time: 02/11/24  4:38 AM   Result Value Ref Range    Magnesium 1.8 1.6 - 2.6 mg/dL   Phosphorus    Collection Time: 02/11/24  4:38 AM   Result Value Ref Range    Phosphorus 2.1 (L) 2.7 - 4.5 mg/dL   CBC with Automated Differential    Collection Time: 02/11/24  4:38 AM   Result Value Ref Range    WBC 5.13 3.90 - 12.70 K/uL    RBC 2.66 (L) 4.00 - 5.40 M/uL    Hemoglobin 8.0 (L) 12.0 - 16.0 g/dL    Hematocrit 24.0 (L) 37.0 - 48.5 %    MCV 90 82 - 98 fL    MCH 30.1 27.0 - 31.0 pg    MCHC 33.3 32.0 - 36.0 g/dL    RDW 13.2 11.5 - 14.5 %    Platelets 38 (LL) 150 - 450 K/uL    MPV 11.3 9.2 - 12.9 fL    Immature Granulocytes 0.8 (H) 0.0 - 0.5 %    Gran # (ANC) 2.7 1.8 - 7.7 K/uL    Immature Grans (Abs) 0.04 0.00 - 0.04 K/uL    Lymph # 1.1 1.0 - 4.8 K/uL    Mono # 1.0 0.3 - 1.0 K/uL    Eos # 0.3 0.0 - 0.5 K/uL    Baso # 0.02 0.00 - 0.20 K/uL    nRBC 0 0 /100 WBC    Gran % 52.7 38.0 - 73.0 %    Lymph % 21.6 18.0 - 48.0 %    Mono % 18.7 (H) 4.0 - 15.0 %    Eosinophil % 5.8 0.0 - 8.0 %    Basophil % 0.4 0.0 - 1.9 %    Platelet Estimate Decreased (A)     Differential Method Automated    C-Reactive Protein    Collection Time: 02/11/24  4:38 PM   Result Value Ref Range    CRP 12.7 (H) 0.0 - 8.2 mg/L   Procalcitonin    Collection Time: 02/11/24  4:38 PM   Result Value Ref Range    Procalcitonin 0.07 0.00 - 0.50 ng/mL        Lab Results   Component Value Date    QAP40FTHERQP Negative 09/09/2023       Recent Labs   Lab 02/09/24  1631 02/10/24  0515 02/11/24  0438   WBC 4.79 4.80 5.13   LYMPH 16.9*  0.8* 20.8  1.0 21.6  1.1   HGB 8.1* 7.7* 8.0*   HCT 23.9* 23.3* 24.0*   PLT 38* 46* 38*     Recent Labs   Lab 02/09/24  0426 02/10/24  0515 02/11/24  0438    137 137   K 3.6 3.4* 3.4*    106  108   CO2 24 23 22*   BUN 14 12 9   CREATININE 1.2 1.2 1.1   GLU 78 83 79   CALCIUM 8.8 8.4* 8.4*   MG 1.8 1.7 1.8   PHOS 3.0 2.5* 2.1*     Recent Labs   Lab 02/09/24  0426 02/10/24  0515 02/11/24  0438   ALKPHOS 117 98 101   ALT 16 15 15   AST 17 15 13   ALBUMIN 2.9* 2.7* 2.8*   PROT 5.0* 4.8* 5.0*   BILITOT 0.7 0.5 0.5        Recent Labs     02/09/24  1417 02/11/24  1638   FERRITIN 356*  --    CRP  --  12.7*           Microbiology: All microbiology updates for the past 24 hours have been reviewed.  Microbiology Results (last 7 days)       Procedure Component Value Units Date/Time    Urine culture [[id number]]  (Abnormal)  (Susceptibility) Collected: 02/08/24 1437    Order Status: Completed Specimen: Urine Updated: 02/11/24 0616     Urine Culture, Routine ESCHERICHIA COLI ESBL  >100,000 cfu/ml      Narrative:      Specimen Source->Urine    Blood culture #1 **CANNOT BE ORDERED STAT** [[id number]] Collected: 02/08/24 1530    Order Status: Completed Specimen: Blood from Peripheral, Antecubital, Right Updated: 02/10/24 2032     Blood Culture, Routine No Growth to date      No Growth to date      No Growth to date    Blood culture #2 **CANNOT BE ORDERED STAT** [[id number]] Collected: 02/08/24 1618    Order Status: Completed Specimen: Blood from Peripheral, Antecubital, Right Updated: 02/10/24 2032     Blood Culture, Routine No Growth to date      No Growth to date      No Growth to date              Imaging All imaging within the last 24 hours was reviewed.   ECG Results              EKG 12-lead (Final result)        Collection Time Result Time QRS Duration OHS QTC Calculation    02/08/24 15:39:44 02/09/24 12:03:57 128 492                     Final result by Interface, Lab In [facility] (02/09/24 12:04:01)                   Narrative:    Test Reason : D64.9,    Vent. Rate : 065 BPM     Atrial Rate : 065 BPM     P-R Int : 160 ms          QRS Dur : 128 ms      QT Int : 474 ms       P-R-T Axes : 051 -17 039 degrees     QTc  Int : 492 ms    Atrial-sensed ventricular-paced rhythm  Abnormal ECG  When compared with ECG of 20-JAN-2024 14:04,  Electronic ventricular pacemaker has replaced Atrial fibrillation  Vent. rate has decreased BY  59 BPM  Confirmed by Justin Ramirez MD ([id number]) on 2/9/2024 12:03:56 PM    Referred By: [email or web address]   SELF           Confirmed By:Justin Ramirez MD                                    Results for orders placed during the hospital encounter of 12/11/23    Echo    Interpretation Summary    Left Ventricle: The left ventricle is normal in size. Normal wall thickness. Septal motion is consistent with pacing. There is low normal systolic function with a visually estimated ejection fraction of 50 - 55%. Grade II diastolic dysfunction.    Right Ventricle: Normal right ventricular cavity size. Systolic function is normal.    Left Atrium: Left atrium is mildly dilated.    Right Atrium: Right atrium is mildly dilated.    Aortic Valve: There is a well-seated, normally functioning bioprosthetic valve in the aortic position. It is reported to be a 29 mm Medtronic valve. Mild paravalvular regurgitation.    Mitral Valve: Moderately calcified leaflets. There is moderate mitral annular calcification present. There is mild stenosis. The mean pressure gradient across the mitral valve is 3 mmHg at a heart rate of 67 bpm. There is mild regurgitation.    Tricuspid Valve: There is mild to moderate regurgitation.    The estimated pulmonary artery systolic pressure is 42 mmHg.      US Abdomen Limited  Narrative: EXAMINATION:  US ABDOMEN LIMITED    CLINICAL HISTORY:  thrombocytopenia;  Chronic kidney disease, stage 3b    TECHNIQUE:  Limited ultrasound of the right upper quadrant of the abdomen (including pancreas, liver, gallbladder, common bile duct, and right kidney) was performed.    COMPARISON:  None.    FINDINGS:  The pancreas is obscured.    Multiple gallstones are present measuring up to 5 mm.  Sonographic Lee sign is  negative.  The common bile duct is mildly dilated for patient age at 10 mm.    The IVC is normal caliber.    The liver is normal size at 14.6 cm.  There is no focal hepatic lesion.    The spleen is normal size at 10.5 cm.    There is a right pleural effusion.  Impression: Cholelithiasis.  Prominent common bile duct, nonspecific.    Right pleural effusion.    Electronically signed by: Yoel Mcmahan MD  Date:    02/10/2024  Time:    07:32           Assessment/Plan:      * Anemia  Stable. Appears to be secondary to chronic disease.  -S/p one unit PRBCs  -Trend Hgb with CBC  -Hematology following    Hypophosphatemia  Patient has Abnormal Phosphorus: hypophosphatemia. Will continue to monitor electrolytes closely. Will replace the affected electrolytes and repeat labs to be done after interventions completed. The patient's phosphorus results have been reviewed and are listed below.  Recent Labs   Lab 02/11/24  7849   PHOS 2.1*        Urinary retention  -Rodriguez changed on 2/9    UTI (urinary tract infection)  Recurrent for past few months - developing resistant organisms  -urine culture with ESBL E. Coli - stop ceftriaxone and meropenem started  ID consulted due to recurrence and resistance with large number of allergies/intolerances, nausea  Rodriguez changed on 2/9        Stage 3b chronic kidney disease  Estimated Creatinine Clearance: 39.2 mL/min (based on SCr of 1.1 mg/dL).  Stable.  -Renally dose medications  -Avoid nephrotoxic agents    Hypertension  Stable.  -Continue to monitor    PAF (paroxysmal atrial fibrillation)  -Telemetry  -Hold anticoagulation  -Amiodarone    Hypokalemia  Replete as tolerated to goal K of 4 - not tolerating po  Monitor Mg      Thrombocytopenia  -Discontinue linezolid  -Hematology follow up; likely BMBx once UTI treated  -Trend platelets with CBC          VTE Risk Mitigation (From admission, onward)           Ordered     IP VTE HIGH RISK PATIENT  Once         02/08/24 1916     Place  sequential compression device  Until discontinued         02/08/24 1916     Reason for No Pharmacological VTE Prophylaxis  Once        Question Answer Comment   Reasons: Already adequately anticoagulated on oral Anticoagulants    Reasons: Active Bleeding        02/08/24 1916                          I have completed this tele-visit with the assistance of a telepresenter.    The attending portion of this evaluation, treatment, and documentation was performed per Ginny Bruner MD via Telemedicine AudioVisual using the secure [employer] software platform with 2 way audio/video. The provider was located off-site and the patient is located in the hospital. The aforementioned video software was utilized to document the relevant history and physical exam    Ginny Bruner MD  Department of Hospital Medicine   [facility]/Surg

## 2024-02-12 NOTE — ASSESSMENT & PLAN NOTE
Recurrent for past few months - developing resistant organisms; recent chronic araya for retention  Araya changed on 2/9  -urine culture with ESBL E. Coli - stop ceftriaxone and meropenem started - tolerating without nausea  ID consulted due to recurrence and resistance with large number of allergies/intolerances, nausea; appreciate recs: renal stone CT and Urology consulted

## 2024-02-12 NOTE — CONSULTS
[facility] Urology Consult Note:    Irene العراقي is a 83 y.o. female who presents for recurrent urinary tract infection.    Patient with a history of atrial fibrillation, CKD, HTN and thrombocytopenia who presented to the emergency department on 2/8/24 after outpatient labs revealed anemia and low platelets.     Renal ultrasound on 2/12/24 with unremarkable appearance of kidneys and bilateral pleural effusions. CT renal stone on 2/12/24 with moderate bilateral pleural effusions, defibrillator, diverticulosis and cholelithiasis. Admitted for management and started on Meropenem. Urine culture with ESBL E. Coli UTI. Urology consulted by Dr. Bruner to assist with management.     On review of records, she has had several 7 urinary tract infections over the past 12 months. Has a chronic araya due to urinary retention. No known constipation.     States she is from [place] and wishes to move back soon.     Denies any fever, chills, gross hematuria, flank pain, bone pain, unintentional weight loss,  trauma or history of  malignancy.       Urine culture  ESBL E. Coli 2/8/24  Entero  1/19/24  Klebsiella 12/11/23  Entero  9/21/23  Pantoea 9/9/23  E. Coli  5/9/23  E. Coli  4/25/23    Past Medical History:   Diagnosis Date    Anemia, unspecified     Atrial fibrillation 01/25/2021    CKD (chronic kidney disease)     Hypertension        Past Surgical History:   Procedure Laterality Date    A-V CARDIAC PACEMAKER INSERTION  11/2/2023    Procedure: Dual Chamber PPM RM 2617 ([employer]);  Surgeon: Andreas James III, MD;  Location: [facility] CATH;  Service: Cardiology;;    ANGIOGRAM, CORONARY, WITH LEFT HEART CATHETERIZATION Left 4/19/2023    Procedure: Angiogram, Coronary, with Left Heart Cath;  Surgeon: Kevin Redmond MD;  Location: [facility] CATH/EP LAB;  Service: Cardiology;  Laterality: Left;    BREAST SURGERY      COLONOSCOPY N/A 4/16/2023    Procedure: COLONOSCOPY;  Surgeon: Kvng Mensah MD;  Location: [facility] ENDO;   Service: Endoscopy;  Laterality: N/A;    COLONOSCOPY W/ POLYPECTOMY  04/16/2023    OPEN REDUCTION AND INTERNAL FIXATION (ORIF) OF INJURY OF ANKLE Right 12/13/2023    Procedure: ORIF, ANKLE;  Surgeon: Chaitanya Garrison MD;  Location: [facility] OR;  Service: Orthopedics;  Laterality: Right;  Synthes    TRANSCATHETER AORTIC VALVE REPLACEMENT (TAVR)  11/1/2023    Procedure: (TAVR);  Surgeon: Juliano Moncada MD;  Location: [facility] CATH;  Service: Cardiology;;    TRANSCATHETER AORTIC VALVE REPLACEMENT (TAVR)  11/1/2023    Procedure: (TAVR)- Surgeon;  Surgeon: Adebayo Guzman MD;  Location: [facility] CATH;  Service: Peripheral Vascular;;       Family History   Problem Relation Age of Onset    Cancer Mother     Cancer Father        Review of patient's allergies indicates:   Allergen Reactions    Cefuroxime     Hydrocodone     Meperidine     Meperidine hcl Nausea And Vomiting    Persantine [dipyridamole]     Nitrofurantoin macrocrystal      Headache , went into Afib     Vicodin [hydrocodone-acetaminophen] Nausea And Vomiting       Medications Reviewed: see MAR    PHYSICAL EXAM:    Vitals:    02/12/24 1520   BP: (!) 110/54   Pulse: 75   Resp: 18   Temp: 97.9 °F (36.6 °C)     Body mass index is 25.34 kg/m².       General: Alert, cooperative, no distress, appears stated age  Abdomen: Soft, non-tender, no CVA tenderness, non-distended  : Rodriguez in place with clear urine      LABS:    Recent Results (from the past 336 hour(s))   Type & Screen    Collection Time: 02/08/24  2:01 PM   Result Value Ref Range    Group & Rh O POS     Indirect Carlos NEG     Specimen Outdate 02/11/2024 23:59    CBC auto differential    Collection Time: 02/08/24  2:01 PM   Result Value Ref Range    WBC 5.12 3.90 - 12.70 K/uL    RBC 2.49 (L) 4.00 - 5.40 M/uL    Hemoglobin 7.6 (L) 12.0 - 16.0 g/dL    Hematocrit 22.7 (L) 37.0 - 48.5 %    MCV 91 82 - 98 fL    MCH 30.5 27.0 - 31.0 pg    MCHC 33.5 32.0 - 36.0 g/dL    RDW 13.2 11.5 - 14.5 %    Platelets 45 (LL) 150 -  450 K/uL    MPV 10.1 9.2 - 12.9 fL    Immature Granulocytes 0.4 0.0 - 0.5 %    Gran # (ANC) 3.7 1.8 - 7.7 K/uL    Immature Grans (Abs) 0.02 0.00 - 0.04 K/uL    Lymph # 0.8 (L) 1.0 - 4.8 K/uL    Mono # 0.5 0.3 - 1.0 K/uL    Eos # 0.1 0.0 - 0.5 K/uL    Baso # 0.01 0.00 - 0.20 K/uL    nRBC 0 0 /100 WBC    Gran % 72.5 38.0 - 73.0 %    Lymph % 15.2 (L) 18.0 - 48.0 %    Mono % 9.4 4.0 - 15.0 %    Eosinophil % 2.3 0.0 - 8.0 %    Basophil % 0.2 0.0 - 1.9 %    Differential Method Automated    Comprehensive metabolic panel    Collection Time: 02/08/24  2:01 PM   Result Value Ref Range    Sodium 137 136 - 145 mmol/L    Potassium 3.7 3.5 - 5.1 mmol/L    Chloride 104 95 - 110 mmol/L    CO2 25 23 - 29 mmol/L    Glucose 123 (H) 70 - 110 mg/dL    BUN 15 8 - 23 mg/dL    Creatinine 1.4 0.5 - 1.4 mg/dL    Calcium 9.0 8.7 - 10.5 mg/dL    Total Protein 5.7 (L) 6.0 - 8.4 g/dL    Albumin 3.1 (L) 3.5 - 5.2 g/dL    Total Bilirubin 0.5 0.1 - 1.0 mg/dL    Alkaline Phosphatase 128 55 - 135 U/L    AST 19 10 - 40 U/L    ALT 20 10 - 44 U/L    eGFR 37 (A) >60 mL/min/1.73 m^2    Anion Gap 8 8 - 16 mmol/L   Prepare RBC 1 Unit    Collection Time: 02/08/24  2:01 PM   Result Value Ref Range    UNIT NUMBER [id number]     Product Code J0843H71     DISPENSE STATUS TRANSFUSED     CODING SYSTEM VWPP120     Unit Blood Type Code 5100     Unit Blood Type O POS     Unit Expiration [id number]     CROSSMATCH INTERPRETATION Compatible    Brain natriuretic peptide    Collection Time: 02/08/24  2:01 PM   Result Value Ref Range     (H) 0 - 99 pg/mL   Troponin I    Collection Time: 02/08/24  2:01 PM   Result Value Ref Range    Troponin I 0.026 0.000 - 0.026 ng/mL   Ferritin    Collection Time: 02/08/24  2:02 PM   Result Value Ref Range    Ferritin 339 (H) 20.0 - 300.0 ng/mL   Iron and TIBC    Collection Time: 02/08/24  2:02 PM   Result Value Ref Range    Iron 157 30 - 160 ug/dL    Transferrin 168 (L) 200 - 375 mg/dL    TIBC 235 (L) 250 - 450 ug/dL     Saturated Iron 67 (H) 20 - 50 %   Urinalysis, Reflex to Urine Culture Urine, Clean Catch    Collection Time: 02/08/24  2:37 PM    Specimen: Urine   Result Value Ref Range    Specimen UA Urine, Catheterized     Color, UA Orange (A) Yellow, Straw, Isabel    Appearance, UA Cloudy (A) Clear    pH, UA 6.0 5.0 - 8.0    Specific Gravity, UA 1.020 1.005 - 1.030    Protein, UA 1+ (A) Negative    Glucose, UA Negative Negative    Ketones, UA Negative Negative    Bilirubin (UA) Negative Negative    Occult Blood UA 3+ (A) Negative    Nitrite, UA Positive (A) Negative    Urobilinogen, UA Negative <2.0 EU/dL    Leukocytes, UA 3+ (A) Negative   Urinalysis Microscopic    Collection Time: 02/08/24  2:37 PM   Result Value Ref Range    RBC, UA 10 (H) 0 - 4 /hpf    WBC, UA >100 (H) 0 - 5 /hpf    WBC Clumps, UA Rare None-Rare    Bacteria Moderate (A) None-Occ /hpf    Yeast, UA None None    Squam Epithel, UA 3 /hpf    Non-Squam Epith 3 (A) <1/hpf /hpf    Hyaline Casts, UA 0 0-1/lpf /lpf    Unclass Brandee UA 8 None-Moderate    Microscopic Comment SEE COMMENT    Urine culture    Collection Time: 02/08/24  2:37 PM    Specimen: Urine   Result Value Ref Range    Urine Culture, Routine ESCHERICHIA COLI ESBL  >100,000 cfu/ml   (A)        Susceptibility    Escherichia coli ESBL - CULTURE, URINE     Amp/Sulbactam 16/8 Intermediate mcg/mL     Ampicillin >16 Resistant mcg/mL     Ceftriaxone >32 Resistant mcg/mL     Cefazolin >16 Resistant mcg/mL     Ciprofloxacin >2 Resistant mcg/mL     Cefepime >16 Resistant mcg/mL     Ertapenem <=0.5 Sensitive mcg/mL     Nitrofurantoin <=32 Sensitive mcg/mL     Gentamicin <=4 Sensitive mcg/mL     Levofloxacin >4 Resistant mcg/mL     Meropenem <=1 Sensitive mcg/mL     Piperacillin/Tazo <=16 Sensitive mcg/mL     Trimeth/Sulfa >2/38 Resistant mcg/mL     Tetracycline >8 Resistant mcg/mL     Tobramycin <=4 Sensitive mcg/mL   Blood culture #1 **CANNOT BE ORDERED STAT**    Collection Time: 02/08/24  3:30 PM    Specimen:  Peripheral, Antecubital, Right; Blood   Result Value Ref Range    Blood Culture, Routine No Growth to date     Blood Culture, Routine No Growth to date     Blood Culture, Routine No Growth to date     Blood Culture, Routine No Growth to date    EKG 12-lead    Collection Time: 02/08/24  3:39 PM   Result Value Ref Range    QRS Duration 128 ms    [facility] QTC Calculation 492 ms   Blood culture #2 **CANNOT BE ORDERED STAT**    Collection Time: 02/08/24  4:18 PM    Specimen: Peripheral, Antecubital, Right; Blood   Result Value Ref Range    Blood Culture, Routine No Growth to date     Blood Culture, Routine No Growth to date     Blood Culture, Routine No Growth to date     Blood Culture, Routine No Growth to date    Lactic acid, plasma    Collection Time: 02/08/24  4:18 PM   Result Value Ref Range    Lactate (Lactic Acid) 1.3 0.5 - 2.2 mmol/L   Comprehensive Metabolic Panel (CMP)    Collection Time: 02/09/24  4:26 AM   Result Value Ref Range    Sodium 137 136 - 145 mmol/L    Potassium 3.6 3.5 - 5.1 mmol/L    Chloride 105 95 - 110 mmol/L    CO2 24 23 - 29 mmol/L    Glucose 78 70 - 110 mg/dL    BUN 14 8 - 23 mg/dL    Creatinine 1.2 0.5 - 1.4 mg/dL    Calcium 8.8 8.7 - 10.5 mg/dL    Total Protein 5.0 (L) 6.0 - 8.4 g/dL    Albumin 2.9 (L) 3.5 - 5.2 g/dL    Total Bilirubin 0.7 0.1 - 1.0 mg/dL    Alkaline Phosphatase 117 55 - 135 U/L    AST 17 10 - 40 U/L    ALT 16 10 - 44 U/L    eGFR 45 (A) >60 mL/min/1.73 m^2    Anion Gap 8 8 - 16 mmol/L   Magnesium    Collection Time: 02/09/24  4:26 AM   Result Value Ref Range    Magnesium 1.8 1.6 - 2.6 mg/dL   Phosphorus    Collection Time: 02/09/24  4:26 AM   Result Value Ref Range    Phosphorus 3.0 2.7 - 4.5 mg/dL   CBC with Automated Differential    Collection Time: 02/09/24  4:26 AM   Result Value Ref Range    WBC 4.79 3.90 - 12.70 K/uL    RBC 2.65 (L) 4.00 - 5.40 M/uL    Hemoglobin 8.0 (L) 12.0 - 16.0 g/dL    Hematocrit 23.7 (L) 37.0 - 48.5 %    MCV 89 82 - 98 fL    MCH 30.2 27.0 - 31.0 pg     MCHC 33.8 32.0 - 36.0 g/dL    RDW 13.2 11.5 - 14.5 %    Platelets 40 (LL) 150 - 450 K/uL    MPV 11.1 9.2 - 12.9 fL    Immature Granulocytes 0.8 (H) 0.0 - 0.5 %    Gran # (ANC) 2.7 1.8 - 7.7 K/uL    Immature Grans (Abs) 0.04 0.00 - 0.04 K/uL    Lymph # 1.1 1.0 - 4.8 K/uL    Mono # 0.7 0.3 - 1.0 K/uL    Eos # 0.2 0.0 - 0.5 K/uL    Baso # 0.01 0.00 - 0.20 K/uL    nRBC 0 0 /100 WBC    Gran % 56.4 38.0 - 73.0 %    Lymph % 23.8 18.0 - 48.0 %    Mono % 14.6 4.0 - 15.0 %    Eosinophil % 4.2 0.0 - 8.0 %    Basophil % 0.2 0.0 - 1.9 %    Differential Method Automated    Iron and TIBC    Collection Time: 02/09/24  2:17 PM   Result Value Ref Range    Iron 86 30 - 160 ug/dL    Transferrin 161 (L) 200 - 375 mg/dL    TIBC 225 (L) 250 - 450 ug/dL    Saturated Iron 38 20 - 50 %   Ferritin    Collection Time: 02/09/24  2:17 PM   Result Value Ref Range    Ferritin 356 (H) 20.0 - 300.0 ng/mL   Reticulocytes    Collection Time: 02/09/24  2:17 PM   Result Value Ref Range    Retic 0.3 (L) 0.5 - 2.5 %   Vitamin B12    Collection Time: 02/09/24  2:17 PM   Result Value Ref Range    Vitamin B-12 538 210 - 950 pg/mL   Folate    Collection Time: 02/09/24  2:17 PM   Result Value Ref Range    Folate 8.6 4.0 - 24.0 ng/mL   Direct antiglobulin test    Collection Time: 02/09/24  4:29 PM   Result Value Ref Range    Direct Carlos (SEJAL) NEG    CBC Auto Differential    Collection Time: 02/09/24  4:31 PM   Result Value Ref Range    WBC 4.79 3.90 - 12.70 K/uL    RBC 2.67 (L) 4.00 - 5.40 M/uL    Hemoglobin 8.1 (L) 12.0 - 16.0 g/dL    Hematocrit 23.9 (L) 37.0 - 48.5 %    MCV 90 82 - 98 fL    MCH 30.3 27.0 - 31.0 pg    MCHC 33.9 32.0 - 36.0 g/dL    RDW 13.3 11.5 - 14.5 %    Platelets 38 (LL) 150 - 450 K/uL    MPV 10.7 9.2 - 12.9 fL    Immature Granulocytes 0.6 (H) 0.0 - 0.5 %    Gran # (ANC) 3.0 1.8 - 7.7 K/uL    Immature Grans (Abs) 0.03 0.00 - 0.04 K/uL    Lymph # 0.8 (L) 1.0 - 4.8 K/uL    Mono # 0.7 0.3 - 1.0 K/uL    Eos # 0.2 0.0 - 0.5 K/uL    Baso #  0.01 0.00 - 0.20 K/uL    nRBC 0 0 /100 WBC    Gran % 63.3 38.0 - 73.0 %    Lymph % 16.9 (L) 18.0 - 48.0 %    Mono % 15.2 (H) 4.0 - 15.0 %    Eosinophil % 3.8 0.0 - 8.0 %    Basophil % 0.2 0.0 - 1.9 %    Platelet Estimate Decreased (A)     Differential Method Automated    Comprehensive Metabolic Panel (CMP)    Collection Time: 02/10/24  5:15 AM   Result Value Ref Range    Sodium 137 136 - 145 mmol/L    Potassium 3.4 (L) 3.5 - 5.1 mmol/L    Chloride 106 95 - 110 mmol/L    CO2 23 23 - 29 mmol/L    Glucose 83 70 - 110 mg/dL    BUN 12 8 - 23 mg/dL    Creatinine 1.2 0.5 - 1.4 mg/dL    Calcium 8.4 (L) 8.7 - 10.5 mg/dL    Total Protein 4.8 (L) 6.0 - 8.4 g/dL    Albumin 2.7 (L) 3.5 - 5.2 g/dL    Total Bilirubin 0.5 0.1 - 1.0 mg/dL    Alkaline Phosphatase 98 55 - 135 U/L    AST 15 10 - 40 U/L    ALT 15 10 - 44 U/L    eGFR 45 (A) >60 mL/min/1.73 m^2    Anion Gap 8 8 - 16 mmol/L   Magnesium    Collection Time: 02/10/24  5:15 AM   Result Value Ref Range    Magnesium 1.7 1.6 - 2.6 mg/dL   Phosphorus    Collection Time: 02/10/24  5:15 AM   Result Value Ref Range    Phosphorus 2.5 (L) 2.7 - 4.5 mg/dL   CBC with Automated Differential    Collection Time: 02/10/24  5:15 AM   Result Value Ref Range    WBC 4.80 3.90 - 12.70 K/uL    RBC 2.55 (L) 4.00 - 5.40 M/uL    Hemoglobin 7.7 (L) 12.0 - 16.0 g/dL    Hematocrit 23.3 (L) 37.0 - 48.5 %    MCV 91 82 - 98 fL    MCH 30.2 27.0 - 31.0 pg    MCHC 33.0 32.0 - 36.0 g/dL    RDW 13.2 11.5 - 14.5 %    Platelets 46 (LL) 150 - 450 K/uL    MPV 12.8 9.2 - 12.9 fL    Immature Granulocytes 0.4 0.0 - 0.5 %    Gran # (ANC) 2.8 1.8 - 7.7 K/uL    Immature Grans (Abs) 0.02 0.00 - 0.04 K/uL    Lymph # 1.0 1.0 - 4.8 K/uL    Mono # 0.7 0.3 - 1.0 K/uL    Eos # 0.3 0.0 - 0.5 K/uL    Baso # 0.02 0.00 - 0.20 K/uL    nRBC 0 0 /100 WBC    Gran % 57.4 38.0 - 73.0 %    Lymph % 20.8 18.0 - 48.0 %    Mono % 15.4 (H) 4.0 - 15.0 %    Eosinophil % 5.6 0.0 - 8.0 %    Basophil % 0.4 0.0 - 1.9 %    Differential Method  Automated    Comprehensive Metabolic Panel (CMP)    Collection Time: 02/11/24  4:38 AM   Result Value Ref Range    Sodium 137 136 - 145 mmol/L    Potassium 3.4 (L) 3.5 - 5.1 mmol/L    Chloride 108 95 - 110 mmol/L    CO2 22 (L) 23 - 29 mmol/L    Glucose 79 70 - 110 mg/dL    BUN 9 8 - 23 mg/dL    Creatinine 1.1 0.5 - 1.4 mg/dL    Calcium 8.4 (L) 8.7 - 10.5 mg/dL    Total Protein 5.0 (L) 6.0 - 8.4 g/dL    Albumin 2.8 (L) 3.5 - 5.2 g/dL    Total Bilirubin 0.5 0.1 - 1.0 mg/dL    Alkaline Phosphatase 101 55 - 135 U/L    AST 13 10 - 40 U/L    ALT 15 10 - 44 U/L    eGFR 50 (A) >60 mL/min/1.73 m^2    Anion Gap 7 (L) 8 - 16 mmol/L   Magnesium    Collection Time: 02/11/24  4:38 AM   Result Value Ref Range    Magnesium 1.8 1.6 - 2.6 mg/dL   Phosphorus    Collection Time: 02/11/24  4:38 AM   Result Value Ref Range    Phosphorus 2.1 (L) 2.7 - 4.5 mg/dL   CBC with Automated Differential    Collection Time: 02/11/24  4:38 AM   Result Value Ref Range    WBC 5.13 3.90 - 12.70 K/uL    RBC 2.66 (L) 4.00 - 5.40 M/uL    Hemoglobin 8.0 (L) 12.0 - 16.0 g/dL    Hematocrit 24.0 (L) 37.0 - 48.5 %    MCV 90 82 - 98 fL    MCH 30.1 27.0 - 31.0 pg    MCHC 33.3 32.0 - 36.0 g/dL    RDW 13.2 11.5 - 14.5 %    Platelets 38 (LL) 150 - 450 K/uL    MPV 11.3 9.2 - 12.9 fL    Immature Granulocytes 0.8 (H) 0.0 - 0.5 %    Gran # (ANC) 2.7 1.8 - 7.7 K/uL    Immature Grans (Abs) 0.04 0.00 - 0.04 K/uL    Lymph # 1.1 1.0 - 4.8 K/uL    Mono # 1.0 0.3 - 1.0 K/uL    Eos # 0.3 0.0 - 0.5 K/uL    Baso # 0.02 0.00 - 0.20 K/uL    nRBC 0 0 /100 WBC    Gran % 52.7 38.0 - 73.0 %    Lymph % 21.6 18.0 - 48.0 %    Mono % 18.7 (H) 4.0 - 15.0 %    Eosinophil % 5.8 0.0 - 8.0 %    Basophil % 0.4 0.0 - 1.9 %    Platelet Estimate Decreased (A)     Differential Method Automated    C-Reactive Protein    Collection Time: 02/11/24  4:38 PM   Result Value Ref Range    CRP 12.7 (H) 0.0 - 8.2 mg/L   Procalcitonin    Collection Time: 02/11/24  4:38 PM   Result Value Ref Range     Procalcitonin 0.07 0.00 - 0.50 ng/mL   MRSA Screen by PCR    Collection Time: 02/11/24  7:52 PM    Specimen: Nasal; Nasopharyngeal Swab   Result Value Ref Range    MRSA SCREEN BY PCR Negative Negative   Comprehensive Metabolic Panel (CMP)    Collection Time: 02/12/24  4:33 AM   Result Value Ref Range    Sodium 137 136 - 145 mmol/L    Potassium 4.1 3.5 - 5.1 mmol/L    Chloride 108 95 - 110 mmol/L    CO2 23 23 - 29 mmol/L    Glucose 81 70 - 110 mg/dL    BUN 10 8 - 23 mg/dL    Creatinine 1.3 0.5 - 1.4 mg/dL    Calcium 8.4 (L) 8.7 - 10.5 mg/dL    Total Protein 5.1 (L) 6.0 - 8.4 g/dL    Albumin 2.9 (L) 3.5 - 5.2 g/dL    Total Bilirubin 0.5 0.1 - 1.0 mg/dL    Alkaline Phosphatase 101 55 - 135 U/L    AST 15 10 - 40 U/L    ALT 15 10 - 44 U/L    eGFR 41 (A) >60 mL/min/1.73 m^2    Anion Gap 6 (L) 8 - 16 mmol/L   Magnesium    Collection Time: 02/12/24  4:33 AM   Result Value Ref Range    Magnesium 1.8 1.6 - 2.6 mg/dL   Phosphorus    Collection Time: 02/12/24  4:33 AM   Result Value Ref Range    Phosphorus 2.4 (L) 2.7 - 4.5 mg/dL   CBC with Automated Differential    Collection Time: 02/12/24  4:33 AM   Result Value Ref Range    WBC 6.03 3.90 - 12.70 K/uL    RBC 2.49 (L) 4.00 - 5.40 M/uL    Hemoglobin 7.5 (L) 12.0 - 16.0 g/dL    Hematocrit 22.6 (L) 37.0 - 48.5 %    MCV 91 82 - 98 fL    MCH 30.1 27.0 - 31.0 pg    MCHC 33.2 32.0 - 36.0 g/dL    RDW 13.2 11.5 - 14.5 %    Platelets 58 (L) 150 - 450 K/uL    MPV 11.4 9.2 - 12.9 fL    Immature Granulocytes 1.0 (H) 0.0 - 0.5 %    Gran # (ANC) 3.5 1.8 - 7.7 K/uL    Immature Grans (Abs) 0.06 (H) 0.00 - 0.04 K/uL    Lymph # 1.2 1.0 - 4.8 K/uL    Mono # 1.1 (H) 0.3 - 1.0 K/uL    Eos # 0.2 0.0 - 0.5 K/uL    Baso # 0.03 0.00 - 0.20 K/uL    nRBC 0 0 /100 WBC    Gran % 57.6 38.0 - 73.0 %    Lymph % 19.2 18.0 - 48.0 %    Mono % 17.9 (H) 4.0 - 15.0 %    Eosinophil % 3.8 0.0 - 8.0 %    Basophil % 0.5 0.0 - 1.9 %    Differential Method Automated    BNP    Collection Time: 02/12/24  4:33 AM   Result  Value Ref Range     (H) 0 - 99 pg/mL             Assessment/Diagnosis:    Recurrent UTI    Plans:    - UTI is multifactorial and can be secondary to nephrolithiasis, decreased estrogen levels after menopause, anatomic abnormalities, sexual intercourse, genetic predisposition, chronic araya, constipation, medications, dehydration and urinary retention.   - Recommend increasing water intake, start cranberry capsules once daily. Also instructed to avoid long-term antibiotic use as this has been shown to increase bacterial resistance.   - We discussed the benefit of estrace cream placed vaginally twice weekly if no history of MI, DVT, PE, cervical, uterine or ovarian cancer.   - Continue araya catheter given her retention.   - Continue culture specific Abx therapy.   - Offered follow up for cystoscopy as an outpatient for further evaluation. States she would prefer to establish care in [place] where she is from, but also understands that she can follow up here as well should she change her mind.

## 2024-02-12 NOTE — SUBJECTIVE & OBJECTIVE
This follow-up encounter was provided through telemedicine to address  Anemia present on admission.  Patient was transferred to the telemedicine service on:  02/12/2024   The patient location is: 316[address] A admitted 2/8/2024  1:26 PM.      Interval History/Overnight Events:     Patient is able to provide adequate history.  Updated patient's niece by phone on plan of care.     Uneventful night and nausea resolved - plt improved today - she is very anxious communicating via video and would prefer to have a bedside MD.  She was able to eat this am.     Review of Systems   Constitutional:  Positive for activity change and fatigue. Negative for fever.   Gastrointestinal:  Negative for abdominal pain, nausea and vomiting.   Psychiatric/Behavioral:  The patient is nervous/anxious.           I have reviewed the following on 02/12/2024:    Data  Details     [x]   Lab results reviewed   Wbc 6; hgb 7.5; plt 58; Cr 1.3; K 4.1    [x]   Micro reports reviewed  ESBL e coli sensitive to meropenem    []   Pathology reports reviewed      []   Imaging reports reviewed      []   Cardiology Procedure reports reviewed      []   Non- records/CareEverywhere notes reviewed      []  Tests/studies orders placed or verified       []  Independently viewed/assessed       [x]  02/12/2024 Discussion of:  Plan of care with ID       Inpatient Medications reviewed and prescribed for management of current problems:  Scheduled Meds:   amiodarone  200 mg Oral BID    famotidine  20 mg Oral Daily    Lactobacillus acidoph-L.bulgar  4 tablet Oral TID WM    meropenem (MERREM) IVPB  1 g Intravenous Q12H    mupirocin   Nasal BID     Continuous Infusions:  PRN Meds:.acetaminophen, albuterol-ipratropium, aluminum-magnesium hydroxide-simethicone, hydrALAZINE, magnesium oxide, magnesium oxide, melatonin, naloxone, ondansetron, potassium bicarbonate, potassium bicarbonate, potassium bicarbonate, prochlorperazine, simethicone, sodium chloride 0.9%      Objective:      Temp:  [97.8 °F (36.6 °C)-98.3 °F (36.8 °C)] 98 °F (36.7 °C)  Pulse:  [80-90] 80  Resp:  [16-18] 16  SpO2:  [92 %-95 %] 95 %  BP: (108-165)/(51-74) 163/73      Intake/Output Summary (Last 24 hours) at 2/12/2024 1201  Last data filed at 2/12/2024 0646  Gross per 24 hour   Intake 640.64 ml   Output 750 ml   Net -109.36 ml          Body mass index is 25.34 kg/m².    Physical Exam  Vitals and nursing note reviewed.   Constitutional:       General: She is not in acute distress.     Appearance: She is normal weight. She is ill-appearing.   HENT:      Head: Normocephalic and atraumatic.   Eyes:      Extraocular Movements: Extraocular movements intact.   Cardiovascular:      Rate and Rhythm: Normal rate.   Pulmonary:      Effort: Pulmonary effort is normal. No tachypnea or respiratory distress.   Neurological:      General: No focal deficit present.      Mental Status: She is alert.      Cranial Nerves: No cranial nerve deficit.      Motor: No weakness.   Psychiatric:         Attention and Perception: Attention normal.         Mood and Affect: Mood is anxious.         Speech: Speech is rapid and pressured.         Behavior: Behavior is cooperative.         Cognition and Memory: Memory is impaired.          Labs: All labs within the last 24 hours were reviewed.   Recent Results (from the past 24 hour(s))   C-Reactive Protein    Collection Time: 02/11/24  4:38 PM   Result Value Ref Range    CRP 12.7 (H) 0.0 - 8.2 mg/L   Procalcitonin    Collection Time: 02/11/24  4:38 PM   Result Value Ref Range    Procalcitonin 0.07 0.00 - 0.50 ng/mL   MRSA Screen by PCR    Collection Time: 02/11/24  7:52 PM    Specimen: Nasal; Nasopharyngeal Swab   Result Value Ref Range    MRSA SCREEN BY PCR Negative Negative   Comprehensive Metabolic Panel (CMP)    Collection Time: 02/12/24  4:33 AM   Result Value Ref Range    Sodium 137 136 - 145 mmol/L    Potassium 4.1 3.5 - 5.1 mmol/L    Chloride 108 95 - 110 mmol/L    CO2 23 23 - 29 mmol/L     Glucose 81 70 - 110 mg/dL    BUN 10 8 - 23 mg/dL    Creatinine 1.3 0.5 - 1.4 mg/dL    Calcium 8.4 (L) 8.7 - 10.5 mg/dL    Total Protein 5.1 (L) 6.0 - 8.4 g/dL    Albumin 2.9 (L) 3.5 - 5.2 g/dL    Total Bilirubin 0.5 0.1 - 1.0 mg/dL    Alkaline Phosphatase 101 55 - 135 U/L    AST 15 10 - 40 U/L    ALT 15 10 - 44 U/L    eGFR 41 (A) >60 mL/min/1.73 m^2    Anion Gap 6 (L) 8 - 16 mmol/L   Magnesium    Collection Time: 02/12/24  4:33 AM   Result Value Ref Range    Magnesium 1.8 1.6 - 2.6 mg/dL   Phosphorus    Collection Time: 02/12/24  4:33 AM   Result Value Ref Range    Phosphorus 2.4 (L) 2.7 - 4.5 mg/dL   CBC with Automated Differential    Collection Time: 02/12/24  4:33 AM   Result Value Ref Range    WBC 6.03 3.90 - 12.70 K/uL    RBC 2.49 (L) 4.00 - 5.40 M/uL    Hemoglobin 7.5 (L) 12.0 - 16.0 g/dL    Hematocrit 22.6 (L) 37.0 - 48.5 %    MCV 91 82 - 98 fL    MCH 30.1 27.0 - 31.0 pg    MCHC 33.2 32.0 - 36.0 g/dL    RDW 13.2 11.5 - 14.5 %    Platelets 58 (L) 150 - 450 K/uL    MPV 11.4 9.2 - 12.9 fL    Immature Granulocytes 1.0 (H) 0.0 - 0.5 %    Gran # (ANC) 3.5 1.8 - 7.7 K/uL    Immature Grans (Abs) 0.06 (H) 0.00 - 0.04 K/uL    Lymph # 1.2 1.0 - 4.8 K/uL    Mono # 1.1 (H) 0.3 - 1.0 K/uL    Eos # 0.2 0.0 - 0.5 K/uL    Baso # 0.03 0.00 - 0.20 K/uL    nRBC 0 0 /100 WBC    Gran % 57.6 38.0 - 73.0 %    Lymph % 19.2 18.0 - 48.0 %    Mono % 17.9 (H) 4.0 - 15.0 %    Eosinophil % 3.8 0.0 - 8.0 %    Basophil % 0.5 0.0 - 1.9 %    Differential Method Automated    BNP    Collection Time: 02/12/24  4:33 AM   Result Value Ref Range     (H) 0 - 99 pg/mL        Lab Results   Component Value Date    [id number] Negative 09/09/2023       Recent Labs   Lab 02/10/24  0515 02/11/24  0438 02/12/24  0433   WBC 4.80 5.13 6.03   LYMPH 20.8  1.0 21.6  1.1 19.2  1.2   HGB 7.7* 8.0* 7.5*   HCT 23.3* 24.0* 22.6*   PLT 46* 38* 58*       Recent Labs   Lab 02/10/24  0515 02/11/24  0438 02/12/24  0433    137 137   K 3.4* 3.4* 4.1   CL  106 108 108   CO2 23 22* 23   BUN 12 9 10   CREATININE 1.2 1.1 1.3   GLU 83 79 81   CALCIUM 8.4* 8.4* 8.4*   MG 1.7 1.8 1.8   PHOS 2.5* 2.1* 2.4*       Recent Labs   Lab 02/10/24  0515 02/11/24  0438 02/12/24  0433   ALKPHOS 98 101 101   ALT 15 15 15   AST 15 13 15   ALBUMIN 2.7* 2.8* 2.9*   PROT 4.8* 5.0* 5.1*   BILITOT 0.5 0.5 0.5          Recent Labs     02/09/24  1417 02/11/24  1638 02/12/24  0433   FERRITIN 356*  --   --    CRP  --  12.7*  --    BNP  --   --  384*             Microbiology: All microbiology updates for the past 24 hours have been reviewed.  Microbiology Results (last 7 days)       Procedure Component Value Units Date/Time    MRSA Screen by PCR [[id number]] Collected: 02/11/24 1952    Order Status: Completed Specimen: Nasopharyngeal Swab from Nasal Updated: 02/12/24 1046     MRSA SCREEN BY PCR Negative    Blood culture #1 **CANNOT BE ORDERED STAT** [[id number]] Collected: 02/08/24 1530    Order Status: Completed Specimen: Blood from Peripheral, Antecubital, Right Updated: 02/11/24 2032     Blood Culture, Routine No Growth to date      No Growth to date      No Growth to date      No Growth to date    Blood culture #2 **CANNOT BE ORDERED STAT** [[id number]] Collected: 02/08/24 1618    Order Status: Completed Specimen: Blood from Peripheral, Antecubital, Right Updated: 02/11/24 2032     Blood Culture, Routine No Growth to date      No Growth to date      No Growth to date      No Growth to date    Culture, Respiratory with Gram Stain [[id number]]     Order Status: No result Specimen: Respiratory from Sputum, Expectorated     Urine culture [[id number]]  (Abnormal)  (Susceptibility) Collected: 02/08/24 1437    Order Status: Completed Specimen: Urine Updated: 02/11/24 0616     Urine Culture, Routine ESCHERICHIA COLI ESBL  >100,000 cfu/ml      Narrative:      Specimen Source->Urine              Imaging All imaging within the last 24 hours was reviewed.   ECG Results              EKG 12-lead (Final  result)        Collection Time Result Time QRS Duration OHS QTC Calculation    02/08/24 15:39:44 02/09/24 12:03:57 128 492                     Final result by Interface, Lab In [facility] (02/09/24 12:04:01)                   Narrative:    Test Reason : D64.9,    Vent. Rate : 065 BPM     Atrial Rate : 065 BPM     P-R Int : 160 ms          QRS Dur : 128 ms      QT Int : 474 ms       P-R-T Axes : 051 -17 039 degrees     QTc Int : 492 ms    Atrial-sensed ventricular-paced rhythm  Abnormal ECG  When compared with ECG of 20-JAN-2024 14:04,  Electronic ventricular pacemaker has replaced Atrial fibrillation  Vent. rate has decreased BY  59 BPM  Confirmed by Justin Ramirez MD ([id number]) on 2/9/2024 12:03:56 PM    Referred By: CYNTHIA   SELF           Confirmed By:Justin Ramirez MD                                    Results for orders placed during the hospital encounter of 12/11/23    Echo    Interpretation Summary    Left Ventricle: The left ventricle is normal in size. Normal wall thickness. Septal motion is consistent with pacing. There is low normal systolic function with a visually estimated ejection fraction of 50 - 55%. Grade II diastolic dysfunction.    Right Ventricle: Normal right ventricular cavity size. Systolic function is normal.    Left Atrium: Left atrium is mildly dilated.    Right Atrium: Right atrium is mildly dilated.    Aortic Valve: There is a well-seated, normally functioning bioprosthetic valve in the aortic position. It is reported to be a 29 mm Medtronic valve. Mild paravalvular regurgitation.    Mitral Valve: Moderately calcified leaflets. There is moderate mitral annular calcification present. There is mild stenosis. The mean pressure gradient across the mitral valve is 3 mmHg at a heart rate of 67 bpm. There is mild regurgitation.    Tricuspid Valve: There is mild to moderate regurgitation.    The estimated pulmonary artery systolic pressure is 42 mmHg.      US Retroperitoneal Complete  Narrative:  EXAMINATION:  US RETROPERITONEAL COMPLETE    CLINICAL HISTORY:  recurrent UTIs rule out bladder reflux;    TECHNIQUE:  Ultrasound of the kidneys and urinary bladder was performed including color flow and Doppler evaluation of the kidneys.    COMPARISON:  None.    FINDINGS:  The right kidney measures 10.3 cm in length and the left 9.5 cm.  The resistive indices are within normal limits.  No stone, mass, or hydronephrosis is demonstrated.  The urinary bladder is largely decompressed by Rodriguez catheter and not well evaluated.  There are bilateral pleural effusions.  Small volume ascites is present.  Impression: Unremarkable appearance of the kidneys.    Bilateral pleural effusions and small volume ascites.    Electronically signed by: Yoel Mcmahan MD  Date:    02/12/2024  Time:    10:38  X-Ray Chest 1 View  Narrative: EXAMINATION:  XR CHEST 1 VIEW    CLINICAL HISTORY:  rule out pneumonia;    TECHNIQUE:  Single frontal view of the chest was performed.    COMPARISON:  02/08/2024 and 12/11/2023 and 02/09/2020    FINDINGS:  There is a retrocardiac airspace opacity at the left lung base.  Chronic scar in the lung apices.  Unchanged heart size with cardiac conduction device.  AVR.  Atherosclerosis.  No significant pleural fluid.  No pneumothorax.  Impression: Atelectasis aspiration or early pneumonia in the left lung base.    Electronically signed by: Giovany Huerta  Date:    02/12/2024  Time:    09:57

## 2024-02-12 NOTE — PT/OT/SLP PROGRESS
Physical Therapy Treatment    Patient Name:  Irene العراقي   MRN:  [id number]    Recommendations:     Discharge Recommendations: Moderate Intensity Therapy  Discharge Equipment Recommendations: none  Barriers to discharge: None    Assessment:     Irene العراقي is a 83 y.o. female admitted with a medical diagnosis of Anemia.  She presents with the following impairments/functional limitations: weakness, impaired endurance, impaired self care skills, impaired functional mobility, gait instability, decreased lower extremity function, orthopedic precautions, impaired cardiopulmonary response to activity . Supine in bed , finished breakfast.  Agreed to participate in therapy.  Required removal and replacement of diaper prior to transfer. Rolled R<>L with CGA and verbal cues for technique.  Pressure relief boots removed and CAM walker placed RLE/ sock and tennis shoe L foot. Sup > sit with Min A.  Sit > stand with rw and Mod A.  Ambulated 15' x 2 with rw and Mod A, seated rest between each .  Sat up in chair at bedside/ LE's elevated.     Rehab Prognosis: Fair; patient would benefit from acute skilled PT services to address these deficits and reach maximum level of function.    Recent Surgery: * No surgery found *      Plan:     During this hospitalization, patient to be seen 6 x/week to address the identified rehab impairments via gait training, therapeutic activities, therapeutic exercises and progress toward the following goals:    Plan of Care Expires:  02/29/24    Subjective     Chief Complaint: weakness but no nausea today  Patient/Family Comments/goals: to return to facility for therapy per patient  Pain/Comfort:  Pain Rating 1: 0/10      Objective:     Communicated with nurse Payne prior to session.  Patient found supine with bed alarm, araya catheter, oxygen, telemetry, pressure relief boots upon PT entry to room.     General Precautions: Standard, fall, contact, respiratory  Orthopedic Precautions: RLE partial  weight bearing  Braces:  (CAM walker boot)  Respiratory Status: Nasal cannula, flow 2 L/min     Functional Mobility:  Bed Mobility:     Supine to Sit: moderate assistance  Transfers:     Sit to Stand:  moderate assistance with rolling walker  Gait: 15' x 2 with rw and Mod A, seated rest between each.       AM-PAC 6 CLICK MOBILITY          Treatment & Education:  Ambulate with rw and assistance for safety, PWB RLE with cam walker in place.     Patient left up in chair with all lines intact, call button in reach, chair alarm on, and nurse Kerry notified..    GOALS:   Multidisciplinary Problems       Physical Therapy Goals          Problem: Physical Therapy    Goal Priority Disciplines Outcome Goal Variances Interventions   Physical Therapy Goal     PT, PT/OT Ongoing, Progressing     Description: Goals to be met by: 2024     Patient will increase functional independence with mobility by performin. Supine to sit with MInimal Assistance  2. Sit to stand transfer with Minimal Assistance  3. Bed to chair transfer with Minimal Assistance using Rolling Walker  4. Gait  x 50 feet with Minimal Assistance using Rolling Walker.   5. Lower extremity exercise program x20 reps     Hx of R ankle fx 2023- mando boot                         Time Tracking:     PT Received On: 24  PT Start Time: 918     PT Stop Time: 941  PT Total Time (min): 23 min     Billable Minutes: Gait Training 12min and Therapeutic Activity 11min    Treatment Type: Treatment  PT/PTA: PTA     Number of PTA visits since last PT visit: 2     2024

## 2024-02-12 NOTE — ASSESSMENT & PLAN NOTE
Uncontrolled, chronic  -recent low BP which has resolved  -resume low dose beta blocker - will start coreg

## 2024-02-12 NOTE — SUBJECTIVE & OBJECTIVE
This follow-up encounter was provided through telemedicine to address  Anemia present on admission.  Patient was transferred to the telemedicine service on:  02/11/2024   The patient location is: 316[address] A admitted 2/8/2024  1:26 PM.      Interval History/Overnight Events:   Clinical record since admit reviewed.    Patient is able to provide adequate history.    Patient complains of nausea - no vomiting associated with zyvox initially and changed to rocephin - poor po intake - usually has nausea especially with oral antibiotics.  Araya in place. No signs of bleeding.     Review of Systems   Constitutional:  Positive for activity change and fatigue. Negative for fever.   Gastrointestinal:  Positive for nausea. Negative for abdominal pain and vomiting.          I have reviewed the following on 02/11/2024:    Data  Details     [x]   Lab results reviewed   Wbc 5; hgb 8; plt 38; Cr 1.1; K 3.4; CRP 12.7    [x]   Micro reports reviewed  ESBL e coli sensitive to meropenem    []   Pathology reports reviewed      []   Imaging reports reviewed      []   Cardiology Procedure reports reviewed      [x]   Non- records/CareEverywhere notes reviewed  Documentation of araya for urine retention intermittently since 12/2023    []  Tests/studies orders placed or verified       []  Independently viewed/assessed       []  02/11/2024 Discussion of:        Inpatient Medications reviewed and prescribed for management of current problems:  Scheduled Meds:   amiodarone  200 mg Oral BID    docusate sodium  100 mg Oral BID    famotidine  20 mg Oral Daily    meropenem (MERREM) IVPB  1 g Intravenous Q12H    mupirocin   Nasal BID     Continuous Infusions:  PRN Meds:.acetaminophen, albuterol-ipratropium, aluminum-magnesium hydroxide-simethicone, hydrALAZINE, magnesium oxide, magnesium oxide, melatonin, naloxone, ondansetron, potassium bicarbonate, potassium bicarbonate, potassium bicarbonate, potassium, sodium phosphates, potassium, sodium  phosphates, potassium, sodium phosphates, prochlorperazine, simethicone, sodium chloride 0.9%      Objective:     Temp:  [98 °F (36.7 °C)-98.7 °F (37.1 °C)] 98.3 °F (36.8 °C)  Pulse:  [73-90] 87  Resp:  [17-18] 18  SpO2:  [92 %-96 %] 92 %  BP: (108-188)/(51-77) 108/51      Intake/Output Summary (Last 24 hours) at 2/11/2024 1821  Last data filed at 2/11/2024 1743  Gross per 24 hour   Intake 647.73 ml   Output 1200 ml   Net -552.27 ml        Body mass index is 25.34 kg/m².    Physical Exam  Vitals and nursing note reviewed.   Constitutional:       General: She is not in acute distress.     Appearance: She is normal weight. She is ill-appearing.   HENT:      Head: Normocephalic and atraumatic.   Eyes:      Extraocular Movements: Extraocular movements intact.   Cardiovascular:      Rate and Rhythm: Normal rate.   Pulmonary:      Effort: Pulmonary effort is normal. No tachypnea or respiratory distress.   Neurological:      General: No focal deficit present.      Mental Status: She is alert.      Cranial Nerves: No cranial nerve deficit.      Motor: No weakness.   Psychiatric:         Attention and Perception: Attention normal.         Mood and Affect: Affect is flat.         Speech: Speech normal.         Behavior: Behavior is cooperative.         Cognition and Memory: Memory is impaired.          Labs: All labs within the last 24 hours were reviewed.   Recent Results (from the past 24 hour(s))   Comprehensive Metabolic Panel (CMP)    Collection Time: 02/11/24  4:38 AM   Result Value Ref Range    Sodium 137 136 - 145 mmol/L    Potassium 3.4 (L) 3.5 - 5.1 mmol/L    Chloride 108 95 - 110 mmol/L    CO2 22 (L) 23 - 29 mmol/L    Glucose 79 70 - 110 mg/dL    BUN 9 8 - 23 mg/dL    Creatinine 1.1 0.5 - 1.4 mg/dL    Calcium 8.4 (L) 8.7 - 10.5 mg/dL    Total Protein 5.0 (L) 6.0 - 8.4 g/dL    Albumin 2.8 (L) 3.5 - 5.2 g/dL    Total Bilirubin 0.5 0.1 - 1.0 mg/dL    Alkaline Phosphatase 101 55 - 135 U/L    AST 13 10 - 40 U/L    ALT  15 10 - 44 U/L    eGFR 50 (A) >60 mL/min/1.73 m^2    Anion Gap 7 (L) 8 - 16 mmol/L   Magnesium    Collection Time: 02/11/24  4:38 AM   Result Value Ref Range    Magnesium 1.8 1.6 - 2.6 mg/dL   Phosphorus    Collection Time: 02/11/24  4:38 AM   Result Value Ref Range    Phosphorus 2.1 (L) 2.7 - 4.5 mg/dL   CBC with Automated Differential    Collection Time: 02/11/24  4:38 AM   Result Value Ref Range    WBC 5.13 3.90 - 12.70 K/uL    RBC 2.66 (L) 4.00 - 5.40 M/uL    Hemoglobin 8.0 (L) 12.0 - 16.0 g/dL    Hematocrit 24.0 (L) 37.0 - 48.5 %    MCV 90 82 - 98 fL    MCH 30.1 27.0 - 31.0 pg    MCHC 33.3 32.0 - 36.0 g/dL    RDW 13.2 11.5 - 14.5 %    Platelets 38 (LL) 150 - 450 K/uL    MPV 11.3 9.2 - 12.9 fL    Immature Granulocytes 0.8 (H) 0.0 - 0.5 %    Gran # (ANC) 2.7 1.8 - 7.7 K/uL    Immature Grans (Abs) 0.04 0.00 - 0.04 K/uL    Lymph # 1.1 1.0 - 4.8 K/uL    Mono # 1.0 0.3 - 1.0 K/uL    Eos # 0.3 0.0 - 0.5 K/uL    Baso # 0.02 0.00 - 0.20 K/uL    nRBC 0 0 /100 WBC    Gran % 52.7 38.0 - 73.0 %    Lymph % 21.6 18.0 - 48.0 %    Mono % 18.7 (H) 4.0 - 15.0 %    Eosinophil % 5.8 0.0 - 8.0 %    Basophil % 0.4 0.0 - 1.9 %    Platelet Estimate Decreased (A)     Differential Method Automated    C-Reactive Protein    Collection Time: 02/11/24  4:38 PM   Result Value Ref Range    CRP 12.7 (H) 0.0 - 8.2 mg/L   Procalcitonin    Collection Time: 02/11/24  4:38 PM   Result Value Ref Range    Procalcitonin 0.07 0.00 - 0.50 ng/mL        Lab Results   Component Value Date    [id number] Negative 09/09/2023       Recent Labs   Lab 02/09/24  1631 02/10/24  0515 02/11/24  0438   WBC 4.79 4.80 5.13   LYMPH 16.9*  0.8* 20.8  1.0 21.6  1.1   HGB 8.1* 7.7* 8.0*   HCT 23.9* 23.3* 24.0*   PLT 38* 46* 38*     Recent Labs   Lab 02/09/24  0426 02/10/24  0515 02/11/24  0438    137 137   K 3.6 3.4* 3.4*    106 108   CO2 24 23 22*   BUN 14 12 9   CREATININE 1.2 1.2 1.1   GLU 78 83 79   CALCIUM 8.8 8.4* 8.4*   MG 1.8 1.7 1.8   PHOS 3.0 2.5*  2.1*     Recent Labs   Lab 02/09/24  0426 02/10/24  0515 02/11/24  0438   ALKPHOS 117 98 101   ALT 16 15 15   AST 17 15 13   ALBUMIN 2.9* 2.7* 2.8*   PROT 5.0* 4.8* 5.0*   BILITOT 0.7 0.5 0.5        Recent Labs     02/09/24  1417 02/11/24  1638   FERRITIN 356*  --    CRP  --  12.7*           Microbiology: All microbiology updates for the past 24 hours have been reviewed.  Microbiology Results (last 7 days)       Procedure Component Value Units Date/Time    Urine culture [[id number]]  (Abnormal)  (Susceptibility) Collected: 02/08/24 1437    Order Status: Completed Specimen: Urine Updated: 02/11/24 0616     Urine Culture, Routine ESCHERICHIA COLI ESBL  >100,000 cfu/ml      Narrative:      Specimen Source->Urine    Blood culture #1 **CANNOT BE ORDERED STAT** [[id number]] Collected: 02/08/24 1530    Order Status: Completed Specimen: Blood from Peripheral, Antecubital, Right Updated: 02/10/24 2032     Blood Culture, Routine No Growth to date      No Growth to date      No Growth to date    Blood culture #2 **CANNOT BE ORDERED STAT** [[id number]] Collected: 02/08/24 1618    Order Status: Completed Specimen: Blood from Peripheral, Antecubital, Right Updated: 02/10/24 2032     Blood Culture, Routine No Growth to date      No Growth to date      No Growth to date              Imaging All imaging within the last 24 hours was reviewed.   ECG Results              EKG 12-lead (Final result)        Collection Time Result Time QRS Duration OHS QTC Calculation    02/08/24 15:39:44 02/09/24 12:03:57 128 492                     Final result by Interface, Lab In [facility] (02/09/24 12:04:01)                   Narrative:    Test Reason : D64.9,    Vent. Rate : 065 BPM     Atrial Rate : 065 BPM     P-R Int : 160 ms          QRS Dur : 128 ms      QT Int : 474 ms       P-R-T Axes : 051 -17 039 degrees     QTc Int : 492 ms    Atrial-sensed ventricular-paced rhythm  Abnormal ECG  When compared with ECG of 20-JAN-2024 14:04,  Electronic  ventricular pacemaker has replaced Atrial fibrillation  Vent. rate has decreased BY  59 BPM  Confirmed by Justin Ramirez MD ([id number]) on 2/9/2024 12:03:56 PM    Referred By: [email or web address]   SELF           Confirmed By:Justin Ramirez MD                                    Results for orders placed during the hospital encounter of 12/11/23    Echo    Interpretation Summary    Left Ventricle: The left ventricle is normal in size. Normal wall thickness. Septal motion is consistent with pacing. There is low normal systolic function with a visually estimated ejection fraction of 50 - 55%. Grade II diastolic dysfunction.    Right Ventricle: Normal right ventricular cavity size. Systolic function is normal.    Left Atrium: Left atrium is mildly dilated.    Right Atrium: Right atrium is mildly dilated.    Aortic Valve: There is a well-seated, normally functioning bioprosthetic valve in the aortic position. It is reported to be a 29 mm [employer] valve. Mild paravalvular regurgitation.    Mitral Valve: Moderately calcified leaflets. There is moderate mitral annular calcification present. There is mild stenosis. The mean pressure gradient across the mitral valve is 3 mmHg at a heart rate of 67 bpm. There is mild regurgitation.    Tricuspid Valve: There is mild to moderate regurgitation.    The estimated pulmonary artery systolic pressure is 42 mmHg.      US Abdomen Limited  Narrative: EXAMINATION:  US ABDOMEN LIMITED    CLINICAL HISTORY:  thrombocytopenia;  Chronic kidney disease, stage 3b    TECHNIQUE:  Limited ultrasound of the right upper quadrant of the abdomen (including pancreas, liver, gallbladder, common bile duct, and right kidney) was performed.    COMPARISON:  None.    FINDINGS:  The pancreas is obscured.    Multiple gallstones are present measuring up to 5 mm.  Sonographic Lee sign is negative.  The common bile duct is mildly dilated for patient age at 10 mm.    The IVC is normal caliber.    The liver is normal size  at 14.6 cm.  There is no focal hepatic lesion.    The spleen is normal size at 10.5 cm.    There is a right pleural effusion.  Impression: Cholelithiasis.  Prominent common bile duct, nonspecific.    Right pleural effusion.    Electronically signed by: Yoel Mcmahan MD  Date:    02/10/2024  Time:    07:32

## 2024-02-12 NOTE — ASSESSMENT & PLAN NOTE
Patient has Abnormal Phosphorus: hypophosphatemia. Will continue to monitor electrolytes closely. Will replace the affected electrolytes and repeat labs to be done after interventions completed. The patient's phosphorus results have been reviewed and are listed below.  Recent Labs   Lab 02/11/24  0438   PHOS 2.1*

## 2024-02-12 NOTE — PLAN OF CARE
Problem: Physical Therapy  Goal: Physical Therapy Goal  Description: Goals to be met by: 2024     Patient will increase functional independence with mobility by performin. Supine to sit with MInimal Assistance  2. Sit to stand transfer with Minimal Assistance  3. Bed to chair transfer with Minimal Assistance using Rolling Walker  4. Gait  x 50 feet with Minimal Assistance using Rolling Walker.   5. Lower extremity exercise program x20 reps     Hx of R ankle fx 2023- camwalker boot    Outcome: Ongoing, Progressing   Therapeutic activity :bed mobility, transfers, gait with rw and assistance for safety( PWB RLE).

## 2024-02-12 NOTE — PLAN OF CARE
02/11/24 2021   Patient Assessment/Suction   Level of Consciousness (AVPU) alert   Respiratory Effort Normal;Unlabored   Rhythm/Pattern, Respiratory pattern regular   Cough Frequency no cough   PRE-TX-O2   Device (Oxygen Therapy) nasal cannula   Flow (L/min) 2   SpO2 95 %   Pulse Oximetry Type Intermittent   Aerosol Therapy   $ Aerosol Therapy Charges PRN treatment not required

## 2024-02-12 NOTE — PROGRESS NOTES
[facility] Medicine  Telemedicine Progress Note    Patient Name: Irene العراقي  MRN: [id number]  Patient Class: IP- Inpatient   Admission Date: 2/8/2024  Length of Stay: 1 days  Attending Physician: Ginny Bruner MD  Primary Care Provider: Wanda Kuhn FNP-C          Subjective:     Principal Problem:Anemia  Acute Condition: resistant UTI        HPI:  Irene العراقي is an 84 y/o F with past medical history significant for anemia, afib, CKD and HTN who presented to the ED from [facility] for further treatment of abnormal labs.  Per report, her platelets have been taking a downward trend and are 47K today.  She also has a hemoglobin of 7.5 with no obvious source of blood loss.  Pt denies recent fevers, chest pain, dizziness, melena or hematochezia.  Denies N/V but is having some mild generalized abdominal tenderness.  CT abdomen and pelvis obtained shows nonspecific gastric wall thickening which could be consistent with gastritis or peptic ulcer disease.  The ED physician discussed the lab findings with the hematologist on call who recommends admission and transfusion of PRBCs.  Pt presented from [facility] with araya catheter in place.  She reports urinary retention and states the araya has been in place for about a week.  Araya catheter was exchanged in the ED.  Pt is currently being treated for UTI with culture positive for enterococcus faecium VRE susceptible to linezolid.  Urinalysis today remains positive. She is placed in observation under the service of hospital medicine for continued monitoring and treatment.       Overview/Hospital Course:  Irene العراقي is an 83 year old female with a past medical history of Afib, CKD, HTN, anemia and thrombocytopenia who presented with worsening anemia and thrombocytopenia in the setting of linezolid use for Enterococcus UTI. There was no evidence of gross bleeding and she was transfused a unit of PRBCs. Hematology was consulted;  workup is currently pending. Repeat urine culture shows GNR. Rocephin was started in place of linezolid.       This follow-up encounter was provided through telemedicine to address  Anemia present on admission.  Patient was transferred to the telemedicine service on:  02/12/2024   The patient location is: 316/316 A admitted 2/8/2024  1:26 PM.      Interval History/Overnight Events:     Patient is able to provide adequate history.  Updated patient's niece by phone on plan of care.     Uneventful night and nausea resolved - plt improved today - she is very anxious communicating via video and would prefer to have a bedside MD.  She was able to eat this am.     Review of Systems   Constitutional:  Positive for activity change and fatigue. Negative for fever.   Gastrointestinal:  Negative for abdominal pain, nausea and vomiting.   Psychiatric/Behavioral:  The patient is nervous/anxious.           I have reviewed the following on 02/12/2024:    Data  Details     [x]   Lab results reviewed   Wbc 6; hgb 7.5; plt 58; Cr 1.3; K 4.1    [x]   Micro reports reviewed  ESBL e coli sensitive to meropenem    []   Pathology reports reviewed      []   Imaging reports reviewed      []   Cardiology Procedure reports reviewed      []   Non- records/CareEverywhere notes reviewed      []  Tests/studies orders placed or verified       []  Independently viewed/assessed       [x]  02/12/2024 Discussion of:  Plan of care with ID       Inpatient Medications reviewed and prescribed for management of current problems:  Scheduled Meds:   amiodarone  200 mg Oral BID    famotidine  20 mg Oral Daily    Lactobacillus acidoph-L.bulgar  4 tablet Oral TID WM    meropenem (MERREM) IVPB  1 g Intravenous Q12H    mupirocin   Nasal BID     Continuous Infusions:  PRN Meds:.acetaminophen, albuterol-ipratropium, aluminum-magnesium hydroxide-simethicone, hydrALAZINE, magnesium oxide, magnesium oxide, melatonin, naloxone, ondansetron, potassium bicarbonate,  potassium bicarbonate, potassium bicarbonate, prochlorperazine, simethicone, sodium chloride 0.9%      Objective:     Temp:  [97.8 °F (36.6 °C)-98.3 °F (36.8 °C)] 98 °F (36.7 °C)  Pulse:  [80-90] 80  Resp:  [16-18] 16  SpO2:  [92 %-95 %] 95 %  BP: (108-165)/(51-74) 163/73      Intake/Output Summary (Last 24 hours) at 2/12/2024 1201  Last data filed at 2/12/2024 0646  Gross per 24 hour   Intake 640.64 ml   Output 750 ml   Net -109.36 ml          Body mass index is 25.34 kg/m².    Physical Exam  Vitals and nursing note reviewed.   Constitutional:       General: She is not in acute distress.     Appearance: She is normal weight. She is ill-appearing.   HENT:      Head: Normocephalic and atraumatic.   Eyes:      Extraocular Movements: Extraocular movements intact.   Cardiovascular:      Rate and Rhythm: Normal rate.   Pulmonary:      Effort: Pulmonary effort is normal. No tachypnea or respiratory distress.   Neurological:      General: No focal deficit present.      Mental Status: She is alert.      Cranial Nerves: No cranial nerve deficit.      Motor: No weakness.   Psychiatric:         Attention and Perception: Attention normal.         Mood and Affect: Mood is anxious.         Speech: Speech is rapid and pressured.         Behavior: Behavior is cooperative.         Cognition and Memory: Memory is impaired.          Labs: All labs within the last 24 hours were reviewed.   Recent Results (from the past 24 hour(s))   C-Reactive Protein    Collection Time: 02/11/24  4:38 PM   Result Value Ref Range    CRP 12.7 (H) 0.0 - 8.2 mg/L   Procalcitonin    Collection Time: 02/11/24  4:38 PM   Result Value Ref Range    Procalcitonin 0.07 0.00 - 0.50 ng/mL   MRSA Screen by PCR    Collection Time: 02/11/24  7:52 PM    Specimen: Nasal; Nasopharyngeal Swab   Result Value Ref Range    MRSA SCREEN BY PCR Negative Negative   Comprehensive Metabolic Panel (CMP)    Collection Time: 02/12/24  4:33 AM   Result Value Ref Range    Sodium 137  136 - 145 mmol/L    Potassium 4.1 3.5 - 5.1 mmol/L    Chloride 108 95 - 110 mmol/L    CO2 23 23 - 29 mmol/L    Glucose 81 70 - 110 mg/dL    BUN 10 8 - 23 mg/dL    Creatinine 1.3 0.5 - 1.4 mg/dL    Calcium 8.4 (L) 8.7 - 10.5 mg/dL    Total Protein 5.1 (L) 6.0 - 8.4 g/dL    Albumin 2.9 (L) 3.5 - 5.2 g/dL    Total Bilirubin 0.5 0.1 - 1.0 mg/dL    Alkaline Phosphatase 101 55 - 135 U/L    AST 15 10 - 40 U/L    ALT 15 10 - 44 U/L    eGFR 41 (A) >60 mL/min/1.73 m^2    Anion Gap 6 (L) 8 - 16 mmol/L   Magnesium    Collection Time: 02/12/24  4:33 AM   Result Value Ref Range    Magnesium 1.8 1.6 - 2.6 mg/dL   Phosphorus    Collection Time: 02/12/24  4:33 AM   Result Value Ref Range    Phosphorus 2.4 (L) 2.7 - 4.5 mg/dL   CBC with Automated Differential    Collection Time: 02/12/24  4:33 AM   Result Value Ref Range    WBC 6.03 3.90 - 12.70 K/uL    RBC 2.49 (L) 4.00 - 5.40 M/uL    Hemoglobin 7.5 (L) 12.0 - 16.0 g/dL    Hematocrit 22.6 (L) 37.0 - 48.5 %    MCV 91 82 - 98 fL    MCH 30.1 27.0 - 31.0 pg    MCHC 33.2 32.0 - 36.0 g/dL    RDW 13.2 11.5 - 14.5 %    Platelets 58 (L) 150 - 450 K/uL    MPV 11.4 9.2 - 12.9 fL    Immature Granulocytes 1.0 (H) 0.0 - 0.5 %    Gran # (ANC) 3.5 1.8 - 7.7 K/uL    Immature Grans (Abs) 0.06 (H) 0.00 - 0.04 K/uL    Lymph # 1.2 1.0 - 4.8 K/uL    Mono # 1.1 (H) 0.3 - 1.0 K/uL    Eos # 0.2 0.0 - 0.5 K/uL    Baso # 0.03 0.00 - 0.20 K/uL    nRBC 0 0 /100 WBC    Gran % 57.6 38.0 - 73.0 %    Lymph % 19.2 18.0 - 48.0 %    Mono % 17.9 (H) 4.0 - 15.0 %    Eosinophil % 3.8 0.0 - 8.0 %    Basophil % 0.5 0.0 - 1.9 %    Differential Method Automated    BNP    Collection Time: 02/12/24  4:33 AM   Result Value Ref Range     (H) 0 - 99 pg/mL        Lab Results   Component Value Date    QMQ15ALVTGRI Negative 09/09/2023       Recent Labs   Lab 02/10/24  0515 02/11/24  0438 02/12/24  0433   WBC 4.80 5.13 6.03   LYMPH 20.8  1.0 21.6  1.1 19.2  1.2   HGB 7.7* 8.0* 7.5*   HCT 23.3* 24.0* 22.6*   PLT 46* 38* 58*        Recent Labs   Lab 02/10/24  0515 02/11/24  0438 02/12/24  0433    137 137   K 3.4* 3.4* 4.1    108 108   CO2 23 22* 23   BUN 12 9 10   CREATININE 1.2 1.1 1.3   GLU 83 79 81   CALCIUM 8.4* 8.4* 8.4*   MG 1.7 1.8 1.8   PHOS 2.5* 2.1* 2.4*       Recent Labs   Lab 02/10/24  0515 02/11/24  0438 02/12/24  0433   ALKPHOS 98 101 101   ALT 15 15 15   AST 15 13 15   ALBUMIN 2.7* 2.8* 2.9*   PROT 4.8* 5.0* 5.1*   BILITOT 0.5 0.5 0.5          Recent Labs     02/09/24  1417 02/11/24  1638 02/12/24 0433   FERRITIN 356*  --   --    CRP  --  12.7*  --    BNP  --   --  384*             Microbiology: All microbiology updates for the past 24 hours have been reviewed.  Microbiology Results (last 7 days)       Procedure Component Value Units Date/Time    MRSA Screen by PCR [[id number]] Collected: 02/11/24 1952    Order Status: Completed Specimen: Nasopharyngeal Swab from Nasal Updated: 02/12/24 1046     MRSA SCREEN BY PCR Negative    Blood culture #1 **CANNOT BE ORDERED STAT** [[id number]] Collected: 02/08/24 1530    Order Status: Completed Specimen: Blood from Peripheral, Antecubital, Right Updated: 02/11/24 2032     Blood Culture, Routine No Growth to date      No Growth to date      No Growth to date      No Growth to date    Blood culture #2 **CANNOT BE ORDERED STAT** [[id number]] Collected: 02/08/24 1618    Order Status: Completed Specimen: Blood from Peripheral, Antecubital, Right Updated: 02/11/24 2032     Blood Culture, Routine No Growth to date      No Growth to date      No Growth to date      No Growth to date    Culture, Respiratory with Gram Stain [[id number]]     Order Status: No result Specimen: Respiratory from Sputum, Expectorated     Urine culture [[id number]]  (Abnormal)  (Susceptibility) Collected: 02/08/24 1437    Order Status: Completed Specimen: Urine Updated: 02/11/24 0616     Urine Culture, Routine ESCHERICHIA COLI ESBL  >100,000 cfu/ml      Narrative:      Specimen Source->Urine               Imaging All imaging within the last 24 hours was reviewed.   ECG Results              EKG 12-lead (Final result)        Collection Time Result Time QRS Duration OHS QTC Calculation    02/08/24 15:39:44 02/09/24 12:03:57 128 492                     Final result by Interface, Lab In [facility] (02/09/24 12:04:01)                   Narrative:    Test Reason : D64.9,    Vent. Rate : 065 BPM     Atrial Rate : 065 BPM     P-R Int : 160 ms          QRS Dur : 128 ms      QT Int : 474 ms       P-R-T Axes : 051 -17 039 degrees     QTc Int : 492 ms    Atrial-sensed ventricular-paced rhythm  Abnormal ECG  When compared with ECG of 20-JAN-2024 14:04,  Electronic ventricular pacemaker has replaced Atrial fibrillation  Vent. rate has decreased BY  59 BPM  Confirmed by Justin Ramirez MD ([id number]) on 2/9/2024 12:03:56 PM    Referred By: [email or web address]   SELF           Confirmed By:Justin Ramirez MD                                    Results for orders placed during the hospital encounter of 12/11/23    Echo    Interpretation Summary    Left Ventricle: The left ventricle is normal in size. Normal wall thickness. Septal motion is consistent with pacing. There is low normal systolic function with a visually estimated ejection fraction of 50 - 55%. Grade II diastolic dysfunction.    Right Ventricle: Normal right ventricular cavity size. Systolic function is normal.    Left Atrium: Left atrium is mildly dilated.    Right Atrium: Right atrium is mildly dilated.    Aortic Valve: There is a well-seated, normally functioning bioprosthetic valve in the aortic position. It is reported to be a 29 mm Medtronic valve. Mild paravalvular regurgitation.    Mitral Valve: Moderately calcified leaflets. There is moderate mitral annular calcification present. There is mild stenosis. The mean pressure gradient across the mitral valve is 3 mmHg at a heart rate of 67 bpm. There is mild regurgitation.    Tricuspid Valve: There is mild to moderate  regurgitation.    The estimated pulmonary artery systolic pressure is 42 mmHg.      US Retroperitoneal Complete  Narrative: EXAMINATION:  US RETROPERITONEAL COMPLETE    CLINICAL HISTORY:  recurrent UTIs rule out bladder reflux;    TECHNIQUE:  Ultrasound of the kidneys and urinary bladder was performed including color flow and Doppler evaluation of the kidneys.    COMPARISON:  None.    FINDINGS:  The right kidney measures 10.3 cm in length and the left 9.5 cm.  The resistive indices are within normal limits.  No stone, mass, or hydronephrosis is demonstrated.  The urinary bladder is largely decompressed by Rodriguez catheter and not well evaluated.  There are bilateral pleural effusions.  Small volume ascites is present.  Impression: Unremarkable appearance of the kidneys.    Bilateral pleural effusions and small volume ascites.    Electronically signed by: Yoel Mcmahan MD  Date:    02/12/2024  Time:    10:38  X-Ray Chest 1 View  Narrative: EXAMINATION:  XR CHEST 1 VIEW    CLINICAL HISTORY:  rule out pneumonia;    TECHNIQUE:  Single frontal view of the chest was performed.    COMPARISON:  02/08/2024 and 12/11/2023 and 02/09/2020    FINDINGS:  There is a retrocardiac airspace opacity at the left lung base.  Chronic scar in the lung apices.  Unchanged heart size with cardiac conduction device.  AVR.  Atherosclerosis.  No significant pleural fluid.  No pneumothorax.  Impression: Atelectasis aspiration or early pneumonia in the left lung base.    Electronically signed by: Giovany Huerta  Date:    02/12/2024  Time:    09:57           Assessment/Plan:      * Anemia  Stable. Appears to be secondary to chronic disease.  -S/p one unit PRBCs  -Trend Hgb with CBC  -Hematology following    Hypophosphatemia  Patient has Abnormal Phosphorus: hypophosphatemia. Will continue to monitor electrolytes closely. Will replace the affected electrolytes and repeat labs to be done after interventions completed. The patient's phosphorus  results have been reviewed and are listed below.  Recent Labs   Lab 02/12/24  0433   PHOS 2.4*     -improving - did not tolerate po supplement     Urinary retention  -Araya changed on 2/9  -Urology consulted    UTI (urinary tract infection)  Recurrent for past few months - developing resistant organisms; recent chronic araya for retention  Araya changed on 2/9  -urine culture with ESBL E. Coli - stop ceftriaxone and meropenem started - tolerating without nausea  ID consulted due to recurrence and resistance with large number of allergies/intolerances, nausea; appreciate recs: renal stone CT and Urology consulted          NYHA class 3 heart failure with preserved ejection fraction  BNP mildly elevated  Low Na diet  Avoid IVF  Diuresis as tolerates - recent nasuea/vomiting now resolved      Stage 3b chronic kidney disease  Estimated Creatinine Clearance: 33.2 mL/min (based on SCr of 1.3 mg/dL).  Stable.  -Renally dose medications  -Avoid nephrotoxic agents  BNP elevated - avoid IVF; low Na diet    Hypertension  Uncontrolled, chronic  -recent low BP which has resolved  -resume low dose beta blocker - will start coreg    PAF (paroxysmal atrial fibrillation)  -Telemetry  -Hold anticoagulation  -Amiodarone    Hypokalemia  Replete as tolerated to goal K of 4  Monitor Mg      Thrombocytopenia  -Discontinue linezolid  -Hematology follow up; likely BMBx once UTI treated  -Trend platelets with CBC          VTE Risk Mitigation (From admission, onward)           Ordered     IP VTE HIGH RISK PATIENT  Once         02/08/24 1916     Place sequential compression device  Until discontinued         02/08/24 1916     Reason for No Pharmacological VTE Prophylaxis  Once        Question Answer Comment   Reasons: Already adequately anticoagulated on oral Anticoagulants    Reasons: Active Bleeding        02/08/24 1916                          I have completed this tele-visit with the assistance of a telepresenter.    The attending portion  of this evaluation, treatment, and documentation was performed per Ginny Bruner MD via Telemedicine AudioVisual using the secure [employer] software platform with 2 way audio/video. The provider was located off-site and the patient is located in the hospital. The aforementioned video software was utilized to document the relevant history and physical exam      Patient no longer meets criteria for [facility] medicine due to anxiety-provocation and will be transferred to bedside team.     Ginny Bruner MD  Department of Hospital Medicine   [facility]/Surg

## 2024-02-12 NOTE — ASSESSMENT & PLAN NOTE
BNP mildly elevated  Low Na diet  Avoid IVF  Diuresis as tolerates - recent nasuea/vomiting now resolved

## 2024-02-13 PROBLEM — E87.6 HYPOKALEMIA: Status: RESOLVED | Noted: 2021-01-09 | Resolved: 2024-02-13

## 2024-02-13 PROBLEM — G93.31 POSTVIRAL FATIGUE SYNDROME: Status: RESOLVED | Noted: 2022-02-21 | Resolved: 2024-02-13

## 2024-02-13 PROBLEM — I44.2 CHB (COMPLETE HEART BLOCK): Status: RESOLVED | Noted: 2023-11-02 | Resolved: 2024-02-13

## 2024-02-13 LAB
ALBUMIN SERPL BCP-MCNC: 2.9 G/DL (ref 3.5–5.2)
ALP SERPL-CCNC: 105 U/L (ref 55–135)
ALT SERPL W/O P-5'-P-CCNC: 12 U/L (ref 10–44)
ANION GAP SERPL CALC-SCNC: 6 MMOL/L (ref 8–16)
AST SERPL-CCNC: 15 U/L (ref 10–40)
BACTERIA BLD CULT: NORMAL
BACTERIA BLD CULT: NORMAL
BASOPHILS # BLD AUTO: 0.03 K/UL (ref 0–0.2)
BASOPHILS NFR BLD: 0.5 % (ref 0–1.9)
BILIRUB SERPL-MCNC: 0.4 MG/DL (ref 0.1–1)
BUN SERPL-MCNC: 11 MG/DL (ref 8–23)
CALCIUM SERPL-MCNC: 8.7 MG/DL (ref 8.7–10.5)
CHLORIDE SERPL-SCNC: 108 MMOL/L (ref 95–110)
CO2 SERPL-SCNC: 23 MMOL/L (ref 23–29)
CREAT SERPL-MCNC: 1.2 MG/DL (ref 0.5–1.4)
DIFFERENTIAL METHOD BLD: ABNORMAL
EOSINOPHIL # BLD AUTO: 0.3 K/UL (ref 0–0.5)
EOSINOPHIL NFR BLD: 4.7 % (ref 0–8)
ERYTHROCYTE [DISTWIDTH] IN BLOOD BY AUTOMATED COUNT: 13.5 % (ref 11.5–14.5)
EST. GFR  (NO RACE VARIABLE): 45 ML/MIN/1.73 M^2
GLUCOSE SERPL-MCNC: 85 MG/DL (ref 70–110)
HAPTOGLOB SERPL NEPH-MCNC: 119 MG/DL (ref 30–200)
HCT VFR BLD AUTO: 22.2 % (ref 37–48.5)
HCV IGG SERPL QL IA: NEGATIVE
HGB BLD-MCNC: 7.5 G/DL (ref 12–16)
IGA SERPL-MCNC: 137 MG/DL (ref 64–422)
IGG SERPL-MCNC: 612 MG/DL (ref 586–1602)
IGM SERPL-MCNC: 90 MG/DL (ref 26–217)
IMM GRANULOCYTES # BLD AUTO: 0.11 K/UL (ref 0–0.04)
IMM GRANULOCYTES NFR BLD AUTO: 2 % (ref 0–0.5)
KAPPA LC FREE SER-MCNC: 3.87 MG/DL (ref 0.33–1.94)
KAPPA LC FREE/LAMBDA FREE SER: 1.16 {RATIO} (ref 0.26–1.65)
LAMBDA LC FREE SERPL-MCNC: 3.35 MG/DL (ref 0.57–2.63)
LYMPHOCYTES # BLD AUTO: 0.9 K/UL (ref 1–4.8)
LYMPHOCYTES NFR BLD: 15.9 % (ref 18–48)
MAGNESIUM SERPL-MCNC: 1.8 MG/DL (ref 1.6–2.6)
MCH RBC QN AUTO: 31.1 PG (ref 27–31)
MCHC RBC AUTO-ENTMCNC: 33.8 G/DL (ref 32–36)
MCV RBC AUTO: 92 FL (ref 82–98)
MONOCYTES # BLD AUTO: 0.8 K/UL (ref 0.3–1)
MONOCYTES NFR BLD: 14.3 % (ref 4–15)
NEUTROPHILS # BLD AUTO: 3.5 K/UL (ref 1.8–7.7)
NEUTROPHILS NFR BLD: 62.6 % (ref 38–73)
NRBC BLD-RTO: 0 /100 WBC
PHOSPHATE SERPL-MCNC: 2.5 MG/DL (ref 2.7–4.5)
PLATELET # BLD AUTO: 67 K/UL (ref 150–450)
PMV BLD AUTO: 11 FL (ref 9.2–12.9)
POTASSIUM SERPL-SCNC: 4.2 MMOL/L (ref 3.5–5.1)
PROT PATTERN SERPL IFE-IMP: NORMAL
PROT SERPL-MCNC: 5 G/DL (ref 6–8.4)
RBC # BLD AUTO: 2.41 M/UL (ref 4–5.4)
SODIUM SERPL-SCNC: 137 MMOL/L (ref 136–145)
WBC # BLD AUTO: 5.59 K/UL (ref 3.9–12.7)

## 2024-02-13 PROCEDURE — 27000221 HC OXYGEN, UP TO 24 HOURS

## 2024-02-13 PROCEDURE — 83735 ASSAY OF MAGNESIUM: CPT | Performed by: NURSE PRACTITIONER

## 2024-02-13 PROCEDURE — 97530 THERAPEUTIC ACTIVITIES: CPT

## 2024-02-13 PROCEDURE — 63600175 PHARM REV CODE 636 W HCPCS: Performed by: STUDENT IN AN ORGANIZED HEALTH CARE EDUCATION/TRAINING PROGRAM

## 2024-02-13 PROCEDURE — 25000003 PHARM REV CODE 250: Performed by: NURSE PRACTITIONER

## 2024-02-13 PROCEDURE — 85025 COMPLETE CBC W/AUTO DIFF WBC: CPT | Performed by: NURSE PRACTITIONER

## 2024-02-13 PROCEDURE — 25000003 PHARM REV CODE 250: Performed by: INTERNAL MEDICINE

## 2024-02-13 PROCEDURE — 25000003 PHARM REV CODE 250: Performed by: STUDENT IN AN ORGANIZED HEALTH CARE EDUCATION/TRAINING PROGRAM

## 2024-02-13 PROCEDURE — 84100 ASSAY OF PHOSPHORUS: CPT | Performed by: NURSE PRACTITIONER

## 2024-02-13 PROCEDURE — 11000001 HC ACUTE MED/SURG PRIVATE ROOM

## 2024-02-13 PROCEDURE — 94799 UNLISTED PULMONARY SVC/PX: CPT

## 2024-02-13 PROCEDURE — 97110 THERAPEUTIC EXERCISES: CPT

## 2024-02-13 PROCEDURE — 99900035 HC TECH TIME PER 15 MIN (STAT)

## 2024-02-13 PROCEDURE — 80053 COMPREHEN METABOLIC PANEL: CPT | Performed by: NURSE PRACTITIONER

## 2024-02-13 PROCEDURE — 36415 COLL VENOUS BLD VENIPUNCTURE: CPT | Performed by: NURSE PRACTITIONER

## 2024-02-13 PROCEDURE — 94761 N-INVAS EAR/PLS OXIMETRY MLT: CPT

## 2024-02-13 PROCEDURE — 97116 GAIT TRAINING THERAPY: CPT

## 2024-02-13 PROCEDURE — 97535 SELF CARE MNGMENT TRAINING: CPT

## 2024-02-13 RX ADMIN — APIXABAN 5 MG: 2.5 TABLET, FILM COATED ORAL at 09:02

## 2024-02-13 RX ADMIN — AMIODARONE HYDROCHLORIDE 200 MG: 100 TABLET ORAL at 09:02

## 2024-02-13 RX ADMIN — Medication 800 MG: at 08:02

## 2024-02-13 RX ADMIN — LACTOBACILLUS TAB 4 TABLET: TAB at 06:02

## 2024-02-13 RX ADMIN — FAMOTIDINE 20 MG: 20 TABLET, FILM COATED ORAL at 08:02

## 2024-02-13 RX ADMIN — CARVEDILOL 3.12 MG: 3.12 TABLET, FILM COATED ORAL at 09:02

## 2024-02-13 RX ADMIN — MUPIROCIN: 20 OINTMENT TOPICAL at 09:02

## 2024-02-13 RX ADMIN — CARVEDILOL 3.12 MG: 3.12 TABLET, FILM COATED ORAL at 08:02

## 2024-02-13 RX ADMIN — LACTOBACILLUS TAB 4 TABLET: TAB at 01:02

## 2024-02-13 RX ADMIN — MUPIROCIN: 20 OINTMENT TOPICAL at 08:02

## 2024-02-13 RX ADMIN — MEROPENEM 1 G: 1 INJECTION, POWDER, FOR SOLUTION INTRAVENOUS at 03:02

## 2024-02-13 RX ADMIN — APIXABAN 5 MG: 2.5 TABLET, FILM COATED ORAL at 01:02

## 2024-02-13 RX ADMIN — LACTOBACILLUS TAB 4 TABLET: TAB at 08:02

## 2024-02-13 RX ADMIN — MEROPENEM 1 G: 1 INJECTION, POWDER, FOR SOLUTION INTRAVENOUS at 01:02

## 2024-02-13 RX ADMIN — AMIODARONE HYDROCHLORIDE 200 MG: 100 TABLET ORAL at 08:02

## 2024-02-13 RX ADMIN — Medication 800 MG: at 01:02

## 2024-02-13 NOTE — ASSESSMENT & PLAN NOTE
Improving.  -Discontinued linezolid  -Hematology follow up; likely BMBx once UTI treated  -Trend platelets with CBC

## 2024-02-13 NOTE — PLAN OF CARE
02/12/24 2013   Patient Assessment/Suction   Level of Consciousness (AVPU) alert   Respiratory Effort Normal;Unlabored   Rhythm/Pattern, Respiratory pattern regular   Cough Frequency no cough   PRE-TX-O2   Device (Oxygen Therapy) nasal cannula   Flow (L/min) 1   SpO2 (!) 94 %   Pulse Oximetry Type Intermittent   Aerosol Therapy   $ Aerosol Therapy Charges PRN treatment not required

## 2024-02-13 NOTE — PT/OT/SLP PROGRESS
Occupational Therapy   Treatment    Name: Irene العراقي  MRN: [id number]  Admitting Diagnosis:  Recurrent UTI      Recommendations:     Discharge Recommendations: Moderate Intensity Therapy  Discharge Equipment Recommendations:  none  Barriers to discharge:      Assessment:     Irene العراقي is a 83 y.o. female with a medical diagnosis of Recurrent UTI.  She presents with performance deficits affecting function are weakness, impaired endurance, impaired self care skills, impaired functional mobility, gait instability, impaired balance, decreased upper extremity function, decreased lower extremity function, decreased safety awareness, pain, decreased ROM, impaired skin, impaired cardiopulmonary response to activity and orthopedic precautions. Pt put forth good effort with all treatment activities. Continue OT treatment to maximize safety and independence with ADLs.    Rehab Prognosis:  Good; patient would benefit from acute skilled OT services to address these deficits and reach maximum level of function.       Plan:     Patient to be seen 6 x/week to address the above listed problems via self-care/home management, therapeutic activities, therapeutic exercises  Plan of Care Expires: 03/01/24  Plan of Care Reviewed with: patient    Subjective     Chief Complaint: None stated  Patient/Family Comments/goals: Patient agreed to participate in OT.  Pain/Comfort:  Pain Rating 1: 0/10    Objective:     Communicated with: nurse prior to session.  Patient found HOB elevated with araya catheter and peripheral IV upon OT entry to room.    General Precautions: Standard, fall    Orthopedic Precautions: R LE partial weight bearing (WBAT with walking boot)  Braces:  (R camwalker boot)  Respiratory Status: Room air     Occupational Performance:     Bed Mobility:    Patient completed Scooting/Bridging with stand by assistance  Patient completed Supine to Sit with minimum assistance     Functional Mobility/Transfers:  Patient completed  Sit <> Stand Transfer with minimum assistance with rolling walker   Patient completed Bed > Chair Transfer using Step Transfer technique with minimum assistance with rolling walker  Functional Mobility: 5 steps using a RW with min A    Activities of Daily Living:  Grooming: Patient completed hand hygiene and brushed her teethe with set up A/SBA seated EOB.    Lower Body Dressing: total assistance to don left sock, shoe and right boot  Toileting: dependence araya catheter      Treatment & Education:  Educated on the Importance of mobility to maximize recovery.  OT ed pt on bed mobility techniques to increase independence with task.  Educated on the importance of OOB mobility within safe range in order to decrease adverse effects of prolonged bedrest.  Educated on safety with functional mobility; hand placement to ensure safe transfers to various surfaces in prep for ADLs  Educated on performing functional mobility and ADL's in adherence to orthopedic precautions.  Educated on weight bearing status  Therapist provided facilitation and instruction of proper body mechanics, energy conservation, and fall prevention strategies during tasks listed above.  Pt performed B UE resistive exercises using 3# bar x 3 sets of 10 reps chest press and bicep curls with rest breaks taken between each set.  Pt performed x 20 bilateral hand grasps and x10 tip pinches to improve her hand strength. Pt completed all exercises with cues after demonstration provided.      Patient left up in chair with all lines intact, call button in reach and chair alarm on.    GOALS:   Multidisciplinary Problems       Occupational Therapy Goals          Problem: Occupational Therapy    Goal Priority Disciplines Outcome Interventions   Occupational Therapy Goal     OT, PT/OT     Description: Goals to be met by: 3/1/2024     Patient will increase functional independence with ADLs by performing:    UE Dressing with Set-up Assistance.  LE Dressing with Set-up  Assistance.  Grooming while standing with Stand-by Assistance.  Toileting from toilet with Stand-by Assistance for hygiene and clothing management.   Toilet transfer to toilet with Stand-by Assistance with AD as needed.                         Time Tracking:     OT Date of Treatment: 02/13/24  OT Start Time: 1026  OT Stop Time: 1135  OT Total Time (min): 69 min    Billable Minutes:Self Care/Home Management 54  Therapeutic Exercise 15            Number of HANNAH visits since last OT visit: 0    2/13/2024

## 2024-02-13 NOTE — PLAN OF CARE
Problem: Adult Inpatient Plan of Care  Goal: Plan of Care Review  Outcome: Ongoing, Progressing   Supine with HOB up 45. Tele [id number] in use. POC and medications reviewed with pt. Verbalized understanding. Midline in left upper arm with DDI. No redness or swelling at site. Rodriguez cath intact and draining yellow urine in drainage bag. Call light in reach. Bed in lowest position with brakes locked. Will continue to monitor.   Problem: Adult Inpatient Plan of Care  Goal: Optimal Comfort and Wellbeing  Outcome: Ongoing, Progressing   Q 2 hourly rounds made through out shift and IV site, pain and position monitored. Prn meds given per MD order.

## 2024-02-13 NOTE — ASSESSMENT & PLAN NOTE
ESBL E coli. In setting of urinary retention.  -Rodriguez  -Meropenem  -ID  -Urology; patient deferred further workup at this time

## 2024-02-13 NOTE — PLAN OF CARE
Problem: Adult Inpatient Plan of Care  Goal: Plan of Care Review  Outcome: Ongoing, Progressing  Goal: Patient-Specific Goal (Individualized)  Outcome: Ongoing, Progressing  Goal: Absence of Hospital-Acquired Illness or Injury  Outcome: Ongoing, Progressing  Goal: Optimal Comfort and Wellbeing  Outcome: Ongoing, Progressing  Goal: Readiness for Transition of Care  Outcome: Ongoing, Progressing     Problem: InfectionPlan of care reviewed with patient,verbalized understanding. IV antibiotics administered as per orders. SR on telemetry. Rodriguez catheter intact & patent. Remains free from falls, injury. Instructed to call for assistance as needed ,verbalized understanding. Bed in low & locked position. Call light in reach, bed alarm on .  Goal: Absence of Infection Signs and Symptoms  Outcome: Ongoing, Progressing     Problem: Impaired Wound Healing  Goal: Optimal Wound Healing  Outcome: Ongoing, Progressing     Problem: Skin Injury Risk Increased  Goal: Skin Health and Integrity  Outcome: Ongoing, Progressing

## 2024-02-13 NOTE — ASSESSMENT & PLAN NOTE
Stable. Appears to be secondary to chronic disease.  -S/p one unit PRBCs  -Trend Hgb with CBC  -Hematology following  -Continue home Eliquis

## 2024-02-13 NOTE — PT/OT/SLP PROGRESS
Physical Therapy Treatment    Patient Name:  Irene العراقي   MRN:  [id number]    Recommendations:     Discharge Recommendations: Moderate Intensity Therapy  Discharge Equipment Recommendations: none    Assessment:     Irene العراقي is a 83 y.o. female admitted with a medical diagnosis of Recurrent UTI.  She presents with the following impairments/functional limitations: weakness, impaired endurance, orthopedic precautions, impaired balance, gait instability, impaired functional mobility, decreased lower extremity function, impaired cardiopulmonary response to activity  .    Rehab Prognosis: Good and Fair; patient would benefit from acute skilled PT services to address these deficits and reach maximum level of function.    Recent Surgery: * No surgery found *      Plan:     During this hospitalization, patient to be seen 6 x/week to address the identified rehab impairments via gait training, therapeutic activities, therapeutic exercises and progress toward the following goals:    Plan of Care Expires:  02/29/24    Subjective     Patient/Family Comments/goals: agrees to ambulate in room, wants to lie down after due to nausea    Objective:     Communicated with RN (Leyla, present in room during session).  Patient found up in chair with oxygen, peripheral IV, araya catheter, telemetry (chair alarm , CAM walker boot) upon PT entry to room.     General Precautions: Standard, contact, fall, pacemaker  Orthopedic Precautions: RLE partial weight bearing  Braces:  (CAM walker boot)  Respiratory Status: Nasal cannula, flow 1 L/min     Functional Mobility training:  Bed Mobility:     Scooting: minimum assistance  Sit to Supine: moderate assistance  Transfers:     Sit to Stand:  moderate assistance with rolling walker  Gait: 60' with RW with CGA      AM-PAC 6 CLICK MOBILITY  Turning over in bed (including adjusting bedclothes, sheets and blankets)?: 2  Sitting down on and standing up from a chair with arms (e.g., wheelchair,  bedside commode, etc.): 2  Moving from lying on back to sitting on the side of the bed?: 2  Moving to and from a bed to a chair (including a wheelchair)?: 2  Need to walk in hospital room?: 2  Climbing 3-5 steps with a railing?: 1  Basic Mobility Total Score: 11       Treatment & Education:  Mobility training as above with cues for technique  SEATED: LAQ, hip abd/add    Patient left HOB elevated with all lines intact, call button in reach, bed alarm on, RN  present, and pressure relief boots on B LEs .    GOALS:   Multidisciplinary Problems       Physical Therapy Goals          Problem: Physical Therapy    Goal Priority Disciplines Outcome Goal Variances Interventions   Physical Therapy Goal     PT, PT/OT Ongoing, Progressing     Description: Goals to be met by: 2024     Patient will increase functional independence with mobility by performin. Supine to sit with MInimal Assistance  2. Sit to stand transfer with Minimal Assistance  3. Bed to chair transfer with Minimal Assistance using Rolling Walker  4. Gait  x 50 feet with Minimal Assistance using Rolling Walker.   5. Lower extremity exercise program x20 reps     Hx of R ankle fx 2023- [facility] boot                         Time Tracking:     PT Received On: 24  PT Start Time: 1301     PT Stop Time: 1326  PT Total Time (min): 25 min     Billable Minutes: Gait Training 10 and Therapeutic Activity 15    Treatment Type: Treatment  PT/PTA: PT     Number of PTA visits since last PT visit: 0     2024

## 2024-02-13 NOTE — PLAN OF CARE
6511  Noted consult indicating pt will need IV ertapenem 1 g daily for three days from 2/14 and this information was sent to [facility] in [place]     0932 Spoke with Allyn at [facility]. She reports that the business office and admissions are all off today for the holiday and will return tomorrow.  Pt pending insurance authorization for SNF. Will continue to follow.       02/13/24 8246   Post-Acute Status   Post-Acute Authorization Placement   Post-Acute Placement Status Pending payor review/awaiting authorization (if required)

## 2024-02-13 NOTE — SUBJECTIVE & OBJECTIVE
"Interval History: see "Hospital Course"    Review of Systems   Constitutional:  Positive for fatigue. Negative for fever.   Skin:  Positive for pallor.     Objective:     Vital Signs (Most Recent):  Temp: 98.2 °F (36.8 °C) (02/13/24 1146)  Pulse: 70 (02/13/24 1146)  Resp: 20 (02/13/24 1146)  BP: (!) 159/69 (02/13/24 1146)  SpO2: (!) 93 % (02/13/24 1146) Vital Signs (24h Range):  Temp:  [97.9 °F (36.6 °C)-98.6 °F (37 °C)] 98.2 °F (36.8 °C)  Pulse:  [70-80] 70  Resp:  [17-20] 20  SpO2:  [91 %-95 %] 93 %  BP: (110-179)/(54-79) 159/69     Weight: 71.2 kg (156 lb 15.5 oz)  Body mass index is 25.34 kg/m².    Intake/Output Summary (Last 24 hours) at 2/13/2024 1153  Last data filed at 2/13/2024 0741  Gross per 24 hour   Intake 679.84 ml   Output 1050 ml   Net -370.16 ml         Physical Exam  Vitals and nursing note reviewed.   Constitutional:       General: She is not in acute distress.  HENT:      Head: Normocephalic and atraumatic.      Right Ear: External ear normal.      Left Ear: External ear normal.      Nose: Nose normal.      Mouth/Throat:      Pharynx: Oropharynx is clear.   Eyes:      Extraocular Movements: Extraocular movements intact.      Conjunctiva/sclera: Conjunctivae normal.   Cardiovascular:      Rate and Rhythm: Normal rate and regular rhythm.      Pulses: Normal pulses.      Heart sounds: Normal heart sounds.   Pulmonary:      Effort: Pulmonary effort is normal.      Breath sounds: Normal breath sounds.   Abdominal:      General: Bowel sounds are normal. There is no distension.      Palpations: Abdomen is soft.   Genitourinary:     Comments: Rodriguez.  Musculoskeletal:         General: Normal range of motion.      Cervical back: Normal range of motion and neck supple.      Right lower leg: No edema.      Left lower leg: No edema.   Skin:     General: Skin is warm and dry.      Coloration: Skin is pale.   Neurological:      Mental Status: She is alert. Mental status is at baseline.   Psychiatric:         " Mood and Affect: Mood normal.         Behavior: Behavior normal.             Significant Labs: All pertinent labs within the past 24 hours have been reviewed.    Significant Imaging: I have reviewed all pertinent imaging results/findings within the past 24 hours.

## 2024-02-13 NOTE — PROGRESS NOTES
[facility] Medicine  Progress Note    Patient Name: Irene العراقي  MRN: [id number]  Patient Class: IP- Inpatient   Admission Date: 2/8/2024  Length of Stay: 2 days  Attending Physician: Brian Marques MD  Primary Care Provider: Wanda Kuhn FNP-C        Subjective:     Principal Problem:Recurrent UTI  Acute Condition: stable        HPI:  Irene العراقي is an 84 y/o F with past medical history significant for anemia, afib, CKD and HTN who presented to the ED from [facility] for further treatment of abnormal labs.  Per report, her platelets have been taking a downward trend and are 47K today.  She also has a hemoglobin of 7.5 with no obvious source of blood loss.  Pt denies recent fevers, chest pain, dizziness, melena or hematochezia.  Denies N/V but is having some mild generalized abdominal tenderness.  CT abdomen and pelvis obtained shows nonspecific gastric wall thickening which could be consistent with gastritis or peptic ulcer disease.  The ED physician discussed the lab findings with the hematologist on call who recommends admission and transfusion of PRBCs.  Pt presented from [facility] with araya catheter in place.  She reports urinary retention and states the araya has been in place for about a week.  Araya catheter was exchanged in the ED.  Pt is currently being treated for UTI with culture positive for enterococcus faecium VRE susceptible to linezolid.  Urinalysis today remains positive. She is placed in observation under the service of hospital medicine for continued monitoring and treatment.       Overview/Hospital Course:  Irene العراقي is an 83 year old female with a past medical history of Afib, CKD, HTN, anemia and thrombocytopenia who presented with worsening anemia and thrombocytopenia in the setting of linezolid use for Enterococcus UTI. There was no evidence of gross bleeding and she was transfused a unit of PRBCs. The Hgb is stable and her platelets are  "improving.Hematology was consulted. Repeat urine culture shows ESBL E coli for which the patient is on meropenem. ID has been consulted. A midline is in place. A Rodriguez is in place for urinary retention. Urology has been consulted; the patient has deferred further urologic workup at this time.    Interval History: see "Hospital Course"    Review of Systems   Constitutional:  Positive for fatigue. Negative for fever.   Skin:  Positive for pallor.     Objective:     Vital Signs (Most Recent):  Temp: 98.2 °F (36.8 °C) (02/13/24 1146)  Pulse: 70 (02/13/24 1146)  Resp: 20 (02/13/24 1146)  BP: (!) 159/69 (02/13/24 1146)  SpO2: (!) 93 % (02/13/24 1146) Vital Signs (24h Range):  Temp:  [97.9 °F (36.6 °C)-98.6 °F (37 °C)] 98.2 °F (36.8 °C)  Pulse:  [70-80] 70  Resp:  [17-20] 20  SpO2:  [91 %-95 %] 93 %  BP: (110-179)/(54-79) 159/69     Weight: 71.2 kg (156 lb 15.5 oz)  Body mass index is 25.34 kg/m².    Intake/Output Summary (Last 24 hours) at 2/13/2024 1153  Last data filed at 2/13/2024 0741  Gross per 24 hour   Intake 679.84 ml   Output 1050 ml   Net -370.16 ml         Physical Exam  Vitals and nursing note reviewed.   Constitutional:       General: She is not in acute distress.  HENT:      Head: Normocephalic and atraumatic.      Right Ear: External ear normal.      Left Ear: External ear normal.      Nose: Nose normal.      Mouth/Throat:      Pharynx: Oropharynx is clear.   Eyes:      Extraocular Movements: Extraocular movements intact.      Conjunctiva/sclera: Conjunctivae normal.   Cardiovascular:      Rate and Rhythm: Normal rate and regular rhythm.      Pulses: Normal pulses.      Heart sounds: Normal heart sounds.   Pulmonary:      Effort: Pulmonary effort is normal.      Breath sounds: Normal breath sounds.   Abdominal:      General: Bowel sounds are normal. There is no distension.      Palpations: Abdomen is soft.   Genitourinary:     Comments: Rodriguez.  Musculoskeletal:         General: Normal range of motion.      " Cervical back: Normal range of motion and neck supple.      Right lower leg: No edema.      Left lower leg: No edema.   Skin:     General: Skin is warm and dry.      Coloration: Skin is pale.   Neurological:      Mental Status: She is alert. Mental status is at baseline.   Psychiatric:         Mood and Affect: Mood normal.         Behavior: Behavior normal.             Significant Labs: All pertinent labs within the past 24 hours have been reviewed.    Significant Imaging: I have reviewed all pertinent imaging results/findings within the past 24 hours.    Assessment/Plan:      * Recurrent UTI  ESBL E coli. In setting of urinary retention.  -Rodriguez  -Meropenem  -ID  -Urology; patient deferred further workup at this time          Hypophosphatemia  -Replete PRN  -Trend P    Urinary retention  -Rodriguez changed on 2/9  -Urology consulted; patient deferred further workup    NYHA class 3 heart failure with preserved ejection fraction  -Coreg  -Telemetry      Stage 3b chronic kidney disease  Stable.  -Renally dose medications  -Avoid nephrotoxic agents    Hypertension  -Continue Coreg  -Continue to monitor    Anemia  Stable. Appears to be secondary to chronic disease.  -S/p one unit PRBCs  -Trend Hgb with CBC  -Hematology following  -Continue home Eliquis     PAF (paroxysmal atrial fibrillation)  -Telemetry  -Coreg  -Amiodarone  -Eliquis    Thrombocytopenia  Improving.  -Discontinued linezolid  -Hematology follow up; likely BMBx once UTI treated  -Trend platelets with CBC          VTE Risk Mitigation (From admission, onward)           Ordered     apixaban tablet 5 mg  2 times daily         02/13/24 1148     IP VTE HIGH RISK PATIENT  Once         02/08/24 1916     Place sequential compression device  Until discontinued         02/08/24 1916     Reason for No Pharmacological VTE Prophylaxis  Once        Question Answer Comment   Reasons: Already adequately anticoagulated on oral Anticoagulants    Reasons: Active Bleeding         02/08/24 1916                    Discharge Planning   YANET: 2/13/2024     Code Status: Full Code   Is the patient medically ready for discharge?:     Reason for patient still in hospital (select all that apply): Patient trending condition, Treatment, and Pending disposition  Discharge Plan A: Skilled Nursing Facility                  Brian Marques MD  Department of Hospital Medicine   [facility]/Surg

## 2024-02-14 LAB
ALBUMIN SERPL BCP-MCNC: 2.9 G/DL (ref 3.5–5.2)
ALBUMIN SERPL ELPH-MCNC: 2.8 G/DL (ref 3.4–4.7)
ALBUMIN/GLOB SERPL ELPH: 1.1 {RATIO}
ALP SERPL-CCNC: 103 U/L (ref 55–135)
ALPHA1 GLOB SERPL ELPH-MCNC: 0.3 G/DL (ref 0.1–0.3)
ALPHA2 GLOB SERPL ELPH-MCNC: 0.9 G/DL (ref 0.6–1)
ALT SERPL W/O P-5'-P-CCNC: 13 U/L (ref 10–44)
ANION GAP SERPL CALC-SCNC: 8 MMOL/L (ref 8–16)
AST SERPL-CCNC: 17 U/L (ref 10–40)
B-GLOBULIN SERPL ELPH-MCNC: 0.7 G/DL (ref 0.7–1.2)
BASOPHILS # BLD AUTO: 0.01 K/UL (ref 0–0.2)
BASOPHILS NFR BLD: 0.2 % (ref 0–1.9)
BILIRUB SERPL-MCNC: 0.5 MG/DL (ref 0.1–1)
BUN SERPL-MCNC: 11 MG/DL (ref 8–23)
CALCIUM SERPL-MCNC: 8.6 MG/DL (ref 8.7–10.5)
CHLORIDE SERPL-SCNC: 107 MMOL/L (ref 95–110)
CO2 SERPL-SCNC: 23 MMOL/L (ref 23–29)
CREAT SERPL-MCNC: 1 MG/DL (ref 0.5–1.4)
CRP SERPL-MCNC: 10.2 MG/L (ref 0–8.2)
DIFFERENTIAL METHOD BLD: ABNORMAL
EOSINOPHIL # BLD AUTO: 0.2 K/UL (ref 0–0.5)
EOSINOPHIL NFR BLD: 3.7 % (ref 0–8)
ERYTHROCYTE [DISTWIDTH] IN BLOOD BY AUTOMATED COUNT: 13.4 % (ref 11.5–14.5)
EST. GFR  (NO RACE VARIABLE): 56 ML/MIN/1.73 M^2
GAMMA GLOB SERPL ELPH-MCNC: 0.7 G/DL (ref 0.6–1.6)
GLUCOSE SERPL-MCNC: 83 MG/DL (ref 70–110)
HCT VFR BLD AUTO: 22.7 % (ref 37–48.5)
HGB BLD-MCNC: 7.5 G/DL (ref 12–16)
IMM GRANULOCYTES # BLD AUTO: 0.1 K/UL (ref 0–0.04)
IMM GRANULOCYTES NFR BLD AUTO: 1.6 % (ref 0–0.5)
LYMPHOCYTES # BLD AUTO: 0.9 K/UL (ref 1–4.8)
LYMPHOCYTES NFR BLD: 13.9 % (ref 18–48)
M PROTEIN 1 SERPL ELPH-MCNC: ABNORMAL G/L
M PROTEIN 2 SERPL ELPH-MCNC: ABNORMAL G/DL
MAGNESIUM SERPL-MCNC: 1.8 MG/DL (ref 1.6–2.6)
MCH RBC QN AUTO: 30.4 PG (ref 27–31)
MCHC RBC AUTO-ENTMCNC: 33 G/DL (ref 32–36)
MCV RBC AUTO: 92 FL (ref 82–98)
MONOCYTES # BLD AUTO: 0.7 K/UL (ref 0.3–1)
MONOCYTES NFR BLD: 10.7 % (ref 4–15)
NEUTROPHILS # BLD AUTO: 4.3 K/UL (ref 1.8–7.7)
NEUTROPHILS NFR BLD: 69.9 % (ref 38–73)
NRBC BLD-RTO: 0 /100 WBC
PHOSPHATE SERPL-MCNC: 2.6 MG/DL (ref 2.7–4.5)
PLATELET # BLD AUTO: 84 K/UL (ref 150–450)
PMV BLD AUTO: 10.8 FL (ref 9.2–12.9)
POTASSIUM SERPL-SCNC: 4.4 MMOL/L (ref 3.5–5.1)
PROT PATTERN SERPL ELPH-IMP: ABNORMAL
PROT SERPL-MCNC: 5.2 G/DL (ref 6–8.4)
PROT SERPL-MCNC: 5.4 G/DL (ref 6.3–7.9)
RBC # BLD AUTO: 2.47 M/UL (ref 4–5.4)
SODIUM SERPL-SCNC: 138 MMOL/L (ref 136–145)
WBC # BLD AUTO: 6.17 K/UL (ref 3.9–12.7)

## 2024-02-14 PROCEDURE — 25000003 PHARM REV CODE 250: Performed by: STUDENT IN AN ORGANIZED HEALTH CARE EDUCATION/TRAINING PROGRAM

## 2024-02-14 PROCEDURE — 83735 ASSAY OF MAGNESIUM: CPT | Performed by: NURSE PRACTITIONER

## 2024-02-14 PROCEDURE — 25000003 PHARM REV CODE 250: Performed by: INTERNAL MEDICINE

## 2024-02-14 PROCEDURE — 63600175 PHARM REV CODE 636 W HCPCS: Performed by: NURSE PRACTITIONER

## 2024-02-14 PROCEDURE — 27000221 HC OXYGEN, UP TO 24 HOURS

## 2024-02-14 PROCEDURE — 94799 UNLISTED PULMONARY SVC/PX: CPT

## 2024-02-14 PROCEDURE — 25000003 PHARM REV CODE 250: Performed by: NURSE PRACTITIONER

## 2024-02-14 PROCEDURE — 63600175 PHARM REV CODE 636 W HCPCS: Performed by: STUDENT IN AN ORGANIZED HEALTH CARE EDUCATION/TRAINING PROGRAM

## 2024-02-14 PROCEDURE — 85025 COMPLETE CBC W/AUTO DIFF WBC: CPT | Performed by: NURSE PRACTITIONER

## 2024-02-14 PROCEDURE — 99233 SBSQ HOSP IP/OBS HIGH 50: CPT | Mod: FS,,, | Performed by: NURSE PRACTITIONER

## 2024-02-14 PROCEDURE — 36415 COLL VENOUS BLD VENIPUNCTURE: CPT | Performed by: NURSE PRACTITIONER

## 2024-02-14 PROCEDURE — 80053 COMPREHEN METABOLIC PANEL: CPT | Performed by: NURSE PRACTITIONER

## 2024-02-14 PROCEDURE — 94761 N-INVAS EAR/PLS OXIMETRY MLT: CPT

## 2024-02-14 PROCEDURE — 99900035 HC TECH TIME PER 15 MIN (STAT)

## 2024-02-14 PROCEDURE — 84100 ASSAY OF PHOSPHORUS: CPT | Performed by: NURSE PRACTITIONER

## 2024-02-14 PROCEDURE — 86140 C-REACTIVE PROTEIN: CPT | Performed by: NURSE PRACTITIONER

## 2024-02-14 PROCEDURE — 97116 GAIT TRAINING THERAPY: CPT

## 2024-02-14 PROCEDURE — 11000001 HC ACUTE MED/SURG PRIVATE ROOM

## 2024-02-14 RX ORDER — AMLODIPINE BESYLATE 5 MG/1
10 TABLET ORAL DAILY
Status: DISCONTINUED | OUTPATIENT
Start: 2024-02-14 | End: 2024-02-19 | Stop reason: HOSPADM

## 2024-02-14 RX ADMIN — APIXABAN 5 MG: 2.5 TABLET, FILM COATED ORAL at 09:02

## 2024-02-14 RX ADMIN — CARVEDILOL 3.12 MG: 3.12 TABLET, FILM COATED ORAL at 09:02

## 2024-02-14 RX ADMIN — CARVEDILOL 3.12 MG: 3.12 TABLET, FILM COATED ORAL at 08:02

## 2024-02-14 RX ADMIN — AMIODARONE HYDROCHLORIDE 200 MG: 100 TABLET ORAL at 09:02

## 2024-02-14 RX ADMIN — MEROPENEM 1 G: 1 INJECTION, POWDER, FOR SOLUTION INTRAVENOUS at 04:02

## 2024-02-14 RX ADMIN — FAMOTIDINE 20 MG: 20 TABLET, FILM COATED ORAL at 08:02

## 2024-02-14 RX ADMIN — MUPIROCIN: 20 OINTMENT TOPICAL at 08:02

## 2024-02-14 RX ADMIN — MUPIROCIN: 20 OINTMENT TOPICAL at 09:02

## 2024-02-14 RX ADMIN — MEROPENEM 1 G: 1 INJECTION, POWDER, FOR SOLUTION INTRAVENOUS at 01:02

## 2024-02-14 RX ADMIN — ONDANSETRON 4 MG: 2 INJECTION INTRAMUSCULAR; INTRAVENOUS at 07:02

## 2024-02-14 RX ADMIN — APIXABAN 5 MG: 2.5 TABLET, FILM COATED ORAL at 08:02

## 2024-02-14 RX ADMIN — AMLODIPINE BESYLATE 10 MG: 5 TABLET ORAL at 04:02

## 2024-02-14 RX ADMIN — HYDRALAZINE HYDROCHLORIDE 10 MG: 20 INJECTION INTRAMUSCULAR; INTRAVENOUS at 12:02

## 2024-02-14 RX ADMIN — ACETAMINOPHEN 650 MG: 325 TABLET ORAL at 10:02

## 2024-02-14 RX ADMIN — AMIODARONE HYDROCHLORIDE 200 MG: 100 TABLET ORAL at 08:02

## 2024-02-14 NOTE — PROGRESS NOTES
[facility]/Surg  Adult Nutrition  Progress Note    SUMMARY       Recommendations    1. Encourage oral intake   2. Recommend pasquale BID to promote wound healing   3. Recommend commercial oral beverages if po intake remains decreased   4. Monitor labs and weights   5. Collaboration with medical providers     Goals: Patient to consume >50% of estimated energy needs prior to RD follow up  Nutrition Goal Status: progressing towards goal  Communication of RD Recs: other (comment) (consult, poc)    Assessment and Plan    Nutrition Problem  Inadequate nutrition     Related to (etiology):   Decreased appetite  GI pain     Signs and Symptoms (as evidenced by):   Decreased po intake      Interventions(treatment strategy):  Commercial beverages  Collaboration with medical providers        Nutrition Diagnosis Status:   New    Malnutrition Assessment     Skin (Micronutrient): wounds unhealed                                 Reason for Assessment    Reason For Assessment: RD follow-up    Diagnosis:  (anemia)  Patient Active Problem List   Diagnosis    Peripheral vertigo of both ears    Thrombocytopenia    PAF (paroxysmal atrial fibrillation)    Anemia    Anxiety    History of COVID-19    Severe aortic valve stenosis    Current use of long term anticoagulation    Hypertension    Tremors of nervous system    Acquired hammer toe of right foot    Primary osteoarthritis of right knee    Osteoporosis    Family history of rectal cancer    Diverticulosis of large intestine without perforation or abscess without bleeding    Depression    Colon polyps    Chronic idiopathic constipation    Stage 3b chronic kidney disease    NYHA class 3 heart failure with preserved ejection fraction    S/P TAVR (transcatheter aortic valve replacement)    On amiodarone therapy    Recurrent UTI    Postural hypotension    Status post open reduction with internal fixation (ORIF) of fracture of ankle    Urinary retention    Hypophosphatemia  "      Relevant Medical History:   Past Medical History:   Diagnosis Date    Anemia, unspecified     Atrial fibrillation 01/25/2021    CKD (chronic kidney disease)     Hypertension        Interdisciplinary Rounds: did not attend (RD remote)    General Information Comments:   2/14/2024: Patient continues on a low na diet with fair to good po intake noted between %.  No tolerance issues reported at this time.  Tee recommended to promote wound healing of left heel.  Labs reviewed.  NKFA.  LBM: 2/13/2024.    Nutrition Discharge Planning: Patient to continue on healthy diet post discharge    Nutrition Risk Screen    Nutrition Risk Screen: large or nonhealing wound, burn or pressure injury    Nutrition/Diet History    Spiritual, Cultural Beliefs, Confucianist Practices, Values that Affect Care: yes  Food Allergies: NKFA  Factors Affecting Nutritional Intake: decreased appetite, abdominal pain    Anthropometrics    Temp: 97.9 °F (36.6 °C)  Height Method: Stated  Height: 5' 6" (167.6 cm)  Height (inches): 66 in  Weight Method: Bed Scale  Weight: 71.2 kg (156 lb 15.5 oz)  Weight (lb): 156.97 lb  Ideal Body Weight (IBW), Female: 130 lb  % Ideal Body Weight, Female (lb): 120.75 %  BMI (Calculated): 25.3  BMI Grade: 25 - 29.9 - overweight       Lab/Procedures/Meds    Pertinent Labs Reviewed: reviewed  BMP  Lab Results   Component Value Date     02/14/2024    K 4.4 02/14/2024     02/14/2024    CO2 23 02/14/2024    BUN 11 02/14/2024    CREATININE 1.0 02/14/2024    CALCIUM 8.6 (L) 02/14/2024    ANIONGAP 8 02/14/2024    EGFRNORACEVR 56 (A) 02/14/2024     Lab Results   Component Value Date    HGBA1C 5.1 04/11/2023     Lab Results   Component Value Date    CALCIUM 8.6 (L) 02/14/2024    PHOS 2.6 (L) 02/14/2024       Pertinent Medications Reviewed: reviewed  Scheduled Meds:   amiodarone  200 mg Oral BID    apixaban  5 mg Oral BID    carvediloL  3.125 mg Oral BID    famotidine  20 mg Oral Daily    Lactobacillus " acidoph-L.bulgar  4 tablet Oral TID WM    meropenem (MERREM) IVPB  1 g Intravenous Q12H    mupirocin   Nasal BID     Continuous Infusions:  PRN Meds:.acetaminophen, albuterol-ipratropium, aluminum-magnesium hydroxide-simethicone, hydrALAZINE, magnesium oxide, magnesium oxide, melatonin, naloxone, ondansetron, potassium bicarbonate, potassium bicarbonate, potassium bicarbonate, prochlorperazine, simethicone, sodium chloride 0.9%    Physical Findings/Assessment         Estimated/Assessed Needs    Weight Used For Calorie Calculations: 71.2 kg (156 lb 15.5 oz)  Energy Calorie Requirements (kcal): 25-30kcals/kg (1780-2136kcals/day)  Energy Need Method: Kcal/kg  Protein Requirements: 0.8g/kg (CKD) (57g/day)  Weight Used For Protein Calculations: 71.2 kg (156 lb 15.5 oz)  Fluid Requirements (mL): 1000+output  Estimated Fluid Requirement Method: other (see comments) (ckd)  RDA Method (mL): 25  CHO Requirement: 250      Nutrition Prescription Ordered    Current Diet Order: regular  Oral Nutrition Supplement: pasquale bid    Evaluation of Received Nutrient/Fluid Intake    % Kcal Needs: %  % Protein Needs: %  I/O: -1.4L since admit  Energy Calories Required: meeting needs  Protein Required: not meeting needs  Fluid Required: meeting needs  Comments: LBM: 2/13/24  Tolerance: tolerating  % Intake of Estimated Energy Needs: 50 - 75 %  % Meal Intake: 50 - 75 %    Nutrition Risk    Level of Risk/Frequency of Follow-up: moderate (follow up: 2x per week)     Monitor and Evaluation    Food and Nutrient Intake: energy intake, food and beverage intake  Food and Nutrient Adminstration: diet order  Knowledge/Beliefs/Attitudes: beliefs and attitudes  Physical Activity and Function: nutrition-related ADLs and IADLs, factors affecting access to physical activity  Anthropometric Measurements: height/length, weight, weight change, body mass index  Biochemical Data, Medical Tests and Procedures: gastrointestinal profile, electrolyte  and renal panel, glucose/endocrine profile, inflammatory profile, lipid profile  Nutrition-Focused Physical Findings: skin     Nutrition Follow-Up    RD Follow-up?: Yes    Nara Morrison, MS, RDN, LDN

## 2024-02-14 NOTE — PLAN OF CARE
Problem: Physical Therapy  Goal: Physical Therapy Goal  Description: Goals to be met by: 2024     Patient will increase functional independence with mobility by performin. Supine to sit with MInimal Assistance  2. Sit to stand transfer with Minimal Assistance  3. Bed to chair transfer with Minimal Assistance using Rolling Walker  4. Gait  x 50 feet with Minimal Assistance using Rolling Walker.   5. Lower extremity exercise program x20 reps     Hx of R ankle fx 2023- camwalker boot    Outcome: Ongoing, Progressing   Pt alert, interactive today. Pt agreeable to PT. Blanco arce applied RLE and tennis shoe L. Pt requiring min assist mobility and ambulated within room 40ft /RW min assist and OOB chair. No nausea today

## 2024-02-14 NOTE — ASSESSMENT & PLAN NOTE
-Rodriguez changed on 2/9  -Urology consulted; patient deferred further workup at this time; may follow up outpatient

## 2024-02-14 NOTE — PROGRESS NOTES
[facility] Medicine  Progress Note    Patient Name: Irene العراقي  MRN: [id number]  Patient Class: IP- Inpatient   Admission Date: 2/8/2024  Length of Stay: 3 days  Attending Physician: Brian Marques MD  Primary Care Provider: Wanda Kuhn FNP-C        Subjective:     Principal Problem:Recurrent UTI  Acute Condition: stable        HPI:  Irene العراقي is an 84 y/o F with past medical history significant for anemia, afib, CKD and HTN who presented to the ED from [facility] for further treatment of abnormal labs.  Per report, her platelets have been taking a downward trend and are 47K today.  She also has a hemoglobin of 7.5 with no obvious source of blood loss.  Pt denies recent fevers, chest pain, dizziness, melena or hematochezia.  Denies N/V but is having some mild generalized abdominal tenderness.  CT abdomen and pelvis obtained shows nonspecific gastric wall thickening which could be consistent with gastritis or peptic ulcer disease.  The ED physician discussed the lab findings with the hematologist on call who recommends admission and transfusion of PRBCs.  Pt presented from [facility] with araya catheter in place.  She reports urinary retention and states the araya has been in place for about a week.  Araya catheter was exchanged in the ED.  Pt is currently being treated for UTI with culture positive for enterococcus faecium VRE susceptible to linezolid.  Urinalysis today remains positive. She is placed in observation under the service of hospital medicine for continued monitoring and treatment.       Overview/Hospital Course:  Irene العراقي is an 83 year old female with a past medical history of Afib, CKD, HTN, anemia and thrombocytopenia who presented with worsening anemia and thrombocytopenia in the setting of linezolid use for Enterococcus UTI. There was no evidence of gross bleeding and she was transfused a unit of PRBCs. The Hgb is stable and her platelets are  "improving. Hematology was consulted. Repeat urine culture shows ESBL E coli for which the patient is on meropenem. ID has been consulted. A midline is in place. A Rodriguez is in place for urinary retention. Urology has been consulted; the patient has deferred further urologic workup at this time and may follow up with Dr. Zaavla in the outpatient setting.    Interval History: see "Hospital Course"    Review of Systems   Constitutional:  Positive for fatigue. Negative for fever.   Skin:  Positive for pallor.     Objective:     Vital Signs (Most Recent):  Temp: 97.9 °F (36.6 °C) (02/14/24 1127)  Pulse: 71 (02/14/24 1127)  Resp: 17 (02/14/24 1127)  BP: (!) 179/79 (02/14/24 1127)  SpO2: (!) 92 % (02/14/24 1127) Vital Signs (24h Range):  Temp:  [97.8 °F (36.6 °C)-98.9 °F (37.2 °C)] 97.9 °F (36.6 °C)  Pulse:  [71-85] 71  Resp:  [17-20] 17  SpO2:  [92 %-94 %] 92 %  BP: (161-190)/(70-80) 179/79     Weight: 71.2 kg (156 lb 15.5 oz)  Body mass index is 25.34 kg/m².    Intake/Output Summary (Last 24 hours) at 2/14/2024 1207  Last data filed at 2/14/2024 0812  Gross per 24 hour   Intake 396.99 ml   Output 1601 ml   Net -1204.01 ml         Physical Exam  Vitals and nursing note reviewed.   Constitutional:       General: She is not in acute distress.  HENT:      Head: Normocephalic and atraumatic.      Right Ear: External ear normal.      Left Ear: External ear normal.      Nose: Nose normal.      Mouth/Throat:      Pharynx: Oropharynx is clear.   Eyes:      Extraocular Movements: Extraocular movements intact.      Conjunctiva/sclera: Conjunctivae normal.   Cardiovascular:      Rate and Rhythm: Normal rate and regular rhythm.      Pulses: Normal pulses.      Heart sounds: Normal heart sounds.   Pulmonary:      Effort: Pulmonary effort is normal.      Breath sounds: Normal breath sounds.   Abdominal:      General: Bowel sounds are normal. There is no distension.      Palpations: Abdomen is soft.   Genitourinary:     Comments: " Rodriguez.  Musculoskeletal:         General: Normal range of motion.      Cervical back: Normal range of motion and neck supple.      Right lower leg: No edema.      Left lower leg: No edema.   Skin:     General: Skin is warm and dry.      Coloration: Skin is pale.   Neurological:      Mental Status: She is alert. Mental status is at baseline.   Psychiatric:         Mood and Affect: Mood normal.         Behavior: Behavior normal.             Significant Labs: All pertinent labs within the past 24 hours have been reviewed.    Significant Imaging: I have reviewed all pertinent imaging results/findings within the past 24 hours.    Assessment/Plan:      * Recurrent UTI  ESBL E coli. In setting of urinary retention.  -Rodriguez  -Meropenem  -ID  -Urology; patient deferred further workup at this time          Hypophosphatemia  -Replete PRN  -Trend P    Urinary retention  -Rodriguez changed on 2/9  -Urology consulted; patient deferred further workup at this time; may follow up outpatient    S/P TAVR (transcatheter aortic valve replacement)  Stable.  -Telemetry  -Coreg      NYHA class 3 heart failure with preserved ejection fraction  -Coreg  -Telemetry      Stage 3b chronic kidney disease  Stable.  -Renally dose medications  -Avoid nephrotoxic agents    Hypertension  -Continue Coreg  -Continue to monitor    Anemia  Stable. Appears to be secondary to chronic disease.  -S/p one unit PRBCs  -Trend Hgb with CBC  -Hematology following  -Continue home Eliquis     PAF (paroxysmal atrial fibrillation)  -Telemetry  -Coreg  -Amiodarone  -Eliquis    Thrombocytopenia  Improving.  -Discontinued linezolid  -Hematology follow up; likely BMBx once UTI treated  -Trend platelets with CBC          VTE Risk Mitigation (From admission, onward)           Ordered     apixaban tablet 5 mg  2 times daily         02/13/24 1148     IP VTE HIGH RISK PATIENT  Once         02/08/24 1916     Place sequential compression device  Until discontinued         02/08/24  1916     Reason for No Pharmacological VTE Prophylaxis  Once        Question Answer Comment   Reasons: Already adequately anticoagulated on oral Anticoagulants    Reasons: Active Bleeding        02/08/24 1916                    Discharge Planning   YANET: 2/15/2024     Code Status: Full Code   Is the patient medically ready for discharge?:     Reason for patient still in hospital (select all that apply): Patient trending condition, Treatment, Consult recommendations, and Pending disposition  Discharge Plan A: Skilled Nursing Facility                  Brian Marques MD  Department of Hospital Medicine   [facility]/Surg

## 2024-02-14 NOTE — PLAN OF CARE
02/13/24 1949   Patient Assessment/Suction   Level of Consciousness (AVPU) alert   Respiratory Effort Normal;Unlabored   Expansion/Accessory Muscles/Retractions expansion symmetric   All Lung Fields Breath Sounds diminished   Rhythm/Pattern, Respiratory pattern regular   Cough Frequency no cough   PRE-TX-O2   Device (Oxygen Therapy) nasal cannula   Flow (L/min) 1   SpO2 (!) 94 %   Pulse Oximetry Type Intermittent   Aerosol Therapy   $ Aerosol Therapy Charges PRN treatment not required   Incentive Spirometer   $ Incentive Spirometer Charges done with encouragement   Incentive Spirometer Predicted Level (mL) 1680   Administration (IS) proper technique demonstrated   Number of Repetitions (IS) 10   Level Incentive Spirometer (mL) 1500   Patient Tolerance (IS) good;no adverse signs/symptoms present

## 2024-02-14 NOTE — RESPIRATORY THERAPY
02/14/24 0732   Patient Assessment/Suction   Level of Consciousness (AVPU) alert   Respiratory Effort Normal;Unlabored   Expansion/Accessory Muscles/Retractions expansion symmetric;no retractions;no use of accessory muscles   All Lung Fields Breath Sounds Anterior:;Lateral:;diminished;clear;equal bilaterally   Rhythm/Pattern, Respiratory depth regular;pattern regular;unlabored   Cough Frequency no cough   Skin Integrity   $ Wound Care Tech Time 15 min   Area Observed Left;Right;Behind ear;Cheek;Upper lip;Nares   Skin Appearance without discoloration   PRE-TX-O2   Device (Oxygen Therapy) nasal cannula   $ Is the patient on Low Flow Oxygen? Yes   Flow (L/min) 2   Oxygen Concentration (%) 28   SpO2 (!) 93 %   Pulse Oximetry Type Intermittent   $ Pulse Oximetry - Multiple Charge Pulse Oximetry - Multiple   Pulse 74   Resp 18   Positioning HOB elevated 45 degrees   Aerosol Therapy   $ Aerosol Therapy Charges PRN treatment not required   Respiratory Treatment Status (SVN) refused   Incentive Spirometer   $ Incentive Spirometer Charges refused   Administration (IS) refused  (pt nauseated, RN at bedside)   Ready to Wean/Extubation Screen   FIO2<=50 (chart decimal) 0.28

## 2024-02-14 NOTE — PLAN OF CARE
Nutrition Plan of Care:    Recommendations     1. Encourage oral intake   2. Recommend pasquale BID to promote wound healing   3. Recommend commercial oral beverages if po intake remains decreased   4. Monitor labs and weights   5. Collaboration with medical providers     Goals: Patient to consume >50% of estimated energy needs prior to RD follow up  Nutrition Goal Status: progressing towards goal  Communication of RD Recs: other (comment) (consult, poc)     Assessment and Plan     Nutrition Problem  Inadequate nutrition     Related to (etiology):   Decreased appetite  GI pain     Signs and Symptoms (as evidenced by):   Decreased po intake      Interventions(treatment strategy):  Commercial beverages  Collaboration with medical providers        Nutrition Diagnosis Status:   New     Malnutrition Assessment  Skin (Micronutrient): wounds unhealed     Nara Morrison, MS, RDN, LDN

## 2024-02-14 NOTE — PT/OT/SLP PROGRESS
Occupational Therapy      Patient Name:  Irene العراقي   MRN:  [id number]    Attempted OT tx twice. Patient eating lunch on first attempt. Patient incontinent of bowels on second attempt. Will follow up tomorrow (2/15).    2/14/2024

## 2024-02-14 NOTE — PROGRESS NOTES
[facility]  Department of [facility]ious Disease  Progress Note        PATIENT NAME: Irene العراقي  MRN: [id number]  TODAY'S DATE: 02/14/2024  ADMIT DATE: 2/8/2024  LENGTH OF STAY: 3 DAYS    PRINCIPLE PROBLEM: Recurrent UTI    REASON FOR CONSULT:  ESBL E coli with anemia and thrombocytopenia several antibiotic courses in the past 2 weeks - chronic Rodriguez     INTERVAL HISTORY      2/14/24@0921 (Jacinto):  Patient seen and examined.  She was sleeping and awakens easily.  She was up all night with her blood pressure elevated and is very tired.  She was having some wheezing yesterday but not appreciated today.  She was able to eat breakfast but needs to rest.  She changed her mind about having procedure here and would like to go ahead and have cystoscopy.  She was hoping that she can have it done while she was in the hospital.  Today is 4th day for meropenem.  T-max 98.2° in the last 24 hours.  WBC 6.17, platelets 84.  H&H 7.5/22.7.  BUN/CR 11/1.0 with estimated creatinine clearance 43.1.  ALT/AST 13/17.  CRP pending. CTRSS with no hydronephrosis or kidney stones.  She does have moderate bilateral pleural effusions and compressive lower lobe atelectasis. Last echocardiogram from December 2023 with TAVR mild mitral stenosis and grade 2 diastolic dysfunction.    2/12/2024@0846 (Jacinto):  Patient seen and examined alone in her room.  She was sitting up in bed eating breakfast.  She is in a much better mood today and is not tearful.  She said she is feeling better.  She continues to cough up very small amounts of sputum but has been unable to have enough for sputum culture.  Diarrhea is improved and she denies abdominal pain, she denies fevers or chills, chest pain or palpitations.  She is hoping to be able to sit up on side of bed with physical therapy.  She tells us that she had kidney stones around age 18 but has not had any issues in the recent past. Appetite is fair but she said that eating some food,  such as sausage burns her tongue..  T-max 98.3° in the last 24 hours, WBC 6.03, platelets trending up at 58, H&H 7.5/22.6, no left shift or bands.  BUN/CR 10/1.3 with creatinine clearance 33.2, ALT/AST 15/15.  BNP this morning 384.  Procalcitonin 0.07 and CRP 12.7.  Urine culture with ESBL E coli sensitive to meropenem, blood cultures no growth to date.  Retroperitoneal ultrasound pending.    Antibiotics (From admission, onward)      Start     Stop Route Frequency Ordered    02/11/24 2100  mupirocin 2 % ointment         02/16/24 2059 Nasl 2 times daily 02/11/24 1315    02/11/24 1400  meropenem (MERREM) 1 g in sodium chloride 0.9 % 100 mL IVPB (MB+)         -- IV Every 12 hours (non-standard times) 02/11/24 1306              ASSESSMENT and PLAN     Recurrent UTIs in the setting of urinary retention status post Rodriguez for the last 6 weeks  Urine culture 2/8 ESBL E coli  Prior urine cultures: 1/19 VRE Enterococcus faecium S to daptomycin and linezolid, 12/11/23 pan-sensitive Klebsiella pneumoniae, 9/21/23 Enterococcus fecalis, 9/9/23: Pantoea agglomerans, 5/9/23 pan-sensitive E coli, 4/25/23 Enterococcus faecalis   MRSA nasal swab negative  CRP 12.7, procalcitonin 0.07  Initial WBC 5.12, today 6.03, no left shift, no bands  Remote history of kidney stone  CTRSS no hydronephrosis or kidney stone     Thrombocytopenia, likely secondary to recent linezolid    Platelets 45 on admission, 58 today  2/14 - improving, 84    Productive cough  Procal negative, 0.07, chest x-ray suggest mild CHF   Last echocardiogram with appropriately functioning TAVR, grade 2 diastolic dysfunction, preserved ejection fraction, mild mitral regurgitation and CTA RSS shows bilateral pleural effusions     Cholelithiasis, mild common duct dilation  Normal LFTs     PMHx: AFib on amiodarone status post TAVR and pacemaker, chronic anemia, anxiety, chronic kidney disease not on dialysis, prior ankle fracture status post repair, and recurrent  UTIs    RECOMMENDATIONS:   Continue meropenem 1 g IV q.12 for ESBL E coli, dose per creatinine clearance - anticipate 7 days, D4  Continue Probiotics  Monitor plt count   Increase activity PT/OT  Continue probiotic  Monitor renal function and adjust antibiotics as needed  Patient is asking to have procedure by Urology as inpatient; does not want to wait       D/W Dr Leigh, Hospitalist, Urologist - secure chat per Dr Leigh    Please send [employer] secure chat with any questions.      SUBJECTIVE    Review of Systems  Negative except as stated above in Interval History     OBJECTIVE   Temp:  [97.8 °F (36.6 °C)-98.9 °F (37.2 °C)] 98.9 °F (37.2 °C)  Pulse:  [70-85] 74  Resp:  [18-20] 18  SpO2:  [92 %-94 %] 93 %  BP: (159-190)/(69-80) 172/78  Temp:  [97.8 °F (36.6 °C)-98.9 °F (37.2 °C)]   Temp: 98.9 °F (37.2 °C) (02/14/24 0729)  Pulse: 74 (02/14/24 0732)  Resp: 18 (02/14/24 0732)  BP: (!) 172/78 (02/14/24 0729)  SpO2: (!) 93 % (02/14/24 0732)    Intake/Output Summary (Last 24 hours) at 2/14/2024 1006  Last data filed at 2/14/2024 0812  Gross per 24 hour   Intake 516.99 ml   Output 1601 ml   Net -1084.01 ml          Physical Exam  General:  Lying in bed sleeping, opens eyes to voice.  Eyes: Eyes with no icterus or injection. Vision grossly normal.  Mild periorbital edema.  Ears: Hearing grossly normal.  Nose: Nares patent  Mouth: Moist mucous membranes, upper dentures, lower partial. No ulcerations, erythema or exudates.  Neck: Supple, no tenderness to palpation.  Cardiovascular: Regular rate and rhythm, systolic murmur - AICD/PPM  Respiratory:  Bilateral breath sounds clear to auscultation anterior and posterior.  Nasal cannula O2 in place.  Genitourinary:  No suprapubic tenderness.  Rodriguez catheter in place, clear pale yellow urine  Musculoskeletal:  Moves all extremities, trace lower extremity edema.  Skin:  Warm and dry, no obvious rashes.    Neuro:   Oriented, conversant, follows commands.   Psych:  Drowsy.  VAD:   "Midline  Isolation:  Contact for ESBL    WOUNDS  2/8              Significant Labs: All pertinent labs within the past 24 hours have been reviewed.    CBC LAST 7 DAYS  Recent Labs   Lab 02/09/24  0426 02/09/24  1631 02/10/24  0515 02/11/24  0438 02/12/24  0433 02/13/24  0457 02/14/24  0434   WBC 4.79 4.79 4.80 5.13 6.03 5.59 6.17   RBC 2.65* 2.67* 2.55* 2.66* 2.49* 2.41* 2.47*   HGB 8.0* 8.1* 7.7* 8.0* 7.5* 7.5* 7.5*   HCT 23.7* 23.9* 23.3* 24.0* 22.6* 22.2* 22.7*   MCV 89 90 91 90 91 92 92   MCH 30.2 30.3 30.2 30.1 30.1 31.1* 30.4   MCHC 33.8 33.9 33.0 33.3 33.2 33.8 33.0   RDW 13.2 13.3 13.2 13.2 13.2 13.5 13.4   PLT 40* 38* 46* 38* 58* 67* 84*   MPV 11.1 10.7 12.8 11.3 11.4 11.0 10.8   GRAN 56.4  2.7 63.3  3.0 57.4  2.8 52.7  2.7 57.6  3.5 62.6  3.5 69.9  4.3   LYMPH 23.8  1.1 16.9*  0.8* 20.8  1.0 21.6  1.1 19.2  1.2 15.9*  0.9* 13.9*  0.9*   MONO 14.6  0.7 15.2*  0.7 15.4*  0.7 18.7*  1.0 17.9*  1.1* 14.3  0.8 10.7  0.7   BASO 0.01 0.01 0.02 0.02 0.03 0.03 0.01   NRBC 0 0 0 0 0 0 0         CHEMISTRY LAST 7 DAYS  Recent Labs   Lab 02/08/24  1401 02/09/24  0426 02/10/24  0515 02/11/24  0438 02/12/24  0433 02/13/24  0453 02/14/24  0434    137 137 137 137 137 138   K 3.7 3.6 3.4* 3.4* 4.1 4.2 4.4    105 106 108 108 108 107   CO2 25 24 23 22* 23 23 23   ANIONGAP 8 8 8 7* 6* 6* 8   BUN 15 14 12 9 10 11 11   CREATININE 1.4 1.2 1.2 1.1 1.3 1.2 1.0   * 78 83 79 81 85 83   CALCIUM 9.0 8.8 8.4* 8.4* 8.4* 8.7 8.6*   MG  --  1.8 1.7 1.8 1.8 1.8 1.8   ALBUMIN 3.1* 2.9* 2.7* 2.8* 2.9* 2.9* 2.9*   PROT 5.7* 5.0* 4.8* 5.0* 5.1* 5.0* 5.2*   ALKPHOS 128 117 98 101 101 105 103   ALT 20 16 15 15 15 12 13   AST 19 17 15 13 15 15 17   BILITOT 0.5 0.7 0.5 0.5 0.5 0.4 0.5         Estimated Creatinine Clearance: 43.1 mL/min (based on SCr of 1 mg/dL).    INFLAMMATORY/PROCAL  LAST 7 DAYS  Recent Labs   Lab 02/11/24  1638   PROCAL 0.07   CRP 12.7*       No results found for: "ESR"  CRP   Date Value " Ref Range Status   02/11/2024 12.7 (H) 0.0 - 8.2 mg/L Final   01/13/2021 1.39 (H) <0.76 mg/dL Final   01/11/2021 2.68 (H) <0.76 mg/dL Final   01/08/2021 2.05 (H) <0.76 mg/dL Final       PRIOR  MICROBIOLOGY:    Susceptibility data from last 90 days.  Collected Specimen Info Organism Amp/Sulbactam Ampicillin Cefazolin Cefepime Ceftriaxone Ciprofloxacin Daptomycin Ertapenem Gentamicin Levofloxacin Linezolid Meropenem Nitrofurantoin PIPERACILLIN/TAZO SUSCEPTIBILITY   02/08/24 Blood from Peripheral, Antecubital, Right No growth after 5 days.                 02/08/24 Blood from Peripheral, Antecubital, Right No growth after 5 days.                 02/08/24 Urine Escherichia coli ESBL  I  R  R  R  R  R   S  S  R   S  S  S   01/24/24 Blood from Antecubital, Right Arm No growth after 5 days.                 01/24/24 Blood No growth after 5 days.                 01/19/24 Urine, Catheterized Enterococcus faecium VRE   R      S     S   I    12/11/23 Urine Klebsiella pneumoniae  S   S  S  S  S   S  S  S   S  S  S     Collected Specimen Info Organism Tetracycline Tobramycin Trimeth/Sulfa Vancomycin   02/08/24 Blood from Peripheral, Antecubital, Right No growth after 5 days.       02/08/24 Blood from Peripheral, Antecubital, Right No growth after 5 days.       02/08/24 Urine Escherichia coli ESBL  R  S  R    01/24/24 Blood from Antecubital, Right Arm No growth after 5 days.       01/24/24 Blood No growth after 5 days.       01/19/24 Urine, Catheterized Enterococcus faecium VRE  R    R   12/11/23 Urine Klebsiella pneumoniae  S  S  S            LAST 7 DAYS MICROBIOLOGY   Microbiology Results (last 7 days)       Procedure Component Value Units Date/Time    Blood culture #2 **CANNOT BE ORDERED STAT** [[id number]] Collected: 02/08/24 1618    Order Status: Completed Specimen: Blood from Peripheral, Antecubital, Right Updated: 02/13/24 2032     Blood Culture, Routine No growth after 5 days.    Blood culture #1 **CANNOT BE ORDERED STAT**  [[id number]] Collected: 02/08/24 1530    Order Status: Completed Specimen: Blood from Peripheral, Antecubital, Right Updated: 02/13/24 2032     Blood Culture, Routine No growth after 5 days.    MRSA Screen by PCR [[id number]] Collected: 02/11/24 1952    Order Status: Completed Specimen: Nasopharyngeal Swab from Nasal Updated: 02/12/24 1046     MRSA SCREEN BY PCR Negative    Culture, Respiratory with Gram Stain [[id number]]     Order Status: No result Specimen: Respiratory from Sputum, Expectorated     Urine culture [[id number]]  (Abnormal)  (Susceptibility) Collected: 02/08/24 1437    Order Status: Completed Specimen: Urine Updated: 02/11/24 0616     Urine Culture, Routine ESCHERICHIA COLI ESBL  >100,000 cfu/ml      Narrative:      Specimen Source->Urine              CURRENT/PREVIOUS VISIT EKG  Results for orders placed or performed during the hospital encounter of 02/08/24   EKG 12-lead    Collection Time: 02/08/24  3:39 PM   Result Value Ref Range    QRS Duration 128 ms    [facility] QTC Calculation 492 ms    Narrative    Test Reason : D64.9,    Vent. Rate : 065 BPM     Atrial Rate : 065 BPM     P-R Int : 160 ms          QRS Dur : 128 ms      QT Int : 474 ms       P-R-T Axes : 051 -17 039 degrees     QTc Int : 492 ms    Atrial-sensed ventricular-paced rhythm  Abnormal ECG  When compared with ECG of 20-JAN-2024 14:04,  Electronic ventricular pacemaker has replaced Atrial fibrillation  Vent. rate has decreased BY  59 BPM  Confirmed by Justin Ramirez MD ([id number]) on 2/9/2024 12:03:56 PM    Referred By: [email or web address]   SELF           Confirmed By:Justin Ramirez MD     Echocardiography Findings 12/11/23    Left Ventricle The left ventricle is normal in size. Normal wall thickness. Septal motion is consistent with pacing. There is low normal systolic function with a visually estimated ejection fraction of 50 - 55%. Grade II diastolic dysfunction.   Right Ventricle Normal right ventricular cavity size. Systolic function is normal.    Left Atrium Left atrium is mildly dilated.   Right Atrium Right atrium is mildly dilated.   Aortic Valve There is a well-seated bioprosthetic valve in the aortic position. It is reported to be a 29 mm Medtronic valve. EOA by VTI is 2.68 cm², aortic valve peak velocity is 2.05 m/s, mean gradient is 10 mmHg (delta P < 10 mmHg) and the dimensionless index is 0.87 suggesting the valve is normally functioning. Mild paravalvular regurgitation.   Mitral Valve Moderately calcified leaflets. There is moderate mitral annular calcification present. There is mild stenosis. The mean pressure gradient across the mitral valve is 3 mmHg at a heart rate of 67 bpm. There is mild regurgitation.   Tricuspid Valve Not well visualized due to poor acoustic window. There is no stenosis. There is mild to moderate regurgitation.   Pulmonic Valve Not well visualized due to poor acoustic window. There is no stenosis. There is trace regurgitation.   IVC/SVC Intermediate venous pressure at 8 mmHg.   Ascending Aorta Aortic root is normal in size. Ascending aorta is normal measuring 2.40 cm.   Pericardium and Other Findings There is no pericardial effusion.   Pulmonary Artery The estimated pulmonary artery systolic pressure is 42 mmHg.       Significant Imaging: I have reviewed all relevant and available imaging results/findings within the past 24 hours.    X-Ray Chest 1 View 02/08/24 1537    The cardiomediastinal silhouette is stable.  Cardiac pacemaker with multiple leads remains in place.  Mild prominence of the interstitial markings particularly in the lower lung fields appearing slightly more so today than on the prior exam questioning very mild pulmonary vascular distention or CHF.  No confluent infiltrate.  No pleural effusion.   Impression:    Mildly prominent lung markings in lower lung fields more so today than on the prior exam questioning very mild CHF    CT Abdomen Pelvis Without Contrast 02/08/24 1623   1. Gallbladder stones and  sludge with nonspecific dilation.   2. Gastric wall thickening could relate to inadequate distension.  Gastritis or peptic ulcer disease also possible in the appropriate clinical setting.   3. Scattered colonic diverticula with possible constipation.   4. Urinary bladder wall thickening is presumably from inadequate distension.    US Abdomen Limited 02/10/24 0732   FINDINGS:   The pancreas is obscured.   Multiple gallstones are present measuring up to 5 mm.  Sonographic Lee sign is negative.  The common bile duct is mildly dilated for patient age at 10 mm.   The IVC is normal caliber.   The liver is normal size at 14.6 cm.  There is no focal hepatic lesion.   The spleen is normal size at 10.5 cm.   There is a right pleural effusion.   Impression:    Cholelithiasis.  Prominent common bile duct, nonspecific.   Right pleural effusion.    CT Renal Stone Study Abd Pelvis WO 02/12/24 1207   Cardiac pacer/defibrillator leads are partially imaged.  There are moderate bilateral pleural effusions with compressive lower lobe atelectasis.   The liver, spleen, pancreas, and adrenal glands are unremarkable.  Several small gallstones are present.   There is no urolithiasis or hydroureteronephrosis.  A Rodriguez catheter is present within the urinary bladder, which is largely decompressed.  Air is present within the urinary bladder, not unexpected in the presence of a Rodriguez catheter.   The small bowel is normal in caliber.  There is extensive diverticulosis coli.  There is heavy calcification of the aorta without aneurysm.   There are bilateral L5 pars interarticularis defects accompanied by grade 1 anterolisthesis of L5 on S1.  There is also grade 1 anterolisthesis of L4 on L5 without pars defects present.  Bones are demineralized.   Impression:    Moderate bilateral pleural effusions.   Cholelithiasis.   Cardiac pacer/defibrillator.  Severe atherosclerosis.   Severe diverticulosis coli.    Jennifer Casey NP  Date of Service:  02/14/2024

## 2024-02-14 NOTE — SUBJECTIVE & OBJECTIVE
"Interval History: see "Hospital Course"    Review of Systems   Constitutional:  Positive for fatigue. Negative for fever.   Skin:  Positive for pallor.     Objective:     Vital Signs (Most Recent):  Temp: 97.9 °F (36.6 °C) (02/14/24 1127)  Pulse: 71 (02/14/24 1127)  Resp: 17 (02/14/24 1127)  BP: (!) 179/79 (02/14/24 1127)  SpO2: (!) 92 % (02/14/24 1127) Vital Signs (24h Range):  Temp:  [97.8 °F (36.6 °C)-98.9 °F (37.2 °C)] 97.9 °F (36.6 °C)  Pulse:  [71-85] 71  Resp:  [17-20] 17  SpO2:  [92 %-94 %] 92 %  BP: (161-190)/(70-80) 179/79     Weight: 71.2 kg (156 lb 15.5 oz)  Body mass index is 25.34 kg/m².    Intake/Output Summary (Last 24 hours) at 2/14/2024 1207  Last data filed at 2/14/2024 0812  Gross per 24 hour   Intake 396.99 ml   Output 1601 ml   Net -1204.01 ml         Physical Exam  Vitals and nursing note reviewed.   Constitutional:       General: She is not in acute distress.  HENT:      Head: Normocephalic and atraumatic.      Right Ear: External ear normal.      Left Ear: External ear normal.      Nose: Nose normal.      Mouth/Throat:      Pharynx: Oropharynx is clear.   Eyes:      Extraocular Movements: Extraocular movements intact.      Conjunctiva/sclera: Conjunctivae normal.   Cardiovascular:      Rate and Rhythm: Normal rate and regular rhythm.      Pulses: Normal pulses.      Heart sounds: Normal heart sounds.   Pulmonary:      Effort: Pulmonary effort is normal.      Breath sounds: Normal breath sounds.   Abdominal:      General: Bowel sounds are normal. There is no distension.      Palpations: Abdomen is soft.   Genitourinary:     Comments: Rodriguez.  Musculoskeletal:         General: Normal range of motion.      Cervical back: Normal range of motion and neck supple.      Right lower leg: No edema.      Left lower leg: No edema.   Skin:     General: Skin is warm and dry.      Coloration: Skin is pale.   Neurological:      Mental Status: She is alert. Mental status is at baseline.   Psychiatric:         " Mood and Affect: Mood normal.         Behavior: Behavior normal.             Significant Labs: All pertinent labs within the past 24 hours have been reviewed.    Significant Imaging: I have reviewed all pertinent imaging results/findings within the past 24 hours.

## 2024-02-14 NOTE — PLAN OF CARE
Per Sally with [facility], there is a peer to peer scheduled for today for SNF auth.     02/14/24 1257   Post-Acute Status   Post-Acute Authorization Placement   Post-Acute Placement Status Pending payor medical review/second level review

## 2024-02-14 NOTE — PT/OT/SLP PROGRESS
Physical Therapy Treatment    Patient Name:  Irene العراقي   MRN:  [id number]    Recommendations:     Discharge Recommendations: Moderate Intensity Therapy  Discharge Equipment Recommendations: none  Barriers to discharge: Decreased caregiver support    Assessment:     Irene العراقي is a 83 y.o. female admitted with a medical diagnosis of Recurrent UTI.  She presents with the following impairments/functional limitations: weakness, impaired endurance, orthopedic precautions, impaired balance, gait instability, impaired functional mobility, decreased lower extremity function, impaired cardiopulmonary response to activity .    Pt seen supine in bed, alert, bright and agreeable to PT. Pt with no nausea. Cam walker boot to R foot and tennis shoe L. Pt requiring min assist for mobility and able to ambulate within room with RW 40ft min assist with chair following. OOB chair post PT.  Pt is feeling better with a much improved participation and mobility. Pt to benefit from SNF with goal of returning back to [place] to live with nephew..    Rehab Prognosis: Fair; patient would benefit from acute skilled PT services to address these deficits and reach maximum level of function.    Recent Surgery: * No surgery found *      Plan:     During this hospitalization, patient to be seen 6 x/week to address the identified rehab impairments via gait training, therapeutic activities, therapeutic exercises and progress toward the following goals:    Plan of Care Expires:  02/29/24    Subjective   Pt feeling better and giving instructions to PT  Stated she is eating better and wanting to walk  Chief Complaint: none  Patient/Family Comments/goals: get well and go back home to [place]  Pain/Comfort:  Pain Rating 1: 0/10      Objective:     Communicated with nurse Garrido prior to session.  Patient found HOB elevated with oxygen, telemetry, araya catheter upon PT entry to room.     General Precautions: Standard, contact, fall, pacemaker  Orthopedic  Precautions: RLE partial weight bearing  Braces:  (CAM walker boot)  Respiratory Status: Nasal cannula, flow 2 L/min     Functional Mobility:  Bed Mobility:     Rolling Right: minimum assistance  Scooting: minimum assistance  Supine to Sit: minimum assistance  Transfers:     Sit to Stand:  minimum assistance with rolling walker  Bed to Chair: minimum assistance with  rolling walker  using  Stand Pivot  Gait: 40ft with RW min assist with O2 at 2 liters and araya. Camwalfrancisco boot R      AM-PAC 6 CLICK MOBILITY  Turning over in bed (including adjusting bedclothes, sheets and blankets)?: 3  Sitting down on and standing up from a chair with arms (e.g., wheelchair, bedside commode, etc.): 3  Moving from lying on back to sitting on the side of the bed?: 3  Moving to and from a bed to a chair (including a wheelchair)?: 3  Need to walk in hospital room?: 3  Climbing 3-5 steps with a railing?: 1  Basic Mobility Total Score: 16       Treatment & Education:  Patient was educated on the importance of OOB activity and functional mobility to negate negative effects of prolonged bed rest during hospitalization, safe transfers and ambulation, and D/C planning   OOB chair post PT with plan of staying up for dinner    Patient left up in chair with all lines intact, call button in reach, and chair alarm on..    GOALS:   Multidisciplinary Problems       Physical Therapy Goals          Problem: Physical Therapy    Goal Priority Disciplines Outcome Goal Variances Interventions   Physical Therapy Goal     PT, PT/OT Ongoing, Progressing     Description: Goals to be met by: 2024     Patient will increase functional independence with mobility by performin. Supine to sit with MInimal Assistance  2. Sit to stand transfer with Minimal Assistance  3. Bed to chair transfer with Minimal Assistance using Rolling Walker  4. Gait  x 50 feet with Minimal Assistance using Rolling Walker.   5. Lower extremity exercise program x20 reps     Hx  of R ankle fx 12-- mando boot                         Time Tracking:     PT Received On: 02/14/24  PT Start Time: 1538     PT Stop Time: 1554  PT Total Time (min): 16 min     Billable Minutes: Gait Training 16    Treatment Type: Treatment  PT/PTA: PT     Number of PTA visits since last PT visit: 0     02/14/2024

## 2024-02-14 NOTE — PLAN OF CARE
Problem: Adult Inpatient Plan of Care  Goal: Plan of Care Review  Outcome: Ongoing, Progressing  Goal: Patient-Specific Goal (Individualized)  Outcome: Ongoing, Progressing  Goal: Absence of Hospital-Acquired Illness or Injury  Outcome: Ongoing, Progressing  Goal: Optimal Comfort and Wellbeing  Outcome: Ongoing, Progressing  Goal: Readiness for Transition of Care  Outcome: Ongoing, Progressing     Problem: Infection  Goal: Absence of Infection Signs and Symptoms  Outcome: Ongoing, Progressing     Problem: Impaired Wound Healing  Goal: Optimal Wound Healing  Outcome: Ongoing, Progressing     Problem: Skin Injury Risk Increased  Goal: Skin Health and Integrity  Outcome: Ongoing, Progressing   Plan of care reviewed with patient,verbalized understanding.  IV fluids/ IV antibiotics administered as per orders. SR on telemetry. Rodriguez catheter intact & patent.   Remains free from falls, injury. Instructed to call for assistance as needed ,verbalized understanding. Bed in low & locked position. Call light in reach, bed alarm on .Isolation maintained as per orders.

## 2024-02-15 PROBLEM — E83.39 HYPOPHOSPHATEMIA: Status: RESOLVED | Noted: 2024-02-11 | Resolved: 2024-02-15

## 2024-02-15 LAB
ALBUMIN SERPL BCP-MCNC: 2.8 G/DL (ref 3.5–5.2)
ALP SERPL-CCNC: 92 U/L (ref 55–135)
ALT SERPL W/O P-5'-P-CCNC: 11 U/L (ref 10–44)
ANION GAP SERPL CALC-SCNC: 8 MMOL/L (ref 8–16)
AST SERPL-CCNC: 16 U/L (ref 10–40)
BASOPHILS # BLD AUTO: 0.02 K/UL (ref 0–0.2)
BASOPHILS NFR BLD: 0.4 % (ref 0–1.9)
BILIRUB SERPL-MCNC: 0.5 MG/DL (ref 0.1–1)
BUN SERPL-MCNC: 12 MG/DL (ref 8–23)
CALCIUM SERPL-MCNC: 8.7 MG/DL (ref 8.7–10.5)
CHLORIDE SERPL-SCNC: 105 MMOL/L (ref 95–110)
CO2 SERPL-SCNC: 23 MMOL/L (ref 23–29)
CREAT SERPL-MCNC: 1.2 MG/DL (ref 0.5–1.4)
DIFFERENTIAL METHOD BLD: ABNORMAL
EOSINOPHIL # BLD AUTO: 0.2 K/UL (ref 0–0.5)
EOSINOPHIL NFR BLD: 4.1 % (ref 0–8)
ERYTHROCYTE [DISTWIDTH] IN BLOOD BY AUTOMATED COUNT: 13.6 % (ref 11.5–14.5)
EST. GFR  (NO RACE VARIABLE): 45 ML/MIN/1.73 M^2
GLUCOSE SERPL-MCNC: 76 MG/DL (ref 70–110)
HCT VFR BLD AUTO: 22.6 % (ref 37–48.5)
HGB BLD-MCNC: 7.6 G/DL (ref 12–16)
IMM GRANULOCYTES # BLD AUTO: 0.07 K/UL (ref 0–0.04)
IMM GRANULOCYTES NFR BLD AUTO: 1.3 % (ref 0–0.5)
LYMPHOCYTES # BLD AUTO: 0.9 K/UL (ref 1–4.8)
LYMPHOCYTES NFR BLD: 16.5 % (ref 18–48)
MAGNESIUM SERPL-MCNC: 1.9 MG/DL (ref 1.6–2.6)
MCH RBC QN AUTO: 30.6 PG (ref 27–31)
MCHC RBC AUTO-ENTMCNC: 33.6 G/DL (ref 32–36)
MCV RBC AUTO: 91 FL (ref 82–98)
MONOCYTES # BLD AUTO: 0.8 K/UL (ref 0.3–1)
MONOCYTES NFR BLD: 14.3 % (ref 4–15)
NEUTROPHILS # BLD AUTO: 3.4 K/UL (ref 1.8–7.7)
NEUTROPHILS NFR BLD: 63.4 % (ref 38–73)
NRBC BLD-RTO: 0 /100 WBC
PHOSPHATE SERPL-MCNC: 2.7 MG/DL (ref 2.7–4.5)
PLATELET # BLD AUTO: 86 K/UL (ref 150–450)
PMV BLD AUTO: 10.3 FL (ref 9.2–12.9)
POTASSIUM SERPL-SCNC: 4.2 MMOL/L (ref 3.5–5.1)
PROT SERPL-MCNC: 4.9 G/DL (ref 6–8.4)
RBC # BLD AUTO: 2.48 M/UL (ref 4–5.4)
SODIUM SERPL-SCNC: 136 MMOL/L (ref 136–145)
WBC # BLD AUTO: 5.4 K/UL (ref 3.9–12.7)

## 2024-02-15 PROCEDURE — 27000221 HC OXYGEN, UP TO 24 HOURS

## 2024-02-15 PROCEDURE — 94799 UNLISTED PULMONARY SVC/PX: CPT

## 2024-02-15 PROCEDURE — 63600175 PHARM REV CODE 636 W HCPCS: Performed by: STUDENT IN AN ORGANIZED HEALTH CARE EDUCATION/TRAINING PROGRAM

## 2024-02-15 PROCEDURE — 36415 COLL VENOUS BLD VENIPUNCTURE: CPT | Performed by: NURSE PRACTITIONER

## 2024-02-15 PROCEDURE — 25000003 PHARM REV CODE 250: Performed by: INTERNAL MEDICINE

## 2024-02-15 PROCEDURE — 63600175 PHARM REV CODE 636 W HCPCS: Performed by: NURSE PRACTITIONER

## 2024-02-15 PROCEDURE — 25000003 PHARM REV CODE 250: Performed by: STUDENT IN AN ORGANIZED HEALTH CARE EDUCATION/TRAINING PROGRAM

## 2024-02-15 PROCEDURE — 80053 COMPREHEN METABOLIC PANEL: CPT | Performed by: NURSE PRACTITIONER

## 2024-02-15 PROCEDURE — 85025 COMPLETE CBC W/AUTO DIFF WBC: CPT | Performed by: NURSE PRACTITIONER

## 2024-02-15 PROCEDURE — 84100 ASSAY OF PHOSPHORUS: CPT | Performed by: NURSE PRACTITIONER

## 2024-02-15 PROCEDURE — 99900035 HC TECH TIME PER 15 MIN (STAT)

## 2024-02-15 PROCEDURE — 83735 ASSAY OF MAGNESIUM: CPT | Performed by: NURSE PRACTITIONER

## 2024-02-15 PROCEDURE — 97116 GAIT TRAINING THERAPY: CPT | Mod: CQ

## 2024-02-15 PROCEDURE — 94761 N-INVAS EAR/PLS OXIMETRY MLT: CPT

## 2024-02-15 PROCEDURE — 97110 THERAPEUTIC EXERCISES: CPT

## 2024-02-15 PROCEDURE — 99233 SBSQ HOSP IP/OBS HIGH 50: CPT | Mod: FS,,, | Performed by: NURSE PRACTITIONER

## 2024-02-15 PROCEDURE — 11000001 HC ACUTE MED/SURG PRIVATE ROOM

## 2024-02-15 PROCEDURE — 25000003 PHARM REV CODE 250: Performed by: NURSE PRACTITIONER

## 2024-02-15 RX ORDER — CARVEDILOL 6.25 MG/1
6.25 TABLET ORAL 2 TIMES DAILY
Status: DISCONTINUED | OUTPATIENT
Start: 2024-02-15 | End: 2024-02-19 | Stop reason: HOSPADM

## 2024-02-15 RX ORDER — GRANULES FOR ORAL 3 G/1
3 POWDER ORAL
Status: DISCONTINUED | OUTPATIENT
Start: 2024-02-18 | End: 2024-02-19 | Stop reason: HOSPADM

## 2024-02-15 RX ADMIN — LACTOBACILLUS TAB 4 TABLET: TAB at 04:02

## 2024-02-15 RX ADMIN — APIXABAN 5 MG: 2.5 TABLET, FILM COATED ORAL at 08:02

## 2024-02-15 RX ADMIN — FAMOTIDINE 20 MG: 20 TABLET, FILM COATED ORAL at 08:02

## 2024-02-15 RX ADMIN — CARVEDILOL 3.12 MG: 3.12 TABLET, FILM COATED ORAL at 08:02

## 2024-02-15 RX ADMIN — MEROPENEM 1 G: 1 INJECTION, POWDER, FOR SOLUTION INTRAVENOUS at 02:02

## 2024-02-15 RX ADMIN — LACTOBACILLUS TAB 4 TABLET: TAB at 08:02

## 2024-02-15 RX ADMIN — AMIODARONE HYDROCHLORIDE 200 MG: 100 TABLET ORAL at 08:02

## 2024-02-15 RX ADMIN — MEROPENEM 1 G: 1 INJECTION, POWDER, FOR SOLUTION INTRAVENOUS at 01:02

## 2024-02-15 RX ADMIN — AMLODIPINE BESYLATE 10 MG: 5 TABLET ORAL at 08:02

## 2024-02-15 RX ADMIN — CARVEDILOL 6.25 MG: 6.25 TABLET, FILM COATED ORAL at 08:02

## 2024-02-15 RX ADMIN — LACTOBACILLUS TAB 4 TABLET: TAB at 11:02

## 2024-02-15 NOTE — PLAN OF CARE
Plan of care reviewed with pt. Pt able to make needs known. Continuous cardiac monitoring in place. Meds given per MAR. IV intact and patent with no s/s of infiltration or irritation noted to site. Complaints of pain managed with PRN medication. IS at beside and encouraged usage. Purposeful rounding done during shift to promote pt safety. NAD noted. Safety maintained with SR up x3, bed wheels locked, bed in lowest position, bed alarm set, slip resistant socks when out of bed, call light with in reach of pt. Pt instructed to call for assistance when needed. Remains free from falls. No further needs expressed at this time. Will continue to monitor.     Problem: Adult Inpatient Plan of Care  Goal: Plan of Care Review  Outcome: Ongoing, Progressing  Goal: Patient-Specific Goal (Individualized)  Outcome: Ongoing, Progressing  Goal: Absence of Hospital-Acquired Illness or Injury  Outcome: Ongoing, Progressing  Goal: Optimal Comfort and Wellbeing  Outcome: Ongoing, Progressing  Goal: Readiness for Transition of Care  Outcome: Ongoing, Progressing     Problem: Infection  Goal: Absence of Infection Signs and Symptoms  Outcome: Ongoing, Progressing     Problem: Impaired Wound Healing  Goal: Optimal Wound Healing  Outcome: Ongoing, Progressing     Problem: Skin Injury Risk Increased  Goal: Skin Health and Integrity  Outcome: Ongoing, Progressing     Problem: Fall Injury Risk  Goal: Absence of Fall and Fall-Related Injury  Outcome: Ongoing, Progressing     Problem: Social Isolation  Goal: Social Connection Supported  Outcome: Ongoing, Progressing

## 2024-02-15 NOTE — PT/OT/SLP PROGRESS
Occupational Therapy   Treatment    Name: Irene الرعاقي  MRN: [id number]  Admitting Diagnosis:  Recurrent UTI    Recommendations:     Discharge Recommendations: Moderate Intensity Therapy  Discharge Equipment Recommendations:  none  Barriers to discharge:      Assessment:     Irene العراقي is a 83 y.o. female with a medical diagnosis of Recurrent UTI.  She presents with performance deficits affecting function are weakness, impaired endurance, impaired self care skills, impaired functional mobility, gait instability, impaired balance, decreased upper extremity function, decreased lower extremity function, decreased safety awareness, pain, decreased ROM, impaired skin, impaired cardiopulmonary response to activity and orthopedic precautions.     Rehab Prognosis:  Good; patient would benefit from acute skilled OT services to address these deficits and reach maximum level of function.       Plan:     Patient to be seen 6 x/week to address the above listed problems via self-care/home management, therapeutic activities, therapeutic exercises  Plan of Care Expires: 03/01/24  Plan of Care Reviewed with: patient    Subjective     Chief Complaint: Fatigue  Patient/Family Comments/goals: Patient agreed to UE exercises.   Pain/Comfort:  Pain Rating 1: 0/10    Objective:     Communicated with: nurse prior to session.  Patient found up in chair with araya catheter and oxygen upon OT entry to room.    General Precautions: Standard, fall    Orthopedic Precautions: R LE partial weight bearing (WBAT with walking boot)  Braces:  (R camwalker boot)  Respiratory Status: Nasal cannula, flow 2 L/min     Occupational Performance:     Functional Mobility/Transfers:  Patient declined.     Activities of Daily Living:  Patient declined.       Treatment & Education:  Pt performed B UE resistive exercises using 3# bar x 3 sets of 10 reps shoulder press, chest press and bicep curls with rest breaks taken between each set.  Pt completed x20  bilateral hand grasps and tip pinch to to improve her hand strength.     Patient left up in chair with all lines intact, call button in reach and chair alarm on.    GOALS:   Multidisciplinary Problems       Occupational Therapy Goals          Problem: Occupational Therapy    Goal Priority Disciplines Outcome Interventions   Occupational Therapy Goal     OT, PT/OT     Description: Goals to be met by: 3/1/2024     Patient will increase functional independence with ADLs by performing:    UE Dressing with Set-up Assistance.  LE Dressing with Set-up Assistance.  Grooming while standing with Stand-by Assistance.  Toileting from toilet with Stand-by Assistance for hygiene and clothing management.   Toilet transfer to toilet with Stand-by Assistance with AD as needed.                         Time Tracking:     OT Date of Treatment: 02/15/24  OT Start Time: 1121  OT Stop Time: 1144  OT Total Time (min): 23 min    Billable Minutes:Therapeutic Exercise 23          Number of HANNAH visits since last OT visit: 0    2/15/2024

## 2024-02-15 NOTE — PT/OT/SLP PROGRESS
Physical Therapy Treatment    Patient Name:  Irene العراقي   MRN:  [id number]    Recommendations:     Discharge Recommendations: Moderate Intensity Therapy  Discharge Equipment Recommendations: none  Barriers to discharge: None    Assessment:     Irene العراقي is a 83 y.o. female admitted with a medical diagnosis of Recurrent UTI.  She presents with the following impairments/functional limitations: weakness, impaired endurance, impaired self care skills, impaired functional mobility, gait instability, impaired balance, decreased lower extremity function, impaired cardiopulmonary response to activity, orthopedic precautions . Supine in bed.  Agreed to mobilize . Boots removed , Boot placed RLE/ tennis shoe L foot.  Sup > sit with  CGA.  Sit > stand with rw and Min A.  Ambulated 60' with rw and Min A .      Rehab Prognosis: Fair; patient would benefit from acute skilled PT services to address these deficits and reach maximum level of function.    Recent Surgery: * No surgery found *      Plan:     During this hospitalization, patient to be seen 6 x/week to address the identified rehab impairments via gait training, therapeutic activities, therapeutic exercises and progress toward the following goals:    Plan of Care Expires:  02/29/24    Subjective     Chief Complaint: none stated  Patient/Family Comments/goals: none stated  Pain/Comfort:  Pain Rating 1: 0/10      Objective:     Communicated with nurse Neno prior to session.  Patient found supine with bed alarm, araya catheter, oxygen, pressure relief boots, telemetry upon PT entry to room.     General Precautions: Standard, contact, fall  Orthopedic Precautions: RLE partial weight bearing  Braces:  (CAM walker)  Respiratory Status: Nasal cannula, flow 2 L/min     Functional Mobility:  Bed Mobility:     Supine to Sit: contact guard assistance  Transfers:     Sit to Stand:  minimum assistance with rolling walker  Gait: 60' with rw and Min A with CAM walker RLE.        AM-PAC 6 CLICK MOBILITY          Treatment & Education:  Ambulated with rw and Min A for safety.    Patient left up in chair with all lines intact, call button in reach, chair alarm on, and nurse Neno notified..    GOALS:   Multidisciplinary Problems       Physical Therapy Goals          Problem: Physical Therapy    Goal Priority Disciplines Outcome Goal Variances Interventions   Physical Therapy Goal     PT, PT/OT Ongoing, Progressing     Description: Goals to be met by: 2024     Patient will increase functional independence with mobility by performin. Supine to sit with MInimal Assistance  2. Sit to stand transfer with Minimal Assistance  3. Bed to chair transfer with Minimal Assistance using Rolling Walker  4. Gait  x 50 feet with Minimal Assistance using Rolling Walker.   5. Lower extremity exercise program x20 reps     Hx of R ankle fx 2023- camwalker boot                         Time Tracking:     PT Received On: 02/15/24  PT Start Time: 0942     PT Stop Time: 1000  PT Total Time (min): 18 min     Billable Minutes: Gait Training 18min    Treatment Type: Treatment  PT/PTA: PTA     Number of PTA visits since last PT visit: 1     02/15/2024

## 2024-02-15 NOTE — PLAN OF CARE
Spoke to Sally @ [facility] regarding auth for return to SNF, per Sally insurance denied and pt cannot return to SNF. CM met with pt at bedside to discuss and pt asked that we call her niece, Rosaline.    CM called Rosaline @ [phone] to explain insurance denial and plan for discharge with home health. Explained that we can send pt home with HH, outpt CM, and NP @ home. Sharon calling Sally @ [employer] to see if it is possible to appeal denial. CM following.      02/15/24 1109   Post-Acute Status   Post-Acute Authorization Placement   Post-Acute Placement Status Discharge Plan Changed

## 2024-02-15 NOTE — PROGRESS NOTES
[facility] Medicine  Progress Note    Patient Name: Irene العراقي  MRN: [id number]  Patient Class: IP- Inpatient   Admission Date: 2/8/2024  Length of Stay: 4 days  Attending Physician: Brian Marques MD  Primary Care Provider: Wanda Kuhn FNP-C        Subjective:     Principal Problem:Recurrent UTI  Acute Condition: stable        HPI:  Irene العراقي is an 84 y/o F with past medical history significant for anemia, afib, CKD and HTN who presented to the ED from [facility] for further treatment of abnormal labs.  Per report, her platelets have been taking a downward trend and are 47K today.  She also has a hemoglobin of 7.5 with no obvious source of blood loss.  Pt denies recent fevers, chest pain, dizziness, melena or hematochezia.  Denies N/V but is having some mild generalized abdominal tenderness.  CT abdomen and pelvis obtained shows nonspecific gastric wall thickening which could be consistent with gastritis or peptic ulcer disease.  The ED physician discussed the lab findings with the hematologist on call who recommends admission and transfusion of PRBCs.  Pt presented from [facility] with araya catheter in place.  She reports urinary retention and states the araya has been in place for about a week.  Araya catheter was exchanged in the ED.  Pt is currently being treated for UTI with culture positive for enterococcus faecium VRE susceptible to linezolid.  Urinalysis today remains positive. She is placed in observation under the service of hospital medicine for continued monitoring and treatment.       Overview/Hospital Course:  Irene العراقي is an 83 year old female with a past medical history of Afib, CKD, HTN, anemia and thrombocytopenia who presented with worsening anemia and thrombocytopenia in the setting of linezolid use for Enterococcus UTI. There was no evidence of gross bleeding and she was transfused a unit of PRBCs. The Hgb is stable and her platelets are  "improving. Hematology was consulted. Repeat urine culture shows ESBL E coli for which the patient is on meropenem. ID has been consulted. A midline is in place. A Rodriguez is in place for urinary retention. Urology has been consulted; the patient has deferred further urologic workup at this time and may follow up with Dr. Zavala in the outpatient setting.    Interval History: see "Hospital Course"    Review of Systems   Constitutional:  Positive for fatigue. Negative for fever.   Skin:  Positive for pallor.     Objective:     Vital Signs (Most Recent):  Temp: 97.6 °F (36.4 °C) (02/15/24 1110)  Pulse: 64 (02/15/24 1110)  Resp: 17 (02/15/24 1110)  BP: (!) 126/58 (02/15/24 1110)  SpO2: (!) 92 % (02/15/24 1110) Vital Signs (24h Range):  Temp:  [97.1 °F (36.2 °C)-98.4 °F (36.9 °C)] 97.6 °F (36.4 °C)  Pulse:  [64-79] 64  Resp:  [16-18] 17  SpO2:  [92 %-96 %] 92 %  BP: (126-179)/(58-82) 126/58     Weight: 71.2 kg (156 lb 15.5 oz)  Body mass index is 25.34 kg/m².    Intake/Output Summary (Last 24 hours) at 2/15/2024 1216  Last data filed at 2/15/2024 0645  Gross per 24 hour   Intake 904.33 ml   Output 1175 ml   Net -270.67 ml         Physical Exam  Vitals and nursing note reviewed.   Constitutional:       General: She is not in acute distress.  HENT:      Head: Normocephalic and atraumatic.      Right Ear: External ear normal.      Left Ear: External ear normal.      Nose: Nose normal.      Mouth/Throat:      Pharynx: Oropharynx is clear.   Eyes:      Extraocular Movements: Extraocular movements intact.      Conjunctiva/sclera: Conjunctivae normal.   Cardiovascular:      Rate and Rhythm: Normal rate and regular rhythm.      Pulses: Normal pulses.      Heart sounds: Normal heart sounds.   Pulmonary:      Effort: Pulmonary effort is normal.      Breath sounds: Normal breath sounds.   Abdominal:      General: Bowel sounds are normal. There is no distension.      Palpations: Abdomen is soft.   Genitourinary:     Comments: " Rodriguez.  Musculoskeletal:         General: Normal range of motion.      Cervical back: Normal range of motion and neck supple.      Right lower leg: No edema.      Left lower leg: No edema.   Skin:     General: Skin is warm and dry.      Coloration: Skin is pale.   Neurological:      Mental Status: She is alert. Mental status is at baseline.   Psychiatric:         Mood and Affect: Mood normal.         Behavior: Behavior normal.             Significant Labs: All pertinent labs within the past 24 hours have been reviewed.    Significant Imaging: I have reviewed all pertinent imaging results/findings within the past 24 hours.    Assessment/Plan:      * Recurrent UTI  ESBL E coli. In setting of urinary retention.  -Rodriguez  -Meropenem  -ID  -Urology; patient deferred further workup at this time          Urinary retention  -Rodriguez changed on 2/9  -Urology consulted; patient deferred further workup at this time; may follow up outpatient    S/P TAVR (transcatheter aortic valve replacement)  Stable.  -Telemetry  -Coreg      NYHA class 3 heart failure with preserved ejection fraction  -Coreg  -Telemetry      Stage 3b chronic kidney disease  Stable.  -Renally dose medications  -Avoid nephrotoxic agents    Hypertension  -Continue Coreg  -Continue to monitor    Anemia  Stable. Appears to be secondary to chronic disease.  -S/p one unit PRBCs  -Trend Hgb with CBC  -Hematology following  -Continue home Eliquis     PAF (paroxysmal atrial fibrillation)  -Telemetry  -Coreg  -Amiodarone  -Eliquis    Thrombocytopenia  Improving.  -Discontinued linezolid  -Hematology follow up; likely BMBx once UTI treated  -Trend platelets with CBC          VTE Risk Mitigation (From admission, onward)           Ordered     apixaban tablet 5 mg  2 times daily         02/13/24 1148     IP VTE HIGH RISK PATIENT  Once         02/08/24 1916     Place sequential compression device  Until discontinued         02/08/24 1916     Reason for No Pharmacological VTE  Prophylaxis  Once        Question Answer Comment   Reasons: Already adequately anticoagulated on oral Anticoagulants    Reasons: Active Bleeding        02/08/24 1916                    Discharge Planning   YANET: 2/16/2024     Code Status: Full Code   Is the patient medically ready for discharge?:     Reason for patient still in hospital (select all that apply): Patient trending condition and Pending disposition  Discharge Plan A: Skilled Nursing Facility                  Brian Marques MD  Department of Hospital Medicine   [facility]/Surg

## 2024-02-15 NOTE — SUBJECTIVE & OBJECTIVE
"Interval History: see "Hospital Course"    Review of Systems   Constitutional:  Positive for fatigue. Negative for fever.   Skin:  Positive for pallor.     Objective:     Vital Signs (Most Recent):  Temp: 97.6 °F (36.4 °C) (02/15/24 1110)  Pulse: 64 (02/15/24 1110)  Resp: 17 (02/15/24 1110)  BP: (!) 126/58 (02/15/24 1110)  SpO2: (!) 92 % (02/15/24 1110) Vital Signs (24h Range):  Temp:  [97.1 °F (36.2 °C)-98.4 °F (36.9 °C)] 97.6 °F (36.4 °C)  Pulse:  [64-79] 64  Resp:  [16-18] 17  SpO2:  [92 %-96 %] 92 %  BP: (126-179)/(58-82) 126/58     Weight: 71.2 kg (156 lb 15.5 oz)  Body mass index is 25.34 kg/m².    Intake/Output Summary (Last 24 hours) at 2/15/2024 1216  Last data filed at 2/15/2024 0645  Gross per 24 hour   Intake 904.33 ml   Output 1175 ml   Net -270.67 ml         Physical Exam  Vitals and nursing note reviewed.   Constitutional:       General: She is not in acute distress.  HENT:      Head: Normocephalic and atraumatic.      Right Ear: External ear normal.      Left Ear: External ear normal.      Nose: Nose normal.      Mouth/Throat:      Pharynx: Oropharynx is clear.   Eyes:      Extraocular Movements: Extraocular movements intact.      Conjunctiva/sclera: Conjunctivae normal.   Cardiovascular:      Rate and Rhythm: Normal rate and regular rhythm.      Pulses: Normal pulses.      Heart sounds: Normal heart sounds.   Pulmonary:      Effort: Pulmonary effort is normal.      Breath sounds: Normal breath sounds.   Abdominal:      General: Bowel sounds are normal. There is no distension.      Palpations: Abdomen is soft.   Genitourinary:     Comments: Rodriguez.  Musculoskeletal:         General: Normal range of motion.      Cervical back: Normal range of motion and neck supple.      Right lower leg: No edema.      Left lower leg: No edema.   Skin:     General: Skin is warm and dry.      Coloration: Skin is pale.   Neurological:      Mental Status: She is alert. Mental status is at baseline.   Psychiatric:         " Mood and Affect: Mood normal.         Behavior: Behavior normal.             Significant Labs: All pertinent labs within the past 24 hours have been reviewed.    Significant Imaging: I have reviewed all pertinent imaging results/findings within the past 24 hours.

## 2024-02-15 NOTE — PROGRESS NOTES
[facility]  Department of Infectious Disease  Progress Note        PATIENT NAME: Irene العراقي  MRN: [id number]  TODAY'S DATE: 02/15/2024  ADMIT DATE: 2/8/2024  LENGTH OF STAY: 4 DAYS    PRINCIPLE PROBLEM: Recurrent UTI    REASON FOR CONSULT:  ESBL E coli with anemia and thrombocytopenia several antibiotic courses in the past 2 weeks - chronic Rodriguez     INTERVAL HISTORY     02/15@0741 (Jacinto):  Patient seen and examined alone in her room.  She is sitting up in bed awake and alert.  She is feeling much better today.  She said she was able to sleep last night.  Her blood pressure was under better control though this morning it was somewhat high.  The puffiness under her eyes is much improved though she still feels like she has swelling in her hands.  She remains on nasal cannula O2.  She got up with physical therapy yesterday and was able to walk and sit up in the chair and she looks forward to doing physical therapy and increasing activity.  She was hoping to go back to look home nursing home.  She had 2 bowel movements yesterday and denies abdominal pain or diarrhea.  She was eating and drinking well.  She has no pain with urinary catheter.  T-max 98.9° in last 24 hours, WBC 5.40, platelets 86, BUN/CR 12/1.2 with creatinine clearance 35.9.  ALT/AST 11/16.    2/14/24@0921 (Jacinto):  Patient seen and examined.  She was sleeping and awakens easily.  She was up all night with her blood pressure elevated and is very tired.  She was having some wheezing yesterday but not appreciated today.  She was able to eat breakfast but needs to rest.  She changed her mind about having procedure here and would like to go ahead and have cystoscopy.  She was hoping that she can have it done while she was in the hospital.  Today is 4th day for meropenem.  T-max 98.2° in the last 24 hours.  WBC 6.17, platelets 84.  H&H 7.5/22.7.  BUN/CR 11/1.0 with estimated creatinine clearance 43.1.  ALT/AST 13/17.  CRP pending.  CTRSS with no hydronephrosis or kidney stones.  She does have moderate bilateral pleural effusions and compressive lower lobe atelectasis. Last echocardiogram from December 2023 with TAVR mild mitral stenosis and grade 2 diastolic dysfunction.    2/12/2024@0846 (Jacinto):  Patient seen and examined alone in her room.  She was sitting up in bed eating breakfast.  She is in a much better mood today and is not tearful.  She said she is feeling better.  She continues to cough up very small amounts of sputum but has been unable to have enough for sputum culture.  Diarrhea is improved and she denies abdominal pain, she denies fevers or chills, chest pain or palpitations.  She is hoping to be able to sit up on side of bed with physical therapy.  She tells us that she had kidney stones around age 18 but has not had any issues in the recent past. Appetite is fair but she said that eating some food, such as sausage burns her tongue..  T-max 98.3° in the last 24 hours, WBC 6.03, platelets trending up at 58, H&H 7.5/22.6, no left shift or bands.  BUN/CR 10/1.3 with creatinine clearance 33.2, ALT/AST 15/15.  BNP this morning 384.  Procalcitonin 0.07 and CRP 12.7.  Urine culture with ESBL E coli sensitive to meropenem, blood cultures no growth to date.  Retroperitoneal ultrasound pending.    Antibiotics (From admission, onward)      Start     Stop Route Frequency Ordered    02/11/24 2100  mupirocin 2 % ointment         02/16/24 2059 Nasl 2 times daily 02/11/24 1315    02/11/24 1400  meropenem (MERREM) 1 g in sodium chloride 0.9 % 100 mL IVPB (MB+)         -- IV Every 12 hours (non-standard times) 02/11/24 1306              ASSESSMENT and PLAN     Recurrent UTIs in the setting of urinary retention status post Rodriguez for the last 6 weeks  Urine culture 2/8 ESBL E coli  Prior urine cultures: 1/19 VRE Enterococcus faecium S to daptomycin and linezolid, 12/11/23 pan-sensitive Klebsiella pneumoniae, 9/21/23 Enterococcus fecalis, 9/9/23:  Pantoea agglomerans, 5/9/23 pan-sensitive E coli, 4/25/23 Enterococcus faecalis   MRSA nasal swab negative  CRP 12.7, procalcitonin 0.07  Initial WBC 5.12, today 6.03, no left shift, no bands  Remote history of kidney stone  CTRSS no hydronephrosis or kidney stone     Thrombocytopenia, likely secondary to recent linezolid    Platelets 45 on admission, 58 today  2/14 - improving, 84    Productive cough  Procal negative, 0.07, chest x-ray suggest mild CHF   Last echocardiogram with appropriately functioning TAVR, grade 2 diastolic dysfunction, preserved ejection fraction, mild mitral regurgitation and CTRSS shows bilateral pleural effusions     Cholelithiasis, mild common duct dilation  Normal LFTs     PMHx: AFib on amiodarone status post TAVR and pacemaker, chronic anemia, anxiety, chronic kidney disease not on dialysis, prior ankle fracture status post repair, and recurrent UTIs    RECOMMENDATIONS:   Continue meropenem 1 g IV q.12 for ESBL E coli, dose per creatinine clearance - anticipate 7 days, D5 through February 17th  On February 18th, start fosfomycin 3 g sachet mixed in 4 oz of fluid every 10 days for prophylaxis of recurrent resistant UTI  Continue Probiotics  Monitor plt count   Increase activity PT/OT  Continue probiotic  Monitor renal function and adjust antibiotics as needed  Patient is asking to have procedure by Urology as inpatient; does not want to wait      INFECTIOUS DISEASE DISCHARGE RECOMMENDATIONS:    Continue meropenem 1 g IV every 12 hours for 7 days through February 17  On February 18th, start fosfomycin 3 g sachet mixed in 4 oz of fluid every 10 days for prophylaxis of recurrent resistant UTI  Once monthly CBC with diff and CMP, fax results to Dr. Leigh at [phone], while on prophylaxis  Weekly labs including CBC with diff, CMP and CRP for duration of antibiotic therapy   Remove midline at completion of antibiotic therapy or on discharge from hospital  Follow-up with Infectious Disease  in 4-6 weeks, secure chat sent to office  She will need a follow-up appointment scheduled with Urology for cystoscopy, Dr Zavala  Probiotic of choice daily    Labs ordered  Communication sent to Case Management    D/W Dr Leigh    Infectious Disease will sign off. Please send Epic secure chat with any questions.        SUBJECTIVE    Review of Systems  Negative except as stated above in Interval History     OBJECTIVE   Temp:  [97.1 °F (36.2 °C)-98.4 °F (36.9 °C)] 97.1 °F (36.2 °C)  Pulse:  [68-79] 71  Resp:  [16-18] 17  SpO2:  [92 %-96 %] 93 %  BP: (134-179)/(62-82) 178/79  Temp:  [97.1 °F (36.2 °C)-98.4 °F (36.9 °C)]   Temp: 97.1 °F (36.2 °C) (02/15/24 0734)  Pulse: 71 (02/15/24 0734)  Resp: 17 (02/15/24 0734)  BP: (!) 178/79 (02/15/24 0734)  SpO2: (!) 93 % (02/15/24 0734)    Intake/Output Summary (Last 24 hours) at 2/15/2024 0815  Last data filed at 2/15/2024 0645  Gross per 24 hour   Intake 1144.33 ml   Output 1175 ml   Net -30.67 ml          Physical Exam  General:  Sitting up in bed awake and alert, pleasant and conversant.  Eyes: Eyes with no icterus or injection. Vision grossly normal.  Much improved periorbital edema.  Ears: Hearing grossly normal.  Nose: Nares patent  Mouth: Moist mucous membranes, upper dentures, lower partial. No ulcerations, erythema or exudates.  Neck: Supple, no tenderness to palpation.  Cardiovascular: Regular rate and rhythm, systolic murmur - AICD/PPM  Respiratory:  Bilateral breath sounds clear to auscultation anterior, posterior with few basilar crackles.  Nasal cannula O2 in place.  No wheezing noted.  Genitourinary:  No suprapubic tenderness.  Rodriguez catheter in place, clear pale yellow urine  Musculoskeletal:  Moves all extremities, no lower extremity edema.  Skin:  Warm and dry, no obvious rashes.    Neuro:   Oriented, conversant, follows commands.   Psych:  Good mood, normal affect.  VAD:  Midline  Isolation:  Contact for ESBL    WOUNDS  2/8              Significant Labs: All  "pertinent labs within the past 24 hours have been reviewed.    CBC LAST 7 DAYS  Recent Labs   Lab 02/09/24  1631 02/10/24  0515 02/11/24 0438 02/12/24 0433 02/13/24  0457 02/14/24  0434 02/15/24  0403   WBC 4.79 4.80 5.13 6.03 5.59 6.17 5.40   RBC 2.67* 2.55* 2.66* 2.49* 2.41* 2.47* 2.48*   HGB 8.1* 7.7* 8.0* 7.5* 7.5* 7.5* 7.6*   HCT 23.9* 23.3* 24.0* 22.6* 22.2* 22.7* 22.6*   MCV 90 91 90 91 92 92 91   MCH 30.3 30.2 30.1 30.1 31.1* 30.4 30.6   MCHC 33.9 33.0 33.3 33.2 33.8 33.0 33.6   RDW 13.3 13.2 13.2 13.2 13.5 13.4 13.6   PLT 38* 46* 38* 58* 67* 84* 86*   MPV 10.7 12.8 11.3 11.4 11.0 10.8 10.3   GRAN 63.3  3.0 57.4  2.8 52.7  2.7 57.6  3.5 62.6  3.5 69.9  4.3 63.4  3.4   LYMPH 16.9*  0.8* 20.8  1.0 21.6  1.1 19.2  1.2 15.9*  0.9* 13.9*  0.9* 16.5*  0.9*   MONO 15.2*  0.7 15.4*  0.7 18.7*  1.0 17.9*  1.1* 14.3  0.8 10.7  0.7 14.3  0.8   BASO 0.01 0.02 0.02 0.03 0.03 0.01 0.02   NRBC 0 0 0 0 0 0 0         CHEMISTRY LAST 7 DAYS  Recent Labs   Lab 02/09/24  0426 02/10/24  0515 02/11/24  0438 02/12/24  0433 02/13/24  0453 02/14/24  0434 02/15/24  0403    137 137 137 137 138 136   K 3.6 3.4* 3.4* 4.1 4.2 4.4 4.2    106 108 108 108 107 105   CO2 24 23 22* 23 23 23 23   ANIONGAP 8 8 7* 6* 6* 8 8   BUN 14 12 9 10 11 11 12   CREATININE 1.2 1.2 1.1 1.3 1.2 1.0 1.2   GLU 78 83 79 81 85 83 76   CALCIUM 8.8 8.4* 8.4* 8.4* 8.7 8.6* 8.7   MG 1.8 1.7 1.8 1.8 1.8 1.8 1.9   ALBUMIN 2.9* 2.7* 2.8* 2.9* 2.9* 2.9* 2.8*   PROT 5.0* 4.8* 5.0* 5.1* 5.0* 5.2* 4.9*   ALKPHOS 117 98 101 101 105 103 92   ALT 16 15 15 15 12 13 11   AST 17 15 13 15 15 17 16   BILITOT 0.7 0.5 0.5 0.5 0.4 0.5 0.5         Estimated Creatinine Clearance: 35.9 mL/min (based on SCr of 1.2 mg/dL).    INFLAMMATORY/PROCAL  LAST 7 DAYS  Recent Labs   Lab 02/11/24  1638 02/14/24 0434   PROCAL 0.07  --    CRP 12.7* 10.2*       No results found for: "ESR"  CRP   Date Value Ref Range Status   02/14/2024 10.2 (H) 0.0 - 8.2 mg/L Final "   02/11/2024 12.7 (H) 0.0 - 8.2 mg/L Final   01/13/2021 1.39 (H) <0.76 mg/dL Final   01/11/2021 2.68 (H) <0.76 mg/dL Final   01/08/2021 2.05 (H) <0.76 mg/dL Final       PRIOR  MICROBIOLOGY:    Susceptibility data from last 90 days.  Collected Specimen Info Organism Amp/Sulbactam Ampicillin Cefazolin Cefepime Ceftriaxone Ciprofloxacin Daptomycin Ertapenem Gentamicin Levofloxacin Linezolid Meropenem Nitrofurantoin PIPERACILLIN/TAZO SUSCEPTIBILITY   02/08/24 Blood from Peripheral, Antecubital, Right No growth after 5 days.                 02/08/24 Blood from Peripheral, Antecubital, Right No growth after 5 days.                 02/08/24 Urine Escherichia coli ESBL  I  R  R  R  R  R   S  S  R   S  S  S   01/24/24 Blood from Antecubital, Right Arm No growth after 5 days.                 01/24/24 Blood No growth after 5 days.                 01/19/24 Urine, Catheterized Enterococcus faecium VRE   R      S     S   I    12/11/23 Urine Klebsiella pneumoniae  S   S  S  S  S   S  S  S   S  S  S     Collected Specimen Info Organism Tetracycline Tobramycin Trimeth/Sulfa Vancomycin   02/08/24 Blood from Peripheral, Antecubital, Right No growth after 5 days.       02/08/24 Blood from Peripheral, Antecubital, Right No growth after 5 days.       02/08/24 Urine Escherichia coli ESBL  R  S  R    01/24/24 Blood from Antecubital, Right Arm No growth after 5 days.       01/24/24 Blood No growth after 5 days.       01/19/24 Urine, Catheterized Enterococcus faecium VRE  R    R   12/11/23 Urine Klebsiella pneumoniae  S  S  S            LAST 7 DAYS MICROBIOLOGY   Microbiology Results (last 7 days)       Procedure Component Value Units Date/Time    Blood culture #2 **CANNOT BE ORDERED STAT** [[id number]] Collected: 02/08/24 1618    Order Status: Completed Specimen: Blood from Peripheral, Antecubital, Right Updated: 02/13/24 2032     Blood Culture, Routine No growth after 5 days.    Blood culture #1 **CANNOT BE ORDERED STAT** [[id number]]  Collected: 02/08/24 1530    Order Status: Completed Specimen: Blood from Peripheral, Antecubital, Right Updated: 02/13/24 2032     Blood Culture, Routine No growth after 5 days.    MRSA Screen by PCR [[id number]] Collected: 02/11/24 1952    Order Status: Completed Specimen: Nasopharyngeal Swab from Nasal Updated: 02/12/24 1046     MRSA SCREEN BY PCR Negative    Culture, Respiratory with Gram Stain [[id number]]     Order Status: Canceled Specimen: Respiratory from Sputum, Expectorated     Urine culture [[id number]]  (Abnormal)  (Susceptibility) Collected: 02/08/24 1437    Order Status: Completed Specimen: Urine Updated: 02/11/24 0616     Urine Culture, Routine ESCHERICHIA COLI ESBL  >100,000 cfu/ml      Narrative:      Specimen Source->Urine              CURRENT/PREVIOUS VISIT EKG  Results for orders placed or performed during the hospital encounter of 02/08/24   EKG 12-lead    Collection Time: 02/08/24  3:39 PM   Result Value Ref Range    QRS Duration 128 ms    [facility] QTC Calculation 492 ms    Narrative    Test Reason : D64.9,    Vent. Rate : 065 BPM     Atrial Rate : 065 BPM     P-R Int : 160 ms          QRS Dur : 128 ms      QT Int : 474 ms       P-R-T Axes : 051 -17 039 degrees     QTc Int : 492 ms    Atrial-sensed ventricular-paced rhythm  Abnormal ECG  When compared with ECG of 20-JAN-2024 14:04,  Electronic ventricular pacemaker has replaced Atrial fibrillation  Vent. rate has decreased BY  59 BPM  Confirmed by Justin Ramirez MD ([id number]) on 2/9/2024 12:03:56 PM    Referred By: [email or web address]   SELF           Confirmed By:Justin Ramirez MD     Echocardiography Findings 12/11/23    Left Ventricle The left ventricle is normal in size. Normal wall thickness. Septal motion is consistent with pacing. There is low normal systolic function with a visually estimated ejection fraction of 50 - 55%. Grade II diastolic dysfunction.   Right Ventricle Normal right ventricular cavity size. Systolic function is normal.   Left Atrium  Left atrium is mildly dilated.   Right Atrium Right atrium is mildly dilated.   Aortic Valve There is a well-seated bioprosthetic valve in the aortic position. It is reported to be a 29 mm Medtronic valve. EOA by VTI is 2.68 cm², aortic valve peak velocity is 2.05 m/s, mean gradient is 10 mmHg (delta P < 10 mmHg) and the dimensionless index is 0.87 suggesting the valve is normally functioning. Mild paravalvular regurgitation.   Mitral Valve Moderately calcified leaflets. There is moderate mitral annular calcification present. There is mild stenosis. The mean pressure gradient across the mitral valve is 3 mmHg at a heart rate of 67 bpm. There is mild regurgitation.   Tricuspid Valve Not well visualized due to poor acoustic window. There is no stenosis. There is mild to moderate regurgitation.   Pulmonic Valve Not well visualized due to poor acoustic window. There is no stenosis. There is trace regurgitation.   IVC/SVC Intermediate venous pressure at 8 mmHg.   Ascending Aorta Aortic root is normal in size. Ascending aorta is normal measuring 2.40 cm.   Pericardium and Other Findings There is no pericardial effusion.   Pulmonary Artery The estimated pulmonary artery systolic pressure is 42 mmHg.       Significant Imaging: I have reviewed all relevant and available imaging results/findings within the past 24 hours.    X-Ray Chest 1 View 02/08/24 1537    The cardiomediastinal silhouette is stable.  Cardiac pacemaker with multiple leads remains in place.  Mild prominence of the interstitial markings particularly in the lower lung fields appearing slightly more so today than on the prior exam questioning very mild pulmonary vascular distention or CHF.  No confluent infiltrate.  No pleural effusion.   Impression:    Mildly prominent lung markings in lower lung fields more so today than on the prior exam questioning very mild CHF    CT Abdomen Pelvis Without Contrast 02/08/24 1623   1. Gallbladder stones and sludge with  nonspecific dilation.   2. Gastric wall thickening could relate to inadequate distension.  Gastritis or peptic ulcer disease also possible in the appropriate clinical setting.   3. Scattered colonic diverticula with possible constipation.   4. Urinary bladder wall thickening is presumably from inadequate distension.    US Abdomen Limited 02/10/24 0732   FINDINGS:   The pancreas is obscured.   Multiple gallstones are present measuring up to 5 mm.  Sonographic Lee sign is negative.  The common bile duct is mildly dilated for patient age at 10 mm.   The IVC is normal caliber.   The liver is normal size at 14.6 cm.  There is no focal hepatic lesion.   The spleen is normal size at 10.5 cm.   There is a right pleural effusion.   Impression:    Cholelithiasis.  Prominent common bile duct, nonspecific.   Right pleural effusion.    CT Renal Stone Study Abd Pelvis WO 02/12/24 1207   Cardiac pacer/defibrillator leads are partially imaged.  There are moderate bilateral pleural effusions with compressive lower lobe atelectasis.   The liver, spleen, pancreas, and adrenal glands are unremarkable.  Several small gallstones are present.   There is no urolithiasis or hydroureteronephrosis.  A Rodriguez catheter is present within the urinary bladder, which is largely decompressed.  Air is present within the urinary bladder, not unexpected in the presence of a Rodriguez catheter.   The small bowel is normal in caliber.  There is extensive diverticulosis coli.  There is heavy calcification of the aorta without aneurysm.   There are bilateral L5 pars interarticularis defects accompanied by grade 1 anterolisthesis of L5 on S1.  There is also grade 1 anterolisthesis of L4 on L5 without pars defects present.  Bones are demineralized.   Impression:    Moderate bilateral pleural effusions.   Cholelithiasis.   Cardiac pacer/defibrillator.  Severe atherosclerosis.   Severe diverticulosis coli.    Jennifer Casey NP  Date of Service: 02/15/2024

## 2024-02-15 NOTE — PLAN OF CARE
Problem: Physical Therapy  Goal: Physical Therapy Goal  Description: Goals to be met by: 2024     Patient will increase functional independence with mobility by performin. Supine to sit with MInimal Assistance  2. Sit to stand transfer with Minimal Assistance  3. Bed to chair transfer with Minimal Assistance using Rolling Walker  4. Gait  x 50 feet with Minimal Assistance using Rolling Walker.   5. Lower extremity exercise program x20 reps     Hx of R ankle fx 2023- camwalker boot    Outcome: Ongoing, Progressing   Ambulate with rw and assistance for safety.

## 2024-02-16 LAB
ALBUMIN SERPL BCP-MCNC: 2.6 G/DL (ref 3.5–5.2)
ALP SERPL-CCNC: 86 U/L (ref 55–135)
ALT SERPL W/O P-5'-P-CCNC: 11 U/L (ref 10–44)
ANION GAP SERPL CALC-SCNC: 7 MMOL/L (ref 8–16)
AST SERPL-CCNC: 13 U/L (ref 10–40)
BASOPHILS # BLD AUTO: 0.03 K/UL (ref 0–0.2)
BASOPHILS NFR BLD: 0.6 % (ref 0–1.9)
BILIRUB SERPL-MCNC: 0.5 MG/DL (ref 0.1–1)
BUN SERPL-MCNC: 15 MG/DL (ref 8–23)
CALCIUM SERPL-MCNC: 8.4 MG/DL (ref 8.7–10.5)
CHLORIDE SERPL-SCNC: 106 MMOL/L (ref 95–110)
CO2 SERPL-SCNC: 25 MMOL/L (ref 23–29)
CREAT SERPL-MCNC: 1.1 MG/DL (ref 0.5–1.4)
DIFFERENTIAL METHOD BLD: ABNORMAL
EOSINOPHIL # BLD AUTO: 0.2 K/UL (ref 0–0.5)
EOSINOPHIL NFR BLD: 4 % (ref 0–8)
ERYTHROCYTE [DISTWIDTH] IN BLOOD BY AUTOMATED COUNT: 13.8 % (ref 11.5–14.5)
EST. GFR  (NO RACE VARIABLE): 50 ML/MIN/1.73 M^2
GLUCOSE SERPL-MCNC: 88 MG/DL (ref 70–110)
HCT VFR BLD AUTO: 23 % (ref 37–48.5)
HGB BLD-MCNC: 7.5 G/DL (ref 12–16)
IMM GRANULOCYTES # BLD AUTO: 0.05 K/UL (ref 0–0.04)
IMM GRANULOCYTES NFR BLD AUTO: 1 % (ref 0–0.5)
LYMPHOCYTES # BLD AUTO: 0.9 K/UL (ref 1–4.8)
LYMPHOCYTES NFR BLD: 17.1 % (ref 18–48)
MAGNESIUM SERPL-MCNC: 2 MG/DL (ref 1.6–2.6)
MCH RBC QN AUTO: 30.2 PG (ref 27–31)
MCHC RBC AUTO-ENTMCNC: 32.6 G/DL (ref 32–36)
MCV RBC AUTO: 93 FL (ref 82–98)
MONOCYTES # BLD AUTO: 0.8 K/UL (ref 0.3–1)
MONOCYTES NFR BLD: 15.4 % (ref 4–15)
NEUTROPHILS # BLD AUTO: 3.2 K/UL (ref 1.8–7.7)
NEUTROPHILS NFR BLD: 61.9 % (ref 38–73)
NRBC BLD-RTO: 0 /100 WBC
PHOSPHATE SERPL-MCNC: 3 MG/DL (ref 2.7–4.5)
PLATELET # BLD AUTO: 87 K/UL (ref 150–450)
PMV BLD AUTO: 10 FL (ref 9.2–12.9)
POTASSIUM SERPL-SCNC: 4.3 MMOL/L (ref 3.5–5.1)
PROT SERPL-MCNC: 4.7 G/DL (ref 6–8.4)
RBC # BLD AUTO: 2.48 M/UL (ref 4–5.4)
SODIUM SERPL-SCNC: 138 MMOL/L (ref 136–145)
WBC # BLD AUTO: 5.2 K/UL (ref 3.9–12.7)

## 2024-02-16 PROCEDURE — 97116 GAIT TRAINING THERAPY: CPT | Mod: CQ

## 2024-02-16 PROCEDURE — 94799 UNLISTED PULMONARY SVC/PX: CPT

## 2024-02-16 PROCEDURE — 36415 COLL VENOUS BLD VENIPUNCTURE: CPT | Performed by: NURSE PRACTITIONER

## 2024-02-16 PROCEDURE — 80053 COMPREHEN METABOLIC PANEL: CPT | Performed by: NURSE PRACTITIONER

## 2024-02-16 PROCEDURE — 90471 IMMUNIZATION ADMIN: CPT | Performed by: STUDENT IN AN ORGANIZED HEALTH CARE EDUCATION/TRAINING PROGRAM

## 2024-02-16 PROCEDURE — 84100 ASSAY OF PHOSPHORUS: CPT | Performed by: NURSE PRACTITIONER

## 2024-02-16 PROCEDURE — 63600175 PHARM REV CODE 636 W HCPCS: Performed by: STUDENT IN AN ORGANIZED HEALTH CARE EDUCATION/TRAINING PROGRAM

## 2024-02-16 PROCEDURE — 83735 ASSAY OF MAGNESIUM: CPT | Performed by: NURSE PRACTITIONER

## 2024-02-16 PROCEDURE — 90694 VACC AIIV4 NO PRSRV 0.5ML IM: CPT | Performed by: STUDENT IN AN ORGANIZED HEALTH CARE EDUCATION/TRAINING PROGRAM

## 2024-02-16 PROCEDURE — 85025 COMPLETE CBC W/AUTO DIFF WBC: CPT | Performed by: NURSE PRACTITIONER

## 2024-02-16 PROCEDURE — 25000003 PHARM REV CODE 250: Performed by: NURSE PRACTITIONER

## 2024-02-16 PROCEDURE — 99232 SBSQ HOSP IP/OBS MODERATE 35: CPT | Mod: ,,, | Performed by: NURSE PRACTITIONER

## 2024-02-16 PROCEDURE — 84238 ASSAY NONENDOCRINE RECEPTOR: CPT | Performed by: NURSE PRACTITIONER

## 2024-02-16 PROCEDURE — 27000221 HC OXYGEN, UP TO 24 HOURS

## 2024-02-16 PROCEDURE — 25000003 PHARM REV CODE 250: Performed by: STUDENT IN AN ORGANIZED HEALTH CARE EDUCATION/TRAINING PROGRAM

## 2024-02-16 PROCEDURE — 94761 N-INVAS EAR/PLS OXIMETRY MLT: CPT

## 2024-02-16 PROCEDURE — 11000001 HC ACUTE MED/SURG PRIVATE ROOM

## 2024-02-16 PROCEDURE — 25000003 PHARM REV CODE 250: Performed by: INTERNAL MEDICINE

## 2024-02-16 PROCEDURE — 63600175 PHARM REV CODE 636 W HCPCS: Performed by: NURSE PRACTITIONER

## 2024-02-16 RX ADMIN — INFLUENZA A VIRUS A/VICTORIA/4897/2022 IVR-238 (H1N1) ANTIGEN (FORMALDEHYDE INACTIVATED), INFLUENZA A VIRUS A/DARWIN/6/2021 IVR-227 (H3N2) ANTIGEN (FORMALDEHYDE INACTIVATED), INFLUENZA B VIRUS B/AUSTRIA/1359417/2021 BVR-26 ANTIGEN (FORMALDEHYDE INACTIVATED), INFLUENZA B VIRUS B/PHUKET/3073/2013 BVR-1B ANTIGEN (FORMALDEHYDE INACTIVATED) 0.5 ML: 15; 15; 15; 15 INJECTION, SUSPENSION INTRAMUSCULAR at 09:02

## 2024-02-16 RX ADMIN — CARVEDILOL 6.25 MG: 6.25 TABLET, FILM COATED ORAL at 08:02

## 2024-02-16 RX ADMIN — LACTOBACILLUS TAB 4 TABLET: TAB at 08:02

## 2024-02-16 RX ADMIN — MEROPENEM 1 G: 1 INJECTION, POWDER, FOR SOLUTION INTRAVENOUS at 01:02

## 2024-02-16 RX ADMIN — MUPIROCIN: 20 OINTMENT TOPICAL at 08:02

## 2024-02-16 RX ADMIN — AMIODARONE HYDROCHLORIDE 200 MG: 100 TABLET ORAL at 08:02

## 2024-02-16 RX ADMIN — AMLODIPINE BESYLATE 10 MG: 5 TABLET ORAL at 08:02

## 2024-02-16 RX ADMIN — APIXABAN 5 MG: 2.5 TABLET, FILM COATED ORAL at 08:02

## 2024-02-16 RX ADMIN — ACETAMINOPHEN 650 MG: 325 TABLET ORAL at 04:02

## 2024-02-16 RX ADMIN — LACTOBACILLUS TAB 4 TABLET: TAB at 01:02

## 2024-02-16 RX ADMIN — FAMOTIDINE 20 MG: 20 TABLET, FILM COATED ORAL at 08:02

## 2024-02-16 RX ADMIN — LACTOBACILLUS TAB 4 TABLET: TAB at 04:02

## 2024-02-16 RX ADMIN — MEROPENEM 1 G: 1 INJECTION, POWDER, FOR SOLUTION INTRAVENOUS at 03:02

## 2024-02-16 NOTE — PLAN OF CARE
Plan of care reviewed with pt. Pt able to make needs known. Continuous cardiac monitoring in place. Meds given per MAR. IV intact and patent with no s/s of infiltration or irritation noted to site. No complaints of pain or discomfort. IS at beside and encouraged usage. Purposeful rounding done during shift to promote pt safety. NAD noted. Safety maintained with SR up x3, bed wheels locked, bed in lowest position, bed alarm set, slip resistant socks when out of bed, call light with in reach of pt. Pt instructed to call for assistance when needed. Remains free from falls. No further needs expressed at this time. Will continue to monitor.      Problem: Adult Inpatient Plan of Care  Goal: Plan of Care Review  Outcome: Ongoing, Progressing  Goal: Patient-Specific Goal (Individualized)  Outcome: Ongoing, Progressing  Goal: Absence of Hospital-Acquired Illness or Injury  Outcome: Ongoing, Progressing  Goal: Optimal Comfort and Wellbeing  Outcome: Ongoing, Progressing  Goal: Readiness for Transition of Care  Outcome: Ongoing, Progressing     Problem: Infection  Goal: Absence of Infection Signs and Symptoms  Outcome: Ongoing, Progressing     Problem: Impaired Wound Healing  Goal: Optimal Wound Healing  Outcome: Ongoing, Progressing     Problem: Skin Injury Risk Increased  Goal: Skin Health and Integrity  Outcome: Ongoing, Progressing     Problem: Fall Injury Risk  Goal: Absence of Fall and Fall-Related Injury  Outcome: Ongoing, Progressing     Problem: Social Isolation  Goal: Social Connection Supported  Outcome: Ongoing, Progressing

## 2024-02-16 NOTE — PT/OT/SLP PROGRESS
Physical Therapy Treatment    Patient Name:  Irene العراقي   MRN:  [id number]    Recommendations:     Discharge Recommendations: Moderate Intensity Therapy  Discharge Equipment Recommendations: none  Barriers to discharge: None    Assessment:     Irene العراقي is a 83 y.o. female admitted with a medical diagnosis of Recurrent UTI.  She presents with the following impairments/functional limitations: weakness, impaired endurance, impaired self care skills, impaired functional mobility, gait instability, decreased lower extremity function, impaired cardiopulmonary response to activity, orthopedic precautions . Supine in bed.  Agreed to participate in therapy.  Reported some R hip discomfort today, unsure why.  Pressure relief boots removed.  CAM walker RLE applied, tennis shoe L foot.  Sup > sit with CGA.  Sit > stand with rw and Min A. Reported slight dizziness upon standing.  Ambulated 45' with rw and Min A with CAM walker RLE, O2 in tow. Sat up in chair, wound care nurse present.     Rehab Prognosis: Fair; patient would benefit from acute skilled PT services to address these deficits and reach maximum level of function.    Recent Surgery: * No surgery found *      Plan:     During this hospitalization, patient to be seen 6 x/week to address the identified rehab impairments via gait training, therapeutic activities, therapeutic exercises and progress toward the following goals:    Plan of Care Expires:  02/29/24    Subjective     Chief Complaint: reports some R hip discomfort.  Patient/Family Comments/goals: none stated  Pain/Comfort:  Pain Rating 1: 0/10      Objective:     Communicated with nurse Lazcano prior to session.  Patient found supine with bed alarm, pressure relief boots, peripheral IV, oxygen upon PT entry to room.     General Precautions: Standard, contact, fall  Orthopedic Precautions: RLE partial weight bearing  Braces:  (CAM walker boot)  Respiratory Status: Nasal cannula, flow 2 L/min     Functional  Mobility:  Bed Mobility:     Rolling Right: contact guard assistance  Supine to Sit: contact guard assistance  Transfers:     Sit to Stand:  minimum assistance with rolling walker  Gait: 45' with rw and Min A.      AM-PAC 6 CLICK MOBILITY          Treatment & Education:  Ambulated in room with rw and Min A, O2 in tow.    Patient left up in chair with all lines intact, call button in reach, chair alarm on, nurse Neno notified, and wound care nurse present..    GOALS:   Multidisciplinary Problems       Physical Therapy Goals          Problem: Physical Therapy    Goal Priority Disciplines Outcome Goal Variances Interventions   Physical Therapy Goal     PT, PT/OT Ongoing, Progressing     Description: Goals to be met by: 2024     Patient will increase functional independence with mobility by performin. Supine to sit with MInimal Assistance  2. Sit to stand transfer with Minimal Assistance  3. Bed to chair transfer with Minimal Assistance using Rolling Walker  4. Gait  x 50 feet with Minimal Assistance using Rolling Walker.   5. Lower extremity exercise program x20 reps     Hx of R ankle fx 2023- [facility] boot                         Time Tracking:     PT Received On: 24  PT Start Time: 1040     PT Stop Time: 1053  PT Total Time (min): 13 min     Billable Minutes: Gait Training 13min    Treatment Type: Treatment  PT/PTA: PTA     Number of PTA visits since last PT visit: 2     2024

## 2024-02-16 NOTE — PLAN OF CARE
Problem: Physical Therapy  Goal: Physical Therapy Goal  Description: Goals to be met by: 2024     Patient will increase functional independence with mobility by performin. Supine to sit with MInimal Assistance  2. Sit to stand transfer with Minimal Assistance  3. Bed to chair transfer with Minimal Assistance using Rolling Walker  4. Gait  x 50 feet with Minimal Assistance using Rolling Walker.   5. Lower extremity exercise program x20 reps     Hx of R ankle jacqui 2023- camwalker boot    Outcome: Ongoing, Progressing   Ambulate with assistance for safety.

## 2024-02-16 NOTE — NURSING
Skin check today. Right foot is noted to be swollen at this skin check but no breaks in her skin. Areas between toes are clear from rash at this time but did weave a silver gauze between her toes on both feet as a preventative measure. Patient has dry skin to her left plantar foot and a stable callous to her right plantar foot. Bilateral heels are pink with no redness noted. The right posterior calf area remains healed and did apply a clean dressing with silver and foam to continue to protect the newly healed skin to this area from her immobilizer boot on the right leg. Patient has no wound care needs at this time but she requests that wound care continue to follow up with her throughout her hospital stay. Will follow up weekly and as needed throughout hospital stay.      Right foot swollen    Right posterior calf skin intact and pink    Left posterior heel    Right posterior heel    Buttocks

## 2024-02-16 NOTE — PLAN OF CARE
Problem: Adult Inpatient Plan of Care  Goal: Plan of Care Review  Outcome: Ongoing, Progressing     Problem: Adult Inpatient Plan of Care  Goal: Patient-Specific Goal (Individualized)  Outcome: Ongoing, Progressing     Problem: Adult Inpatient Plan of Care  Goal: Absence of Hospital-Acquired Illness or Injury  Outcome: Ongoing, Progressing     Problem: Adult Inpatient Plan of Care  Goal: Optimal Comfort and Wellbeing  Outcome: Ongoing, Progressing     Problem: Adult Inpatient Plan of Care  Goal: Readiness for Transition of Care  Outcome: Ongoing, Progressing     Problem: Infection  Goal: Absence of Infection Signs and Symptoms  Outcome: Ongoing, Progressing     Problem: Impaired Wound Healing  Goal: Optimal Wound Healing  Outcome: Ongoing, Progressing     Problem: Fall Injury Risk  Goal: Absence of Fall and Fall-Related Injury  Outcome: Ongoing, Progressing

## 2024-02-16 NOTE — SUBJECTIVE & OBJECTIVE
"Interval History: see "Hospital Course"    Review of Systems   Constitutional:  Positive for fatigue. Negative for fever.   Skin:  Positive for pallor.     Objective:     Vital Signs (Most Recent):  Temp: 98.1 °F (36.7 °C) (02/16/24 0731)  Pulse: 70 (02/16/24 0732)  Resp: 16 (02/16/24 0732)  BP: (!) 147/67 (02/16/24 0731)  SpO2: 95 % (02/16/24 0732) Vital Signs (24h Range):  Temp:  [97.6 °F (36.4 °C)-98.4 °F (36.9 °C)] 98.1 °F (36.7 °C)  Pulse:  [68-72] 70  Resp:  [15-18] 16  SpO2:  [94 %-95 %] 95 %  BP: (132-147)/(60-67) 147/67     Weight: 71.2 kg (156 lb 15.5 oz)  Body mass index is 25.34 kg/m².    Intake/Output Summary (Last 24 hours) at 2/16/2024 1112  Last data filed at 2/16/2024 0645  Gross per 24 hour   Intake 1230 ml   Output 1550 ml   Net -320 ml         Physical Exam  Vitals and nursing note reviewed.   Constitutional:       General: She is not in acute distress.  HENT:      Head: Normocephalic and atraumatic.      Right Ear: External ear normal.      Left Ear: External ear normal.      Nose: Nose normal.      Mouth/Throat:      Pharynx: Oropharynx is clear.   Eyes:      Extraocular Movements: Extraocular movements intact.      Conjunctiva/sclera: Conjunctivae normal.   Cardiovascular:      Rate and Rhythm: Normal rate and regular rhythm.      Pulses: Normal pulses.      Heart sounds: Normal heart sounds.   Pulmonary:      Effort: Pulmonary effort is normal.      Breath sounds: Normal breath sounds.   Abdominal:      General: Bowel sounds are normal. There is no distension.      Palpations: Abdomen is soft.   Genitourinary:     Comments: Rodriguez.  Musculoskeletal:         General: Normal range of motion.      Cervical back: Normal range of motion and neck supple.      Right lower leg: No edema.      Left lower leg: No edema.   Skin:     General: Skin is warm and dry.      Coloration: Skin is pale.   Neurological:      Mental Status: She is alert. Mental status is at baseline.   Psychiatric:         Mood and " Affect: Mood normal.         Behavior: Behavior normal.             Significant Labs: All pertinent labs within the past 24 hours have been reviewed.    Significant Imaging: I have reviewed all pertinent imaging results/findings within the past 24 hours.

## 2024-02-16 NOTE — PROGRESS NOTES
[facility]/Surg  Hematology/Oncology  Progress Note    Patient Name: Irene العراقي  Admission Date: 2/8/2024  Hospital Length of Stay: 5 days  Code Status: Full Code     Subjective:     HPI:  PT is an 83-year-old female transferred from [facility] to ED after routine labs showed low platelets at 47 K. she is history of atrial fibrillation Eliquis is on hold due to thrombocytopenia currently history of anemia CKD and hypertension     No new subjective & objective note has been filed under this hospital service since the last note was generated.    2/16/2024:  During my interview she is in bed resting quietly with no obvious signs of distress.  She voices no complaints or concerns at this time    ROS:  As per mentioned in HPI; all other systems reviewed and negative    GENERAL: appears frail,  No anxiety, no agitation, and in no distress.  Patient is awake, alert, oriented and cooperative.  HEENT:  Showed no congestion. Trachea is central no obvious icterus or pallor noted via video.  NECK:  Supple.  No JVD. No obvious cervical submental or supraclavicular adenopathy.  RS:the visualized portion of  Chest expands well. chest appears symmetric, no audible wheezes.  ABDOMEN: the visualized portion of  abdomen appears undistended.  EXTREMITIES:  Without edema.  NEUROLOGICAL:  The patient is appropriate, higher functions are normal.  No neuro deficits.  No gait abnormality noted.  No confusion, no speech impediment. Cranial nerves are intact and show no deficit. No motor deficits noted through video.  SKIN MUSCULOSKELETAL: no joint or skeletal deformity, ambulating around room, no clubbing of nails.     Labs reviewed  Assessment/Plan:     Anemia of unknown etiology:   -transfuse 1 unit packed red blood cell for hemoglobin less than 7  -she can follow-up outpatient status post discharge    Thrombocytopenia:   -transfuse 1 pack platelets platelet count less than 20  -improving and noted to be [id number]  today  -she can follow-up status post discharge at which time we will arrange outpatient bone marrow biopsy    Heme-Onc will sign off at this time.  Please feel free to contact us with any questions or concerns or re-consult if needed    Irene Vines NP  Hematology/Oncology  [facility]/Surg

## 2024-02-16 NOTE — PLAN OF CARE
Spoke to Linda @ [facility] [phone] regarding SNF placement. Linda states she spoke to the insurance company and they will look at SNF request again and requested updated clinicals. CM sent updated clinicals via [employer]. Per Linda it will be an expedited review. CM following.       02/16/24 1003   Post-Acute Status   Post-Acute Authorization Placement   Post-Acute Placement Status Pending payor review/awaiting authorization (if required)

## 2024-02-16 NOTE — ASSESSMENT & PLAN NOTE
ESBL E coli. In setting of urinary retention.  -Rodriguez  -Meropenem through 2/17  -Fosfomycin prophylaxis starting 2/18  -ID  -Urology; patient deferred further workup at this time; will need outpatient follow up

## 2024-02-16 NOTE — PROGRESS NOTES
[facility] Medicine  Progress Note    Patient Name: Irene العراقي  MRN: [id number]  Patient Class: IP- Inpatient   Admission Date: 2/8/2024  Length of Stay: 5 days  Attending Physician: Brian Marques MD  Primary Care Provider: Wanda Kuhn FNP-C        Subjective:     Principal Problem:Recurrent UTI  Acute Condition: stable        HPI:  Irene العراقي is an 84 y/o F with past medical history significant for anemia, afib, CKD and HTN who presented to the ED from [facility] for further treatment of abnormal labs.  Per report, her platelets have been taking a downward trend and are 47K today.  She also has a hemoglobin of 7.5 with no obvious source of blood loss.  Pt denies recent fevers, chest pain, dizziness, melena or hematochezia.  Denies N/V but is having some mild generalized abdominal tenderness.  CT abdomen and pelvis obtained shows nonspecific gastric wall thickening which could be consistent with gastritis or peptic ulcer disease.  The ED physician discussed the lab findings with the hematologist on call who recommends admission and transfusion of PRBCs.  Pt presented from [facility] with araya catheter in place.  She reports urinary retention and states the araya has been in place for about a week.  Araya catheter was exchanged in the ED.  Pt is currently being treated for UTI with culture positive for enterococcus faecium VRE susceptible to linezolid.  Urinalysis today remains positive. She is placed in observation under the service of hospital medicine for continued monitoring and treatment.       Overview/Hospital Course:  Irene العراقي is an 83 year old female with a past medical history of Afib, CKD, HTN, anemia and thrombocytopenia who presented with worsening anemia and thrombocytopenia in the setting of linezolid use for Enterococcus UTI. There was no evidence of gross bleeding and she was transfused a unit of PRBCs. The Hgb is stable and her platelets are  "improving as linezolid was discontinued. Hematology was consulted. Repeat urine culture shows ESBL E coli for which the patient is on meropenem through 2/17. ID has been consulted. A midline is in place. A Rodriguez is in place for urinary retention. Urology has been consulted; the patient has deferred further urologic workup at this time and may follow up with Dr. Zavala in the outpatient setting. The patient is pending SNF placement at this time.    Interval History: see "Hospital Course"    Review of Systems   Constitutional:  Positive for fatigue. Negative for fever.   Skin:  Positive for pallor.     Objective:     Vital Signs (Most Recent):  Temp: 98.1 °F (36.7 °C) (02/16/24 0731)  Pulse: 70 (02/16/24 0732)  Resp: 16 (02/16/24 0732)  BP: (!) 147/67 (02/16/24 0731)  SpO2: 95 % (02/16/24 0732) Vital Signs (24h Range):  Temp:  [97.6 °F (36.4 °C)-98.4 °F (36.9 °C)] 98.1 °F (36.7 °C)  Pulse:  [68-72] 70  Resp:  [15-18] 16  SpO2:  [94 %-95 %] 95 %  BP: (132-147)/(60-67) 147/67     Weight: 71.2 kg (156 lb 15.5 oz)  Body mass index is 25.34 kg/m².    Intake/Output Summary (Last 24 hours) at 2/16/2024 1112  Last data filed at 2/16/2024 0645  Gross per 24 hour   Intake 1230 ml   Output 1550 ml   Net -320 ml         Physical Exam  Vitals and nursing note reviewed.   Constitutional:       General: She is not in acute distress.  HENT:      Head: Normocephalic and atraumatic.      Right Ear: External ear normal.      Left Ear: External ear normal.      Nose: Nose normal.      Mouth/Throat:      Pharynx: Oropharynx is clear.   Eyes:      Extraocular Movements: Extraocular movements intact.      Conjunctiva/sclera: Conjunctivae normal.   Cardiovascular:      Rate and Rhythm: Normal rate and regular rhythm.      Pulses: Normal pulses.      Heart sounds: Normal heart sounds.   Pulmonary:      Effort: Pulmonary effort is normal.      Breath sounds: Normal breath sounds.   Abdominal:      General: Bowel sounds are normal. There is no " distension.      Palpations: Abdomen is soft.   Genitourinary:     Comments: Rodriguez.  Musculoskeletal:         General: Normal range of motion.      Cervical back: Normal range of motion and neck supple.      Right lower leg: No edema.      Left lower leg: No edema.   Skin:     General: Skin is warm and dry.      Coloration: Skin is pale.   Neurological:      Mental Status: She is alert. Mental status is at baseline.   Psychiatric:         Mood and Affect: Mood normal.         Behavior: Behavior normal.             Significant Labs: All pertinent labs within the past 24 hours have been reviewed.    Significant Imaging: I have reviewed all pertinent imaging results/findings within the past 24 hours.    Assessment/Plan:      * Recurrent UTI  ESBL E coli. In setting of urinary retention.  -Rodriguez  -Meropenem through 2/17  -Fosfomycin prophylaxis starting 2/18  -ID  -Urology; patient deferred further workup at this time; will need outpatient follow up          Urinary retention  -Rodriguez changed on 2/9  -Urology consulted; patient deferred further workup at this time; may follow up outpatient    S/P TAVR (transcatheter aortic valve replacement)  Stable.  -Telemetry  -Coreg      NYHA class 3 heart failure with preserved ejection fraction  -Coreg  -Telemetry      Stage 3b chronic kidney disease  Stable.  -Renally dose medications  -Avoid nephrotoxic agents    Hypertension  -Continue Coreg  -Continue to monitor    Anemia  Stable. Appears to be secondary to chronic disease.  -S/p one unit PRBCs  -Trend Hgb with CBC  -Hematology following  -Continue home Eliquis     PAF (paroxysmal atrial fibrillation)  -Telemetry  -Coreg  -Amiodarone  -Eliquis    Thrombocytopenia  Improving.  -Discontinued linezolid  -Hematology follow up; likely BMBx once UTI treated  -Trend platelets with CBC          VTE Risk Mitigation (From admission, onward)           Ordered     apixaban tablet 5 mg  2 times daily         02/13/24 1148     IP VTE HIGH  RISK PATIENT  Once         02/08/24 1916     Place sequential compression device  Until discontinued         02/08/24 1916     Reason for No Pharmacological VTE Prophylaxis  Once        Question Answer Comment   Reasons: Already adequately anticoagulated on oral Anticoagulants    Reasons: Active Bleeding        02/08/24 1916                    Discharge Planning   YANET: 2/19/2024     Code Status: Full Code   Is the patient medically ready for discharge?:     Reason for patient still in hospital (select all that apply): Pending disposition  Discharge Plan A: Skilled Nursing Facility                  Brian Marques MD  Department of Hospital Medicine   [facility]/Surg

## 2024-02-17 LAB
ALBUMIN SERPL BCP-MCNC: 2.8 G/DL (ref 3.5–5.2)
ALP SERPL-CCNC: 91 U/L (ref 55–135)
ALT SERPL W/O P-5'-P-CCNC: 10 U/L (ref 10–44)
ANION GAP SERPL CALC-SCNC: 6 MMOL/L (ref 8–16)
AST SERPL-CCNC: 15 U/L (ref 10–40)
BASOPHILS # BLD AUTO: 0.02 K/UL (ref 0–0.2)
BASOPHILS NFR BLD: 0.4 % (ref 0–1.9)
BILIRUB SERPL-MCNC: 0.5 MG/DL (ref 0.1–1)
BUN SERPL-MCNC: 15 MG/DL (ref 8–23)
CALCIUM SERPL-MCNC: 8.5 MG/DL (ref 8.7–10.5)
CHLORIDE SERPL-SCNC: 104 MMOL/L (ref 95–110)
CO2 SERPL-SCNC: 26 MMOL/L (ref 23–29)
CREAT SERPL-MCNC: 1 MG/DL (ref 0.5–1.4)
DIFFERENTIAL METHOD BLD: ABNORMAL
EOSINOPHIL # BLD AUTO: 0.2 K/UL (ref 0–0.5)
EOSINOPHIL NFR BLD: 4.5 % (ref 0–8)
ERYTHROCYTE [DISTWIDTH] IN BLOOD BY AUTOMATED COUNT: 14.6 % (ref 11.5–14.5)
EST. GFR  (NO RACE VARIABLE): 56 ML/MIN/1.73 M^2
GLUCOSE SERPL-MCNC: 81 MG/DL (ref 70–110)
HCT VFR BLD AUTO: 23.2 % (ref 37–48.5)
HGB BLD-MCNC: 7.6 G/DL (ref 12–16)
IMM GRANULOCYTES # BLD AUTO: 0.03 K/UL (ref 0–0.04)
IMM GRANULOCYTES NFR BLD AUTO: 0.6 % (ref 0–0.5)
LYMPHOCYTES # BLD AUTO: 0.7 K/UL (ref 1–4.8)
LYMPHOCYTES NFR BLD: 14.4 % (ref 18–48)
MAGNESIUM SERPL-MCNC: 2 MG/DL (ref 1.6–2.6)
MCH RBC QN AUTO: 30.4 PG (ref 27–31)
MCHC RBC AUTO-ENTMCNC: 32.8 G/DL (ref 32–36)
MCV RBC AUTO: 93 FL (ref 82–98)
MONOCYTES # BLD AUTO: 0.8 K/UL (ref 0.3–1)
MONOCYTES NFR BLD: 16.4 % (ref 4–15)
NEUTROPHILS # BLD AUTO: 3.1 K/UL (ref 1.8–7.7)
NEUTROPHILS NFR BLD: 63.7 % (ref 38–73)
NRBC BLD-RTO: 0 /100 WBC
PHOSPHATE SERPL-MCNC: 2.8 MG/DL (ref 2.7–4.5)
PLATELET # BLD AUTO: 92 K/UL (ref 150–450)
PMV BLD AUTO: 10.3 FL (ref 9.2–12.9)
POTASSIUM SERPL-SCNC: 4.3 MMOL/L (ref 3.5–5.1)
PROT SERPL-MCNC: 5 G/DL (ref 6–8.4)
RBC # BLD AUTO: 2.5 M/UL (ref 4–5.4)
SODIUM SERPL-SCNC: 136 MMOL/L (ref 136–145)
WBC # BLD AUTO: 4.93 K/UL (ref 3.9–12.7)

## 2024-02-17 PROCEDURE — 94761 N-INVAS EAR/PLS OXIMETRY MLT: CPT

## 2024-02-17 PROCEDURE — 84100 ASSAY OF PHOSPHORUS: CPT | Performed by: NURSE PRACTITIONER

## 2024-02-17 PROCEDURE — 25000003 PHARM REV CODE 250: Performed by: STUDENT IN AN ORGANIZED HEALTH CARE EDUCATION/TRAINING PROGRAM

## 2024-02-17 PROCEDURE — 11000001 HC ACUTE MED/SURG PRIVATE ROOM

## 2024-02-17 PROCEDURE — 83735 ASSAY OF MAGNESIUM: CPT | Performed by: NURSE PRACTITIONER

## 2024-02-17 PROCEDURE — 97530 THERAPEUTIC ACTIVITIES: CPT | Mod: CO

## 2024-02-17 PROCEDURE — 80053 COMPREHEN METABOLIC PANEL: CPT | Performed by: NURSE PRACTITIONER

## 2024-02-17 PROCEDURE — 85025 COMPLETE CBC W/AUTO DIFF WBC: CPT | Performed by: NURSE PRACTITIONER

## 2024-02-17 PROCEDURE — 25000003 PHARM REV CODE 250: Performed by: NURSE PRACTITIONER

## 2024-02-17 PROCEDURE — 94799 UNLISTED PULMONARY SVC/PX: CPT

## 2024-02-17 PROCEDURE — 36415 COLL VENOUS BLD VENIPUNCTURE: CPT | Performed by: NURSE PRACTITIONER

## 2024-02-17 PROCEDURE — 99900035 HC TECH TIME PER 15 MIN (STAT)

## 2024-02-17 PROCEDURE — 63600175 PHARM REV CODE 636 W HCPCS: Performed by: NURSE PRACTITIONER

## 2024-02-17 PROCEDURE — 27000221 HC OXYGEN, UP TO 24 HOURS

## 2024-02-17 RX ADMIN — LACTOBACILLUS TAB 4 TABLET: TAB at 04:02

## 2024-02-17 RX ADMIN — FAMOTIDINE 20 MG: 20 TABLET, FILM COATED ORAL at 09:02

## 2024-02-17 RX ADMIN — LACTOBACILLUS TAB 4 TABLET: TAB at 09:02

## 2024-02-17 RX ADMIN — APIXABAN 5 MG: 2.5 TABLET, FILM COATED ORAL at 09:02

## 2024-02-17 RX ADMIN — LACTOBACILLUS TAB 4 TABLET: TAB at 12:02

## 2024-02-17 RX ADMIN — ACETAMINOPHEN 650 MG: 325 TABLET ORAL at 09:02

## 2024-02-17 RX ADMIN — CARVEDILOL 6.25 MG: 6.25 TABLET, FILM COATED ORAL at 09:02

## 2024-02-17 RX ADMIN — AMLODIPINE BESYLATE 10 MG: 5 TABLET ORAL at 09:02

## 2024-02-17 RX ADMIN — MEROPENEM 1 G: 1 INJECTION, POWDER, FOR SOLUTION INTRAVENOUS at 04:02

## 2024-02-17 RX ADMIN — MEROPENEM 1 G: 1 INJECTION, POWDER, FOR SOLUTION INTRAVENOUS at 01:02

## 2024-02-17 RX ADMIN — AMIODARONE HYDROCHLORIDE 200 MG: 100 TABLET ORAL at 09:02

## 2024-02-17 NOTE — CARE UPDATE
02/16/24 2005   Patient Assessment/Suction   Level of Consciousness (AVPU) alert   Respiratory Effort Normal;Unlabored   Expansion/Accessory Muscles/Retractions expansion symmetric;no retractions;no use of accessory muscles   PRE-TX-O2   Device (Oxygen Therapy) nasal cannula   Flow (L/min) 2   Oxygen Concentration (%) 28   SpO2 97 %   Pulse Oximetry Type Intermittent   Incentive Spirometer   $ Incentive Spirometer Charges done with encouragement   Administration (IS) proper technique demonstrated   Number of Repetitions (IS) 6   Level Incentive Spirometer (mL) 2000   Patient Tolerance (IS) good   Ready to Wean/Extubation Screen   FIO2<=50 (chart decimal) 0.28

## 2024-02-17 NOTE — CARE UPDATE
02/17/24 0713   Patient Assessment/Suction   Level of Consciousness (AVPU) alert   Respiratory Effort Unlabored;Normal   Expansion/Accessory Muscles/Retractions no use of accessory muscles   All Lung Fields Breath Sounds coarse   Rhythm/Pattern, Respiratory pattern regular   PRE-TX-O2   Device (Oxygen Therapy) nasal cannula   $ Is the patient on Low Flow Oxygen? Yes   Flow (L/min) 2   SpO2 98 %   Pulse Oximetry Type Intermittent   $ Pulse Oximetry - Multiple Charge Pulse Oximetry - Multiple   Pulse 72   Resp 18   Incentive Spirometer   $ Incentive Spirometer Charges done with encouragement   Administration (IS) proper technique demonstrated   Number of Repetitions (IS) 10   Level Incentive Spirometer (mL) 2000   Patient Tolerance (IS) good

## 2024-02-17 NOTE — PLAN OF CARE
Plan of care reviewed with pt. Pt able to make needs known. Continuous cardiac monitoring in place. Meds given per MAR. IV intact and patent with no s/s of infiltration or irritation noted to site. No complaints of pain or discomfort. Rodriguez to gravity in place for urinary retention, draining clear yellow urine, no s/s infection noted to insertion site. IS at beside and encouraged usage. Purposeful rounding done during shift to promote pt safety. NAD noted. Safety maintained with SR up x3, bed wheels locked, bed in lowest position, bed alarm set, slip resistant socks when out of bed, call light with in reach of pt. Pt instructed to call for assistance when needed. Remains free from falls. No further needs expressed at this time. Will continue to monitor.       Problem: Adult Inpatient Plan of Care  Goal: Plan of Care Review  Outcome: Ongoing, Progressing  Goal: Patient-Specific Goal (Individualized)  Outcome: Ongoing, Progressing  Goal: Absence of Hospital-Acquired Illness or Injury  Outcome: Ongoing, Progressing  Goal: Optimal Comfort and Wellbeing  Outcome: Ongoing, Progressing  Goal: Readiness for Transition of Care  Outcome: Ongoing, Progressing     Problem: Infection  Goal: Absence of Infection Signs and Symptoms  Outcome: Ongoing, Progressing     Problem: Impaired Wound Healing  Goal: Optimal Wound Healing  Outcome: Ongoing, Progressing     Problem: Skin Injury Risk Increased  Goal: Skin Health and Integrity  Outcome: Ongoing, Progressing     Problem: Fall Injury Risk  Goal: Absence of Fall and Fall-Related Injury  Outcome: Ongoing, Progressing     Problem: Social Isolation  Goal: Social Connection Supported  Outcome: Ongoing, Progressing

## 2024-02-17 NOTE — PROGRESS NOTES
[facility] Medicine  Progress Note    Patient Name: Irene العراقي  MRN: [id number]  Patient Class: IP- Inpatient   Admission Date: 2/8/2024  Length of Stay: 6 days  Attending Physician: Madisyn Green MD  Primary Care Provider: Wanda Kuhn FNP-C        Subjective:     Principal Problem:Recurrent UTI          HPI:  Irene العراقي is an 82 y/o F with past medical history significant for anemia, afib, CKD and HTN who presented to the ED from [facility] for further treatment of abnormal labs.  Per report, her platelets have been taking a downward trend and are 47K today.  She also has a hemoglobin of 7.5 with no obvious source of blood loss.  Pt denies recent fevers, chest pain, dizziness, melena or hematochezia.  Denies N/V but is having some mild generalized abdominal tenderness.  CT abdomen and pelvis obtained shows nonspecific gastric wall thickening which could be consistent with gastritis or peptic ulcer disease.  The ED physician discussed the lab findings with the hematologist on call who recommends admission and transfusion of PRBCs.  Pt presented from [facility] with araya catheter in place.  She reports urinary retention and states the araya has been in place for about a week.  Araya catheter was exchanged in the ED.  Pt is currently being treated for UTI with culture positive for enterococcus faecium VRE susceptible to linezolid.  Urinalysis today remains positive. She is placed in observation under the service of hospital medicine for continued monitoring and treatment.       Overview/Hospital Course:  Irene العراقي is an 83 year old female with a past medical history of Afib, CKD, HTN, anemia and thrombocytopenia who presented with worsening anemia and thrombocytopenia in the setting of linezolid use for Enterococcus UTI. There was no evidence of gross bleeding and she was transfused a unit of PRBCs. The Hgb is stable and her platelets are improving as linezolid was  discontinued. Hematology was consulted. Repeat urine culture shows ESBL E coli for which the patient is on meropenem through 2/17. ID has been consulted. A midline is in place. A Rodriguez is in place for urinary retention. Urology has been consulted; the patient has deferred further urologic workup at this time and may follow up with Dr. Zavala in the outpatient setting. The patient is pending SNF placement at this time.    Interval History: Patient seen and examined. No new complaints. Explained plan to transition to every 10 day fosfomycin tomorrow, she expressed understanding.     Review of Systems   Constitutional:  Positive for fatigue. Negative for fever.   Skin:  Positive for pallor.     Objective:     Vital Signs (Most Recent):  Temp: 98.1 °F (36.7 °C) (02/17/24 1131)  Pulse: 65 (02/17/24 1302)  Resp: 17 (02/17/24 1302)  BP: (!) 150/65 (02/17/24 1131)  SpO2: 95 % (02/17/24 1302) Vital Signs (24h Range):  Temp:  [97.6 °F (36.4 °C)-98.3 °F (36.8 °C)] 98.1 °F (36.7 °C)  Pulse:  [65-76] 65  Resp:  [16-20] 17  SpO2:  [92 %-98 %] 95 %  BP: (127-160)/(61-73) 150/65     Weight: 71.2 kg (156 lb 15.5 oz)  Body mass index is 25.34 kg/m².    Intake/Output Summary (Last 24 hours) at 2/17/2024 1326  Last data filed at 2/17/2024 0655  Gross per 24 hour   Intake 1116.71 ml   Output 1925 ml   Net -808.29 ml           Physical Exam  Vitals and nursing note reviewed.   Constitutional:       General: She is not in acute distress.  HENT:      Head: Normocephalic and atraumatic.      Right Ear: External ear normal.      Left Ear: External ear normal.      Nose: Nose normal.      Mouth/Throat:      Pharynx: Oropharynx is clear.   Eyes:      Extraocular Movements: Extraocular movements intact.      Conjunctiva/sclera: Conjunctivae normal.   Cardiovascular:      Rate and Rhythm: Normal rate and regular rhythm.      Pulses: Normal pulses.      Heart sounds: Normal heart sounds.   Pulmonary:      Effort: Pulmonary effort is normal.       Breath sounds: Normal breath sounds.   Abdominal:      General: Bowel sounds are normal. There is no distension.      Palpations: Abdomen is soft.   Genitourinary:     Comments: Rodriguez.  Musculoskeletal:         General: Normal range of motion.      Cervical back: Normal range of motion and neck supple.      Right lower leg: No edema.      Left lower leg: No edema.   Skin:     General: Skin is warm and dry.      Coloration: Skin is pale.   Neurological:      Mental Status: She is alert. Mental status is at baseline.   Psychiatric:         Mood and Affect: Mood normal.         Behavior: Behavior normal.             Significant Labs: All pertinent labs within the past 24 hours have been reviewed.    Significant Imaging: I have reviewed all pertinent imaging results/findings within the past 24 hours.    Assessment/Plan:      * Recurrent UTI  ESBL E coli. In setting of urinary retention.  -Rodriguez  -Meropenem through 2/17  -Fosfomycin prophylaxis starting 2/18  -ID  -Urology; patient deferred further workup at this time; will need outpatient follow up          Urinary retention  -Rodriguez changed on 2/9  -Urology consulted; patient deferred further workup at this time; may follow up outpatient    S/P TAVR (transcatheter aortic valve replacement)  Stable.  -Telemetry  -Coreg      NYHA class 3 heart failure with preserved ejection fraction  -Coreg  -Telemetry      Stage 3b chronic kidney disease  Stable.  -Renally dose medications  -Avoid nephrotoxic agents    Hypertension  -Continue Coreg  -Continue to monitor    Anemia  Stable. Appears to be secondary to chronic disease.  -S/p one unit PRBCs  -Trend Hgb with CBC  -Hematology following  -Continue home Eliquis     PAF (paroxysmal atrial fibrillation)  -Telemetry  -Coreg  -Amiodarone  -Eliquis    Thrombocytopenia  Improving.  -Discontinued linezolid  -Hematology follow up; likely BMBx once UTI treated  -Trend platelets with CBC          VTE Risk Mitigation (From admission,  onward)           Ordered     apixaban tablet 5 mg  2 times daily         02/13/24 1148     IP VTE HIGH RISK PATIENT  Once         02/08/24 1916     Place sequential compression device  Until discontinued         02/08/24 1916     Reason for No Pharmacological VTE Prophylaxis  Once        Question Answer Comment   Reasons: Already adequately anticoagulated on oral Anticoagulants    Reasons: Active Bleeding        02/08/24 1916                    Discharge Planning   YANET: 2/19/2024     Code Status: Full Code   Is the patient medically ready for discharge?:     Reason for patient still in hospital (select all that apply): Patient trending condition, Treatment, and Pending disposition  Discharge Plan A: Skilled Nursing Facility                  Madisyn Green MD  Department of Hospital Medicine   [facility]/Surg

## 2024-02-17 NOTE — SUBJECTIVE & OBJECTIVE
Interval History: Patient seen and examined. No new complaints. Explained plan to transition to every 10 day fosfomycin tomorrow, she expressed understanding.     Review of Systems   Constitutional:  Positive for fatigue. Negative for fever.   Skin:  Positive for pallor.     Objective:     Vital Signs (Most Recent):  Temp: 98.1 °F (36.7 °C) (02/17/24 1131)  Pulse: 65 (02/17/24 1302)  Resp: 17 (02/17/24 1302)  BP: (!) 150/65 (02/17/24 1131)  SpO2: 95 % (02/17/24 1302) Vital Signs (24h Range):  Temp:  [97.6 °F (36.4 °C)-98.3 °F (36.8 °C)] 98.1 °F (36.7 °C)  Pulse:  [65-76] 65  Resp:  [16-20] 17  SpO2:  [92 %-98 %] 95 %  BP: (127-160)/(61-73) 150/65     Weight: 71.2 kg (156 lb 15.5 oz)  Body mass index is 25.34 kg/m².    Intake/Output Summary (Last 24 hours) at 2/17/2024 1326  Last data filed at 2/17/2024 0655  Gross per 24 hour   Intake 1116.71 ml   Output 1925 ml   Net -808.29 ml           Physical Exam  Vitals and nursing note reviewed.   Constitutional:       General: She is not in acute distress.  HENT:      Head: Normocephalic and atraumatic.      Right Ear: External ear normal.      Left Ear: External ear normal.      Nose: Nose normal.      Mouth/Throat:      Pharynx: Oropharynx is clear.   Eyes:      Extraocular Movements: Extraocular movements intact.      Conjunctiva/sclera: Conjunctivae normal.   Cardiovascular:      Rate and Rhythm: Normal rate and regular rhythm.      Pulses: Normal pulses.      Heart sounds: Normal heart sounds.   Pulmonary:      Effort: Pulmonary effort is normal.      Breath sounds: Normal breath sounds.   Abdominal:      General: Bowel sounds are normal. There is no distension.      Palpations: Abdomen is soft.   Genitourinary:     Comments: Rodriguez.  Musculoskeletal:         General: Normal range of motion.      Cervical back: Normal range of motion and neck supple.      Right lower leg: No edema.      Left lower leg: No edema.   Skin:     General: Skin is warm and dry.       Coloration: Skin is pale.   Neurological:      Mental Status: She is alert. Mental status is at baseline.   Psychiatric:         Mood and Affect: Mood normal.         Behavior: Behavior normal.             Significant Labs: All pertinent labs within the past 24 hours have been reviewed.    Significant Imaging: I have reviewed all pertinent imaging results/findings within the past 24 hours.

## 2024-02-18 LAB
ALBUMIN SERPL BCP-MCNC: 2.7 G/DL (ref 3.5–5.2)
ALP SERPL-CCNC: 80 U/L (ref 55–135)
ALT SERPL W/O P-5'-P-CCNC: 10 U/L (ref 10–44)
ANION GAP SERPL CALC-SCNC: 7 MMOL/L (ref 8–16)
AST SERPL-CCNC: 14 U/L (ref 10–40)
BASOPHILS # BLD AUTO: 0.01 K/UL (ref 0–0.2)
BASOPHILS NFR BLD: 0.2 % (ref 0–1.9)
BILIRUB SERPL-MCNC: 0.5 MG/DL (ref 0.1–1)
BUN SERPL-MCNC: 18 MG/DL (ref 8–23)
CALCIUM SERPL-MCNC: 8.4 MG/DL (ref 8.7–10.5)
CHLORIDE SERPL-SCNC: 107 MMOL/L (ref 95–110)
CO2 SERPL-SCNC: 24 MMOL/L (ref 23–29)
CREAT SERPL-MCNC: 1 MG/DL (ref 0.5–1.4)
DIFFERENTIAL METHOD BLD: ABNORMAL
EOSINOPHIL # BLD AUTO: 0.3 K/UL (ref 0–0.5)
EOSINOPHIL NFR BLD: 6.1 % (ref 0–8)
ERYTHROCYTE [DISTWIDTH] IN BLOOD BY AUTOMATED COUNT: 15.1 % (ref 11.5–14.5)
EST. GFR  (NO RACE VARIABLE): 56 ML/MIN/1.73 M^2
GLUCOSE SERPL-MCNC: 85 MG/DL (ref 70–110)
HCT VFR BLD AUTO: 21.3 % (ref 37–48.5)
HGB BLD-MCNC: 7 G/DL (ref 12–16)
IMM GRANULOCYTES # BLD AUTO: 0.02 K/UL (ref 0–0.04)
IMM GRANULOCYTES NFR BLD AUTO: 0.5 % (ref 0–0.5)
LYMPHOCYTES # BLD AUTO: 0.8 K/UL (ref 1–4.8)
LYMPHOCYTES NFR BLD: 18.7 % (ref 18–48)
MAGNESIUM SERPL-MCNC: 2 MG/DL (ref 1.6–2.6)
MCH RBC QN AUTO: 30.8 PG (ref 27–31)
MCHC RBC AUTO-ENTMCNC: 32.9 G/DL (ref 32–36)
MCV RBC AUTO: 94 FL (ref 82–98)
MONOCYTES # BLD AUTO: 0.7 K/UL (ref 0.3–1)
MONOCYTES NFR BLD: 15.6 % (ref 4–15)
NEUTROPHILS # BLD AUTO: 2.5 K/UL (ref 1.8–7.7)
NEUTROPHILS NFR BLD: 58.9 % (ref 38–73)
NRBC BLD-RTO: 0 /100 WBC
PHOSPHATE SERPL-MCNC: 3.1 MG/DL (ref 2.7–4.5)
PLATELET # BLD AUTO: 92 K/UL (ref 150–450)
PLATELET BLD QL SMEAR: ABNORMAL
PMV BLD AUTO: 10.2 FL (ref 9.2–12.9)
POCT GLUCOSE: 94 MG/DL (ref 70–110)
POTASSIUM SERPL-SCNC: 4.4 MMOL/L (ref 3.5–5.1)
PROT SERPL-MCNC: 4.8 G/DL (ref 6–8.4)
RBC # BLD AUTO: 2.27 M/UL (ref 4–5.4)
SODIUM SERPL-SCNC: 138 MMOL/L (ref 136–145)
WBC # BLD AUTO: 4.23 K/UL (ref 3.9–12.7)

## 2024-02-18 PROCEDURE — 36415 COLL VENOUS BLD VENIPUNCTURE: CPT | Performed by: NURSE PRACTITIONER

## 2024-02-18 PROCEDURE — 11000001 HC ACUTE MED/SURG PRIVATE ROOM

## 2024-02-18 PROCEDURE — 94799 UNLISTED PULMONARY SVC/PX: CPT

## 2024-02-18 PROCEDURE — 94761 N-INVAS EAR/PLS OXIMETRY MLT: CPT

## 2024-02-18 PROCEDURE — 63600175 PHARM REV CODE 636 W HCPCS: Performed by: HOSPITALIST

## 2024-02-18 PROCEDURE — 80053 COMPREHEN METABOLIC PANEL: CPT | Performed by: NURSE PRACTITIONER

## 2024-02-18 PROCEDURE — 63600175 PHARM REV CODE 636 W HCPCS: Performed by: NURSE PRACTITIONER

## 2024-02-18 PROCEDURE — 85025 COMPLETE CBC W/AUTO DIFF WBC: CPT | Performed by: NURSE PRACTITIONER

## 2024-02-18 PROCEDURE — 25000003 PHARM REV CODE 250: Performed by: STUDENT IN AN ORGANIZED HEALTH CARE EDUCATION/TRAINING PROGRAM

## 2024-02-18 PROCEDURE — 83735 ASSAY OF MAGNESIUM: CPT | Performed by: NURSE PRACTITIONER

## 2024-02-18 PROCEDURE — 25000003 PHARM REV CODE 250: Performed by: NURSE PRACTITIONER

## 2024-02-18 PROCEDURE — 27000221 HC OXYGEN, UP TO 24 HOURS

## 2024-02-18 PROCEDURE — 25000003 PHARM REV CODE 250: Performed by: HOSPITALIST

## 2024-02-18 PROCEDURE — 99900035 HC TECH TIME PER 15 MIN (STAT)

## 2024-02-18 PROCEDURE — 84100 ASSAY OF PHOSPHORUS: CPT | Performed by: NURSE PRACTITIONER

## 2024-02-18 PROCEDURE — 97530 THERAPEUTIC ACTIVITIES: CPT | Mod: CQ

## 2024-02-18 RX ORDER — DOCUSATE SODIUM 100 MG/1
100 CAPSULE, LIQUID FILLED ORAL 2 TIMES DAILY
Status: DISCONTINUED | OUTPATIENT
Start: 2024-02-18 | End: 2024-02-19 | Stop reason: HOSPADM

## 2024-02-18 RX ADMIN — APIXABAN 5 MG: 2.5 TABLET, FILM COATED ORAL at 10:02

## 2024-02-18 RX ADMIN — AMLODIPINE BESYLATE 10 MG: 5 TABLET ORAL at 10:02

## 2024-02-18 RX ADMIN — CARVEDILOL 6.25 MG: 6.25 TABLET, FILM COATED ORAL at 10:02

## 2024-02-18 RX ADMIN — ACETAMINOPHEN 650 MG: 325 TABLET ORAL at 08:02

## 2024-02-18 RX ADMIN — AMIODARONE HYDROCHLORIDE 200 MG: 100 TABLET ORAL at 08:02

## 2024-02-18 RX ADMIN — DOCUSATE SODIUM 100 MG: 100 CAPSULE, LIQUID FILLED ORAL at 08:02

## 2024-02-18 RX ADMIN — MEROPENEM 1 G: 1 INJECTION, POWDER, FOR SOLUTION INTRAVENOUS at 11:02

## 2024-02-18 RX ADMIN — APIXABAN 5 MG: 2.5 TABLET, FILM COATED ORAL at 08:02

## 2024-02-18 RX ADMIN — FAMOTIDINE 20 MG: 20 TABLET, FILM COATED ORAL at 11:02

## 2024-02-18 RX ADMIN — AMIODARONE HYDROCHLORIDE 200 MG: 100 TABLET ORAL at 10:02

## 2024-02-18 RX ADMIN — LACTOBACILLUS TAB 4 TABLET: TAB at 07:02

## 2024-02-18 RX ADMIN — CARVEDILOL 6.25 MG: 6.25 TABLET, FILM COATED ORAL at 08:02

## 2024-02-18 RX ADMIN — LACTOBACILLUS TAB 4 TABLET: TAB at 11:02

## 2024-02-18 RX ADMIN — LACTOBACILLUS TAB 4 TABLET: TAB at 05:02

## 2024-02-18 RX ADMIN — DOCUSATE SODIUM 100 MG: 100 CAPSULE, LIQUID FILLED ORAL at 10:02

## 2024-02-18 RX ADMIN — ONDANSETRON 4 MG: 2 INJECTION INTRAMUSCULAR; INTRAVENOUS at 01:02

## 2024-02-18 NOTE — PT/OT/SLP PROGRESS
Occupational Therapy   Treatment    Name: Irene العراقي  MRN: [id number]  Admitting Diagnosis:  Recurrent UTI       Recommendations:     Discharge Recommendations: Moderate Intensity Therapy  Discharge Equipment Recommendations:  none  Barriers to discharge:  None    Assessment:     Irene العراقي is a 83 y.o. female with a medical diagnosis of Recurrent UTI.  She presents with requiring CAM walker boot to be donned for OOB activity but mobilizes with Jake or less and should be OOB as much as she wants and is possible. Performance deficits affecting function are weakness, impaired endurance, impaired self care skills, impaired functional mobility, gait instability, impaired balance, decreased lower extremity function.     Rehab Prognosis:  Good; patient would benefit from acute skilled OT services to address these deficits and reach maximum level of function.       Plan:     Patient to be seen 6 x/week to address the above listed problems via self-care/home management, therapeutic activities, therapeutic exercises  Plan of Care Expires: 03/01/24  Plan of Care Reviewed with: patient    Subjective     Chief Complaint: I can't lay in this bed anymore.  Patient/Family Comments/goals: rehab  Pain/Comfort:  Pain Rating 1: 0/10    Objective:     Communicated with: nurseShiraz prior to session.  Patient found  nearly supine  with bed alarm, peripheral IV, telemetry upon OT entry to room.    General Precautions: Standard, contact, fall    Orthopedic Precautions:RLE weight bearing as tolerated (when CAM boot is donned)  Braces:  (CAM walker boot)  Respiratory Status: Room air     Occupational Performance:     Bed Mobility:    Patient completed Scooting/Bridging with modified independence and with side rail  Patient completed Supine to Sit with modified independence and with side rail   Unsupported sitting at EOB with no LOB.  Pt returns demo of hip dissociation to scoot out of chair.    Functional Mobility/Transfers:  Patient  completed Sit <> Stand Transfer with contact guard assistance  with  rolling walker and VC and demo to push with BUE from sit surface for greatest independence.   Additional padding (folded blankets) placed in bed side chair to create a more normal height for pt to transfer Out of chair later (due to her height). Then sit<>stand completed with SBA and RW from this height.  Functional Mobility: GOOD-    AMPA 6 Click ADL: 21    Treatment & Education:  -in room ambulation 2 x 15 ft SBA/RW.  -CAM boot donned by ELDER    Patient left up in chair with all lines intact, call button in reach, and nurse Shiraz present    GOALS:   Multidisciplinary Problems       Occupational Therapy Goals          Problem: Occupational Therapy    Goal Priority Disciplines Outcome Interventions   Occupational Therapy Goal     OT, PT/OT     Description: Goals to be met by: 3/1/2024     Patient will increase functional independence with ADLs by performing:    UE Dressing with Set-up Assistance.  LE Dressing with Set-up Assistance.  Grooming while standing with Stand-by Assistance.  Toileting from toilet with Stand-by Assistance for hygiene and clothing management.   Toilet transfer to toilet with Stand-by Assistance with AD as needed.                         Time Tracking:     OT Date of Treatment: 02/17/24  OT Start Time: 1545  OT Stop Time: 1602  OT Total Time (min): 17 min    Billable Minutes:Therapeutic Activity 17 min    OT/HANNAH: HANNAH     Number of HANNAH visits since last OT visit: 1 2/17/2024

## 2024-02-18 NOTE — SUBJECTIVE & OBJECTIVE
Interval History: Patient seen and examined.  Reports some dizziness overnight.  Tolerated breakfast.  She transitions to every 10 day fosfomycin today.  Hemoglobin is 7.  We will recheck tomorrow and will transfuse if continues to decrease.  Review of Systems   Constitutional:  Positive for fatigue. Negative for fever.   Skin:  Positive for pallor.     Objective:     Vital Signs (Most Recent):  Temp: 97.7 °F (36.5 °C) (02/18/24 0736)  Pulse: 65 (02/18/24 0736)  Resp: 17 (02/18/24 0736)  BP: (!) 152/67 (02/18/24 0736)  SpO2: (!) 94 % (02/18/24 0736) Vital Signs (24h Range):  Temp:  [97.6 °F (36.4 °C)-98.1 °F (36.7 °C)] 97.7 °F (36.5 °C)  Pulse:  [62-77] 65  Resp:  [16-18] 17  SpO2:  [91 %-97 %] 94 %  BP: (130-178)/(60-74) 152/67     Weight: 71.2 kg (156 lb 15.5 oz)  Body mass index is 25.34 kg/m².    Intake/Output Summary (Last 24 hours) at 2/18/2024 1009  Last data filed at 2/18/2024 0655  Gross per 24 hour   Intake 980 ml   Output 1850 ml   Net -870 ml           Physical Exam  Vitals and nursing note reviewed.   Constitutional:       General: She is not in acute distress.  HENT:      Head: Normocephalic and atraumatic.      Right Ear: External ear normal.      Left Ear: External ear normal.      Nose: Nose normal.      Mouth/Throat:      Pharynx: Oropharynx is clear.   Eyes:      Extraocular Movements: Extraocular movements intact.      Conjunctiva/sclera: Conjunctivae normal.   Cardiovascular:      Rate and Rhythm: Normal rate and regular rhythm.      Pulses: Normal pulses.      Heart sounds: Normal heart sounds.   Pulmonary:      Effort: Pulmonary effort is normal.      Breath sounds: Normal breath sounds.   Abdominal:      General: Bowel sounds are normal. There is no distension.      Palpations: Abdomen is soft.   Genitourinary:     Comments: Rodriguez.  Musculoskeletal:         General: Normal range of motion.      Cervical back: Normal range of motion and neck supple.      Right lower leg: No edema.      Left  lower leg: No edema.   Skin:     General: Skin is warm and dry.      Coloration: Skin is pale.   Neurological:      Mental Status: She is alert. Mental status is at baseline.   Psychiatric:         Mood and Affect: Mood normal.         Behavior: Behavior normal.             Significant Labs: All pertinent labs within the past 24 hours have been reviewed.    Significant Imaging: I have reviewed all pertinent imaging results/findings within the past 24 hours.

## 2024-02-18 NOTE — PT/OT/SLP PROGRESS
"Physical Therapy Treatment    Patient Name:  Irene العراقي   MRN:  [id number]    Recommendations:     Discharge Recommendations: Moderate Intensity Therapy  Discharge Equipment Recommendations: none  Barriers to discharge:  lightheadedness limiting ambulation tolerance and safety    Assessment:     Irene العراقي is a 83 y.o. female admitted with a medical diagnosis of Recurrent UTI.  She presents with the following impairments/functional limitations: weakness, impaired endurance, impaired functional mobility, gait instability, impaired balance, decreased lower extremity function, impaired cardiopulmonary response to activity.    Pt presents eager to participate but c/o lightheadedness that did increase slightly upon transfer to EOB. Did not fully resolve but decreased with extended sit. With RLE CAM boot and LLE tennis shoe, ambulated 10' with RW, Jessica, and persistent lightheadedness.  Remained up in chair with lunch tray set up and all needs at hand.     Rehab Prognosis: Good; patient would benefit from acute skilled PT services to address these deficits and reach maximum level of function.    Recent Surgery: * No surgery found *      Plan:     During this hospitalization, patient to be seen 6 x/week to address the identified rehab impairments via gait training, therapeutic activities, therapeutic exercises and progress toward the following goals:    Plan of Care Expires:  02/29/24    Subjective     Chief Complaint: "lightheaded today"  Patient/Family Comments/goals: I still want to at least get to the chair  Pain/Comfort:  Pain Rating 1: 0/10  Pain Rating Post-Intervention 1: 0/10      Objective:     Communicated with nurse prior to session.  Patient found HOB elevated with bed alarm, peripheral IV, araya, telemetry upon PT entry to room.     General Precautions: Standard, fall, contact  Orthopedic Precautions: RLE partial weight bearing  Braces:  (CAM walker boot)  Respiratory Status: Nasal cannula, flow 2 " L/min     Functional Mobility:  Bed Mobility:     Supine to Sit: contact guard assistance  Transfers:     Sit to Stand:  contact guard assistance with rolling walker  Bed to Chair: minimum assistance with  rolling walker  using  Step Transfer  Gait: 10' RW Jessica; VC for upright posture and gait pattern, with close monitoring for increasing lightheadedness      AM-PAC 6 CLICK MOBILITY          Treatment & Education:    -Pt educ: slow postural transitions with extended sit and BLE therex for decreased dizziness; call light, fall prevention, nursing assist for transfers; frequent mobility during day as tolerated  -Extended time required for donning CAM boot and tennis shoe    Patient left up in chair with all lines intact, call button in reach, chair alarm on, and nurse notified..    GOALS:   Multidisciplinary Problems       Physical Therapy Goals          Problem: Physical Therapy    Goal Priority Disciplines Outcome Goal Variances Interventions   Physical Therapy Goal     PT, PT/OT Ongoing, Progressing     Description: Goals to be met by: 2024     Patient will increase functional independence with mobility by performin. Supine to sit with MInimal Assistance  2. Sit to stand transfer with Minimal Assistance  3. Bed to chair transfer with Minimal Assistance using Rolling Walker  4. Gait  x 50 feet with Minimal Assistance using Rolling Walker.   5. Lower extremity exercise program x20 reps     Hx of R ankle fx 2023- mando boot                         Time Tracking:     PT Received On: 24  PT Start Time: 1144     PT Stop Time: 1207  PT Total Time (min): 23 min     Billable Minutes: Therapeutic Activity 23    Treatment Type: Treatment  PT/PTA: PTA     Number of PTA visits since last PT visit: 3     2024

## 2024-02-18 NOTE — PLAN OF CARE
Problem: Adult Inpatient Plan of Care  Goal: Plan of Care Review  Outcome: Ongoing, Progressing  Goal: Patient-Specific Goal (Individualized)  Outcome: Ongoing, Progressing  Goal: Absence of Hospital-Acquired Illness or Injury  Outcome: Ongoing, Progressing  Goal: Optimal Comfort and Wellbeing  Outcome: Ongoing, Progressing  Goal: Readiness for Transition of Care  Outcome: Ongoing, Progressing     Problem: Infection  Goal: Absence of Infection Signs and Symptoms  Outcome: Ongoing, Progressing     Problem: Impaired Wound Healing  Goal: Optimal Wound Healing  Outcome: Ongoing, Progressing     Problem: Fall Injury Risk  Goal: Absence of Fall and Fall-Related Injury  Outcome: Ongoing, Progressing

## 2024-02-18 NOTE — PLAN OF CARE
Problem: Adult Inpatient Plan of Care  Goal: Plan of Care Review  Outcome: Ongoing, Progressing  Goal: Patient-Specific Goal (Individualized)  Outcome: Ongoing, Progressing  Goal: Absence of Hospital-Acquired Illness or Injury  Outcome: Ongoing, Progressing  Goal: Optimal Comfort and Wellbeing  Outcome: Ongoing, Progressing  Goal: Readiness for Transition of Care  Outcome: Ongoing, Progressing     Problem: Infection  Goal: Absence of Infection Signs and Symptoms  Outcome: Ongoing, Progressing     Problem: Impaired Wound Healing  Goal: Optimal Wound Healing  Outcome: Ongoing, Progressing     Problem: Skin Injury Risk Increased  Goal: Skin Health and Integrity  Outcome: Ongoing, Progressing     Problem: Fall Injury Risk  Goal: Absence of Fall and Fall-Related Injury  Outcome: Ongoing, Progressing     Problem: Social Isolation  Goal: Social Connection Supported  Outcome: Ongoing, Progressing   Plan of care reviewed with patient. Patient verbalized complete understanding. Two hour patient rounding maintained throughout shift. All fall precautions maintained. Bed in lowest position, locked, call light within reach. Side rails up x 2. Slip resistant socks maintained. Needs attended to.

## 2024-02-18 NOTE — PROGRESS NOTES
[facility] Medicine  Progress Note    Patient Name: Irene العراقي  MRN: [id number]  Patient Class: IP- Inpatient   Admission Date: 2/8/2024  Length of Stay: 7 days  Attending Physician: Madisyn Green MD  Primary Care Provider: Wanda Kuhn FNP-C        Subjective:     Principal Problem:Recurrent UTI          HPI:  Irene العراقي is an 84 y/o F with past medical history significant for anemia, afib, CKD and HTN who presented to the ED from [facility] for further treatment of abnormal labs.  Per report, her platelets have been taking a downward trend and are 47K today.  She also has a hemoglobin of 7.5 with no obvious source of blood loss.  Pt denies recent fevers, chest pain, dizziness, melena or hematochezia.  Denies N/V but is having some mild generalized abdominal tenderness.  CT abdomen and pelvis obtained shows nonspecific gastric wall thickening which could be consistent with gastritis or peptic ulcer disease.  The ED physician discussed the lab findings with the hematologist on call who recommends admission and transfusion of PRBCs.  Pt presented from [facility] with araya catheter in place.  She reports urinary retention and states the araya has been in place for about a week.  Araya catheter was exchanged in the ED.  Pt is currently being treated for UTI with culture positive for enterococcus faecium VRE susceptible to linezolid.  Urinalysis today remains positive. She is placed in observation under the service of hospital medicine for continued monitoring and treatment.       Overview/Hospital Course:  Irene العراقي is an 83 year old female with a past medical history of Afib, CKD, HTN, anemia and thrombocytopenia who presented with worsening anemia and thrombocytopenia in the setting of linezolid use for Enterococcus UTI. There was no evidence of gross bleeding and she was transfused a unit of PRBCs. The Hgb is stable and her platelets are improving as linezolid was  discontinued. Hematology was consulted. Repeat urine culture shows ESBL E coli for which the patient is on meropenem through 2/17. ID has been consulted. A midline is in place. A Rodriguez is in place for urinary retention. Urology has been consulted; the patient has deferred further urologic workup at this time and may follow up with Dr. Zavala in the outpatient setting. The patient is pending SNF placement at this time.    Interval History: Patient seen and examined.  Reports some dizziness overnight.  Tolerated breakfast.  She transitions to every 10 day fosfomycin today.  Hemoglobin is 7.  We will recheck tomorrow and will transfuse if continues to decrease.  Review of Systems   Constitutional:  Positive for fatigue. Negative for fever.   Skin:  Positive for pallor.     Objective:     Vital Signs (Most Recent):  Temp: 97.7 °F (36.5 °C) (02/18/24 0736)  Pulse: 65 (02/18/24 0736)  Resp: 17 (02/18/24 0736)  BP: (!) 152/67 (02/18/24 0736)  SpO2: (!) 94 % (02/18/24 0736) Vital Signs (24h Range):  Temp:  [97.6 °F (36.4 °C)-98.1 °F (36.7 °C)] 97.7 °F (36.5 °C)  Pulse:  [62-77] 65  Resp:  [16-18] 17  SpO2:  [91 %-97 %] 94 %  BP: (130-178)/(60-74) 152/67     Weight: 71.2 kg (156 lb 15.5 oz)  Body mass index is 25.34 kg/m².    Intake/Output Summary (Last 24 hours) at 2/18/2024 1009  Last data filed at 2/18/2024 0655  Gross per 24 hour   Intake 980 ml   Output 1850 ml   Net -870 ml           Physical Exam  Vitals and nursing note reviewed.   Constitutional:       General: She is not in acute distress.  HENT:      Head: Normocephalic and atraumatic.      Right Ear: External ear normal.      Left Ear: External ear normal.      Nose: Nose normal.      Mouth/Throat:      Pharynx: Oropharynx is clear.   Eyes:      Extraocular Movements: Extraocular movements intact.      Conjunctiva/sclera: Conjunctivae normal.   Cardiovascular:      Rate and Rhythm: Normal rate and regular rhythm.      Pulses: Normal pulses.      Heart sounds:  Normal heart sounds.   Pulmonary:      Effort: Pulmonary effort is normal.      Breath sounds: Normal breath sounds.   Abdominal:      General: Bowel sounds are normal. There is no distension.      Palpations: Abdomen is soft.   Genitourinary:     Comments: Rodriguez.  Musculoskeletal:         General: Normal range of motion.      Cervical back: Normal range of motion and neck supple.      Right lower leg: No edema.      Left lower leg: No edema.   Skin:     General: Skin is warm and dry.      Coloration: Skin is pale.   Neurological:      Mental Status: She is alert. Mental status is at baseline.   Psychiatric:         Mood and Affect: Mood normal.         Behavior: Behavior normal.             Significant Labs: All pertinent labs within the past 24 hours have been reviewed.    Significant Imaging: I have reviewed all pertinent imaging results/findings within the past 24 hours.    Assessment/Plan:      * Recurrent UTI  ESBL E coli. In setting of urinary retention.  -Rodriguez  -Meropenem through 2/17 completed  -Fosfomycin prophylaxis starting 2/18, to be given every 10 days  -ID  -Urology; patient deferred further workup at this time; will need outpatient follow up          Urinary retention  -Rodriguez changed on 2/9  -Urology consulted; patient deferred further workup at this time; may follow up outpatient    S/P TAVR (transcatheter aortic valve replacement)  Stable.  -Telemetry  -Coreg      NYHA class 3 heart failure with preserved ejection fraction  -Coreg  -Telemetry      Stage 3b chronic kidney disease  Stable.  -Renally dose medications  -Avoid nephrotoxic agents    Hypertension  -Continue Coreg  -Continue to monitor    Anemia  Stable. Appears to be secondary to chronic disease.  -S/p one unit PRBCs  -Trend Hgb with CBC  -Hematology following  -Continue home Eliquis     PAF (paroxysmal atrial fibrillation)  -Telemetry  -Coreg  -Amiodarone  -Eliquis    Thrombocytopenia  Improving.  -Discontinued linezolid  -Hematology  follow up; likely BMBx once UTI treated  -Trend platelets with CBC          VTE Risk Mitigation (From admission, onward)           Ordered     apixaban tablet 5 mg  2 times daily         02/13/24 1148     IP VTE HIGH RISK PATIENT  Once         02/08/24 1916     Place sequential compression device  Until discontinued         02/08/24 1916     Reason for No Pharmacological VTE Prophylaxis  Once        Question Answer Comment   Reasons: Already adequately anticoagulated on oral Anticoagulants    Reasons: Active Bleeding        02/08/24 1916                    Discharge Planning   YANET: 2/19/2024     Code Status: Full Code   Is the patient medically ready for discharge?:     Reason for patient still in hospital (select all that apply): Patient trending condition and Treatment  Discharge Plan A: Skilled Nursing Facility                  Madisyn Green MD  Department of Hospital Medicine   [facility]/Surg

## 2024-02-18 NOTE — CARE UPDATE
02/18/24 0710   Patient Assessment/Suction   Level of Consciousness (AVPU) alert   Respiratory Effort Unlabored;Normal   Expansion/Accessory Muscles/Retractions no use of accessory muscles   All Lung Fields Breath Sounds diminished   Rhythm/Pattern, Respiratory pattern regular;depth regular   PRE-TX-O2   Device (Oxygen Therapy) nasal cannula   $ Is the patient on Low Flow Oxygen? Yes   Flow (L/min) 2   SpO2 (!) 94 %   Pulse Oximetry Type Intermittent   $ Pulse Oximetry - Multiple Charge Pulse Oximetry - Multiple   Pulse 62   Resp 17   Incentive Spirometer   $ Incentive Spirometer Charges done with encouragement   Administration (IS) proper technique demonstrated   Number of Repetitions (IS) 10   Level Incentive Spirometer (mL) 1750   Patient Tolerance (IS) good

## 2024-02-18 NOTE — ASSESSMENT & PLAN NOTE
ESBL E coli. In setting of urinary retention.  -Rodriguez  -Meropenem through 2/17 completed  -Fosfomycin prophylaxis starting 2/18, to be given every 10 days  -ID  -Urology; patient deferred further workup at this time; will need outpatient follow up

## 2024-02-18 NOTE — PLAN OF CARE
POC reviewed verbalizes understanding VSS afebrile denies pain this shift no acute distress noted tolerated PT and OT well Pt is being treated for UTI with culture positive for enterococcus  VRE Urinalysis remains positive. She is placed in observation for  monitoring and treatment.

## 2024-02-19 VITALS
BODY MASS INDEX: 25.22 KG/M2 | RESPIRATION RATE: 17 BRPM | HEART RATE: 61 BPM | HEIGHT: 66 IN | DIASTOLIC BLOOD PRESSURE: 59 MMHG | SYSTOLIC BLOOD PRESSURE: 125 MMHG | TEMPERATURE: 97 F | OXYGEN SATURATION: 94 % | WEIGHT: 156.94 LBS

## 2024-02-19 LAB
ALBUMIN SERPL BCP-MCNC: 2.7 G/DL (ref 3.5–5.2)
ALP SERPL-CCNC: 83 U/L (ref 55–135)
ALT SERPL W/O P-5'-P-CCNC: 11 U/L (ref 10–44)
ANION GAP SERPL CALC-SCNC: 8 MMOL/L (ref 8–16)
AST SERPL-CCNC: 15 U/L (ref 10–40)
BASOPHILS # BLD AUTO: 0.02 K/UL (ref 0–0.2)
BASOPHILS NFR BLD: 0.4 % (ref 0–1.9)
BILIRUB SERPL-MCNC: 0.6 MG/DL (ref 0.1–1)
BUN SERPL-MCNC: 18 MG/DL (ref 8–23)
CALCIUM SERPL-MCNC: 8.4 MG/DL (ref 8.7–10.5)
CHLORIDE SERPL-SCNC: 106 MMOL/L (ref 95–110)
CO2 SERPL-SCNC: 24 MMOL/L (ref 23–29)
CREAT SERPL-MCNC: 1 MG/DL (ref 0.5–1.4)
DIFFERENTIAL METHOD BLD: ABNORMAL
EOSINOPHIL # BLD AUTO: 0.3 K/UL (ref 0–0.5)
EOSINOPHIL NFR BLD: 6.8 % (ref 0–8)
ERYTHROCYTE [DISTWIDTH] IN BLOOD BY AUTOMATED COUNT: 15.3 % (ref 11.5–14.5)
EST. GFR  (NO RACE VARIABLE): 56 ML/MIN/1.73 M^2
GLUCOSE SERPL-MCNC: 81 MG/DL (ref 70–110)
HCT VFR BLD AUTO: 22.3 % (ref 37–48.5)
HGB BLD-MCNC: 7.1 G/DL (ref 12–16)
IMM GRANULOCYTES # BLD AUTO: 0.01 K/UL (ref 0–0.04)
IMM GRANULOCYTES NFR BLD AUTO: 0.2 % (ref 0–0.5)
LYMPHOCYTES # BLD AUTO: 0.8 K/UL (ref 1–4.8)
LYMPHOCYTES NFR BLD: 16.3 % (ref 18–48)
MAGNESIUM SERPL-MCNC: 2 MG/DL (ref 1.6–2.6)
MCH RBC QN AUTO: 30 PG (ref 27–31)
MCHC RBC AUTO-ENTMCNC: 31.8 G/DL (ref 32–36)
MCV RBC AUTO: 94 FL (ref 82–98)
MONOCYTES # BLD AUTO: 0.7 K/UL (ref 0.3–1)
MONOCYTES NFR BLD: 15.4 % (ref 4–15)
NEUTROPHILS # BLD AUTO: 2.9 K/UL (ref 1.8–7.7)
NEUTROPHILS NFR BLD: 60.9 % (ref 38–73)
NRBC BLD-RTO: 0 /100 WBC
PHOSPHATE SERPL-MCNC: 3 MG/DL (ref 2.7–4.5)
PLATELET # BLD AUTO: 105 K/UL (ref 150–450)
PMV BLD AUTO: 10.8 FL (ref 9.2–12.9)
POTASSIUM SERPL-SCNC: 4.5 MMOL/L (ref 3.5–5.1)
PROT SERPL-MCNC: 4.9 G/DL (ref 6–8.4)
RBC # BLD AUTO: 2.37 M/UL (ref 4–5.4)
SODIUM SERPL-SCNC: 138 MMOL/L (ref 136–145)
WBC # BLD AUTO: 4.73 K/UL (ref 3.9–12.7)

## 2024-02-19 PROCEDURE — 25000003 PHARM REV CODE 250: Performed by: HOSPITALIST

## 2024-02-19 PROCEDURE — 94761 N-INVAS EAR/PLS OXIMETRY MLT: CPT

## 2024-02-19 PROCEDURE — 97530 THERAPEUTIC ACTIVITIES: CPT | Mod: CQ

## 2024-02-19 PROCEDURE — 36415 COLL VENOUS BLD VENIPUNCTURE: CPT | Performed by: NURSE PRACTITIONER

## 2024-02-19 PROCEDURE — 25000003 PHARM REV CODE 250: Performed by: STUDENT IN AN ORGANIZED HEALTH CARE EDUCATION/TRAINING PROGRAM

## 2024-02-19 PROCEDURE — 94799 UNLISTED PULMONARY SVC/PX: CPT

## 2024-02-19 PROCEDURE — 27000221 HC OXYGEN, UP TO 24 HOURS

## 2024-02-19 PROCEDURE — 99900031 HC PATIENT EDUCATION (STAT)

## 2024-02-19 PROCEDURE — 84100 ASSAY OF PHOSPHORUS: CPT | Performed by: NURSE PRACTITIONER

## 2024-02-19 PROCEDURE — 85025 COMPLETE CBC W/AUTO DIFF WBC: CPT | Performed by: NURSE PRACTITIONER

## 2024-02-19 PROCEDURE — 97116 GAIT TRAINING THERAPY: CPT | Mod: CQ

## 2024-02-19 PROCEDURE — 83735 ASSAY OF MAGNESIUM: CPT | Performed by: NURSE PRACTITIONER

## 2024-02-19 PROCEDURE — 99900035 HC TECH TIME PER 15 MIN (STAT)

## 2024-02-19 PROCEDURE — 80053 COMPREHEN METABOLIC PANEL: CPT | Performed by: NURSE PRACTITIONER

## 2024-02-19 PROCEDURE — 25000003 PHARM REV CODE 250: Performed by: NURSE PRACTITIONER

## 2024-02-19 RX ORDER — AMLODIPINE BESYLATE 10 MG/1
10 TABLET ORAL DAILY
Qty: 30 TABLET | Refills: 11 | Status: SHIPPED | OUTPATIENT
Start: 2024-02-20 | End: 2025-02-19

## 2024-02-19 RX ORDER — GRANULES FOR ORAL 3 G/1
3 POWDER ORAL
Qty: 3 PACKET | Refills: 2 | Status: SHIPPED | OUTPATIENT
Start: 2024-02-27

## 2024-02-19 RX ORDER — CARVEDILOL 6.25 MG/1
6.25 TABLET ORAL 2 TIMES DAILY
Qty: 60 TABLET | Refills: 11 | Status: SHIPPED | OUTPATIENT
Start: 2024-02-19 | End: 2025-02-18

## 2024-02-19 RX ADMIN — CARVEDILOL 6.25 MG: 6.25 TABLET, FILM COATED ORAL at 08:02

## 2024-02-19 RX ADMIN — LACTOBACILLUS TAB 4 TABLET: TAB at 08:02

## 2024-02-19 RX ADMIN — FAMOTIDINE 20 MG: 20 TABLET, FILM COATED ORAL at 08:02

## 2024-02-19 RX ADMIN — DOCUSATE SODIUM 100 MG: 100 CAPSULE, LIQUID FILLED ORAL at 08:02

## 2024-02-19 RX ADMIN — AMIODARONE HYDROCHLORIDE 200 MG: 100 TABLET ORAL at 08:02

## 2024-02-19 RX ADMIN — APIXABAN 5 MG: 2.5 TABLET, FILM COATED ORAL at 08:02

## 2024-02-19 RX ADMIN — AMLODIPINE BESYLATE 10 MG: 5 TABLET ORAL at 08:02

## 2024-02-19 NOTE — PLAN OF CARE
Patient cleared for discharge from case management standpoint.    Per [facility] [phone], patient's nurse can call report to [facility] will provide transportation for patient.       02/19/24 1442   Final Note   Assessment Type Final Discharge Note   Anticipated Discharge Disposition    Hospital Resources/Appts/Education Provided Provided patient/caregiver with written discharge plan information;Provided education on problems/symptoms using teachback;Appointments scheduled and added to AVS;Post-Acute resouces added to AVS   Post-Acute Status   Post-Acute Placement Status Set-up Complete/Auth obtained   Discharge Delays (!) Ambulance Transport/Facility Transport

## 2024-02-19 NOTE — PROGRESS NOTES
[facility] Medicine  Progress Note    Patient Name: Irene العراقي  MRN: [id number]  Patient Class: IP- Inpatient   Admission Date: 2/8/2024  Length of Stay: 8 days  Attending Physician: Madisyn Green MD  Primary Care Provider: Wanda Kuhn FNP-C        Subjective:     Principal Problem:Recurrent UTI          HPI:  Irene العراقي is an 84 y/o F with past medical history significant for anemia, afib, CKD and HTN who presented to the ED from [facility] for further treatment of abnormal labs.  Per report, her platelets have been taking a downward trend and are 47K today.  She also has a hemoglobin of 7.5 with no obvious source of blood loss.  Pt denies recent fevers, chest pain, dizziness, melena or hematochezia.  Denies N/V but is having some mild generalized abdominal tenderness.  CT abdomen and pelvis obtained shows nonspecific gastric wall thickening which could be consistent with gastritis or peptic ulcer disease.  The ED physician discussed the lab findings with the hematologist on call who recommends admission and transfusion of PRBCs.  Pt presented from [facility] with araya catheter in place.  She reports urinary retention and states the araya has been in place for about a week.  Araya catheter was exchanged in the ED.  Pt is currently being treated for UTI with culture positive for enterococcus faecium VRE susceptible to linezolid.  Urinalysis today remains positive. She is placed in observation under the service of hospital medicine for continued monitoring and treatment.       Overview/Hospital Course:  Irene العراقي is an 83 year old female with a past medical history of Afib, CKD, HTN, anemia and thrombocytopenia who presented with worsening anemia and thrombocytopenia in the setting of linezolid use for Enterococcus UTI. There was no evidence of gross bleeding and she was transfused a unit of PRBCs. The Hgb is stable and her platelets are improving as linezolid was  discontinued. Hematology was consulted. Repeat urine culture shows ESBL E coli for which the patient is on meropenem through 2/17. ID has been consulted. A midline is in place. A Rodriguez is in place for urinary retention. Urology has been consulted; the patient has deferred further urologic workup at this time and may follow up with Dr. Zavala in the outpatient setting. The patient is pending SNF placement at this time.    Interval History: Patient seen and examined.  Pharmacist clarified with Infectious Disease Dr. Leigh and the fosfomycin is to start 10 days after stopping the meropenem.  Case management making sure skilled nursing facility can give the fosfomycin.  Patient feeling well today.  Hemoglobin is 7.1.  Review of Systems   Constitutional:  Negative for fatigue and fever.   Skin:  Positive for pallor.     Objective:     Vital Signs (Most Recent):  Temp: 97.5 °F (36.4 °C) (02/19/24 0900)  Pulse: 65 (02/19/24 0900)  Resp: 17 (02/19/24 0900)  BP: (!) 149/66 (02/19/24 0900)  SpO2: 95 % (02/19/24 0900) Vital Signs (24h Range):  Temp:  [97.5 °F (36.4 °C)-97.7 °F (36.5 °C)] 97.5 °F (36.4 °C)  Pulse:  [62-68] 65  Resp:  [16-18] 17  SpO2:  [92 %-96 %] 95 %  BP: (113-158)/(54-71) 149/66     Weight: 71.2 kg (156 lb 15.5 oz)  Body mass index is 25.34 kg/m².    Intake/Output Summary (Last 24 hours) at 2/19/2024 1043  Last data filed at 2/19/2024 0730  Gross per 24 hour   Intake 780 ml   Output 1300 ml   Net -520 ml           Physical Exam  Vitals and nursing note reviewed.   Constitutional:       General: She is not in acute distress.  HENT:      Head: Normocephalic and atraumatic.      Right Ear: External ear normal.      Left Ear: External ear normal.      Nose: Nose normal.      Mouth/Throat:      Pharynx: Oropharynx is clear.   Eyes:      Extraocular Movements: Extraocular movements intact.      Conjunctiva/sclera: Conjunctivae normal.   Cardiovascular:      Rate and Rhythm: Normal rate and regular rhythm.       Pulses: Normal pulses.      Heart sounds: Normal heart sounds.   Pulmonary:      Effort: Pulmonary effort is normal.      Breath sounds: Normal breath sounds.   Abdominal:      General: Bowel sounds are normal. There is no distension.      Palpations: Abdomen is soft.   Genitourinary:     Comments: Rodriguez.  Musculoskeletal:         General: Normal range of motion.      Cervical back: Normal range of motion and neck supple.      Right lower leg: No edema.      Left lower leg: No edema.   Skin:     General: Skin is warm and dry.      Coloration: Skin is pale.   Neurological:      Mental Status: She is alert. Mental status is at baseline.   Psychiatric:         Mood and Affect: Mood normal.         Behavior: Behavior normal.             Significant Labs: All pertinent labs within the past 24 hours have been reviewed.    Significant Imaging: I have reviewed all pertinent imaging results/findings within the past 24 hours.    Assessment/Plan:      * Recurrent UTI  ESBL E coli. In setting of urinary retention.  -Rodriguez  -Meropenem through 2/17 completed  -Fosfomycin prophylaxis starting 2/27, to be given every 10 days  -ID  -Urology; patient deferred further workup at this time; will need outpatient follow up          Urinary retention  -Rodriguez changed on 2/9  -Urology consulted; patient deferred further workup at this time; may follow up outpatient    S/P TAVR (transcatheter aortic valve replacement)  Stable.  -Telemetry  -Coreg      NYHA class 3 heart failure with preserved ejection fraction  -Coreg  -Telemetry      Stage 3b chronic kidney disease  Stable.  -Renally dose medications  -Avoid nephrotoxic agents    Hypertension  -Continue Coreg  -Continue to monitor    Anemia  Stable. Appears to be secondary to chronic disease.  -S/p one unit PRBCs  -Trend Hgb with CBC  -Hematology following  -Continue home Eliquis     PAF (paroxysmal atrial  fibrillation)  -Telemetry  -Coreg  -Amiodarone  -Eliquis    Thrombocytopenia  Improving.  -Discontinued linezolid  -Hematology follow up; likely BMBx once UTI treated  -Trend platelets with CBC          VTE Risk Mitigation (From admission, onward)           Ordered     apixaban tablet 5 mg  2 times daily         02/13/24 1148     IP VTE HIGH RISK PATIENT  Once         02/08/24 1916     Place sequential compression device  Until discontinued         02/08/24 1916     Reason for No Pharmacological VTE Prophylaxis  Once        Question Answer Comment   Reasons: Already adequately anticoagulated on oral Anticoagulants    Reasons: Active Bleeding        02/08/24 1916                    Discharge Planning   YANET: 2/20/2024     Code Status: Full Code   Is the patient medically ready for discharge?:     Reason for patient still in hospital (select all that apply): Patient trending condition and Treatment  Discharge Plan A: Skilled Nursing Facility                  Madisyn Green MD  Department of Hospital Medicine   [facility]/Surg

## 2024-02-19 NOTE — PT/OT/SLP PROGRESS
Physical Therapy Treatment    Patient Name:  Irene العراقي   MRN:  [id number]    Recommendations:     Discharge Recommendations: Moderate Intensity Therapy  Discharge Equipment Recommendations: none  Barriers to discharge: None    Assessment:     Irene العراقي is a 83 y.o. female admitted with a medical diagnosis of Recurrent UTI.  She presents with the following impairments/functional limitations: weakness, impaired endurance, impaired self care skills, impaired functional mobility, gait instability, decreased lower extremity function, pain, impaired cardiopulmonary response to activity, orthopedic precautions . Required 2 attempts, requested later after finishing breakfast first attempt. Supine in bed taking phone call from [facility] per patient.  Agreed to participate in therapy.  Requested diaper change hoping to have had a BM ( no success).  Rolled R<>L with CGA.  CAM walker RLE, sock and tennis shoe applied L foot.  Sup > sit with Min A. Sit > stand with rw and Min A.  Ambulated 30' with rw and Min A for safety.     Rehab Prognosis: Good; patient would benefit from acute skilled PT services to address these deficits and reach maximum level of function.    Recent Surgery: * No surgery found *      Plan:     During this hospitalization, patient to be seen 6 x/week to address the identified rehab impairments via gait training, therapeutic activities, therapeutic exercises and progress toward the following goals:    Plan of Care Expires:  02/29/24    Subjective     Chief Complaint: lack of BM  Patient/Family Comments/goals: to go to facility for therapy.   Pain/Comfort:  Pain Rating 1: other (see comments) (did not rate)  Location 1:  (sacral area, has hemmorrhoid)      Objective:     Communicated with nurse Washburn prior to session.  Patient found supine with bed alarm, araya catheter, oxygen, telemetry upon PT entry to room.     General Precautions: Standard, contact, fall, respiratory  Orthopedic Precautions: RLE  partial weight bearing  Braces:  (CAM walker boot)  Respiratory Status: Nasal cannula, flow 2 L/min     Functional Mobility:  Bed Mobility:     Rolling Left:  contact guard assistance  Rolling Right: contact guard assistance  Supine to Sit: minimum assistance  Transfers:     Sit to Stand:  minimum assistance with rolling walker  Gait: 30' with rw and Min A.      AM-PAC 6 CLICK MOBILITY          Treatment & Education:  Ambulated slowly with rw and Min A .     Patient left up in chair with all lines intact, call button in reach, chair alarm on, and nurse Wu notified..    GOALS:   Multidisciplinary Problems       Physical Therapy Goals          Problem: Physical Therapy    Goal Priority Disciplines Outcome Goal Variances Interventions   Physical Therapy Goal     PT, PT/OT Ongoing, Progressing     Description: Goals to be met by: 2024     Patient will increase functional independence with mobility by performin. Supine to sit with MInimal Assistance  2. Sit to stand transfer with Minimal Assistance  3. Bed to chair transfer with Minimal Assistance using Rolling Walker  4. Gait  x 50 feet with Minimal Assistance using Rolling Walker.   5. Lower extremity exercise program x20 reps     Hx of R ankle fx 2023- mando boot                         Time Tracking:     PT Received On: 24  PT Start Time: 1035     PT Stop Time: 1059  PT Total Time (min): 24 min     Billable Minutes: Gait Training 12min and Therapeutic Activity 12min    Treatment Type: Treatment  PT/PTA: PTA     Number of PTA visits since last PT visit: 4     2024

## 2024-02-19 NOTE — ASSESSMENT & PLAN NOTE
ESBL E coli. In setting of urinary retention.  -Rodriguez  -Meropenem through 2/17 completed  -Fosfomycin prophylaxis starting 2/27, to be given every 10 days  -ID  -Urology; patient deferred further workup at this time; will need outpatient follow up

## 2024-02-19 NOTE — SUBJECTIVE & OBJECTIVE
Interval History: Patient seen and examined.  Pharmacist clarified with Infectious Disease Dr. Leigh and the fosfomycin is to start 10 days after stopping the meropenem.  Case management making sure skilled nursing facility can give the fosfomycin.  Patient feeling well today.  Hemoglobin is 7.1.  Review of Systems   Constitutional:  Negative for fatigue and fever.   Skin:  Positive for pallor.     Objective:     Vital Signs (Most Recent):  Temp: 97.5 °F (36.4 °C) (02/19/24 0900)  Pulse: 65 (02/19/24 0900)  Resp: 17 (02/19/24 0900)  BP: (!) 149/66 (02/19/24 0900)  SpO2: 95 % (02/19/24 0900) Vital Signs (24h Range):  Temp:  [97.5 °F (36.4 °C)-97.7 °F (36.5 °C)] 97.5 °F (36.4 °C)  Pulse:  [62-68] 65  Resp:  [16-18] 17  SpO2:  [92 %-96 %] 95 %  BP: (113-158)/(54-71) 149/66     Weight: 71.2 kg (156 lb 15.5 oz)  Body mass index is 25.34 kg/m².    Intake/Output Summary (Last 24 hours) at 2/19/2024 1043  Last data filed at 2/19/2024 0730  Gross per 24 hour   Intake 780 ml   Output 1300 ml   Net -520 ml           Physical Exam  Vitals and nursing note reviewed.   Constitutional:       General: She is not in acute distress.  HENT:      Head: Normocephalic and atraumatic.      Right Ear: External ear normal.      Left Ear: External ear normal.      Nose: Nose normal.      Mouth/Throat:      Pharynx: Oropharynx is clear.   Eyes:      Extraocular Movements: Extraocular movements intact.      Conjunctiva/sclera: Conjunctivae normal.   Cardiovascular:      Rate and Rhythm: Normal rate and regular rhythm.      Pulses: Normal pulses.      Heart sounds: Normal heart sounds.   Pulmonary:      Effort: Pulmonary effort is normal.      Breath sounds: Normal breath sounds.   Abdominal:      General: Bowel sounds are normal. There is no distension.      Palpations: Abdomen is soft.   Genitourinary:     Comments: Rodriguez.  Musculoskeletal:         General: Normal range of motion.      Cervical back: Normal range of motion and neck supple.       Right lower leg: No edema.      Left lower leg: No edema.   Skin:     General: Skin is warm and dry.      Coloration: Skin is pale.   Neurological:      Mental Status: She is alert. Mental status is at baseline.   Psychiatric:         Mood and Affect: Mood normal.         Behavior: Behavior normal.             Significant Labs: All pertinent labs within the past 24 hours have been reviewed.    Significant Imaging: I have reviewed all pertinent imaging results/findings within the past 24 hours.

## 2024-02-19 NOTE — CARE UPDATE
02/19/24 0735   Patient Assessment/Suction   Level of Consciousness (AVPU) alert   Respiratory Effort Normal;Unlabored   Expansion/Accessory Muscles/Retractions no use of accessory muscles;no retractions;expansion symmetric   All Lung Fields Breath Sounds Anterior:;Lateral:;clear   Rhythm/Pattern, Respiratory no shortness of breath reported;depth regular;pattern regular;unlabored   Cough Frequency no cough   PRE-TX-O2   Device (Oxygen Therapy) nasal cannula   $ Is the patient on Low Flow Oxygen? Yes   Flow (L/min) 2   SpO2 (!) 93 %   Pulse Oximetry Type Intermittent   $ Pulse Oximetry - Multiple Charge Pulse Oximetry - Multiple   Pulse 62   Resp 16   Positioning HOB elevated 30 degrees   Aerosol Therapy   $ Aerosol Therapy Charges PRN treatment not required   Education   $ Education Bronchodilator;15 min

## 2024-02-19 NOTE — PLAN OF CARE
Received phone call from Sally with [facility], auth for SNF was approved. DC orders requested.      02/19/24 1138   Post-Acute Status   Post-Acute Authorization Placement   Post-Acute Placement Status Pending post-acute provider review/more information requested

## 2024-02-19 NOTE — PLAN OF CARE
KARRIE sent patient information to [facility] via [employer] system.  KARRIE spoke to Alexa with [facility] who stated they will review and return call for report.       02/19/24 1218   Post-Acute Status   Post-Acute Authorization Placement   Post-Acute Placement Status Pending post-acute provider review/more information requested

## 2024-02-19 NOTE — DISCHARGE INSTRUCTIONS
[facility]  Facility Transfer Orders        Admit to: SNF    Diagnoses:   Active Hospital Problems    Diagnosis  POA    *Recurrent UTI [N39.0]  Yes    Urinary retention [R33.9]  Yes    NYHA class 3 heart failure with preserved ejection fraction [I50.30]  Yes    S/P TAVR (transcatheter aortic valve replacement) [Z95.2]  Not Applicable    Stage 3b chronic kidney disease [N18.32]  Yes    Hypertension [I10]  Yes    Anemia [D64.9]  Yes    PAF (paroxysmal atrial fibrillation) [I48.0]  Yes    Thrombocytopenia [D69.6]  Yes      Resolved Hospital Problems    Diagnosis Date Resolved POA    Hypophosphatemia [E83.39] 02/15/2024 Yes    Hypokalemia [E87.6] 02/13/2024 Yes     Allergies:   Review of patient's allergies indicates:   Allergen Reactions    Cefuroxime     Hydrocodone     Meperidine     Meperidine hcl Nausea And Vomiting    Persantine [dipyridamole]     Nitrofurantoin macrocrystal      Headache , went into Afib     Vicodin [hydrocodone-acetaminophen] Nausea And Vomiting       Code Status: Full    Vitals: Routine       Diet: regular diet    Activity: Activity as tolerated    Nursing Precautions: Aspiration , Fall, Seizure, and Pressure ulcer prevention    Bed/Surface: Low Air Loss    Consults: PT to evaluate and treat- 5 times a week and OT to evaluate and treat- 5 times a week    Oxygen: room air    Dialysis: Patient is not on dialysis.     Labs: CBC, CMP, CRP x 3 weeks then change to monthly, fax results to Dr. Leigh at [phone]  Pending Diagnostic Studies:       Procedure Component Value Units Date/Time    Pathologist Interpretation Differential [[id number]] Collected: 02/09/24 1631    Order Status: Sent Lab Status: In process Updated: 02/09/24 1632    Specimen: Blood     Soluble Transferrin Receptor [[id number]] Collected: 02/16/24 0411    Order Status: Sent Lab Status: In process Updated: 02/16/24 1510    Specimen: Blood           Imaging: none    Miscellaneous Care:   Rodriguez Care: Empty  Rodriguez bag every shift.  Change Rodriguez every month    IV Access: Peripheral     Medications: Discontinue all previous medication orders, if any. See new list below.  Current Discharge Medication List        START taking these medications    Details   amLODIPine (NORVASC) 10 MG tablet Take 1 tablet (10 mg total) by mouth once daily.  Qty: 30 tablet, Refills: 11    Comments: .      carvediloL (COREG) 6.25 MG tablet Take 1 tablet (6.25 mg total) by mouth 2 (two) times daily.  Qty: 60 tablet, Refills: 11    Comments: .      fosfomycin (MONUROL) 3 gram Pack Take 3 g by mouth every 10 days.  Qty: 3 packet, Refills: 2           CONTINUE these medications which have CHANGED    Details   apixaban (ELIQUIS) 5 mg Tab Take 1 tablet (5 mg total) by mouth 2 (two) times daily.  Qty: 60 tablet, Refills: 2           CONTINUE these medications which have NOT CHANGED    Details   amiodarone (PACERONE) 200 MG Tab Take 1 tablet (200 mg total) by mouth 2 (two) times daily.  Qty: 60 tablet, Refills: 1      docusate sodium (COLACE) 100 MG capsule Take 1 capsule (100 mg total) by mouth 2 (two) times daily.  Qty: 60 capsule, Refills: 5      famotidine (PEPCID) 20 MG tablet Take 1 tablet (20 mg total) by mouth once daily.  Qty: 30 tablet, Refills: 11      magnesium oxide (MAG-OX) 400 mg (241.3 mg magnesium) tablet TAKE 1 TABLET BY MOUTH ONCE DAILY  Qty: 90 tablet, Refills: 3    Associated Diagnoses: Paroxysmal atrial fibrillation           STOP taking these medications       ertapenem (INVANZ) 1 gram injection Comments:   Reason for Stopping:         hydrocortisone (ANUSOL-HC) 2.5 % rectal cream Comments:   Reason for Stopping:         iron ag,[id number] (NIFEREX, SUMALATE-QUATREFOLIC,) 150 mg iron- 60 mg-1 mg Tab Comments:   Reason for Stopping:         linezolid (ZYVOX) 600 mg/300 mL PgBk Comments:   Reason for Stopping:         meclizine (ANTIVERT) 50 MG tablet Comments:   Reason for Stopping:         midodrine (PROAMATINE) 5 MG  Tab Comments:   Reason for Stopping:         MIRALAX 17 gram PwPk Comments:   Reason for Stopping:         ondansetron (ZOFRAN-ODT) 8 MG TbDL Comments:   Reason for Stopping:             Follow up:    Follow-up Information       Wanda Kuhn, DEBORAHP-C Follow up.    Specialty: Family Medicine  Contact information:  [address]  [address]  [place] [address]  [phone]               Jennifer Brooks MD Follow up.    Specialty: Infectious Diseases  Contact information:  [address]  [address]  [place] [address]  [phone]               Salome Zavala Jr., MD Follow up.    Specialty: Urology  Contact information:  [address] [place] [address]  [address]  [place] [address] [phone]               Callie Capone MD .    Specialties: Hematology and Oncology, Hematology, Oncology  Contact information:  [address]  [address]  [place] [address] [phone]                               Immunizations Administered as of 2/19/2024       Name Date Dose VIS Date Route Exp Date    COVID-19, MRNA, LN-S, PF (Pfizer) (Purple Cap) 8/20/2021 0.3 mL 5/10/2021 Intramuscular --    Site: Left deltoid     : [employer]     Lot: [id number]     Comment: Adminis     COVID-19, MRNA, LN-S, PF (Pfizer) (Purple Cap) 7/31/2021 0.3 mL 5/10/2021 Intramuscular --    Site: Left deltoid     : [employer]     Lot: [id number]     Comment: Adminis                 Madisyn Green MD

## 2024-02-20 LAB — STFR SERPL-MCNC: 2.5 MG/L (ref 1.8–4.6)

## 2024-02-20 NOTE — DISCHARGE SUMMARY
[facility] Medicine  Discharge Summary      Patient Name: Irene العراقي  MRN: [id number]  [facility]: [id number]  Patient Class: IP- Inpatient  Admission Date: 2/8/2024  Hospital Length of Stay: 8 days  Discharge Date and Time: 2/19/2024  4:15 PM  Attending Physician: No att. providers found   Discharging Provider: Madisyn Green MD  Primary Care Provider: Wanda Kuhn, JOY-CARYN    Primary Care Team: Networked reference to record PCT     HPI:   Irene العراقي is an 82 y/o F with past medical history significant for anemia, afib, CKD and HTN who presented to the ED from [facility] for further treatment of abnormal labs.  Per report, her platelets have been taking a downward trend and are 47K today.  She also has a hemoglobin of 7.5 with no obvious source of blood loss.  Pt denies recent fevers, chest pain, dizziness, melena or hematochezia.  Denies N/V but is having some mild generalized abdominal tenderness.  CT abdomen and pelvis obtained shows nonspecific gastric wall thickening which could be consistent with gastritis or peptic ulcer disease.  The ED physician discussed the lab findings with the hematologist on call who recommends admission and transfusion of PRBCs.  Pt presented from [facility] with araya catheter in place.  She reports urinary retention and states the araya has been in place for about a week.  Araya catheter was exchanged in the ED.  Pt is currently being treated for UTI with culture positive for enterococcus faecium VRE susceptible to linezolid.  Urinalysis today remains positive. She is placed in observation under the service of hospital medicine for continued monitoring and treatment.       * No surgery found *      Hospital Course:   Irene العراقي is an 83 year old female with a past medical history of Afib, CKD, HTN, anemia and thrombocytopenia who presented with worsening anemia and thrombocytopenia in the setting of linezolid use for Enterococcus UTI. There was no  evidence of gross bleeding and she was transfused a unit of PRBCs. The Hgb is stable and her platelets improved as linezolid was discontinued. Hematology was consulted. Repeat urine culture showed ESBL E coli for which the patient was treated with meropenem through 2/17. ID has been consulted. A midline is in place. A Rodriguez is in place for urinary retention. Urology has been consulted; the patient has deferred further urologic workup at this time and may follow up with Dr. Zavala in the outpatient setting.  Patient was discharged to skilled nursing facility.  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  This was clarified with ID by pharmacist on day of discharge.  Patient will follow-up with ID, Urology, PCP.     Goals of Care Treatment Preferences:  Code Status: Full Code      Consults:   Consults (From admission, onward)          Status Ordering Provider     Inpatient consult to Social Work/Case Management  Once        Provider:  (Not yet assigned)    Completed DANIELLE RODRIGUEZ     Inpatient consult to Social Work/Case Management  Once        Provider:  (Not yet assigned)    Completed BIMAL JOYCE     Inpatient consult to Urology  Once        Provider:  Salome Zavala Jr., MD    Completed SHARLENE NUNEZ     Inpatient consult to Infectious Diseases  Once        Provider:  Sharlene Nunez MD    Completed KASSY MONTEMAYOR     Inpatient virtual consult to Hospital Medicine  Once        Provider:  (Not yet assigned)    Completed BIMAL JOYCE     Case Management/  Once        Provider:  (Not yet assigned)    Completed CARMEN MICHEL     Inpatient consult to Hematology/Oncology  Once        Provider:  Callie Capone MD    Completed CARMEN MICHEL     Inpatient consult to Registered Dietitian/Nutritionist  Once        Provider:  (Not yet assigned)     Completed CELESTINE ONEILL            No new Assessment & Plan notes have been filed under this hospital service since the last note was generated.  Service: Hospital Medicine    Final Active Diagnoses:    Diagnosis Date Noted POA    PRINCIPAL PROBLEM:  Recurrent UTI [N39.0] 12/12/2023 Yes    Urinary retention [R33.9] 02/08/2024 Yes    NYHA class 3 heart failure with preserved ejection fraction [I50.30] 11/01/2023 Yes    S/P TAVR (transcatheter aortic valve replacement) [Z95.2] 11/01/2023 Not Applicable    Stage 3b chronic kidney disease [N18.32] 09/28/2023 Yes    Hypertension [I10] 02/21/2022 Yes    Anemia [D64.9] 01/28/2021 Yes    PAF (paroxysmal atrial fibrillation) [I48.0] 01/28/2021 Yes    Thrombocytopenia [D69.6] 01/09/2021 Yes      Problems Resolved During this Admission:    Diagnosis Date Noted Date Resolved POA    Hypophosphatemia [E83.39] 02/11/2024 02/15/2024 Yes    Hypokalemia [E87.6] 01/09/2021 02/13/2024 Yes       Discharged Condition: good    Disposition: Skilled Nursing Facility    Follow Up:   Follow-up Information       Wanda Kuhn, DEBORAHP-C Follow up.    Specialty: Family Medicine  Contact information:  [address] [facility]  [address]  [place] [address]  [phone]               Jennifer Brooks MD Follow up.    Specialty: Infectious Diseases  Contact information:  [address] [facility]  [address]  [place] [address]  [phone]               Salome Zavala Jr., MD Follow up.    Specialty: Urology  Contact information:  [address] [place] [address] [address]  [address]  [place] [address]  [phone]               Callie Capone MD .    Specialties: Hematology and Oncology, Hematology, Oncology  Contact information:  [address] [facility]  [address]  [place] [address]  [phone]               Rehabilitation, [place] [facility] Follow up.    Specialties: SNF Agency, Nursing Home Agency  Why: SNF  Contact information:  [address]  [place] [address]  [phone]                           Patient  Instructions:      CBC Auto Differential   Standing Status: Standing Number of Occurrences: 12 Standing Exp. Date: 04/15/25     Comprehensive Metabolic Panel   Standing Status: Standing Number of Occurrences: 12 Standing Exp. Date: 04/15/25     C-Reactive Protein   Standing Status: Standing Number of Occurrences: 12 Standing Exp. Date: 04/15/25     Activity as tolerated       Significant Diagnostic Studies: Labs: BMP:   Recent Labs   Lab 02/19/24  0434   GLU 81      K 4.5      CO2 24   BUN 18   CREATININE 1.0   CALCIUM 8.4*   MG 2.0    and CBC   Recent Labs   Lab 02/19/24  0434   WBC 4.73   HGB 7.1*   HCT 22.3*   *     Procedure Component Value Units Date/Time   MRSA Screen by PCR [[id number]] Collected: 02/11/24 1952   Order Status: Completed Specimen: Nasopharyngeal Swab from Nasal Updated: 02/12/24 1046    MRSA SCREEN BY PCR Negative   Culture, Respiratory with Gram Stain [[id number]]    Order Status: Canceled Specimen: Respiratory from Sputum, Expectorated    Blood culture #2 **CANNOT BE ORDERED STAT** [[id number]] Collected: 02/08/24 1618   Order Status: Completed Specimen: Blood from Peripheral, Antecubital, Right Updated: 02/13/24 2032    Blood Culture, Routine No growth after 5 days.   Blood culture #1 **CANNOT BE ORDERED STAT** [[id number]] Collected: 02/08/24 1530   Order Status: Completed Specimen: Blood from Peripheral, Antecubital, Right Updated: 02/13/24 2032    Blood Culture, Routine No growth after 5 days.   Urine culture [[id number]] (Abnormal)  Collected: 02/08/24 1437   Order Status: Completed Specimen: Urine Updated: 02/11/24 0616    Urine Culture, Routine ESCHERICHIA COLI ESBL  >100,000 cfu/ml   Abnormal    Narrative:     Specimen Source->Urine   Susceptibility     Escherichia coli ESBL     CULTURE, URINE     Amp/Sulbactam 16/8 mcg/mL Intermediate     Ampicillin >16 mcg/mL Resistant     Cefazolin >16 mcg/mL Resistant     Cefepime >16 mcg/mL Resistant     Ceftriaxone >32 mcg/mL  Resistant     Ciprofloxacin >2 mcg/mL Resistant     Ertapenem <=0.5 mcg/mL Sensitive     Gentamicin <=4 mcg/mL Sensitive     Levofloxacin >4 mcg/mL Resistant     Meropenem <=1 mcg/mL Sensitive     Nitrofurantoin <=32 mcg/mL Sensitive     Piperacillin/Tazo <=16 mcg/mL Sensitive     Tetracycline >8 mcg/mL Resistant     Tobramycin <=4 mcg/mL Sensitive     Trimeth/Sulfa >2/38 mcg/mL Resistant               Linear View             Pending Diagnostic Studies:       Procedure Component Value Units Date/Time    Pathologist Interpretation Differential [[id number]] Collected: 02/09/24 1631    Order Status: Sent Lab Status: In process Updated: 02/09/24 1632    Specimen: Blood            Medications:  Reconciled Home Medications:      Medication List        START taking these medications      amLODIPine 10 MG tablet  Commonly known as: NORVASC  Take 1 tablet (10 mg total) by mouth once daily.     carvediloL 6.25 MG tablet  Commonly known as: COREG  Take 1 tablet (6.25 mg total) by mouth 2 (two) times daily.     fosfomycin 3 gram Pack  Commonly known as: MONUROL  Take 3 g by mouth every 10 days.  Start taking on: February 27, 2024            CHANGE how you take these medications      apixaban 5 mg Tab  Commonly known as: ELIQUIS  Take 1 tablet (5 mg total) by mouth 2 (two) times daily.  What changed:   medication strength  how much to take     magnesium oxide 400 mg (241.3 mg magnesium) tablet  Commonly known as: MAG-OX  TAKE 1 TABLET BY MOUTH ONCE DAILY  What changed: when to take this            CONTINUE taking these medications      amiodarone 200 MG Tab  Commonly known as: PACERONE  Take 1 tablet (200 mg total) by mouth 2 (two) times daily.     docusate sodium 100 MG capsule  Commonly known as: COLACE  Take 1 capsule (100 mg total) by mouth 2 (two) times daily.     famotidine 20 MG tablet  Commonly known as: PEPCID  Take 1 tablet (20 mg total) by mouth once daily.            STOP taking these medications      ertapenem 1  gram injection  Commonly known as: INVANZ     hydrocortisone 2.5 % rectal cream  Commonly known as: ANUSOL-HC     linezolid 600 mg/300 mL Pgbk  Commonly known as: ZYVOX     meclizine 50 MG tablet  Commonly known as: ANTIVERT     midodrine 5 MG Tab  Commonly known as: PROAMATINE     MIRALAX 17 gram Pwpk  Generic drug: polyethylene glycol     NIFEREX (SUMALATE-QUATREFOLIC) 150 mg iron- 60 mg-1 mg Tab  Generic drug: iron ag,ck-E-CD8-B12-Zn-sa-sto     ondansetron 8 MG Tbdl  Commonly known as: ZOFRAN-ODT              Indwelling Lines/Drains at time of discharge:   Lines/Drains/Airways       Drain  Duration                  Urethral Catheter 02/08/24 1939 11 days                    Time spent on the discharge of patient: 35 minutes         Madisyn Geren MD  Department of Hospital Medicine  [facility]/Surg

## 2024-02-20 NOTE — PHYSICIAN QUERY
PT Name: Irene العراقي  MR #: [id number]     DOCUMENTATION CLARIFICATION     CDS/: Gisela Christina               Contact information:  This form is a permanent document in the medical record.     Query Date: February 20, 2024    By submitting this query, we are merely seeking further clarification of documentation.  Please utilize your independent clinical judgment when addressing the question(s) below.    The Medical Record contains the following   Indicators Supporting Clinical Findings Location in Medical Record    Heart Failure documented NYHA class 3 heart failure with preserved ejection fraction  Discharge Summary    , 384 Lab Results    EF/Echo Echocardiography Findings 12/11/23    Left Ventricle The left ventricle is normal in size. Normal wall thickness. Septal motion is consistent with pacing. There is low normal systolic function with a visually estimated ejection fraction of 50 - 55%. Grade II diastolic dysfunction.  Echo 12/11/23    Radiology findings CXR: Mildly prominent lung markings in lower lung fields more so today than on the prior exam questioning very mild CHF  Chest Xray    Subjective/Objective Respiratory Conditions  Respiratory: Mostly clear to auscultation anteriorly, decreased breath sounds and scatter crackles R base, no tachypnea or increased work of breathing.  Consults by Jennifer Brooks MD 2/11/2024    Edema, JVD Right lower leg: Edema (trace) present.  H&P    Diuretics/Meds will give one dose IV lasix post transfusion and IV hydralazine prn  H&P    Other Right pleural effusion.     Progress Notes by Major Gordon MD  2/10/2024         Provider, please specify the diagnosis associated with the above clinical findings.    [   ]  Acute Diastolic Heart Failure (HFpEF) - new diagnosis   [ x  ]  Acute on Chronic Diastolic Heart Failure (HFpEF) - worsening of CHF signs/symptoms in preexisting CHF   [   ]  Chronic Diastolic Heart Failure (HFpEF) - preexisting and  stable   [   ]  Acute Combined Systolic and Diastolic Heart Failure - new diagnosis   [   ]  Acute on Chronic Combined Systolic and Diastolic Heart Failure - worsening of CHF signs/symptoms in preexisting CHF   [   ]  Chronic Combined Systolic and Diastolic Heart Failure - pre-existing and stable   [   ]  Heart Failure Ruled Out   [   ]  Other (please specify):

## 2024-02-20 NOTE — PHYSICIAN QUERY
PT Name: Irene العراقي  MR #: [id number]     DOCUMENTATION CLARIFICATION      CDS/: Gisela Christina               Contact information:  This form is a permanent document in the medical record.     Query Date: February 20, 2024    By submitting this query, we are merely seeking further clarification of documentation. Please utilize your independent clinical judgment when addressing the question(s) below.    The Medical Record contains the following:     Indicators   Supporting Clinical Findings Location in Medical Record    Documentation of condition Recurrent UTI  Discharge Summary    Urinary Device, Catheter Pt presented from [facility] with araya catheter in place. She reports urinary retention and states the araya has been in place for about a week. Araya catheter was exchanged in the ED. Pt is currently being treated for UTI with culture positive for enterococcus faecium   H&P      Provider, please clarify if there is any clinical correlation between UTI and Araya Catheter.  [ x  ] Due to or associated with each other   [   ] Unrelated to each other   [   ] Other explanation (please specify):

## 2024-02-21 ENCOUNTER — OFFICE VISIT (OUTPATIENT)
Dept: CARDIOLOGY | Facility: CLINIC | Age: 84
End: 2024-02-21
Payer: COMMERCIAL

## 2024-02-21 VITALS
OXYGEN SATURATION: 95 % | WEIGHT: 156 LBS | HEART RATE: 64 BPM | SYSTOLIC BLOOD PRESSURE: 126 MMHG | DIASTOLIC BLOOD PRESSURE: 70 MMHG | HEIGHT: 66 IN | BODY MASS INDEX: 25.07 KG/M2 | RESPIRATION RATE: 16 BRPM

## 2024-02-21 DIAGNOSIS — Z98.890 STATUS POST OPEN REDUCTION WITH INTERNAL FIXATION (ORIF) OF FRACTURE OF ANKLE: ICD-10-CM

## 2024-02-21 DIAGNOSIS — Z87.440 HISTORY OF RECURRENT UTIS: ICD-10-CM

## 2024-02-21 DIAGNOSIS — I48.0 PAF (PAROXYSMAL ATRIAL FIBRILLATION): ICD-10-CM

## 2024-02-21 DIAGNOSIS — I95.1 POSTURAL HYPOTENSION: ICD-10-CM

## 2024-02-21 DIAGNOSIS — Z87.81 STATUS POST OPEN REDUCTION WITH INTERNAL FIXATION (ORIF) OF FRACTURE OF ANKLE: ICD-10-CM

## 2024-02-21 DIAGNOSIS — Z95.2 S/P TAVR (TRANSCATHETER AORTIC VALVE REPLACEMENT): ICD-10-CM

## 2024-02-21 DIAGNOSIS — I35.0 SEVERE AORTIC VALVE STENOSIS: Primary | ICD-10-CM

## 2024-02-21 DIAGNOSIS — N18.32 STAGE 3B CHRONIC KIDNEY DISEASE: ICD-10-CM

## 2024-02-21 DIAGNOSIS — I50.30 NYHA CLASS 3 HEART FAILURE WITH PRESERVED EJECTION FRACTION: ICD-10-CM

## 2024-02-21 PROCEDURE — 1159F MED LIST DOCD IN RCRD: CPT | Mod: CPTII,S$GLB,, | Performed by: INTERNAL MEDICINE

## 2024-02-21 PROCEDURE — 3288F FALL RISK ASSESSMENT DOCD: CPT | Mod: CPTII,S$GLB,, | Performed by: INTERNAL MEDICINE

## 2024-02-21 PROCEDURE — 1126F AMNT PAIN NOTED NONE PRSNT: CPT | Mod: CPTII,S$GLB,, | Performed by: INTERNAL MEDICINE

## 2024-02-21 PROCEDURE — 3074F SYST BP LT 130 MM HG: CPT | Mod: CPTII,S$GLB,, | Performed by: INTERNAL MEDICINE

## 2024-02-21 PROCEDURE — 1111F DSCHRG MED/CURRENT MED MERGE: CPT | Mod: CPTII,S$GLB,, | Performed by: INTERNAL MEDICINE

## 2024-02-21 PROCEDURE — 1160F RVW MEDS BY RX/DR IN RCRD: CPT | Mod: CPTII,S$GLB,, | Performed by: INTERNAL MEDICINE

## 2024-02-21 PROCEDURE — 99214 OFFICE O/P EST MOD 30 MIN: CPT | Mod: S$GLB,,, | Performed by: INTERNAL MEDICINE

## 2024-02-21 PROCEDURE — 99999 PR PBB SHADOW E&M-EST. PATIENT-LVL IV: CPT | Mod: PBBFAC,,, | Performed by: INTERNAL MEDICINE

## 2024-02-21 PROCEDURE — 1100F PTFALLS ASSESS-DOCD GE2>/YR: CPT | Mod: CPTII,S$GLB,, | Performed by: INTERNAL MEDICINE

## 2024-02-21 PROCEDURE — 3078F DIAST BP <80 MM HG: CPT | Mod: CPTII,S$GLB,, | Performed by: INTERNAL MEDICINE

## 2024-02-21 NOTE — PROGRESS NOTES
Subjective:    Patient ID:  Irene العراقي is a 83 y.o. female     Chief Complaint   Patient presents with    Hospital Follow Up       HPI:    Ms Irene العراقي is a 83 y.o. female is here for follow-up.    Patient was recently in the hospital with urinary tract infection and she has had multiple urinary tract infections.  She is currently on strong antibiotics.  She was also restarted on amiodarone while she was in the hospital.    Her breathing is good denies any shortness a breath or difficulty in breathing denies any chest pain or tightness heaviness denies any dizziness or lightheadedness or loss of consciousness.        Review of patient's allergies indicates:   Allergen Reactions    Cefuroxime     Hydrocodone     Meperidine     Meperidine hcl Nausea And Vomiting    Persantine [dipyridamole]     Nitrofurantoin macrocrystal      Headache , went into Afib     Vicodin [hydrocodone-acetaminophen] Nausea And Vomiting       Past Medical History:   Diagnosis Date    Anemia, unspecified     Atrial fibrillation 01/25/2021    CKD (chronic kidney disease)     Hypertension      Past Surgical History:   Procedure Laterality Date    A-V CARDIAC PACEMAKER INSERTION  11/2/2023    Procedure: Dual Chamber PPM RM 2617 (Medtronic);  Surgeon: Andreas James III, MD;  Location: [facility] CATH;  Service: Cardiology;;    ANGIOGRAM, CORONARY, WITH LEFT HEART CATHETERIZATION Left 4/19/2023    Procedure: Angiogram, Coronary, with Left Heart Cath;  Surgeon: Kevin Redmond MD;  Location: [facility] CATH/EP LAB;  Service: Cardiology;  Laterality: Left;    BREAST SURGERY      COLONOSCOPY N/A 4/16/2023    Procedure: COLONOSCOPY;  Surgeon: Kvng Mensah MD;  Location: [facility] ENDO;  Service: Endoscopy;  Laterality: N/A;    COLONOSCOPY W/ POLYPECTOMY  04/16/2023    OPEN REDUCTION AND INTERNAL FIXATION (ORIF) OF INJURY OF ANKLE Right 12/13/2023    Procedure: ORIF, ANKLE;  Surgeon: Chaitanya Garrison MD;  Location: [facility] OR;  Service: Orthopedics;  Laterality:  Right;  Synthes    TRANSCATHETER AORTIC VALVE REPLACEMENT (TAVR)  11/1/2023    Procedure: (TAVR);  Surgeon: Juliano Moncada MD;  Location: [facility] CATH;  Service: Cardiology;;    TRANSCATHETER AORTIC VALVE REPLACEMENT (TAVR)  11/1/2023    Procedure: (TAVR)- Surgeon;  Surgeon: Adebayo Guzman MD;  Location: [facility] CATH;  Service: Peripheral Vascular;;     Social History     Tobacco Use    Smoking status: Former     Types: Cigarettes     Passive exposure: Past    Smokeless tobacco: Never   Substance Use Topics    Alcohol use: Not Currently    Drug use: Not Currently     Family History   Problem Relation Age of Onset    Cancer Mother     Cancer Father         Review of Systems:   Constitution: Negative for diaphoresis and fever.   HEENT: Negative for nosebleeds.    Cardiovascular: Negative for chest pain       No dyspnea on exertion       Right ankle more swollen than the left leg swelling        No palpitations  Respiratory: Negative for shortness of breath and wheezing.    Hematologic/Lymphatic: Negative for bleeding problem. Does not bruise/bleed easily.   Skin: Negative for color change and rash.   Musculoskeletal: Negative for falls and myalgias.   Gastrointestinal: Negative for hematemesis and hematochezia.   Genitourinary: Negative for hematuria.   Neurological: Negative for dizziness and light-headedness.   Psychiatric/Behavioral: Negative for altered mental status and memory loss.          Objective:        Vitals:    02/21/24 1550   BP: 126/70   Pulse: 64   Resp: 16       Lab Results   Component Value Date    WBC 4.73 02/19/2024    HGB 7.1 (L) 02/19/2024    HCT 22.3 (L) 02/19/2024     (L) 02/19/2024    CHOL 173 02/10/2020    TRIG 40 02/10/2020    HDL 78 (H) 02/10/2020    ALT 11 02/19/2024    AST 15 02/19/2024     02/19/2024    K 4.5 02/19/2024     02/19/2024    CREATININE 1.0 02/19/2024    BUN 18 02/19/2024    CO2 24 02/19/2024    TSH 7.870 (H) 11/04/2023    INR 1.0 08/09/2023    HGBA1C  5.1 04/11/2023        ECHOCARDIOGRAM RESULTS  Results for orders placed during the hospital encounter of 12/11/23    Echo    Interpretation Summary    Left Ventricle: The left ventricle is normal in size. Normal wall thickness. Septal motion is consistent with pacing. There is low normal systolic function with a visually estimated ejection fraction of 50 - 55%. Grade II diastolic dysfunction.    Right Ventricle: Normal right ventricular cavity size. Systolic function is normal.    Left Atrium: Left atrium is mildly dilated.    Right Atrium: Right atrium is mildly dilated.    Aortic Valve: There is a well-seated, normally functioning bioprosthetic valve in the aortic position. It is reported to be a 29 mm Medtronic valve. Mild paravalvular regurgitation.    Mitral Valve: Moderately calcified leaflets. There is moderate mitral annular calcification present. There is mild stenosis. The mean pressure gradient across the mitral valve is 3 mmHg at a heart rate of 67 bpm. There is mild regurgitation.    Tricuspid Valve: There is mild to moderate regurgitation.    The estimated pulmonary artery systolic pressure is 42 mmHg.        CURRENT/PREVIOUS VISIT EKG  Results for orders placed or performed during the hospital encounter of 02/08/24   EKG 12-lead    Collection Time: 02/08/24  3:39 PM   Result Value Ref Range    QRS Duration 128 ms    OHS QTC Calculation 492 ms    Narrative    Test Reason : D64.9,    Vent. Rate : 065 BPM     Atrial Rate : 065 BPM     P-R Int : 160 ms          QRS Dur : 128 ms      QT Int : 474 ms       P-R-T Axes : 051 -17 039 degrees     QTc Int : 492 ms    Atrial-sensed ventricular-paced rhythm  Abnormal ECG  When compared with ECG of 20-JAN-2024 14:04,  Electronic ventricular pacemaker has replaced Atrial fibrillation  Vent. rate has decreased BY  59 BPM  Confirmed by Justin Ramirez MD ([id number]) on 2/9/2024 12:03:56 PM    Referred By: [email or web address]   SELF           Confirmed By:Justin Ramirez MD     No  valid procedures specified.   Results for orders placed during the hospital encounter of 07/23/22    Nuclear Stress Test    Interpretation Summary    The EKG portion of this study is negative for ischemia.    The patient reported no chest pain during the stress test.    The nuclear portion of this study will be reported separately.      Physical Exam:  CONSTITUTIONAL: No fever, no chills  HEENT: Normocephalic, atraumatic,pupils reactive to light                 NECK:  No JVD no carotid bruit  CVS: S1S2+, RRR, systolic murmurs,   LUNGS: Clear  ABDOMEN: Soft, NT, BS+  EXTREMITIES: No cyanosis, edema  :  Currently wearing araya catheter  NEURO: AAO X 3  PSY: Normal affect      Medication List with Changes/Refills   Current Medications    AMIODARONE (PACERONE) 200 MG TAB    Take 1 tablet (200 mg total) by mouth 2 (two) times daily.    AMLODIPINE (NORVASC) 10 MG TABLET    Take 1 tablet (10 mg total) by mouth once daily.    APIXABAN (ELIQUIS) 5 MG TAB    Take 1 tablet (5 mg total) by mouth 2 (two) times daily.    CARVEDILOL (COREG) 6.25 MG TABLET    Take 1 tablet (6.25 mg total) by mouth 2 (two) times daily.    DOCUSATE SODIUM (COLACE) 100 MG CAPSULE    Take 1 capsule (100 mg total) by mouth 2 (two) times daily.    FAMOTIDINE (PEPCID) 20 MG TABLET    Take 1 tablet (20 mg total) by mouth once daily.    FOSFOMYCIN (MONUROL) 3 GRAM PACK    Take 3 g by mouth every 10 days.    MAGNESIUM OXIDE (MAG-OX) 400 MG (241.3 MG MAGNESIUM) TABLET    TAKE 1 TABLET BY MOUTH ONCE DAILY             Assessment:       1. Severe aortic valve stenosis    2. S/P TAVR (transcatheter aortic valve replacement)    3. Postural hypotension    4. PAF (paroxysmal atrial fibrillation)    5. NYHA class 3 heart failure with preserved ejection fraction    6. Stage 3b chronic kidney disease    7. Status post open reduction with internal fixation (ORIF) of fracture of ankle    8. History of recurrent UTIs         Plan:   1. Severe aortic stenosis status post  TAVR   Patient is stable at the present time she is currently on Eliquis 5 mg p.o. Q b.i.d. continue the same.  2. Essential hypertension   Her blood pressure is stable she is on amlodipine 10 mg p.o. q.h.s. and Coreg 6.25 mg p.o. b.i.d. continue the same.  3. Paroxysmal atrial fibrillation   Patient has been started on amiodarone 200 mg p.o. b.i.d. will decreShe is also on Eliquis 5 mg p.o. b.i.d..    4. Diastolic heart failure   Patient is stable at the present time continue her current management.  5. Chronic kidney disease   Patient is stable at the present time and she follows up with the primary physician.  6. History of recurrent UTI   Patient is currently on fosfomycin and she follows up with her infectious disease doctor.  7. Patient to continue current management.  I will see her back in the office in 4 months time.              Problem List Items Addressed This Visit          Cardiac/Vascular    PAF (paroxysmal atrial fibrillation)    Severe aortic valve stenosis - Primary    NYHA class 3 heart failure with preserved ejection fraction    S/P TAVR (transcatheter aortic valve replacement)    Postural hypotension       Renal/    Stage 3b chronic kidney disease       Orthopedic    Status post open reduction with internal fixation (ORIF) of fracture of ankle     Other Visit Diagnoses       History of recurrent UTIs                No follow-ups on file.

## 2024-02-22 ENCOUNTER — TELEPHONE (OUTPATIENT)
Dept: HEMATOLOGY/ONCOLOGY | Facility: CLINIC | Age: 84
End: 2024-02-22
Payer: COMMERCIAL

## 2024-02-22 NOTE — TELEPHONE ENCOUNTER
Called Michelle at [facility] to schedule patients hospital follow up appt for 03/06/2024 @ 10:00a.m

## 2024-02-22 NOTE — TELEPHONE ENCOUNTER
----- Message from Daphnie Dennis sent at 2/22/2024  1:10 PM CST -----  Regarding: advise  Contact: yeison garcia  Type: Needs Medical Advice  Who Called:  jilliansemaj Del Toro jose   Symptoms (please be specific):    How long has patient had these symptoms:    Pharmacy name and phone #:    Best Call Back Number: [phone]  Additional Information: taye wilson, i have jessica melendez MRN: [id number] returning ur call are u available

## 2024-02-22 NOTE — TELEPHONE ENCOUNTER
Called patient yeison to schedule a hospital follow up with Dr. Osborne. Yeison stated that the patient lives in a nursing home in [place] [phone] and stated we have to contact them in order to schedule her appointment due to transportation.

## 2024-02-22 NOTE — TELEPHONE ENCOUNTER
----- Message from Daphnie Dennis sent at 2/22/2024  1:10 PM CST -----  Regarding: advise  Contact: yeison garcia  Type: Needs Medical Advice  Who Called:  jilliansemaj De lToro jose   Symptoms (please be specific):    How long has patient had these symptoms:    Pharmacy name and phone #:    Best Call Back Number: [phone]  Additional Information: taye wilson, i have jessica melendez MRN: [id number] returning ur call are u available

## 2024-02-22 NOTE — TELEPHONE ENCOUNTER
----- Message from Lena Dumont sent at 2/22/2024  9:34 AM CST -----  Type:  Sooner Appointment Request    Caller is requesting a sooner appointment.  Caller declined first available appointment listed below.  Caller will not accept being placed on the waitlist and is requesting a message be sent to doctor.    Name of Caller:  pt  When is the first available appointment?  na  Symptoms:  hospital follow up  Would the patient rather a call back or a response via [facility]? call  Best Call Back Number:  [phone]    Additional Information:  Please call back to advise

## 2024-02-28 ENCOUNTER — DOCUMENTATION ONLY (OUTPATIENT)
Dept: INFECTIOUS DISEASES | Facility: CLINIC | Age: 84
End: 2024-02-28

## 2024-02-28 NOTE — PROGRESS NOTES
I spoke with nurse Puneet ( [facility] )  [phone] re: anemia in patient    Puneet stated patient sees Hematologist  ( has an appointment next week )  They draw her iron levels and platelet  weekly   The last labs they sent us are her basic baseline levels  Patient denies to take her iron supplement medication    Dr Leigh notified of all     J Wms [facility]   2/28/24

## 2024-02-29 ENCOUNTER — OFFICE VISIT (OUTPATIENT)
Dept: UROLOGY | Facility: CLINIC | Age: 84
End: 2024-02-29
Payer: COMMERCIAL

## 2024-02-29 VITALS — HEIGHT: 66 IN | WEIGHT: 156 LBS | BODY MASS INDEX: 25.07 KG/M2

## 2024-02-29 DIAGNOSIS — R33.9 URINE RETENTION: Primary | ICD-10-CM

## 2024-02-29 DIAGNOSIS — N39.0 RECURRENT UTI: ICD-10-CM

## 2024-02-29 DIAGNOSIS — N39.0 RECURRENT UTI: Primary | ICD-10-CM

## 2024-02-29 PROCEDURE — 99214 OFFICE O/P EST MOD 30 MIN: CPT | Mod: S$GLB,,, | Performed by: UROLOGY

## 2024-02-29 PROCEDURE — 1100F PTFALLS ASSESS-DOCD GE2>/YR: CPT | Mod: CPTII,S$GLB,, | Performed by: UROLOGY

## 2024-02-29 PROCEDURE — 1111F DSCHRG MED/CURRENT MED MERGE: CPT | Mod: CPTII,S$GLB,, | Performed by: UROLOGY

## 2024-02-29 PROCEDURE — 99999 PR PBB SHADOW E&M-EST. PATIENT-LVL III: CPT | Mod: PBBFAC,,, | Performed by: UROLOGY

## 2024-02-29 PROCEDURE — 1160F RVW MEDS BY RX/DR IN RCRD: CPT | Mod: CPTII,S$GLB,, | Performed by: UROLOGY

## 2024-02-29 PROCEDURE — 3288F FALL RISK ASSESSMENT DOCD: CPT | Mod: CPTII,S$GLB,, | Performed by: UROLOGY

## 2024-02-29 PROCEDURE — 1159F MED LIST DOCD IN RCRD: CPT | Mod: CPTII,S$GLB,, | Performed by: UROLOGY

## 2024-02-29 PROCEDURE — 1126F AMNT PAIN NOTED NONE PRSNT: CPT | Mod: CPTII,S$GLB,, | Performed by: UROLOGY

## 2024-02-29 NOTE — PROGRESS NOTES
Procedure Order to Urology [[id number]]    Electronically signed by: Salome Zavala Jr., MD on 02/29/24 1047 Status: Active   Ordering user: Salome Zavala Jr., MD 02/29/24 1047 Authorized by: Salome Zavala Jr., MD   Ordering mode: Standard   Frequency:  02/29/24 -     Diagnoses  Urine retention [R33.9]  Recurrent UTI [N39.0]   Questionnaire    Question Answer   Procedure Cystoscopy   Pre-op Diagnosis Urinary retention   Facility Name: [facility]   Order comments: Cystscoopy at the [facility] on 3/6/24.   ASC, Please order Urine POCT without micro . Local sedation

## 2024-02-29 NOTE — H&P (VIEW-ONLY)
[facility] Established Patient/H&P:    Irene العراقي is a 83 y.o. female who presents for follow up for recurrent UTI and urinary retention.     Patient with a history of atrial fibrillation, CKD, HTN and thrombocytopenia who presented to the emergency department on 2/8/24 after outpatient labs revealed anemia and low platelets.      Renal ultrasound on 2/12/24 with unremarkable appearance of kidneys and bilateral pleural effusions. CT renal stone on 2/12/24 with moderate bilateral pleural effusions, defibrillator, diverticulosis and cholelithiasis. Admitted for management and started on Meropenem. Urine culture with ESBL E. Coli UTI. Urology consulted by Dr. Bruner to assist with management.      On review of records, she has had several 7 urinary tract infections over the past 12 months. Has a chronic araya due to urinary retention. No known constipation.      States she is from [place] and wishes to move back soon.      Interval History    2/29/24: Patient here today for follow up. Discharged to [facility]. Remains with a araya catheter in place. No recent voiding trial. Still waiting to move back to [place] soon. Has not had an infection since her hospitalization.  She is currently on Fosfomycin every 10 days per ID.     Denies any fever, chills, gross hematuria, flank pain, bone pain, unintentional weight loss,  trauma or history of  malignancy.         Urine culture  ESBL E. Coli   2/8/24  Entero              1/19/24  Klebsiella         12/11/23  Entero              9/21/23  Pantoea           9/9/23  E. Coli              5/9/23  E. Coli              4/25/23    Past Medical History:   Diagnosis Date    Anemia, unspecified     Atrial fibrillation 01/25/2021    CKD (chronic kidney disease)     Hypertension        Past Surgical History:   Procedure Laterality Date    A-V CARDIAC PACEMAKER INSERTION  11/2/2023    Procedure: Dual Chamber PPM RM 2617 ([employer]);  Surgeon:  "Andreas James III, MD;  Location: [facility] CATH;  Service: Cardiology;;    ANGIOGRAM, CORONARY, WITH LEFT HEART CATHETERIZATION Left 4/19/2023    Procedure: Angiogram, Coronary, with Left Heart Cath;  Surgeon: Kevin Redmond MD;  Location: [facility] CATH/EP LAB;  Service: Cardiology;  Laterality: Left;    BREAST SURGERY      COLONOSCOPY N/A 4/16/2023    Procedure: COLONOSCOPY;  Surgeon: Kvng Mensah MD;  Location: [facility] ENDO;  Service: Endoscopy;  Laterality: N/A;    COLONOSCOPY W/ POLYPECTOMY  04/16/2023    OPEN REDUCTION AND INTERNAL FIXATION (ORIF) OF INJURY OF ANKLE Right 12/13/2023    Procedure: ORIF, ANKLE;  Surgeon: Chaitanya Garrison MD;  Location: [facility] OR;  Service: Orthopedics;  Laterality: Right;  Synthes    TRANSCATHETER AORTIC VALVE REPLACEMENT (TAVR)  11/1/2023    Procedure: (TAVR);  Surgeon: Juliano Moncada MD;  Location: [facility] CATH;  Service: Cardiology;;    TRANSCATHETER AORTIC VALVE REPLACEMENT (TAVR)  11/1/2023    Procedure: (TAVR)- Surgeon;  Surgeon: Adebayo Guzman MD;  Location: [facility] CATH;  Service: Peripheral Vascular;;       Review of patient's allergies indicates:   Allergen Reactions    Cefuroxime     Hydrocodone     Meperidine     Meperidine hcl Nausea And Vomiting    Persantine [dipyridamole]     Nitrofurantoin macrocrystal      Headache , went into Afib     Vicodin [hydrocodone-acetaminophen] Nausea And Vomiting       Medications Reviewed: see MAR      FOCUSED PHYSICAL EXAM:    There were no vitals filed for this visit.  Body mass index is 25.18 kg/m². Weight: 70.8 kg (156 lb) Height: 5' 6" (167.6 cm)       General: Alert, cooperative, no distress, appears stated age  Abdomen: Soft, non-tender, no CVA tenderness, non-distended      LABS:    Recent Results (from the past 336 hour(s))   Comprehensive Metabolic Panel (CMP)    Collection Time: 02/16/24  4:11 AM   Result Value Ref Range    Sodium 138 136 - 145 mmol/L    Potassium 4.3 3.5 - 5.1 mmol/L    Chloride 106 95 - 110 mmol/L    CO2 25 23 " - 29 mmol/L    Glucose 88 70 - 110 mg/dL    BUN 15 8 - 23 mg/dL    Creatinine 1.1 0.5 - 1.4 mg/dL    Calcium 8.4 (L) 8.7 - 10.5 mg/dL    Total Protein 4.7 (L) 6.0 - 8.4 g/dL    Albumin 2.6 (L) 3.5 - 5.2 g/dL    Total Bilirubin 0.5 0.1 - 1.0 mg/dL    Alkaline Phosphatase 86 55 - 135 U/L    AST 13 10 - 40 U/L    ALT 11 10 - 44 U/L    eGFR 50 (A) >60 mL/min/1.73 m^2    Anion Gap 7 (L) 8 - 16 mmol/L   Magnesium    Collection Time: 02/16/24  4:11 AM   Result Value Ref Range    Magnesium 2.0 1.6 - 2.6 mg/dL   Phosphorus    Collection Time: 02/16/24  4:11 AM   Result Value Ref Range    Phosphorus 3.0 2.7 - 4.5 mg/dL   CBC with Automated Differential    Collection Time: 02/16/24  4:11 AM   Result Value Ref Range    WBC 5.20 3.90 - 12.70 K/uL    RBC 2.48 (L) 4.00 - 5.40 M/uL    Hemoglobin 7.5 (L) 12.0 - 16.0 g/dL    Hematocrit 23.0 (L) 37.0 - 48.5 %    MCV 93 82 - 98 fL    MCH 30.2 27.0 - 31.0 pg    MCHC 32.6 32.0 - 36.0 g/dL    RDW 13.8 11.5 - 14.5 %    Platelets 87 (L) 150 - 450 K/uL    MPV 10.0 9.2 - 12.9 fL    Immature Granulocytes 1.0 (H) 0.0 - 0.5 %    Gran # (ANC) 3.2 1.8 - 7.7 K/uL    Immature Grans (Abs) 0.05 (H) 0.00 - 0.04 K/uL    Lymph # 0.9 (L) 1.0 - 4.8 K/uL    Mono # 0.8 0.3 - 1.0 K/uL    Eos # 0.2 0.0 - 0.5 K/uL    Baso # 0.03 0.00 - 0.20 K/uL    nRBC 0 0 /100 WBC    Gran % 61.9 38.0 - 73.0 %    Lymph % 17.1 (L) 18.0 - 48.0 %    Mono % 15.4 (H) 4.0 - 15.0 %    Eosinophil % 4.0 0.0 - 8.0 %    Basophil % 0.6 0.0 - 1.9 %    Differential Method Automated    Soluble Transferrin Receptor    Collection Time: 02/16/24  4:11 AM   Result Value Ref Range    Sol. Transferrin Receptor 2.5 1.8 - 4.6 mg/L   Comprehensive Metabolic Panel (CMP)    Collection Time: 02/17/24  5:52 AM   Result Value Ref Range    Sodium 136 136 - 145 mmol/L    Potassium 4.3 3.5 - 5.1 mmol/L    Chloride 104 95 - 110 mmol/L    CO2 26 23 - 29 mmol/L    Glucose 81 70 - 110 mg/dL    BUN 15 8 - 23 mg/dL    Creatinine 1.0 0.5 - 1.4 mg/dL    Calcium 8.5  (L) 8.7 - 10.5 mg/dL    Total Protein 5.0 (L) 6.0 - 8.4 g/dL    Albumin 2.8 (L) 3.5 - 5.2 g/dL    Total Bilirubin 0.5 0.1 - 1.0 mg/dL    Alkaline Phosphatase 91 55 - 135 U/L    AST 15 10 - 40 U/L    ALT 10 10 - 44 U/L    eGFR 56 (A) >60 mL/min/1.73 m^2    Anion Gap 6 (L) 8 - 16 mmol/L   Magnesium    Collection Time: 02/17/24  5:52 AM   Result Value Ref Range    Magnesium 2.0 1.6 - 2.6 mg/dL   Phosphorus    Collection Time: 02/17/24  5:52 AM   Result Value Ref Range    Phosphorus 2.8 2.7 - 4.5 mg/dL   CBC with Automated Differential    Collection Time: 02/17/24  5:52 AM   Result Value Ref Range    WBC 4.93 3.90 - 12.70 K/uL    RBC 2.50 (L) 4.00 - 5.40 M/uL    Hemoglobin 7.6 (L) 12.0 - 16.0 g/dL    Hematocrit 23.2 (L) 37.0 - 48.5 %    MCV 93 82 - 98 fL    MCH 30.4 27.0 - 31.0 pg    MCHC 32.8 32.0 - 36.0 g/dL    RDW 14.6 (H) 11.5 - 14.5 %    Platelets 92 (L) 150 - 450 K/uL    MPV 10.3 9.2 - 12.9 fL    Immature Granulocytes 0.6 (H) 0.0 - 0.5 %    Gran # (ANC) 3.1 1.8 - 7.7 K/uL    Immature Grans (Abs) 0.03 0.00 - 0.04 K/uL    Lymph # 0.7 (L) 1.0 - 4.8 K/uL    Mono # 0.8 0.3 - 1.0 K/uL    Eos # 0.2 0.0 - 0.5 K/uL    Baso # 0.02 0.00 - 0.20 K/uL    nRBC 0 0 /100 WBC    Gran % 63.7 38.0 - 73.0 %    Lymph % 14.4 (L) 18.0 - 48.0 %    Mono % 16.4 (H) 4.0 - 15.0 %    Eosinophil % 4.5 0.0 - 8.0 %    Basophil % 0.4 0.0 - 1.9 %    Differential Method Automated    POCT glucose    Collection Time: 02/18/24  1:38 AM   Result Value Ref Range    POCT Glucose 94 70 - 110 mg/dL   Comprehensive Metabolic Panel (CMP)    Collection Time: 02/18/24  4:44 AM   Result Value Ref Range    Sodium 138 136 - 145 mmol/L    Potassium 4.4 3.5 - 5.1 mmol/L    Chloride 107 95 - 110 mmol/L    CO2 24 23 - 29 mmol/L    Glucose 85 70 - 110 mg/dL    BUN 18 8 - 23 mg/dL    Creatinine 1.0 0.5 - 1.4 mg/dL    Calcium 8.4 (L) 8.7 - 10.5 mg/dL    Total Protein 4.8 (L) 6.0 - 8.4 g/dL    Albumin 2.7 (L) 3.5 - 5.2 g/dL    Total Bilirubin 0.5 0.1 - 1.0 mg/dL     Alkaline Phosphatase 80 55 - 135 U/L    AST 14 10 - 40 U/L    ALT 10 10 - 44 U/L    eGFR 56 (A) >60 mL/min/1.73 m^2    Anion Gap 7 (L) 8 - 16 mmol/L   Magnesium    Collection Time: 02/18/24  4:44 AM   Result Value Ref Range    Magnesium 2.0 1.6 - 2.6 mg/dL   Phosphorus    Collection Time: 02/18/24  4:44 AM   Result Value Ref Range    Phosphorus 3.1 2.7 - 4.5 mg/dL   CBC with Automated Differential    Collection Time: 02/18/24  4:44 AM   Result Value Ref Range    WBC 4.23 3.90 - 12.70 K/uL    RBC 2.27 (L) 4.00 - 5.40 M/uL    Hemoglobin 7.0 (L) 12.0 - 16.0 g/dL    Hematocrit 21.3 (L) 37.0 - 48.5 %    MCV 94 82 - 98 fL    MCH 30.8 27.0 - 31.0 pg    MCHC 32.9 32.0 - 36.0 g/dL    RDW 15.1 (H) 11.5 - 14.5 %    Platelets 92 (L) 150 - 450 K/uL    MPV 10.2 9.2 - 12.9 fL    Immature Granulocytes 0.5 0.0 - 0.5 %    Gran # (ANC) 2.5 1.8 - 7.7 K/uL    Immature Grans (Abs) 0.02 0.00 - 0.04 K/uL    Lymph # 0.8 (L) 1.0 - 4.8 K/uL    Mono # 0.7 0.3 - 1.0 K/uL    Eos # 0.3 0.0 - 0.5 K/uL    Baso # 0.01 0.00 - 0.20 K/uL    nRBC 0 0 /100 WBC    Gran % 58.9 38.0 - 73.0 %    Lymph % 18.7 18.0 - 48.0 %    Mono % 15.6 (H) 4.0 - 15.0 %    Eosinophil % 6.1 0.0 - 8.0 %    Basophil % 0.2 0.0 - 1.9 %    Platelet Estimate Decreased (A)     Differential Method Automated    Comprehensive Metabolic Panel (CMP)    Collection Time: 02/19/24  4:34 AM   Result Value Ref Range    Sodium 138 136 - 145 mmol/L    Potassium 4.5 3.5 - 5.1 mmol/L    Chloride 106 95 - 110 mmol/L    CO2 24 23 - 29 mmol/L    Glucose 81 70 - 110 mg/dL    BUN 18 8 - 23 mg/dL    Creatinine 1.0 0.5 - 1.4 mg/dL    Calcium 8.4 (L) 8.7 - 10.5 mg/dL    Total Protein 4.9 (L) 6.0 - 8.4 g/dL    Albumin 2.7 (L) 3.5 - 5.2 g/dL    Total Bilirubin 0.6 0.1 - 1.0 mg/dL    Alkaline Phosphatase 83 55 - 135 U/L    AST 15 10 - 40 U/L    ALT 11 10 - 44 U/L    eGFR 56 (A) >60 mL/min/1.73 m^2    Anion Gap 8 8 - 16 mmol/L   Magnesium    Collection Time: 02/19/24  4:34 AM   Result Value Ref Range     Magnesium 2.0 1.6 - 2.6 mg/dL   Phosphorus    Collection Time: 02/19/24  4:34 AM   Result Value Ref Range    Phosphorus 3.0 2.7 - 4.5 mg/dL   CBC with Automated Differential    Collection Time: 02/19/24  4:34 AM   Result Value Ref Range    WBC 4.73 3.90 - 12.70 K/uL    RBC 2.37 (L) 4.00 - 5.40 M/uL    Hemoglobin 7.1 (L) 12.0 - 16.0 g/dL    Hematocrit 22.3 (L) 37.0 - 48.5 %    MCV 94 82 - 98 fL    MCH 30.0 27.0 - 31.0 pg    MCHC 31.8 (L) 32.0 - 36.0 g/dL    RDW 15.3 (H) 11.5 - 14.5 %    Platelets 105 (L) 150 - 450 K/uL    MPV 10.8 9.2 - 12.9 fL    Immature Granulocytes 0.2 0.0 - 0.5 %    Gran # (ANC) 2.9 1.8 - 7.7 K/uL    Immature Grans (Abs) 0.01 0.00 - 0.04 K/uL    Lymph # 0.8 (L) 1.0 - 4.8 K/uL    Mono # 0.7 0.3 - 1.0 K/uL    Eos # 0.3 0.0 - 0.5 K/uL    Baso # 0.02 0.00 - 0.20 K/uL    nRBC 0 0 /100 WBC    Gran % 60.9 38.0 - 73.0 %    Lymph % 16.3 (L) 18.0 - 48.0 %    Mono % 15.4 (H) 4.0 - 15.0 %    Eosinophil % 6.8 0.0 - 8.0 %    Basophil % 0.4 0.0 - 1.9 %    Differential Method Automated            Assessment/Diagnosis:    1. Urine retention  Procedure Order to Urology      2. Recurrent UTI  Procedure Order to Urology          Plans:    - I spent 30 minutes of the day of this encounter preparing for, treating and managing this patient. Extensive discussion with patient and her niece regarding the etiology and management of her araya dependent urinary retention and recurrent UTI.   - Scheduled for cystoscopy on 3/6/24.   - Will perform voiding trial a week after cystoscopy if no significant pathology identified.

## 2024-02-29 NOTE — PROGRESS NOTES
[facility] Established Patient/H&P:    Irene العراقي is a 83 y.o. female who presents for follow up for recurrent UTI and urinary retention.     Patient with a history of atrial fibrillation, CKD, HTN and thrombocytopenia who presented to the emergency department on 2/8/24 after outpatient labs revealed anemia and low platelets.      Renal ultrasound on 2/12/24 with unremarkable appearance of kidneys and bilateral pleural effusions. CT renal stone on 2/12/24 with moderate bilateral pleural effusions, defibrillator, diverticulosis and cholelithiasis. Admitted for management and started on Meropenem. Urine culture with ESBL E. Coli UTI. Urology consulted by Dr. Bruner to assist with management.      On review of records, she has had several 7 urinary tract infections over the past 12 months. Has a chronic araya due to urinary retention. No known constipation.      States she is from [place] and wishes to move back soon.      Interval History    2/29/24: Patient here today for follow up. Discharged to [facility]. Remains with a araya catheter in place. No recent voiding trial. Still waiting to move back to [place] soon. Has not had an infection since her hospitalization.  She is currently on Fosfomycin every 10 days per ID.     Denies any fever, chills, gross hematuria, flank pain, bone pain, unintentional weight loss,  trauma or history of  malignancy.         Urine culture  ESBL E. Coli   2/8/24  Entero              1/19/24  Klebsiella         12/11/23  Entero              9/21/23  Pantoea           9/9/23  E. Coli              5/9/23  E. Coli              4/25/23    Past Medical History:   Diagnosis Date    Anemia, unspecified     Atrial fibrillation 01/25/2021    CKD (chronic kidney disease)     Hypertension        Past Surgical History:   Procedure Laterality Date    A-V CARDIAC PACEMAKER INSERTION  11/2/2023    Procedure: Dual Chamber PPM RM 2617 ([employer]);  Surgeon:  "Andreas James III, MD;  Location: [facility] CATH;  Service: Cardiology;;    ANGIOGRAM, CORONARY, WITH LEFT HEART CATHETERIZATION Left 4/19/2023    Procedure: Angiogram, Coronary, with Left Heart Cath;  Surgeon: Kevin Redmond MD;  Location: [facility] CATH/EP LAB;  Service: Cardiology;  Laterality: Left;    BREAST SURGERY      COLONOSCOPY N/A 4/16/2023    Procedure: COLONOSCOPY;  Surgeon: Kvng Mensah MD;  Location: [facility] ENDO;  Service: Endoscopy;  Laterality: N/A;    COLONOSCOPY W/ POLYPECTOMY  04/16/2023    OPEN REDUCTION AND INTERNAL FIXATION (ORIF) OF INJURY OF ANKLE Right 12/13/2023    Procedure: ORIF, ANKLE;  Surgeon: Chaitanya Garrison MD;  Location: [facility] OR;  Service: Orthopedics;  Laterality: Right;  Synthes    TRANSCATHETER AORTIC VALVE REPLACEMENT (TAVR)  11/1/2023    Procedure: (TAVR);  Surgeon: Juliano Moncada MD;  Location: [facility] CATH;  Service: Cardiology;;    TRANSCATHETER AORTIC VALVE REPLACEMENT (TAVR)  11/1/2023    Procedure: (TAVR)- Surgeon;  Surgeon: Adebayo Guzman MD;  Location: [facility] CATH;  Service: Peripheral Vascular;;       Review of patient's allergies indicates:   Allergen Reactions    Cefuroxime     Hydrocodone     Meperidine     Meperidine hcl Nausea And Vomiting    Persantine [dipyridamole]     Nitrofurantoin macrocrystal      Headache , went into Afib     Vicodin [hydrocodone-acetaminophen] Nausea And Vomiting       Medications Reviewed: see MAR      FOCUSED PHYSICAL EXAM:    There were no vitals filed for this visit.  Body mass index is 25.18 kg/m². Weight: 70.8 kg (156 lb) Height: 5' 6" (167.6 cm)       General: Alert, cooperative, no distress, appears stated age  Abdomen: Soft, non-tender, no CVA tenderness, non-distended      LABS:    Recent Results (from the past 336 hour(s))   Comprehensive Metabolic Panel (CMP)    Collection Time: 02/16/24  4:11 AM   Result Value Ref Range    Sodium 138 136 - 145 mmol/L    Potassium 4.3 3.5 - 5.1 mmol/L    Chloride 106 95 - 110 mmol/L    CO2 25 23 " - 29 mmol/L    Glucose 88 70 - 110 mg/dL    BUN 15 8 - 23 mg/dL    Creatinine 1.1 0.5 - 1.4 mg/dL    Calcium 8.4 (L) 8.7 - 10.5 mg/dL    Total Protein 4.7 (L) 6.0 - 8.4 g/dL    Albumin 2.6 (L) 3.5 - 5.2 g/dL    Total Bilirubin 0.5 0.1 - 1.0 mg/dL    Alkaline Phosphatase 86 55 - 135 U/L    AST 13 10 - 40 U/L    ALT 11 10 - 44 U/L    eGFR 50 (A) >60 mL/min/1.73 m^2    Anion Gap 7 (L) 8 - 16 mmol/L   Magnesium    Collection Time: 02/16/24  4:11 AM   Result Value Ref Range    Magnesium 2.0 1.6 - 2.6 mg/dL   Phosphorus    Collection Time: 02/16/24  4:11 AM   Result Value Ref Range    Phosphorus 3.0 2.7 - 4.5 mg/dL   CBC with Automated Differential    Collection Time: 02/16/24  4:11 AM   Result Value Ref Range    WBC 5.20 3.90 - 12.70 K/uL    RBC 2.48 (L) 4.00 - 5.40 M/uL    Hemoglobin 7.5 (L) 12.0 - 16.0 g/dL    Hematocrit 23.0 (L) 37.0 - 48.5 %    MCV 93 82 - 98 fL    MCH 30.2 27.0 - 31.0 pg    MCHC 32.6 32.0 - 36.0 g/dL    RDW 13.8 11.5 - 14.5 %    Platelets 87 (L) 150 - 450 K/uL    MPV 10.0 9.2 - 12.9 fL    Immature Granulocytes 1.0 (H) 0.0 - 0.5 %    Gran # (ANC) 3.2 1.8 - 7.7 K/uL    Immature Grans (Abs) 0.05 (H) 0.00 - 0.04 K/uL    Lymph # 0.9 (L) 1.0 - 4.8 K/uL    Mono # 0.8 0.3 - 1.0 K/uL    Eos # 0.2 0.0 - 0.5 K/uL    Baso # 0.03 0.00 - 0.20 K/uL    nRBC 0 0 /100 WBC    Gran % 61.9 38.0 - 73.0 %    Lymph % 17.1 (L) 18.0 - 48.0 %    Mono % 15.4 (H) 4.0 - 15.0 %    Eosinophil % 4.0 0.0 - 8.0 %    Basophil % 0.6 0.0 - 1.9 %    Differential Method Automated    Soluble Transferrin Receptor    Collection Time: 02/16/24  4:11 AM   Result Value Ref Range    Sol. Transferrin Receptor 2.5 1.8 - 4.6 mg/L   Comprehensive Metabolic Panel (CMP)    Collection Time: 02/17/24  5:52 AM   Result Value Ref Range    Sodium 136 136 - 145 mmol/L    Potassium 4.3 3.5 - 5.1 mmol/L    Chloride 104 95 - 110 mmol/L    CO2 26 23 - 29 mmol/L    Glucose 81 70 - 110 mg/dL    BUN 15 8 - 23 mg/dL    Creatinine 1.0 0.5 - 1.4 mg/dL    Calcium 8.5  (L) 8.7 - 10.5 mg/dL    Total Protein 5.0 (L) 6.0 - 8.4 g/dL    Albumin 2.8 (L) 3.5 - 5.2 g/dL    Total Bilirubin 0.5 0.1 - 1.0 mg/dL    Alkaline Phosphatase 91 55 - 135 U/L    AST 15 10 - 40 U/L    ALT 10 10 - 44 U/L    eGFR 56 (A) >60 mL/min/1.73 m^2    Anion Gap 6 (L) 8 - 16 mmol/L   Magnesium    Collection Time: 02/17/24  5:52 AM   Result Value Ref Range    Magnesium 2.0 1.6 - 2.6 mg/dL   Phosphorus    Collection Time: 02/17/24  5:52 AM   Result Value Ref Range    Phosphorus 2.8 2.7 - 4.5 mg/dL   CBC with Automated Differential    Collection Time: 02/17/24  5:52 AM   Result Value Ref Range    WBC 4.93 3.90 - 12.70 K/uL    RBC 2.50 (L) 4.00 - 5.40 M/uL    Hemoglobin 7.6 (L) 12.0 - 16.0 g/dL    Hematocrit 23.2 (L) 37.0 - 48.5 %    MCV 93 82 - 98 fL    MCH 30.4 27.0 - 31.0 pg    MCHC 32.8 32.0 - 36.0 g/dL    RDW 14.6 (H) 11.5 - 14.5 %    Platelets 92 (L) 150 - 450 K/uL    MPV 10.3 9.2 - 12.9 fL    Immature Granulocytes 0.6 (H) 0.0 - 0.5 %    Gran # (ANC) 3.1 1.8 - 7.7 K/uL    Immature Grans (Abs) 0.03 0.00 - 0.04 K/uL    Lymph # 0.7 (L) 1.0 - 4.8 K/uL    Mono # 0.8 0.3 - 1.0 K/uL    Eos # 0.2 0.0 - 0.5 K/uL    Baso # 0.02 0.00 - 0.20 K/uL    nRBC 0 0 /100 WBC    Gran % 63.7 38.0 - 73.0 %    Lymph % 14.4 (L) 18.0 - 48.0 %    Mono % 16.4 (H) 4.0 - 15.0 %    Eosinophil % 4.5 0.0 - 8.0 %    Basophil % 0.4 0.0 - 1.9 %    Differential Method Automated    POCT glucose    Collection Time: 02/18/24  1:38 AM   Result Value Ref Range    POCT Glucose 94 70 - 110 mg/dL   Comprehensive Metabolic Panel (CMP)    Collection Time: 02/18/24  4:44 AM   Result Value Ref Range    Sodium 138 136 - 145 mmol/L    Potassium 4.4 3.5 - 5.1 mmol/L    Chloride 107 95 - 110 mmol/L    CO2 24 23 - 29 mmol/L    Glucose 85 70 - 110 mg/dL    BUN 18 8 - 23 mg/dL    Creatinine 1.0 0.5 - 1.4 mg/dL    Calcium 8.4 (L) 8.7 - 10.5 mg/dL    Total Protein 4.8 (L) 6.0 - 8.4 g/dL    Albumin 2.7 (L) 3.5 - 5.2 g/dL    Total Bilirubin 0.5 0.1 - 1.0 mg/dL     Alkaline Phosphatase 80 55 - 135 U/L    AST 14 10 - 40 U/L    ALT 10 10 - 44 U/L    eGFR 56 (A) >60 mL/min/1.73 m^2    Anion Gap 7 (L) 8 - 16 mmol/L   Magnesium    Collection Time: 02/18/24  4:44 AM   Result Value Ref Range    Magnesium 2.0 1.6 - 2.6 mg/dL   Phosphorus    Collection Time: 02/18/24  4:44 AM   Result Value Ref Range    Phosphorus 3.1 2.7 - 4.5 mg/dL   CBC with Automated Differential    Collection Time: 02/18/24  4:44 AM   Result Value Ref Range    WBC 4.23 3.90 - 12.70 K/uL    RBC 2.27 (L) 4.00 - 5.40 M/uL    Hemoglobin 7.0 (L) 12.0 - 16.0 g/dL    Hematocrit 21.3 (L) 37.0 - 48.5 %    MCV 94 82 - 98 fL    MCH 30.8 27.0 - 31.0 pg    MCHC 32.9 32.0 - 36.0 g/dL    RDW 15.1 (H) 11.5 - 14.5 %    Platelets 92 (L) 150 - 450 K/uL    MPV 10.2 9.2 - 12.9 fL    Immature Granulocytes 0.5 0.0 - 0.5 %    Gran # (ANC) 2.5 1.8 - 7.7 K/uL    Immature Grans (Abs) 0.02 0.00 - 0.04 K/uL    Lymph # 0.8 (L) 1.0 - 4.8 K/uL    Mono # 0.7 0.3 - 1.0 K/uL    Eos # 0.3 0.0 - 0.5 K/uL    Baso # 0.01 0.00 - 0.20 K/uL    nRBC 0 0 /100 WBC    Gran % 58.9 38.0 - 73.0 %    Lymph % 18.7 18.0 - 48.0 %    Mono % 15.6 (H) 4.0 - 15.0 %    Eosinophil % 6.1 0.0 - 8.0 %    Basophil % 0.2 0.0 - 1.9 %    Platelet Estimate Decreased (A)     Differential Method Automated    Comprehensive Metabolic Panel (CMP)    Collection Time: 02/19/24  4:34 AM   Result Value Ref Range    Sodium 138 136 - 145 mmol/L    Potassium 4.5 3.5 - 5.1 mmol/L    Chloride 106 95 - 110 mmol/L    CO2 24 23 - 29 mmol/L    Glucose 81 70 - 110 mg/dL    BUN 18 8 - 23 mg/dL    Creatinine 1.0 0.5 - 1.4 mg/dL    Calcium 8.4 (L) 8.7 - 10.5 mg/dL    Total Protein 4.9 (L) 6.0 - 8.4 g/dL    Albumin 2.7 (L) 3.5 - 5.2 g/dL    Total Bilirubin 0.6 0.1 - 1.0 mg/dL    Alkaline Phosphatase 83 55 - 135 U/L    AST 15 10 - 40 U/L    ALT 11 10 - 44 U/L    eGFR 56 (A) >60 mL/min/1.73 m^2    Anion Gap 8 8 - 16 mmol/L   Magnesium    Collection Time: 02/19/24  4:34 AM   Result Value Ref Range     Magnesium 2.0 1.6 - 2.6 mg/dL   Phosphorus    Collection Time: 02/19/24  4:34 AM   Result Value Ref Range    Phosphorus 3.0 2.7 - 4.5 mg/dL   CBC with Automated Differential    Collection Time: 02/19/24  4:34 AM   Result Value Ref Range    WBC 4.73 3.90 - 12.70 K/uL    RBC 2.37 (L) 4.00 - 5.40 M/uL    Hemoglobin 7.1 (L) 12.0 - 16.0 g/dL    Hematocrit 22.3 (L) 37.0 - 48.5 %    MCV 94 82 - 98 fL    MCH 30.0 27.0 - 31.0 pg    MCHC 31.8 (L) 32.0 - 36.0 g/dL    RDW 15.3 (H) 11.5 - 14.5 %    Platelets 105 (L) 150 - 450 K/uL    MPV 10.8 9.2 - 12.9 fL    Immature Granulocytes 0.2 0.0 - 0.5 %    Gran # (ANC) 2.9 1.8 - 7.7 K/uL    Immature Grans (Abs) 0.01 0.00 - 0.04 K/uL    Lymph # 0.8 (L) 1.0 - 4.8 K/uL    Mono # 0.7 0.3 - 1.0 K/uL    Eos # 0.3 0.0 - 0.5 K/uL    Baso # 0.02 0.00 - 0.20 K/uL    nRBC 0 0 /100 WBC    Gran % 60.9 38.0 - 73.0 %    Lymph % 16.3 (L) 18.0 - 48.0 %    Mono % 15.4 (H) 4.0 - 15.0 %    Eosinophil % 6.8 0.0 - 8.0 %    Basophil % 0.4 0.0 - 1.9 %    Differential Method Automated            Assessment/Diagnosis:    1. Urine retention  Procedure Order to Urology      2. Recurrent UTI  Procedure Order to Urology          Plans:    - I spent 30 minutes of the day of this encounter preparing for, treating and managing this patient. Extensive discussion with patient and her niece regarding the etiology and management of her araya dependent urinary retention and recurrent UTI.   - Scheduled for cystoscopy on 3/6/24.   - Will perform voiding trial a week after cystoscopy if no significant pathology identified.

## 2024-03-01 ENCOUNTER — TELEPHONE (OUTPATIENT)
Dept: HEMATOLOGY/ONCOLOGY | Facility: CLINIC | Age: 84
End: 2024-03-01
Payer: COMMERCIAL

## 2024-03-01 ENCOUNTER — TELEPHONE (OUTPATIENT)
Dept: UROLOGY | Facility: CLINIC | Age: 84
End: 2024-03-01
Payer: COMMERCIAL

## 2024-03-01 NOTE — TELEPHONE ENCOUNTER
----- Message from Emani Sequeira sent at 3/1/2024  3:34 PM CST -----  Contact: Pt'tonja robertson  Type: Needs Medical Advice    Who Called:  Patient's niece  What is this regarding?:  She is needing to reschedule the pt's appt.  Best Call Back Number:  Rosaline Diane at [phone]  Additional Information:  Please call the patient's niece back at the phone number listed above to advise. Thank you!

## 2024-03-01 NOTE — TELEPHONE ENCOUNTER
Spoke with pt yeison. Informed pt that the asc will call either today or Tuesday with the arrival. Pt niece verbalized understanding.

## 2024-03-01 NOTE — TELEPHONE ENCOUNTER
----- Message from Jackelyn Moreau sent at 3/1/2024  3:19 PM CST -----  Regarding: return call  Contact: Rosaline latham  Type:  Patient Returning Call    Who Called:  Rosaline latham   Who Left Message for Patient:    Does the patient know what this is regarding?:  scheduling  Best Call Back Number:  [phone]  Additional Information:  Please call to advise.  Thanks!

## 2024-03-04 ENCOUNTER — TELEPHONE (OUTPATIENT)
Dept: HEMATOLOGY/ONCOLOGY | Facility: CLINIC | Age: 84
End: 2024-03-04
Payer: COMMERCIAL

## 2024-03-04 ENCOUNTER — TELEPHONE (OUTPATIENT)
Dept: UROLOGY | Facility: CLINIC | Age: 84
End: 2024-03-04
Payer: COMMERCIAL

## 2024-03-04 NOTE — TELEPHONE ENCOUNTER
Spoke with patients niece. She states someone has already spoke with her in regards to the procedure time

## 2024-03-04 NOTE — TELEPHONE ENCOUNTER
----- Message from Clara Sawyer sent at 3/4/2024 10:56 AM CST -----  Type: Needs Medical Advice  Who Called:  pt yeison     Best Call Back Number: [phone]   Additional Information: needs to confirm procedure time so pt can request transportation at nursing home please advise

## 2024-03-04 NOTE — TELEPHONE ENCOUNTER
Called patient's niece to request that she comes to the office and fill out paper work to be added to the patients chart in order for us to update her on patients information.

## 2024-03-06 ENCOUNTER — HOSPITAL ENCOUNTER (OUTPATIENT)
Facility: HOSPITAL | Age: 84
Discharge: HOME | End: 2024-03-06
Attending: UROLOGY | Admitting: UROLOGY
Payer: COMMERCIAL

## 2024-03-06 DIAGNOSIS — R33.9 URINARY RETENTION: ICD-10-CM

## 2024-03-06 DIAGNOSIS — N39.0 RECURRENT UTI: Primary | ICD-10-CM

## 2024-03-06 PROCEDURE — 52310 CYSTOSCOPY AND TREATMENT: CPT | Performed by: UROLOGY

## 2024-03-06 PROCEDURE — 25000003 PHARM REV CODE 250: Performed by: UROLOGY

## 2024-03-06 PROCEDURE — 82365 CALCULUS SPECTROSCOPY: CPT | Performed by: UROLOGY

## 2024-03-06 PROCEDURE — 52310 CYSTOSCOPY AND TREATMENT: CPT | Mod: ,,, | Performed by: UROLOGY

## 2024-03-06 PROCEDURE — A4217 STERILE WATER/SALINE, 500 ML: HCPCS | Performed by: UROLOGY

## 2024-03-06 PROCEDURE — 52000 CYSTOURETHROSCOPY: CPT | Performed by: UROLOGY

## 2024-03-06 RX ORDER — LIDOCAINE HYDROCHLORIDE 20 MG/ML
JELLY TOPICAL
Status: DISCONTINUED | OUTPATIENT
Start: 2024-03-06 | End: 2024-03-06 | Stop reason: HOSPADM

## 2024-03-06 RX ORDER — WATER 1 ML/ML
IRRIGANT IRRIGATION
Status: DISCONTINUED | OUTPATIENT
Start: 2024-03-06 | End: 2024-03-06 | Stop reason: HOSPADM

## 2024-03-06 RX ORDER — ALPRAZOLAM 0.25 MG/1
0.25 TABLET ORAL 3 TIMES DAILY
COMMUNITY

## 2024-03-06 NOTE — OP NOTE
[facility]  Operative/Discharge Note    Date: 03/06/2024    Pre-Op Diagnosis: Recurrent UTI, urinary retention    Post-Op Diagnosis: Recurrent UTI, urinary retention, bladder stone    Procedure(s) Performed:   1.  Cystoscopy with removal of a 1 cm bladder stone    Specimen(s): Bladder stone    Staff Surgeon: Salome Zavala MD    Assistant Surgeon: Salome Zavala Jr, MD    Anesthesia: Local anesthesia topical 2% lidocaine gel    Indications: Irene العراقي is a 83 y.o. female with urinary retention and recurrent UTI.    Findings: 1 cm bladder stone that was extracted using a stone basket. Bladder with moderate trabeculations. Otherwise unremarkable cystoscopy.     Estimated Blood Loss: min    Drains: 16 [place] araya    Procedure in Detail:  After risks, benefits and possible complications of cystoscopy were explained, the patient elected to undergo the procedure and informed consent was obtained. All questions were answered in the yann-operative area. The patient was transferred to the cystoscopy suite and placed in the lithotomy position.  The patient was prepped and draped in the usual sterile fashion. Lidocaine jelly was administered for local anesthesia. Time out was performed.    A flexible cystoscope was introduced into the bladder per urethra. This passed easily.  The entire urethra was visualized which showed no masses or strictures.  The right and left ureteral orifices were identified in the normal anatomic position and were seen effluxing clear urine.  Formal cystoscopy was performed which revealed no masses or lesions suspicious for malignancy, moderate trabeculations, 1 bladder stone and no obvious bladder diverticula.      The Ncircle basket was used to remove the 1 cm bladder stone in its entirety. 16 [address] [place] araya replaced.     The patient tolerated the procedure well and was transferred to recovery in stable condition.    Disposition: Home    Discharge home today status post uncomplicated procedure  as above  Diet - resume home diet  Follow up: Nurse visit araya removal in 1 week. RTC with me in 3 months.   Instructions: Continue home medication.   Meds:     Medication List        CHANGE how you take these medications      magnesium oxide 400 mg (241.3 mg magnesium) tablet  Commonly known as: MAG-OX  TAKE 1 TABLET BY MOUTH ONCE DAILY  What changed: when to take this            CONTINUE taking these medications      ALPRAZolam 0.25 MG tablet  Commonly known as: XANAX     amiodarone 200 MG Tab  Commonly known as: PACERONE  Take 1 tablet (200 mg total) by mouth 2 (two) times daily.     amLODIPine 10 MG tablet  Commonly known as: NORVASC  Take 1 tablet (10 mg total) by mouth once daily.     apixaban 5 mg Tab  Commonly known as: ELIQUIS  Take 1 tablet (5 mg total) by mouth 2 (two) times daily.     carvediloL 6.25 MG tablet  Commonly known as: COREG  Take 1 tablet (6.25 mg total) by mouth 2 (two) times daily.     docusate sodium 100 MG capsule  Commonly known as: COLACE  Take 1 capsule (100 mg total) by mouth 2 (two) times daily.     famotidine 20 MG tablet  Commonly known as: PEPCID  Take 1 tablet (20 mg total) by mouth once daily.     fosfomycin 3 gram Pack  Commonly known as: MONUROL  Take 3 g by mouth every 10 days.              Salome Zavala Jr, MD

## 2024-03-06 NOTE — PLAN OF CARE
Discharge instructions given to pt/family, verbalized understanding.  Tolerating fluids.  Denies pain.  Rodriguez catheter noted.  Will f/u in office next week for removal.  Wheeled out to NH  per RN in no distress.  
VSS, all questions answered. Denies recent fever or illness. Pt states ready for procedure.   
[Negative] : Genitourinary

## 2024-03-08 VITALS
BODY MASS INDEX: 25.08 KG/M2 | HEIGHT: 66 IN | RESPIRATION RATE: 18 BRPM | DIASTOLIC BLOOD PRESSURE: 63 MMHG | HEART RATE: 82 BPM | SYSTOLIC BLOOD PRESSURE: 139 MMHG | TEMPERATURE: 98 F | OXYGEN SATURATION: 94 % | WEIGHT: 156.06 LBS

## 2024-03-12 LAB
COMPN STONE: NORMAL
LABORATORY COMMENT REPORT: NORMAL
SPECIMEN SOURCE: NORMAL
STONE ANALYSIS IR-IMP: NORMAL

## 2024-03-13 ENCOUNTER — CLINICAL SUPPORT (OUTPATIENT)
Dept: UROLOGY | Facility: CLINIC | Age: 84
End: 2024-03-13
Payer: COMMERCIAL

## 2024-03-13 DIAGNOSIS — Z95.0 PRESENCE OF CARDIAC PACEMAKER: ICD-10-CM

## 2024-03-13 DIAGNOSIS — N39.0 RECURRENT UTI: Primary | ICD-10-CM

## 2024-03-13 DIAGNOSIS — R00.1 BRADYCARDIA, UNSPECIFIED: ICD-10-CM

## 2024-03-13 DIAGNOSIS — R33.9 URINE RETENTION: ICD-10-CM

## 2024-03-13 PROCEDURE — 99499 UNLISTED E&M SERVICE: CPT | Mod: S$GLB,,, | Performed by: UROLOGY

## 2024-03-13 NOTE — PROGRESS NOTES
Patient here today to have his catheter removed.   ?  10 ml saline removed from catheter balloon.   16 fr Catheter removed .   Catheter bag contains 100 MLs of clear yellow urine   Informed patient to hydrate   Strict instructions to call us (if during clinic hours) or go to ED (if after hours) if can't void..  Patient left the office in satisfactory condition.

## 2024-03-15 ENCOUNTER — PATIENT MESSAGE (OUTPATIENT)
Dept: CARDIOLOGY | Facility: CLINIC | Age: 84
End: 2024-03-15
Payer: COMMERCIAL

## 2024-03-15 ENCOUNTER — OFFICE VISIT (OUTPATIENT)
Dept: HEMATOLOGY/ONCOLOGY | Facility: CLINIC | Age: 84
End: 2024-03-15
Payer: COMMERCIAL

## 2024-03-15 VITALS
TEMPERATURE: 96 F | DIASTOLIC BLOOD PRESSURE: 53 MMHG | HEIGHT: 66 IN | RESPIRATION RATE: 12 BRPM | BODY MASS INDEX: 25.19 KG/M2 | SYSTOLIC BLOOD PRESSURE: 109 MMHG | OXYGEN SATURATION: 97 % | HEART RATE: 67 BPM

## 2024-03-15 DIAGNOSIS — D53.0 ANEMIA DUE TO PROTEIN DEFICIENCY: ICD-10-CM

## 2024-03-15 DIAGNOSIS — I35.0 SEVERE AORTIC VALVE STENOSIS: ICD-10-CM

## 2024-03-15 DIAGNOSIS — D69.6 THROMBOCYTOPENIA: ICD-10-CM

## 2024-03-15 DIAGNOSIS — Z79.899 ON AMIODARONE THERAPY: Primary | ICD-10-CM

## 2024-03-15 DIAGNOSIS — N39.0 RECURRENT UTI: ICD-10-CM

## 2024-03-15 PROCEDURE — 99214 OFFICE O/P EST MOD 30 MIN: CPT | Mod: S$GLB,,, | Performed by: INTERNAL MEDICINE

## 2024-03-15 PROCEDURE — 99999 PR PBB SHADOW E&M-EST. PATIENT-LVL IV: CPT | Mod: PBBFAC,,, | Performed by: INTERNAL MEDICINE

## 2024-03-15 PROCEDURE — 1126F AMNT PAIN NOTED NONE PRSNT: CPT | Mod: CPTII,S$GLB,, | Performed by: INTERNAL MEDICINE

## 2024-03-15 PROCEDURE — 1111F DSCHRG MED/CURRENT MED MERGE: CPT | Mod: CPTII,S$GLB,, | Performed by: INTERNAL MEDICINE

## 2024-03-15 PROCEDURE — 3288F FALL RISK ASSESSMENT DOCD: CPT | Mod: CPTII,S$GLB,, | Performed by: INTERNAL MEDICINE

## 2024-03-15 PROCEDURE — 3078F DIAST BP <80 MM HG: CPT | Mod: CPTII,S$GLB,, | Performed by: INTERNAL MEDICINE

## 2024-03-15 PROCEDURE — 1101F PT FALLS ASSESS-DOCD LE1/YR: CPT | Mod: CPTII,S$GLB,, | Performed by: INTERNAL MEDICINE

## 2024-03-15 PROCEDURE — 3074F SYST BP LT 130 MM HG: CPT | Mod: CPTII,S$GLB,, | Performed by: INTERNAL MEDICINE

## 2024-03-15 PROCEDURE — 1159F MED LIST DOCD IN RCRD: CPT | Mod: CPTII,S$GLB,, | Performed by: INTERNAL MEDICINE

## 2024-03-15 RX ORDER — MICONAZOLE NITRATE 2 %
POWDER (GRAM) TOPICAL
COMMUNITY

## 2024-03-15 RX ORDER — ONDANSETRON 4 MG/1
4 TABLET, FILM COATED ORAL EVERY 8 HOURS PRN
COMMUNITY

## 2024-03-15 RX ORDER — METOCLOPRAMIDE HYDROCHLORIDE 5 MG/1
TABLET, ORALLY DISINTEGRATING ORAL 3 TIMES DAILY
COMMUNITY

## 2024-03-15 RX ORDER — BUSPIRONE HYDROCHLORIDE 5 MG/1
5 TABLET ORAL 3 TIMES DAILY
COMMUNITY

## 2024-03-15 NOTE — PROGRESS NOTES
Subjective:       Patient ID: Irene العراقي is a 83 y.o. female.    Chief Complaint: Anemia    HPI  patient was seen by me in the hospital admitted for recurrent UTIs on multiple antibiotics  She was found to be thrombocytopenic and anemic even higher with this got worsened monitored . require transfusions got supported and discharged home she is here for hospital follow-up  Review of Systems    Patient denies issues related to appetite or recent weight change.  Feels well overall.  Denies issues with generalized weakness .  Denies fatigue over above what is normally experienced with day-to-day activities  Denies fever, chills, rigors  Denies issues with ambulation  Denies generalized swelling or new lumps and bumps felt in any part  of body  Denies visual or hearing loss  Denies issues with congestion, sinus issues, cough, sputum production runny nose or itching eyes  Denies chest pain or palpitations, or passing out  Denies abdominal pain, reflux symptoms, nausea vomiting loose stools or constipation  Denies seizure activity or focal weaknesses or symptoms related to TIA, no head aches or blurred vision reported  Denies issues with skin rash or bruising  Denies issues with swelling of feet, tingling or numbness   No issues with sleep,   No recent foreign travel   Good family support reported      Gets antibiotic q 10 days for recurrent UTIs and follows with  urology      Past Medical History:   Diagnosis Date    Anemia, unspecified     Atrial fibrillation 01/25/2021    CKD (chronic kidney disease)     Hypertension      Past Surgical History:   Procedure Laterality Date    A-V CARDIAC PACEMAKER INSERTION  11/2/2023    Procedure: Dual Chamber PPM RM 2617 ([employer]);  Surgeon: Andreas James III, MD;  [facility];  Service: Cardiology;;    ANGIOGRAM, CORONARY, WITH LEFT HEART CATHETERIZATION Left 4/19/2023    Procedure: Angiogram, Coronary, with Left Heart Cath;  Surgeon: Kevin Redmond MD;  Location: [facility]  CATH/EP LAB;  Service: Cardiology;  Laterality: Left;    BREAST SURGERY      COLONOSCOPY N/A 4/16/2023    Procedure: COLONOSCOPY;  Surgeon: Kvng Mensah MD;  Location: [facility] ENDO;  Service: Endoscopy;  Laterality: N/A;    COLONOSCOPY W/ POLYPECTOMY  04/16/2023    CYSTOSCOPY N/A 3/6/2024    Procedure: CYSTOSCOPY;  Surgeon: Salome Zavala Jr., MD;  Location: [facility] ASU OR;  Service: Urology;  Laterality: N/A;    OPEN REDUCTION AND INTERNAL FIXATION (ORIF) OF INJURY OF ANKLE Right 12/13/2023    Procedure: ORIF, ANKLE;  Surgeon: Chaitanya Garrison MD;  Location: [facility] OR;  Service: Orthopedics;  Laterality: Right;  Synthes    TRANSCATHETER AORTIC VALVE REPLACEMENT (TAVR)  11/1/2023    Procedure: (TAVR);  Surgeon: Juliano Moncada MD;  Location: [facility] CATH;  Service: Cardiology;;    TRANSCATHETER AORTIC VALVE REPLACEMENT (TAVR)  11/1/2023    Procedure: (TAVR)- Surgeon;  Surgeon: Adebayo Guzman MD;  Location: [facility] CATH;  Service: Peripheral Vascular;;     Family History   Problem Relation Age of Onset    Cancer Mother     Cancer Father       Social History     Socioeconomic History    Marital status: Single   Tobacco Use    Smoking status: Former     Types: Cigarettes     Passive exposure: Past    Smokeless tobacco: Never   Substance and Sexual Activity    Alcohol use: Not Currently    Drug use: Not Currently     Social Determinants of Health     Financial Resource Strain: Low Risk  (2/9/2024)    Overall Financial Resource Strain (CARDIA)     Difficulty of Paying Living Expenses: Not very hard   Food Insecurity: No Food Insecurity (2/9/2024)    Hunger Vital Sign     Worried About Running Out of Food in the Last Year: Never true     Ran Out of Food in the Last Year: Never true   Transportation Needs: No Transportation Needs (2/9/2024)    PRAPARE - Transportation     Lack of Transportation (Medical): No     Lack of Transportation (Non-Medical): No   Physical Activity: Unknown (11/30/2023)    Exercise Vital Sign     Days  of Exercise per Week: Patient declined     Minutes of Exercise per Session: 0 min   Stress: Patient Declined (2/9/2024)    [employer] - Occupational Stress Questionnaire     Feeling of Stress : Patient declined   Social Connections: Socially Isolated (11/30/2023)    Social Connection and Isolation Panel [NHANES]     Frequency of Communication with Friends and Family: More than three times a week     Frequency of Social Gatherings with Friends and Family: Three times a week     Attends Mosque Services: Never     Active Member of Clubs or Organizations: No     Attends Club or Organization Meetings: Patient declined     Marital Status: Never    Housing Stability: Low Risk  (2/9/2024)    Housing Stability Vital Sign     Unable to Pay for Housing in the Last Year: No     Number of Places Lived in the Last Year: 1     Unstable Housing in the Last Year: No     Review of patient's allergies indicates:   Allergen Reactions    Cefuroxime     Hydrocodone     Meperidine     Meperidine hcl Nausea And Vomiting    Persantine [dipyridamole]     Nitrofurantoin macrocrystal      Headache , went into Afib     Vicodin [hydrocodone-acetaminophen] Nausea And Vomiting       Current Outpatient Medications:     ALPRAZolam (XANAX) 0.25 MG tablet, Take 0.25 mg by mouth 3 (three) times daily., Disp: , Rfl:     amiodarone (PACERONE) 200 MG Tab, Take 1 tablet (200 mg total) by mouth 2 (two) times daily. (Patient taking differently: Take 200 mg by mouth once daily.), Disp: 60 tablet, Rfl: 1    amLODIPine (NORVASC) 10 MG tablet, Take 1 tablet (10 mg total) by mouth once daily., Disp: 30 tablet, Rfl: 11    apixaban (ELIQUIS) 5 mg Tab, Take 1 tablet (5 mg total) by mouth 2 (two) times daily., Disp: 60 tablet, Rfl: 2    busPIRone (BUSPAR) 5 MG Tab, Take 5 mg by mouth 3 (three) times daily., Disp: , Rfl:     carvediloL (COREG) 6.25 MG tablet, Take 1 tablet (6.25 mg total) by mouth 2 (two) times daily., Disp: 60  tablet, Rfl: 11    docusate sodium (COLACE) 100 MG capsule, Take 1 capsule (100 mg total) by mouth 2 (two) times daily., Disp: 60 capsule, Rfl: 5    famotidine (PEPCID) 20 MG tablet, Take 1 tablet (20 mg total) by mouth once daily., Disp: 30 tablet, Rfl: 11    fosfomycin (MONUROL) 3 gram Pack, Take 3 g by mouth every 10 days., Disp: 3 packet, Rfl: 2    magnesium oxide (MAG-OX) 400 mg (241.3 mg magnesium) tablet, TAKE 1 TABLET BY MOUTH ONCE DAILY (Patient taking differently: Take 400 mg by mouth once daily.), Disp: 90 tablet, Rfl: 3    metoclopramide HCl 5 mg TbDL, Take by mouth 3 (three) times daily., Disp: , Rfl:     miconazole NITRATE 2 % (MICOTIN) 2 % top powder, Apply topically as needed for Itching., Disp: , Rfl:     ondansetron (ZOFRAN) 4 MG tablet, Take 4 mg by mouth every 8 (eight) hours as needed for Nausea., Disp: , Rfl:     polyethylene glycol 3350 (MIRALAX ORAL), Take by mouth 2 (two) times a day., Disp: , Rfl:     Physical Exam    Wt Readings from Last 3 Encounters:   03/01/24 70.8 kg (156 lb 1.4 oz)   02/29/24 70.8 kg (156 lb)   02/21/24 70.8 kg (156 lb)     Temp Readings from Last 3 Encounters:   03/15/24 96.3 °F (35.7 °C) (Temporal)   03/06/24 97.9 °F (36.6 °C) (Skin)   02/19/24 97.4 °F (36.3 °C) (Oral)     BP Readings from Last 3 Encounters:   03/15/24 (!) 109/53   03/06/24 139/63   02/21/24 126/70     Pulse Readings from Last 3 Encounters:   03/15/24 67   03/06/24 82   02/21/24 64    VITAL SIGNS:  as above   GENERAL: appears well-built, well-nourished.  No anxiety, no agitation, and in no distress.  Patient is awake, alert, oriented and cooperative.  HEENT:  Showed no congestion. Trachea is central no obvious icterus or pallor noted no hoarseness. no obvious JVD   NECK:  Supple.  No JVD. No obvious cervical submental or supraclavicular adenopathy.  RS:the visualized portion of  Chest expands well. chest appears symmetric, no audible wheezes.  No dyspnea recognized  ABDOMEN:  abdomen appears  undistended.  EXTREMITIES:  Without edema.  NEUROLOGICAL:  The patient is appropriate, higher functions are normal.  No  obvious neurological deficits.  normal judgement normal thought content  No confusion, no speech impediment. Cranial nerves are intact and show no deficit. No gross motor deficits noted   SKIN MUSCULOSKELETAL: no joint or skeletal deformity, no clubbing of nails.  No visible rash ecchymosis or petechiae   Lab Results   Component Value Date    WBC 4.73 02/19/2024    HGB 7.1 (L) 02/19/2024    HCT 22.3 (L) 02/19/2024    MCV 94 02/19/2024     (L) 02/19/2024       BMP  Lab Results   Component Value Date     02/19/2024    K 4.5 02/19/2024     02/19/2024    CO2 24 02/19/2024    BUN 18 02/19/2024    CREATININE 1.0 02/19/2024    CALCIUM 8.4 (L) 02/19/2024    ANIONGAP 8 02/19/2024    ESTGFRAFRICA >60.0 07/24/2022    EGFRNONAA >60.0 07/24/2022     Lab Results   Component Value Date    IRON 86 02/09/2024    TIBC 225 (L) 02/09/2024    FERRITIN 356 (H) 02/09/2024         Patient Active Problem List   Diagnosis    Peripheral vertigo of both ears    Thrombocytopenia    PAF (paroxysmal atrial fibrillation)    Anemia    Anxiety    History of COVID-19    Severe aortic valve stenosis    Current use of long term anticoagulation    Hypertension    Tremors of nervous system    Acquired hammer toe of right foot    Primary osteoarthritis of right knee    Osteoporosis    Family history of rectal cancer    Diverticulosis of large intestine without perforation or abscess without bleeding    Depression    Colon polyps    Chronic idiopathic constipation    Stage 3b chronic kidney disease    NYHA class 3 heart failure with preserved ejection fraction    S/P TAVR (transcatheter aortic valve replacement)    On amiodarone therapy    Recurrent UTI    Postural hypotension    Status post open reduction with internal fixation (ORIF) of fracture of ankle    Urinary retention        Assessment and Plan   Anemia and  thrombocytopenia  Hgb 8.4 plts 113 3/7/24.  Patient is given these test done at  [facility]. these seem to be somewhat improved since hospital stay   Would need bone marrow bx buyt she is moving back to [facility] within the next week or two.  They have assured me they will get hematology oncology follow-up set up in [place] to continue chemo follow-up and perhaps get bone marrow biopsy  Patient was also requiring urology follow-up as soon as possible in [place]  She has already established cardiology follow-up there  Advised her to come back to clinic if she continues to stay here otherwise continue follow-up in [place]

## 2024-03-18 ENCOUNTER — TELEPHONE (OUTPATIENT)
Dept: CARDIOLOGY | Facility: CLINIC | Age: 84
End: 2024-03-18
Payer: COMMERCIAL

## 2024-03-18 NOTE — TELEPHONE ENCOUNTER
----- Message from Lay Harley sent at 3/18/2024  9:09 AM CDT -----  Type:  Needs Medical Advice    Who Called: pt nephnarciso cuellar   Todd Call Back Number: [phone] (home)     Additional Information:  Requesting call back sts pt will be flying from [place] back to [place] and will need oxygen to fly on a airplane.. requesting rx for oxygen  before Wednesday           please advise thank you

## 2024-03-20 NOTE — TELEPHONE ENCOUNTER
Spoke to Roger. Told him I called [facility] and they stated they don't allow patients to fly with o2 tanks. That they would need to buy a portable tank that it wasn't something that they rented.

## 2024-03-22 ENCOUNTER — PATIENT MESSAGE (OUTPATIENT)
Dept: CARDIOLOGY | Facility: CLINIC | Age: 84
End: 2024-03-22
Payer: COMMERCIAL

## 2024-05-20 PROBLEM — N39.0 RECURRENT UTI: Status: RESOLVED | Noted: 2023-12-12 | Resolved: 2024-05-20

## 2024-10-17 DIAGNOSIS — Z78.0 MENOPAUSE: ICD-10-CM

## 2025-03-10 ENCOUNTER — PATIENT MESSAGE (OUTPATIENT)
Dept: ADMINISTRATIVE | Facility: HOSPITAL | Age: 85
End: 2025-03-10
Payer: MEDICARE

## 2025-08-20 ENCOUNTER — PATIENT MESSAGE (OUTPATIENT)
Dept: ADMINISTRATIVE | Facility: HOSPITAL | Age: 85
End: 2025-08-20
Payer: MEDICARE

## (undated) DEVICE — Device

## (undated) DEVICE — BAG DRAIN ANTI REFLUX 2000ML

## (undated) DEVICE — DEVICE ANC SW STAT FOLEY 6-24

## (undated) DEVICE — CUFF TOURNIQUET 34DUAL PRT 5921-034-235

## (undated) DEVICE — UNDERGLOVE BIOGEL MICRO BLUE SZ 8.5

## (undated) DEVICE — GUIDEWIRE REG. ANGLED .035X260 LAUREATE

## (undated) DEVICE — BANDAGE ESMARK 6X9 55516

## (undated) DEVICE — BANDAGE ACE STERILE 4 REB3114

## (undated) DEVICE — GOWN X-LG STERILE BACK

## (undated) DEVICE — CATHETER DIAGNOSTIC DXTERITY 5FR JL4.0

## (undated) DEVICE — SPONGE GAUZE 16PLY 4X4

## (undated) DEVICE — PAD BOVIE ADULT

## (undated) DEVICE — GLOVE BIOGEL PI GOLD SZ  8.5

## (undated) DEVICE — SOLUTION IRRI NS BOTTLE 1000ML R5200-01

## (undated) DEVICE — SHEATH INTRODUCER SLENDER 5FX10CM

## (undated) DEVICE — GUIDEWIRE J TIP EXCHANGE FIXED CORE 260

## (undated) DEVICE — EXTRACTOR TIPLESS 3FR 115 CM

## (undated) DEVICE — SCRUB 10% POVIDONE IODINE 4OZ

## (undated) DEVICE — TRAY LOWER EXTREMITY  SMHS029-05

## (undated) DEVICE — PADDING CAST 4 STERILE 30-321

## (undated) DEVICE — SUTURE VICRYL 2-0 CT-1 36 VCP945H

## (undated) DEVICE — DRAPE MEDIUM SHEET 40X70IN

## (undated) DEVICE — CATHETER DIAGNOSTIC DXTERITY 5FR JR4.0

## (undated) DEVICE — STERISTRIP 1/2 R1547

## (undated) DEVICE — SET CYSTO IRR DRP CHMBR 84IN

## (undated) DEVICE — GLOVE SENSICARE PI ALOE 7.5

## (undated) DEVICE — DRAPE C-ARM (FITS NEW C-ARM) 07-CA108

## (undated) DEVICE — SUCTION YANKAUER 34970